# Patient Record
Sex: MALE | Race: BLACK OR AFRICAN AMERICAN | NOT HISPANIC OR LATINO | Employment: OTHER | URBAN - METROPOLITAN AREA
[De-identification: names, ages, dates, MRNs, and addresses within clinical notes are randomized per-mention and may not be internally consistent; named-entity substitution may affect disease eponyms.]

---

## 2017-01-26 DIAGNOSIS — E11.9 TYPE 2 DIABETES MELLITUS WITHOUT COMPLICATION, WITH LONG-TERM CURRENT USE OF INSULIN (H): Primary | ICD-10-CM

## 2017-01-26 DIAGNOSIS — Z79.4 TYPE 2 DIABETES MELLITUS WITHOUT COMPLICATION, WITH LONG-TERM CURRENT USE OF INSULIN (H): Primary | ICD-10-CM

## 2017-01-26 NOTE — TELEPHONE ENCOUNTER
patient needs new diabetic supplies (we are giving him a meter).  has appointment scheduled for 2/3/17.    If patient does not  the meter at our clinic he can call [facility] at [phone] and they will mail him a free meter    Nader Hernandez,   [facility]  [phone]

## 2017-01-27 RX ORDER — BLOOD-GLUCOSE METER
EACH MISCELLANEOUS
Qty: 1 KIT | Refills: 0 | Status: SHIPPED | OUTPATIENT
Start: 2017-01-27 | End: 2017-07-05

## 2017-02-03 ENCOUNTER — OFFICE VISIT (OUTPATIENT)
Dept: FAMILY MEDICINE | Facility: CLINIC | Age: 57
End: 2017-02-03

## 2017-02-03 VITALS
DIASTOLIC BLOOD PRESSURE: 88 MMHG | SYSTOLIC BLOOD PRESSURE: 120 MMHG | HEART RATE: 74 BPM | OXYGEN SATURATION: 98 % | BODY MASS INDEX: 30.79 KG/M2 | WEIGHT: 185 LBS

## 2017-02-03 DIAGNOSIS — K21.9 GASTROESOPHAGEAL REFLUX DISEASE, ESOPHAGITIS PRESENCE NOT SPECIFIED: ICD-10-CM

## 2017-02-03 DIAGNOSIS — E11.21 TYPE 2 DIABETES MELLITUS WITH DIABETIC NEPHROPATHY, WITHOUT LONG-TERM CURRENT USE OF INSULIN (H): ICD-10-CM

## 2017-02-03 DIAGNOSIS — R07.0 THROAT PAIN: Primary | ICD-10-CM

## 2017-02-03 PROCEDURE — 99214 OFFICE O/P EST MOD 30 MIN: CPT | Performed by: FAMILY MEDICINE

## 2017-02-03 PROCEDURE — 36415 COLL VENOUS BLD VENIPUNCTURE: CPT | Performed by: FAMILY MEDICINE

## 2017-02-03 RX ORDER — LANSOPRAZOLE 30 MG/1
30 CAPSULE, DELAYED RELEASE ORAL DAILY
Qty: 30 CAPSULE | Refills: 1 | Status: SHIPPED | OUTPATIENT
Start: 2017-02-03 | End: 2017-02-09

## 2017-02-03 NOTE — Clinical Note
[place] [address] [place]  [address]  Phone: [phone]    February 7, 2017      Emily Zhu  [address]  [facility] [address]              Dear Emily,    The A1c is better again, recheck in 3-4 months. The cholesterol and triglycerides are elevated, of course. Have you ever tried pravastatin(Pravachol) in the past. It causes fewer muscle aches then Crestor. Please let me know.          Sincerely,     Gianluca Apodaca M.D.    Results for orders placed or performed in visit on 02/03/17   Lipid Panel ([facility])   Result Value Ref Range    Cholesterol 255 (H) 100 - 199 mg/dL    Triglycerides 644 (HH) 0 - 149 mg/dL    HDL Cholesterol 38 (L) >39 mg/dL    VLDL Cholesterol Lester Comment 5 - 40 mg/dL    LDL Cholesterol Calculated Comment 0 - 99 mg/dL    LDL/HDL Ratio CANCELED ratio units    Narrative    Performed at:  01 - [facility] [address], [place], [place]  [id number]  : Rafat Chew MD, Phone:  [phone]   Hemoglobin A1C ([facility])   Result Value Ref Range    Hemoglobin A1C 8.1 (H) 4.8 - 5.6 %    Narrative    Performed at:  01 - [facility] [address], [place], [place]  [id number]  : Rafat Chew MD, Phone:  [phone]

## 2017-02-03 NOTE — MR AVS SNAPSHOT
"              After Visit Summary   2/3/2017    Emily Zhu    MRN: [id number]           Patient Information     Date Of Birth          1960        Visit Information        Provider Department      2/3/2017 2:30 PM Gianluca Apodaca MD [facility]        Today's Diagnoses     Throat pain    -  1     Type 2 diabetes mellitus with diabetic nephropathy, without long-term current use of insulin (H)         Gastroesophageal reflux disease, esophagitis presence not specified            Follow-ups after your visit        Additional Services     Referral to [facility]       Referral to [facility]  Phone:  [phone]  Reason for Referral:  consult    Please be aware that coverage of these services is subject to the terms and limitations of your health insurance plan.  Call member services at your health plan with any benefit or coverage questions.                  Who to contact     If you have questions or need follow up information about today's clinic visit or your schedule please contact [facility] directly at [phone].  Normal or non-critical lab and imaging results will be communicated to you by [employer], letter or phone within 4 business days after the clinic has received the results. If you do not hear from us within 7 days, please contact the clinic through [employer] or phone. If you have a critical or abnormal lab result, we will notify you by phone as soon as possible.  Submit refill requests through [employer] or call your pharmacy and they will forward the refill request to us. Please allow 3 business days for your refill to be completed.          Additional Information About Your Visit        [employer] Information     [employer] lets you send messages to your doctor, view your test results, renew your prescriptions, schedule appointments and more. To sign up, go to [email or web address] . Click on \"Log in\" on " "the left side of the screen, which will take you to the Welcome page. Then click on \"Sign up Now\" on the right side of the page.     You will be asked to enter the access code listed below, as well as some personal information. Please follow the directions to create your username and password.     Your access code is: [email or web address]  Expires: 2017  2:50 PM     Your access code will  in 90 days. If you need help or a new code, please call your [facility] or [phone].        Care EveryWhere ID     This is your Care EveryWhere ID. This could be used by other organizations to access your [facility] medical records  [id number]        Your Vitals Were     Pulse Pulse Oximetry                74 98%           Blood Pressure from Last 3 Encounters:   17 120/88   10/21/16 120/80   16 148/90    Weight from Last 3 Encounters:   17 83.915 kg (185 lb)   16 83.915 kg (185 lb)   16 84.1 kg (185 lb 6.5 oz)              We Performed the Following     Hemoglobin A1C ([facility])     Lipid Panel ([facility])     Referral to [facility]          Today's Medication Changes          These changes are accurate as of: 2/3/17  2:50 PM.  If you have any questions, ask your nurse or doctor.               Start taking these medicines.        Dose/Directions    LANsoprazole 30 MG CR capsule   Commonly known as:  PREVACID   Used for:  Gastroesophageal reflux disease, esophagitis presence not specified   Started by:  Gianluca Apodaca MD        Dose:  30 mg   Take 1 capsule (30 mg) by mouth daily Take 30-60 minutes before a meal.   Quantity:  30 capsule   Refills:  1            Where to get your medicines      These medications were sent to [facility] [address] [place] [id number]     Phone:  [phone]    - LANsoprazole 30 MG CR capsule             Primary Care Provider Office Phone # Fax #    Gianluca " RAMESH Apodaca -[phone]       [facility] [address]  [facility] [address]        Thank you!     Thank you for choosing [facility]  for your care. Our goal is always to provide you with excellent care. Hearing back from our patients is one way we can continue to improve our services. Please take a few minutes to complete the written survey that you may receive in the mail after your visit with us. Thank you!             Your Updated Medication List - Protect others around you: Learn how to safely use, store and throw away your medicines at www.disposemymeds.org.          This list is accurate as of: 2/3/17  2:50 PM.  Always use your most recent med list.                   Brand Name Dispense Instructions for use    blood glucose calibration solution     1 each    Use to calibrate blood glucose monitor as directed.       blood glucose monitoring lancets     1 Box    Use to test blood sugars 2 times daily or as directed.       blood glucose monitoring meter device kit     1 kit    Use to test blood sugars 2 times daily or as directed.       blood glucose monitoring test strip    ONE TOUCH VERIO IQ    100 strip    Use to test blood sugars 2 times daily or as directed.       clopidogrel 75 MG tablet    PLAVIX    90 tablet    TAKE ONE TABLET BY MOUTH ONCE DAILY       glipiZIDE 5 MG 24 hr tablet    GLUCOTROL XL    90 tablet    TAKE ONE TABLET BY MOUTH ONCE DAILY       isosorbide mononitrate 30 MG 24 hr tablet    IMDUR    90 tablet    TAKE ONE TABLET BY MOUTH ONCE DAILY       LANsoprazole 30 MG CR capsule    PREVACID    30 capsule    Take 1 capsule (30 mg) by mouth daily Take 30-60 minutes before a meal.       lisinopril 20 MG tablet    PRINIVIL/ZESTRIL    90 tablet    TAKE ONE TABLET BY MOUTH ONCE DAILY       metFORMIN 1000 MG tablet    GLUCOPHAGE    180 tablet    TAKE ONE TABLET BY MOUTH TWICE DAILY WITH MEALS       metoprolol 25 MG tablet    LOPRESSOR    180 tablet     Take 1 tablet (25 mg) by mouth 2 times daily

## 2017-02-03 NOTE — PROGRESS NOTES
SUBJECTIVE: Emily Zhu is a 57 year old male who is here for a DM 2 visit.   A1C      9.3   10/21/2016  A1C     11.4   6/15/2016  A1C      8.0   11/20/2015  A1C      9.2   7/24/2015  A1C      7.2   12/10/2014  Glucose monitoring is done daily, with am sugars averaging between 100-200. Compliance has been good with diet and medications. Has been feeling well, without polyuria, sensory or visual changes. No foot issues.    GENERAL: No change in weight, sleep or appetite.  Normal energy.  No fever or chills  ENT: throat discomfort  RESP: No coughing, wheezing or shortness of breath  CV: No chest pains or palpitations  GI: occasionally heartburn  : No urinary frequency  OBJECTIVE: /88 mmHg  Pulse 74  Wt 83.915 kg (185 lb)  SpO2 98%   Constitutional: healthy, alert and no distress  HEENT pharynx injected, no nodes. Smoker.  Cardiovascular: PMI normal. No lifts, heaves, or thrills. RRR. No murmurs, clicks gallops or rub, No edema or JVD.  Respiratory:  Good diaphragmatic excursion. Lungs clear  Psychiatric: affect normal/bright and mentation appears normal.  ASSESSMENT:   Pharyngitis, poss PND, or reflux  DM 2 ? Controlled, with microalbuminuria  BP at goal.    PLAN: Continue efforts to follow good diet and exercise regimen.  Orders Placed This Encounter   Procedures     VENOUS COLLECTION     Lipid Panel ([facility])     Hemoglobin A1C ([facility])     Referral to [facility]     Prevacid 30 mg daily for 4 weeks, refer to ENT if still uncomfortable throat area

## 2017-02-04 LAB
CHOLEST SERPL-MCNC: 255 MG/DL (ref 100–199)
HBA1C MFR BLD: 8.1 % (ref 4.8–5.6)
HDLC SERPL-MCNC: 38 MG/DL
LDL/HDL RATIO: ABNORMAL RATIO UNITS
LDLC SERPL CALC-MCNC: ABNORMAL MG/DL (ref 0–99)
TRIGL SERPL-MCNC: 644 MG/DL (ref 0–149)
VLDLC SERPL CALC-MCNC: ABNORMAL MG/DL (ref 5–40)

## 2017-02-07 NOTE — PROGRESS NOTES
Quick Note:    The A1c is better again, recheck in 3-4 months. The cholesterol and triglycerides are elevated, of course. Have you ever tried pravastatin(Pravachol) in the past. It causes fewer muscle aches then Crestor. Please let me know.  Dr. Apodaca    ______

## 2017-02-09 ENCOUNTER — TELEPHONE (OUTPATIENT)
Dept: FAMILY MEDICINE | Facility: CLINIC | Age: 57
End: 2017-02-09

## 2017-02-09 DIAGNOSIS — K21.9 ESOPHAGEAL REFLUX: Primary | ICD-10-CM

## 2017-02-09 RX ORDER — PANTOPRAZOLE SODIUM 20 MG/1
TABLET, DELAYED RELEASE ORAL
Qty: 90 TABLET | Refills: 1 | Status: SHIPPED | OUTPATIENT
Start: 2017-02-09 | End: 2018-12-12

## 2017-02-09 NOTE — TELEPHONE ENCOUNTER
Received fax from [facility] pharmacy that patients rx for Lansoprazole needs PA.  Submitted PA to [facility].  Received fax back that it was denied.  Suggestions on formulary are Nexium 24 hr OTC, generic Prilosec or generic Protonix.  Per Dr Apodaca patient can try generic Protonix.  Sent over rx to [facility] pharmacy and notified patient.

## 2017-02-14 ENCOUNTER — APPOINTMENT (OUTPATIENT)
Dept: MRI IMAGING | Facility: CLINIC | Age: 57
End: 2017-02-14
Attending: EMERGENCY MEDICINE
Payer: COMMERCIAL

## 2017-02-14 ENCOUNTER — HOSPITAL ENCOUNTER (EMERGENCY)
Facility: CLINIC | Age: 57
Discharge: HOME | End: 2017-02-14
Attending: EMERGENCY MEDICINE | Admitting: EMERGENCY MEDICINE
Payer: COMMERCIAL

## 2017-02-14 VITALS
TEMPERATURE: 97.8 F | HEART RATE: 83 BPM | WEIGHT: 180 LBS | SYSTOLIC BLOOD PRESSURE: 135 MMHG | HEIGHT: 64 IN | RESPIRATION RATE: 16 BRPM | DIASTOLIC BLOOD PRESSURE: 94 MMHG | OXYGEN SATURATION: 98 % | BODY MASS INDEX: 30.73 KG/M2

## 2017-02-14 DIAGNOSIS — R07.9 CHEST PAIN, UNSPECIFIED TYPE: ICD-10-CM

## 2017-02-14 DIAGNOSIS — Z86.73 HISTORY OF STROKE: ICD-10-CM

## 2017-02-14 DIAGNOSIS — Z86.79 HISTORY OF CORONARY ARTERY DISEASE: ICD-10-CM

## 2017-02-14 DIAGNOSIS — R20.2 PARESTHESIAS: ICD-10-CM

## 2017-02-14 LAB
ANION GAP SERPL CALCULATED.3IONS-SCNC: 8 MMOL/L (ref 3–14)
BASOPHILS # BLD AUTO: 0 10E9/L (ref 0–0.2)
BASOPHILS NFR BLD AUTO: 0.4 %
BUN SERPL-MCNC: 21 MG/DL (ref 7–30)
CALCIUM SERPL-MCNC: 8.5 MG/DL (ref 8.5–10.1)
CHLORIDE SERPL-SCNC: 101 MMOL/L (ref 94–109)
CO2 SERPL-SCNC: 26 MMOL/L (ref 20–32)
CREAT SERPL-MCNC: 1.29 MG/DL (ref 0.66–1.25)
DIFFERENTIAL METHOD BLD: ABNORMAL
EOSINOPHIL # BLD AUTO: 0.2 10E9/L (ref 0–0.7)
EOSINOPHIL NFR BLD AUTO: 1.8 %
ERYTHROCYTE [DISTWIDTH] IN BLOOD BY AUTOMATED COUNT: 12.6 % (ref 10–15)
GFR SERPL CREATININE-BSD FRML MDRD: 57 ML/MIN/1.7M2
GLUCOSE BLDC GLUCOMTR-MCNC: 333 MG/DL (ref 70–99)
GLUCOSE SERPL-MCNC: 288 MG/DL (ref 70–99)
HCT VFR BLD AUTO: 44.3 % (ref 40–53)
HGB BLD-MCNC: 16 G/DL (ref 13.3–17.7)
IMM GRANULOCYTES # BLD: 0 10E9/L (ref 0–0.4)
IMM GRANULOCYTES NFR BLD: 0.3 %
INTERPRETATION ECG - MUSE: NORMAL
LYMPHOCYTES # BLD AUTO: 2.9 10E9/L (ref 0.8–5.3)
LYMPHOCYTES NFR BLD AUTO: 32.1 %
MCH RBC QN AUTO: 33.2 PG (ref 26.5–33)
MCHC RBC AUTO-ENTMCNC: 36.1 G/DL (ref 31.5–36.5)
MCV RBC AUTO: 92 FL (ref 78–100)
MONOCYTES # BLD AUTO: 0.4 10E9/L (ref 0–1.3)
MONOCYTES NFR BLD AUTO: 4.3 %
NEUTROPHILS # BLD AUTO: 5.6 10E9/L (ref 1.6–8.3)
NEUTROPHILS NFR BLD AUTO: 61.1 %
NRBC # BLD AUTO: 0 10*3/UL
NRBC BLD AUTO-RTO: 0 /100
PLATELET # BLD AUTO: 246 10E9/L (ref 150–450)
POTASSIUM SERPL-SCNC: 4.2 MMOL/L (ref 3.4–5.3)
RBC # BLD AUTO: 4.82 10E12/L (ref 4.4–5.9)
SODIUM SERPL-SCNC: 135 MMOL/L (ref 133–144)
TROPONIN I SERPL-MCNC: NORMAL UG/L (ref 0–0.04)
WBC # BLD AUTO: 9.1 10E9/L (ref 4–11)

## 2017-02-14 PROCEDURE — A9585 GADOBUTROL INJECTION: HCPCS | Performed by: RADIOLOGY

## 2017-02-14 PROCEDURE — 93005 ELECTROCARDIOGRAM TRACING: CPT

## 2017-02-14 PROCEDURE — 85025 COMPLETE CBC W/AUTO DIFF WBC: CPT | Performed by: EMERGENCY MEDICINE

## 2017-02-14 PROCEDURE — 25500064 ZZH RX 255 OP 636: Performed by: RADIOLOGY

## 2017-02-14 PROCEDURE — 70553 MRI BRAIN STEM W/O & W/DYE: CPT

## 2017-02-14 PROCEDURE — 25000128 H RX IP 250 OP 636: Performed by: EMERGENCY MEDICINE

## 2017-02-14 PROCEDURE — 80048 BASIC METABOLIC PNL TOTAL CA: CPT | Performed by: EMERGENCY MEDICINE

## 2017-02-14 PROCEDURE — 99285 EMERGENCY DEPT VISIT HI MDM: CPT | Mod: 25

## 2017-02-14 PROCEDURE — 00000146 ZZHCL STATISTIC GLUCOSE BY METER IP

## 2017-02-14 PROCEDURE — 84484 ASSAY OF TROPONIN QUANT: CPT | Performed by: EMERGENCY MEDICINE

## 2017-02-14 PROCEDURE — 96374 THER/PROPH/DIAG INJ IV PUSH: CPT | Mod: 59

## 2017-02-14 RX ORDER — GADOBUTROL 604.72 MG/ML
8 INJECTION INTRAVENOUS ONCE
Status: COMPLETED | OUTPATIENT
Start: 2017-02-14 | End: 2017-02-14

## 2017-02-14 RX ADMIN — GADOBUTROL 8 ML: 604.72 INJECTION INTRAVENOUS at 10:37

## 2017-02-14 RX ADMIN — MIDAZOLAM HYDROCHLORIDE 1 MG: 1 INJECTION, SOLUTION INTRAMUSCULAR; INTRAVENOUS at 09:52

## 2017-02-14 NOTE — ED AVS SNAPSHOT
Emergency Department    [address] [facility] [address]    Phone:  [phone]    Fax:  [phone]                                       Emily Zhu   MRN: [id number]    Department:   Emergency Department   Date of Visit:  2/14/2017           Patient Information     Date Of Birth          1960        Your diagnoses for this visit were:     Paresthesias     Chest pain, unspecified type     History of stroke     History of coronary artery disease        You were seen by Harjeet Salinas MD.      Follow-up Information     Follow up with Gianluca Apodaca MD. Schedule an appointment as soon as possible for a visit in 2 days.    Specialty:  Family Practice    Why:  for recheck in clinic    Contact information:    [facility]  [address]  [facility] [address] [phone]          Follow up with  Emergency Department.    Specialty:  EMERGENCY MEDICINE    Why:  As needed, If symptoms worsen    Contact information:    [address] [facility] [address] [phone]        Follow up with [facility]. Call today.    Why:  to make appt for recheck within 1 week with Neurologist    Contact information:    [address] [facility] [phone]      Discharge References/Attachments     PARAESTHESIAS (ENGLISH)    CHEST PAIN, UNCERTAIN CAUSE (ENGLISH)      24 Hour Appointment Hotline       To make an appointment at any [facility], call [phone] ([phone]). If you don't have a family doctor or clinic, we will help you find one. [facility] are conveniently located to serve the needs of you and your family.             Review of your medicines      Our records show that you are taking the medicines listed below. If these are incorrect, please call your family doctor or clinic.        Dose / Directions Last dose taken    blood glucose calibration solution   Quantity:  1 each         Use to calibrate blood glucose monitor as directed.   Refills:  3        blood glucose monitoring lancets   Quantity:  1 Box        Use to test blood sugars 2 times daily or as directed.   Refills:  3        blood glucose monitoring meter device kit   Quantity:  1 kit        Use to test blood sugars 2 times daily or as directed.   Refills:  0        blood glucose monitoring test strip   Commonly known as:  ONE TOUCH VERIO IQ   Quantity:  100 strip        Use to test blood sugars 2 times daily or as directed.   Refills:  3        clopidogrel 75 MG tablet   Commonly known as:  PLAVIX   Quantity:  90 tablet        TAKE ONE TABLET BY MOUTH ONCE DAILY   Refills:  3        glipiZIDE 5 MG 24 hr tablet   Commonly known as:  GLUCOTROL XL   Quantity:  90 tablet        TAKE ONE TABLET BY MOUTH ONCE DAILY   Refills:  0        isosorbide mononitrate 30 MG 24 hr tablet   Commonly known as:  IMDUR   Quantity:  90 tablet        TAKE ONE TABLET BY MOUTH ONCE DAILY   Refills:  3        lisinopril 20 MG tablet   Commonly known as:  PRINIVIL/ZESTRIL   Quantity:  90 tablet        TAKE ONE TABLET BY MOUTH ONCE DAILY   Refills:  2        metFORMIN 1000 MG tablet   Commonly known as:  GLUCOPHAGE   Quantity:  180 tablet        TAKE ONE TABLET BY MOUTH TWICE DAILY WITH MEALS   Refills:  0        metoprolol 25 MG tablet   Commonly known as:  LOPRESSOR   Dose:  25 mg   Quantity:  180 tablet        Take 1 tablet (25 mg) by mouth 2 times daily   Refills:  3        pantoprazole 20 MG EC tablet   Commonly known as:  PROTONIX   Quantity:  90 tablet        Take by mouth 30-60 minutes before a meal.   Refills:  1                Procedures and tests performed during your visit     Basic metabolic panel    CBC with platelets differential    Cardiac Continuous Monitoring    EKG 12-lead, tracing only    Glucose by meter    Glucose monitor nursing POCT    MR Brain w/o & w Contrast    Peripheral IV catheter    Pulse oximetry nursing    Troponin I       Orders Needing Specimen Collection     None      Pending Results     Date and Time Order Name Status Description    2/14/2017 0924 MR Brain w/o & w Contrast Preliminary             Pending Culture Results     No orders found from 2/12/2017 to 2/15/2017.             Test Results from your hospital stay     2/14/2017  9:42 AM - Interface, Flexilab Results      Component Results     Component Value Ref Range & Units Status    WBC 9.1 4.0 - 11.0 10e9/L Final    RBC Count 4.82 4.4 - 5.9 10e12/L Final    Hemoglobin 16.0 13.3 - 17.7 g/dL Final    Hematocrit 44.3 40.0 - 53.0 % Final    MCV 92 78 - 100 fl Final    MCH 33.2 (H) 26.5 - 33.0 pg Final    MCHC 36.1 31.5 - 36.5 g/dL Final    RDW 12.6 10.0 - 15.0 % Final    Platelet Count 246 150 - 450 10e9/L Final    Diff Method Automated Method  Final    % Neutrophils 61.1 % Final    % Lymphocytes 32.1 % Final    % Monocytes 4.3 % Final    % Eosinophils 1.8 % Final    % Basophils 0.4 % Final    % Immature Granulocytes 0.3 % Final    Nucleated RBCs 0 0 /100 Final    Absolute Neutrophil 5.6 1.6 - 8.3 10e9/L Final    Absolute Lymphocytes 2.9 0.8 - 5.3 10e9/L Final    Absolute Monocytes 0.4 0.0 - 1.3 10e9/L Final    Absolute Eosinophils 0.2 0.0 - 0.7 10e9/L Final    Absolute Basophils 0.0 0.0 - 0.2 10e9/L Final    Abs Immature Granulocytes 0.0 0 - 0.4 10e9/L Final    Absolute Nucleated RBC 0.0  Final         2/14/2017 10:03 AM - Interface, Flexilab Results      Component Results     Component Value Ref Range & Units Status    Sodium 135 133 - 144 mmol/L Final    Potassium 4.2 3.4 - 5.3 mmol/L Final    Chloride 101 94 - 109 mmol/L Final    Carbon Dioxide 26 20 - 32 mmol/L Final    Anion Gap 8 3 - 14 mmol/L Final    Glucose 288 (H) 70 - 99 mg/dL Final    Urea Nitrogen 21 7 - 30 mg/dL Final    Creatinine 1.29 (H) 0.66 - 1.25 mg/dL Final    GFR Estimate 57 (L) >60 mL/min/1.7m2 Final    Non  GFR Calc    GFR Estimate If Black 69 >60 mL/min/1.7m2 Final    African American  GFR Calc    Calcium 8.5 8.5 - 10.1 mg/dL Final         2/14/2017 10:04 AM - Interface, Flexilab Results      Component Results     Component Value Ref Range & Units Status    Troponin I ES  0.000 - 0.045 ug/L Final    <0.015  The 99th percentile for upper reference range is 0.045 ug/L.  Troponin values in   the range of 0.045 - 0.120 ug/L may be associated with risks of adverse   clinical events.           2/14/2017 11:31 AM - Interface, Radiant Ib      Narrative     MRI BRAIN WITHOUT AND WITH CONTRAST  2/14/2017  10:46 AM    HISTORY: 3-7 days of heaviness in the left leg, arm and face. Weakness  and paresthesia.     TECHNIQUE: Multiplanar, multisequence MRI of the brain without and  with 8mL Gadavist    COMPARISON: 6/16/2016    FINDINGS: There is generalized atrophy of the brain. White matter  changes are present in the cerebral hemispheres that are consistent  with small vessel ischemic disease in this age patient. There is no  evidence of hemorrhage, mass, acute infarct, or anomaly. There are no  gadolinium enhancing lesions.    There is scattered mucosal thickening in the paranasal sinuses. There  are bilateral intraocular lens implants. The arteries at the base of  the brain and the dural venous sinuses appear patent.         Impression     IMPRESSION:  1. No evidence of acute infarct, hemorrhage or mass.  2. Brain atrophy and white matter changes consistent with sequelae of  small vessel ischemic disease, unchanged.         2/14/2017  9:50 AM - Interface, Flexilab Results      Component Results     Component Value Ref Range & Units Status    Glucose 333 (H) 70 - 99 mg/dL Final                Clinical Quality Measure: Blood Pressure Screening     Your blood pressure was checked while you were in the emergency department today. The last reading we obtained was  BP: (!) 135/94 . Please read the guidelines below about what these numbers mean and what you should do about them.  If your systolic blood pressure (the  "top number) is less than 120 and your diastolic blood pressure (the bottom number) is less than 80, then your blood pressure is normal. There is nothing more that you need to do about it.  If your systolic blood pressure (the top number) is 120-139 or your diastolic blood pressure (the bottom number) is 80-89, your blood pressure may be higher than it should be. You should have your blood pressure rechecked within a year by a primary care provider.  If your systolic blood pressure (the top number) is 140 or greater or your diastolic blood pressure (the bottom number) is 90 or greater, you may have high blood pressure. High blood pressure is treatable, but if left untreated over time it can put you at risk for heart attack, stroke, or kidney failure. You should have your blood pressure rechecked by a primary care provider within the next 4 weeks.  If your provider in the emergency department today gave you specific instructions to follow-up with your doctor or provider even sooner than that, you should follow that instruction and not wait for up to 4 weeks for your follow-up visit.        Thank you for choosing [facility]       Thank you for choosing [facility] for your care. Our goal is always to provide you with excellent care. Hearing back from our patients is one way we can continue to improve our services. Please take a few minutes to complete the written survey that you may receive in the mail after you visit with us. Thank you!        [employer] Information     [employer] lets you send messages to your doctor, view your test results, renew your prescriptions, schedule appointments and more. To sign up, go to [email or web address] . Click on \"Log in\" on the left side of the screen, which will take you to the Welcome page. Then click on \"Sign up Now\" on the right side of the page.     You will be asked to enter the access code listed below, as well as some personal information. Please follow the directions to create " your username and password.     Your access code is: [email or web address]  Expires: 2017  2:50 PM     Your access code will  in 90 days. If you need help or a new code, please call your [facility] or [phone].        Care EveryWhere ID     This is your Care EveryWhere ID. This could be used by other organizations to access your [facility] medical records  [id number]        After Visit Summary       This is your record. Keep this with you and show to your community pharmacist(s) and doctor(s) at your next visit.

## 2017-02-14 NOTE — ED PROVIDER NOTES
"  History     Chief Complaint:  L sided weakness/heaviness      The history is provided by the patient.      Emily Zhu is a 57 year old male with history of type 2 DM, HTN, hyperlipidemia, and remote stroke on Plavix and Aspirin 81 mg who presents with one-sided weakness.  The patient reports 3-7 days of heaviness in his left leg, arm, and face.   He went to work today but symptoms felt worse prompting visit to the emergency department.  He was evaluated in June 2016 for similar symptoms though he reports this is more severe, MRI results from June as below.  He also reports vague intermittent nonexertional chest pain for over a week, which she has had periodically in the past . No current pain. No dyspnea.  There is a \"pinching\" pain in his left lower extremity.  Patient also reports experiencing chills.  He denies fevers, hearing loss, or other complaint.        MRI Brain without and with contrast 6/15/16:  IMPRESSION:  1. Moderately extensive white matter changes bilaterally. These are nonspecific but could be due to gliosis, chronic ischemic change, or prior demyelination.  2. Old lacunar infarct in both basal ganglia regions.   3. No evidence for intracranial hemorrhage, acute infarct, or any focal mass lesions.     KG TEIXEIRA MD      Allergies:  Crestor [Rosuvastatin] - Myalgia     Medications:    Protonix  Glipizide  Lisinopril  Metformin  Imdur  Plavix  Lopressor  Aspirin 81 mg    Past Medical History:    Type 2 DM  GERD  Coronary atherosclerosis   HTN  Hyperlipidemia  Cataract  Stented coronary artery    Past Surgical History:    Angioplasty - RCA, OM  ORIF hip nailing  Coronary stenting  Colonoscopy  Phacoemulsification clear cornea IOL  Endoscopic release carpal tunnel  Decompression cubital tunnel    Family History:    Mother - CV disease, HTN  Father - CV disease, HTN    Social History:  Presents alone   Tobacco use: 0.70 PPD  Alcohol use: Occasional  PCP: Gianluca Apodaca    Marital Status:  " "Single        Review of Systems   All other systems reviewed and are negative.      Physical Exam     Patient Vitals for the past 24 hrs:   BP Temp Temp src Pulse Heart Rate Resp SpO2 Height Weight   02/14/17 1115 - - - - 78 17 98 % - -   02/14/17 1055 (!) 135/94 - - - 77 - 98 % - -   02/14/17 0923 - - - - - - - 1.626 m (5' 4\") 81.6 kg (180 lb)   02/14/17 0915 (!) 180/104 97.8  F (36.6  C) Oral 83 - 18 98 % - -   02/14/17 0914 (!) 180/104 - - - - - - - -      Physical Exam  General: nontoxic appearing male sitting upright  HENT: mucous membranes moist  CV: regular rate, regular rhythm, no LE edema, negative Yosi's bilaterally  Resp: clear throughout, normal effort  GI: abdomen soft and nontender, no guarding  MSK: no bony tenderness, no CVAT, FROM neck  Skin: appropriately warm and dry  Neuro: awake, alert, clear speech, fully oriented, face symmetric,  minimally weaker on L, finger-nose normal bilaterally, 4+/5 L leg strength, 5/5 on R, no meningismus, ambulatory, seems poorly motivated to participate in exam and question whether full effort made by patient  Psych: anxious, briefly tearful with nurse when discussing his family, cooperative      Emergency Department Course   ECG (09:39:18):  Rate 80 bpm. WV interval 162. QRS duration 70. QT/QTc 380/438. P-R-T axes 37 -62 -24.  Normal sinus rhythm.  Left axis deviation.  Inferior infarct, old.  Anterolateral infarct, old.  Abnormal ECG.  Interpreted at 0950 by Harjeet Salinas MD.     Imaging:  Radiographic findings were communicated with the patient who voiced understanding of the findings.    MR Brain without and with contrast:  IMPRESSION:  1. No evidence of acute infarct, hemorrhage or mass.  2. Brain atrophy and white matter changes consistent with sequelae of small vessel ischemic disease, unchanged.    Preliminary result per radiology.    Laboratory:  CBC: WNL (WBC 9.1, HGB 16.0, )   BMP: Creatinine 1.29 (H), Glucose 288 (H) ow WNL  "     Recent Labs  Lab 02/14/17  0947 02/14/17  0930   GLC  --  288*   *  --       0930: Troponin: <0.015     Interventions:  0952: Versed 1 mg IV    Emergency Department Course:  Past medical records, nursing notes, and vitals reviewed.    I performed electronic chart review in EPIC.    0914: I performed an exam of the patient and obtained history, as documented above.  IV inserted and blood drawn. The patient was placed on continuous cardiac monitoring and pulse oximetry.   The patient was sent for a MRI while in the emergency department, findings above.   I personally reviewed the laboratory results with the Patient and answered all related questions prior to discharge.   1136: I rechecked the patient.  Findings and plan explained to the Patient. Patient discharged home with instructions regarding supportive care, medications, and reasons to return. The importance of close follow-up was reviewed.      Impression & Plan    Medical Decision Making:  The cause of his subacute paresthesias is unclear.  He was at risk for stroke, intracranial hemorrhage, or tumor, among other possibilities, so MRI was ordered and fortunately has benign results.  He appears to have poor effort on the exam and it is not clear how much of his possible slight weakness is volitional.  He is quite anxious which has been noted in the past.  Presentation is not suspicious for infection.  His blood pressure improved to 130's/90's without treatment.  Versed given upon patient request for anxiolysis for MRI.  He has had vague, non-exertional chest pain for the past week and still has a negative troponin with benign EKG.  He is not in pain at this time and I do not think he requires workup for PE, dissection, or other more serious condition.  He agrees with the plan for discharge home.  He was exceptionally happy to have a fairly extended discussion about [place]'s with my physician assistant student Gagan today and agreed with the plan for  discharge home to close follow up as an outpatient through primary care later this week.  He also may ultimately benefit from neurology consultation.      Diagnosis:    ICD-10-CM    1. Paresthesias R20.2    2. Chest pain, unspecified type R07.9    3. History of stroke Z86.73    4. History of coronary artery disease Z86.79        Disposition:  Discharged to home with plan as outlined.      Shola Mak  2/14/2017    EMERGENCY DEPARTMENT    I, Shola Mak, am serving as a scribe at 9:14 AM on 2/14/2017 to document services personally performed by Harjeet Salinas MD based on my observations and the provider's statements to me.       Harjeet Salinas MD  02/14/17 2025

## 2017-02-14 NOTE — ED AVS SNAPSHOT
Emergency Department    [address] [facility] [address]    Phone:  [phone]    Fax:  [phone]                                       Emily Zhu   MRN: [id number]    Department:   Emergency Department   Date of Visit:  2/14/2017           After Visit Summary Signature Page     I have received my discharge instructions, and my questions have been answered. I have discussed any challenges I see with this plan with the nurse or doctor.    ..........................................................................................................................................  Patient/Patient Representative Signature      ..........................................................................................................................................  Patient Representative Print Name and Relationship to Patient    ..................................................               ................................................  Date                                            Time    ..........................................................................................................................................  Reviewed by Signature/Title    ...................................................              ..............................................  Date                                                            Time

## 2017-02-17 ENCOUNTER — OFFICE VISIT (OUTPATIENT)
Dept: FAMILY MEDICINE | Facility: CLINIC | Age: 57
End: 2017-02-17

## 2017-02-17 VITALS
HEART RATE: 84 BPM | SYSTOLIC BLOOD PRESSURE: 122 MMHG | BODY MASS INDEX: 31.76 KG/M2 | OXYGEN SATURATION: 98 % | DIASTOLIC BLOOD PRESSURE: 80 MMHG | WEIGHT: 185 LBS

## 2017-02-17 DIAGNOSIS — R20.2 PARESTHESIA AND PAIN OF LEFT EXTREMITY: Primary | ICD-10-CM

## 2017-02-17 DIAGNOSIS — M79.609 PARESTHESIA AND PAIN OF LEFT EXTREMITY: Primary | ICD-10-CM

## 2017-02-17 PROCEDURE — 99214 OFFICE O/P EST MOD 30 MIN: CPT | Performed by: FAMILY MEDICINE

## 2017-02-17 NOTE — MR AVS SNAPSHOT
"              After Visit Summary   2/17/2017    Emily Zhu    MRN: [id number]           Patient Information     Date Of Birth          1960        Visit Information        Provider Department      2/17/2017 2:15 PM Gianluca Apodaca MD [facility]        Today's Diagnoses     Paresthesia and pain of left extremity    -  1       Follow-ups after your visit        Additional Services     Referral to [facility]       Referral to [facility], [facility].  Phone:  [phone]  Reason for Referral:  Left sided numbness    Please be aware that coverage of these services is subject to the terms and limitations of your health insurance plan.  Call member services at your health plan with any benefit or coverage questions.                  Who to contact     If you have questions or need follow up information about today's clinic visit or your schedule please contact [facility] directly at [phone].  Normal or non-critical lab and imaging results will be communicated to you by MyChart, letter or phone within 4 business days after the clinic has received the results. If you do not hear from us within 7 days, please contact the clinic through MyChart or phone. If you have a critical or abnormal lab result, we will notify you by phone as soon as possible.  Submit refill requests through [employer] or call your pharmacy and they will forward the refill request to us. Please allow 3 business days for your refill to be completed.          Additional Information About Your Visit        MyChart Information     [employer] lets you send messages to your doctor, view your test results, renew your prescriptions, schedule appointments and more. To sign up, go to [email or web address] . Click on \"Log in\" on the left side of the screen, which will take you to the Welcome page. Then click on \"Sign up Now\" on the right side of the page.     You will be asked to " enter the access code listed below, as well as some personal information. Please follow the directions to create your username and password.     Your access code is: [email or web address]  Expires: 2017  2:50 PM     Your access code will  in 90 days. If you need help or a new code, please call your [facility] or [phone].        Care EveryWhere ID     This is your Care EveryWhere ID. This could be used by other organizations to access your [facility] medical records  [id number]        Your Vitals Were     Pulse Pulse Oximetry BMI (Body Mass Index)             84 98% 31.76 kg/m2          Blood Pressure from Last 3 Encounters:   17 122/80   17 (!) 135/94   17 120/88    Weight from Last 3 Encounters:   17 83.9 kg (185 lb)   17 81.6 kg (180 lb)   17 83.9 kg (185 lb)              We Performed the Following     Referral to [facility], [facility].        Primary Care Provider Office Phone # Fax #    Gianluca Apodaca [phone]       [facility] [address] [facility][note 2] [address]        Thank you!     Thank you for choosing [facility]  for your care. Our goal is always to provide you with excellent care. Hearing back from our patients is one way we can continue to improve our services. Please take a few minutes to complete the written survey that you may receive in the mail after your visit with us. Thank you!             Your Updated Medication List - Protect others around you: Learn how to safely use, store and throw away your medicines at www.disposemymeds.org.          This list is accurate as of: 17  2:30 PM.  Always use your most recent med list.                   Brand Name Dispense Instructions for use    blood glucose calibration solution     1 each    Use to calibrate blood glucose monitor as directed.       blood glucose monitoring lancets     1 Box    Use to test blood sugars 2 times daily or as  directed.       blood glucose monitoring meter device kit     1 kit    Use to test blood sugars 2 times daily or as directed.       blood glucose monitoring test strip    ONE TOUCH VERIO IQ    100 strip    Use to test blood sugars 2 times daily or as directed.       clopidogrel 75 MG tablet    PLAVIX    90 tablet    TAKE ONE TABLET BY MOUTH ONCE DAILY       glipiZIDE 5 MG 24 hr tablet    GLUCOTROL XL    90 tablet    TAKE ONE TABLET BY MOUTH ONCE DAILY       isosorbide mononitrate 30 MG 24 hr tablet    IMDUR    90 tablet    TAKE ONE TABLET BY MOUTH ONCE DAILY       lisinopril 20 MG tablet    PRINIVIL/ZESTRIL    90 tablet    TAKE ONE TABLET BY MOUTH ONCE DAILY       metFORMIN 1000 MG tablet    GLUCOPHAGE    180 tablet    TAKE ONE TABLET BY MOUTH TWICE DAILY WITH MEALS       metoprolol 25 MG tablet    LOPRESSOR    180 tablet    Take 1 tablet (25 mg) by mouth 2 times daily       pantoprazole 20 MG EC tablet    PROTONIX    90 tablet    Take by mouth 30-60 minutes before a meal.

## 2017-02-17 NOTE — PROGRESS NOTES
Here to recheck after sensation of left sided heaviness or numbness, involving the cheek, arm and lower extremity. This has been happening since last summer. He has had 2 ED visits, with MRI of brain done each time, with only white matter changes. At the last one, 3 days ago, felt so worried, he was afraid to fall asleep, as he might have a stroke, and be unable to get help. He is diabetic with last A1c of 8.1%, has CAD, and still smokes. He has had a CT release on the left side, and still has some numbness there. CT angio of head. TSH and B12 in the past have been normal.  OBJECTIVE: /80 (BP Location: Right arm)  Pulse 84  Wt 83.9 kg (185 lb)  SpO2 98%  BMI 31.76 kg/m2 he does seem anxious. Face symmetric, normal EOM, tongue and palate movement. Normal shrug. His  is weaker on the left, but hand is well developed. Gait is normal. DTR's are equal.   (M79.609,  R20.2) Paresthesia and pain of left extremity  (primary encounter diagnosis)  Comment: atypical, with negative w/u thus far.   Plan: Referral to [facility]        I emphasized that he needs to keep his DM2 controlled, stop smoking. He has not tolerated statins, but will try pravastatin. He will f/u with me in 3 months.   >25 min >50% counseling

## 2017-02-21 ENCOUNTER — CARE COORDINATION (OUTPATIENT)
Dept: CASE MANAGEMENT | Facility: CLINIC | Age: 57
End: 2017-02-21

## 2017-02-21 NOTE — PROGRESS NOTES
Clinic Care Coordination Contact  OUTREACH    Referral Information:  Referral Source: PCP  Reason for Contact: Glucometer teaching  Care Conference: No     Universal Utilization:   ED Visits in last year: 1  Hospital visits in last year: 1  Last PCP appointment: 02/17/17  Missed Appointments:  (N/A)  Concerns: Management of diabetes  Multiple Providers or Specialists: Cardiology, Neurology    Clinical Concerns:  Current Medical Concerns:   Patient Active Problem List   Diagnosis     Coronary atherosclerosis     Essential hypertension     Mixed hyperlipidemia     Cataract     GERD (gastroesophageal reflux disease)     Health Care Home     Type 2 diabetes mellitus with diabetic nephropathy (H)     Microalbuminuria due to type 2 diabetes mellitus (H)     Chest pain   Current Behavioral Concerns:  Reports no concerns  Education Provided to patient: Glucometer teaching, and review of medications.      Clinical Pathway: None    Medication Management:  Patient manages his own medications    Functional Status:  Mobility Status: Independent  Equipment Currently Used at Home: glucometer  Transportation: Friends           Psychosocial:  Current living arrangement:: I live alone  Financial/Insurance: [employer]       Resources and Interventions:  Current Resources: Other (Comments) (Emergency plan);  (N/A)    Advanced Care Plans/Directives on file:: No    Referrals Placed:  (N/A)     Patient/Caregiver understanding:   Type II Diabetes: Patient having difficulty using his new One Touch Verio IQ glucometer and lancet. Reviewed use of glucometer and lancets with patient.    Blood glucose today 172. Patient reports he is surprised his blood sugar was high because he hasn't had anything to eat. Reviewed his medications and instructions. States he didn't realize he was supposed to be taking Glipizide in addition to Glucophage. Patient was given RN CC contact information, and was informed he can contact the clinic doctor on call  after hours by calling the clinic number.     Numbness left side of face and arm: Referral to [facility]    Frequency of Care Coordination: 3-4 weeks        Plan:   Patient will keep Neurology appointment  RN CC will follow up with patient next week to assess needs.     Tiffanie Conrad RN  Clinic Care Coordination  [facility]  [phone]

## 2017-02-22 NOTE — PROGRESS NOTES
2/21/17 Katelyn faxed 2/17/17 office note with lab results from 6/28/16 to 2/14/17 to \[facility]\"" clinic of neurology, Atten: Dr. Crump @ [phone]    Nader Hernandez,   [facility]  [phone]

## 2017-03-06 NOTE — PROGRESS NOTES
3/2/17 Faxed this office note with 10/21/16 and 2/23/17 lab results to [facility] of neurology @ [phone]    Nader Hernandez,   [facility]  [phone]

## 2017-03-08 ENCOUNTER — CARE COORDINATION (OUTPATIENT)
Dept: CASE MANAGEMENT | Facility: CLINIC | Age: 57
End: 2017-03-08

## 2017-03-09 ENCOUNTER — CARE COORDINATION (OUTPATIENT)
Dept: CASE MANAGEMENT | Facility: CLINIC | Age: 57
End: 2017-03-09

## 2017-03-09 ENCOUNTER — TRANSFERRED RECORDS (OUTPATIENT)
Dept: FAMILY MEDICINE | Facility: CLINIC | Age: 57
End: 2017-03-09

## 2017-03-09 NOTE — PROGRESS NOTES
Clinic Care Coordination Contact  OUTREACH    Referral Information:  Referral Source: PCP  Reason for Contact: Insurance questions  Care Conference: No     Universal Utilization:   ED Visits in last year: 1  Hospital visits in last year: 1  Last PCP appointment: 02/17/17  Missed Appointments:  (N/A)  Concerns: Management of diabetes  Multiple Providers or Specialists: Cardiology, Neurology    Clinical Concerns:  Current Medical Concerns:   Patient Active Problem List   Diagnosis     Coronary atherosclerosis     Essential hypertension     Mixed hyperlipidemia     Cataract     GERD (gastroesophageal reflux disease)     Health Care Home     Type 2 diabetes mellitus with diabetic nephropathy (H)     Microalbuminuria due to type 2 diabetes mellitus (H)     Chest pain       Current Behavioral Concerns: NA    Education Provided to patient: Care Coordination Information, Insurance, BHP   Clinical Pathway: None    Medication Management:  Unclear if compliant     Functional Status:  Mobility Status: Independent  Equipment Currently Used at Home: glucometer  Transportation: Pt utilizes the bus     Psychosocial:  Current living arrangement:: I live alone  Financial/Insurance: [employer], Low income, Part time employee at [employer]     Resources and Interventions:  Current Resources: Other (Comments) (Emergency plan);  (N/A)  PAS Number:  (N/A)  Senior Linkage Line Referral Placed:  (N/A)  Advanced Care Plans/Directives on file:: No  Referrals Placed:  (N/A)     Patient/Caregiver understanding: Met with patient in clinic to review Special Notice regarding his [employer] Medical Assistance Plan. Called help line and disability linkage line and they directed pt to the web-site about providers. Pt rated his preferences and mailed document. Pt spent a great deal of time discussing his social history including his entry into the country and immigration status and past relationships. Pt is employed part-time at [employer] as a   Pt has two sons one in [place] and one in [place], is  (x 2?). Pt had a long time friend that he expressed grief about a deteriorationg relationship with, and stated that she wasn't really supporting him anymore like he wanted her too. Pt showed EDDIE LEIVA his phone which showed he had attempted to call her 35 times in one night and she didn't answer. Pt reports feeling lonely and isolated. Discussed hobbies, patient enjoys gardening, cooking, and playing music. Pt is Yarsanism and has a spiritual corner in his apartment dedicated to this. 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 Pt also talked about women and ideal relationships between men and women. Discussed counseling/therapy and medication for pt's reported depressive symptoms. Pt would like to think about this for a bit before he makes an appointment. Provided EDDIE CC contact information and encouraged patient to call with questions or concerns.     Frequency of Care Coordination: as needed  Upcoming appointment:  (NA)     Plan:  Pt will continue to work with PCP on diabetic management and will discuss depressive symptoms at next appointment. EDDIE CC will remain available to patient.    Marivel Urban [facility]  Clinic Care Coordinator  [facility]  [phone]

## 2017-03-09 NOTE — PROGRESS NOTES
"Clinic Care Coordination Contact  Face to face visit    Referral Information:  Referral Source: PCP  Reason for Contact: Follow up  Care Conference: No     Universal Utilization:   ED Visits in last year: 1  Hospital visits in last year: 1  Last PCP appointment: 02/17/17  Missed Appointments:  (N/A)  Concerns: Management of diabetes  Multiple Providers or Specialists: Cardiology, Neurology    Clinical Concerns:  Current Medical Concerns: Type II Diabetes, Left shoulder pain   Current Behavioral Concerns: None reported  Education Provided to patient: Reviewed use of One Touch Verio IQ meter  Clinical Pathway Name: None      Medication Management: Manages own medications    Functional Status:  Mobility Status: Independent  Equipment Currently Used at Home: glucometer  Transportation: Friends           Psychosocial:  Current living arrangement:: I live alone  Financial/Insurance: [facility]       Resources and Interventions:  Current Resources: Other (Comments) (Emergency plan);  (N/A)  PAS Number:  (N/A)  [facility] Linkage Line Referral Placed:  (N/A)  Advanced Care Plans/Directives on file:: No  Referrals Placed: EDDIE LEIVA     Patient/Caregiver understanding: Patient came to clinic today for assistance with glucometer and for assistance with his health care insurance coverage.     Glucometer: States he is having difficulty using his glucometer. Reviewed each step using lancet, new strips, and One Touch Verio IQ meter. Patient returned demonstration. Blood sugar today 252.  Patient stated \"Maybe the new strips will work better.\" No further questions in regards to blood glucose testing. Will follow up with Dr. Apodaca for patient update.     Insurance: Patient received letter in the mail informing him after 5/1/17 he will no longer be receiving health care coverage through [facility].  He has been instructed to choose  from [facility], [facility], or [facility]. RN CC referred patient to EDDIE LEIVA for further " assistance with health insurance coverage and housing resources.      Frequency of Care Coordination: 3-4 weeks        Plan: RN CC will follow up with patient in 3-4 weeks.     Tiffanie Conrad RN  Clinic Care Coordination  [facility]  [phone]

## 2017-03-16 ENCOUNTER — CARE COORDINATION (OUTPATIENT)
Dept: CASE MANAGEMENT | Facility: CLINIC | Age: 57
End: 2017-03-16

## 2017-03-16 NOTE — PROGRESS NOTES
Clinic Care Coordination Contact  Pt called the clinic and was in distress about his machine and wanted to discuss this with the RN CC. [facility] returned call to make sure that patient was okay. Pt is doing fine, just frustrated with the machine. RN CC had consulted with the MTM about possible options and will contact patient tomorrow with suggestions. Pt would like the return call around 2:15 pm as this is when he gets home from work. Will notify RN CC.     Marivel Urban, [facility]  Clinic Care Coordinator  [facility]  [phone]

## 2017-03-17 NOTE — PROGRESS NOTES
Clinic Care Coordination Contact  [facility]/Voicemail    Clinical Data: Care Coordinator Outreach  Outreach attempted x 1.  Left message on voicemail with call back information and requested return call.    Plan: Care Coordinator will try to reach patient again in 1-2 business days.    Tiffanie Conrad RN  Clinic Care Coordination  [facility]  [phone]

## 2017-03-17 NOTE — PROGRESS NOTES
Clinic Care Coordination Contact  Care Team Conversations    Patient called to report he continues to have difficulty using his One Touch Verio IQ glucometer. States he hasn't been able to get any readings for 5 days. Reviewed step by step instructions. Patient is able to come in on Monday afternoon to meet with care team to review glucometer instructions.     Spoke with Chelo Roberts Pharm D, MTM for recommendations. Per Chelo she will work patient into her schedule on Monday 3/20/17 at 2:30.     Spoke with patient and gave him this information. He was in agreement with this plan.     Tiffanie Conrad RN  Clinic Care Coordination  [facility]  [phone]

## 2017-03-20 ENCOUNTER — OFFICE VISIT (OUTPATIENT)
Dept: PHARMACY | Facility: PHYSICIAN GROUP | Age: 57
End: 2017-03-20
Payer: COMMERCIAL

## 2017-03-20 DIAGNOSIS — E11.21 TYPE 2 DIABETES MELLITUS WITH DIABETIC NEPHROPATHY, WITHOUT LONG-TERM CURRENT USE OF INSULIN (H): Primary | ICD-10-CM

## 2017-03-20 PROCEDURE — 99607 MTMS BY PHARM ADDL 15 MIN: CPT | Performed by: PHARMACIST

## 2017-03-20 PROCEDURE — 99605 MTMS BY PHARM NP 15 MIN: CPT | Performed by: PHARMACIST

## 2017-03-20 NOTE — PROGRESS NOTES
SUBJECTIVE/OBJECTIVE:                                                    Emily Zhu is a 57 year old male coming in for an initial visit for Medication Therapy Management.  He was referred to me from Tiff[facility]- [facility].     Chief Complaint: OneTouch VerioIQ meter not working.    Allergies/ADRs: Reviewed in Epic  Tobacco: 0-1 pack per day - is not interested in quitting Tobacco Cessation Action Plan: Information offered: Patient not interested at this time  Alcohol: Less than 1 beverages / week  Works PT at "[employer] Doesn't drive.   PMH: Reviewed in Epic    Medication Adherence: no issues reported, but doesn't have his meds with him today or a list of what he is taking.     Diabetes:  Pt currently taking metformin 1000mg BID and glipizide xl 5mg. Pt is not experiencing side effects.  SMBG: rarely. Ranges (patient reported): getting Error 4 message for several tests at a time. He is getting enough blood as he checked it twice in the office with me.   Called [employer] and they agreed the meter needs to be replaced, they will be shipping out a new one to him.   Symptoms of low blood sugar? none. Frequency of hypoglycemia? never.  Microalbumin is not < 30 mg/g. Pt is taking an ACEi/ARB.  Aspirin: Taking Plavix daily and denies side effects  Diet/Exercise: doesn't know what to eat    Current labs include:  BP Readings from Last 3 Encounters:   02/17/17 122/80   02/14/17 (!) 135/94   02/03/17 120/88     Today's Vitals: There were no vitals taken for this visit.  Lab Results   Component Value Date    A1C 8.1 02/03/2017   .  Lab Results   Component Value Date    CHOL 255 02/03/2017     Lab Results   Component Value Date    TRIG 644 02/03/2017     Lab Results   Component Value Date    HDL 38 02/03/2017     Lab Results   Component Value Date    LDL Comment 02/03/2017       Liver Function Studies -   Recent Labs   Lab Test  04/20/15   1540   PROTTOTAL  7.9   ALBUMIN  3.5   BILITOTAL  0.4   ALKPHOS  60   AST  24   ALT  71*        No results found for: UCRR, MICROL, UMALCR    Last Basic Metabolic Panel:  Lab Results   Component Value Date     02/14/2017      Lab Results   Component Value Date    POTASSIUM 4.2 02/14/2017     Lab Results   Component Value Date    CHLORIDE 101 02/14/2017     Lab Results   Component Value Date    BUN 21 02/14/2017    BUN 16 04/10/2013     Lab Results   Component Value Date    CR 1.29 02/14/2017     GFR Estimate   Date Value Ref Range Status   02/14/2017 57 (L) >60 mL/min/1.7m2 Final     Comment:     Non  GFR Calc   06/15/2016 67 >60 mL/min/1.7m2 Final     Comment:     Non  GFR Calc   04/20/2015 73 >60 mL/min/1.7m2 Final     Comment:     Non  GFR Calc     GFR Estimate If Black   Date Value Ref Range Status   02/14/2017 69 >60 mL/min/1.7m2 Final     Comment:      GFR Calc   06/15/2016 81 >60 mL/min/1.7m2 Final     Comment:      GFR Calc   04/20/2015 88 >60 mL/min/1.7m2 Final     Comment:      GFR Calc     No results found for: TSH]    Most Recent Immunizations   Administered Date(s) Administered     Pneumococcal 23 valent 07/24/2015     TDAP (ADACEL AGES 11-64) 05/13/2011     ASSESSMENT:                                                       Current medications were reviewed today.     Medication Adherence: no issues identified    Diabetes: Needs Improvement. Patient is not meeting A1c goal of < 7%. Self monitoring of blood glucose is not at goal of fasting  mg/dL and post prandial < 150 mg/dL. Pt would benefit from working meter- provided demo meter today and will be getting another in the mail from [employer]. Diet resources provided, but no time to go into more detail today during our visit.    PLAN:                                                      1. Cholesterol medication? He thinks he is taking something but nothing on list.     2. Diet information offered today- will need to reschedule for more  in depth conversation.     3. OneTouch VerioIQ meter given to him today, also getting a new one mailed to him in 3-4 business days.     4. Patient instructed to call and schedule follow up with me for 1 hour in the next 1-2 weeks to go through all medications and diet with him.       I spent 40 minutes with this patient today.  All changes were made via collaborative practice agreement with Gianluca Apodaca. A copy of the visit note was provided to the patient's primary care provider.    Will follow up in 2 weeks.    The patient was given a summary of these recommendations as an after visit summary.     Chelo Roberts, Pharm.D, [facility]  Medication Therapy Management Pharmacist  [phone]

## 2017-03-20 NOTE — MR AVS SNAPSHOT
"              After Visit Summary   3/20/2017    Emily Zhu    MRN: [id number]           Patient Information     Date Of Birth          1960        Visit Information        Provider Department      3/20/2017 2:30 PM Chelo Roberts RPH [facility]        Today's Diagnoses     Type 2 diabetes mellitus with diabetic nephropathy, without long-term current use of insulin (H)    -  1       Follow-ups after your visit        Who to contact     If you have questions or need follow up information about today's clinic visit or your schedule please contact [facility] directly at [phone].  Normal or non-critical lab and imaging results will be communicated to you by BeatTheBusheshart, letter or phone within 4 business days after the clinic has received the results. If you do not hear from us within 7 days, please contact the clinic through BeatTheBusheshart or phone. If you have a critical or abnormal lab result, we will notify you by phone as soon as possible.  Submit refill requests through Dekkun or call your pharmacy and they will forward the refill request to us. Please allow 3 business days for your refill to be completed.          Additional Information About Your Visit        MyChart Information     D[employer] lets you send messages to your doctor, view your test results, renew your prescriptions, schedule appointments and more. To sign up, go to [email or web address] . Click on \"Log in\" on the left side of the screen, which will take you to the Welcome page. Then click on \"Sign up Now\" on the right side of the page.     You will be asked to enter the access code listed below, as well as some personal information. Please follow the directions to create your username and password.     Your access code is: [email or web address]  Expires: 2017  3:50 PM     Your access code will  in 90 days. If you need help or a new code, please call your [place] clinic or [phone].        Care " EveryWhere ID     This is your Care EveryWhere ID. This could be used by other organizations to access your [facility] medical records  [id number]         Blood Pressure from Last 3 Encounters:   02/17/17 122/80   02/14/17 (!) 135/94   02/03/17 120/88    Weight from Last 3 Encounters:   02/17/17 185 lb (83.9 kg)   02/14/17 180 lb (81.6 kg)   02/03/17 185 lb (83.9 kg)              Today, you had the following     No orders found for display       Primary Care Provider Office Phone # Fax #    Gianluca Apodaca [phone]       [facility] [address]  [facility] [address]        Thank you!     Thank you for choosing [facility]  for your care. Our goal is always to provide you with excellent care. Hearing back from our patients is one way we can continue to improve our services. Please take a few minutes to complete the written survey that you may receive in the mail after your visit with us. Thank you!             Your Updated Medication List - Protect others around you: Learn how to safely use, store and throw away your medicines at www.disposemymeds.org.          This list is accurate as of: 3/20/17  4:03 PM.  Always use your most recent med list.                   Brand Name Dispense Instructions for use    blood glucose calibration solution     1 each    Use to calibrate blood glucose monitor as directed.       blood glucose monitoring lancets     1 Box    Use to test blood sugars 2 times daily or as directed.       blood glucose monitoring meter device kit     1 kit    Use to test blood sugars 2 times daily or as directed.       blood glucose monitoring test strip    ONE TOUCH VERIO IQ    100 strip    Use to test blood sugars 2 times daily or as directed.       clopidogrel 75 MG tablet    PLAVIX    90 tablet    TAKE ONE TABLET BY MOUTH ONCE DAILY       glipiZIDE 5 MG 24 hr tablet    GLUCOTROL XL    90 tablet    TAKE ONE TABLET BY MOUTH ONCE DAILY        isosorbide mononitrate 30 MG 24 hr tablet    IMDUR    90 tablet    TAKE ONE TABLET BY MOUTH ONCE DAILY       lisinopril 20 MG tablet    PRINIVIL/ZESTRIL    90 tablet    TAKE ONE TABLET BY MOUTH ONCE DAILY       metFORMIN 1000 MG tablet    GLUCOPHAGE    180 tablet    TAKE ONE TABLET BY MOUTH TWICE DAILY WITH MEALS       metoprolol 25 MG tablet    LOPRESSOR    180 tablet    Take 1 tablet (25 mg) by mouth 2 times daily       pantoprazole 20 MG EC tablet    PROTONIX    90 tablet    Take by mouth 30-60 minutes before a meal.

## 2017-03-20 NOTE — Clinical Note
Meter is broken- error code is why he can't get a reading most days. I gave him another demo meter and the company is mailing him a new one as well. He is suppose to call back tomorrow with his schedule to find a time to meet with me for more time to go through his medications and talk diet for his DM since he doesn't feel he knows what to do with that. Thank you for the referral!   Chelo Roberts, Pharm.D, [facility] Medication Therapy Management Pharmacist [phone]

## 2017-03-21 ENCOUNTER — TRANSFERRED RECORDS (OUTPATIENT)
Dept: FAMILY MEDICINE | Facility: CLINIC | Age: 57
End: 2017-03-21

## 2017-03-28 ENCOUNTER — TRANSFERRED RECORDS (OUTPATIENT)
Dept: FAMILY MEDICINE | Facility: CLINIC | Age: 57
End: 2017-03-28

## 2017-03-29 DIAGNOSIS — E11.9 TYPE 2 DIABETES MELLITUS WITHOUT COMPLICATION, WITHOUT LONG-TERM CURRENT USE OF INSULIN (H): ICD-10-CM

## 2017-03-30 NOTE — TELEPHONE ENCOUNTER
metformin last OV 2/17/17 last labs 2/14/17 @ [facility]  Mary Goldstein MA March 30, 2017 9:49 AM    Lab Results   Component Value Date    A1C 8.1 02/03/2017    A1C 9.3 10/21/2016    A1C 11.4 06/15/2016    A1C 8.0 11/20/2015    A1C 9.2 07/24/2015

## 2017-03-31 ENCOUNTER — OFFICE VISIT (OUTPATIENT)
Dept: FAMILY MEDICINE | Facility: CLINIC | Age: 57
End: 2017-03-31

## 2017-03-31 ENCOUNTER — CARE COORDINATION (OUTPATIENT)
Dept: CASE MANAGEMENT | Facility: CLINIC | Age: 57
End: 2017-03-31

## 2017-03-31 VITALS
OXYGEN SATURATION: 97 % | HEIGHT: 64 IN | RESPIRATION RATE: 16 BRPM | HEART RATE: 76 BPM | WEIGHT: 187 LBS | SYSTOLIC BLOOD PRESSURE: 116 MMHG | DIASTOLIC BLOOD PRESSURE: 80 MMHG | BODY MASS INDEX: 31.92 KG/M2 | TEMPERATURE: 98.1 F

## 2017-03-31 DIAGNOSIS — Z76.0 ENCOUNTER FOR MEDICATION REFILL: ICD-10-CM

## 2017-03-31 DIAGNOSIS — M75.122 COMPLETE TEAR OF LEFT ROTATOR CUFF: ICD-10-CM

## 2017-03-31 DIAGNOSIS — I10 ESSENTIAL HYPERTENSION: ICD-10-CM

## 2017-03-31 DIAGNOSIS — E11.21 TYPE 2 DIABETES MELLITUS WITH DIABETIC NEPHROPATHY, WITHOUT LONG-TERM CURRENT USE OF INSULIN (H): ICD-10-CM

## 2017-03-31 DIAGNOSIS — Z01.818 PREOP GENERAL PHYSICAL EXAM: Primary | ICD-10-CM

## 2017-03-31 DIAGNOSIS — I25.10 ATHEROSCLEROSIS OF CORONARY ARTERY OF NATIVE HEART WITHOUT ANGINA PECTORIS, UNSPECIFIED VESSEL OR LESION TYPE: ICD-10-CM

## 2017-03-31 LAB
% GRANULOCYTES: 55.4 % (ref 42.2–75.2)
HCT VFR BLD AUTO: 44.6 % (ref 39–51)
HEMOGLOBIN: 15.2 G/DL (ref 13.4–17.5)
LYMPHOCYTES NFR BLD AUTO: 36.7 % (ref 20.5–51.1)
MCH RBC QN AUTO: 31.7 PG (ref 27–31)
MCHC RBC AUTO-ENTMCNC: 34 G/DL (ref 33–37)
MCV RBC AUTO: 93.1 FL (ref 80–100)
MONOCYTES NFR BLD AUTO: 7.9 % (ref 1.7–9.3)
PLATELET # BLD AUTO: 260 K/UL (ref 140–450)
RBC # BLD AUTO: 4.79 X10/CMM (ref 4.2–5.9)
WBC # BLD AUTO: 8.6 X10/CMM (ref 3.8–11)

## 2017-03-31 PROCEDURE — 36415 COLL VENOUS BLD VENIPUNCTURE: CPT | Performed by: FAMILY MEDICINE

## 2017-03-31 PROCEDURE — 85025 COMPLETE CBC W/AUTO DIFF WBC: CPT | Performed by: FAMILY MEDICINE

## 2017-03-31 PROCEDURE — 99215 OFFICE O/P EST HI 40 MIN: CPT | Performed by: FAMILY MEDICINE

## 2017-03-31 RX ORDER — METOPROLOL TARTRATE 25 MG/1
25 TABLET, FILM COATED ORAL 2 TIMES DAILY
Qty: 180 TABLET | Refills: 3 | Status: SHIPPED | OUTPATIENT
Start: 2017-03-31 | End: 2018-04-02

## 2017-03-31 RX ORDER — CLOPIDOGREL BISULFATE 75 MG/1
75 TABLET ORAL DAILY
Qty: 90 TABLET | Refills: 3 | Status: SHIPPED | OUTPATIENT
Start: 2017-03-31 | End: 2018-04-30

## 2017-03-31 RX ORDER — GLIPIZIDE 5 MG/1
5 TABLET, FILM COATED, EXTENDED RELEASE ORAL DAILY
Qty: 90 TABLET | Refills: 0 | Status: SHIPPED | OUTPATIENT
Start: 2017-03-31 | End: 2018-04-18

## 2017-03-31 NOTE — PROGRESS NOTES
[facility]  [address] [place] [id number] [phone]  Dept: [phone]    PRE-OP EVALUATION:  Today's date: 3/31/2017    Emily Zhu (: 1960) presents for pre-operative evaluation assessment as requested by Dr. Deejay Conrad.  He requires evaluation and anesthesia risk assessment prior to undergoing surgery/procedure for treatment of left shoulder rotator cuff pain .  Proposed procedure: LEFT SHOULDER OPEN DECOMPRESSION; LEFT ROTATOR CUFF REPAIR    Date of Surgery/ Procedure: 17  Time of Surgery/ Procedure: 1045am  Hospital/Surgical [facility] same day surgery  Primary Physician: Gianluca Apodaca  Type of Anesthesia Anticipated: General    Patient has a Health Care Directive or Living Will:  YES in chart    1. YES - DO YOU HAVE A HISTORY OF HEART ATTACK, STROKE, STENT, BYPASS OR SURGERY ON AN ARTERY IN THE HEAD, NECK, HEART OR LEG? Artery stent, normal nuclear stress 2016. Able to climb stairs, lift and carry without SOB or CP  2. NO - Do you ever have any pain or discomfort in your chest?  3. YES - DO YOU HAVE A HISTORY OF HEART FAILURE 13 yrs ago  4. NO - Are you troubled by shortness of breath when: walking on the level, up a slight hill or at night?   5. NO - Do you currently have a cold, bronchitis or other respiratory infection?  6. NO - Do you have a cough, shortness of breath or wheezing?  7. NO- DO YOU SOMETIMES GET PAINS IN THE CALVES OF YOUR LEGS WHEN YOU WALK?   8. NO - Do you or anyone in your family have previous history of blood clots?  9. NO - Do you or does anyone in your family have a serious bleeding problem such as prolonged bleeding following surgeries or cuts?  10. NO - Have you ever had problems with anemia or been told to take iron pills?  11. NO - Have you had any abnormal blood loss such as black, tarry or bloody stools, or abnormal vaginal bleeding?  12. NO - Have you ever had a blood transfusion?  13. NO - Have you or  any of your relatives ever had problems with anesthesia?  14. NO - Do you have sleep apnea, excessive snoring or daytime drowsiness?  15. NO - Do you have any prosthetic heart valves?  16. NO - Do you have prosthetic joints?        HPI:                                                      Brief HPI related to upcoming procedure: left rotator cuff syndrome      56 yo male with history of DM2(A1c 8.1% Feb), CAD without symptoms, and HTN    MEDICAL HISTORY:                                                      Patient Active Problem List    Diagnosis Date Noted     Chest pain 06/16/2016     Priority: Medium     Type 2 diabetes mellitus with diabetic nephropathy (H) 11/23/2015     Priority: Medium     Microalbuminuria due to type 2 diabetes mellitus (H) 11/23/2015     Priority: Medium     Health Care Home 11/20/2013     Priority: Medium     State Tier Level:  Tier 3           GERD (gastroesophageal reflux disease) 05/03/2013     Priority: Medium     Cataract 04/10/2013     Priority: Medium     Utility update for deleted IMO code  [employer] Update utility       Coronary atherosclerosis      Priority: Medium     Coronary artery disease. 2 stents in [place]. Normal nuclear stress in 2011.  Problem list name updated by automated process. Provider to review       Essential hypertension      Priority: Medium     Essential hypertension  Problem list name updated by automated process. 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     Stented coronary artery      Type II or unspecified type diabetes mellitus without mention of  complication, not stated as uncontrolled     Diabetes mellitus     Unspecified essential hypertension     Essential hypertension     Past Surgical History:   Procedure Laterality Date     ANGIOPLASTY  2007    rca     ANGIOPLASTY  2010    om     COLONOSCOPY       DECOMPRESSION CUBITAL TUNNEL  11/29/2013    Procedure: DECOMPRESSION CUBITAL TUNNEL;;  Surgeon: Radha Cain MD;  Location: [facility]     ENDOSCOPIC RELEASE CARPAL TUNNEL  11/29/2013    Procedure: ENDOSCOPIC RELEASE CARPAL TUNNEL;  LEFT ENDOSCOPIC CARPAL TUNNEL RELEASE AND CUBITAL TUNNEL RELEASE ;  Surgeon: Radha Cain MD;  Location: [facility]     OPEN REDUCTION INTERNAL FIXATION HIP NAILING  3/30/2012    Procedure:OPEN REDUCTION INTERNAL FIXATION HIP NAILING; Biopsy, Curettage and Bone Grafting Right Hip Lesion, Placement of Hip Screw; Surgeon:MARILIN GAY; Location:[facility]     PHACOEMULSIFICATION CLEAR CORNEA WITH STANDARD INTRAOCULAR LENS IMPLANT  5/8/2013    Procedure: PHACOEMULSIFICATION CLEAR CORNEA WITH STANDARD INTRAOCULAR LENS IMPLANT;  RIGHT EYE PHACOEMULSIFICATION CLEAR CORNEA WITH STANDARD INTRAOCULAR LENS IMPLANT ;  Surgeon: Jovani Pretty MD;  Location: [facility]     PHACOEMULSIFICATION CLEAR CORNEA WITH STANDARD INTRAOCULAR LENS IMPLANT  5/20/2013    Procedure: PHACOEMULSIFICATION CLEAR CORNEA WITH STANDARD INTRAOCULAR LENS IMPLANT;  LEFT  EYE PHACOEMULSIFICATION CLEAR CORNEA WITH STANDARD INTRAOCULAR LENS IMPLANT ;  Surgeon: Jovani Pretty MD;  Location: [facility]     STENT       Current Outpatient Prescriptions   Medication Sig Dispense Refill     metFORMIN (GLUCOPHAGE) 1000 MG tablet TAKE ONE TABLET BY MOUTH TWICE DAILY WITH MEALS 180 tablet 0     metoprolol (LOPRESSOR) 25 MG tablet Take 1 tablet (25 mg) by mouth 2 times daily 180 tablet 3     clopidogrel (PLAVIX) 75 MG tablet Take 1 tablet (75 mg) by mouth daily 90 tablet 3     glipiZIDE (GLUCOTROL XL) 5 MG 24 hr tablet Take 1 tablet (5 mg) by mouth daily 90  tablet 0     pantoprazole (PROTONIX) 20 MG EC tablet Take by mouth 30-60 minutes before a meal. 90 tablet 1     lisinopril (PRINIVIL/ZESTRIL) 20 MG tablet TAKE ONE TABLET BY MOUTH ONCE DAILY 90 tablet 2     blood glucose monitoring (ONETOUCH VERIO) meter device kit Use to test blood sugars 2 times daily or as directed. 1 kit 0     blood glucose monitoring (ONE TOUCH VERIO IQ) test strip Use to test blood sugars 2 times daily or as directed. 100 strip 3     blood glucose monitoring (ONE TOUCH DELICA) lancets Use to test blood sugars 2 times daily or as directed. 1 Box 3     isosorbide mononitrate (IMDUR) 30 MG 24 hr tablet TAKE ONE TABLET BY MOUTH ONCE DAILY 90 tablet 3     blood glucose calibration (ONETOUCH VERIO) solution Use to calibrate blood glucose monitor as directed. 1 each 3     [DISCONTINUED] glipiZIDE (GLUCOTROL XL) 5 MG 24 hr tablet TAKE ONE TABLET BY MOUTH ONCE DAILY 90 tablet 0     [DISCONTINUED] clopidogrel (PLAVIX) 75 MG tablet TAKE ONE TABLET BY MOUTH ONCE DAILY 90 tablet 3     [DISCONTINUED] metoprolol (LOPRESSOR) 25 MG tablet Take 1 tablet (25 mg) by mouth 2 times daily 180 tablet 3     OTC products: None, except as noted above    Allergies   Allergen Reactions     Crestor [Rosuvastatin] Muscle Pain (Myalgia)      Latex Allergy: NO    Social History   Substance Use Topics     Smoking status: Current Every Day Smoker     Packs/day: 1.00     Types: Cigarettes     Smokeless tobacco: Never Used     Alcohol use Yes      Comment: occasion     History   Drug Use No       REVIEW OF SYSTEMS:                                                    C: NEGATIVE for fever, chills, change in weight  I: NEGATIVE for worrisome rashes, moles or lesions  E: NEGATIVE for vision changes or irritation  E/M: NEGATIVE for ear, mouth and throat problems  R: NEGATIVE for significant cough or SOB  CV: NEGATIVE for chest pain, palpitations or peripheral edema  GI: NEGATIVE for nausea, abdominal pain, heartburn, or change in  "bowel habits  : NEGATIVE for frequency, dysuria, or hematuria  M: NEGATIVE for significant arthralgias or myalgia  N: NEGATIVE for weakness, dizziness or paresthesias  E: NEGATIVE for temperature intolerance, skin/hair changes  H: NEGATIVE for bleeding problems  P: NEGATIVE for changes in mood or affect    EXAM:                                                    /80  Pulse 76  Temp 98.1  F (36.7  C) (Oral)  Resp 16  Ht 1.626 m (5' 4\")  Wt 84.8 kg (187 lb)  SpO2 97%  BMI 32.1 kg/m2    GENERAL APPEARANCE: healthy, alert and no distress     EYES: EOMI,  PERRL     HENT: ear canals and TM's normal and nose and mouth without ulcers or lesions     NECK: no adenopathy, no asymmetry, masses, or scars and thyroid normal to palpation     RESP: lungs clear to auscultation - no rales, rhonchi or wheezes     CV: regular rates and rhythm, normal S1 S2, no S3 or S4 and no murmur, click or rub     ABDOMEN:  soft, nontender, no HSM or masses and bowel sounds normal     MS: extremities normal- no gross deformities noted, no evidence of inflammation in joints, FROM in all extremities.     SKIN: no suspicious lesions or rashes     NEURO: Normal strength and tone, sensory exam grossly normal, mentation intact and speech normal     PSYCH: mentation appears normal. and affect normal/bright     LYMPHATICS: No axillary, cervical, or supraclavicular nodes    DIAGNOSTICS:                                                    EKG:  NSR, normal axis, normal intervals, tiny R waves inferior leads, and poor R wave progression anteriorly, no LVH by voltage criteria, unchanged from previous tracings    Recent Labs   Lab Test  02/14/17   0930  02/03/17   1503  10/21/16   1412  06/15/16   1150   04/20/15   1540   HGB  16.0   --    --   16.6   --   15.8   PLT  246   --    --   227   --   235   INR   --    --    --   0.90   --   0.93   NA  135   --    --   136   --   135   POTASSIUM  4.2   --    --   4.1   --   4.0   CR  1.29*   --    --   " 1.12   --   1.05   A1C   --   8.1*  9.3*  11.4*   < >   --     < > = values in this interval not displayed.      Lab Results   Component Value Date    WBC 8.6 03/31/2017    WBC 9.1 02/14/2017     Lab Results   Component Value Date    RBC 4.79 03/31/2017    RBC 4.82 02/14/2017     Lab Results   Component Value Date    HGB 15.2 03/31/2017     Lab Results   Component Value Date    HCT 44.6 03/31/2017     No components found for: MCT  Lab Results   Component Value Date    MCV 93.1 03/31/2017     Lab Results   Component Value Date    MCH 31.7 03/31/2017     Lab Results   Component Value Date    MCHC 34.0 03/31/2017     Lab Results   Component Value Date    RDW 12.6 02/14/2017     Lab Results   Component Value Date     03/31/2017         IMPRESSION:                                                    Reason for surgery/procedure: left rotator cuff tear  Diagnosis/reason for consult: 56 yo male with history of DM2(A1c 8.1% Feb), CAD without symptoms, and HTN. He has abnormal ECG, but can exercise to 4 MET's and had a nuclear stress with no ischemia in June. Discussed case with Dr. Jim of [facility].    The proposed surgical procedure is considered INTERMEDIATE risk.    REVISED CARDIAC RISK INDEX  The patient has the following serious cardiovascular risks for perioperative complications such as (MI, PE, VFib and 3  AV Block):  Coronary Artery Disease (MI, positive stress test, angina, Qs on EKG)  INTERPRETATION: 2 risks: Class III (moderate risk - 6.6% complication rate)    The patient has the following additional risks for perioperative complications:  No identified additional risks      ICD-10-CM    1. Left shoulder pain M25.512 CBC with Diff/Plt ([facility])     Potassium  Serum ([facility])     VENOUS COLLECTION   2. Preop general physical exam Z01.818 CBC with Diff/Plt ([facility])     Potassium  Serum ([facility])     VENOUS COLLECTION   3. Encounter for medication refill Z76.0 CBC with Diff/Plt ([facility])     Potassium  Serum  ([facility])     metoprolol (LOPRESSOR) 25 MG tablet     clopidogrel (PLAVIX) 75 MG tablet     glipiZIDE (GLUCOTROL XL) 5 MG 24 hr tablet     VENOUS COLLECTION   4. Atherosclerosis of coronary artery of native heart without angina pectoris, unspecified vessel or lesion type I25.10    5. Essential hypertension I10    6. Type 2 diabetes mellitus with diabetic nephropathy, without long-term current use of insulin (H) E11.21        RECOMMENDATIONS:                                                      Take only the lisinopril and the metoprolol  the morning of surgery. He may stop his antiplatelet medication whenever surgeons wish.    APPROVAL GIVEN to proceed with proposed procedure, without further diagnostic evaluation       Signed Electronically by: Gianluca Apodaca MD    Copy of this evaluation report is provided to requesting physician.

## 2017-03-31 NOTE — PROGRESS NOTES
Clinic Care Coordination Contact  OUTREACH    Referral Information:  Referral Source: PCP  Reason for Contact: Follow up  Care Conference: No     Universal Utilization:   ED Visits in last year: 1  Hospital visits in last year: 1  Last PCP appointment: 03/31/17  Missed Appointments:  (N/A)  Concerns: Management of diabetes  Multiple Providers or Specialists: Cardiology, Neurology    Clinical Concerns:  Current Medical Concerns: Left shoulder pain   Current Behavioral Concerns: None      Medication Management: Manages own medications      Functional Status:  Mobility Status: Independent  Equipment Currently Used at Home: glucometer  Transportation: Public transportation           Psychosocial:  Current living arrangement:: I live alone  Financial/Insurance: [employer]       Resources and Interventions:  Current Resources: Other (Comments) (Emergency plan);  (N/A)  PAS Number:  (N/A)  Senior Linkage Line Referral Placed:  (N/A)  Advanced Care Plans/Directives on file:: No  Referrals Placed:  (N/A)     Goals:   Goal 1 Statement: I want to have less pain and more strength in my left shoulder.    Goal 1 Progression Percent: 0%  Goal 1 Progression Date: 03/31/17  Barriers: Pain in left shoulder and numbness in left  Hand.  Strengths: Patient is scheduled for shoulder surgery 4/5/17.     Patient/Caregiver understanding: Met with patient today after his pre op visit.  Patient reports he recently spoke with his son to ask him if he can come and stay with him the first night after his surgery. Patient lives alone and does not have any other relatives that live nearby.     Frequency of Care Coordination: 3-4 weeks       Plan: RN CC will follow up with patient after his surgery.     Tiffanie Conrad RN  Clinic Care Coordination  [facility]  [phone]

## 2017-03-31 NOTE — MR AVS SNAPSHOT
After Visit Summary   3/31/2017    Emily Zhu    MRN: [id number]           Patient Information     Date Of Birth          1960        Visit Information        Provider Department      3/31/2017 1:00 PM Gianluca Apodaca MD [facility]        Today's Diagnoses     Preop general physical exam    -  1    Encounter for medication refill        Atherosclerosis of coronary artery of native heart without angina pectoris, unspecified vessel or lesion type        Essential hypertension        Type 2 diabetes mellitus with diabetic nephropathy, without long-term current use of insulin (H)        Complete tear of left rotator cuff          Care Instructions      Before Your Surgery      Call your surgeon if there is any change in your health. This includes signs of a cold or flu (such as a sore throat, runny nose, cough, rash or fever).    Do not smoke, drink alcohol or take over the counter medicine (unless your surgeon or primary care doctor tells you to) for the 24 hours before and after surgery.    If you take prescribed drugs: Follow your doctor s orders about which medicines to take and which to stop until after surgery.    Eating and drinking prior to surgery: follow the instructions from your surgeon    Take a shower or bath the night before surgery. Use the soap your surgeon gave you to gently clean your skin. If you do not have soap from your surgeon, use your regular soap. Do not shave or scrub the surgery site.  Wear clean pajamas and have clean sheets on your bed.         Follow-ups after your visit        Your next 10 appointments already scheduled     Apr 05, 2017   Procedure with Deejay Conrad MD   [facility] (--)    [address]  [facility] [id number] [phone]              Who to contact     If you have questions or need follow up information about today's clinic visit or your schedule please contact [facility]  "GROUP directly at [phone].  Normal or non-critical lab and imaging results will be communicated to you by [employer], letter or phone within 4 business days after the clinic has received the results. If you do not hear from us within 7 days, please contact the clinic through [employer] or phone. If you have a critical or abnormal lab result, we will notify you by phone as soon as possible.  Submit refill requests through [employer] or call your pharmacy and they will forward the refill request to us. Please allow 3 business days for your refill to be completed.          Additional Information About Your Visit        [email or web address] Information     [employer] lets you send messages to your doctor, view your test results, renew your prescriptions, schedule appointments and more. To sign up, go to [email or web address] . Click on \"Log in\" on the left side of the screen, which will take you to the Welcome page. Then click on \"Sign up Now\" on the right side of the page.     You will be asked to enter the access code listed below, as well as some personal information. Please follow the directions to create your username and password.     Your access code is: [email or web address]  Expires: 2017  3:50 PM     Your access code will  in 90 days. If you need help or a new code, please call your [facility] clinic or [phone].        Care EveryWhere ID     This is your Care EveryWhere ID. This could be used by other organizations to access your [facility] medical records  [id number]        Your Vitals Were     Pulse Temperature Respirations Height Pulse Oximetry BMI (Body Mass Index)    76 98.1  F (36.7  C) (Oral) 16 1.626 m (5' 4\") 97% 32.1 kg/m2       Blood Pressure from Last 3 Encounters:   17 116/80   17 122/80   17 (!) 135/94    Weight from Last 3 Encounters:   17 84.8 kg (187 lb)   17 83.9 kg (185 lb)   17 81.6 kg (180 lb)              We Performed the Following     CBC with Diff/Plt ([facility])     " Potassium  Serum ([facility])     VENOUS COLLECTION          Today's Medication Changes          These changes are accurate as of: 3/31/17  2:12 PM.  If you have any questions, ask your nurse or doctor.               These medicines have changed or have updated prescriptions.        Dose/Directions    clopidogrel 75 MG tablet   Commonly known as:  PLAVIX   This may have changed:  See the new instructions.   Used for:  Encounter for medication refill   Changed by:  Gianluca Apodaca MD        Dose:  75 mg   Take 1 tablet (75 mg) by mouth daily   Quantity:  90 tablet   Refills:  3       glipiZIDE 5 MG 24 hr tablet   Commonly known as:  GLUCOTROL XL   This may have changed:  See the new instructions.   Used for:  Encounter for medication refill   Changed by:  Gianluca Apodaca MD        Dose:  5 mg   Take 1 tablet (5 mg) by mouth daily   Quantity:  90 tablet   Refills:  0            Where to get your medicines      These medications were sent to [facility] [address] [place] [address]     Phone:  [phone]     clopidogrel 75 MG tablet    glipiZIDE 5 MG 24 hr tablet    metoprolol 25 MG tablet                Primary Care Provider Office Phone # Fax #    Gianluca Apodaca [phone]       [facility] [address] [facility] [place] [facility] [place] [address]        Thank you!     Thank you for choosing [facility]  for your care. Our goal is always to provide you with excellent care. Hearing back from our patients is one way we can continue to improve our services. Please take a few minutes to complete the written survey that you may receive in the mail after your visit with us. Thank you!             Your Updated Medication List - Protect others around you: Learn how to safely use, store and throw away your medicines at www.disposemymeds.org.          This list is accurate as of: 3/31/17  2:12 PM.  Always use your most  recent med list.                   Brand Name Dispense Instructions for use    blood glucose calibration solution     1 each    Use to calibrate blood glucose monitor as directed.       blood glucose monitoring lancets     1 Box    Use to test blood sugars 2 times daily or as directed.       blood glucose monitoring meter device kit     1 kit    Use to test blood sugars 2 times daily or as directed.       blood glucose monitoring test strip    ONE TOUCH VERIO IQ    100 strip    Use to test blood sugars 2 times daily or as directed.       clopidogrel 75 MG tablet    PLAVIX    90 tablet    Take 1 tablet (75 mg) by mouth daily       glipiZIDE 5 MG 24 hr tablet    GLUCOTROL XL    90 tablet    Take 1 tablet (5 mg) by mouth daily       isosorbide mononitrate 30 MG 24 hr tablet    IMDUR    90 tablet    TAKE ONE TABLET BY MOUTH ONCE DAILY       lisinopril 20 MG tablet    PRINIVIL/ZESTRIL    90 tablet    TAKE ONE TABLET BY MOUTH ONCE DAILY       metFORMIN 1000 MG tablet    GLUCOPHAGE    180 tablet    TAKE ONE TABLET BY MOUTH TWICE DAILY WITH MEALS       metoprolol 25 MG tablet    LOPRESSOR    180 tablet    Take 1 tablet (25 mg) by mouth 2 times daily       pantoprazole 20 MG EC tablet    PROTONIX    90 tablet    Take by mouth 30-60 minutes before a meal.

## 2017-04-01 LAB — POTASSIUM SERPL-SCNC: 4.9 MMOL/L (ref 3.5–5.2)

## 2017-04-03 NOTE — H&P (VIEW-ONLY)
[facility]  [address] [place] [address] [phone]  Dept: [phone]    PRE-OP EVALUATION:  Today's date: 3/31/2017    Emily Zhu (: 1960) presents for pre-operative evaluation assessment as requested by Dr. Deejay Conrad.  He requires evaluation and anesthesia risk assessment prior to undergoing surgery/procedure for treatment of left shoulder rotator cuff pain .  Proposed procedure: LEFT SHOULDER OPEN DECOMPRESSION; LEFT ROTATOR CUFF REPAIR    Date of Surgery/ Procedure: 17  Time of Surgery/ Procedure: 1045am  Hospital/Surgical Facility: [facility] same day surgery  Primary Physician: Gianluca Apodaca  Type of Anesthesia Anticipated: General    Patient has a Health Care Directive or Living Will:  YES in chart    1. YES - DO YOU HAVE A HISTORY OF HEART ATTACK, STROKE, STENT, BYPASS OR SURGERY ON AN ARTERY IN THE HEAD, NECK, HEART OR LEG? Artery stent, normal nuclear stress 2016.  2. NO - Do you ever have any pain or discomfort in your chest?  3. YES - DO YOU HAVE A HISTORY OF HEART FAILURE 13 yrs ago  4. NO - Are you troubled by shortness of breath when: walking on the level, up a slight hill or at night?  5. NO - Do you currently have a cold, bronchitis or other respiratory infection?  6. NO - Do you have a cough, shortness of breath or wheezing?  7. NO- DO YOU SOMETIMES GET PAINS IN THE CALVES OF YOUR LEGS WHEN YOU WALK?   8. NO - Do you or anyone in your family have previous history of blood clots?  9. NO - Do you or does anyone in your family have a serious bleeding problem such as prolonged bleeding following surgeries or cuts?  10. NO - Have you ever had problems with anemia or been told to take iron pills?  11. NO - Have you had any abnormal blood loss such as black, tarry or bloody stools, or abnormal vaginal bleeding?  12. NO - Have you ever had a blood transfusion?  13. NO - Have you or any of your relatives ever had problems with  anesthesia?  14. NO - Do you have sleep apnea, excessive snoring or daytime drowsiness?  15. NO - Do you have any prosthetic heart valves?  16. NO - Do you have prosthetic joints?        HPI:                                                      Brief HPI related to upcoming procedure: left rotator cuff      56 yo male with history of DM2(A1c 8.1% Feb), CAD without symptoms, and HTN    MEDICAL HISTORY:                                                      Patient Active Problem List    Diagnosis Date Noted     Chest pain 06/16/2016     Priority: Medium     Type 2 diabetes mellitus with diabetic nephropathy (H) 11/23/2015     Priority: Medium     Microalbuminuria due to type 2 diabetes mellitus (H) 11/23/2015     Priority: Medium     Health Care Home 11/20/2013     Priority: Medium     State Tier Level:  Tier 3           GERD (gastroesophageal reflux disease) 05/03/2013     Priority: Medium     Cataract 04/10/2013     Priority: Medium     Utility update for deleted IMO code  [employer] Update utility       Coronary atherosclerosis      Priority: Medium     Coronary artery disease. 2 stents in [place]. Normal nuclear stress in 2011.  Problem list name updated by automated process. Provider to review       Essential hypertension      Priority: Medium     Essential hypertension  Problem list name updated by automated process. 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     Stented coronary artery      Type II or unspecified type diabetes mellitus without mention of complication, not stated as uncontrolled     Diabetes mellitus      Unspecified essential hypertension     Essential hypertension     Past Surgical History:   Procedure Laterality Date     ANGIOPLASTY  2007    rca     ANGIOPLASTY  2010    om     COLONOSCOPY       DECOMPRESSION CUBITAL TUNNEL  11/29/2013    Procedure: DECOMPRESSION CUBITAL TUNNEL;;  Surgeon: Radha Cain MD;  Location: [facility]     ENDOSCOPIC RELEASE CARPAL TUNNEL  11/29/2013    Procedure: ENDOSCOPIC RELEASE CARPAL TUNNEL;  LEFT ENDOSCOPIC CARPAL TUNNEL RELEASE AND CUBITAL TUNNEL RELEASE ;  Surgeon: Radha Cain MD;  Location: [facility]     OPEN REDUCTION INTERNAL FIXATION HIP NAILING  3/30/2012    Procedure:OPEN REDUCTION INTERNAL FIXATION HIP NAILING; Biopsy, Curettage and Bone Grafting Right Hip Lesion, Placement of Hip Screw; Surgeon:MARILIN GAY; Location:[facility]     PHACOEMULSIFICATION CLEAR CORNEA WITH STANDARD INTRAOCULAR LENS IMPLANT  5/8/2013    Procedure: PHACOEMULSIFICATION CLEAR CORNEA WITH STANDARD INTRAOCULAR LENS IMPLANT;  RIGHT EYE PHACOEMULSIFICATION CLEAR CORNEA WITH STANDARD INTRAOCULAR LENS IMPLANT ;  Surgeon: Jovani Pretty MD;  [facility]     PHACOEMULSIFICATION CLEAR CORNEA WITH STANDARD INTRAOCULAR LENS IMPLANT  5/20/2013    Procedure: PHACOEMULSIFICATION CLEAR CORNEA WITH STANDARD INTRAOCULAR LENS IMPLANT;  LEFT  EYE PHACOEMULSIFICATION CLEAR CORNEA WITH STANDARD INTRAOCULAR LENS IMPLANT ;  Surgeon: Jovani Pretty MD;  Location:  [facility]     STENT       Current Outpatient Prescriptions   Medication Sig Dispense Refill     metFORMIN (GLUCOPHAGE) 1000 MG tablet TAKE ONE TABLET BY MOUTH TWICE DAILY WITH MEALS 180 tablet 0     metoprolol (LOPRESSOR) 25 MG tablet Take 1 tablet (25 mg) by mouth 2 times daily 180 tablet 3     clopidogrel (PLAVIX) 75 MG tablet Take 1 tablet (75 mg) by mouth daily 90 tablet 3     glipiZIDE (GLUCOTROL XL) 5 MG 24 hr tablet Take 1 tablet (5 mg) by mouth daily 90 tablet 0     pantoprazole (PROTONIX) 20 MG EC tablet Take by mouth  30-60 minutes before a meal. 90 tablet 1     lisinopril (PRINIVIL/ZESTRIL) 20 MG tablet TAKE ONE TABLET BY MOUTH ONCE DAILY 90 tablet 2     blood glucose monitoring (ONETOUCH VERIO) meter device kit Use to test blood sugars 2 times daily or as directed. 1 kit 0     blood glucose monitoring (ONE TOUCH VERIO IQ) test strip Use to test blood sugars 2 times daily or as directed. 100 strip 3     blood glucose monitoring (ONE TOUCH DELICA) lancets Use to test blood sugars 2 times daily or as directed. 1 Box 3     isosorbide mononitrate (IMDUR) 30 MG 24 hr tablet TAKE ONE TABLET BY MOUTH ONCE DAILY 90 tablet 3     blood glucose calibration (ONETOUCH VERIO) solution Use to calibrate blood glucose monitor as directed. 1 each 3     [DISCONTINUED] glipiZIDE (GLUCOTROL XL) 5 MG 24 hr tablet TAKE ONE TABLET BY MOUTH ONCE DAILY 90 tablet 0     [DISCONTINUED] clopidogrel (PLAVIX) 75 MG tablet TAKE ONE TABLET BY MOUTH ONCE DAILY 90 tablet 3     [DISCONTINUED] metoprolol (LOPRESSOR) 25 MG tablet Take 1 tablet (25 mg) by mouth 2 times daily 180 tablet 3     OTC products: None, except as noted above    Allergies   Allergen Reactions     Crestor [Rosuvastatin] Muscle Pain (Myalgia)      Latex Allergy: NO    Social History   Substance Use Topics     Smoking status: Current Every Day Smoker     Packs/day: 1.00     Types: Cigarettes     Smokeless tobacco: Never Used     Alcohol use Yes      Comment: occasion     History   Drug Use No       REVIEW OF SYSTEMS:                                                    C: NEGATIVE for fever, chills, change in weight  I: NEGATIVE for worrisome rashes, moles or lesions  E: NEGATIVE for vision changes or irritation  E/M: NEGATIVE for ear, mouth and throat problems  R: NEGATIVE for significant cough or SOB  CV: NEGATIVE for chest pain, palpitations or peripheral edema  GI: NEGATIVE for nausea, abdominal pain, heartburn, or change in bowel habits  : NEGATIVE for frequency, dysuria, or hematuria  M:  "NEGATIVE for significant arthralgias or myalgia  N: NEGATIVE for weakness, dizziness or paresthesias  E: NEGATIVE for temperature intolerance, skin/hair changes  H: NEGATIVE for bleeding problems  P: NEGATIVE for changes in mood or affect    EXAM:                                                    /80  Pulse 76  Temp 98.1  F (36.7  C) (Oral)  Resp 16  Ht 1.626 m (5' 4\")  Wt 84.8 kg (187 lb)  SpO2 97%  BMI 32.1 kg/m2    GENERAL APPEARANCE: healthy, alert and no distress     EYES: EOMI,  PERRL     HENT: ear canals and TM's normal and nose and mouth without ulcers or lesions     NECK: no adenopathy, no asymmetry, masses, or scars and thyroid normal to palpation     RESP: lungs clear to auscultation - no rales, rhonchi or wheezes     CV: regular rates and rhythm, normal S1 S2, no S3 or S4 and no murmur, click or rub     ABDOMEN:  soft, nontender, no HSM or masses and bowel sounds normal     MS: extremities normal- no gross deformities noted, no evidence of inflammation in joints, FROM in all extremities.     SKIN: no suspicious lesions or rashes     NEURO: Normal strength and tone, sensory exam grossly normal, mentation intact and speech normal     PSYCH: mentation appears normal. and affect normal/bright     LYMPHATICS: No axillary, cervical, or supraclavicular nodes    DIAGNOSTICS:                                                    EKG:  NSR, normal axis, normal intervals, tiny R waves inferior leads, and poor R wave progression anteriorly, no LVH by voltage criteria, unchanged from previous tracings    Recent Labs   Lab Test  02/14/17   0930  02/03/17   1503  10/21/16   1412  06/15/16   1150   04/20/15   1540   HGB  16.0   --    --   16.6   --   15.8   PLT  246   --    --   227   --   235   INR   --    --    --   0.90   --   0.93   NA  135   --    --   136   --   135   POTASSIUM  4.2   --    --   4.1   --   4.0   CR  1.29*   --    --   1.12   --   1.05   A1C   --   8.1*  9.3*  11.4*   < >   --     < > = " values in this interval not displayed.      Lab Results   Component Value Date    WBC 8.6 03/31/2017    WBC 9.1 02/14/2017     Lab Results   Component Value Date    RBC 4.79 03/31/2017    RBC 4.82 02/14/2017     Lab Results   Component Value Date    HGB 15.2 03/31/2017     Lab Results   Component Value Date    HCT 44.6 03/31/2017     No components found for: MCT  Lab Results   Component Value Date    MCV 93.1 03/31/2017     Lab Results   Component Value Date    MCH 31.7 03/31/2017     Lab Results   Component Value Date    MCHC 34.0 03/31/2017     Lab Results   Component Value Date    RDW 12.6 02/14/2017     Lab Results   Component Value Date     03/31/2017         IMPRESSION:                                                    Reason for surgery/procedure: left rotator cuff tear  Diagnosis/reason for consult: 56 yo male with history of DM2(A1c 8.1% Feb), CAD without symptoms, and HTN    The proposed surgical procedure is considered INTERMEDIATE risk.    REVISED CARDIAC RISK INDEX  The patient has the following serious cardiovascular risks for perioperative complications such as (MI, PE, VFib and 3  AV Block):  Coronary Artery Disease (MI, positive stress test, angina, Qs on EKG)  INTERPRETATION: 2 risks: Class III (moderate risk - 6.6% complication rate)    The patient has the following additional risks for perioperative complications:  No identified additional risks      ICD-10-CM    1. Left shoulder pain M25.512 CBC with Diff/Plt ([facility])     Potassium  Serum ([facility])     VENOUS COLLECTION   2. Preop general physical exam Z01.818 CBC with Diff/Plt ([facility])     Potassium  Serum ([facility])     VENOUS COLLECTION   3. Encounter for medication refill Z76.0 CBC with Diff/Plt ([facility])     Potassium  Serum ([facility])     metoprolol (LOPRESSOR) 25 MG tablet     clopidogrel (PLAVIX) 75 MG tablet     glipiZIDE (GLUCOTROL XL) 5 MG 24 hr tablet     VENOUS COLLECTION   4. Atherosclerosis of coronary artery of native heart without  angina pectoris, unspecified vessel or lesion type I25.10    5. Essential hypertension I10    6. Type 2 diabetes mellitus with diabetic nephropathy, without long-term current use of insulin (H) E11.21        RECOMMENDATIONS:                                                      Take only the lisinopril and the metoprolol  the morning of surgery    APPROVAL GIVEN to proceed with proposed procedure, without further diagnostic evaluation       Signed Electronically by: Gianluca Apodaca MD    Copy of this evaluation report is provided to requesting physician.

## 2017-04-04 ENCOUNTER — ANESTHESIA EVENT (OUTPATIENT)
Dept: SURGERY | Facility: CLINIC | Age: 57
End: 2017-04-04

## 2017-04-05 ENCOUNTER — HOSPITAL ENCOUNTER (OUTPATIENT)
Facility: CLINIC | Age: 57
Discharge: HOME | End: 2017-04-05
Attending: ORTHOPAEDIC SURGERY | Admitting: ORTHOPAEDIC SURGERY
Payer: COMMERCIAL

## 2017-04-05 ENCOUNTER — SURGERY (OUTPATIENT)
Age: 57
End: 2017-04-05

## 2017-04-05 ENCOUNTER — DOCUMENTATION ONLY (OUTPATIENT)
Dept: CARDIOLOGY | Facility: CLINIC | Age: 57
End: 2017-04-05

## 2017-04-05 ENCOUNTER — ANESTHESIA (OUTPATIENT)
Dept: SURGERY | Facility: CLINIC | Age: 57
End: 2017-04-05

## 2017-04-05 ENCOUNTER — TELEPHONE (OUTPATIENT)
Dept: FAMILY MEDICINE | Facility: CLINIC | Age: 57
End: 2017-04-05

## 2017-04-05 VITALS
HEIGHT: 64 IN | RESPIRATION RATE: 16 BRPM | TEMPERATURE: 95.7 F | BODY MASS INDEX: 31.16 KG/M2 | DIASTOLIC BLOOD PRESSURE: 88 MMHG | SYSTOLIC BLOOD PRESSURE: 138 MMHG | OXYGEN SATURATION: 98 % | WEIGHT: 182.5 LBS

## 2017-04-05 LAB
ANION GAP SERPL CALCULATED.3IONS-SCNC: 8 MMOL/L (ref 3–14)
BUN SERPL-MCNC: 25 MG/DL (ref 7–30)
CALCIUM SERPL-MCNC: 8.7 MG/DL (ref 8.5–10.1)
CHLORIDE SERPL-SCNC: 105 MMOL/L (ref 94–109)
CO2 SERPL-SCNC: 24 MMOL/L (ref 20–32)
CREAT SERPL-MCNC: 1.34 MG/DL (ref 0.66–1.25)
GFR SERPL CREATININE-BSD FRML MDRD: 55 ML/MIN/1.7M2
GLUCOSE BLDC GLUCOMTR-MCNC: 175 MG/DL (ref 70–99)
GLUCOSE SERPL-MCNC: 179 MG/DL (ref 70–99)
HGB BLD-MCNC: 15.9 G/DL (ref 13.3–17.7)
INR PPP: 0.95 (ref 0.86–1.14)
POTASSIUM SERPL-SCNC: 4.1 MMOL/L (ref 3.4–5.3)
SODIUM SERPL-SCNC: 137 MMOL/L (ref 133–144)

## 2017-04-05 PROCEDURE — 85610 PROTHROMBIN TIME: CPT | Performed by: ORTHOPAEDIC SURGERY

## 2017-04-05 PROCEDURE — 80048 BASIC METABOLIC PNL TOTAL CA: CPT | Performed by: ORTHOPAEDIC SURGERY

## 2017-04-05 PROCEDURE — 36415 COLL VENOUS BLD VENIPUNCTURE: CPT | Performed by: ORTHOPAEDIC SURGERY

## 2017-04-05 PROCEDURE — 85018 HEMOGLOBIN: CPT | Performed by: ORTHOPAEDIC SURGERY

## 2017-04-05 PROCEDURE — 82962 GLUCOSE BLOOD TEST: CPT

## 2017-04-05 RX ORDER — CEFAZOLIN SODIUM 2 G/100ML
2 INJECTION, SOLUTION INTRAVENOUS
Status: DISCONTINUED | OUTPATIENT
Start: 2017-04-05 | End: 2017-04-05 | Stop reason: HOSPADM

## 2017-04-05 RX ORDER — CEFAZOLIN SODIUM 1 G/3ML
1 INJECTION, POWDER, FOR SOLUTION INTRAMUSCULAR; INTRAVENOUS SEE ADMIN INSTRUCTIONS
Status: DISCONTINUED | OUTPATIENT
Start: 2017-04-05 | End: 2017-04-05 | Stop reason: HOSPADM

## 2017-04-05 NOTE — TELEPHONE ENCOUNTER
Rotator surgery canceled this morning due to patient hadn't been off his plavix for the 7 days prior. Had taken it up until 4/3/17.   Also some question regarding EKG changes on preop EKG.   Surgery will possibly rescheduled for next week.   Plan: Dr. Apodaca reviewed EKG with Dr. Jim at [facility] and ok to proceed with surgery.   Ok to continue holding Plavix and ASA until surgery next week. No bridging with lovenox necessary.   Patient and Idalmis at Dr. Houston desai's office, informed.   Yelena Montiel RN

## 2017-04-05 NOTE — ANESTHESIA PREPROCEDURE EVALUATION
Anesthesia Evaluation     .             ROS/MED HX    ENT/Pulmonary:       Neurologic:       Cardiovascular:     (+) ----. Taking blood thinners (stopped ASA>1 week ago but continued Plavix up to 48 hours ago) : . . . :. . Previous cardiac testing date:results:Stress Testdate:6/2016 results:nlECG reviewed date:2/14/2017 results:New inferior infarct date: results:          METS/Exercise Tolerance:     Hematologic:         Musculoskeletal:         GI/Hepatic:         Renal/Genitourinary:         Endo:         Psychiatric:         Infectious Disease:         Malignancy:         Other:                                    Anesthesia Plan          Plan for     Surgery cancelled until off Plavix X 1 week and recent EKG changes addressed      Postoperative Care      Consents                          .

## 2017-04-05 NOTE — PROGRESS NOTES
Returned a phone call from Dr. Apodaca regarding the patient's cardiac risk for undergoing rotator cuff surgery. He has a history of CAD and previous stenting. His preoperative ECG in 2/17 was abnormal, showing SR with inferior Q waves, poor R wave progression, and nonspecific T wave abnormalities in the inferolateral leads. Possible inferior and lateral infarctions. I am told that the patient is very active, carrying heavy loads and walking for prolonged distances without any symptoms. I reviewed his last stress test from 6/2016 (less than a year ago). The report indicates that it was normal, but I reviewed the images and see a small, basal lateral infarction without significant residual infarction. LV systolic function was well preserved with an EF 86%. Based on the low cardiac risk of the surgery, good exercise tolerance without symptoms, and recent stress test without ischemia or LV dysfunction, I don't think that additional cardiac work up is necessary, but would treat the patient with beta blockers throughout the perioperative period and proceed with surgery at a risk for cardiac events that is thought to be low but higher than the general population.  Antonio Jim MD

## 2017-04-05 NOTE — OR NURSING
"2 hours and 8 minutes spent in admission process.  Patient cancelled by Dr. Conrad. Patient took Plavix 2 days ago.  Patient upset and unaware of being off blood thinners.  Upset because \" I can't pay my bills\",  \" took time off from work without pay\" 'no one to help me\" , my brother in law and sister in law upset about helping me today\".   Dr. Conrad's , Idalmis, is to call patient today to reschedule patient, as soon as possible. Dr. Musa states patient should be off Plavix for 7 days.  Talked with Dr. Deejay Conrad regarding above and he asked patient to discuss with Dr. Apodaca , if he needs to be on Lovenox injections during this time.  I talked with Dr. Apodaca's nurse and left this information and that patient needs very clear instructions about when to take and be off medications.  Including being off Lovenox 24 hours before surgery.  Dr. Apodaca will send an addendum regarding the patient's last EKG via fax to [facility].  I talked with  EDWARD, patient's sister in law regarding all of the above and explained need to help patient get medications correct before surgery.  She seemed like she would help him with this.  Patient given number for [facility] for food and cash assistance.   "

## 2017-04-06 NOTE — H&P (VIEW-ONLY)
[facility]  [address] [place] [id number] [phone]  Dept: [phone]    PRE-OP EVALUATION:  Today's date: 3/31/2017    Emily Zhu (: 1960) presents for pre-operative evaluation assessment as requested by Dr. Deejay Conrad.  He requires evaluation and anesthesia risk assessment prior to undergoing surgery/procedure for treatment of left shoulder rotator cuff pain .  Proposed procedure: LEFT SHOULDER OPEN DECOMPRESSION; LEFT ROTATOR CUFF REPAIR    Date of Surgery/ Procedure: 17  Time of Surgery/ Procedure: 1045am  Hospital/Surgical Facility: [facility] same day surgery  Primary Physician: Gianluca Apodaca  Type of Anesthesia Anticipated: General    Patient has a Health Care Directive or Living Will:  YES in chart    1. YES - DO YOU HAVE A HISTORY OF HEART ATTACK, STROKE, STENT, BYPASS OR SURGERY ON AN ARTERY IN THE HEAD, NECK, HEART OR LEG? Artery stent, normal nuclear stress 2016. Able to climb stairs, lift and carry without SOB or CP  2. NO - Do you ever have any pain or discomfort in your chest?  3. YES - DO YOU HAVE A HISTORY OF HEART FAILURE 13 yrs ago  4. NO - Are you troubled by shortness of breath when: walking on the level, up a slight hill or at night?   5. NO - Do you currently have a cold, bronchitis or other respiratory infection?  6. NO - Do you have a cough, shortness of breath or wheezing?  7. NO- DO YOU SOMETIMES GET PAINS IN THE CALVES OF YOUR LEGS WHEN YOU WALK?   8. NO - Do you or anyone in your family have previous history of blood clots?  9. NO - Do you or does anyone in your family have a serious bleeding problem such as prolonged bleeding following surgeries or cuts?  10. NO - Have you ever had problems with anemia or been told to take iron pills?  11. NO - Have you had any abnormal blood loss such as black, tarry or bloody stools, or abnormal vaginal bleeding?  12. NO - Have you ever had a blood transfusion?  13. NO - Have you or  any of your relatives ever had problems with anesthesia?  14. NO - Do you have sleep apnea, excessive snoring or daytime drowsiness?  15. NO - Do you have any prosthetic heart valves?  16. NO - Do you have prosthetic joints?        HPI:                                                      Brief HPI related to upcoming procedure: left rotator cuff syndrome      58 yo male with history of DM2(A1c 8.1% Feb), CAD without symptoms, and HTN    MEDICAL HISTORY:                                                      Patient Active Problem List    Diagnosis Date Noted     Chest pain 06/16/2016     Priority: Medium     Type 2 diabetes mellitus with diabetic nephropathy (H) 11/23/2015     Priority: Medium     Microalbuminuria due to type 2 diabetes mellitus (H) 11/23/2015     Priority: Medium     Health Care Home 11/20/2013     Priority: Medium     State Tier Level:  Tier 3           GERD (gastroesophageal reflux disease) 05/03/2013     Priority: Medium     Cataract 04/10/2013     Priority: Medium     Utility update for deleted IMO code  [employer] Update utility       Coronary atherosclerosis      Priority: Medium     Coronary artery disease. 2 stents in [place]. Normal nuclear stress in 2011.  Problem list name updated by automated process. Provider to review       Essential hypertension      Priority: Medium     Essential hypertension  Problem list name updated by automated process. 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     Stented coronary artery      Type II or unspecified type diabetes mellitus without mention of  complication, not stated as uncontrolled     Diabetes mellitus     Unspecified essential hypertension     Essential hypertension     Past Surgical History:   Procedure Laterality Date     ANGIOPLASTY  2007    rca     ANGIOPLASTY  2010    om     COLONOSCOPY       DECOMPRESSION CUBITAL TUNNEL  11/29/2013    Procedure: DECOMPRESSION CUBITAL TUNNEL;;  Surgeon: Radha Cain MD;  Location: [facility]     ENDOSCOPIC RELEASE CARPAL TUNNEL  11/29/2013    Procedure: ENDOSCOPIC RELEASE CARPAL TUNNEL;  LEFT ENDOSCOPIC CARPAL TUNNEL RELEASE AND CUBITAL TUNNEL RELEASE ;  Surgeon: Radha Cain MD;  Location: [facility]     OPEN REDUCTION INTERNAL FIXATION HIP NAILING  3/30/2012    Procedure:OPEN REDUCTION INTERNAL FIXATION HIP NAILING; Biopsy, Curettage and Bone Grafting Right Hip Lesion, Placement of Hip Screw; Surgeon:MARILIN GAY; Location:[facility]     PHACOEMULSIFICATION CLEAR CORNEA WITH STANDARD INTRAOCULAR LENS IMPLANT  5/8/2013    Procedure: PHACOEMULSIFICATION CLEAR CORNEA WITH STANDARD INTRAOCULAR LENS IMPLANT;  RIGHT EYE PHACOEMULSIFICATION CLEAR CORNEA WITH STANDARD INTRAOCULAR LENS IMPLANT ;  Surgeon: Jovani Pretty MD;  Location: [facility]     PHACOEMULSIFICATION CLEAR CORNEA WITH STANDARD INTRAOCULAR LENS IMPLANT  5/20/2013    Procedure: PHACOEMULSIFICATION CLEAR CORNEA WITH STANDARD INTRAOCULAR LENS IMPLANT;  LEFT  EYE PHACOEMULSIFICATION CLEAR CORNEA WITH STANDARD INTRAOCULAR LENS IMPLANT ;  Surgeon: Jovani Pretty MD;  Location: [facility]     STENT       Current Outpatient Prescriptions   Medication Sig Dispense Refill     metFORMIN (GLUCOPHAGE) 1000 MG tablet TAKE ONE TABLET BY MOUTH TWICE DAILY WITH MEALS 180 tablet 0     metoprolol (LOPRESSOR) 25 MG tablet Take 1 tablet (25 mg) by mouth 2 times daily 180 tablet 3     clopidogrel (PLAVIX) 75 MG tablet Take 1 tablet (75 mg) by mouth daily 90 tablet 3     glipiZIDE (GLUCOTROL XL) 5 MG 24 hr tablet Take 1 tablet (5 mg) by mouth daily 90  tablet 0     pantoprazole (PROTONIX) 20 MG EC tablet Take by mouth 30-60 minutes before a meal. 90 tablet 1     lisinopril (PRINIVIL/ZESTRIL) 20 MG tablet TAKE ONE TABLET BY MOUTH ONCE DAILY 90 tablet 2     blood glucose monitoring (ONETOUCH VERIO) meter device kit Use to test blood sugars 2 times daily or as directed. 1 kit 0     blood glucose monitoring (ONE TOUCH VERIO IQ) test strip Use to test blood sugars 2 times daily or as directed. 100 strip 3     blood glucose monitoring (ONE TOUCH DELICA) lancets Use to test blood sugars 2 times daily or as directed. 1 Box 3     isosorbide mononitrate (IMDUR) 30 MG 24 hr tablet TAKE ONE TABLET BY MOUTH ONCE DAILY 90 tablet 3     blood glucose calibration (ONETOUCH VERIO) solution Use to calibrate blood glucose monitor as directed. 1 each 3     [DISCONTINUED] glipiZIDE (GLUCOTROL XL) 5 MG 24 hr tablet TAKE ONE TABLET BY MOUTH ONCE DAILY 90 tablet 0     [DISCONTINUED] clopidogrel (PLAVIX) 75 MG tablet TAKE ONE TABLET BY MOUTH ONCE DAILY 90 tablet 3     [DISCONTINUED] metoprolol (LOPRESSOR) 25 MG tablet Take 1 tablet (25 mg) by mouth 2 times daily 180 tablet 3     OTC products: None, except as noted above    Allergies   Allergen Reactions     Crestor [Rosuvastatin] Muscle Pain (Myalgia)      Latex Allergy: NO    Social History   Substance Use Topics     Smoking status: Current Every Day Smoker     Packs/day: 1.00     Types: Cigarettes     Smokeless tobacco: Never Used     Alcohol use Yes      Comment: occasion     History   Drug Use No       REVIEW OF SYSTEMS:                                                    C: NEGATIVE for fever, chills, change in weight  I: NEGATIVE for worrisome rashes, moles or lesions  E: NEGATIVE for vision changes or irritation  E/M: NEGATIVE for ear, mouth and throat problems  R: NEGATIVE for significant cough or SOB  CV: NEGATIVE for chest pain, palpitations or peripheral edema  GI: NEGATIVE for nausea, abdominal pain, heartburn, or change in  "bowel habits  : NEGATIVE for frequency, dysuria, or hematuria  M: NEGATIVE for significant arthralgias or myalgia  N: NEGATIVE for weakness, dizziness or paresthesias  E: NEGATIVE for temperature intolerance, skin/hair changes  H: NEGATIVE for bleeding problems  P: NEGATIVE for changes in mood or affect    EXAM:                                                    /80  Pulse 76  Temp 98.1  F (36.7  C) (Oral)  Resp 16  Ht 1.626 m (5' 4\")  Wt 84.8 kg (187 lb)  SpO2 97%  BMI 32.1 kg/m2    GENERAL APPEARANCE: healthy, alert and no distress     EYES: EOMI,  PERRL     HENT: ear canals and TM's normal and nose and mouth without ulcers or lesions     NECK: no adenopathy, no asymmetry, masses, or scars and thyroid normal to palpation     RESP: lungs clear to auscultation - no rales, rhonchi or wheezes     CV: regular rates and rhythm, normal S1 S2, no S3 or S4 and no murmur, click or rub     ABDOMEN:  soft, nontender, no HSM or masses and bowel sounds normal     MS: extremities normal- no gross deformities noted, no evidence of inflammation in joints, FROM in all extremities.     SKIN: no suspicious lesions or rashes     NEURO: Normal strength and tone, sensory exam grossly normal, mentation intact and speech normal     PSYCH: mentation appears normal. and affect normal/bright     LYMPHATICS: No axillary, cervical, or supraclavicular nodes    DIAGNOSTICS:                                                    EKG:  NSR, normal axis, normal intervals, tiny R waves inferior leads, and poor R wave progression anteriorly, no LVH by voltage criteria, unchanged from previous tracings    Recent Labs   Lab Test  02/14/17   0930  02/03/17   1503  10/21/16   1412  06/15/16   1150   04/20/15   1540   HGB  16.0   --    --   16.6   --   15.8   PLT  246   --    --   227   --   235   INR   --    --    --   0.90   --   0.93   NA  135   --    --   136   --   135   POTASSIUM  4.2   --    --   4.1   --   4.0   CR  1.29*   --    --   " 1.12   --   1.05   A1C   --   8.1*  9.3*  11.4*   < >   --     < > = values in this interval not displayed.      Lab Results   Component Value Date    WBC 8.6 03/31/2017    WBC 9.1 02/14/2017     Lab Results   Component Value Date    RBC 4.79 03/31/2017    RBC 4.82 02/14/2017     Lab Results   Component Value Date    HGB 15.2 03/31/2017     Lab Results   Component Value Date    HCT 44.6 03/31/2017     No components found for: MCT  Lab Results   Component Value Date    MCV 93.1 03/31/2017     Lab Results   Component Value Date    MCH 31.7 03/31/2017     Lab Results   Component Value Date    MCHC 34.0 03/31/2017     Lab Results   Component Value Date    RDW 12.6 02/14/2017     Lab Results   Component Value Date     03/31/2017         IMPRESSION:                                                    Reason for surgery/procedure: left rotator cuff tear  Diagnosis/reason for consult: 56 yo male with history of DM2(A1c 8.1% Feb), CAD without symptoms, and HTN. He has abnormal ECG, but can exercise to 4 MET's and had a nuclear stress with no ischemia in June. Discussed case with Dr. Jim of [facility].    The proposed surgical procedure is considered INTERMEDIATE risk.    REVISED CARDIAC RISK INDEX  The patient has the following serious cardiovascular risks for perioperative complications such as (MI, PE, VFib and 3  AV Block):  Coronary Artery Disease (MI, positive stress test, angina, Qs on EKG)  INTERPRETATION: 2 risks: Class III (moderate risk - 6.6% complication rate)    The patient has the following additional risks for perioperative complications:  No identified additional risks      ICD-10-CM    1. Left shoulder pain M25.512 CBC with Diff/Plt ([facility])     Potassium  Serum ([facility])     VENOUS COLLECTION   2. Preop general physical exam Z01.818 CBC with Diff/Plt ([facility])     Potassium  Serum ([facility])     VENOUS COLLECTION   3. Encounter for medication refill Z76.0 CBC with Diff/Plt ([facility])     Potassium  Serum  ([facility])     metoprolol (LOPRESSOR) 25 MG tablet     clopidogrel (PLAVIX) 75 MG tablet     glipiZIDE (GLUCOTROL XL) 5 MG 24 hr tablet     VENOUS COLLECTION   4. Atherosclerosis of coronary artery of native heart without angina pectoris, unspecified vessel or lesion type I25.10    5. Essential hypertension I10    6. Type 2 diabetes mellitus with diabetic nephropathy, without long-term current use of insulin (H) E11.21        RECOMMENDATIONS:                                                      Take only the lisinopril and the metoprolol  the morning of surgery. He may stop his antiplatelet medication whenever surgeons wish.    APPROVAL GIVEN to proceed with proposed procedure, without further diagnostic evaluation       Signed Electronically by: Gianluca Apodaca MD    Copy of this evaluation report is provided to requesting physician.

## 2017-04-12 ENCOUNTER — HOSPITAL ENCOUNTER (OUTPATIENT)
Facility: CLINIC | Age: 57
Discharge: HOME | End: 2017-04-12
Attending: ORTHOPAEDIC SURGERY | Admitting: ORTHOPAEDIC SURGERY
Payer: COMMERCIAL

## 2017-04-12 ENCOUNTER — ANESTHESIA (OUTPATIENT)
Dept: SURGERY | Facility: CLINIC | Age: 57
End: 2017-04-12
Payer: COMMERCIAL

## 2017-04-12 ENCOUNTER — ANESTHESIA EVENT (OUTPATIENT)
Dept: SURGERY | Facility: CLINIC | Age: 57
End: 2017-04-12
Payer: COMMERCIAL

## 2017-04-12 VITALS
WEIGHT: 184 LBS | BODY MASS INDEX: 31.58 KG/M2 | DIASTOLIC BLOOD PRESSURE: 92 MMHG | OXYGEN SATURATION: 97 % | SYSTOLIC BLOOD PRESSURE: 134 MMHG | RESPIRATION RATE: 16 BRPM | TEMPERATURE: 96.8 F

## 2017-04-12 DIAGNOSIS — Z98.890 S/P ROTATOR CUFF REPAIR: Primary | ICD-10-CM

## 2017-04-12 LAB
ANION GAP SERPL CALCULATED.3IONS-SCNC: 6 MMOL/L (ref 3–14)
BUN SERPL-MCNC: 19 MG/DL (ref 7–30)
C DIFF TOX B STL QL: ABNORMAL
CALCIUM SERPL-MCNC: 9.3 MG/DL (ref 8.5–10.1)
CHLORIDE SERPL-SCNC: 100 MMOL/L (ref 94–109)
CO2 SERPL-SCNC: 28 MMOL/L (ref 20–32)
CREAT SERPL-MCNC: 1.14 MG/DL (ref 0.66–1.25)
GFR SERPL CREATININE-BSD FRML MDRD: 66 ML/MIN/1.7M2
GLUCOSE BLDC GLUCOMTR-MCNC: 183 MG/DL (ref 70–99)
GLUCOSE BLDC GLUCOMTR-MCNC: 202 MG/DL (ref 70–99)
GLUCOSE SERPL-MCNC: 206 MG/DL (ref 70–99)
HGB BLD-MCNC: 16 G/DL (ref 13.3–17.7)
INR PPP: 0.88 (ref 0.86–1.14)
POTASSIUM SERPL-SCNC: 4.7 MMOL/L (ref 3.4–5.3)
SODIUM SERPL-SCNC: 134 MMOL/L (ref 133–144)
SPECIMEN SOURCE: ABNORMAL

## 2017-04-12 PROCEDURE — 25000132 ZZH RX MED GY IP 250 OP 250 PS 637: Performed by: ORTHOPAEDIC SURGERY

## 2017-04-12 PROCEDURE — 25000125 ZZHC RX 250: Performed by: NURSE ANESTHETIST, CERTIFIED REGISTERED

## 2017-04-12 PROCEDURE — 37000008 ZZH ANESTHESIA TECHNICAL FEE, 1ST 30 MIN: Performed by: ORTHOPAEDIC SURGERY

## 2017-04-12 PROCEDURE — 36415 COLL VENOUS BLD VENIPUNCTURE: CPT | Performed by: ORTHOPAEDIC SURGERY

## 2017-04-12 PROCEDURE — 82962 GLUCOSE BLOOD TEST: CPT

## 2017-04-12 PROCEDURE — 25800025 ZZH RX 258: Performed by: NURSE ANESTHETIST, CERTIFIED REGISTERED

## 2017-04-12 PROCEDURE — 40000170 ZZH STATISTIC PRE-PROCEDURE ASSESSMENT II: Performed by: ORTHOPAEDIC SURGERY

## 2017-04-12 PROCEDURE — 25000128 H RX IP 250 OP 636: Performed by: NURSE ANESTHETIST, CERTIFIED REGISTERED

## 2017-04-12 PROCEDURE — 25000128 H RX IP 250 OP 636: Performed by: ANESTHESIOLOGY

## 2017-04-12 PROCEDURE — 71000013 ZZH RECOVERY PHASE 1 LEVEL 1 EA ADDTL HR: Performed by: ORTHOPAEDIC SURGERY

## 2017-04-12 PROCEDURE — 27210794 ZZH OR GENERAL SUPPLY STERILE: Performed by: ORTHOPAEDIC SURGERY

## 2017-04-12 PROCEDURE — 71000012 ZZH RECOVERY PHASE 1 LEVEL 1 FIRST HR: Performed by: ORTHOPAEDIC SURGERY

## 2017-04-12 PROCEDURE — 36000063 ZZH SURGERY LEVEL 4 EA 15 ADDTL MIN: Performed by: ORTHOPAEDIC SURGERY

## 2017-04-12 PROCEDURE — 85018 HEMOGLOBIN: CPT | Performed by: ORTHOPAEDIC SURGERY

## 2017-04-12 PROCEDURE — 37000009 ZZH ANESTHESIA TECHNICAL FEE, EACH ADDTL 15 MIN: Performed by: ORTHOPAEDIC SURGERY

## 2017-04-12 PROCEDURE — 25000128 H RX IP 250 OP 636: Performed by: ORTHOPAEDIC SURGERY

## 2017-04-12 PROCEDURE — 80048 BASIC METABOLIC PNL TOTAL CA: CPT | Performed by: ORTHOPAEDIC SURGERY

## 2017-04-12 PROCEDURE — C1713 ANCHOR/SCREW BN/BN,TIS/BN: HCPCS | Performed by: ORTHOPAEDIC SURGERY

## 2017-04-12 PROCEDURE — 25000125 ZZHC RX 250: Performed by: ORTHOPAEDIC SURGERY

## 2017-04-12 PROCEDURE — 85610 PROTHROMBIN TIME: CPT | Performed by: ORTHOPAEDIC SURGERY

## 2017-04-12 PROCEDURE — 36000093 ZZH SURGERY LEVEL 4 1ST 30 MIN: Performed by: ORTHOPAEDIC SURGERY

## 2017-04-12 DEVICE — IMPLANTABLE DEVICE: Type: IMPLANTABLE DEVICE | Site: SHOULDER | Status: FUNCTIONAL

## 2017-04-12 RX ORDER — OXYCODONE AND ACETAMINOPHEN 5; 325 MG/1; MG/1
1-2 TABLET ORAL EVERY 4 HOURS PRN
Qty: 50 TABLET | Refills: 0 | Status: SHIPPED | OUTPATIENT
Start: 2017-04-12 | End: 2017-11-03

## 2017-04-12 RX ORDER — BUPIVACAINE HYDROCHLORIDE AND EPINEPHRINE 2.5; 5 MG/ML; UG/ML
INJECTION, SOLUTION INFILTRATION; PERINEURAL PRN
Status: DISCONTINUED | OUTPATIENT
Start: 2017-04-12 | End: 2017-04-12 | Stop reason: HOSPADM

## 2017-04-12 RX ORDER — OXYCODONE AND ACETAMINOPHEN 5; 325 MG/1; MG/1
1-2 TABLET ORAL EVERY 4 HOURS PRN
Status: DISCONTINUED | OUTPATIENT
Start: 2017-04-12 | End: 2017-04-12 | Stop reason: HOSPADM

## 2017-04-12 RX ORDER — ONDANSETRON 4 MG/1
4 TABLET, ORALLY DISINTEGRATING ORAL EVERY 30 MIN PRN
Status: DISCONTINUED | OUTPATIENT
Start: 2017-04-12 | End: 2017-04-12 | Stop reason: HOSPADM

## 2017-04-12 RX ORDER — FENTANYL CITRATE 50 UG/ML
INJECTION, SOLUTION INTRAMUSCULAR; INTRAVENOUS PRN
Status: DISCONTINUED | OUTPATIENT
Start: 2017-04-12 | End: 2017-04-12

## 2017-04-12 RX ORDER — CEFAZOLIN SODIUM 2 G/100ML
2 INJECTION, SOLUTION INTRAVENOUS
Status: COMPLETED | OUTPATIENT
Start: 2017-04-12 | End: 2017-04-12

## 2017-04-12 RX ORDER — ONDANSETRON 2 MG/ML
INJECTION INTRAMUSCULAR; INTRAVENOUS PRN
Status: DISCONTINUED | OUTPATIENT
Start: 2017-04-12 | End: 2017-04-12

## 2017-04-12 RX ORDER — PROPOFOL 10 MG/ML
INJECTION, EMULSION INTRAVENOUS CONTINUOUS PRN
Status: DISCONTINUED | OUTPATIENT
Start: 2017-04-12 | End: 2017-04-12

## 2017-04-12 RX ORDER — ONDANSETRON 2 MG/ML
4 INJECTION INTRAMUSCULAR; INTRAVENOUS EVERY 6 HOURS PRN
Status: DISCONTINUED | OUTPATIENT
Start: 2017-04-12 | End: 2017-04-12 | Stop reason: HOSPADM

## 2017-04-12 RX ORDER — ONDANSETRON 2 MG/ML
4 INJECTION INTRAMUSCULAR; INTRAVENOUS EVERY 30 MIN PRN
Status: DISCONTINUED | OUTPATIENT
Start: 2017-04-12 | End: 2017-04-12 | Stop reason: HOSPADM

## 2017-04-12 RX ORDER — EPHEDRINE SULFATE 50 MG/ML
INJECTION, SOLUTION INTRAVENOUS PRN
Status: DISCONTINUED | OUTPATIENT
Start: 2017-04-12 | End: 2017-04-12

## 2017-04-12 RX ORDER — SODIUM CHLORIDE, SODIUM LACTATE, POTASSIUM CHLORIDE, CALCIUM CHLORIDE 600; 310; 30; 20 MG/100ML; MG/100ML; MG/100ML; MG/100ML
INJECTION, SOLUTION INTRAVENOUS CONTINUOUS PRN
Status: DISCONTINUED | OUTPATIENT
Start: 2017-04-12 | End: 2017-04-12

## 2017-04-12 RX ORDER — NALOXONE HYDROCHLORIDE 0.4 MG/ML
.1-.4 INJECTION, SOLUTION INTRAMUSCULAR; INTRAVENOUS; SUBCUTANEOUS
Status: DISCONTINUED | OUTPATIENT
Start: 2017-04-12 | End: 2017-04-12 | Stop reason: HOSPADM

## 2017-04-12 RX ORDER — PROPOFOL 10 MG/ML
INJECTION, EMULSION INTRAVENOUS PRN
Status: DISCONTINUED | OUTPATIENT
Start: 2017-04-12 | End: 2017-04-12

## 2017-04-12 RX ORDER — ONDANSETRON 4 MG/1
4 TABLET, ORALLY DISINTEGRATING ORAL EVERY 6 HOURS PRN
Status: DISCONTINUED | OUTPATIENT
Start: 2017-04-12 | End: 2017-04-12 | Stop reason: HOSPADM

## 2017-04-12 RX ORDER — PHYSOSTIGMINE SALICYLATE 1 MG/ML
1.2 INJECTION INTRAVENOUS
Status: DISCONTINUED | OUTPATIENT
Start: 2017-04-12 | End: 2017-04-12 | Stop reason: HOSPADM

## 2017-04-12 RX ORDER — GINSENG 100 MG
CAPSULE ORAL
Status: DISCONTINUED
Start: 2017-04-12 | End: 2017-04-12 | Stop reason: HOSPADM

## 2017-04-12 RX ORDER — MEPERIDINE HYDROCHLORIDE 25 MG/ML
12.5 INJECTION INTRAMUSCULAR; INTRAVENOUS; SUBCUTANEOUS
Status: DISCONTINUED | OUTPATIENT
Start: 2017-04-12 | End: 2017-04-12 | Stop reason: HOSPADM

## 2017-04-12 RX ORDER — FENTANYL CITRATE 50 UG/ML
25-50 INJECTION, SOLUTION INTRAMUSCULAR; INTRAVENOUS
Status: DISCONTINUED | OUTPATIENT
Start: 2017-04-12 | End: 2017-04-12 | Stop reason: HOSPADM

## 2017-04-12 RX ORDER — FENTANYL CITRATE 50 UG/ML
25-50 INJECTION, SOLUTION INTRAMUSCULAR; INTRAVENOUS EVERY 5 MIN PRN
Status: DISCONTINUED | OUTPATIENT
Start: 2017-04-12 | End: 2017-04-12 | Stop reason: HOSPADM

## 2017-04-12 RX ORDER — CEFAZOLIN SODIUM 1 G/3ML
1 INJECTION, POWDER, FOR SOLUTION INTRAMUSCULAR; INTRAVENOUS SEE ADMIN INSTRUCTIONS
Status: DISCONTINUED | OUTPATIENT
Start: 2017-04-12 | End: 2017-04-12 | Stop reason: HOSPADM

## 2017-04-12 RX ORDER — HYDROMORPHONE HYDROCHLORIDE 1 MG/ML
.3-.5 INJECTION, SOLUTION INTRAMUSCULAR; INTRAVENOUS; SUBCUTANEOUS
Status: DISCONTINUED | OUTPATIENT
Start: 2017-04-12 | End: 2017-04-12 | Stop reason: HOSPADM

## 2017-04-12 RX ORDER — ACETAMINOPHEN 650 MG
TABLET, EXTENDED RELEASE ORAL PRN
Status: DISCONTINUED | OUTPATIENT
Start: 2017-04-12 | End: 2017-04-12 | Stop reason: HOSPADM

## 2017-04-12 RX ORDER — SODIUM CHLORIDE, SODIUM LACTATE, POTASSIUM CHLORIDE, CALCIUM CHLORIDE 600; 310; 30; 20 MG/100ML; MG/100ML; MG/100ML; MG/100ML
INJECTION, SOLUTION INTRAVENOUS CONTINUOUS
Status: DISCONTINUED | OUTPATIENT
Start: 2017-04-12 | End: 2017-04-12 | Stop reason: HOSPADM

## 2017-04-12 RX ADMIN — MIDAZOLAM HYDROCHLORIDE 2 MG: 1 INJECTION, SOLUTION INTRAMUSCULAR; INTRAVENOUS at 10:48

## 2017-04-12 RX ADMIN — PHENYLEPHRINE HYDROCHLORIDE 100 MCG: 10 INJECTION, SOLUTION INTRAMUSCULAR; INTRAVENOUS; SUBCUTANEOUS at 12:14

## 2017-04-12 RX ADMIN — MIDAZOLAM HYDROCHLORIDE 2 MG: 1 INJECTION, SOLUTION INTRAMUSCULAR; INTRAVENOUS at 11:15

## 2017-04-12 RX ADMIN — EPHEDRINE SULFATE 5 MG: 50 INJECTION, SOLUTION INTRAMUSCULAR; INTRAVENOUS; SUBCUTANEOUS at 11:24

## 2017-04-12 RX ADMIN — CEFAZOLIN SODIUM 2 G: 2 INJECTION, SOLUTION INTRAVENOUS at 11:22

## 2017-04-12 RX ADMIN — EPHEDRINE SULFATE 5 MG: 50 INJECTION, SOLUTION INTRAMUSCULAR; INTRAVENOUS; SUBCUTANEOUS at 11:45

## 2017-04-12 RX ADMIN — ROPIVACAINE HYDROCHLORIDE 30 ML: 5 INJECTION, SOLUTION EPIDURAL; INFILTRATION; PERINEURAL at 10:48

## 2017-04-12 RX ADMIN — PROPOFOL 175 MCG/KG/MIN: 10 INJECTION, EMULSION INTRAVENOUS at 11:19

## 2017-04-12 RX ADMIN — PROPOFOL 200 MG: 10 INJECTION, EMULSION INTRAVENOUS at 11:15

## 2017-04-12 RX ADMIN — SODIUM CHLORIDE, POTASSIUM CHLORIDE, SODIUM LACTATE AND CALCIUM CHLORIDE: 600; 310; 30; 20 INJECTION, SOLUTION INTRAVENOUS at 12:06

## 2017-04-12 RX ADMIN — SODIUM CHLORIDE, POTASSIUM CHLORIDE, SODIUM LACTATE AND CALCIUM CHLORIDE: 600; 310; 30; 20 INJECTION, SOLUTION INTRAVENOUS at 11:09

## 2017-04-12 RX ADMIN — ONDANSETRON 4 MG: 2 INJECTION INTRAMUSCULAR; INTRAVENOUS at 12:27

## 2017-04-12 RX ADMIN — FENTANYL CITRATE 50 MCG: 50 INJECTION, SOLUTION INTRAMUSCULAR; INTRAVENOUS at 12:00

## 2017-04-12 RX ADMIN — OXYCODONE HYDROCHLORIDE AND ACETAMINOPHEN 1 TABLET: 5; 325 TABLET ORAL at 16:03

## 2017-04-12 RX ADMIN — FENTANYL CITRATE 100 MCG: 50 INJECTION, SOLUTION INTRAMUSCULAR; INTRAVENOUS at 11:15

## 2017-04-12 RX ADMIN — ONDANSETRON 4 MG: 2 SOLUTION INTRAMUSCULAR; INTRAVENOUS at 13:59

## 2017-04-12 RX ADMIN — PHENYLEPHRINE HYDROCHLORIDE 50 MCG: 10 INJECTION, SOLUTION INTRAMUSCULAR; INTRAVENOUS; SUBCUTANEOUS at 11:57

## 2017-04-12 ASSESSMENT — LIFESTYLE VARIABLES: TOBACCO_USE: 1

## 2017-04-12 NOTE — DISCHARGE INSTRUCTIONS
Restart Plavix tomorrow.      [facility]  Discharge Instructions -- Ultra Sling Directions MD Víctor Sawyer MD           1.    [facility]  [facility] [address] [address]  [phone]        Discharge Instructions After Rotator Cuff Repair  Deejay Conrad MD    Post Anesthesia Instructions:    You have received sedation, rest and relax the day of surgery.  Please be aware of possible dizziness and exercise caution when you are up.  A responsible adult must be with you for 24 hours following surgery for safety and fall prevention.    Begin with liquids and advance diet as tolerated: avoid greasy and spicy foods.     No important decision or signing of legal documents for 24 hours. Do not operate poser machinery    Do not drive for 24 hours following surgery or while taking narcotics    If you are unable to urinate, feel uncomfortable and it has been 8-10 hours since you last urinated, go to an urgent care or the ER.    Medications:  Take the prescribed narcotic pain medication if your pain is significant You may also use Tylenol 650 mg every six hours, if you wish to supplement and increase the effectiveness on the narcotic pain medication.  If your narcotic pain medication has Tylenol in it be sure that you do not exceed 4000 mg of Tylenol in a 24 hour period. After surgery, pain typically will settle down after three to five days, at which point you should be able to diminish or discontinue the narcotic pain medication.  You may resume you pre-operative medications per your physician.        Shoulder Care:     Many patients have a Regional Nerve Block before their surgery to help with pain management afterwards.  Start taking your pain medication before the block wears off.  The block may wear off fairly quickly so have some pain medication ready to take as soon as you feel the pain coming on.  Do not be alarmed if the block is still working the following  morning.  The average duration of the block is anywhere from 8-24 hours.    Ice your shoulder on a regular basis over the first 72 hours. Use the cold pack or ice for 15-20 minutes at a time and do this 4 times per day. You can continue icing it thereafter, if you wish, but it is not necessary.    Remove your dressing the day after the surgery.  You may shower directly over the wound at that time, but do not submerge the incision underwater. After you towel dry following your shower, apply band aids over the small incisions. Occasionally you may need a gauze pad if the wound is still weeping, which is not unusual for 3-5 days. Please call if the weeping or drainage persists beyond 5 days or is increasing.    Do not be alarmed if you see bruising on your shoulder or chest wall in a few days.  This is very common.     You should use the Ultra-Sling arm support at all times day and night. You may remove the sling to bathe.  Straighten your elbow several times a day to prevent stiffening.  Also move your hand, wrist and fingers frequently to prevent swelling and stiffness in your hand.  You will be using this sling for approximately 6 weeks following this surgery.     Physical Therapy:     You will receive a referral for your physical therapy at the time of your postoperative appointment scheduled at our office.  You should then promptly call to arrange your physical therapy appointments according to that protocol. You will be undergoing supervised physical therapy for approximately 6 weeks.    Activities:     You can assume modified daily activities as soon as you are able while wearing the sling.  You should not operate a motor vehicle while on narcotic pain medication and wearing a sling.    Exercises:     Begin Codman's pendulum exercises as soon as you are comfortable, tomorrow if possible. You are to remove the sling to do these exercises.  While bending forward at the waist, you will let your arm hang straight  "down towards the floor and swing it gently as a pendulum on a clock would swing.  Do not actively elevate your arm away from your body and up overhead.      Doctor's Postoperative Appointment:     You should have clinic postoperative follow-up appointment already scheduled.  This appointment is typically setup for you when your surgery is scheduled. We will examine your shoulder,. Review your photos and discuss further recovery.  If you are not sure when to come in for that appointment, please call us at your convenience.       Be alert for signs of infection: redness, swelling, heat, red streaks and elevated temperature over 101  Contact Dr. Conrad's office if these occur.    Please do not hesitate to call if you have any   questions or concerns:  During clinic hours: [phone], after hours [phone].        [facility]      DISCHARGE INSTRUCTIONS FOLLOWING   REGIONAL BLOCK ANESTHESIA      Numbness or lack of feeling in the arm/leg that was operated may last up to 24 hours.  The average time is usually 10-15 hours.  You may not be able to lift or move the arm or leg where the operation was by itself during that time.  Long-acting local anesthetic medicines were used to give you long-lasting pain relief.    Wear a sling until your arm is completely \"awake\"    Avoid bumping your arm, leg or foot while it is numb    Avoid extremes of hot or cold while it is numb    Remain quiet and restful the day of surgery.  Resume normal activities gradually over the next day or so as advise by your surgeon.    Do not drive or operate  Any machinery until your extremity is full  \"awake\"        You will have a tingling and prickly sensation in your arm/leg as the feeling begins to return; you can also expect some discomfort. The amount of discomfort is unpredictable, but if you have more pain that can be controlled with the pain medication you received, you should contact your surgeon.  Start to take your pain " pills as soon as you start to feel any discomfort or pain.  We strongly recommend starting your pain medication at bedtime if you haven't already done so.  This is in the event that the block wears off while you are asleep.                      [facility] Day [facility] Discharge Instructions for  Sedation and General Anesthesia       It's not unusual to feel dizzy, light-headed or faint for up to 24 hours after surgery or while taking pain medication.  If you have these symptoms: sit for a few minutes before standing and have someone assist you when you get up to walk or use the bathroom.      You should rest and relax for the next 24 hours. We recommend you make arrangements to have an adult stay with you for at least 24 hours after your discharge.  Avoid hazardous and strenuous activity.      DO NOT DRIVE any vehicle or operate mechanical equipment for 24 hours following the end of your surgery.  Even though you may feel normal, your reactions may be affected by the medication you have received.      Do not drink alcoholic beverages for 24 hours following surgery.       Slowly progress to your regular diet as you feel able. It's not unusual to feel nauseated and/or vomit after receiving anesthesia.  If you develop these symptoms, drink clear liquids (apple juice, ginger ale, broth, 7-up, etc. ) until you feel better.  If your nausea and vomiting persists for 24 hours, please notify your surgeon.        All narcotic pain medications, along with inactivity and anesthesia, can cause constipation. Drinking plenty of liquids and increasing fiber intake will help.      For any questions of a medical nature, call your surgeon.      Do not make important decisions for 24 hours.      If you had general anesthesia, you may have a sore throat for a couple of days related to the breathing tube used during surgery.  You may use Cepacol lozenges to help with this discomfort.  If it worsens or if you develop a fever, contact your  surgeon.       If you feel your pain is not well managed with the pain medications prescribed by your surgeon, please contact your surgeon's office to let them know so they can address your concerns.       Please see your primary care provider if your diarrhea does not improve in a few days. You may take an over the counter medication such as imodium to help stop the diarrhea.

## 2017-04-12 NOTE — PROVIDER NOTIFICATION
Patient having frequent liquid stool. Notified lloyd Modi, who initially requested a c diff rule out. After discussing with PPM and the patient being otherwise fine to discharge as planned, this test was cancelled. Patient told to follow up with primary care if diarrhea continues, this was written in d/c instructions.

## 2017-04-12 NOTE — OP NOTE
PATIENT NAME:   Emily Zhu  MED RECORD #: [id number]  YOB: 1960  TODAY'S DATE: 4/12/2017    SURGEON:     JOON CARSON MD    1ST ASSISTANT:   EMELINA Yancey OPA-C      PREOPERATIVE DIAGNOSES:   1.  Impingement syndrome, left shoulder.   2.  Rotator cuff tear, left shoulder.      POSTOPERATIVE DIAGNOSES:   1.  Impingement syndrome, left shoulder.   2.  Rotator cuff tear, left shoulder  3. AC joint arthritis left shoulder      PROCEDURES:   1.  Open decompression, left shoulder.   2.  Open Fortino distal clavicle resection, left shoulder.   3.  Open rotator cuff repair involving the supraspinatus        DESCRIPTION OF PROCEDURE:  Emily Zhu was brought to the operating room on 4/12/2017 and following suitable general anesthesia, was placed in the semi-sitting position and the operative shoulder prepped and draped in the usual manner.  A full timeout was carried out and the patient, proper extremity, and operative procedure were identified and confirmed by all members of the operating team.        The shoulder was approached through a longitudinal incision made beginning at the AC joint and extending distally in line with the deltoid fibers for 6 cm.  The deltoid was split in line with its fibers for 4 cm and the AC joint opened.  The anterior acromion was downsloped in a type 2+ to type 3 fashion.  An acromioplasty was carried out along converting it back to a type 1 morphology with a medial wedge of the acromion also removed.  Bone wax was used to cover the resected bone surfaces once the decompression was completed.      The distal clavicle was arthritic, and distal clavicle resection was carried out removing 10 mm of distal clavicle according to Fortino.  Bone wax was used to coat this as well.      The underlying rotator cuff was next inspected.  There was a full thickness 2.0 cm tear involving the supraspinatus.  The torn edges were debrided in preparation for repair.  We then  prepared the footprint of the greater tuberosity.  We used a 5.5 twin fix  anchor in the footprint.   The proximal portion of the anchor fractured off the distal portion due to the firm bone structure.  The imbedded portion of the anchor was secure and held up to strong tensioning of the attached sutures so the anchor was not replaced.  This suture anchor was loaded with two #2 FiberWire sutures which were placed through the rotator cuff, 1 cm proximal to the torn edge.  These 2 sutures were tied down to complete the medial row repair.  We then used side to side ethibond sutures to reinforce the repair and a strong, secure repair was achieved.      The wound was irrigated with 25% Betadine solution.  It was closed with #1 Ethibond interrupted sutures in the dorsal capsule over the AC resection arthroplasty site.  #1 running was also then run the full length of the incision to close the deltoid split.  3-0 undyed Vicryl was used in the subcutaneous tissue, and skin staples in the skin.  A sterile soft dressing was applied and the patient awakened and taken to PAR in satisfactory condition.  The arm was placed in a sling.  There were no known intraoperative complications.  A skilled, certified surgical assistant was required for positioning and for assistance during the entire case.  He was present for the entirety of the procedure.      JOON CARSON MD    FAX:  [phone]

## 2017-04-12 NOTE — ANESTHESIA PREPROCEDURE EVALUATION
Anesthesia Evaluation     . Pt has had prior anesthetic. Type: General           ROS/MED HX    ENT/Pulmonary:     (+)tobacco use, Current use , . .    Neurologic:     (+)CVA     Cardiovascular:     (+) hypertension--CAD, --stent,2 . Taking blood thinners : . . . :. .       METS/Exercise Tolerance:     Hematologic:         Musculoskeletal:         GI/Hepatic:     (+) GERD       Renal/Genitourinary:     (+) chronic renal disease,       Endo:     (+) type II DM .      Psychiatric:         Infectious Disease:         Malignancy:         Other:                                    Anesthesia Plan      History & Physical Review  History and physical reviewed and following examination; no interval change.    ASA Status:  3 .        Plan for General and Periph. Nerve Block for postop pain with Intravenous induction. Maintenance will be TIVA.    PONV prophylaxis:  Ondansetron (or other 5HT-3) and Dexamethasone or Solumedrol  Propofol, Zofran, and decadron      Postoperative Care  Postoperative pain management:  IV analgesics and Oral pain medications.      Consents  Anesthetic plan, risks, benefits and alternatives discussed with:  Patient..                          .

## 2017-04-12 NOTE — ANESTHESIA CARE TRANSFER NOTE
Patient: Emily Permaul    Procedure(s):  LEFT SHOULDER OPEN DECOMPRESSION AND JEAN CARLOS; LEFT ROTATOR CUFF REPAIR - Wound Class: I-Clean   - Wound Class: I-Clean    Diagnosis: LEFT ROTATOR CUFF TEAR  Diagnosis Additional Information: No value filed.    Anesthesia Type:   General, Periph. Nerve Block for postop pain     Note:  Airway :LMA  Patient transferred to:PACU  Comments: Patient with good spontaneous respirations, tidal volumes 300-500cc. Patient transported to recovery with oxygen via LMA. On arrival to PACU, patient awake, eyes open, follows commands, LMA out. Dentiion unchanged. IV patent.. Report given to RN, care transferred, questions answered.  Vitals in PACU:  /76  HR 81  RR 16  Sats 99%  Temp 97.2      Vitals: (Last set prior to Anesthesia Care Transfer)    CRNA VITALS  4/12/2017 1223 - 4/12/2017 1253      4/12/2017             Resp Rate (set): 10                Electronically Signed By: CARROL Lorenz CRNA  April 12, 2017  12:53 PM

## 2017-04-12 NOTE — ANESTHESIA PROCEDURE NOTES
Peripheral nerve/Neuraxial procedure note : Interscalene    Procedure Documentation    .    Procedure:    Interscalene.     .  .       Assessment/Narrative  .  .  . Comments:  Interscalene Brachial Plexus Block for post- op analgesia:  Betadine prep: 25guage 5/8 inch needle:  30cc. 0.5%Ropivicaine with 1/200,000 epinephrine. paresthesia elicited.

## 2017-04-12 NOTE — ANESTHESIA POSTPROCEDURE EVALUATION
Patient: Emily Permaul    Procedure(s):  LEFT SHOULDER OPEN DECOMPRESSION AND JEAN CARLOS; LEFT ROTATOR CUFF REPAIR - Wound Class: I-Clean   - Wound Class: I-Clean    Diagnosis:LEFT ROTATOR CUFF TEAR  Diagnosis Additional Information: No value filed.    Anesthesia Type:  General, Periph. Nerve Block for postop pain    Note:  Anesthesia Post Evaluation    Patient location during evaluation: PACU  Patient participation: Able to fully participate in evaluation  Level of consciousness: awake  Pain management: adequate  Airway patency: patent  Cardiovascular status: acceptable  Respiratory status: acceptable  Hydration status: acceptable  PONV: controlled     Anesthetic complications: None          Last vitals:  Vitals:    04/12/17 1249 04/12/17 1251 04/12/17 1300   BP: (!) 89/57 103/76 128/86   Resp: 16 18 15   Temp: 36.2  C (97.2  F)     SpO2: 97% 99% 98%         Electronically Signed By: Vj Ramirez MD  April 12, 2017  1:06 PM

## 2017-04-12 NOTE — PROGRESS NOTES
Received report from PACU RN.  Patient to stay in observation until his sister can pick him up at @1730.  Will continue to monitor patient until he discharges.

## 2017-04-12 NOTE — IP AVS SNAPSHOT
[facility]    [address] [place] [id number]    Phone:  [phone]                                       After Visit Summary   4/12/2017    Emily Zhu    MRN: [id number]           After Visit Summary Signature Page     I have received my discharge instructions, and my questions have been answered. I have discussed any challenges I see with this plan with the nurse or doctor.    ..........................................................................................................................................  Patient/Patient Representative Signature      ..........................................................................................................................................  Patient Representative Print Name and Relationship to Patient    ..................................................               ................................................  Date                                            Time    ..........................................................................................................................................  Reviewed by Signature/Title    ...................................................              ..............................................  Date                                                            Time

## 2017-04-12 NOTE — IP AVS SNAPSHOT
MRN:[id number]                      After Visit Summary   4/12/2017    Emily Zhu    MRN: [id number]           Thank you!     Thank you for choosing [facility] for your care. Our goal is always to provide you with excellent care. Hearing back from our patients is one way we can continue to improve our services. Please take a few minutes to complete the written survey that you may receive in the mail after you visit with us. Thank you!        Patient Information     Date Of Birth          1960        About your hospital stay     You were admitted on:  April 12, 2017 [facility]    You were discharged on:  April 12, 2017       Who to Call     For medical emergencies, please call 911.  For non-urgent questions about your medical care, please call your primary care provider or clinic, [phone]  For questions related to your surgery, please call your surgery clinic        Attending Provider     Provider Deejay Taylor MD Orthopedics       Primary Care Provider Office Phone # Fax #    Gianluca Apodaca [phone]       [facility] [address] [place] [address]        Further instructions from your care team       Restart Plavix tomorrow.      [facility]  Discharge Instructions -- Ultra Sling Directions MD Víctor Sawyer MD           1.    [facility]  [address] [place] [id number] [phone]        Discharge Instructions After Rotator Cuff Repair  Deejay Conrad MD    Post Anesthesia Instructions:    You have received sedation, rest and relax the day of surgery.  Please be aware of possible dizziness and exercise caution when you are up.  A responsible adult must be with you for 24 hours following surgery for safety and fall prevention.    Begin with liquids and advance diet as tolerated: avoid greasy and spicy foods.     No  important decision or signing of legal documents for 24 hours. Do not operate poser machinery    Do not drive for 24 hours following surgery or while taking narcotics    If you are unable to urinate, feel uncomfortable and it has been 8-10 hours since you last urinated, go to an urgent care or the ER.    Medications:  Take the prescribed narcotic pain medication if your pain is significant You may also use Tylenol 650 mg every six hours, if you wish to supplement and increase the effectiveness on the narcotic pain medication.  If your narcotic pain medication has Tylenol in it be sure that you do not exceed 4000 mg of Tylenol in a 24 hour period. After surgery, pain typically will settle down after three to five days, at which point you should be able to diminish or discontinue the narcotic pain medication.  You may resume you pre-operative medications per your physician.        Shoulder Care:     Many patients have a Regional Nerve Block before their surgery to help with pain management afterwards.  Start taking your pain medication before the block wears off.  The block may wear off fairly quickly so have some pain medication ready to take as soon as you feel the pain coming on.  Do not be alarmed if the block is still working the following morning.  The average duration of the block is anywhere from 8-24 hours.    Ice your shoulder on a regular basis over the first 72 hours. Use the cold pack or ice for 15-20 minutes at a time and do this 4 times per day. You can continue icing it thereafter, if you wish, but it is not necessary.    Remove your dressing the day after the surgery.  You may shower directly over the wound at that time, but do not submerge the incision underwater. After you towel dry following your shower, apply band aids over the small incisions. Occasionally you may need a gauze pad if the wound is still weeping, which is not unusual for 3-5 days. Please call if the weeping or drainage persists  beyond 5 days or is increasing.    Do not be alarmed if you see bruising on your shoulder or chest wall in a few days.  This is very common.     You should use the Ultra-Sling arm support at all times day and night. You may remove the sling to bathe.  Straighten your elbow several times a day to prevent stiffening.  Also move your hand, wrist and fingers frequently to prevent swelling and stiffness in your hand.  You will be using this sling for approximately 6 weeks following this surgery.     Physical Therapy:     You will receive a referral for your physical therapy at the time of your postoperative appointment scheduled at our office.  You should then promptly call to arrange your physical therapy appointments according to that protocol. You will be undergoing supervised physical therapy for approximately 6 weeks.    Activities:     You can assume modified daily activities as soon as you are able while wearing the sling.  You should not operate a motor vehicle while on narcotic pain medication and wearing a sling.    Exercises:     Begin Codman's pendulum exercises as soon as you are comfortable, tomorrow if possible. You are to remove the sling to do these exercises.  While bending forward at the waist, you will let your arm hang straight down towards the floor and swing it gently as a pendulum on a clock would swing.  Do not actively elevate your arm away from your body and up overhead.      Doctor's Postoperative Appointment:     You should have clinic postoperative follow-up appointment already scheduled.  This appointment is typically setup for you when your surgery is scheduled. We will examine your shoulder,. Review your photos and discuss further recovery.  If you are not sure when to come in for that appointment, please call us at your convenience.       Be alert for signs of infection: redness, swelling, heat, red streaks and elevated temperature over 101  Contact Dr. Conrad's office if these  "occur.    Please do not hesitate to call if you have any   questions or concerns:  During clinic hours: [phone], after hours [phone].        [facility]      DISCHARGE INSTRUCTIONS FOLLOWING   REGIONAL BLOCK ANESTHESIA      Numbness or lack of feeling in the arm/leg that was operated may last up to 24 hours.  The average time is usually 10-15 hours.  You may not be able to lift or move the arm or leg where the operation was by itself during that time.  Long-acting local anesthetic medicines were used to give you long-lasting pain relief.    Wear a sling until your arm is completely \"awake\"    Avoid bumping your arm, leg or foot while it is numb    Avoid extremes of hot or cold while it is numb    Remain quiet and restful the day of surgery.  Resume normal activities gradually over the next day or so as advise by your surgeon.    Do not drive or operate  Any machinery until your extremity is full  \"awake\"        You will have a tingling and prickly sensation in your arm/leg as the feeling begins to return; you can also expect some discomfort. The amount of discomfort is unpredictable, but if you have more pain that can be controlled with the pain medication you received, you should contact your surgeon.  Start to take your pain pills as soon as you start to feel any discomfort or pain.  We strongly recommend starting your pain medication at bedtime if you haven't already done so.  This is in the event that the block wears off while you are asleep.                      Same Day Surgery Discharge Instructions for  Sedation and General Anesthesia       It's not unusual to feel dizzy, light-headed or faint for up to 24 hours after surgery or while taking pain medication.  If you have these symptoms: sit for a few minutes before standing and have someone assist you when you get up to walk or use the bathroom.      You should rest and relax for the next 24 hours. We recommend you make arrangements to " have an adult stay with you for at least 24 hours after your discharge.  Avoid hazardous and strenuous activity.      DO NOT DRIVE any vehicle or operate mechanical equipment for 24 hours following the end of your surgery.  Even though you may feel normal, your reactions may be affected by the medication you have received.      Do not drink alcoholic beverages for 24 hours following surgery.       Slowly progress to your regular diet as you feel able. It's not unusual to feel nauseated and/or vomit after receiving anesthesia.  If you develop these symptoms, drink clear liquids (apple juice, ginger ale, broth, 7-up, etc. ) until you feel better.  If your nausea and vomiting persists for 24 hours, please notify your surgeon.        All narcotic pain medications, along with inactivity and anesthesia, can cause constipation. Drinking plenty of liquids and increasing fiber intake will help.      For any questions of a medical nature, call your surgeon.      Do not make important decisions for 24 hours.      If you had general anesthesia, you may have a sore throat for a couple of days related to the breathing tube used during surgery.  You may use Cepacol lozenges to help with this discomfort.  If it worsens or if you develop a fever, contact your surgeon.       If you feel your pain is not well managed with the pain medications prescribed by your surgeon, please contact your surgeon's office to let them know so they can address your concerns.       Please see your primary care provider if your diarrhea does not improve in a few days. You may take an over the counter medication such as imodium to help stop the diarrhea.          Pending Results     Date and Time Order Name Status Description    4/12/2017 1559 Clostridium difficile toxin B PCR In process             Admission Information     Date & Time Provider Department Dept. Phone    4/12/2017 Deejay Conrad MD [facility] [phone]     "  Your Vitals Were     Blood Pressure Temperature Respirations Weight Pulse Oximetry BMI (Body Mass Index)    134/92 (BP Location: Right arm) 96.8  F (36  C) (Oral) 16 83.5 kg (184 lb) 97% 31.58 kg/m2      [employer] Information     [employer] lets you send messages to your doctor, view your test results, renew your prescriptions, schedule appointments and more. To sign up, go to [email or web address] . Click on \"Log in\" on the left side of the screen, which will take you to the Welcome page. Then click on \"Sign up Now\" on the right side of the page.     You will be asked to enter the access code listed below, as well as some personal information. Please follow the directions to create your username and password.     Your access code is: [email or web address]  Expires: 2017  3:50 PM     Your access code will  in 90 days. If you need help or a new code, please call your [facility] clinic or [phone].        Care EveryWhere ID     This is your Care EveryWhere ID. This could be used by other organizations to access your [facility] medical records  [id number]           Review of your medicines      START taking        Dose / Directions    oxyCODONE-acetaminophen 5-325 MG per tablet   Commonly known as:  PERCOCET   Used for:  S/P rotator cuff repair        Dose:  1-2 tablet   Take 1-2 tablets by mouth every 4 hours as needed for pain maximum 10 tablet(s) per day   Quantity:  50 tablet   Refills:  0         CONTINUE these medicines which have NOT CHANGED        Dose / Directions    ASPIRIN PO        Dose:  81 mg   Take 81 mg by mouth daily   Refills:  0       blood glucose calibration solution   Used for:  Type 2 diabetes mellitus without complication, with long-term current use of insulin (H)        Use to calibrate blood glucose monitor as directed.   Quantity:  1 each   Refills:  3       blood glucose monitoring lancets   Used for:  Type 2 diabetes mellitus without complication, with long-term current use of insulin (H) "        Use to test blood sugars 2 times daily or as directed.   Quantity:  1 Box   Refills:  3       blood glucose monitoring meter device kit   Used for:  Type 2 diabetes mellitus without complication, with long-term current use of insulin (H)        Use to test blood sugars 2 times daily or as directed.   Quantity:  1 kit   Refills:  0       blood glucose monitoring test strip   Commonly known as:  ONE TOUCH VERIO IQ   Used for:  Type 2 diabetes mellitus without complication, with long-term current use of insulin (H)        Use to test blood sugars 2 times daily or as directed.   Quantity:  100 strip   Refills:  3       clopidogrel 75 MG tablet   Commonly known as:  PLAVIX   Used for:  Encounter for medication refill        Dose:  75 mg   Take 1 tablet (75 mg) by mouth daily   Quantity:  90 tablet   Refills:  3       glipiZIDE 5 MG 24 hr tablet   Commonly known as:  GLUCOTROL XL   Used for:  Encounter for medication refill        Dose:  5 mg   Take 1 tablet (5 mg) by mouth daily   Quantity:  90 tablet   Refills:  0       isosorbide mononitrate 30 MG 24 hr tablet   Commonly known as:  IMDUR   Used for:  Encounter for medication refill        TAKE ONE TABLET BY MOUTH ONCE DAILY   Quantity:  90 tablet   Refills:  3       lisinopril 20 MG tablet   Commonly known as:  PRINIVIL/ZESTRIL   Used for:  Essential hypertension        TAKE ONE TABLET BY MOUTH ONCE DAILY   Quantity:  90 tablet   Refills:  2       metFORMIN 1000 MG tablet   Commonly known as:  GLUCOPHAGE   Used for:  Type 2 diabetes mellitus without complication, without long-term current use of insulin (H)        TAKE ONE TABLET BY MOUTH TWICE DAILY WITH MEALS   Quantity:  180 tablet   Refills:  0       metoprolol 25 MG tablet   Commonly known as:  LOPRESSOR   Used for:  Encounter for medication refill        Dose:  25 mg   Take 1 tablet (25 mg) by mouth 2 times daily   Quantity:  180 tablet   Refills:  3       pantoprazole 20 MG EC tablet   Commonly known as:   PROTONIX   Used for:  Esophageal reflux        Take by mouth 30-60 minutes before a meal.   Quantity:  90 tablet   Refills:  1            Where to get your medicines      Some of these will need a paper prescription and others can be bought over the counter. Ask your nurse if you have questions.     Bring a paper prescription for each of these medications     oxyCODONE-acetaminophen 5-325 MG per tablet                Protect others around you: Learn how to safely use, store and throw away your medicines at www.disposemymeds.org.             Medication List: This is a list of all your medications and when to take them. Check marks below indicate your daily home schedule. Keep this list as a reference.      Medications           Morning Afternoon Evening Bedtime As Needed    ASPIRIN PO   Take 81 mg by mouth daily   Next Dose Due:  4/13/17                                     blood glucose calibration solution   Use to calibrate blood glucose monitor as directed.                                blood glucose monitoring lancets   Use to test blood sugars 2 times daily or as directed.                                blood glucose monitoring meter device kit   Use to test blood sugars 2 times daily or as directed.                                blood glucose monitoring test strip   Commonly known as:  ONE TOUCH VERIO IQ   Use to test blood sugars 2 times daily or as directed.                                clopidogrel 75 MG tablet   Commonly known as:  PLAVIX   Take 1 tablet (75 mg) by mouth daily   Next Dose Due:  4/13/17                                     glipiZIDE 5 MG 24 hr tablet   Commonly known as:  GLUCOTROL XL   Take 1 tablet (5 mg) by mouth daily   Next Dose Due:  4/13/17                                     isosorbide mononitrate 30 MG 24 hr tablet   Commonly known as:  IMDUR   TAKE ONE TABLET BY MOUTH ONCE DAILY   Next Dose Due:  4/13/17                                     lisinopril 20 MG tablet   Commonly known  as:  PRINIVIL/ZESTRIL   TAKE ONE TABLET BY MOUTH ONCE DAILY   Next Dose Due:  4/13/17                                     metFORMIN 1000 MG tablet   Commonly known as:  GLUCOPHAGE   TAKE ONE TABLET BY MOUTH TWICE DAILY WITH MEALS   Next Dose Due:  4/12/2017                                        metoprolol 25 MG tablet   Commonly known as:  LOPRESSOR   Take 1 tablet (25 mg) by mouth 2 times daily   Next Dose Due:  4/12/2017                                        oxyCODONE-acetaminophen 5-325 MG per tablet   Commonly known as:  PERCOCET   Take 1-2 tablets by mouth every 4 hours as needed for pain maximum 10 tablet(s) per day   Last time this was given:  1 tablet on 4/12/2017  4:03 PM   Next Dose Due:  Available 4/12/2017 at 8pm                                   pantoprazole 20 MG EC tablet   Commonly known as:  PROTONIX   Take by mouth 30-60 minutes before a meal.   Next Dose Due:  4/13/17

## 2017-04-12 NOTE — OR NURSING
Patient s/p left rotator cuff repair. Transferred to Observation Unit until his sister can pick him up after work at 17:00.  Detailed report given to REGI MANCERA.  Transferred via cart, accompanied by DAVIDSON Garcia.  All belongings sent.  VSS.  Denies pain.

## 2017-04-13 NOTE — PLAN OF CARE
Problem: Goal Outcome Summary  Goal: Goal Outcome Summary  Outcome: Completed Date Met:  04/12/17  Patient stable, surgeon has declared ready for discharge. Discharge instructions, follow up recommendations, new medications and precautions reviewed with sister Lesia who will be staying the night with the patient. Sister verbalized understanding of instructions. Patient asked not to be included in discharge teaching but was provided with all instructions on paper. Left unit with all belongings, shoulder sling, new medications, discharge instructions. Home with sister for tonight, then self care.

## 2017-04-25 ENCOUNTER — TRANSFERRED RECORDS (OUTPATIENT)
Dept: FAMILY MEDICINE | Facility: CLINIC | Age: 57
End: 2017-04-25

## 2017-05-19 ENCOUNTER — TRANSFERRED RECORDS (OUTPATIENT)
Dept: FAMILY MEDICINE | Facility: CLINIC | Age: 57
End: 2017-05-19

## 2017-06-20 ENCOUNTER — TRANSFERRED RECORDS (OUTPATIENT)
Dept: FAMILY MEDICINE | Facility: CLINIC | Age: 57
End: 2017-06-20

## 2017-06-29 DIAGNOSIS — Z76.0 ENCOUNTER FOR MEDICATION REFILL: Primary | ICD-10-CM

## 2017-06-29 NOTE — TELEPHONE ENCOUNTER
Clementina monitor Countour kit last OV - 3/31/17 last labs 4/17/17 @ [facility]  Mary Goldstein MA June 29, 2017 2:50 PM

## 2017-07-05 DIAGNOSIS — Z76.0 ENCOUNTER FOR MEDICATION REFILL: Primary | ICD-10-CM

## 2017-07-07 RX ORDER — BLOOD-GLUCOSE METER
KIT MISCELLANEOUS
Qty: 1 KIT | Refills: 0 | Status: SHIPPED | OUTPATIENT
Start: 2017-07-07 | End: 2017-08-02

## 2017-07-07 RX ORDER — BLOOD-GLUCOSE CONTROL, NORMAL
EACH MISCELLANEOUS
Qty: 1 BOTTLE | Refills: 3 | Status: SHIPPED | OUTPATIENT
Start: 2017-07-07 | End: 2017-08-02

## 2017-07-07 RX ORDER — LANCETS 28 GAUGE
EACH MISCELLANEOUS
Qty: 100 EACH | Refills: 3 | Status: SHIPPED | OUTPATIENT
Start: 2017-07-07 | End: 2017-08-02

## 2017-07-31 DIAGNOSIS — Z76.0 ENCOUNTER FOR MEDICATION REFILL: ICD-10-CM

## 2017-07-31 RX ORDER — ISOSORBIDE MONONITRATE 30 MG/1
30 TABLET, EXTENDED RELEASE ORAL DAILY
Qty: 90 TABLET | Refills: 3 | Status: SHIPPED | OUTPATIENT
Start: 2017-07-31 | End: 2018-06-28

## 2017-08-02 DIAGNOSIS — Z76.0 ENCOUNTER FOR MEDICATION REFILL: Primary | ICD-10-CM

## 2017-08-04 RX ORDER — BLOOD-GLUCOSE METER
EACH MISCELLANEOUS
Qty: 1 KIT | Refills: 0 | Status: SHIPPED | OUTPATIENT
Start: 2017-08-04 | End: 2018-04-18

## 2017-11-03 ENCOUNTER — OFFICE VISIT (OUTPATIENT)
Dept: FAMILY MEDICINE | Facility: CLINIC | Age: 57
End: 2017-11-03

## 2017-11-03 VITALS
SYSTOLIC BLOOD PRESSURE: 104 MMHG | HEART RATE: 72 BPM | WEIGHT: 189 LBS | BODY MASS INDEX: 32.44 KG/M2 | OXYGEN SATURATION: 99 % | DIASTOLIC BLOOD PRESSURE: 80 MMHG

## 2017-11-03 DIAGNOSIS — E11.21 TYPE 2 DIABETES MELLITUS WITH DIABETIC NEPHROPATHY, WITHOUT LONG-TERM CURRENT USE OF INSULIN (H): Primary | ICD-10-CM

## 2017-11-03 DIAGNOSIS — R20.9 DISTURBANCE OF SKIN SENSATION: ICD-10-CM

## 2017-11-03 PROBLEM — Z98.890 POST-OPERATIVE STATE: Status: RESOLVED | Noted: 2017-04-12 | Resolved: 2017-11-03

## 2017-11-03 LAB
A/C RATIO MG/G: >300 MG/G
ALBUMIN (URINE) MG/L: 150 MG/L
INTERPRETATION: ABNORMAL
URINE CREATININE MG/DL - QUEST: 100 MG/DL

## 2017-11-03 PROCEDURE — 82044 UR ALBUMIN SEMIQUANTITATIVE: CPT | Performed by: FAMILY MEDICINE

## 2017-11-03 PROCEDURE — 99207 C FOOT EXAM  NO CHARGE: CPT | Performed by: FAMILY MEDICINE

## 2017-11-03 PROCEDURE — 82570 ASSAY OF URINE CREATININE: CPT | Performed by: FAMILY MEDICINE

## 2017-11-03 PROCEDURE — 99214 OFFICE O/P EST MOD 30 MIN: CPT | Performed by: FAMILY MEDICINE

## 2017-11-03 NOTE — PROGRESS NOTES
SUBJECTIVE: Emily Zhu is a 57 year old male who is here for a DM 2 visit.   Lab Results   Component Value Date    A1C 8.1 02/03/2017    A1C 9.3 10/21/2016    A1C 11.4 06/15/2016    A1C 8.0 11/20/2015    A1C 9.2 07/24/2015     Glucose monitoring is done bid, with am sugars averaging 100 and pm 150. Compliance has been good with diet and medications. Has been feeling tingling, burning in feet and left hand, for the past few months. Severe enough to be interfering with his sleep.  Current Outpatient Prescriptions   Medication     lisinopril (PRINIVIL/ZESTRIL) 20 MG tablet     blood glucose monitoring (ONE TOUCH ULTRA 2) meter device kit     blood glucose monitoring (ONE TOUCH DELICA) lancets     blood glucose monitoring (ONE TOUCH ULTRA) test strip     blood glucose calibration (ONETOUCH ULTRA CONTROL) solution     isosorbide mononitrate (IMDUR) 30 MG 24 hr tablet     metFORMIN (GLUCOPHAGE) 1000 MG tablet     ASPIRIN PO     metoprolol (LOPRESSOR) 25 MG tablet     clopidogrel (PLAVIX) 75 MG tablet     glipiZIDE (GLUCOTROL XL) 5 MG 24 hr tablet     pantoprazole (PROTONIX) 20 MG EC tablet     [DISCONTINUED] glipiZIDE (GLUCOTROL XL) 5 MG 24 hr tablet     No current facility-administered medications for this visit.        GENERAL: No change in weight, sleep or appetite.  Normal energy.  No fever or chills  RESP: No coughing, wheezing or shortness of breath  CV: No chest pains or palpitations  GI: No nausea, vomiting,  heartburn, abdominal pain, diarrhea, constipation or change in bowel habits  : No urinary frequency or dysuria, bladder or kidney problems  Musculoskeletal: left shoulder reconstruction.   OBJECTIVE: /80  Pulse 72  Wt 85.7 kg (189 lb)  SpO2 99%  BMI 32.44 kg/m2   Constitutional: healthy, alert and no distress  Cardiovascular: PMI normal. No lifts, heaves, or thrills. RRR. No murmurs, clicks gallops or rub, No edema or JVD. Excellent distal pulses.  Respiratory:  Good diaphragmatic  excursion. Lungs clear  Skin: no suspicious lesions or rashes  Neurologic: Mono normal, vibration normal  Psychiatric: affect normal/bright and mentation appears normal.    (E11.21) Type 2 diabetes mellitus with diabetic nephropathy, without long-term current use of insulin (H)  (primary encounter diagnosis)  Comment:   Plan: C FOOT EXAM  NO CHARGE, Microalbumin ([facility]),         Hemoglobin A1C ([facility])            (R20.9) Disturbance of skin sensation  Comment: no signs  Plan: Vitamin B12 ([facility])        Poss capsaicin, gabapentin, alpha lipoic acid.

## 2017-11-03 NOTE — MR AVS SNAPSHOT
"              After Visit Summary   11/3/2017    Emily Zhu    MRN: [id number]           Patient Information     Date Of Birth          1960        Visit Information        Provider Department      11/3/2017 9:30 AM Gianluca Apodaca MD [facility]        Today's Diagnoses     Type 2 diabetes mellitus with diabetic nephropathy, without long-term current use of insulin (H)    -  1    Disturbance of skin sensation           Follow-ups after your visit        Who to contact     If you have questions or need follow up information about today's clinic visit or your schedule please contact [facility] directly at [phone].  Normal or non-critical lab and imaging results will be communicated to you by Survatahart, letter or phone within 4 business days after the clinic has received the results. If you do not hear from us within 7 days, please contact the clinic through Survatahart or phone. If you have a critical or abnormal lab result, we will notify you by phone as soon as possible.  Submit refill requests through Mac[employer] or call your pharmacy and they will forward the refill request to us. Please allow 3 business days for your refill to be completed.          Additional Information About Your Visit        MyChart Information     Macoscope lets you send messages to your doctor, view your test results, renew your prescriptions, schedule appointments and more. To sign up, go to [email or web address] . Click on \"Log in\" on the left side of the screen, which will take you to the Welcome page. Then click on \"Sign up Now\" on the right side of the page.     You will be asked to enter the access code listed below, as well as some personal information. Please follow the directions to create your username and password.     Your access code is: [email or web address]  Expires: 2018  3:53 PM     Your access code will  in 90 days. If you need help or a new code, please call your [place] clinic or " [phone].        Care EveryWhere ID     This is your Care EveryWhere ID. This could be used by other organizations to access your [facility] medical records  [id number]        Your Vitals Were     Pulse Pulse Oximetry BMI (Body Mass Index)             72 99% 32.44 kg/m2          Blood Pressure from Last 3 Encounters:   11/03/17 104/80   04/12/17 (!) 134/92   04/05/17 138/88    Weight from Last 3 Encounters:   11/03/17 85.7 kg (189 lb)   04/12/17 83.5 kg (184 lb)   04/05/17 82.8 kg (182 lb 8 oz)              We Performed the Following     C FOOT EXAM  NO CHARGE     Hemoglobin A1C ([facility])     Microalbumin ([facility])     Vitamin B12 ([facility])        Primary Care Provider Office Phone # Fax #    Gianluca Apodaca [phone] [address] [facility] [address]        Equal Access to Services     [facility] : Hadii aad ku hadasho Soomaali, waaxda luqadaha, qaybta kaalmada adeegyada, waxay idiin hayaan emeritaeg shlomoaramadi la'gabe . So [facility] [phone].    ATENCIÓN: Si habla español, tiene a boggs disposición servicios gratuitos de asistencia lingüística. Llame al [phone].    We comply with applicable federal civil rights laws and [place] laws. We do not discriminate on the basis of race, color, national origin, age, disability, sex, sexual orientation, or gender identity.            Thank you!     Thank you for choosing [facility]  for your care. Our goal is always to provide you with excellent care. Hearing back from our patients is one way we can continue to improve our services. Please take a few minutes to complete the written survey that you may receive in the mail after your visit with us. Thank you!             Your Updated Medication List - Protect others around you: Learn how to safely use, store and throw away your medicines at www.disposemymeds.org.          This list is accurate as of: 11/3/17  3:53 PM.  Always use your most recent med list.                   Brand  Name Dispense Instructions for use Diagnosis    ASPIRIN PO      Take 81 mg by mouth daily        blood glucose calibration solution     1 Bottle    Use to calibrate blood glucose monitor as directed.    Encounter for medication refill       blood glucose monitoring lancets     100 each    Use to test blood sugars 1 times daily or as directed.    Encounter for medication refill       blood glucose monitoring meter device kit     1 kit    Use to test blood sugars 1 times daily or as directed.    Encounter for medication refill       blood glucose monitoring test strip    ONETOUCH ULTRA    100 strip    Use to test blood sugars 1 times daily or as directed.    Encounter for medication refill       clopidogrel 75 MG tablet    PLAVIX    90 tablet    Take 1 tablet (75 mg) by mouth daily    Encounter for medication refill       glipiZIDE 5 MG 24 hr tablet    GLUCOTROL XL    90 tablet    Take 1 tablet (5 mg) by mouth daily    Encounter for medication refill       isosorbide mononitrate 30 MG 24 hr tablet    IMDUR    90 tablet    Take 1 tablet (30 mg) by mouth daily    Encounter for medication refill       lisinopril 20 MG tablet    PRINIVIL/ZESTRIL    90 tablet    TAKE ONE TABLET BY MOUTH ONCE DAILY    Essential hypertension       metFORMIN 1000 MG tablet    GLUCOPHAGE    180 tablet    TAKE ONE TABLET BY MOUTH TWICE DAILY WITH MEALS    Type 2 diabetes mellitus without complication, without long-term current use of insulin (H)       metoprolol 25 MG tablet    LOPRESSOR    180 tablet    Take 1 tablet (25 mg) by mouth 2 times daily    Encounter for medication refill       pantoprazole 20 MG EC tablet    PROTONIX    90 tablet    Take by mouth 30-60 minutes before a meal.    Esophageal reflux

## 2017-11-03 NOTE — LETTER
[facility] [address] [place]  [address]  Phone: [phone]    November 8, 2017      Emily Zhu  [address]  [facility] [address]              Dear Emily,    The A1c results are fine, but low B12, so start taking sublingual(under the tongue) B12 1000 mcg daily indefinitely. It is over the counter. It helps the nerve function, and I think this is definitely affecting your nerves. It will take at least 3 months to see a difference. We should see you in 6 months, again.         Sincerely,     Gianluca Apodaca M.D.    Results for orders placed or performed in visit on 11/03/17   Hemoglobin A1C ([facility])   Result Value Ref Range    Hemoglobin A1C 7.4 (H) 4.8 - 5.6 %    Narrative    Performed at:  01 - [facility] [address], [place], [place]  [id number]  : Rafat Chew MD, Phone:  [phone]   Vitamin B12 ([facility])   Result Value Ref Range    Vitamin B12 207 (L) 211 - 946 pg/mL    Narrative    Performed at:  01 - [facility]  [employer] [place] [address]  [id number]  : Rafat Chew MD, Phone:  [phone]

## 2017-11-04 LAB
HBA1C MFR BLD: 7.4 % (ref 4.8–5.6)
VIT B12 SERPL-MCNC: 207 PG/ML (ref 211–946)

## 2017-11-07 NOTE — PROGRESS NOTES
The A1c results are fine, but low B12, so start taking sublingual(under the tongue) B12 1000 mcg daily indefinitely. It is over the counter. It helps the nerve function, and I think this is definitely affecting your nerves. It will take at least 3 months to see a difference. We should see you in 6 months, again.  Dr. Apodaca

## 2017-11-30 ENCOUNTER — TELEPHONE (OUTPATIENT)
Dept: FAMILY MEDICINE | Facility: CLINIC | Age: 57
End: 2017-11-30

## 2017-11-30 DIAGNOSIS — E11.9 DIABETES MELLITUS, TYPE 2 (H): Primary | ICD-10-CM

## 2017-11-30 NOTE — TELEPHONE ENCOUNTER
Pharmacy requesting new rx for OneTouch Ultra Blue test strips to be BID instead of once a day.  Sent over rx to [facility]'s pharmacy with change.

## 2017-12-27 ENCOUNTER — OFFICE VISIT (OUTPATIENT)
Dept: FAMILY MEDICINE | Facility: CLINIC | Age: 57
End: 2017-12-27

## 2017-12-27 VITALS
WEIGHT: 191 LBS | OXYGEN SATURATION: 95 % | DIASTOLIC BLOOD PRESSURE: 80 MMHG | BODY MASS INDEX: 32.79 KG/M2 | SYSTOLIC BLOOD PRESSURE: 112 MMHG | TEMPERATURE: 97.7 F | HEART RATE: 62 BPM

## 2017-12-27 DIAGNOSIS — R10.9 RIGHT FLANK PAIN: Primary | ICD-10-CM

## 2017-12-27 LAB
BACTERIA URINE: NORMAL
BILIRUB UR QL STRIP: 0
BLOOD URINE DIP: 0
CASTS/LPF: NORMAL
COLOR UR: YELLOW
CRYSTAL URINE: NORMAL
EPITHELIAL CELLS - QUEST: NORMAL
GLUCOSE UR STRIP-MCNC: 0 MG/DL
KETONES UR QL STRIP: 0
LEUKOCYTE ESTERASE URINE DIP: 0
MUCOUS URINE: NORMAL
NITRITE UR QL STRIP: NORMAL
PH UR STRIP: 5.5 PH (ref 5–9)
PROT UR QL: 0 MG/DL (ref ?–0.01)
RBC URINE: NORMAL (ref 0–3)
SP GR UR STRIP: 1.03 (ref 1–1.02)
UROBILINOGEN UR QL STRIP: 0.2 EU/DL (ref 0.2–1)
WBC URINE: NORMAL (ref 0–3)

## 2017-12-27 PROCEDURE — 81003 URINALYSIS AUTO W/O SCOPE: CPT | Performed by: FAMILY MEDICINE

## 2017-12-27 PROCEDURE — 99214 OFFICE O/P EST MOD 30 MIN: CPT | Performed by: FAMILY MEDICINE

## 2017-12-27 RX ORDER — PREGABALIN 50 MG/1
50 CAPSULE ORAL 3 TIMES DAILY
Qty: 90 CAPSULE | Refills: 0 | Status: SHIPPED | OUTPATIENT
Start: 2017-12-27 | End: 2018-02-28

## 2017-12-27 ASSESSMENT — PATIENT HEALTH QUESTIONNAIRE - PHQ9: SUM OF ALL RESPONSES TO PHQ QUESTIONS 1-9: 12

## 2017-12-27 NOTE — PROGRESS NOTES
10 days of burning right flank pain, with radiation to the right testicle area. No change in urination. No rash, no fever, no dysuria.  Patient Active Problem List   Diagnosis     Coronary atherosclerosis     Essential hypertension     Mixed hyperlipidemia     Cataract     GERD (gastroesophageal reflux disease)     Health Care Home     Microalbuminuria due to type 2 diabetes mellitus (H)     Type 2 diabetes mellitus with diabetic nephropathy, without long-term current use of insulin (H)      Current Outpatient Prescriptions   Medication     pregabalin (LYRICA) 50 MG capsule     blood glucose monitoring (NO BRAND SPECIFIED) test strip     glipiZIDE (GLUCOTROL XL) 5 MG 24 hr tablet     lisinopril (PRINIVIL/ZESTRIL) 20 MG tablet     blood glucose monitoring (ONE TOUCH ULTRA 2) meter device kit     blood glucose monitoring (ONE TOUCH DELICA) lancets     blood glucose monitoring (ONE TOUCH ULTRA) test strip     blood glucose calibration (ONETOUCH ULTRA CONTROL) solution     isosorbide mononitrate (IMDUR) 30 MG 24 hr tablet     metFORMIN (GLUCOPHAGE) 1000 MG tablet     ASPIRIN PO     metoprolol (LOPRESSOR) 25 MG tablet     clopidogrel (PLAVIX) 75 MG tablet     pantoprazole (PROTONIX) 20 MG EC tablet     glipiZIDE (GLUCOTROL XL) 5 MG 24 hr tablet     No current facility-administered medications for this visit.        ROS: depressed, lonely. Blood glucoses are normal.     /80  Pulse 62  Temp 97.7  F (36.5  C)  Wt 86.6 kg (191 lb)  SpO2 95%  BMI 32.79 kg/m2   NAD talkative, slightly depressed appearing. Lungs are clear, RRR. No tenderness with touch or percussion. No rash, but pain described follow a dermatomal pattern to the right groin. UA normal.    (R10.9) Right flank pain  (primary encounter diagnosis)  Comment: poss neuritis/shingles?  Plan: Urinalysis w/reflex protein, bili ([facility]),         pregabalin (LYRICA) 50 MG capsule        Tid prn.

## 2017-12-27 NOTE — MR AVS SNAPSHOT
"              After Visit Summary   2017    Emily Zhu    MRN: [id number]           Patient Information     Date Of Birth          1960        Visit Information        Provider Department      2017 3:30 PM Gianluca Apodaca MD [facility]        Today's Diagnoses     Right flank pain    -  1       Follow-ups after your visit        Who to contact     If you have questions or need follow up information about today's clinic visit or your schedule please contact [facility] directly at [phone].  Normal or non-critical lab and imaging results will be communicated to you by MyChart, letter or phone within 4 business days after the clinic has received the results. If you do not hear from us within 7 days, please contact the clinic through Deal In City[employer] or phone. If you have a critical or abnormal lab result, we will notify you by phone as soon as possible.  Submit refill requests through [employer] or call your pharmacy and they will forward the refill request to us. Please allow 3 business days for your refill to be completed.          Additional Information About Your Visit        MyChart Information     [employer] lets you send messages to your doctor, view your test results, renew your prescriptions, schedule appointments and more. To sign up, go to [email or web address] . Click on \"Log in\" on the left side of the screen, which will take you to the Welcome page. Then click on \"Sign up Now\" on the right side of the page.     You will be asked to enter the access code listed below, as well as some personal information. Please follow the directions to create your username and password.     Your access code is: [email or web address]  Expires: 2018  2:53 PM     Your access code will  in 90 days. If you need help or a new code, please call your [facility] or [phone].        Care EveryWhere ID     This is your Care EveryWhere ID. This could be used by other organizations " to access your [facility] medical records  [id number]        Your Vitals Were     Pulse Temperature Pulse Oximetry BMI (Body Mass Index)          62 97.7  F (36.5  C) 95% 32.79 kg/m2         Blood Pressure from Last 3 Encounters:   12/27/17 112/80   11/03/17 104/80   04/12/17 (!) 134/92    Weight from Last 3 Encounters:   12/27/17 86.6 kg (191 lb)   11/03/17 85.7 kg (189 lb)   04/12/17 83.5 kg (184 lb)              We Performed the Following     Urinalysis w/reflex protein, bili ([facility])          Today's Medication Changes          These changes are accurate as of: 12/27/17  5:24 PM.  If you have any questions, ask your nurse or doctor.               Start taking these medicines.        Dose/Directions    pregabalin 50 MG capsule   Commonly known as:  LYRICA   Used for:  Right flank pain   Started by:  Gianluca Apodaca MD        Dose:  50 mg   Take 1 capsule (50 mg) by mouth 3 times daily   Quantity:  90 capsule   Refills:  0            Where to get your medicines      Some of these will need a paper prescription and others can be bought over the counter.  Ask your nurse if you have questions.     Bring a paper prescription for each of these medications     pregabalin 50 MG capsule                Primary Care Provider Office Phone # Fax #    Gianluca Apodaca [phone] [address] [facility] [address]        Equal Access to Services     [facility]: Hadii aad ku hadasho Soomaali, waaxda luqadaha, qaybta kaalmada adeegyada, waxbryon dimitris jason ah. So [facility] [phone].    ATENCIÓN: Si habla español, tiene a boggs disposición servicios gratuitos de asistencia lingüística. Llame al [phone].    We comply with applicable federal civil rights laws and [place] laws. We do not discriminate on the basis of race, color, national origin, age, disability, sex, sexual orientation, or gender identity.            Thank you!     Thank you for choosing [facility]   for your care. Our goal is always to provide you with excellent care. Hearing back from our patients is one way we can continue to improve our services. Please take a few minutes to complete the written survey that you may receive in the mail after your visit with us. Thank you!             Your Updated Medication List - Protect others around you: Learn how to safely use, store and throw away your medicines at www.disposemymeds.org.          This list is accurate as of: 12/27/17  5:24 PM.  Always use your most recent med list.                   Brand Name Dispense Instructions for use Diagnosis    ASPIRIN PO      Take 81 mg by mouth daily        blood glucose calibration solution     1 Bottle    Use to calibrate blood glucose monitor as directed.    Encounter for medication refill       blood glucose monitoring lancets     100 each    Use to test blood sugars 1 times daily or as directed.    Encounter for medication refill       blood glucose monitoring meter device kit     1 kit    Use to test blood sugars 1 times daily or as directed.    Encounter for medication refill       * blood glucose monitoring test strip    ONETOUCH ULTRA    100 strip    Use to test blood sugars 1 times daily or as directed.    Encounter for medication refill       * blood glucose monitoring test strip    no brand specified    200 strip    Use to test blood sugars 2  times daily or as directed    Diabetes mellitus, type 2 (H)       clopidogrel 75 MG tablet    PLAVIX    90 tablet    Take 1 tablet (75 mg) by mouth daily    Encounter for medication refill       * glipiZIDE 5 MG 24 hr tablet    GLUCOTROL XL    90 tablet    Take 1 tablet (5 mg) by mouth daily    Encounter for medication refill       * glipiZIDE 5 MG 24 hr tablet    GLUCOTROL XL    90 tablet    TAKE ONE TABLET BY MOUTH ONCE DAILY    Encounter for medication refill       isosorbide mononitrate 30 MG 24 hr tablet    IMDUR    90 tablet    Take 1 tablet (30 mg) by mouth daily     Encounter for medication refill       lisinopril 20 MG tablet    PRINIVIL/ZESTRIL    90 tablet    TAKE ONE TABLET BY MOUTH ONCE DAILY    Essential hypertension       metFORMIN 1000 MG tablet    GLUCOPHAGE    180 tablet    TAKE ONE TABLET BY MOUTH TWICE DAILY WITH MEALS    Type 2 diabetes mellitus without complication, without long-term current use of insulin (H)       metoprolol 25 MG tablet    LOPRESSOR    180 tablet    Take 1 tablet (25 mg) by mouth 2 times daily    Encounter for medication refill       pantoprazole 20 MG EC tablet    PROTONIX    90 tablet    Take by mouth 30-60 minutes before a meal.    Esophageal reflux       pregabalin 50 MG capsule    LYRICA    90 capsule    Take 1 capsule (50 mg) by mouth 3 times daily    Right flank pain       * Notice:  This list has 4 medication(s) that are the same as other medications prescribed for you. Read the directions carefully, and ask your doctor or other care provider to review them with you.

## 2018-01-04 ENCOUNTER — TELEPHONE (OUTPATIENT)
Dept: FAMILY MEDICINE | Facility: CLINIC | Age: 58
End: 2018-01-04

## 2018-01-04 NOTE — TELEPHONE ENCOUNTER
Received fax from [facility] pharmacy that patients rx for Lyrica needs PA.  Submitted PA thru covermymeds.  Received back fax stating PA was approved.  Approval dates 01/01/2018-01/03/2019.  Called [facility] pharmacy to inform them of approval.

## 2018-01-12 ENCOUNTER — TELEPHONE (OUTPATIENT)
Dept: FAMILY MEDICINE | Facility: CLINIC | Age: 58
End: 2018-01-12

## 2018-01-12 NOTE — TELEPHONE ENCOUNTER
"Patient called with complaints of left sided numbness, weakness and heaviness.  Patient describes symptoms as \"stroke like\".  He states that he also has headache, as well as numbness and tingling in fingers and toes.  Advised patient to go call 911 to be evaluated in ER.  Breanne Dunlap   "

## 2018-01-13 ENCOUNTER — HOSPITAL ENCOUNTER (EMERGENCY)
Facility: CLINIC | Age: 58
Discharge: HOME | End: 2018-01-13
Attending: EMERGENCY MEDICINE | Admitting: EMERGENCY MEDICINE
Payer: COMMERCIAL

## 2018-01-13 ENCOUNTER — APPOINTMENT (OUTPATIENT)
Dept: CT IMAGING | Facility: CLINIC | Age: 58
End: 2018-01-13
Attending: EMERGENCY MEDICINE
Payer: COMMERCIAL

## 2018-01-13 VITALS
HEIGHT: 64 IN | OXYGEN SATURATION: 99 % | TEMPERATURE: 98.4 F | RESPIRATION RATE: 28 BRPM | HEART RATE: 72 BPM | SYSTOLIC BLOOD PRESSURE: 153 MMHG | DIASTOLIC BLOOD PRESSURE: 96 MMHG

## 2018-01-13 DIAGNOSIS — I10 HYPERTENSION, UNSPECIFIED TYPE: ICD-10-CM

## 2018-01-13 DIAGNOSIS — M62.81 GENERALIZED MUSCLE WEAKNESS: ICD-10-CM

## 2018-01-13 DIAGNOSIS — R20.2 PARESTHESIA: ICD-10-CM

## 2018-01-13 LAB
ALBUMIN SERPL-MCNC: 3.2 G/DL (ref 3.4–5)
ALP SERPL-CCNC: 58 U/L (ref 40–150)
ALT SERPL W P-5'-P-CCNC: 58 U/L (ref 0–70)
ANION GAP SERPL CALCULATED.3IONS-SCNC: 5 MMOL/L (ref 3–14)
APTT PPP: 28 SEC (ref 22–37)
AST SERPL W P-5'-P-CCNC: 27 U/L (ref 0–45)
BASOPHILS # BLD AUTO: 0.1 10E9/L (ref 0–0.2)
BASOPHILS NFR BLD AUTO: 0.7 %
BILIRUB SERPL-MCNC: 0.2 MG/DL (ref 0.2–1.3)
BUN SERPL-MCNC: 19 MG/DL (ref 7–30)
CALCIUM SERPL-MCNC: 8.6 MG/DL (ref 8.5–10.1)
CHLORIDE SERPL-SCNC: 103 MMOL/L (ref 94–109)
CO2 SERPL-SCNC: 28 MMOL/L (ref 20–32)
CREAT SERPL-MCNC: 1.13 MG/DL (ref 0.66–1.25)
DIFFERENTIAL METHOD BLD: NORMAL
EOSINOPHIL # BLD AUTO: 0.1 10E9/L (ref 0–0.7)
EOSINOPHIL NFR BLD AUTO: 1.6 %
ERYTHROCYTE [DISTWIDTH] IN BLOOD BY AUTOMATED COUNT: 12.8 % (ref 10–15)
GFR SERPL CREATININE-BSD FRML MDRD: 66 ML/MIN/1.7M2
GLUCOSE SERPL-MCNC: 187 MG/DL (ref 70–99)
HCT VFR BLD AUTO: 42.5 % (ref 40–53)
HGB BLD-MCNC: 15.1 G/DL (ref 13.3–17.7)
IMM GRANULOCYTES # BLD: 0 10E9/L (ref 0–0.4)
IMM GRANULOCYTES NFR BLD: 0.5 %
INR PPP: 0.87 (ref 0.86–1.14)
INTERPRETATION ECG - MUSE: NORMAL
LYMPHOCYTES # BLD AUTO: 2.4 10E9/L (ref 0.8–5.3)
LYMPHOCYTES NFR BLD AUTO: 31.8 %
MCH RBC QN AUTO: 32.6 PG (ref 26.5–33)
MCHC RBC AUTO-ENTMCNC: 35.5 G/DL (ref 31.5–36.5)
MCV RBC AUTO: 92 FL (ref 78–100)
MONOCYTES # BLD AUTO: 0.4 10E9/L (ref 0–1.3)
MONOCYTES NFR BLD AUTO: 5.1 %
NEUTROPHILS # BLD AUTO: 4.6 10E9/L (ref 1.6–8.3)
NEUTROPHILS NFR BLD AUTO: 60.3 %
NRBC # BLD AUTO: 0 10*3/UL
NRBC BLD AUTO-RTO: 0 /100
PLATELET # BLD AUTO: 257 10E9/L (ref 150–450)
POTASSIUM SERPL-SCNC: 4.4 MMOL/L (ref 3.4–5.3)
PROT SERPL-MCNC: 7.6 G/DL (ref 6.8–8.8)
RBC # BLD AUTO: 4.63 10E12/L (ref 4.4–5.9)
SODIUM SERPL-SCNC: 136 MMOL/L (ref 133–144)
TROPONIN I SERPL-MCNC: <0.015 UG/L (ref 0–0.04)
WBC # BLD AUTO: 7.6 10E9/L (ref 4–11)

## 2018-01-13 PROCEDURE — 85610 PROTHROMBIN TIME: CPT | Performed by: EMERGENCY MEDICINE

## 2018-01-13 PROCEDURE — 93005 ELECTROCARDIOGRAM TRACING: CPT

## 2018-01-13 PROCEDURE — 70498 CT ANGIOGRAPHY NECK: CPT

## 2018-01-13 PROCEDURE — 25000128 H RX IP 250 OP 636: Performed by: EMERGENCY MEDICINE

## 2018-01-13 PROCEDURE — 85025 COMPLETE CBC W/AUTO DIFF WBC: CPT | Performed by: EMERGENCY MEDICINE

## 2018-01-13 PROCEDURE — 70450 CT HEAD/BRAIN W/O DYE: CPT | Mod: XS

## 2018-01-13 PROCEDURE — 96374 THER/PROPH/DIAG INJ IV PUSH: CPT

## 2018-01-13 PROCEDURE — 25000132 ZZH RX MED GY IP 250 OP 250 PS 637: Performed by: EMERGENCY MEDICINE

## 2018-01-13 PROCEDURE — 99285 EMERGENCY DEPT VISIT HI MDM: CPT | Mod: 25

## 2018-01-13 PROCEDURE — 84484 ASSAY OF TROPONIN QUANT: CPT | Performed by: EMERGENCY MEDICINE

## 2018-01-13 PROCEDURE — 96376 TX/PRO/DX INJ SAME DRUG ADON: CPT

## 2018-01-13 PROCEDURE — 96361 HYDRATE IV INFUSION ADD-ON: CPT

## 2018-01-13 PROCEDURE — 85730 THROMBOPLASTIN TIME PARTIAL: CPT | Performed by: EMERGENCY MEDICINE

## 2018-01-13 PROCEDURE — 25000125 ZZHC RX 250: Performed by: EMERGENCY MEDICINE

## 2018-01-13 PROCEDURE — 80053 COMPREHEN METABOLIC PANEL: CPT | Performed by: EMERGENCY MEDICINE

## 2018-01-13 RX ORDER — ACETAMINOPHEN 500 MG
1000 TABLET ORAL ONCE
Status: COMPLETED | OUTPATIENT
Start: 2018-01-13 | End: 2018-01-13

## 2018-01-13 RX ORDER — IOPAMIDOL 755 MG/ML
70 INJECTION, SOLUTION INTRAVASCULAR ONCE
Status: COMPLETED | OUTPATIENT
Start: 2018-01-13 | End: 2018-01-13

## 2018-01-13 RX ORDER — HYDRALAZINE HYDROCHLORIDE 20 MG/ML
10 INJECTION INTRAMUSCULAR; INTRAVENOUS ONCE
Status: COMPLETED | OUTPATIENT
Start: 2018-01-13 | End: 2018-01-13

## 2018-01-13 RX ADMIN — SODIUM CHLORIDE 500 ML: 9 INJECTION, SOLUTION INTRAVENOUS at 09:35

## 2018-01-13 RX ADMIN — HYDRALAZINE HYDROCHLORIDE 10 MG: 20 INJECTION INTRAMUSCULAR; INTRAVENOUS at 11:02

## 2018-01-13 RX ADMIN — SODIUM CHLORIDE 100 ML: 9 INJECTION, SOLUTION INTRAVENOUS at 09:22

## 2018-01-13 RX ADMIN — IOPAMIDOL 70 ML: 755 INJECTION, SOLUTION INTRAVENOUS at 09:22

## 2018-01-13 RX ADMIN — ACETAMINOPHEN 1000 MG: 500 TABLET, FILM COATED ORAL at 11:01

## 2018-01-13 RX ADMIN — HYDRALAZINE HYDROCHLORIDE 10 MG: 20 INJECTION INTRAMUSCULAR; INTRAVENOUS at 09:51

## 2018-01-13 ASSESSMENT — ENCOUNTER SYMPTOMS
SPEECH DIFFICULTY: 1
HEADACHES: 1
NUMBNESS: 1
NAUSEA: 0
FACIAL ASYMMETRY: 0
WEAKNESS: 1
VOMITING: 0
SHORTNESS OF BREATH: 0

## 2018-01-13 NOTE — ED NOTES
Bed: ED29  Expected date:   Expected time:   Means of arrival:   Comments:  [facility] 511 Hypertension tingling on left side.  57 male ETA 0845

## 2018-01-13 NOTE — LETTER
January 13, 2018      To Whom It May Concern:      Emily Zhu was seen in our Emergency Department today, 01/13/18.  I expect his condition to improve over the next 2 days.  He may return to work/school when improved.    Sincerely,        Alyssia Harrison MD

## 2018-01-13 NOTE — ED AVS SNAPSHOT
Emergency Department    [address] [facility] [address]    Phone:  [phone]    Fax:  [phone]                                       Emily Zhu   MRN: [id number]    Department:   Emergency Department   Date of Visit:  1/13/2018           After Visit Summary Signature Page     I have received my discharge instructions, and my questions have been answered. I have discussed any challenges I see with this plan with the nurse or doctor.    ..........................................................................................................................................  Patient/Patient Representative Signature      ..........................................................................................................................................  Patient Representative Print Name and Relationship to Patient    ..................................................               ................................................  Date                                            Time    ..........................................................................................................................................  Reviewed by Signature/Title    ...................................................              ..............................................  Date                                                            Time

## 2018-01-13 NOTE — ED AVS SNAPSHOT
Emergency Department    [address] [facility] [address]    Phone:  [phone]    Fax:  [phone]                                       Emily Zhu   MRN: [id number]    Department:   Emergency Department   Date of Visit:  1/13/2018           Patient Information     Date Of Birth          1960        Your diagnoses for this visit were:     Hypertension, unspecified type     Paresthesia     Generalized muscle weakness        You were seen by Alyssia Harrison MD.      Follow-up Information     Follow up with Octavio Benz MD In 2 days.    Specialty:  Neurology    Contact information:    [facility]  [address]  [facility] [address]  [phone]        Discharge References/Attachments     HIGH BLOOD PRESSURE (HYPERTENSION), DISCHARGE INSTRUCTIONS (ENGLISH)    PARAESTHESIAS (ENGLISH)    WEAKNESS (UNCERTAIN CAUSE) (ENGLISH)      24 Hour Appointment Hotline       To make an appointment at any [facility], call [phone] ([phone]). If you don't have a family doctor or clinic, we will help you find one. [place] clinics are conveniently located to serve the needs of you and your family.             Review of your medicines      Our records show that you are taking the medicines listed below. If these are incorrect, please call your family doctor or clinic.        Dose / Directions Last dose taken    ASPIRIN PO   Dose:  81 mg        Take 81 mg by mouth daily   Refills:  0        blood glucose calibration solution   Quantity:  1 Bottle        Use to calibrate blood glucose monitor as directed.   Refills:  0        blood glucose monitoring lancets   Quantity:  100 each        Use to test blood sugars 1 times daily or as directed.   Refills:  11        blood glucose monitoring meter device kit   Quantity:  1 kit        Use to test blood sugars 1 times daily or as directed.   Refills:  0        * blood glucose monitoring test strip   Commonly  known as:  ONETOUCH ULTRA   Quantity:  100 strip        Use to test blood sugars 1 times daily or as directed.   Refills:  11        * blood glucose monitoring test strip   Commonly known as:  no brand specified   Quantity:  200 strip        Use to test blood sugars 2  times daily or as directed   Refills:  3        clopidogrel 75 MG tablet   Commonly known as:  PLAVIX   Dose:  75 mg   Quantity:  90 tablet        Take 1 tablet (75 mg) by mouth daily   Refills:  3        * glipiZIDE 5 MG 24 hr tablet   Commonly known as:  GLUCOTROL XL   Dose:  5 mg   Quantity:  90 tablet        Take 1 tablet (5 mg) by mouth daily   Refills:  0        * glipiZIDE 5 MG 24 hr tablet   Commonly known as:  GLUCOTROL XL   Quantity:  90 tablet        TAKE ONE TABLET BY MOUTH ONCE DAILY   Refills:  1        isosorbide mononitrate 30 MG 24 hr tablet   Commonly known as:  IMDUR   Dose:  30 mg   Quantity:  90 tablet        Take 1 tablet (30 mg) by mouth daily   Refills:  3        lisinopril 20 MG tablet   Commonly known as:  PRINIVIL/ZESTRIL   Quantity:  90 tablet        TAKE ONE TABLET BY MOUTH ONCE DAILY   Refills:  1        metFORMIN 1000 MG tablet   Commonly known as:  GLUCOPHAGE   Quantity:  180 tablet        TAKE ONE TABLET BY MOUTH TWICE DAILY WITH MEALS   Refills:  1        metoprolol tartrate 25 MG tablet   Commonly known as:  LOPRESSOR   Dose:  25 mg   Quantity:  180 tablet        Take 1 tablet (25 mg) by mouth 2 times daily   Refills:  3        pantoprazole 20 MG EC tablet   Commonly known as:  PROTONIX   Quantity:  90 tablet        Take by mouth 30-60 minutes before a meal.   Refills:  1        pregabalin 50 MG capsule   Commonly known as:  LYRICA   Dose:  50 mg   Quantity:  90 capsule        Take 1 capsule (50 mg) by mouth 3 times daily   Refills:  0        * Notice:  This list has 4 medication(s) that are the same as other medications prescribed for you. Read the directions carefully, and ask your doctor or other care provider to  review them with you.            Procedures and tests performed during your visit     CBC with platelets + differential    CT Head Neck Angio w/o & w Contrast    CT Head w/o Contrast    Comprehensive metabolic panel    EKG 12 lead    INR    Partial thromboplastin time    Troponin I (now)      Orders Needing Specimen Collection     None      Pending Results     No orders found from 1/11/2018 to 1/14/2018.            Pending Culture Results     No orders found from 1/11/2018 to 1/14/2018.            Pending Results Instructions     If you had any lab results that were not finalized at the time of your Discharge, you can call the ED Lab Result RN at [phone]. You will be contacted by this team for any positive Lab results or changes in treatment. The nurses are available 7 days a week from 10A to 6:30P.  You can leave a message 24 hours per day and they will return your call.        Test Results From Your Hospital Stay        1/13/2018  8:56 AM      Component Results     Component Value Ref Range & Units Status    WBC 7.6 4.0 - 11.0 10e9/L Final    RBC Count 4.63 4.4 - 5.9 10e12/L Final    Hemoglobin 15.1 13.3 - 17.7 g/dL Final    Hematocrit 42.5 40.0 - 53.0 % Final    MCV 92 78 - 100 fl Final    MCH 32.6 26.5 - 33.0 pg Final    MCHC 35.5 31.5 - 36.5 g/dL Final    RDW 12.8 10.0 - 15.0 % Final    Platelet Count 257 150 - 450 10e9/L Final    Diff Method Automated Method  Final    % Neutrophils 60.3 % Final    % Lymphocytes 31.8 % Final    % Monocytes 5.1 % Final    % Eosinophils 1.6 % Final    % Basophils 0.7 % Final    % Immature Granulocytes 0.5 % Final    Nucleated RBCs 0 0 /100 Final    Absolute Neutrophil 4.6 1.6 - 8.3 10e9/L Final    Absolute Lymphocytes 2.4 0.8 - 5.3 10e9/L Final    Absolute Monocytes 0.4 0.0 - 1.3 10e9/L Final    Absolute Eosinophils 0.1 0.0 - 0.7 10e9/L Final    Absolute Basophils 0.1 0.0 - 0.2 10e9/L Final    Abs Immature Granulocytes 0.0 0 - 0.4 10e9/L Final    Absolute Nucleated RBC 0.0   Final         1/13/2018  9:15 AM      Component Results     Component Value Ref Range & Units Status    Sodium 136 133 - 144 mmol/L Final    Potassium 4.4 3.4 - 5.3 mmol/L Final    Chloride 103 94 - 109 mmol/L Final    Carbon Dioxide 28 20 - 32 mmol/L Final    Anion Gap 5 3 - 14 mmol/L Final    Glucose 187 (H) 70 - 99 mg/dL Final    Urea Nitrogen 19 7 - 30 mg/dL Final    Creatinine 1.13 0.66 - 1.25 mg/dL Final    GFR Estimate 66 >60 mL/min/1.7m2 Final    Non  GFR Calc    GFR Estimate If Black 80 >60 mL/min/1.7m2 Final    African American GFR Calc    Calcium 8.6 8.5 - 10.1 mg/dL Final    Bilirubin Total 0.2 0.2 - 1.3 mg/dL Final    Albumin 3.2 (L) 3.4 - 5.0 g/dL Final    Protein Total 7.6 6.8 - 8.8 g/dL Final    Alkaline Phosphatase 58 40 - 150 U/L Final    ALT 58 0 - 70 U/L Final    AST 27 0 - 45 U/L Final         1/13/2018  9:15 AM      Component Results     Component Value Ref Range & Units Status    Troponin I ES <0.015 0.000 - 0.045 ug/L Final    The 99th percentile for upper reference range is 0.045 ug/L.  Troponin values   in the range of 0.045 - 0.120 ug/L may be associated with risks of adverse   clinical events.           1/13/2018 10:31 AM      Narrative     CT SCAN OF THE HEAD WITHOUT CONTRAST January 13, 2018 9:27 AM     HISTORY: Left-sided weakness and numbness for a week.     TECHNIQUE: Axial images of the head and coronal reformations without  IV contrast material. Radiation dose for this scan was reduced using  automated exposure control, adjustment of the mA and/or kV according  to patient size, or iterative reconstruction technique.    COMPARISON: Brain MR 2/14/2017.    FINDINGS: There is no evidence of intracranial hemorrhage, mass, or  anomaly. Moderate diffuse parenchymal volume loss. Moderate  periventricular white matter hypodensities which are nonspecific, but  likely related to chronic microvascular ischemic disease. Chronic  lacunar infarct within the right putamen.      Moderate mucosal thickening within the paranasal sinuses. The bony  calvarium and bones of the skull base appear intact. Bilateral  pseudophakia.        Impression     IMPRESSION:     1. No evidence of acute intracranial hemorrhage, mass, or herniation.  2. There is generalized atrophy of the brain. White matter changes are  present in the cerebral hemispheres that are consistent with small  vessel ischemic disease in this age patient.     BRAYDON SCHROEDER MD         1/13/2018 10:33 AM      Narrative     CT ANGIOGRAM OF THE HEAD AND NECK WITH CONTRAST January 13, 2018 9:34  AM     HISTORY: Evaluate for dissection/thromboembolism.     TECHNIQUE: CT angiography with an injection of 70 mL [id number]-370 IV  with scans through the head and neck. Images were transferred to a  separate 3-D workstation where multiplanar reformations and 3-D images  were created. Estimates of carotid stenoses are made relative to the  distal internal carotid artery diameters except as noted. Radiation  dose for this scan was reduced using automated exposure control,  adjustment of the mA and/or kV according to patient size, or iterative  reconstruction technique.     COMPARISON: CTA 6/15/2016.    CT HEAD FINDINGS: No contrast enhancing lesions. Cerebral blood flow  is grossly normal.    CT ANGIOGRAM HEAD FINDINGS: The major intracranial arteries including  the proximal branches of the anterior cerebral, middle cerebral, and  posterior cerebral arteries appear patent without vascular cutoff. No  aneurysm identified. No significant stenosis. Venous circulation is  unremarkable.     CT ANGIOGRAM NECK FINDINGS:   Common origin of the right brachiocephalic and left common carotid  arteries from the aortic arch, a normal variant. Mild atherosclerotic  disease at the origins of the great vessels without significant  stenosis.    Right carotid artery: The right common and internal carotid arteries  are patent.  Atherosclerotic disease at the right  carotid bifurcation  without stenosis.      Left carotid artery: The left common and internal carotid arteries are  patent.  Atherosclerotic disease at the left carotid bifurcation  without stenosis.      Vertebral arteries: Vertebral arteries are patent without evidence of  dissection.  No significant stenosis.      Other findings: There are hyperdense masses within the parotid glands  bilaterally including a 1.6 x 1.3 cm lesion within the left parotid  gland, a 1.2 cm lesion in the left parotid gland, and a 1.8 cm lesion  in the right parotid gland. There are also smaller subcentimeter  enhancing lesions in the periparotid regions which may represent  periparotid lymph nodes.        Impression     IMPRESSION:   1. Patent arteries in the neck without evidence of dissection. Mild  atherosclerotic calcifications at the carotid bifurcations bilaterally  without significant stenosis by NASCET criteria.  2. Patent proximal major intracranial arteries without vascular  cutoff. No significant stenosis. No aneurysm identified.  3. Bilateral hyperdense masses within the parotid glands. There are  also likely prominent periparotid lymph nodes particularly on the  left. Differential would include benign and malignant parotid lesions.  Consider further evaluation with parotid MR on a nonemergent basis.    BRAYDON SCHROEDER MD         1/13/2018  9:16 AM      Component Results     Component Value Ref Range & Units Status    INR 0.87 0.86 - 1.14 Final         1/13/2018  9:16 AM      Component Results     Component Value Ref Range & Units Status    PTT 28 22 - 37 sec Final                Clinical Quality Measure: Blood Pressure Screening     Your blood pressure was checked while you were in the emergency department today. The last reading we obtained was  BP: (!) 153/96 . Please read the guidelines below about what these numbers mean and what you should do about them.  If your systolic blood pressure (the top number) is less than 120  "and your diastolic blood pressure (the bottom number) is less than 80, then your blood pressure is normal. There is nothing more that you need to do about it.  If your systolic blood pressure (the top number) is 120-139 or your diastolic blood pressure (the bottom number) is 80-89, your blood pressure may be higher than it should be. You should have your blood pressure rechecked within a year by a primary care provider.  If your systolic blood pressure (the top number) is 140 or greater or your diastolic blood pressure (the bottom number) is 90 or greater, you may have high blood pressure. High blood pressure is treatable, but if left untreated over time it can put you at risk for heart attack, stroke, or kidney failure. You should have your blood pressure rechecked by a primary care provider within the next 4 weeks.  If your provider in the emergency department today gave you specific instructions to follow-up with your doctor or provider even sooner than that, you should follow that instruction and not wait for up to 4 weeks for your follow-up visit.        Thank you for choosing [facility]       Thank you for choosing [facility] for your care. Our goal is always to provide you with excellent care. Hearing back from our patients is one way we can continue to improve our services. Please take a few minutes to complete the written survey that you may receive in the mail after you visit with us. Thank you!        [employer] Information     [employer] lets you send messages to your doctor, view your test results, renew your prescriptions, schedule appointments and more. To sign up, go to [email or web address] . Click on \"Log in\" on the left side of the screen, which will take you to the Welcome page. Then click on \"Sign up Now\" on the right side of the page.     You will be asked to enter the access code listed below, as well as some personal information. Please follow the directions to create your username and password.   "   Your access code is: [email or web address]  Expires: 2018  2:53 PM     Your access code will  in 90 days. If you need help or a new code, please call your [facility] clinic or [phone].        Care EveryWhere ID     This is your Care EveryWhere ID. This could be used by other organizations to access your [facility] medical records  [id number]        Equal Access to Services     RILEY KUMARI : Hadii kingsley fontaine Somaya, waaxda lulesteradaha, qaybta kaalmada adeluma, liz jason . So [facility] [phone].    ATENCIÓN: Si habla español, tiene a boggs disposición servicios gratuitos de asistencia lingüística. Llame al [phone].    We comply with applicable federal civil rights laws and [place] laws. We do not discriminate on the basis of race, color, national origin, age, disability, sex, sexual orientation, or gender identity.            After Visit Summary       This is your record. Keep this with you and show to your community pharmacist(s) and doctor(s) at your next visit.

## 2018-01-13 NOTE — ED NOTES
Presents to ED from home by [facility].  Has numbness to entire left side of body that got worse yesterday around 1400.  Has been coming and going the last few days.  Was stressed about coming to ED yesterday for further eval.  Is diabetic.  EMS had BS of 240.

## 2018-04-02 ENCOUNTER — PATIENT OUTREACH (OUTPATIENT)
Dept: CARE COORDINATION | Facility: CLINIC | Age: 58
End: 2018-04-02

## 2018-04-02 ENCOUNTER — OFFICE VISIT (OUTPATIENT)
Dept: FAMILY MEDICINE | Facility: CLINIC | Age: 58
End: 2018-04-02

## 2018-04-02 VITALS
BODY MASS INDEX: 32.61 KG/M2 | HEART RATE: 72 BPM | HEIGHT: 64 IN | DIASTOLIC BLOOD PRESSURE: 88 MMHG | TEMPERATURE: 97.3 F | RESPIRATION RATE: 16 BRPM | SYSTOLIC BLOOD PRESSURE: 142 MMHG | WEIGHT: 191 LBS

## 2018-04-02 DIAGNOSIS — E78.2 MIXED HYPERLIPIDEMIA: ICD-10-CM

## 2018-04-02 DIAGNOSIS — J01.90 ACUTE SINUSITIS WITH SYMPTOMS > 10 DAYS: ICD-10-CM

## 2018-04-02 DIAGNOSIS — I10 ESSENTIAL HYPERTENSION: Primary | ICD-10-CM

## 2018-04-02 DIAGNOSIS — K11.8 PAROTID MASS: ICD-10-CM

## 2018-04-02 DIAGNOSIS — G44.52 NEW DAILY PERSISTENT HEADACHE: ICD-10-CM

## 2018-04-02 PROCEDURE — 99214 OFFICE O/P EST MOD 30 MIN: CPT | Performed by: FAMILY MEDICINE

## 2018-04-02 RX ORDER — METOPROLOL TARTRATE 25 MG/1
25 TABLET, FILM COATED ORAL 2 TIMES DAILY
Qty: 180 TABLET | Refills: 3 | Status: SHIPPED | OUTPATIENT
Start: 2018-04-02 | End: 2019-04-16

## 2018-04-02 RX ORDER — HYDROCHLOROTHIAZIDE 25 MG/1
25 TABLET ORAL DAILY
Qty: 90 TABLET | Refills: 1 | Status: SHIPPED | OUTPATIENT
Start: 2018-04-02 | End: 2018-09-28

## 2018-04-02 RX ORDER — ATORVASTATIN CALCIUM 10 MG/1
1 TABLET, FILM COATED ORAL AT BEDTIME
COMMUNITY
Start: 2018-03-28 | End: 2018-06-11

## 2018-04-02 RX ORDER — AZITHROMYCIN 250 MG/1
TABLET, FILM COATED ORAL
Qty: 6 TABLET | Refills: 0 | Status: SHIPPED | OUTPATIENT
Start: 2018-04-02 | End: 2018-06-11

## 2018-04-02 NOTE — MR AVS SNAPSHOT
"              After Visit Summary   4/2/2018    Emily Zhu    MRN: [id number]           Patient Information     Date Of Birth          1960        Visit Information        Provider Department      4/2/2018 12:45 PM Gianluca Apodaca MD [facility]        Today's Diagnoses     Essential hypertension    -  1    New daily persistent headache        Mixed hyperlipidemia        Parotid mass        Acute sinusitis with symptoms > 10 days           Follow-ups after your visit        Additional Services     Referral to [facility]       Referral to [facility]  Phone: [phone]  Fax: [phone]  Reason for referral: MR parotid glands                  Future tests that were ordered for you today     Open Future Orders        Priority Expected Expires Ordered    Lipid Panel ([facility]) Routine  4/2/2019 4/2/2018            Who to contact     If you have questions or need follow up information about today's clinic visit or your schedule please contact [facility] directly at [phone].  Normal or non-critical lab and imaging results will be communicated to you by MyChart, letter or phone within 4 business days after the clinic has received the results. If you do not hear from us within 7 days, please contact the clinic through [employer] or phone. If you have a critical or abnormal lab result, we will notify you by phone as soon as possible.  Submit refill requests through [employer] or call your pharmacy and they will forward the refill request to us. Please allow 3 business days for your refill to be completed.          Additional Information About Your Visit        [employer] Information     [employer] lets you send messages to your doctor, view your test results, renew your prescriptions, schedule appointments and more. To sign up, go to [email or web address] . Click on \"Log in\" on the left side of the screen, which will take you to the Welcome page. Then click on \"Sign up Now\" " "on the right side of the page.     You will be asked to enter the access code listed below, as well as some personal information. Please follow the directions to create your username and password.     Your access code is: [email or web address]  Expires: 2018  1:46 PM     Your access code will  in 90 days. If you need help or a new code, please call your [facility] or [phone].        Care EveryWhere ID     This is your Care EveryWhere ID. This could be used by other organizations to access your [facility] medical records  [id number]        Your Vitals Were     Pulse Temperature Respirations Height BMI (Body Mass Index)       72 97.3  F (36.3  C) (Oral) 16 1.626 m (5' 4\") 32.79 kg/m2        Blood Pressure from Last 3 Encounters:   18 142/88   18 (!) 153/96   17 112/80    Weight from Last 3 Encounters:   18 86.6 kg (191 lb)   17 86.6 kg (191 lb)   17 85.7 kg (189 lb)              We Performed the Following     Referral to [facility]          Today's Medication Changes          These changes are accurate as of 18  1:46 PM.  If you have any questions, ask your nurse or doctor.               Start taking these medicines.        Dose/Directions    azithromycin 250 MG tablet   Commonly known as:  ZITHROMAX   Used for:  Acute sinusitis with symptoms > 10 days   Started by:  Gianluca Apodaca MD        Two tablets first day, then one tablet daily for four days.   Quantity:  6 tablet   Refills:  0       hydrochlorothiazide 25 MG tablet   Commonly known as:  HYDRODIURIL   Used for:  Essential hypertension   Started by:  Gianluca Apodaca MD        Dose:  25 mg   Take 1 tablet (25 mg) by mouth daily   Quantity:  90 tablet   Refills:  1            Where to get your medicines      These medications were sent to [facility] [address] [place] [address]  [address] [facility] [address]     Phone:  [phone]     azithromycin 250 MG " tablet    hydrochlorothiazide 25 MG tablet    metoprolol tartrate 25 MG tablet                Primary Care Provider Office Phone # Fax #    Gianluca Apodaca [phone] [address]  [facility] [address]        Equal Access to Services     ROSEALEXIS QUOC : Hadii aad ku hadasho Soomaali, waaxda luqadaha, qaybta kaalmada adeegyada, waxay idiin hayaan adeeg khfarhad laministerion todd. So [facility] [phone].    ATENCIÓN: Si habla español, tiene a boggs disposición servicios gratuitos de asistencia lingüística. Llame al [phone].    We comply with applicable federal civil rights laws and [place] laws. We do not discriminate on the basis of race, color, national origin, age, disability, sex, sexual orientation, or gender identity.            Thank you!     Thank you for choosing [facility]  for your care. Our goal is always to provide you with excellent care. Hearing back from our patients is one way we can continue to improve our services. Please take a few minutes to complete the written survey that you may receive in the mail after your visit with us. Thank you!             Your Updated Medication List - Protect others around you: Learn how to safely use, store and throw away your medicines at www.disposemymeds.org.          This list is accurate as of 4/2/18  1:46 PM.  Always use your most recent med list.                   Brand Name Dispense Instructions for use Diagnosis    ASPIRIN PO      Take 81 mg by mouth daily        atorvastatin 10 MG tablet    LIPITOR     Take 1 tablet by mouth At Bedtime        azithromycin 250 MG tablet    ZITHROMAX    6 tablet    Two tablets first day, then one tablet daily for four days.    Acute sinusitis with symptoms > 10 days       blood glucose calibration solution     1 Bottle    Use to calibrate blood glucose monitor as directed.    Encounter for medication refill       blood glucose monitoring lancets     100 each    Use to test blood sugars 1  times daily or as directed.    Encounter for medication refill       blood glucose monitoring meter device kit     1 kit    Use to test blood sugars 1 times daily or as directed.    Encounter for medication refill       * blood glucose monitoring test strip    ONETOUCH ULTRA    100 strip    Use to test blood sugars 1 times daily or as directed.    Encounter for medication refill       * blood glucose monitoring test strip    no brand specified    200 strip    Use to test blood sugars 2  times daily or as directed    Diabetes mellitus, type 2 (H)       clopidogrel 75 MG tablet    PLAVIX    90 tablet    Take 1 tablet (75 mg) by mouth daily    Encounter for medication refill       * glipiZIDE 5 MG 24 hr tablet    GLUCOTROL XL    90 tablet    Take 1 tablet (5 mg) by mouth daily    Encounter for medication refill       * glipiZIDE 5 MG 24 hr tablet    GLUCOTROL XL    90 tablet    TAKE ONE TABLET BY MOUTH ONCE DAILY    Encounter for medication refill       hydrochlorothiazide 25 MG tablet    HYDRODIURIL    90 tablet    Take 1 tablet (25 mg) by mouth daily    Essential hypertension       isosorbide mononitrate 30 MG 24 hr tablet    IMDUR    90 tablet    Take 1 tablet (30 mg) by mouth daily    Encounter for medication refill       lisinopril 20 MG tablet    PRINIVIL/ZESTRIL    90 tablet    TAKE ONE TABLET BY MOUTH ONCE DAILY    Essential hypertension       LYRICA 50 MG capsule   Generic drug:  pregabalin     90 capsule    TAKE ONE CAPSULE BY MOUTH THREE TIMES DAILY    Right flank pain       metFORMIN 1000 MG tablet    GLUCOPHAGE    180 tablet    TAKE ONE TABLET BY MOUTH TWICE DAILY WITH MEALS    Type 2 diabetes mellitus without complication, without long-term current use of insulin (H)       metoprolol tartrate 25 MG tablet    LOPRESSOR    180 tablet    Take 1 tablet (25 mg) by mouth 2 times daily    Essential hypertension       pantoprazole 20 MG EC tablet    PROTONIX    90 tablet    Take by mouth 30-60 minutes before a  meal.    Esophageal reflux       * Notice:  This list has 4 medication(s) that are the same as other medications prescribed for you. Read the directions carefully, and ask your doctor or other care provider to review them with you.

## 2018-04-02 NOTE — PROGRESS NOTES
Problem(s) Oriented visit        SUBJECTIVE:                                                    Emily Zhu is a 58 year old male who presents to clinic today for the following health issues :      1. Essential hypertension  Wondering if he should be on a higher dose of medication. BP's averaging in the 140's. Hx of CAD    2. Coronary atherosclerosis  Needs refill of atorvastatin and metoprolol. No CP or SOB, unless strong exertion.    3. New daily persistent headache  3 weeks of right sided HA. He was in the ED 2 months ago for left sided numbness and weakness, and no problems noted, except BP to 170, and CT head/ carotid angio CT without evidence of stroke, but parotid masses bilaterally. He has had right sided nasal drainage also.         Problem list, Medication list, Allergies, and Medical/Social/Surgical histories reviewed in EPIC and updated as appropriate.   Additional history: as documented    ROS:  General:  NEGATIVE for fever, chills, change in weight    Histories:   Patient Active Problem List   Diagnosis     Coronary atherosclerosis     Essential hypertension     Mixed hyperlipidemia     Cataract     GERD (gastroesophageal reflux disease)     Health Care Home     Microalbuminuria due to type 2 diabetes mellitus (H)     Type 2 diabetes mellitus with diabetic nephropathy, without long-term current use of insulin (H)     Past Surgical History:   Procedure Laterality Date     ANGIOPLASTY  2007    rca     ANGIOPLASTY  2010    om     ARTHROTOMY SHOULDER, ROTATOR CUFF REPAIR, COMBINED Left 4/12/2017    Procedure: COMBINED ARTHROTOMY SHOULDER, ROTATOR CUFF REPAIR;  Surgeon: Deejay Conrad MD;  Location: [facility]     ARTHROTOMY SHOULDER, SUBACROMIAL DECOMPRESSION, COMBINED Left 4/12/2017    Procedure: COMBINED ARTHROTOMY SHOULDER, SUBACROMIAL DECOMPRESSION;  Surgeon: Deejay Conrad MD;  Location: [facility]     COLONOSCOPY       DECOMPRESSION CUBITAL TUNNEL  11/29/2013    Procedure:  "DECOMPRESSION CUBITAL TUNNEL;;  Surgeon: Radha Cain MD;  Location: [facility]     ENDOSCOPIC RELEASE CARPAL TUNNEL  11/29/2013    Procedure: ENDOSCOPIC RELEASE CARPAL TUNNEL;  LEFT ENDOSCOPIC CARPAL TUNNEL RELEASE AND CUBITAL TUNNEL RELEASE ;  Surgeon: Radha Cain MD;  Location: [facility]     OPEN REDUCTION INTERNAL FIXATION HIP NAILING  3/30/2012    Procedure:OPEN REDUCTION INTERNAL FIXATION HIP NAILING; Biopsy, Curettage and Bone Grafting Right Hip Lesion, Placement of Hip Screw; Surgeon:MARILIN GAY; Location:[facility]     PHACOEMULSIFICATION CLEAR CORNEA WITH STANDARD INTRAOCULAR LENS IMPLANT  5/8/2013    Procedure: PHACOEMULSIFICATION CLEAR CORNEA WITH STANDARD INTRAOCULAR LENS IMPLANT;  RIGHT EYE PHACOEMULSIFICATION CLEAR CORNEA WITH STANDARD INTRAOCULAR LENS IMPLANT ;  Surgeon: Jovani Pretty MD;  Location: [facility]     PHACOEMULSIFICATION CLEAR CORNEA WITH STANDARD INTRAOCULAR LENS IMPLANT  5/20/2013    Procedure: PHACOEMULSIFICATION CLEAR CORNEA WITH STANDARD INTRAOCULAR LENS IMPLANT;  LEFT  EYE PHACOEMULSIFICATION CLEAR CORNEA WITH STANDARD INTRAOCULAR LENS IMPLANT ;  Surgeon: Jovani Pretty MD;  Location: [facility]     STENT         Social History   Substance Use Topics     Smoking status: Current Every Day Smoker     Packs/day: 1.00     Years: 40.00     Types: Cigarettes     Smokeless tobacco: Never Used     Alcohol use 0.0 oz/week     0 Standard drinks or equivalent per week      Comment: none     Family History   Problem Relation Age of Onset     CEREBROVASCULAR DISEASE Mother 56     Hypertension Mother      CEREBROVASCULAR DISEASE Father      Hypertension Father            OBJECTIVE:                                                    /88  Pulse 72  Temp 97.3  F (36.3  C) (Oral)  Resp 16  Ht 1.626 m (5' 4\")  Wt 86.6 kg (191 lb)  BMI 32.79 kg/m2  Body mass index is 32.79 kg/(m^2).   Constitutional: healthy, alert and no distress   Cardiovascular: PMI normal. No " lifts, heaves, or thrills. RRR. No murmurs, clicks gallops or rub, No edema or JVD.  Respiratory: Percussion normal. Good diaphragmatic excursion. Lungs clear  Neck: his parotids seem normal, but a left sided nodule about 1 cm at angle of jaw, so may be node or parotid mass     ASSESSMENT/PLAN:                                                        Emily was seen today for headache, hypertension, eye problem and referral.    Diagnoses and all orders for this visit:    Essential hypertension  -     metoprolol tartrate (LOPRESSOR) 25 MG tablet; Take 1 tablet (25 mg) by mouth 2 times daily  -     hydrochlorothiazide (HYDRODIURIL) 25 MG tablet; Take 1 tablet (25 mg) by mouth daily    New daily persistent headache    Mixed hyperlipidemia  -     Lipid Panel ([facility]); Future    Parotid mass  -     Referral to [facility]    Acute sinusitis with symptoms > 10 days  -     azithromycin (ZITHROMAX) 250 MG tablet; Two tablets first day, then one tablet daily for four days.        MR of parotid. RTC 1 month.    The following health maintenance items are reviewed in Epic and correct as of today:  Health Maintenance   Topic Date Due     ADVANCE DIRECTIVE PLANNING Q5 YRS  01/22/2013     LIPID MONITORING Q1 YEAR  02/03/2018     A1C Q6 MO  05/03/2018     EYE EXAM Q1 YEAR  05/19/2018     INFLUENZA VACCINE (SYSTEM ASSIGNED)  09/01/2018     TSH W/ FREE T4 REFLEX Q2 YEAR  10/21/2018     FOOT EXAM Q1 YEAR  11/03/2018     MICROALBUMIN Q1 YEAR  11/03/2018     CREATININE Q1 YEAR  01/13/2019     TETANUS IMMUNIZATION (SYSTEM ASSIGNED)  05/13/2021     COLON CANCER SCREEN (SYSTEM ASSIGNED)  06/03/2021     PNEUMOVAX 1X HI RISK PATIENT < 65 (NO IB MSG)  Completed     HEPATITIS C SCREENING  Completed       Gianluca Apodaca MD  [facility]  Family Practice  [facility]  [phone]    For any issues my office # is [phone]

## 2018-04-03 NOTE — PROGRESS NOTES
Clinic Care Coordination Contact    OUTREACH    Referral Information:  Referral Source: Self-patient/Caregiver    Primary Diagnosis: Psychosocial    Chief Complaint   Patient presents with     Clinic Care Coordination - Initial     Psychosocial Support        Universal Utilization:   Utilization    Last refreshed: 4/2/2018  6:48 PM:  No Show Count (past year) 0       Last refreshed: 4/2/2018  6:48 PM:  ED visits 1       Last refreshed: 4/2/2018  6:48 PM:  Hospital admissions 0          Current as of: 4/2/2018  6:48 PM         Clinical Concerns:  Current Medical Concerns:  Pt stopped into Clinic Care Coordinators office to touch base. EDDIE LEIVA had previously worked with pt to support pt and concerns about relationship conflict. Pt still appears to be having similar concerns with the same individual.     Current Behavioral Concerns: Financial management is an issue at times. Pt is on Medical Assistance-concerns about over earning and income eligibility upon review.    Education Provided to patient:  Care Coordination, Health Care Home  Health Maintenance Reviewed: Due/Overdue     Medication Management:  Pt reports adherence, Pt was started on two new medications to help with hypertension during the appointment today, Pt denies any questions about the change    Functional Status:  Mobility Status: Independent  Equipment Currently Used at Home: glucometer  Transportation means:: Regular car     Psychosocial:  Current living arrangement:: I live alone  Type of residence:: Apartment  Financial/Insurance: Josh Rolon, Pt is employed part time at [employer], Denies any financial concerns, Pt recently helped his son plan, host, and pay for a wedding which he was very proud of, Pt showed pictures and discussed how he was able to cook and prep food for the occasion. Pt finds a lot of paulie in his hobbies including gardening and cooking     Resources and Interventions:  Current Resources:  No formal supports  Advanced Care  Plans/Directives on file:: No  Patient/Caregiver understanding: Pt reports understanding and denies any additional questions or concerns at this times. SW CC engaged in AIDET communication during encounter.  Outreach Frequency: 6 weeks     Plan: Pt will continue to use coping mechanisms and communication techniques during conflicts in his relationships. Pt will reach out to CC if he needs anything regarding health navigation, financial, or psychosocial concerns.    [facility]  Clinic Care Coordinator  [phone]  [email or web address]

## 2018-04-06 ENCOUNTER — TRANSFERRED RECORDS (OUTPATIENT)
Dept: FAMILY MEDICINE | Facility: CLINIC | Age: 58
End: 2018-04-06

## 2018-04-10 ENCOUNTER — TELEPHONE (OUTPATIENT)
Dept: FAMILY MEDICINE | Facility: CLINIC | Age: 58
End: 2018-04-10

## 2018-04-10 DIAGNOSIS — K11.8 PAROTID MASS: Primary | ICD-10-CM

## 2018-04-10 NOTE — TELEPHONE ENCOUNTER
Called patient with result of MRI of neck.  Informed him that it shows bilateral parotid lesions.  Recommended is for patient to have consult with ENT.  Referral made to [facility] Otolaryng[facility].  They will call patient to schedule consult.  Faxed over MRI report.

## 2018-04-16 ENCOUNTER — TRANSFERRED RECORDS (OUTPATIENT)
Dept: FAMILY MEDICINE | Facility: CLINIC | Age: 58
End: 2018-04-16

## 2018-04-18 ENCOUNTER — TELEPHONE (OUTPATIENT)
Dept: INTERVENTIONAL RADIOLOGY/VASCULAR | Facility: CLINIC | Age: 58
End: 2018-04-18

## 2018-04-18 NOTE — TELEPHONE ENCOUNTER
Interventional Radiology   Interventional Radiology at [facility] has been requested to perform a Bilateral parotid mass FNA from Dr Deejay Falcon.  Emily Zhu is a 58 year old male with a PM and surgical history of hypertension, current smoker, diabetes type 2, and a Right neck mass removal in [place] 15 years ago. He had an incidentally noted bilateral parotid neck mass noted on imaging, L > R and a bilateral parotid mass FNA is requested (For more details regarding patient evaluation please see attached ENT note).       Reviewed imaging and clinical information with Radiology staff Dr Donovan who approved the FNA with Ultrasound guidance.    The patient is taking Aspirin and Plavix which do not need to be held.   Central scheduling has contacted the patient and scheduled the FNA for Monday 4/23/18.     Thanks [facility] Interventional Radiology CNP ([phone])

## 2018-04-23 ENCOUNTER — HOSPITAL ENCOUNTER (OUTPATIENT)
Dept: ULTRASOUND IMAGING | Facility: CLINIC | Age: 58
End: 2018-04-23
Attending: OTOLARYNGOLOGY | Admitting: OTOLARYNGOLOGY
Payer: COMMERCIAL

## 2018-04-23 ENCOUNTER — HOSPITAL ENCOUNTER (OUTPATIENT)
Facility: CLINIC | Age: 58
Discharge: HOME | End: 2018-04-23
Attending: OTOLARYNGOLOGY | Admitting: OTOLARYNGOLOGY
Payer: COMMERCIAL

## 2018-04-23 VITALS
OXYGEN SATURATION: 97 % | DIASTOLIC BLOOD PRESSURE: 110 MMHG | RESPIRATION RATE: 18 BRPM | SYSTOLIC BLOOD PRESSURE: 181 MMHG

## 2018-04-23 DIAGNOSIS — K11.8 PAROTID MASS: ICD-10-CM

## 2018-04-23 PROCEDURE — 40000863 ZZH STATISTIC RADIOLOGY XRAY, US, CT, MAR, NM

## 2018-04-23 PROCEDURE — 88173 CYTOPATH EVAL FNA REPORT: CPT | Mod: 26 | Performed by: OTOLARYNGOLOGY

## 2018-04-23 PROCEDURE — 88173 CYTOPATH EVAL FNA REPORT: CPT | Performed by: OTOLARYNGOLOGY

## 2018-04-23 PROCEDURE — 10022 US BIOPSY SALIVARY GLAND: CPT

## 2018-04-23 NOTE — PROGRESS NOTES
1404 explained procedure to pt, reviewed his avs discharge instruction and copy given. Pt says he understands. Pain in neck a 4 on left side.   1450 bp high, other vitats stable. Pt instructed to follow up with primary md re his high bp. Then pt remembers he has new med at home, he thinks it is for his bp, he has not started it yet, will follow up on this. Pain unchanged at 4 more on left side of neck. 2 sites one on right and one on left side of neck, bandaids on both, both CDI. Has his copy of discharge instructions. Given juice. No dizziness. Walked out for discharge.

## 2018-04-23 NOTE — IP AVS SNAPSHOT
[place] [address]    [place] [address]    Phone:  [phone]                                       After Visit Summary   4/23/2018    Emily Zhu    MRN: [id number]           After Visit Summary Signature Page     I have received my discharge instructions, and my questions have been answered. I have discussed any challenges I see with this plan with the nurse or doctor.    ..........................................................................................................................................  Patient/Patient Representative Signature      ..........................................................................................................................................  Patient Representative Print Name and Relationship to Patient    ..................................................               ................................................  Date                                            Time    ..........................................................................................................................................  Reviewed by Signature/Title    ...................................................              ..............................................  Date                                                            Time

## 2018-04-23 NOTE — IP AVS SNAPSHOT
MRN:[id number]                      After Visit Summary   4/23/2018    Emily Zhu    MRN: [id number]           Visit Information        Department      4/23/2018  1:24 PM [facility]          Review of your medicines      UNREVIEWED medicines. Ask your doctor about these medicines        Dose / Directions    ASPIRIN PO        Dose:  81 mg   Take 81 mg by mouth daily   Refills:  0       atorvastatin 10 MG tablet   Commonly known as:  LIPITOR        Dose:  1 tablet   Take 1 tablet by mouth At Bedtime   Refills:  0       azithromycin 250 MG tablet   Commonly known as:  ZITHROMAX   Used for:  Acute sinusitis with symptoms > 10 days        Two tablets first day, then one tablet daily for four days.   Quantity:  6 tablet   Refills:  0       clopidogrel 75 MG tablet   Commonly known as:  PLAVIX   Used for:  Encounter for medication refill        Dose:  75 mg   Take 1 tablet (75 mg) by mouth daily   Quantity:  90 tablet   Refills:  3       glipiZIDE 5 MG 24 hr tablet   Commonly known as:  GLUCOTROL XL   Used for:  Encounter for medication refill        TAKE ONE TABLET BY MOUTH ONCE DAILY   Quantity:  90 tablet   Refills:  1       hydrochlorothiazide 25 MG tablet   Commonly known as:  HYDRODIURIL   Used for:  Essential hypertension        Dose:  25 mg   Take 1 tablet (25 mg) by mouth daily   Quantity:  90 tablet   Refills:  1       isosorbide mononitrate 30 MG 24 hr tablet   Commonly known as:  IMDUR   Used for:  Encounter for medication refill        Dose:  30 mg   Take 1 tablet (30 mg) by mouth daily   Quantity:  90 tablet   Refills:  3       lisinopril 20 MG tablet   Commonly known as:  PRINIVIL/ZESTRIL   Used for:  Essential hypertension        TAKE ONE TABLET BY MOUTH ONCE DAILY   Quantity:  90 tablet   Refills:  1       LYRICA 50 MG capsule   Used for:  Right flank pain   Generic drug:  pregabalin        TAKE ONE CAPSULE BY MOUTH THREE TIMES DAILY   Quantity:  90 capsule    Refills:  3       metFORMIN 1000 MG tablet   Commonly known as:  GLUCOPHAGE   Used for:  Type 2 diabetes mellitus without complication, without long-term current use of insulin (H)        TAKE ONE TABLET BY MOUTH TWICE DAILY WITH MEALS   Quantity:  180 tablet   Refills:  1       metoprolol tartrate 25 MG tablet   Commonly known as:  LOPRESSOR   Used for:  Essential hypertension        Dose:  25 mg   Take 1 tablet (25 mg) by mouth 2 times daily   Quantity:  180 tablet   Refills:  3       pantoprazole 20 MG EC tablet   Commonly known as:  PROTONIX   Used for:  Esophageal reflux        Take by mouth 30-60 minutes before a meal.   Quantity:  90 tablet   Refills:  1                Protect others around you: Learn how to safely use, store and throw away your medicines at www.disposemymeds.org.         Follow-ups after your visit        Your next 10 appointments already scheduled     Apr 23, 2018  2:00 PM CDT   (Arrive by 1:45 PM)   US BIOPSY THYROID FINE NEEDLE ASPIRATION with SHUS4, SH BODY RAD   [facility] Ultrasound ([facility])    [address] [place] [id number] [phone]           Bring a list of your medicines to the exam. Include vitamins, minerals and over-the-counter drugs.  Tell your doctor in advance:   If you are or may be pregnant.   If you are taking Coumadin (or any other blood thinners) 5 days prior to the exam for any special instructions.  IF YOUR DOCTOR HAS TOLD YOU THAT YOU WILL BE RECEIVING SEDATION (medicine to help you relax): (Typically sedation is only for liver exams at [facility] and Renal Biopsy exams in Pediatrics)   See your family doctor for an exam within 30 days of treatment.   Plan for an adult to drive you home and stay with you for at least 24 hours.   No eating or drinking for 4 hours before your test. You may take medicine with small sips of water.   If you have diabetes:If you take insulin, call your diabetes care team for any  "special instructions for this exam.  Please call the Imaging Department at your exam site with any questions.                Care Instructions        Further instructions from your care team       Thyroid/Lymph Node Biopsy Discharge Instructions     After you go home:      You may resume your normal diet    Care of Puncture Site:      You may have mild bruising, soreness & swelling at the puncture site. This will go away in a few days    For swelling & bruising, you may use an ice pack on the site.     Activity:      You may go back to your normal activity    Avoid strenuous activity for 24 hours    Medicines:      You may resume all medications    For minor pain, you may take Acetaminophen (Tylenol) or Ibuprofen (Advil)            Call the provider who ordered this test if:      Increased redness or swelling at the site    Fluid or blood oozing from the site    Severe pain at the site    Chills or a fever greater than 101 F (38 C).    Any questions or concerns    Call  911 or go to the Emergency Room if:      Trouble breathing    Your neck swells    Bleeding that you cannot control    Severe difficulty swallowing    If you have questions call:      [facility] Radiology Dept @ [phone]    The provider who performed your procedure was Dr Donovan     Additional Information About Your Visit        [employer] Information     [email or web address] lets you send messages to your doctor, view your test results, renew your prescriptions, schedule appointments and more. To sign up, go to [email or web address] . Click on \"Log in\" on the left side of the screen, which will take you to the Welcome page. Then click on \"Sign up Now\" on the right side of the page.     You will be asked to enter the access code listed below, as well as some personal information. Please follow the directions to create your username and password.     Your access code is: [email or web address]  Expires: 2018  1:46 PM     Your access code will  in 90 " days. If you need help or a new code, please call your [facility] clinic or [phone].        Care EveryWhere ID     This is your Care EveryWhere ID. This could be used by other organizations to access your [facility] medical records  [id number]         Primary Care Provider Office Phone # Fax #    Gianluca Apodaca [phone]      Equal Access to Services     [facility] : Hadii aad ku hadasho Soomaali, waaxda luqadaha, qaybta kaalmada adeegyada, waxay dimitris josephn sang delisamadi marroquinRaegangabe . So [place] [phone].    ATENCIÓN: Si tobin mendenhall, tiene a boggs disposición servicios gratuitos de asistencia lingüística. Llame al [phone].    We comply with applicable federal civil rights laws and [place] laws. We do not discriminate on the basis of race, color, national origin, age, disability, sex, sexual orientation, or gender identity.            Thank you!     Thank you for choosing [facility] for your care. Our goal is always to provide you with excellent care. Hearing back from our patients is one way we can continue to improve our services. Please take a few minutes to complete the written survey that you may receive in the mail after you visit with us. Thank you!             Medication List: This is a list of all your medications and when to take them. Check marks below indicate your daily home schedule. Keep this list as a reference.      Medications           Morning Afternoon Evening Bedtime As Needed    ASPIRIN PO   Take 81 mg by mouth daily                                atorvastatin 10 MG tablet   Commonly known as:  LIPITOR   Take 1 tablet by mouth At Bedtime                                azithromycin 250 MG tablet   Commonly known as:  ZITHROMAX   Two tablets first day, then one tablet daily for four days.                                clopidogrel 75 MG tablet   Commonly known as:  PLAVIX   Take 1 tablet (75 mg) by mouth daily                                glipiZIDE 5 MG 24 hr tablet    Commonly known as:  GLUCOTROL XL   TAKE ONE TABLET BY MOUTH ONCE DAILY                                hydrochlorothiazide 25 MG tablet   Commonly known as:  HYDRODIURIL   Take 1 tablet (25 mg) by mouth daily                                isosorbide mononitrate 30 MG 24 hr tablet   Commonly known as:  IMDUR   Take 1 tablet (30 mg) by mouth daily                                lisinopril 20 MG tablet   Commonly known as:  PRINIVIL/ZESTRIL   TAKE ONE TABLET BY MOUTH ONCE DAILY                                LYRICA 50 MG capsule   TAKE ONE CAPSULE BY MOUTH THREE TIMES DAILY   Generic drug:  pregabalin                                metFORMIN 1000 MG tablet   Commonly known as:  GLUCOPHAGE   TAKE ONE TABLET BY MOUTH TWICE DAILY WITH MEALS                                metoprolol tartrate 25 MG tablet   Commonly known as:  LOPRESSOR   Take 1 tablet (25 mg) by mouth 2 times daily                                pantoprazole 20 MG EC tablet   Commonly known as:  PROTONIX   Take by mouth 30-60 minutes before a meal.

## 2018-04-23 NOTE — DISCHARGE INSTRUCTIONS
Thyroid/Lymph Node Biopsy Discharge Instructions     After you go home:      You may resume your normal diet    Care of Puncture Site:      You may have mild bruising, soreness & swelling at the puncture site. This will go away in a few days    For swelling & bruising, you may use an ice pack on the site.     Activity:      You may go back to your normal activity    Avoid strenuous activity for 24 hours    Medicines:      You may resume all medications    For minor pain, you may take Acetaminophen (Tylenol) or Ibuprofen (Advil)            Call the provider who ordered this test if:      Increased redness or swelling at the site    Fluid or blood oozing from the site    Severe pain at the site    Chills or a fever greater than 101 F (38 C).    Any questions or concerns    Call  911 or go to the Emergency Room if:      Trouble breathing    Your neck swells    Bleeding that you cannot control    Severe difficulty swallowing    If you have questions call:      [facility] Radiology Dept @ [phone]    The provider who performed your procedure was Dr Donovan

## 2018-04-24 LAB — COPATH REPORT: NORMAL

## 2018-04-30 DIAGNOSIS — E78.2 MIXED HYPERLIPIDEMIA: ICD-10-CM

## 2018-04-30 DIAGNOSIS — I10 ESSENTIAL HYPERTENSION: ICD-10-CM

## 2018-04-30 DIAGNOSIS — Z76.0 ENCOUNTER FOR MEDICATION REFILL: ICD-10-CM

## 2018-04-30 NOTE — TELEPHONE ENCOUNTER
atorvastatin (LIPITOR) 10 MG  lisinopril (PRINIVIL/ZESTRIL) 20 MG  clopidogrel (PLAVIX) 75 MG   LAST  Med check 4/2/18   last labs(for RX) 4/2/18  Next  appt scheduled =  none  Mary Goldstein MA    Recent Labs   Lab Test  02/03/17   1503  11/20/15   0758   CHOL  255*  277*   HDL  38*  43   LDL  Comment  Comment   TRIG  644*  597*     BP Readings from Last 3 Encounters:   04/23/18 (!) 181/110   04/02/18 142/88   01/13/18 (!) 153/96     Last Basic Metabolic Panel:  Lab Results   Component Value Date     01/13/2018      Lab Results   Component Value Date    POTASSIUM 4.4 01/13/2018     Lab Results   Component Value Date    CHLORIDE 103 01/13/2018     Lab Results   Component Value Date    ONEL 8.6 01/13/2018     Lab Results   Component Value Date    CO2 28 01/13/2018     Lab Results   Component Value Date    BUN 19 01/13/2018     Lab Results   Component Value Date    CR 1.13 01/13/2018     Lab Results   Component Value Date     01/13/2018

## 2018-05-01 RX ORDER — CLOPIDOGREL BISULFATE 75 MG/1
TABLET ORAL
Qty: 30 TABLET | Refills: 11 | Status: SHIPPED | OUTPATIENT
Start: 2018-05-01 | End: 2019-05-04

## 2018-05-01 RX ORDER — ATORVASTATIN CALCIUM 10 MG/1
TABLET, FILM COATED ORAL
Qty: 90 TABLET | Refills: 3 | Status: SHIPPED | OUTPATIENT
Start: 2018-05-01 | End: 2019-05-04

## 2018-05-01 RX ORDER — LISINOPRIL 20 MG/1
TABLET ORAL
Qty: 90 TABLET | Refills: 3 | Status: SHIPPED | OUTPATIENT
Start: 2018-05-01 | End: 2019-05-04

## 2018-05-08 NOTE — PROGRESS NOTES
SUBJECTIVE:   Emily Zhu is a 58 year old male who presents to clinic today for the following health issues:     No cold  No flying or diving  No drainage  Smoking 15 cig per day. Not interested right now 35 years. Offered low dose CT for lung cancer screen    Concern - Ear pain ( right)  Onset: 2 weeks    Description:   Aching pain,swelling    Intensity: severe    Progression of Symptoms:  worsening    Accompanying Signs & Symptoms:  none    Previous history of similar problem:   none    Precipitating factors:   Worsened by: none    Alleviating factors:  Improved by: none    Therapies Tried and outcome: none    Ibuprofen 1-2 tabs twice daily  Working at [employer]     ROS: 10 point ROS neg other than the symptoms noted above in the HPI.  /88  Pulse 67  Resp 16  Wt 82.6 kg (182 lb)  SpO2 97%  BMI 31.24 kg/m2    Right anterior/posterior ear tender. Matroid not hot or tender.Hurt a little to tug on pinna.  TMJ ok  Node or mass just below the right ear. SMOKER  Mobile, no drainage no red or hot  TM ok  HEENT otherwise ok  Lungs CTA  CV RRR S2s2 without MGR    IMPRESSION:   1. Patent arteries in the neck without evidence of dissection. Mild  atherosclerotic calcifications at the carotid bifurcations bilaterally  without significant stenosis by NASCET criteria.  2. Patent proximal major intracranial arteries without vascular  cutoff. No significant stenosis. No aneurysm identified.  3. Bilateral hyperdense masses within the parotid glands. There are  also likely prominent periparotid lymph nodes particularly on the  left. Differential would include benign and malignant parotid lesions.  Consider further evaluation with parotid MR on a nonemergent basis.     MD Dr Ezekiel SINCLAIR ENT and did biopsy 4/23/2018 Salivary gland IMPRESSION: Technically successful bilateral parotid nodule  fine-needle aspiration.     JOON MURRY MD    Also had MRI 4/6/18 and CT 1/13/18 CT        Problem list and  histories reviewed & adjusted, as indicated.  Additional history: as documented    Patient Active Problem List   Diagnosis     Coronary atherosclerosis     Essential hypertension     Mixed hyperlipidemia     Cataract     GERD (gastroesophageal reflux disease)     Health Care Home     Microalbuminuria due to type 2 diabetes mellitus (H)     Type 2 diabetes mellitus with diabetic nephropathy, without long-term current use of insulin (H)     Past Surgical History:   Procedure Laterality Date     ANGIOPLASTY  2007    rca     ANGIOPLASTY  2010    om     ARTHROTOMY SHOULDER, ROTATOR CUFF REPAIR, COMBINED Left 4/12/2017    Procedure: COMBINED ARTHROTOMY SHOULDER, ROTATOR CUFF REPAIR;  Surgeon: Deejay Conrad MD;  Location: [facility]     ARTHROTOMY SHOULDER, SUBACROMIAL DECOMPRESSION, COMBINED Left 4/12/2017    Procedure: COMBINED ARTHROTOMY SHOULDER, SUBACROMIAL DECOMPRESSION;  Surgeon: Deejay Conrad MD;  Location: [facility]     COLONOSCOPY       DECOMPRESSION CUBITAL TUNNEL  11/29/2013    Procedure: DECOMPRESSION CUBITAL TUNNEL;;  Surgeon: Radha Cain MD;  Location: [facility]     ENDOSCOPIC RELEASE CARPAL TUNNEL  11/29/2013    Procedure: ENDOSCOPIC RELEASE CARPAL TUNNEL;  LEFT ENDOSCOPIC CARPAL TUNNEL RELEASE AND CUBITAL TUNNEL RELEASE ;  Surgeon: Radha Cain MD;  Location: [facility]     OPEN REDUCTION INTERNAL FIXATION HIP NAILING  3/30/2012    Procedure:OPEN REDUCTION INTERNAL FIXATION HIP NAILING; Biopsy, Curettage and Bone Grafting Right Hip Lesion, Placement of Hip Screw; Surgeon:MARILIN GAY; Location: OR     PHACOEMULSIFICATION CLEAR CORNEA WITH STANDARD INTRAOCULAR LENS IMPLANT  5/8/2013    Procedure: PHACOEMULSIFICATION CLEAR CORNEA WITH STANDARD INTRAOCULAR LENS IMPLANT;  RIGHT EYE PHACOEMULSIFICATION CLEAR CORNEA WITH STANDARD INTRAOCULAR LENS IMPLANT ;  Surgeon: Jovani Pretty MD;  Location: [facility]     PHACOEMULSIFICATION CLEAR CORNEA WITH STANDARD INTRAOCULAR  LENS IMPLANT  5/20/2013    Procedure: PHACOEMULSIFICATION CLEAR CORNEA WITH STANDARD INTRAOCULAR LENS IMPLANT;  LEFT  EYE PHACOEMULSIFICATION CLEAR CORNEA WITH STANDARD INTRAOCULAR LENS IMPLANT ;  Surgeon: Jovani Pretty MD;  Location:  [facility]     STENT         Social History   Substance Use Topics     Smoking status: Current Every Day Smoker     Packs/day: 1.00     Years: 40.00     Types: Cigarettes     Smokeless tobacco: Never Used     Alcohol use 0.0 oz/week     0 Standard drinks or equivalent per week      Comment: none     Family History   Problem Relation Age of Onset     CEREBROVASCULAR DISEASE Mother 56     Hypertension Mother      CEREBROVASCULAR DISEASE Father      Hypertension Father          Current Outpatient Prescriptions   Medication Sig Dispense Refill     amoxicillin-clavulanate (AUGMENTIN) 875-125 MG per tablet Take 1 tablet by mouth 2 times daily 28 tablet 0     ASPIRIN PO Take 81 mg by mouth daily       atorvastatin (LIPITOR) 10 MG tablet Take 1 tablet by mouth At Bedtime       atorvastatin (LIPITOR) 10 MG tablet TAKE ONE TABLET BY MOUTH ONCE DAILY 90 tablet 3     azithromycin (ZITHROMAX) 250 MG tablet Two tablets first day, then one tablet daily for four days. 6 tablet 0     clopidogrel (PLAVIX) 75 MG tablet TAKE ONE TABLET BY MOUTH ONCE DAILY 30 tablet 11     glipiZIDE (GLUCOTROL XL) 5 MG 24 hr tablet TAKE ONE TABLET BY MOUTH ONCE DAILY 90 tablet 1     hydrochlorothiazide (HYDRODIURIL) 25 MG tablet Take 1 tablet (25 mg) by mouth daily 90 tablet 1     isosorbide mononitrate (IMDUR) 30 MG 24 hr tablet Take 1 tablet (30 mg) by mouth daily 90 tablet 3     lisinopril (PRINIVIL/ZESTRIL) 20 MG tablet TAKE ONE TABLET BY MOUTH ONCE DAILY 90 tablet 3     metFORMIN (GLUCOPHAGE) 1000 MG tablet TAKE ONE TABLET BY MOUTH TWICE DAILY WITH MEALS 180 tablet 1     metoprolol tartrate (LOPRESSOR) 25 MG tablet Take 1 tablet (25 mg) by mouth 2 times daily 180 tablet 3     pantoprazole (PROTONIX) 20 MG EC tablet  Take by mouth 30-60 minutes before a meal. 90 tablet 1     LYRICA 50 MG capsule TAKE ONE CAPSULE BY MOUTH THREE TIMES DAILY (Patient not taking: Reported on 4/2/2018) 90 capsule 3     Allergies   Allergen Reactions     Crestor [Rosuvastatin] Muscle Pain (Myalgia)     Recent Labs   Lab Test  01/13/18   0840  11/03/17   1046  04/12/17   0943   02/03/17   1503  10/21/16   1412   11/20/15   0758   04/20/15   1540   11/20/13   1221   04/10/13   1559   A1C   --   7.4*   --    --   8.1*  9.3*   < >  8.0*   < >   --    < >  7.4*   < >   --    LDL   --    --    --    --   Comment   --    --   Comment   --    --    --   38   < >   --    HDL   --    --    --    --   38*   --    --   43   --    --    --   49   < >   --    TRIG   --    --    --    --   644*   --    --   597*   --    --    --   276*   < >   --    ALT  58   --    --    --    --    --    --    --    --   71*   --    --    --   34   CR  1.13   --   1.14   < >   --    --    < >   --    --   1.05   < >   --    --   1.17   GFRESTIMATED  66   --   66   < >   --    --    < >   --    --   73   < >   --    --    --    GFRESTBLACK  80   --   80   < >   --    --    < >   --    --   88   < >   --    --    --    POTASSIUM  4.4   --   4.7   < >   --    --    < >   --    --   4.0   < >  4.4   < >  4.7    < > = values in this interval not displayed.      BP Readings from Last 3 Encounters:   05/09/18 128/88   04/23/18 (!) 181/110   04/02/18 142/88    Wt Readings from Last 3 Encounters:   05/09/18 82.6 kg (182 lb)   04/02/18 86.6 kg (191 lb)   12/27/17 86.6 kg (191 lb)                  Labs reviewed in EPIC    Reviewed and updated as needed this visit by clinical staff       Reviewed and updated as needed this visit by Provider         Plan:    Augmentin for possible infection- this mass seems to be in the lymph node chain. His previous lesion was salivary.    CBC today    If not better in 2-4 weeks then see ENT (Jeff thinking that this will need a biopsy.     Smoking cessation  recommended. He is not interested today.    Consider low dose CT scan for lung cancer screen- he deferred    Vanessa Mejia MD, MEd  G

## 2018-05-09 ENCOUNTER — OFFICE VISIT (OUTPATIENT)
Dept: FAMILY MEDICINE | Facility: CLINIC | Age: 58
End: 2018-05-09

## 2018-05-09 VITALS
DIASTOLIC BLOOD PRESSURE: 88 MMHG | WEIGHT: 182 LBS | BODY MASS INDEX: 31.24 KG/M2 | SYSTOLIC BLOOD PRESSURE: 128 MMHG | OXYGEN SATURATION: 97 % | RESPIRATION RATE: 16 BRPM | HEART RATE: 67 BPM

## 2018-05-09 DIAGNOSIS — R59.9 LYMPH NODE ENLARGEMENT: Primary | ICD-10-CM

## 2018-05-09 DIAGNOSIS — F17.200 TOBACCO USE DISORDER: ICD-10-CM

## 2018-05-09 LAB
% GRANULOCYTES: 53.3 % (ref 42.2–75.2)
HCT VFR BLD AUTO: 46.7 % (ref 39–51)
HEMOGLOBIN: 15.9 G/DL (ref 13.4–17.5)
LYMPHOCYTES NFR BLD AUTO: 37.5 % (ref 20.5–51.1)
MCH RBC QN AUTO: 31.7 PG (ref 27–31)
MCHC RBC AUTO-ENTMCNC: 34.1 G/DL (ref 33–37)
MCV RBC AUTO: 92.8 FL (ref 80–100)
MONOCYTES NFR BLD AUTO: 9.2 % (ref 1.7–9.3)
PLATELET # BLD AUTO: 268 K/UL (ref 140–450)
RBC # BLD AUTO: 5.03 X10/CMM (ref 4.2–5.9)
WBC # BLD AUTO: 7.7 X10/CMM (ref 3.8–11)

## 2018-05-09 PROCEDURE — 36415 COLL VENOUS BLD VENIPUNCTURE: CPT | Performed by: FAMILY MEDICINE

## 2018-05-09 PROCEDURE — 85025 COMPLETE CBC W/AUTO DIFF WBC: CPT | Performed by: FAMILY MEDICINE

## 2018-05-09 PROCEDURE — 99213 OFFICE O/P EST LOW 20 MIN: CPT | Performed by: FAMILY MEDICINE

## 2018-05-09 NOTE — MR AVS SNAPSHOT
"              After Visit Summary   2018    Emily Zhu    MRN: [id number]           Patient Information     Date Of Birth          1960        Visit Information        Provider Department      2018 3:00 PM Vanessa Mejia MD [facility]        Today's Diagnoses     Lymph node enlargement    -  1    Tobacco use disorder          Care Instructions    Augmentin for infection    CBC today    If not better in 2-4 weeks then see ENT           Follow-ups after your visit        Who to contact     If you have questions or need follow up information about today's clinic visit or your schedule please contact [facility] directly at [phone].  Normal or non-critical lab and imaging results will be communicated to you by [employer], letter or phone within 4 business days after the clinic has received the results. If you do not hear from us within 7 days, please contact the clinic through [employer] or phone. If you have a critical or abnormal lab result, we will notify you by phone as soon as possible.  Submit refill requests through [employer] or call your pharmacy and they will forward the refill request to us. Please allow 3 business days for your refill to be completed.          Additional Information About Your Visit        MyChart Information     [employer] lets you send messages to your doctor, view your test results, renew your prescriptions, schedule appointments and more. To sign up, go to [email or web address] . Click on \"Log in\" on the left side of the screen, which will take you to the Welcome page. Then click on \"Sign up Now\" on the right side of the page.     You will be asked to enter the access code listed below, as well as some personal information. Please follow the directions to create your username and password.     Your access code is: [email or web address]  Expires: 2018  1:46 PM     Your access code will  in 90 days. If you need help or a new code, please call " your [place] clinic or [phone].        Care EveryWhere ID     This is your Care EveryWhere ID. This could be used by other organizations to access your [place] medical records  [id number]        Your Vitals Were     Pulse Respirations Pulse Oximetry BMI (Body Mass Index)          67 16 97% 31.24 kg/m2         Blood Pressure from Last 3 Encounters:   05/09/18 128/88   04/23/18 (!) 181/110   04/02/18 142/88    Weight from Last 3 Encounters:   05/09/18 82.6 kg (182 lb)   04/02/18 86.6 kg (191 lb)   12/27/17 86.6 kg (191 lb)              We Performed the Following     CBC with Diff/Plt ([facility])          Today's Medication Changes          These changes are accurate as of 5/9/18  4:00 PM.  If you have any questions, ask your nurse or doctor.               Start taking these medicines.        Dose/Directions    amoxicillin-clavulanate 875-125 MG per tablet   Commonly known as:  AUGMENTIN   Used for:  Lymph node enlargement, Tobacco use disorder   Started by:  Vanessa Mejia MD        Dose:  1 tablet   Take 1 tablet by mouth 2 times daily   Quantity:  28 tablet   Refills:  0            Where to get your medicines      These medications were sent to [facility] [address] [place] [address]     Phone:  [phone]     amoxicillin-clavulanate 875-125 MG per tablet                Primary Care Provider Office Phone # Fax #    Gianluca Apodaca [phone] [address] [facility] [address]        Equal Access to Services     [facility]: Hadii aad ku hadasho Soomaali, waaxda luqadaha, qaybta kaalmada angelica, liz brooks. So [facility] [phone].    ATENCIÓN: Si habla español, tiene a boggs disposición servicios gratuitos de asistencia lingüística. Llame al [phone].    We comply with applicable federal civil rights laws and [place] laws. We do not discriminate on the basis of race,  color, national origin, age, disability, sex, sexual orientation, or gender identity.            Thank you!     Thank you for choosing [facility]  for your care. Our goal is always to provide you with excellent care. Hearing back from our patients is one way we can continue to improve our services. Please take a few minutes to complete the written survey that you may receive in the mail after your visit with us. Thank you!             Your Updated Medication List - Protect others around you: Learn how to safely use, store and throw away your medicines at www.disposemymeds.org.          This list is accurate as of 5/9/18  4:00 PM.  Always use your most recent med list.                   Brand Name Dispense Instructions for use Diagnosis    amoxicillin-clavulanate 875-125 MG per tablet    AUGMENTIN    28 tablet    Take 1 tablet by mouth 2 times daily    Lymph node enlargement, Tobacco use disorder       ASPIRIN PO      Take 81 mg by mouth daily        * atorvastatin 10 MG tablet    LIPITOR     Take 1 tablet by mouth At Bedtime        * atorvastatin 10 MG tablet    LIPITOR    90 tablet    TAKE ONE TABLET BY MOUTH ONCE DAILY    Mixed hyperlipidemia       azithromycin 250 MG tablet    ZITHROMAX    6 tablet    Two tablets first day, then one tablet daily for four days.    Acute sinusitis with symptoms > 10 days       clopidogrel 75 MG tablet    PLAVIX    30 tablet    TAKE ONE TABLET BY MOUTH ONCE DAILY    Encounter for medication refill       glipiZIDE 5 MG 24 hr tablet    GLUCOTROL XL    90 tablet    TAKE ONE TABLET BY MOUTH ONCE DAILY    Encounter for medication refill       hydrochlorothiazide 25 MG tablet    HYDRODIURIL    90 tablet    Take 1 tablet (25 mg) by mouth daily    Essential hypertension       isosorbide mononitrate 30 MG 24 hr tablet    IMDUR    90 tablet    Take 1 tablet (30 mg) by mouth daily    Encounter for medication refill       lisinopril 20 MG tablet    PRINIVIL/ZESTRIL    90 tablet     TAKE ONE TABLET BY MOUTH ONCE DAILY    Essential hypertension       LYRICA 50 MG capsule   Generic drug:  pregabalin     90 capsule    TAKE ONE CAPSULE BY MOUTH THREE TIMES DAILY    Right flank pain       metFORMIN 1000 MG tablet    GLUCOPHAGE    180 tablet    TAKE ONE TABLET BY MOUTH TWICE DAILY WITH MEALS    Type 2 diabetes mellitus without complication, without long-term current use of insulin (H)       metoprolol tartrate 25 MG tablet    LOPRESSOR    180 tablet    Take 1 tablet (25 mg) by mouth 2 times daily    Essential hypertension       pantoprazole 20 MG EC tablet    PROTONIX    90 tablet    Take by mouth 30-60 minutes before a meal.    Esophageal reflux       * Notice:  This list has 2 medication(s) that are the same as other medications prescribed for you. Read the directions carefully, and ask your doctor or other care provider to review them with you.

## 2018-06-11 ENCOUNTER — OFFICE VISIT (OUTPATIENT)
Dept: FAMILY MEDICINE | Facility: CLINIC | Age: 58
End: 2018-06-11

## 2018-06-11 VITALS
RESPIRATION RATE: 12 BRPM | WEIGHT: 187.2 LBS | SYSTOLIC BLOOD PRESSURE: 124 MMHG | DIASTOLIC BLOOD PRESSURE: 88 MMHG | TEMPERATURE: 97.7 F | HEART RATE: 60 BPM | BODY MASS INDEX: 32.13 KG/M2

## 2018-06-11 DIAGNOSIS — M62.838 SPASM OF MUSCLE: Primary | ICD-10-CM

## 2018-06-11 DIAGNOSIS — F17.200 TOBACCO USE DISORDER: ICD-10-CM

## 2018-06-11 DIAGNOSIS — E11.21 TYPE 2 DIABETES MELLITUS WITH DIABETIC NEPHROPATHY, WITHOUT LONG-TERM CURRENT USE OF INSULIN (H): ICD-10-CM

## 2018-06-11 PROCEDURE — 99214 OFFICE O/P EST MOD 30 MIN: CPT | Performed by: FAMILY MEDICINE

## 2018-06-11 ASSESSMENT — PAIN SCALES - GENERAL: PAINLEVEL: WORST PAIN (10)

## 2018-06-11 NOTE — MR AVS SNAPSHOT
"              After Visit Summary   6/11/2018    Emily Zhu    MRN: [id number]           Patient Information     Date Of Birth          1960        Visit Information        Provider Department      6/11/2018 2:45 PM Vanessa Mejia MD [facility]        Today's Diagnoses     Spasm of muscle    -  1    Type 2 diabetes mellitus with diabetic nephropathy, without long-term current use of insulin (H)          Care Instructions    Lab today A1C    Zanaflex for muscle tension HA.     Keep Headache log and call into Dr Mejia in 2-4 weeks    Smoking Cessation Ideas    1. Consider hiding your cigarettes in an inconvenient place like the freezer, the trunk of your car etc.    2. Consider using gum, candy, toothpicks instead. Many smokers are oral people who like things in their mouth and hands.    3. Consider using an elastic/rubber band and snapping it when you want a cigarette to remind you to pause and consider if you want to make the choice to smoke. This is also a negative feedback tool.    4. Consider cutting the cigarette in half.    5. Consider portioning out your allotment for the day in a ziplock bag so that you can see what your limit is for the day. Then put the pack somewhere inconvenient.    6. Set a quit date.    7. Consider when and where and who you smoke with. Consider changing those behaviors. For example \"everytime I have coffee, I smoke\" try and separate these events from each other.    8. Consider setting longer time limits before your first or next cigarette. You could use a timer if needed.                    Follow-ups after your visit        Who to contact     If you have questions or need follow up information about today's clinic visit or your schedule please contact [facility] directly at [phone].  Normal or non-critical lab and imaging results will be communicated to you by MyChart, letter or phone within 4 business days after the clinic has " received the results. If you do not hear from us within 7 days, please contact the clinic through [employer] or phone. If you have a critical or abnormal lab result, we will notify you by phone as soon as possible.  Submit refill requests through [employer] or call your pharmacy and they will forward the refill request to us. Please allow 3 business days for your refill to be completed.          Additional Information About Your Visit        Care EveryWhere ID     This is your Care EveryWhere ID. This could be used by other organizations to access your [place] medical records  [id number]        Your Vitals Were     Pulse Temperature Respirations BMI (Body Mass Index)          60 97.7  F (36.5  C) (Oral) 12 32.13 kg/m2         Blood Pressure from Last 3 Encounters:   06/11/18 124/88   05/09/18 128/88   04/23/18 (!) 181/110    Weight from Last 3 Encounters:   06/11/18 84.9 kg (187 lb 3.2 oz)   05/09/18 82.6 kg (182 lb)   04/02/18 86.6 kg (191 lb)              We Performed the Following     Hemoglobin A1C ([facility])          Today's Medication Changes          These changes are accurate as of 6/11/18  3:18 PM.  If you have any questions, ask your nurse or doctor.               Start taking these medicines.        Dose/Directions    tiZANidine 4 MG tablet   Commonly known as:  ZANAFLEX   Used for:  Spasm of muscle   Started by:  Vanessa Mejia MD        Dose:  4 mg   Take 1 tablet (4 mg) by mouth 3 times daily as needed for muscle spasms   Quantity:  30 tablet   Refills:  1            Where to get your medicines      These medications were sent to [facility] [address] [place] [address] [address] [facility]  [address] [facility] [address]     Phone:  [phone]     tiZANidine 4 MG tablet                Primary Care Provider Office Phone # Fax #    Vanessa Mejia [phone] [address]  [facility] [address]        Equal Access to Services     RILEY KUMARI : Lili  kingsley Pereira, waaxda luqadaha, qaybta kaalmada emeritaluma, waxbryon dimitris keenanalinemadi jason todd. So [facility] [phone].    ATENCIÓN: Si habla español, tiene a boggs disposición servicios gratuitos de asistencia lingüística. Gregoryame al [phone].    We comply with applicable federal civil rights laws and [place] laws. We do not discriminate on the basis of race, color, national origin, age, disability, sex, sexual orientation, or gender identity.            Thank you!     Thank you for choosing [facility]  for your care. Our goal is always to provide you with excellent care. Hearing back from our patients is one way we can continue to improve our services. Please take a few minutes to complete the written survey that you may receive in the mail after your visit with us. Thank you!             Your Updated Medication List - Protect others around you: Learn how to safely use, store and throw away your medicines at www.disposemymeds.org.          This list is accurate as of 6/11/18  3:18 PM.  Always use your most recent med list.                   Brand Name Dispense Instructions for use Diagnosis    ASPIRIN PO      Take 81 mg by mouth daily        atorvastatin 10 MG tablet    LIPITOR    90 tablet    TAKE ONE TABLET BY MOUTH ONCE DAILY    Mixed hyperlipidemia       blood glucose monitoring lancets           clopidogrel 75 MG tablet    PLAVIX    30 tablet    TAKE ONE TABLET BY MOUTH ONCE DAILY    Encounter for medication refill       glipiZIDE 5 MG 24 hr tablet    GLUCOTROL XL    90 tablet    TAKE ONE TABLET BY MOUTH ONCE DAILY    Encounter for medication refill       hydrochlorothiazide 25 MG tablet    HYDRODIURIL    90 tablet    Take 1 tablet (25 mg) by mouth daily    Essential hypertension       IBUPROFEN PO      Take 200 mg by mouth daily        isosorbide mononitrate 30 MG 24 hr tablet    IMDUR    90 tablet    Take 1 tablet (30 mg) by mouth daily    Encounter for medication refill        lisinopril 20 MG tablet    PRINIVIL/ZESTRIL    90 tablet    TAKE ONE TABLET BY MOUTH ONCE DAILY    Essential hypertension       LYRICA 50 MG capsule   Generic drug:  pregabalin     90 capsule    TAKE ONE CAPSULE BY MOUTH THREE TIMES DAILY    Right flank pain       metFORMIN 1000 MG tablet    GLUCOPHAGE    180 tablet    TAKE ONE TABLET BY MOUTH TWICE DAILY WITH MEALS    Type 2 diabetes mellitus without complication, without long-term current use of insulin (H)       metoprolol tartrate 25 MG tablet    LOPRESSOR    180 tablet    Take 1 tablet (25 mg) by mouth 2 times daily    Essential hypertension       ONETOUCH ULTRA test strip   Generic drug:  blood glucose monitoring           pantoprazole 20 MG EC tablet    PROTONIX    90 tablet    Take by mouth 30-60 minutes before a meal.    Esophageal reflux       tiZANidine 4 MG tablet    ZANAFLEX    30 tablet    Take 1 tablet (4 mg) by mouth 3 times daily as needed for muscle spasms    Spasm of muscle

## 2018-06-11 NOTE — PROGRESS NOTES
"  SUBJECTIVE:   Emily Zhu is a 58 year old male who presents to clinic today for the following health issues:    Walk in     Headache at top of head  Linear per his description. No rash  Dizzy some  Coming and going months  Hit on head several times years ago. No LOC  No neurology consult  No grinding teeth \"I don't have teeth\"  Lyrica for back pain does not helped his head pain  No Aura  No sensitive the sound  No nausea with HA  4/7 days in the last week  Eye exam 1.5 year ago  Smoker- advised to quit  Upper trapezius muscle tension  Lab Results   Component Value Date    A1C 7.4 11/03/2017    A1C 8.1 02/03/2017    A1C 9.3 10/21/2016    A1C 11.4 06/15/2016    A1C 8.0 11/20/2015   Rechecked a1c today  Foot exam not done today  Advised update of his eye exam    Previous studies reviewed in his chart  IMPRESSION:   1. Patent arteries in the neck without evidence of dissection. Mild  atherosclerotic calcifications at the carotid bifurcations bilaterally  without significant stenosis by NASCET criteria.  2. Patent proximal major intracranial arteries without vascular  cutoff. No significant stenosis. No aneurysm identified.  3. Bilateral hyperdense masses within the parotid glands. There are  also likely prominent periparotid lymph nodes particularly on the  left. Differential would include benign and malignant parotid lesions.  Consider further evaluation with parotid MR on a nonemergent basis.     BRAYDON SCHROEDER MD  IMPRESSION:     1. No evidence of acute intracranial hemorrhage, mass, or herniation.  2. There is generalized atrophy of the brain. White matter changes are  present in the cerebral hemispheres that are consistent with small  vessel ischemic disease in this age patient.      BRAYDON SCHROEDER MD    New grandchild a girl. He is planning on going to see her.     He reports taking Ibuprofen and lays down. 15-20 min then better. Goes to sleep.  Pain  5/10 now, max 10/10  Burning sensation      Onset: " 2014    Description:   Location:top  of head pointing to scalp  Character: burning  Frequency:  Coming and going for a long time  Duration:  2014    Intensity: severe, 10/10 max, today 5/10    Progression of Symptoms:  intermittent    Accompanying Signs & Symptoms:  Stiff neck: no   Neck or upper back pain: no  Fever: no  Sinus pressure: no  Nausea or vomiting: no  Dizziness: not right now  Numbness: no  Weakness: no  Visual changes: no    History:   Head trauma: YES- remote history  Family history of migraines: unknown  Previous tests for headaches: YES  Neurologist evaluations: no  Able to do daily activities: most of the time, see above  Wake with a headaches: no  Do headaches wake you up: no  Daily pain medication use: no  Work/school stressors/changes: unknown    Precipitating factors:   Does light make it worse: no  Does sound make it worse: no    Alleviating factors:  Does sleep help: YES    Therapies Tried and outcome: Ibuprofen (Advil, Motrin)        Problem list and histories reviewed & adjusted, as indicated.  Additional history: as documented    Patient Active Problem List   Diagnosis     Coronary atherosclerosis     Essential hypertension     Mixed hyperlipidemia     Cataract     GERD (gastroesophageal reflux disease)     Health Care Home     Microalbuminuria due to type 2 diabetes mellitus (H)     Type 2 diabetes mellitus with diabetic nephropathy, without long-term current use of insulin (H)     Past Surgical History:   Procedure Laterality Date     ANGIOPLASTY  2007    rca     ANGIOPLASTY  2010    om     ARTHROTOMY SHOULDER, ROTATOR CUFF REPAIR, COMBINED Left 4/12/2017    Procedure: COMBINED ARTHROTOMY SHOULDER, ROTATOR CUFF REPAIR;  Surgeon: Deejay Conrad MD;  Location: [facility]     ARTHROTOMY SHOULDER, SUBACROMIAL DECOMPRESSION, COMBINED Left 4/12/2017    Procedure: COMBINED ARTHROTOMY SHOULDER, SUBACROMIAL DECOMPRESSION;  Surgeon: Deejay Conrad MD;  Location: [facility]      COLONOSCOPY       DECOMPRESSION CUBITAL TUNNEL  11/29/2013    Procedure: DECOMPRESSION CUBITAL TUNNEL;;  Surgeon: Radha Cain MD;  Location: [facility]     ENDOSCOPIC RELEASE CARPAL TUNNEL  11/29/2013    Procedure: ENDOSCOPIC RELEASE CARPAL TUNNEL;  LEFT ENDOSCOPIC CARPAL TUNNEL RELEASE AND CUBITAL TUNNEL RELEASE ;  Surgeon: Radha Cain MD;  Location: [facility]     OPEN REDUCTION INTERNAL FIXATION HIP NAILING  3/30/2012    Procedure:OPEN REDUCTION INTERNAL FIXATION HIP NAILING; Biopsy, Curettage and Bone Grafting Right Hip Lesion, Placement of Hip Screw; Surgeon:MARILIN GAY; Location:[facility]     PHACOEMULSIFICATION CLEAR CORNEA WITH STANDARD INTRAOCULAR LENS IMPLANT  5/8/2013    Procedure: PHACOEMULSIFICATION CLEAR CORNEA WITH STANDARD INTRAOCULAR LENS IMPLANT;  RIGHT EYE PHACOEMULSIFICATION CLEAR CORNEA WITH STANDARD INTRAOCULAR LENS IMPLANT ;  Surgeon: Jovani Pretty MD;  Location:  [facility]     PHACOEMULSIFICATION CLEAR CORNEA WITH STANDARD INTRAOCULAR LENS IMPLANT  5/20/2013    Procedure: PHACOEMULSIFICATION CLEAR CORNEA WITH STANDARD INTRAOCULAR LENS IMPLANT;  LEFT  EYE PHACOEMULSIFICATION CLEAR CORNEA WITH STANDARD INTRAOCULAR LENS IMPLANT ;  Surgeon: Jovani Pretty MD;  Location:  [facility]     STENT         Social History   Substance Use Topics     Smoking status: Current Every Day Smoker     Packs/day: 1.00     Years: 40.00     Types: Cigarettes     Smokeless tobacco: Never Used     Alcohol use 0.0 oz/week     0 Standard drinks or equivalent per week      Comment: none     Family History   Problem Relation Age of Onset     CEREBROVASCULAR DISEASE Mother 56     Hypertension Mother      CEREBROVASCULAR DISEASE Father      Hypertension Father          Current Outpatient Prescriptions   Medication Sig Dispense Refill     ASPIRIN PO Take 81 mg by mouth daily       atorvastatin (LIPITOR) 10 MG tablet TAKE ONE TABLET BY MOUTH ONCE DAILY 90 tablet 3     blood glucose monitoring (ONE TOUCH  DELICA) lancets        clopidogrel (PLAVIX) 75 MG tablet TAKE ONE TABLET BY MOUTH ONCE DAILY 30 tablet 11     glipiZIDE (GLUCOTROL XL) 5 MG 24 hr tablet TAKE ONE TABLET BY MOUTH ONCE DAILY 90 tablet 1     hydrochlorothiazide (HYDRODIURIL) 25 MG tablet Take 1 tablet (25 mg) by mouth daily 90 tablet 1     IBUPROFEN PO Take 200 mg by mouth daily       isosorbide mononitrate (IMDUR) 30 MG 24 hr tablet Take 1 tablet (30 mg) by mouth daily 90 tablet 3     lisinopril (PRINIVIL/ZESTRIL) 20 MG tablet TAKE ONE TABLET BY MOUTH ONCE DAILY 90 tablet 3     LYRICA 50 MG capsule TAKE ONE CAPSULE BY MOUTH THREE TIMES DAILY 90 capsule 3     metFORMIN (GLUCOPHAGE) 1000 MG tablet TAKE ONE TABLET BY MOUTH TWICE DAILY WITH MEALS 180 tablet 1     metoprolol tartrate (LOPRESSOR) 25 MG tablet Take 1 tablet (25 mg) by mouth 2 times daily 180 tablet 3     pantoprazole (PROTONIX) 20 MG [facility] tablet Take by mouth 30-60 minutes before a meal. 90 tablet 1     tiZANidine (ZANAFLEX) 4 MG tablet Take 1 tablet (4 mg) by mouth 3 times daily as needed for muscle spasms 30 tablet 1     ONETOUCH ULTRA test strip        [DISCONTINUED] atorvastatin (LIPITOR) 10 MG tablet Take 1 tablet by mouth At Bedtime       Allergies   Allergen Reactions     Crestor [Rosuvastatin] Muscle Pain (Myalgia)     Recent Labs   Lab Test  01/13/18   0840  11/03/17   1046  04/12/17   0943   02/03/17   1503  10/21/16   1412   11/20/15   0758   04/20/15   1540   11/20/13   1221   04/10/13   1559   A1C   --   7.4*   --    --   8.1*  9.3*   < >  8.0*   < >   --    < >  7.4*   < >   --    LDL   --    --    --    --   Comment   --    --   Comment   --    --    --   38   < >   --    HDL   --    --    --    --   38*   --    --   43   --    --    --   49   < >   --    TRIG   --    --    --    --   644*   --    --   597*   --    --    --   276*   < >   --    ALT  58   --    --    --    --    --    --    --    --   71*   --    --    --   34   CR  1.13   --   1.14   < >   --    --    < >   --     --   1.05   < >   --    --   1.17   GFRESTIMATED  66   --   66   < >   --    --    < >   --    --   73   < >   --    --    --    GFRESTBLACK  80   --   80   < >   --    --    < >   --    --   88   < >   --    --    --    POTASSIUM  4.4   --   4.7   < >   --    --    < >   --    --   4.0   < >  4.4   < >  4.7    < > = values in this interval not displayed.      BP Readings from Last 3 Encounters:   06/11/18 124/88   05/09/18 128/88   04/23/18 (!) 181/110    Wt Readings from Last 3 Encounters:   06/11/18 84.9 kg (187 lb 3.2 oz)   05/09/18 82.6 kg (182 lb)   04/02/18 86.6 kg (191 lb)                  Labs reviewed in EPIC    Reviewed and updated as needed this visit by clinical staff  Tobacco  Allergies  Meds       Reviewed and updated as needed this visit by Provider         ROS:  Constitutional, HEENT, cardiovascular, pulmonary, GI, , musculoskeletal, neuro, skin, endocrine and psych systems are negative, except as otherwise noted.    OBJECTIVE:     /88  Pulse 60  Temp 97.7  F (36.5  C) (Oral)  Resp 12  Wt 84.9 kg (187 lb 3.2 oz)  BMI 32.13 kg/m2  Body mass index is 32.13 kg/(m^2).  GENERAL: healthy, alert and no distress  EYES: Eyes grossly normal to inspection, PERRL and conjunctivae and sclerae normal  HENT: ear canals and TM's normal, nose and mouth without ulcers or lesions  NECK: no adenopathy, no asymmetry, masses, or scars and thyroid normal to palpation  RESP: lungs clear to auscultation - no rales, rhonchi or wheezes  CV: regular rate and rhythm, normal S1 S2, no S3 or S4, no murmur, click or rub, no peripheral edema and peripheral pulses strong  ABDOMEN: soft, nontender, no hepatosplenomegaly, no masses and bowel sounds normal  MS: no gross musculoskeletal defects noted, no edema  SKIN: no suspicious lesions or rashes  NEURO: Normal strength and tone, mentation intact and speech normal no scalp lesions or sensation change to light touch. Full ROM of neck. Tight upper trapezius. No facial  asymmetry. Nondenominational artery, sinus, TMJ all normal. Mastoid negative. No adenopathy.  Finger to nose, rapid alternating, finger thumb opposition, heel knee shin normal. DTRS at knees normal.   PSYCH: mentation appears normal, affect normal/bright  LYMPH: no cervical, supraclavicular, axillary, or inguinal adenopathy  DM foot exam deferred today    Diagnostic Test Results:  Results for orders placed or performed in visit on 05/09/18   CBC with Diff/Plt ([facility])   Result Value Ref Range    WBC x10/cmm 7.7 3.8 - 11.0 x10/cmm    % Lymphocytes 37.5 20.5 - 51.1 %    % Monocytes 9.2 1.7 - 9.3 %    % Granulocytes 53.3 42.2 - 75.2 %    RBC x10/cmm 5.03 4.2 - 5.9 x10/cmm    Hemoglobin 15.9 13.4 - 17.5 g/dl    Hematocrit 46.7 39 - 51 %    MCV 92.8 80 - 100 fL    MCH 31.7 (A) 27.0 - 31.0 pg    MCHC 34.1 33.0 - 37.0 g/dL    Platelet Count 268 140 - 450 K/uL     Lab Results   Component Value Date    A1C 7.4 11/03/2017    A1C 8.1 02/03/2017    A1C 9.3 10/21/2016    A1C 11.4 06/15/2016    A1C 8.0 11/20/2015         ASSESSMENT/PLAN:     ASSESSMENT / PLAN:  (M62.838) Spasm of muscle  (primary encounter diagnosis)  Comment:   Plan: tiZANidine (ZANAFLEX) 4 MG tablet            (E11.21) Type 2 diabetes mellitus with diabetic nephropathy, without long-term current use of insulin (H)  Comment:   Plan: Hemoglobin A1C ([facility])          Tobacco use disorder- encouraged smoking cessation. Quit plan card given.Reviewed behavioral change ideas.      Patient Instructions   Lab today A1C    Zanaflex for muscle tension HA.     Keep Headache log and call into Dr Mejia in 2-4 weeks    Smoking Cessation Ideas    1. Consider hiding your cigarettes in an inconvenient place like the freezer, the trunk of your car etc.    2. Consider using gum, candy, toothpicks instead. Many smokers are oral people who like things in their mouth and hands.    3. Consider using an elastic/rubber band and snapping it when you want a cigarette to remind you to pause and  "consider if you want to make the choice to smoke. This is also a negative feedback tool.    4. Consider cutting the cigarette in half.    5. Consider portioning out your allotment for the day in a ziplock bag so that you can see what your limit is for the day. Then put the pack somewhere inconvenient.    6. Set a quit date.    7. Consider when and where and who you smoke with. Consider changing those behaviors. For example \"everytime I have coffee, I smoke\" try and separate these events from each other.    8. Consider setting longer time limits before your first or next cigarette. You could use a timer if needed.                Vanessa Mejia MD  [facility]    "

## 2018-06-11 NOTE — PATIENT INSTRUCTIONS
"Lab today A1C    Zanaflex for muscle tension HA.     Keep Headache log and call into Dr Mejia in 2-4 weeks    Smoking Cessation Ideas    1. Consider hiding your cigarettes in an inconvenient place like the freezer, the trunk of your car etc.    2. Consider using gum, candy, toothpicks instead. Many smokers are oral people who like things in their mouth and hands.    3. Consider using an elastic/rubber band and snapping it when you want a cigarette to remind you to pause and consider if you want to make the choice to smoke. This is also a negative feedback tool.    4. Consider cutting the cigarette in half.    5. Consider portioning out your allotment for the day in a ziplock bag so that you can see what your limit is for the day. Then put the pack somewhere inconvenient.    6. Set a quit date.    7. Consider when and where and who you smoke with. Consider changing those behaviors. For example \"everytime I have coffee, I smoke\" try and separate these events from each other.    8. Consider setting longer time limits before your first or next cigarette. You could use a timer if needed.            "

## 2018-06-11 NOTE — LETTER
[facility] [id number] [place]  [address]  Phone: [phone]    June 13, 2018      Emily Zhu  [address] [facility] [address]  [place] [address]              Dear Emily,    The results from your recent visit showed 3 month A1C is HIGH and diabetes needs work. Please see endocrinology and MTM Chelo Roberts.   Check blood sugars before meals and at bedtime. Call the sugars in weekly with the diabetes medications and doses taken so that we can advise you better.      Sincerely,     Vanessa Mejia M.D.    Results for orders placed or performed in visit on 06/11/18   Hemoglobin A1C ([facility])   Result Value Ref Range    Hemoglobin A1C 8.4 (H) 4.8 - 5.6 %    Narrative    Performed at:  01 - [facility] [address], [place], [place]  [id number]  : Rafat Chew MD, Phone:  [phone]

## 2018-06-12 LAB — HBA1C MFR BLD: 8.4 % (ref 4.8–5.6)

## 2018-06-28 DIAGNOSIS — R10.9 RIGHT FLANK PAIN: ICD-10-CM

## 2018-06-28 DIAGNOSIS — Z76.0 ENCOUNTER FOR MEDICATION REFILL: ICD-10-CM

## 2018-06-28 RX ORDER — PREGABALIN 50 MG
CAPSULE ORAL
Qty: 90 CAPSULE | Refills: 3 | Status: SHIPPED | OUTPATIENT
Start: 2018-06-28 | End: 2018-11-30

## 2018-06-28 RX ORDER — BLOOD-GLUCOSE METER
EACH MISCELLANEOUS
Qty: 1 KIT | Refills: 0 | Status: SHIPPED | OUTPATIENT
Start: 2018-06-28 | End: 2020-03-31

## 2018-06-28 RX ORDER — ISOSORBIDE MONONITRATE 30 MG/1
TABLET, EXTENDED RELEASE ORAL
Qty: 90 TABLET | Refills: 3 | Status: SHIPPED | OUTPATIENT
Start: 2018-06-28 | End: 2019-07-15

## 2018-07-02 ENCOUNTER — TELEPHONE (OUTPATIENT)
Dept: FAMILY MEDICINE | Facility: CLINIC | Age: 58
End: 2018-07-02

## 2018-07-02 NOTE — TELEPHONE ENCOUNTER
Received fax from [employer] that patients rx for Lyrica is approved.  ( Not sure who submitted)  There was already a PA approved for Lyrica that  in 2019.  This new PA  expires in 2019.

## 2018-07-13 ENCOUNTER — TRANSFERRED RECORDS (OUTPATIENT)
Dept: FAMILY MEDICINE | Facility: CLINIC | Age: 58
End: 2018-07-13

## 2018-07-24 DIAGNOSIS — E11.9 TYPE 2 DIABETES MELLITUS WITHOUT COMPLICATION, WITHOUT LONG-TERM CURRENT USE OF INSULIN (H): ICD-10-CM

## 2018-08-28 DIAGNOSIS — Z76.0 ENCOUNTER FOR MEDICATION REFILL: ICD-10-CM

## 2018-09-28 DIAGNOSIS — I10 ESSENTIAL HYPERTENSION: ICD-10-CM

## 2018-09-28 NOTE — TELEPHONE ENCOUNTER
hydrochlorothiazide (HYDRODIURIL) 25 MG tablet   LAST  Med check 6/11/18   last labs(for RX) 6/11/18  Next  appt scheduled =  none  Mary Goldstein MA    BP Readings from Last 3 Encounters:   06/11/18 124/88   05/09/18 128/88   04/23/18 (!) 181/110     Lab Results   Component Value Date    A1C 8.4 06/11/2018    A1C 7.4 11/03/2017    A1C 8.1 02/03/2017    A1C 9.3 10/21/2016    A1C 11.4 06/15/2016

## 2018-10-01 RX ORDER — HYDROCHLOROTHIAZIDE 25 MG/1
TABLET ORAL
Qty: 90 TABLET | Refills: 0 | Status: SHIPPED | OUTPATIENT
Start: 2018-10-01 | End: 2019-03-08

## 2018-12-12 ENCOUNTER — PATIENT OUTREACH (OUTPATIENT)
Dept: CARE COORDINATION | Facility: CLINIC | Age: 58
End: 2018-12-12

## 2018-12-12 ENCOUNTER — OFFICE VISIT (OUTPATIENT)
Dept: FAMILY MEDICINE | Facility: CLINIC | Age: 58
End: 2018-12-12

## 2018-12-12 VITALS
SYSTOLIC BLOOD PRESSURE: 130 MMHG | OXYGEN SATURATION: 97 % | DIASTOLIC BLOOD PRESSURE: 86 MMHG | RESPIRATION RATE: 16 BRPM | BODY MASS INDEX: 30.73 KG/M2 | WEIGHT: 179 LBS | HEART RATE: 64 BPM

## 2018-12-12 DIAGNOSIS — K21.9 GASTROESOPHAGEAL REFLUX DISEASE WITHOUT ESOPHAGITIS: ICD-10-CM

## 2018-12-12 DIAGNOSIS — E78.5 HYPERLIPIDEMIA LDL GOAL <100: ICD-10-CM

## 2018-12-12 DIAGNOSIS — E53.8 VITAMIN B12 DEFICIENCY DISEASE: ICD-10-CM

## 2018-12-12 DIAGNOSIS — D50.9 IRON DEFICIENCY ANEMIA, UNSPECIFIED IRON DEFICIENCY ANEMIA TYPE: ICD-10-CM

## 2018-12-12 DIAGNOSIS — E11.9 TYPE 2 DIABETES MELLITUS TREATED WITHOUT INSULIN (H): ICD-10-CM

## 2018-12-12 DIAGNOSIS — Z51.81 ENCOUNTER FOR THERAPEUTIC DRUG MONITORING: ICD-10-CM

## 2018-12-12 DIAGNOSIS — E55.9 VITAMIN D DEFICIENCY: ICD-10-CM

## 2018-12-12 DIAGNOSIS — Z12.5 SCREENING FOR PROSTATE CANCER: Primary | ICD-10-CM

## 2018-12-12 LAB
% GRANULOCYTES: 64 % (ref 42.2–75.2)
A/C RATIO MG/G: ABNORMAL MG/G
ALBUMIN (URINE) MG/L: 150 MG/L
HCT VFR BLD AUTO: 48.3 % (ref 39–51)
HEMOGLOBIN: 16.2 G/DL (ref 13.4–17.5)
INTERPRETATION: ABNORMAL
LYMPHOCYTES NFR BLD AUTO: 28.5 % (ref 20.5–51.1)
MCH RBC QN AUTO: 31.5 PG (ref 27–31)
MCHC RBC AUTO-ENTMCNC: 33.6 G/DL (ref 33–37)
MCV RBC AUTO: 93.7 FL (ref 80–100)
MONOCYTES NFR BLD AUTO: 7.5 % (ref 1.7–9.3)
PLATELET # BLD AUTO: 278 K/UL (ref 140–450)
RBC # BLD AUTO: 5.15 X10/CMM (ref 4.2–5.9)
URINE CREATININE MG/DL - QUEST: 200 MG/DL
WBC # BLD AUTO: 9.9 X10/CMM (ref 3.8–11)

## 2018-12-12 PROCEDURE — 99214 OFFICE O/P EST MOD 30 MIN: CPT | Performed by: FAMILY MEDICINE

## 2018-12-12 PROCEDURE — 85025 COMPLETE CBC W/AUTO DIFF WBC: CPT | Performed by: FAMILY MEDICINE

## 2018-12-12 PROCEDURE — 82044 UR ALBUMIN SEMIQUANTITATIVE: CPT | Performed by: FAMILY MEDICINE

## 2018-12-12 PROCEDURE — 36415 COLL VENOUS BLD VENIPUNCTURE: CPT | Performed by: FAMILY MEDICINE

## 2018-12-12 PROCEDURE — 82570 ASSAY OF URINE CREATININE: CPT | Mod: 90 | Performed by: FAMILY MEDICINE

## 2018-12-12 RX ORDER — PANTOPRAZOLE SODIUM 20 MG/1
20 TABLET, DELAYED RELEASE ORAL DAILY
Qty: 90 TABLET | Refills: 1 | Status: SHIPPED | OUTPATIENT
Start: 2018-12-12 | End: 2020-03-31

## 2018-12-12 RX ORDER — PANTOPRAZOLE SODIUM 20 MG/1
TABLET, DELAYED RELEASE ORAL
Qty: 90 TABLET | Refills: 1 | Status: SHIPPED | OUTPATIENT
Start: 2018-12-12 | End: 2018-12-12

## 2018-12-12 ASSESSMENT — ACTIVITIES OF DAILY LIVING (ADL): DEPENDENT_IADLS:: TRANSPORTATION

## 2018-12-12 NOTE — LETTER
[facility]    December 12, 2018    Emily Zhu  [address]  [place] [address]      Dear Clint,     I am a clinic care coordinator who works with Vanessa Mejia MD at [facility]. I wanted to introduce myself and provide you with my contact information so that you can call me with questions or concerns about your health care. Below is a description of clinic care coordination and how I can further assist you.     The clinic care coordinator is a registered nurse and/or  who understand the health care system. The goal of clinic care coordination is to help you manage your health and improve access to the [facility] system in the most efficient manner. The registered nurse can assist you in meeting your health care goals by providing education, coordinating services, and strengthening the communication among your providers. The  can assist you with financial, behavioral, psychosocial, chemical dependency, counseling, and/or psychiatric resources.    Please feel free to contact me at [phone], with any questions or concerns. We at [facility] are focused on providing you with the highest-quality healthcare experience possible and that all starts with you.     Sincerely,     Christine Tao

## 2018-12-12 NOTE — PROGRESS NOTES
Clinic Care Coordination Contact  OUTREACH    Referral Information: PCP referred for  Care Coordination as patient was fired from [employer] and his unemployment is ending at the end of this month. Patient is depressed and needs guidance on resources/support. Limited informal supports as his sons live out of state.    Referral Source: PCP    Primary Diagnosis: Psychosocial    Chief Complaint   Patient presents with     Clinic Care Coordination - Initial     psychosocial concerns        Universal Utilization: Patient had one ED visit this past year. No compliance concerns  Utilization    Last refreshed: 12/12/2018  8:48 AM:  Hospital Admissions 0           Last refreshed: 12/12/2018  8:48 AM:  ED Visits 1           Last refreshed: 12/12/2018  8:48 AM:  No Show Count (past year) 0              Current as of: 12/12/2018  8:48 AM            Clinical Concerns:  Current Medical Concerns: Patient has Type 2 Diabetes, GERD, Mixed Hyperlipidemia, Coronary Artery Disease, and Cataracts.  Care Coordinator spoke with patient today and he said he is depressed as he is out of a job and not sure how he is going to financially pay to continue to live in his apartment. Assessed with patient if he is having suicidal ideation, thoughts to harm himself, or thoughts to harm others. Patient said he does not have a plan but sometimes feels that he wants to die as he is overwhelmed. Discussed and gave contact information for [facility] crisis response team and told patient to call if he is suicidal or depressed and doesn't know what to do. Patient indicated understanding. Patient was emotional/crying during the phone call but was able to calm down towards the end of the conversation.    Patient takes public transportation to/from places in the community. Discussed with patient that he could apply for [employer] for door to door service, but there is an application process as you have to be certified to have a disability or have a  "reason that it is no longer safe or a good plan for you to continue to take public transportation. Gave patient the phone # and address for [employer] in [place] as they have social workers that can help fill out paperwork, get his access to transportation if needed (through their agency), and have a food pantry.     Patient stated he does get some food support through [facility]- $15. He fills out his renewals for medical assistance through the [facility] and currently has [facility] for insurance.    Patient has gone to the [employer] to try and network to get a new job, he is also getting unemployment through the end of this month. Patient said the [employer] [facility] hasn't \"done much\". He has tried applying to a variety of places, no success yet. Gave patient info for the nonprofit [employer] in [place] as they can provide 1:1 job counseling, skills training, and job search support. Gave contact info and address- patient said he would check into it.     Current Behavioral Concerns: Patient stated he does not use drugs or alcohol. No other behavioral concerns noted.    Medication Management:  Patient sets up and administers medications.    Functional Status:  Patient stated he lives in an apartment and has a cane and walker for ambulation. Patient is able to independently complete ADL's or SW Care Coordinator would have made a referral for homecare.    Living Situation:  Lives alone in an apartment    Diet/Exercise/Sleep:  Patient has info for [employer] food pantry, and gets $15 in food support through [facility]  Patient is walking for exercise daily  Patient said he has not been getting good sleep with being depressed and worrying about his financial situation. He said he is Buddhism (Anglican) and does pray.    Transportation:  Public transportation, gave resources for metro mobility or [employer]      Psychosocial:  Informal Support system:: Family(son in [place], and a son in [place])     Financial/Insurance: " Eliane FLOWER     Resources and Interventions:  [employer] [facility] (food support or general assistance), [employer] and the [employer] [facility]    Advance Care Plan/Directive: None on file, considering options    Referrals Placed: Mental Health, Community Resources     Goals:   Goals        General    Healthy Eating (pt-stated)     Notes - Note created  12/12/2018 11:06 AM by Christine Tao LGSW    I will call [employer] in [place] [phone] for food support, and help filling out paperwork, and see about access to transportation if needed by January 2019      Other (pt-stated)     Notes - Note created  12/12/2018 11:05 AM by Christine Tao LGSW    I will utilize [employer] or the [employer] to find and secure a new job by the end of January 2019              Patient/Caregiver understanding: Patient reports understanding and denies any other concerns. Utilized [employer] communication during encounter    Outreach Frequency: weekly      Plan: Patient will call RO if suicidal/depressed to assess if he should go in for a psych assessment such as at [facility]. Patient will utilize [employer] and/or the [employer] to try and get a new job. Patient will utilize the food [employer] if needs further food support.  Care Coordinator will outreach again this Friday to check on patient and provide further support/resources as needed.     Christine Rutledge, MSW, LGSW  Clinic Care Coordinator  [email or web address]  [phone]

## 2018-12-12 NOTE — PROGRESS NOTES
SUBJECTIVE:   Emily Zhu is a 58 year old male who presents to clinic today for the following health issues:    Fasting    Since he was here last lost his job    July month   [facility]. Wanted to go 2 weeks to   Fired and not working  Financial issues  Depression  Unemployment runs out at the end of this month- SW to contact him  Has some food  Living by self  Having memory issues  Someone brought him here    Sleep poor 3 nights   Appetite fasting  Supper chicken noodle soup  Exercise walking inside    Smoking yes 1 ppd.   ETOH no  Street drugs/MJ no  Caffeine tea    Fall tripped no injury  Seizure no  LOC no    Depression yes  Anxiety yes  Panic yes  isolation  SI/SP no  Hallucinations no  Paranoid no  Manic no  OCD no  PTSD no  Gambling no    Left stove on yesterday- noodles nothing burned  Drinking a lot of water, tea  Eating oatmeal      Lab Results   Component Value Date    A1C 8.4 06/11/2018    A1C 7.4 11/03/2017    A1C 8.1 02/03/2017    A1C 9.3 10/21/2016    A1C 11.4 06/15/2016     LDL Cholesterol Calculated   Date Value Ref Range Status   02/03/2017 Comment 0 - 99 mg/dL Final     Comment:     Triglyceride result indicated is too high for an accurate LDL  cholesterol estimation.     11/20/2015 Comment 0 - 99 mg/dL Final     Comment:     Triglyceride result indicated is too high for an accurate LDL  cholesterol estimation.       Creatinine   Date Value Ref Range Status   01/13/2018 1.13 0.66 - 1.25 mg/dL Final       Caodaism   Was asleep near alter  Recliner sleep  Friend helped him up,her  has cancer      Medication Followup of multiple     Taking Medication as prescribed: yes    Side Effects:  None    Medication Helping Symptoms:  yes           Problem list and histories reviewed & adjusted, as indicated.  Additional history: as documented    Patient Active Problem List   Diagnosis     Coronary atherosclerosis     Essential hypertension     Mixed hyperlipidemia     Cataract     GERD  (gastroesophageal reflux disease)     Health Care Home     Microalbuminuria due to type 2 diabetes mellitus (H)     Type 2 diabetes mellitus with diabetic nephropathy, without long-term current use of insulin (H)     Past Surgical History:   Procedure Laterality Date     ANGIOPLASTY  2007    rca     ANGIOPLASTY  2010    om     ARTHROTOMY SHOULDER, ROTATOR CUFF REPAIR, COMBINED Left 4/12/2017    Procedure: COMBINED ARTHROTOMY SHOULDER, ROTATOR CUFF REPAIR;  Surgeon: Deejay Conrad MD;  Location: [facility]     ARTHROTOMY SHOULDER, SUBACROMIAL DECOMPRESSION, COMBINED Left 4/12/2017    Procedure: COMBINED ARTHROTOMY SHOULDER, SUBACROMIAL DECOMPRESSION;  Surgeon: Deejay Cornad MD;  Location: [facility]     COLONOSCOPY       DECOMPRESSION CUBITAL TUNNEL  11/29/2013    Procedure: DECOMPRESSION CUBITAL TUNNEL;;  Surgeon: Radha Cain MD;  Location: [facility]     ENDOSCOPIC RELEASE CARPAL TUNNEL  11/29/2013    Procedure: ENDOSCOPIC RELEASE CARPAL TUNNEL;  LEFT ENDOSCOPIC CARPAL TUNNEL RELEASE AND CUBITAL TUNNEL RELEASE ;  Surgeon: Radha Cain MD;  Location: [facility]     OPEN REDUCTION INTERNAL FIXATION HIP NAILING  3/30/2012    Procedure:OPEN REDUCTION INTERNAL FIXATION HIP NAILING; Biopsy, Curettage and Bone Grafting Right Hip Lesion, Placement of Hip Screw; Surgeon:MARILIN GAY; Location:[facility]     PHACOEMULSIFICATION CLEAR CORNEA WITH STANDARD INTRAOCULAR LENS IMPLANT  5/8/2013    Procedure: PHACOEMULSIFICATION CLEAR CORNEA WITH STANDARD INTRAOCULAR LENS IMPLANT;  RIGHT EYE PHACOEMULSIFICATION CLEAR CORNEA WITH STANDARD INTRAOCULAR LENS IMPLANT ;  Surgeon: Jovani Pretty MD;  Location: [facility]     PHACOEMULSIFICATION CLEAR CORNEA WITH STANDARD INTRAOCULAR LENS IMPLANT  5/20/2013    Procedure: PHACOEMULSIFICATION CLEAR CORNEA WITH STANDARD INTRAOCULAR LENS IMPLANT;  LEFT  EYE PHACOEMULSIFICATION CLEAR CORNEA WITH STANDARD INTRAOCULAR LENS IMPLANT ;  Surgeon: Jovani Pretty MD;   Location:  [facility]     STENT         Social History     Tobacco Use     Smoking status: Current Every Day Smoker     Packs/day: 1.00     Years: 40.00     Pack years: 40.00     Types: Cigarettes     Smokeless tobacco: Never Used   Substance Use Topics     Alcohol use: Yes     Alcohol/week: 0.0 oz     Comment: none     Family History   Problem Relation Age of Onset     Cerebrovascular Disease Mother 56     Hypertension Mother      Cerebrovascular Disease Father      Hypertension Father          Current Outpatient Medications   Medication Sig Dispense Refill     ASPIRIN PO Take 81 mg by mouth daily       atorvastatin (LIPITOR) 10 MG tablet TAKE ONE TABLET BY MOUTH ONCE DAILY 90 tablet 3     blood glucose monitoring (ONE TOUCH DELICA) lancets USE TO TEST BLOOD SUGARS ONE TIME DAILY OR AS DIRECTED. 100 each 11     blood glucose monitoring (ONE TOUCH DELICA) lancets        blood glucose monitoring (ONE TOUCH ULTRA 2) meter device kit USE TO TEST BLOOD SUGARS ONCE DAILY OR AS DIRECTED 1 kit 0     clopidogrel (PLAVIX) 75 MG tablet TAKE ONE TABLET BY MOUTH ONCE DAILY 30 tablet 11     glipiZIDE (GLUCOTROL XL) 5 MG 24 hr tablet TAKE 1 TABLET BY MOUTH ONCE DAILY 30 tablet 0     hydrochlorothiazide (HYDRODIURIL) 25 MG tablet TAKE 1 TABLET BY MOUTH ONCE DAILY 90 tablet 0     IBUPROFEN PO Take 200 mg by mouth daily       isosorbide mononitrate (IMDUR) 30 MG 24 hr tablet TAKE ONE TABLET BY MOUTH ONCE DAILY 90 tablet 3     lisinopril (PRINIVIL/ZESTRIL) 20 MG tablet TAKE ONE TABLET BY MOUTH ONCE DAILY 90 tablet 3     LYRICA 50 MG capsule TAKE 1 CAPSULE BY MOUTH THREE TIMES DAILY 21 capsule 0     metFORMIN (GLUCOPHAGE) 1000 MG tablet TAKE ONE TABLET BY MOUTH TWICE DAILY WITH MEALS 180 tablet 1     metoprolol tartrate (LOPRESSOR) 25 MG tablet Take 1 tablet (25 mg) by mouth 2 times daily 180 tablet 3     ONETOUCH ULTRA test strip        pantoprazole (PROTONIX) 20 MG [facility] tablet Take by mouth 30-60 minutes before a meal. 90 tablet 1      tiZANidine (ZANAFLEX) 4 MG tablet Take 1 tablet (4 mg) by mouth 3 times daily as needed for muscle spasms 30 tablet 1     Allergies   Allergen Reactions     Crestor [Rosuvastatin] Muscle Pain (Myalgia)     Recent Labs   Lab Test 06/11/18  1532 01/13/18  0840 11/03/17  1046 04/12/17  0943  02/03/17  1503  11/20/15  0758  04/20/15  1540  11/20/13  1221  04/10/13  1559   A1C 8.4*  --  7.4*  --   --  8.1*   < > 8.0*   < >  --    < > 7.4*   < >  --    LDL  --   --   --   --   --  Comment  --  Comment  --   --   --  38   < >  --    HDL  --   --   --   --   --  38*  --  43  --   --   --  49   < >  --    TRIG  --   --   --   --   --  644*  --  597*  --   --   --  276*   < >  --    ALT  --  58  --   --   --   --   --   --   --  71*  --   --   --  34   CR  --  1.13  --  1.14   < >  --    < >  --   --  1.05   < >  --   --  1.17   GFRESTIMATED  --  66  --  66   < >  --    < >  --   --  73   < >  --   --   --    GFRESTBLACK  --  80  --  80   < >  --    < >  --   --  88   < >  --   --   --    POTASSIUM  --  4.4  --  4.7   < >  --    < >  --   --  4.0   < > 4.4   < > 4.7    < > = values in this interval not displayed.      BP Readings from Last 3 Encounters:   12/12/18 130/86   06/11/18 124/88   05/09/18 128/88    Wt Readings from Last 3 Encounters:   12/12/18 81.2 kg (179 lb)   06/11/18 84.9 kg (187 lb 3.2 oz)   05/09/18 82.6 kg (182 lb)                  Labs reviewed in EPIC    Reviewed and updated as needed this visit by clinical staff       Reviewed and updated as needed this visit by Provider         ROS:  Constitutional, HEENT, cardiovascular, pulmonary, GI, , musculoskeletal, neuro, skin, endocrine and psych systems are negative, except as otherwise noted.    OBJECTIVE:     /86   Pulse 64   Resp 16   Wt 81.2 kg (179 lb)   SpO2 97%   BMI 30.73 kg/m    Body mass index is 30.73 kg/m .  GENERAL: healthy, alert and no distress  EYES: Eyes grossly normal to inspection, PERRL and conjunctivae and sclerae normal  HENT:  ear canals and TM's normal, nose and mouth without ulcers or lesions  NECK: no adenopathy, no asymmetry, masses, or scars and thyroid normal to palpation  RESP: lungs clear to auscultation - no rales, rhonchi or wheezes  CV: regular rate and rhythm, normal S1 S2, no S3 or S4, no murmur, click or rub, no peripheral edema and peripheral pulses strong  ABDOMEN: soft, nontender, no hepatosplenomegaly, no masses and bowel sounds normal  MS: no gross musculoskeletal defects noted, no edema  SKIN: no suspicious lesions or rashes  NEURO: Normal strength and tone, mentation intact and speech normal  PSYCH: mentation appears normal, affect normal/anxious pacing at times  LYMPH: no cervical, supraclavicular, axillary, or inguinal adenopathy  Foot exam: normal pulses, no open sores, normal monofilament testing    Diagnostic Test Results:  Results for orders placed or performed in visit on 06/11/18   Hemoglobin A1C ([facility])   Result Value Ref Range    Hemoglobin A1C 8.4 (H) 4.8 - 5.6 %    Narrative    Performed at:  01 - [facility] [address], [place], [place]  [id number]  : Rafat Chew MD, Phone:  [phone]       ASSESSMENT/PLAN:     ASSESSMENT / PLAN:  (Z12.5) Screening for prostate cancer  (primary encounter diagnosis)  Comment:   Plan: PSA Serum ([facility])            (E55.9) Vitamin D deficiency  Comment:   Plan: Vitamin D  25-Hydroxy ([facility])            (E78.5) Hyperlipidemia LDL goal <100  Comment:   Plan: Lipid Panel ([facility])            (E11.9) Type 2 diabetes mellitus treated without insulin (H)  Comment:   Plan: Basic Metabolic Panel (8) ([facility]), Hemoglobin        A1C ([facility]), Microalbumin ([facility]), TSH         ([facility]), Thyroxine (T4) Free  Direct  S         ([facility])            (D50.9) Iron deficiency anemia, unspecified iron deficiency anemia type  Comment:   Plan: CBC with Diff/Plt ([facility])            (Z51.81) Encounter for therapeutic drug monitoring  Comment:   Plan: Hepatic Function Panel  (7) ([facility])            (E53.8) Vitamin B12 deficiency disease  Comment:   Plan: Vitamin B12 and Folate ([facility])            (K21.9) Esophageal reflux  Comment:   Plan: refill    (K21.9) Gastroesophageal reflux disease without esophagitis  Comment:   Plan: pantoprazole (PROTONIX) 20 MG EC tablet,                    Patient Instructions   Melatonin over the counter for sleep    Labs today    Flu shot you declined    Refill done     to call you to help with support options        Vanessa Mejia MD  [facility]

## 2018-12-12 NOTE — PATIENT INSTRUCTIONS
Melatonin over the counter for sleep    Labs today    Flu shot you declined    Refill done     to call you to help with support options

## 2018-12-13 LAB
ALBUMIN SERPL-MCNC: 4.3 G/DL (ref 3.6–4.8)
ALP SERPL-CCNC: 57 IU/L (ref 39–117)
ALT SERPL-CCNC: 32 IU/L (ref 0–44)
AST SERPL-CCNC: 18 IU/L (ref 0–40)
BILIRUB SERPL-MCNC: 0.4 MG/DL (ref 0–1.2)
BILIRUBIN, DIRECT: 0.11 MG/DL (ref 0–0.4)
BUN SERPL-MCNC: 18 MG/DL (ref 8–27)
BUN/CREATININE RATIO: 13 (ref 10–24)
CALCIUM SERPL-MCNC: 9.4 MG/DL (ref 8.6–10.2)
CHLORIDE SERPLBLD-SCNC: 98 MMOL/L (ref 96–106)
CHOLEST SERPL-MCNC: 226 MG/DL (ref 100–199)
CREAT SERPL-MCNC: 1.43 MG/DL (ref 0.76–1.27)
EGFR IF AFRICN AM: 61 ML/MIN/1.73
EGFR IF NONAFRICN AM: 53 ML/MIN/1.73
FOLATE: 8.8 NG/ML
GLUCOSE SERPL-MCNC: 112 MG/DL (ref 65–99)
HBA1C MFR BLD: 7.2 % (ref 4.8–5.6)
HDLC SERPL-MCNC: 45 MG/DL
LDL/HDL RATIO: ABNORMAL RATIO
LDLC SERPL CALC-MCNC: ABNORMAL MG/DL (ref 0–99)
POTASSIUM SERPL-SCNC: 4.6 MMOL/L (ref 3.5–5.2)
PROT SERPL-MCNC: 7.4 G/DL (ref 6–8.5)
PSA NG/ML: 13.6 NG/ML (ref 0–4)
SODIUM SERPL-SCNC: 137 MMOL/L (ref 134–144)
T4 FREE SERPL-MCNC: 1.27 NG/DL (ref 0.82–1.77)
TOTAL CO2: 22 MMOL/L (ref 20–29)
TRIGL SERPL-MCNC: 453 MG/DL (ref 0–149)
TSH BLD-ACNC: 4.02 UIU/ML (ref 0.45–4.5)
VIT B12 SERPL-MCNC: 1035 PG/ML (ref 232–1245)
VITAMIN D, 25-HYDROXY: 16.7 NG/ML (ref 30–100)
VLDLC SERPL CALC-MCNC: ABNORMAL MG/DL (ref 5–40)

## 2018-12-14 ENCOUNTER — PATIENT OUTREACH (OUTPATIENT)
Dept: CARE COORDINATION | Facility: CLINIC | Age: 58
End: 2018-12-14

## 2018-12-14 NOTE — PROGRESS NOTES
Clinic Care Coordination Contact  [facility]/Voicemail       Clinical Data: Care Coordinator Outreach  Outreach attempted x 1.  Left message on voicemail with call back information and requested return call.    Plan:  Care Coordinator plans to talk further with patient to assess why he was fired from [employer]. If he was fired for a medical reason, or he will have trouble working again due a medical reason,  Care Coordinator will suggest trying to apply for social security benefits ([address] [facility] [address], [phone])     Care Coordinator also plans to talk with patient about his informal supports. (emergency contact-sister listed in file) and sons, to see if would be a potential plan for him to move into their home until he is able to establish a financial plan. Either that, or have family loan him some money.     Care Coordinator will try and outreach again in one week.     Christine Rutledge, MSW, [facility]  Clinic Care Coordinator  [email or web address]  [phone]

## 2018-12-17 ENCOUNTER — TELEPHONE (OUTPATIENT)
Dept: FAMILY MEDICINE | Facility: CLINIC | Age: 58
End: 2018-12-17

## 2018-12-17 DIAGNOSIS — R73.09 ELEVATED HEMOGLOBIN A1C: ICD-10-CM

## 2018-12-17 DIAGNOSIS — R79.89 ELEVATED SERUM CREATININE: ICD-10-CM

## 2018-12-17 DIAGNOSIS — R97.20 ELEVATED PROSTATE SPECIFIC ANTIGEN (PSA): Primary | ICD-10-CM

## 2018-12-17 DIAGNOSIS — E78.2 ELEVATED TRIGLYCERIDES WITH HIGH CHOLESTEROL: ICD-10-CM

## 2018-12-17 DIAGNOSIS — R79.89 LOW VITAMIN D LEVEL: ICD-10-CM

## 2018-12-17 NOTE — TELEPHONE ENCOUNTER
Called patient with lab results. Also mailed patient out copy of note from Dr Mejia with phone number to call for [facility].  Informed patient that most of his labs have improved-A1C and triglycerides.  Liver and thyroid labs good.  Vit D low and recommended patient take Vit D3 5000 units daily.  PSA elevated and recommended is referral to Urologist.  Patient will call [facility] to schedule consult.  Referral made. Patient will angie labs-A1C, BMP, Lipids,  Vit D in 3 months.

## 2018-12-21 ENCOUNTER — PATIENT OUTREACH (OUTPATIENT)
Dept: CARE COORDINATION | Facility: CLINIC | Age: 58
End: 2018-12-21

## 2018-12-21 NOTE — LETTER
[facility]    December 21, 2018    Emily Zhu (Clint)  [address]  [place] [address]      Dear Clint,    I am a clinic care coordinator who works with Vanessa Mejia MD at [facility].We spoke recently over the phone, and I wanted to send you a letter to introduce myself and provide you with my contact information so that you can call me with questions or concerns about your health care or concerns.    Please feel free to contact me at [phone]. I have tried to call you twice, and have not heard back yet. I am happy to help and be a resource.     Sincerely,     Christine Tao

## 2018-12-21 NOTE — PROGRESS NOTES
Clinic Care Coordination Contact  [facility]/Voicemail       Clinical Data: Care Coordinator Outreach  Outreach attempted x 2.  Left message on voicemail with call back information and requested return call.  Plan:  Care Coordinator will attempt to outreach again in two weeks. Will send patient a care coordinator intro letter today with  Care Coordinator's contact info.     Christine Rutledge, MSW, [facility]  Clinic Care Coordinator  [email or web address]  [phone]

## 2019-01-03 DIAGNOSIS — E11.21 CONTROLLED TYPE 2 DIABETES MELLITUS WITH DIABETIC NEPHROPATHY, WITHOUT LONG-TERM CURRENT USE OF INSULIN (H): Primary | ICD-10-CM

## 2019-01-04 ENCOUNTER — PATIENT OUTREACH (OUTPATIENT)
Dept: CARE COORDINATION | Facility: CLINIC | Age: 59
End: 2019-01-04

## 2019-01-04 NOTE — PROGRESS NOTES
Clinic Care Coordination Contact  [facility]/Voicemail       Clinical Data: Patient is on unemployment that ended beginning of this month. Patient concerned financially and needing support/resources/guidance.  Outreach attempted x 2.  Left message on voicemail with call back information and requested return call.  Plan:  EDDIE LEIVA did mail out CC letter last outreach. Will send access plan today via mail. EDDIE LEIVA has tried to outreach twice with no success or calls back. Will try to follow up one last time in a week before closing care coordination.    Christine Rutledge, MSW, [facility]  Clinic Care Coordinator  [email or web address]  [phone]

## 2019-01-04 NOTE — LETTER
[facility]  Complex Care Plan  About Me  Patient Name:  Emily Zhu    YOB: 1960  Age:     58 year old   Alfonso MRN:   [id number] Telephone Information:  Home Phone [phone]   Mobile None       Address:    [address]  [place] [address] Email address:  No e-mail address on record      Emergency Contact(s)  Name Relationship Lgl Grd Work Phone Home Phone Mobile Phone   EDWARD CABRERA Sister  [phone] none           Primary language:  English     needed? No   [place] [facility]:  [phone] op. 1  Other communication barriers:    Preferred Method of Communication:     Current living arrangement:    Mobility Status/ Medical Equipment:      Health Maintenance  Health Maintenance Reviewed:      My Access Plan  Medical Emergency 911   Primary Clinic Line   - [phone]   24 Hour Appointment Line [phone] or  [phone]) (toll-free)   24 Hour Nurse Line [phone] (toll-free)   Preferred Urgent Care     Preferred Hospital     Preferred Pharmacy [facility] - [place], [place] - [address] [facility]     Behavioral Health Crisis Line The National Suicide Prevention Lifeline at 1-780.980.7663 or 421     My Care Team Members    Patient Care Team       Relationship Specialty Notifications Start End    Vanessa Mejia MD PCP - General Family Practice  6/11/18     Phone: [phone] Fax: [phone] [address] [facility] [address]    Chrisitne Tao LGSW Lead Care Coordinator   12/12/18             My Care Plans  Self Management and Treatment Plan  Goals and (Comments)  Goals        General    Healthy Eating (pt-stated)     Notes - Note created  12/12/2018 11:06 AM by Christine Tao LGSW    I will call [employer] in [place] [phone] for food support, and help filling out paperwork, and see about access to transportation if needed by January 2019      Other (pt-stated)     Notes  - Note created  12/12/2018 11:05 AM by Christine Tao LGSW    I will utilize [employer] or the [employer] Center to find and secure a new job by the end of January 2019      Other (pt-stated)     Notes - Note created  12/21/2018  9:38 AM by Christine Tao LGSW    I will consult with the social security office to see if I am eligible to receive social security benefits by end of January 2019 (if no longer able to work due to medical reason, or was fired related to a medical condition).                   My Medical and Care Information  Problem List   Patient Active Problem List   Diagnosis     Coronary atherosclerosis     Essential hypertension     Mixed hyperlipidemia     Cataract     GERD (gastroesophageal reflux disease)     Health Care Home     Microalbuminuria due to type 2 diabetes mellitus (H)     Type 2 diabetes mellitus with diabetic nephropathy, without long-term current use of insulin (H)      Current Medications and Allergies:  See printed Medication Report.    Care Coordination Start Date: 12/12/2018   Frequency of Care Coordination: weekly   Form Last Updated: 01/04/2019

## 2019-01-04 NOTE — LETTER
Health Care Home - Access Care Plan    About Me  Patient Name:  Emily Zhu (Clint)    YOB: 1960  Age:                             58 year old   Alfonso MRN:            [id number] Telephone Information:   Home Phone [phone]   Mobile None       Address:    [address]  [place] [address] Email address:  No e-mail address on record      Emergency Contact(s)  Name Relationship Lganjum Grd Work Phone Home Phone Mobile Phone   EDWARD CABRERA Sister  [phone] none             Health Maintenance:      My Access Plan  Medical Emergency 911   Questions or concerns during clinic hours Primary Clinic Line, I will call the clinic directly:  [phone]   24 Hour Appointment Line [phone] or  [phone]) (toll free)   24 Hour Nurse Line [phone] (toll free)         Preferred Urgent Care     Preferred Hospital     Preferred Pharmacy [facility] [address] [address]     Behavioral Health Crisis Line The National Suicide Prevention Lifeline at 1-512.677.7781, or call 911.   Also you can call [facility] [phone] at [facility]     My Care Team Members  Patient Care Team       Relationship Specialty Notifications Start End    Vanessa Mejia MD PCP - General Family Practice  6/11/18     Phone: [phone] Fax: [phone] [address] [facility] [address]    Christine Tao LGSW Lead Care Coordinator   12/12/18            My Medical and Care Information  Problem List   Patient Active Problem List   Diagnosis     Coronary atherosclerosis     Essential hypertension     Mixed hyperlipidemia     Cataract     GERD (gastroesophageal reflux disease)     Health Care Home     Microalbuminuria due to type 2 diabetes mellitus (H)     Type 2 diabetes mellitus with diabetic nephropathy, without long-term current use of insulin (H)      Current Medications and Allergies:  See printed Medication Report

## 2019-01-11 ENCOUNTER — PATIENT OUTREACH (OUTPATIENT)
Dept: CARE COORDINATION | Facility: CLINIC | Age: 59
End: 2019-01-11

## 2019-01-11 NOTE — PROGRESS NOTES
Clinic Care Coordination Contact  [facility]/Voicemail       Clinical Data: Patient indicated that he would stop getting unemployment at the beginning of this month and was concerned financially.  Care Coordinator to offer guidance, support, and resources.      Outreach attempted x 3.  Left message on voicemail with call back information and requested return call.          Plan: EDDIE  has already sent out access plan and introduction letter to patient. Has now tried to outreach several times with no success or calls back. Encouraged patient to call if concerned or has any questions. Scheduled no further outreach at this time.     Christine Rutledge, MSW, [facility]  Clinic Care Coordinator  [email or web address]  [phone]

## 2019-01-28 ENCOUNTER — TRANSFERRED RECORDS (OUTPATIENT)
Dept: FAMILY MEDICINE | Facility: CLINIC | Age: 59
End: 2019-01-28

## 2019-02-27 ENCOUNTER — TRANSFERRED RECORDS (OUTPATIENT)
Dept: FAMILY MEDICINE | Facility: CLINIC | Age: 59
End: 2019-02-27

## 2019-03-08 DIAGNOSIS — I10 ESSENTIAL HYPERTENSION: ICD-10-CM

## 2019-03-08 RX ORDER — HYDROCHLOROTHIAZIDE 25 MG/1
TABLET ORAL
Qty: 90 TABLET | Refills: 0 | Status: SHIPPED | OUTPATIENT
Start: 2019-03-08 | End: 2019-08-15

## 2019-03-08 NOTE — TELEPHONE ENCOUNTER
Last Comprehensive Metabolic Panel:  Sodium   Date Value Ref Range Status   12/12/2018 137 134 - 144 mmol/L Final     Potassium   Date Value Ref Range Status   12/12/2018 4.6 3.5 - 5.2 mmol/L Final     Chloride   Date Value Ref Range Status   12/12/2018 98 96 - 106 mmol/L Final     Carbon Dioxide   Date Value Ref Range Status   01/13/2018 28 20 - 32 mmol/L Final     Anion Gap   Date Value Ref Range Status   01/13/2018 5 3 - 14 mmol/L Final     Glucose   Date Value Ref Range Status   12/12/2018 112 (H) 65 - 99 mg/dL Final     Urea Nitrogen   Date Value Ref Range Status   12/12/2018 18 8 - 27 mg/dL Final     BUN/Creatinine Ratio   Date Value Ref Range Status   12/12/2018 13 10 - 24 Final     Creatinine   Date Value Ref Range Status   12/12/2018 1.43 (H) 0.76 - 1.27 mg/dL Final     GFR Estimate   Date Value Ref Range Status   01/13/2018 66 >60 mL/min/1.7m2 Final     Comment:     Non  GFR Calc     Calcium   Date Value Ref Range Status   12/12/2018 9.4 8.6 - 10.2 mg/dL Final

## 2019-04-02 ENCOUNTER — TELEPHONE (OUTPATIENT)
Dept: FAMILY MEDICINE | Facility: CLINIC | Age: 59
End: 2019-04-02

## 2019-04-02 NOTE — TELEPHONE ENCOUNTER
April 2, 2019 patient stopped in clinic yesterday at 445pm and presented letter from urology that he is scheduled for prostate US and biopsy. This letter instructs regarding necessity of holding warfarin and avoiding nsaids and ASA  4/3/19 and to check with PCP regarding holding plavix. It is not clear in letter how long they would like patient off plavix.   Called urology office to clarify and spoke with Coordinator Viji who states patients do need to hold plavix x 7 days prior to procedure and since he has not been off this med, they will call him to reschedule the procedure.   Per EPIC hx, patient has been on plavix for several years due to CAD and stents.   Okay per Dr. Mejia to hold plavix for 7 days.   Left message on patient's sisters voicemail (per patient's request) for patient with this info and to call MATTHIEU Montiel RN

## 2019-04-16 DIAGNOSIS — I10 ESSENTIAL HYPERTENSION: ICD-10-CM

## 2019-04-16 RX ORDER — METOPROLOL TARTRATE 25 MG/1
25 TABLET, FILM COATED ORAL 2 TIMES DAILY
Qty: 180 TABLET | Refills: 3 | Status: SHIPPED | OUTPATIENT
Start: 2019-04-16 | End: 2020-04-09

## 2019-05-04 DIAGNOSIS — Z76.0 ENCOUNTER FOR MEDICATION REFILL: ICD-10-CM

## 2019-05-04 DIAGNOSIS — I10 ESSENTIAL HYPERTENSION: ICD-10-CM

## 2019-05-04 DIAGNOSIS — E78.2 MIXED HYPERLIPIDEMIA: ICD-10-CM

## 2019-05-06 DIAGNOSIS — E11.9 TYPE 2 DIABETES MELLITUS WITHOUT COMPLICATION, WITHOUT LONG-TERM CURRENT USE OF INSULIN (H): ICD-10-CM

## 2019-05-06 RX ORDER — CLOPIDOGREL BISULFATE 75 MG/1
TABLET ORAL
Qty: 30 TABLET | Refills: 11 | Status: SHIPPED | OUTPATIENT
Start: 2019-05-06 | End: 2020-04-09

## 2019-05-06 RX ORDER — ATORVASTATIN CALCIUM 10 MG/1
TABLET, FILM COATED ORAL
Qty: 30 TABLET | Refills: 11 | Status: SHIPPED | OUTPATIENT
Start: 2019-05-06 | End: 2020-01-13

## 2019-05-06 RX ORDER — LISINOPRIL 20 MG/1
TABLET ORAL
Qty: 90 TABLET | Refills: 1 | Status: SHIPPED | OUTPATIENT
Start: 2019-05-06 | End: 2020-01-02

## 2019-05-06 NOTE — TELEPHONE ENCOUNTER
Creatinine   Date Value Ref Range Status   12/12/2018 1.43 (H) 0.76 - 1.27 mg/dL Final     GFR Estimate   Date Value Ref Range Status   01/13/2018 66 >60 mL/min/1.7m2 Final     Comment:     Non  GFR Calc   04/12/2017 66 >60 mL/min/1.7m2 Final     Comment:     Non  GFR Calc   04/05/2017 55 (L) >60 mL/min/1.7m2 Final     Comment:     Non  GFR Calc     GFR Estimate If Black   Date Value Ref Range Status   01/13/2018 80 >60 mL/min/1.7m2 Final     Comment:      GFR Calc   04/12/2017 80 >60 mL/min/1.7m2 Final     Comment:      GFR Calc   04/05/2017 66 >60 mL/min/1.7m2 Final     Comment:      GFR Calc     BP Readings from Last 3 Encounters:   12/12/18 130/86   06/11/18 124/88   05/09/18 128/88     Recheck labs in [place]issa

## 2019-05-06 NOTE — TELEPHONE ENCOUNTER
Metformin. LOV with labs 12/12/18.     Creatinine   Date Value Ref Range Status   12/12/2018 1.43 (H) 0.76 - 1.27 mg/dL Final     GFR Estimate   Date Value Ref Range Status   01/13/2018 66 >60 mL/min/1.7m2 Final     Comment:     Non  GFR Calc   04/12/2017 66 >60 mL/min/1.7m2 Final     Comment:     Non  GFR Calc   04/05/2017 55 (L) >60 mL/min/1.7m2 Final     Comment:     Non  GFR Calc     GFR Estimate If Black   Date Value Ref Range Status   01/13/2018 80 >60 mL/min/1.7m2 Final     Comment:      GFR Calc   04/12/2017 80 >60 mL/min/1.7m2 Final     Comment:      GFR Calc   04/05/2017 66 >60 mL/min/1.7m2 Final     Comment:      GFR Calc

## 2019-07-08 ENCOUNTER — OFFICE VISIT (OUTPATIENT)
Dept: FAMILY MEDICINE | Facility: CLINIC | Age: 59
End: 2019-07-08

## 2019-07-08 VITALS
BODY MASS INDEX: 30.21 KG/M2 | WEIGHT: 176 LBS | DIASTOLIC BLOOD PRESSURE: 92 MMHG | SYSTOLIC BLOOD PRESSURE: 144 MMHG | OXYGEN SATURATION: 97 % | HEART RATE: 75 BPM

## 2019-07-08 DIAGNOSIS — M62.838 SPASM OF MUSCLE: ICD-10-CM

## 2019-07-08 DIAGNOSIS — R10.9 RIGHT FLANK PAIN: ICD-10-CM

## 2019-07-08 DIAGNOSIS — R79.89 LOW VITAMIN D LEVEL: Primary | ICD-10-CM

## 2019-07-08 DIAGNOSIS — R07.9 CHEST PAIN WITH MODERATE RISK FOR CARDIAC ETIOLOGY: ICD-10-CM

## 2019-07-08 DIAGNOSIS — K21.9 GASTROESOPHAGEAL REFLUX DISEASE WITHOUT ESOPHAGITIS: ICD-10-CM

## 2019-07-08 DIAGNOSIS — N13.9 LOWER URINARY OBSTRUCTIVE SYMPTOM: ICD-10-CM

## 2019-07-08 DIAGNOSIS — E11.21 TYPE 2 DIABETES MELLITUS WITH DIABETIC NEPHROPATHY, WITHOUT LONG-TERM CURRENT USE OF INSULIN (H): ICD-10-CM

## 2019-07-08 LAB
% GRANULOCYTES: 62.3 % (ref 42.2–75.2)
HCT VFR BLD AUTO: 46.2 % (ref 39–51)
HEMOGLOBIN: 15.5 G/DL (ref 13.4–17.5)
LYMPHOCYTES NFR BLD AUTO: 29.9 % (ref 20.5–51.1)
MCH RBC QN AUTO: 31.6 PG (ref 27–31)
MCHC RBC AUTO-ENTMCNC: 33.5 G/DL (ref 33–37)
MCV RBC AUTO: 94.3 FL (ref 80–100)
MONOCYTES NFR BLD AUTO: 7.8 % (ref 1.7–9.3)
PLATELET # BLD AUTO: 275 K/UL (ref 140–450)
RBC # BLD AUTO: 4.9 X10/CMM (ref 4.2–5.9)
WBC # BLD AUTO: 8.5 X10/CMM (ref 3.8–11)

## 2019-07-08 PROCEDURE — 85025 COMPLETE CBC W/AUTO DIFF WBC: CPT | Performed by: NURSE PRACTITIONER

## 2019-07-08 PROCEDURE — 93000 ELECTROCARDIOGRAM COMPLETE: CPT | Performed by: NURSE PRACTITIONER

## 2019-07-08 PROCEDURE — 99205 OFFICE O/P NEW HI 60 MIN: CPT | Performed by: NURSE PRACTITIONER

## 2019-07-08 PROCEDURE — 36415 COLL VENOUS BLD VENIPUNCTURE: CPT | Performed by: NURSE PRACTITIONER

## 2019-07-08 RX ORDER — PREGABALIN 50 MG/1
50 CAPSULE ORAL 3 TIMES DAILY
Qty: 90 CAPSULE | Refills: 0 | Status: SHIPPED | OUTPATIENT
Start: 2019-07-08 | End: 2019-10-21

## 2019-07-08 NOTE — PROGRESS NOTES
Subjective     Emily Zhu is a 59 year old male who presents to clinic today for the following health issues: A feeling of tingling pain down the left side of his body concentrated in his hand and foot.     HPI   Diabetes Follow-up      How often are you checking your blood sugar? One time daily    What time of day are you checking your blood sugars (select all that apply)?  Before and after meals    Have you had any blood sugars above 200?  Yes unknown amount    Have you had any blood sugars below 70?  No    What symptoms do you notice when your blood sugar is low?  Shaky, Dizzy and Weak    What concerns do you have today about your diabetes? None and Blood sugar is often over 200     Do you have any of these symptoms? (Select all that apply)  Numbness in feet, Burning in feet and No numbness or tingling in feet.  No redness, sores or blisters on feet.  No complaints of excessive thirst.  No reports of blurry vision.  No significant changes to weight.     Have you had a diabetic eye exam in the last 12 months? No    Diabetes Management Resources    Hyperlipidemia Follow-Up      Are you having any of the following symptoms? (Select all that apply)  Left-sided neck or arm pain    Are you regularly taking any medication or supplement to lower your cholesterol?   Yes- see medication list    Are you having muscle aches or other side effects that you think could be caused by your cholesterol lowering medication?  No    Hypertension Follow-up      Do you check your blood pressure regularly outside of the clinic? No     Are you following a low salt diet? No    Are your blood pressures ever more than 140 on the top number (systolic) OR more   than 90 on the bottom number (diastolic), for example 140/90? Yes    BP Readings from Last 2 Encounters:   07/08/19 (!) 144/92   12/12/18 130/86     Hemoglobin A1C (%)   Date Value   12/12/2018 7.2 (H)   06/11/2018 8.4 (H)     LDL Cholesterol Calculated (mg/dL)   Date Value    12/12/2018 Comment   02/03/2017 Comment     Depression and Anxiety Follow-Up    How are you doing with your depression since your last visit? No change    How are you doing with your anxiety since your last visit?  No change    Are you having other symptoms that might be associated with depression or anxiety? Yes:  excessive worry    Have you had a significant life event? Job Concerns, Financial Concerns and Health Concerns     Do you have any concerns with your use of alcohol or other drugs? No    Social History     Tobacco Use     Smoking status: Current Every Day Smoker     Packs/day: 1.00     Years: 40.00     Pack years: 40.00     Types: Cigarettes     Smokeless tobacco: Never Used   Substance Use Topics     Alcohol use: Yes     Alcohol/week: 0.0 oz     Comment: none     Drug use: No     PHQ 12/27/2017   PHQ-9 Total Score 12   Q9: Thoughts of better off dead/self-harm past 2 weeks Nearly every day     No flowsheet data found.  No flowsheet data found.  No flowsheet data found.  In the past two weeks have you had thoughts of suicide or self-harm?  No.    Do you have concerns about your personal safety or the safety of others?   No    Suicide Assessment Five-step Evaluation and Treatment (SAFE-T)    Amount of exercise or physical activity: None    Problems taking medications regularly: No    Medication side effects: none    Diet: regular (no restrictions)    Recent Labs   Lab Test 12/12/18  1255 12/12/18  1254 06/11/18  1532 01/13/18  0840 11/03/17  1046 04/12/17  0943  02/03/17  1503  11/20/15  0758  04/20/15  1540   A1C 7.2*  --  8.4*  --  7.4*  --   --  8.1*   < > 8.0*   < >  --    LDL  --  Comment  --   --   --   --   --  Comment  --  Comment  --   --    HDL  --  45  --   --   --   --   --  38*  --  43  --   --    TRIG  --  453*  --   --   --   --   --  644*  --  597*  --   --    ALT  --  32  --  58  --   --   --   --   --   --   --  71*   CR  --  1.43*  --  1.13  --  1.14   < >  --    < >  --   --  1.05    GFRESTIMATED  --   --   --  66  --  66   < >  --    < >  --   --  73   GFRESTBLACK  --   --   --  80  --  80   < >  --    < >  --   --  88   POTASSIUM  --  4.6  --  4.4  --  4.7   < >  --    < >  --   --  4.0    < > = values in this interval not displayed.      BP Readings from Last 3 Encounters:   07/08/19 (!) 144/92   12/12/18 130/86   06/11/18 124/88    Wt Readings from Last 3 Encounters:   07/08/19 79.8 kg (176 lb)   12/12/18 81.2 kg (179 lb)   06/11/18 84.9 kg (187 lb 3.2 oz)           Review of Systems   ROS COMP: CONSTITUTIONAL:NEGATIVE for fever, chills, change in weight  RESP:NEGATIVE for significant cough or SOB  CV: NEGATIVE for chest pain, palpitations or peripheral edema. Positive for feeling of numbness and paresthesia in left arm  MUSCULOSKELETAL: NEGATIVE for edema in LE, muscle cramps-nocturnal, muscle spasm or muscle weakness   NEURO: NEGATIVE for weakness, dizziness or paresthesias  ENDOCRINE: NEGATIVE for temperature intolerance, skin/hair changes  HEME/ALLERGY/IMMUNE: NEGATIVE for bleeding problems  PSYCHIATRIC: POSITIVE forfatigue, anxiety, depressed mood and hopelessness      Objective    BP (!) 144/92 (BP Location: Left arm, Patient Position: Sitting, Cuff Size: Adult Regular)   Pulse 75   Wt 79.8 kg (176 lb)   SpO2 97%   BMI 30.21 kg/m     Body mass index is 30.21 kg/m .  Physical Exam   GENERAL: healthy, alert and no distress  RESP: lungs clear to auscultation - no rales, rhonchi or wheezes  CV: regular rate and rhythm, normal S1 S2, no S3 or S4, no murmur, click or rub, no peripheral edema and peripheral pulses strong  ABDOMEN: soft, nontender, no hepatosplenomegaly, no masses and bowel sounds normal  MS: no gross musculoskeletal defects noted, no edema  SKIN: no suspicious lesions or rashes  NEURO: Normal strength and tone, mentation intact and speech normal  PSYCH: mentation appears normal, affect normal/bright  Diabetic foot exam: normal DP and PT pulses, no trophic changes or  ulcerative lesions and normal sensory exam    EKG - negative      Assessment  1. Type 2 diabetes mellitus with diabetic nephropathy, without long-term current use of insulin (H)    2. Lower urinary obstructive symptom    3. Chest pain with moderate risk for cardiac etiology    4. Spasm of muscle    5. Gastroesophageal reflux disease without esophagitis    6. Right flank pain        Plan  Orders Placed This Encounter     Lipid Panel ([facility])     Hemoglobin A1C ([facility])     Basic Metabolic Panel (8) ([facility])     PSA Serum ([facility])     CBC with Diff/Plt ([facility])     Referral to [facility]     pregabalin (LYRICA) 50 MG capsule     The ECG and neuro exam today are normal. I do not think the paresthesia you are experiencing is due to CVD or stroke. I believe the paresthesia in your limbs may be caused by the abrupt discontinuation of your Lyrica medication. I have reordered this medication. Please begin taking Lyrica as soon as you are able. Please follow up with me in two weeks to see if your condition has improved.

## 2019-07-08 NOTE — PROGRESS NOTES
"Patient with \"numbness and burning\" on right side of body for 2 weeks from scalp to lower leg.    metformin making patient nauseated?    Patient has not been working for 3 months and is currently taking heart medications (metaprpolo),plavix, and metformin. Patient has discontinued all other medications but would like to reinitiate.    Georgette Simmons MA on 7/8/2019 at 3:38 PM    "

## 2019-07-08 NOTE — PATIENT INSTRUCTIONS
The ECG and neuro exam today are normal. I do not think the paresthesia you are experiencing is due to CVD or stroke. I believe the paresthesia in your limbs may be caused by the abrupt discontinuation of your Lyrica medication. I have reordered this medication. Please begin taking Lyrica as soon as you are able. Please follow up with me in two weeks to see if your condition has improved.

## 2019-07-10 LAB
BUN SERPL-MCNC: 15 MG/DL (ref 8–27)
BUN/CREATININE RATIO: 13 (ref 10–24)
CALCIUM SERPL-MCNC: 9.6 MG/DL (ref 8.6–10.2)
CHLORIDE SERPLBLD-SCNC: 102 MMOL/L (ref 96–106)
CHOLEST SERPL-MCNC: 233 MG/DL (ref 100–199)
CREAT SERPL-MCNC: 1.18 MG/DL (ref 0.76–1.27)
EGFR IF AFRICN AM: 77 ML/MIN/1.73
EGFR IF NONAFRICN AM: 66 ML/MIN/1.73
GLUCOSE SERPL-MCNC: 86 MG/DL (ref 65–99)
HBA1C MFR BLD: 7.5 % (ref 4.8–5.6)
HDLC SERPL-MCNC: 41 MG/DL
LDL/HDL RATIO: ABNORMAL RATIO
LDLC SERPL CALC-MCNC: ABNORMAL MG/DL (ref 0–99)
POTASSIUM SERPL-SCNC: 5.3 MMOL/L (ref 3.5–5.2)
PSA NG/ML: 11.6 NG/ML (ref 0–4)
SODIUM SERPL-SCNC: 140 MMOL/L (ref 134–144)
TOTAL CO2: 26 MMOL/L (ref 20–29)
TRIGL SERPL-MCNC: 408 MG/DL (ref 0–149)
VLDLC SERPL CALC-MCNC: ABNORMAL MG/DL (ref 5–40)

## 2019-07-11 LAB — VITAMIN D, 25-HYDROXY: 12.7 NG/ML (ref 30–100)

## 2019-07-12 ENCOUNTER — TRANSFERRED RECORDS (OUTPATIENT)
Dept: FAMILY MEDICINE | Facility: CLINIC | Age: 59
End: 2019-07-12

## 2019-07-12 LAB — RETINOPATHY: NORMAL

## 2019-07-15 DIAGNOSIS — E11.21 TYPE 2 DIABETES MELLITUS WITH DIABETIC NEPHROPATHY, WITHOUT LONG-TERM CURRENT USE OF INSULIN (H): Primary | ICD-10-CM

## 2019-07-15 DIAGNOSIS — Z76.0 ENCOUNTER FOR MEDICATION REFILL: ICD-10-CM

## 2019-07-15 RX ORDER — ISOSORBIDE MONONITRATE 30 MG/1
TABLET, EXTENDED RELEASE ORAL
Qty: 30 TABLET | Refills: 11 | Status: SHIPPED | OUTPATIENT
Start: 2019-07-15 | End: 2020-04-09

## 2019-07-15 NOTE — TELEPHONE ENCOUNTER
per pharmacy needs prescription for all new diabetic supplies.  Insurance does not cover current brand.  last seen 7/8/19

## 2019-07-15 NOTE — TELEPHONE ENCOUNTER
Refill medication Imdur    LOV 07/08/2019  Labs 07/08/2019    Georgette Simmons MA on 7/15/2019 at 3:20 PM

## 2019-07-16 ENCOUNTER — TELEPHONE (OUTPATIENT)
Dept: FAMILY MEDICINE | Facility: CLINIC | Age: 59
End: 2019-07-16

## 2019-07-16 NOTE — TELEPHONE ENCOUNTER
Received fax from [facility] pharmacy requesting PA for Lyrica.  Submitted through [employer].  Will wait for response.

## 2019-07-22 ENCOUNTER — OFFICE VISIT (OUTPATIENT)
Dept: FAMILY MEDICINE | Facility: CLINIC | Age: 59
End: 2019-07-22

## 2019-07-22 VITALS
OXYGEN SATURATION: 98 % | WEIGHT: 174 LBS | RESPIRATION RATE: 16 BRPM | BODY MASS INDEX: 29.87 KG/M2 | SYSTOLIC BLOOD PRESSURE: 148 MMHG | DIASTOLIC BLOOD PRESSURE: 90 MMHG | HEART RATE: 76 BPM

## 2019-07-22 DIAGNOSIS — F32.1 MODERATE MAJOR DEPRESSION (H): ICD-10-CM

## 2019-07-22 DIAGNOSIS — E11.8 TYPE 2 DIABETES MELLITUS WITH COMPLICATION, WITHOUT LONG-TERM CURRENT USE OF INSULIN (H): Primary | ICD-10-CM

## 2019-07-22 PROBLEM — L60.0 INGROWN TOENAIL: Status: ACTIVE | Noted: 2018-07-17

## 2019-07-22 PROCEDURE — 82044 UR ALBUMIN SEMIQUANTITATIVE: CPT | Performed by: NURSE PRACTITIONER

## 2019-07-22 PROCEDURE — 99214 OFFICE O/P EST MOD 30 MIN: CPT | Performed by: NURSE PRACTITIONER

## 2019-07-22 PROCEDURE — 82570 ASSAY OF URINE CREATININE: CPT | Performed by: NURSE PRACTITIONER

## 2019-07-22 RX ORDER — SERTRALINE HYDROCHLORIDE 25 MG/1
25 TABLET, FILM COATED ORAL DAILY
Qty: 30 TABLET | Refills: 1 | Status: SHIPPED | OUTPATIENT
Start: 2019-07-22 | End: 2019-09-09 | Stop reason: ALTCHOICE

## 2019-07-22 ASSESSMENT — ANXIETY QUESTIONNAIRES
2. NOT BEING ABLE TO STOP OR CONTROL WORRYING: SEVERAL DAYS
7. FEELING AFRAID AS IF SOMETHING AWFUL MIGHT HAPPEN: NEARLY EVERY DAY
5. BEING SO RESTLESS THAT IT IS HARD TO SIT STILL: NOT AT ALL
1. FEELING NERVOUS, ANXIOUS, OR ON EDGE: SEVERAL DAYS
3. WORRYING TOO MUCH ABOUT DIFFERENT THINGS: SEVERAL DAYS
6. BECOMING EASILY ANNOYED OR IRRITABLE: SEVERAL DAYS
IF YOU CHECKED OFF ANY PROBLEMS ON THIS QUESTIONNAIRE, HOW DIFFICULT HAVE THESE PROBLEMS MADE IT FOR YOU TO DO YOUR WORK, TAKE CARE OF THINGS AT HOME, OR GET ALONG WITH OTHER PEOPLE: SOMEWHAT DIFFICULT
GAD7 TOTAL SCORE: 8

## 2019-07-22 ASSESSMENT — PATIENT HEALTH QUESTIONNAIRE - PHQ9
SUM OF ALL RESPONSES TO PHQ QUESTIONS 1-9: 11
5. POOR APPETITE OR OVEREATING: SEVERAL DAYS

## 2019-07-22 NOTE — PROGRESS NOTES
SUBJECTIVE:   Emily Zhu is a 59 year old male who presents to clinic today for the following   health issues: Follow up after restarting medications. Discussed depression and ways to treat depression.    Diabetes Follow-up      How often are you checking your blood sugar? One time daily    What time of day are you checking your blood sugars (select all that apply)?  Before meals    Have you had any blood sugars above 200?  Yes weekly    Have you had any blood sugars below 70?  No    What symptoms do you notice when your blood sugar is low?  None    What concerns do you have today about your diabetes? None     Do you have any of these symptoms? (Select all that apply)  Numbness in feet and Burning in feet     Have you had a diabetic eye exam in the last 12 months? Yes- Date of last eye exam: 07/2019    Diabetes Management Resources    Hyperlipidemia Follow-Up      Are you having any of the following symptoms? (Select all that apply)  No complaints of shortness of breath, chest pain or pressure.  No increased sweating or nausea with activity.  No left-sided neck or arm pain.  No complaints of pain in calves when walking 1-2 blocks.    Are you regularly taking any medication or supplement to lower your cholesterol?   No    Are you having muscle aches or other side effects that you think could be caused by your cholesterol lowering medication?  No    Hypertension Follow-up      Do you check your blood pressure regularly outside of the clinic? No     Are you following a low salt diet? No    Are your blood pressures ever more than 140 on the top number (systolic) OR more   than 90 on the bottom number (diastolic), for example 140/90? No    BP Readings from Last 2 Encounters:   07/22/19 148/90   07/08/19 (!) 144/92     Hemoglobin A1C (%)   Date Value   07/08/2019 7.5 (H)   12/12/2018 7.2 (H)     LDL Cholesterol Calculated (mg/dL)   Date Value   07/08/2019 Comment   12/12/2018 Comment       Amount of exercise or  physical activity: None    Problems taking medications regularly: Yes,  cost of medication and problems remembering to take    Medication side effects: none    Diet: regular (no restrictions)         Depression or Anxiety - New Diagnosis   Duration of complaint: 1 year ago after losing his job  Abnormal Mood Symptoms      Duration: one year    Description:  Depression: YES  Anxiety: YES  Panic attacks: no      Accompanying signs and symptoms: see PHQ-9 (11)  and KELLIE (8) scores    History (similar episodes/previous evaluation): None    Precipitating or alleviating factors: Loss of job    Therapies tried and outcome: none      Additional history: as documented      Reviewed and updated as needed this visit by Provider         Patient Active Problem List   Diagnosis     Coronary atherosclerosis     Essential hypertension     Mixed hyperlipidemia     Cataract     GERD (gastroesophageal reflux disease)     Health Care Home     Microalbuminuria due to type 2 diabetes mellitus (H)     Type 2 diabetes mellitus with diabetic nephropathy, without long-term current use of insulin (H)     Ingrown toenail     Plantar fasciitis     Past Surgical History:   Procedure Laterality Date     ANGIOPLASTY  2007    rca     ANGIOPLASTY  2010    om     ARTHROTOMY SHOULDER, ROTATOR CUFF REPAIR, COMBINED Left 4/12/2017    Procedure: COMBINED ARTHROTOMY SHOULDER, ROTATOR CUFF REPAIR;  Surgeon: Deejay Conrad MD;  Location: [facility]     ARTHROTOMY SHOULDER, SUBACROMIAL DECOMPRESSION, COMBINED Left 4/12/2017    Procedure: COMBINED ARTHROTOMY SHOULDER, SUBACROMIAL DECOMPRESSION;  Surgeon: Deejay Conrad MD;  Location: [facility]     COLONOSCOPY       DECOMPRESSION CUBITAL TUNNEL  11/29/2013    Procedure: DECOMPRESSION CUBITAL TUNNEL;;  Surgeon: Radha Cain MD;  Location: [facility]     ENDOSCOPIC RELEASE CARPAL TUNNEL  11/29/2013    Procedure: ENDOSCOPIC RELEASE CARPAL TUNNEL;  LEFT ENDOSCOPIC CARPAL TUNNEL RELEASE AND  CUBITAL TUNNEL RELEASE ;  Surgeon: Radha Cain MD;  Location: [facility]     OPEN REDUCTION INTERNAL FIXATION HIP NAILING  3/30/2012    Procedure:OPEN REDUCTION INTERNAL FIXATION HIP NAILING; Biopsy, Curettage and Bone Grafting Right Hip Lesion, Placement of Hip Screw; Surgeon:MARILIN GAY; Location:[facility]     PHACOEMULSIFICATION CLEAR CORNEA WITH STANDARD INTRAOCULAR LENS IMPLANT  5/8/2013    Procedure: PHACOEMULSIFICATION CLEAR CORNEA WITH STANDARD INTRAOCULAR LENS IMPLANT;  RIGHT EYE PHACOEMULSIFICATION CLEAR CORNEA WITH STANDARD INTRAOCULAR LENS IMPLANT ;  Surgeon: Jovani Pretty MD;  Location:  [facility]     PHACOEMULSIFICATION CLEAR CORNEA WITH STANDARD INTRAOCULAR LENS IMPLANT  5/20/2013    Procedure: PHACOEMULSIFICATION CLEAR CORNEA WITH STANDARD INTRAOCULAR LENS IMPLANT;  LEFT  EYE PHACOEMULSIFICATION CLEAR CORNEA WITH STANDARD INTRAOCULAR LENS IMPLANT ;  Surgeon: Jovani Pretty MD;  Location:  [facility]     STENT         Social History     Tobacco Use     Smoking status: Current Every Day Smoker     Packs/day: 1.00     Years: 40.00     Pack years: 40.00     Types: Cigarettes     Smokeless tobacco: Never Used   Substance Use Topics     Alcohol use: Yes     Alcohol/week: 0.0 oz     Comment: none     Family History   Problem Relation Age of Onset     Cerebrovascular Disease Mother 56     Hypertension Mother      Cerebrovascular Disease Father      Hypertension Father          Current Outpatient Medications   Medication Sig Dispense Refill     ASPIRIN PO Take 81 mg by mouth daily       atorvastatin (LIPITOR) 10 MG tablet TAKE 1 TABLET BY MOUTH ONCE DAILY 30 tablet 11     blood glucose (NO BRAND SPECIFIED) lancets standard Use to test blood sugar 2 times daily or as directed. 100 each 3     blood glucose (NO BRAND SPECIFIED) test strip Use to test blood sugar 2 times daily or as directed. 100 strip 3     blood glucose monitoring (NO BRAND SPECIFIED) meter device kit Use to test blood sugar 2 times  daily or as directed. 1 kit 0     blood glucose monitoring (ONE TOUCH DELICA) lancets USE TO TEST BLOOD SUGARS ONE TIME DAILY OR AS DIRECTED. 100 each 11     blood glucose monitoring (ONE TOUCH DELICA) lancets        blood glucose monitoring (ONE TOUCH ULTRA 2) meter device kit USE TO TEST BLOOD SUGARS ONCE DAILY OR AS DIRECTED 1 kit 0     clopidogrel (PLAVIX) 75 MG tablet TAKE 1 TABLET BY MOUTH ONCE DAILY 30 tablet 11     glipiZIDE (GLUCOTROL XL) 5 MG 24 hr tablet TAKE 1 TABLET BY MOUTH ONCE DAILY 30 tablet 0     hydrochlorothiazide (HYDRODIURIL) 25 MG tablet TAKE 1 TABLET BY MOUTH ONCE DAILY 90 tablet 0     IBUPROFEN PO Take 200 mg by mouth daily       isosorbide mononitrate (IMDUR) 30 MG 24 hr tablet TAKE 1 TABLET BY MOUTH ONCE DAILY 30 tablet 11     lisinopril (PRINIVIL/ZESTRIL) 20 MG tablet TAKE 1 TABLET BY MOUTH ONCE DAILY 90 tablet 1     metFORMIN (GLUCOPHAGE) 1000 MG tablet TAKE ONE TABLET BY MOUTH TWICE DAILY WITH MEALS 180 tablet 1     metoprolol tartrate (LOPRESSOR) 25 MG tablet Take 1 tablet (25 mg) by mouth 2 times daily 180 tablet 3     ONETOUCH ULTRA test strip Test twice daily 100 strip 3     pantoprazole (PROTONIX) 20 MG [facility] tablet Take 1 tablet (20 mg) by mouth daily Take by mouth 30-60 minutes before a meal. 90 tablet 1     tiZANidine (ZANAFLEX) 4 MG tablet Take 1 tablet (4 mg) by mouth 3 times daily as needed for muscle spasms 30 tablet 1     Cholecalciferol (VITAMIN D-3) 5000 units TABS Take 1 tablet by mouth daily 100 tablet 3     pregabalin (LYRICA) 50 MG capsule Take 1 capsule (50 mg) by mouth 3 times daily (Patient not taking: Reported on 7/22/2019) 90 capsule 0     Allergies   Allergen Reactions     Crestor [Rosuvastatin] Muscle Pain (Myalgia)     Recent Labs   Lab Test 07/08/19  1630 12/12/18  1255 12/12/18  1254 06/11/18  1532 01/13/18  0840  04/12/17  0943  02/03/17  1503  04/20/15  1540   A1C 7.5* 7.2*  --  8.4*  --    < >  --   --  8.1*   < >  --    LDL Comment  --  Comment  --   --    --   --   --  Comment   < >  --    HDL 41  --  45  --   --   --   --   --  38*   < >  --    TRIG 408*  --  453*  --   --   --   --   --  644*   < >  --    ALT  --   --  32  --  58  --   --   --   --   --  71*   CR 1.18  --  1.43*  --  1.13  --  1.14   < >  --    < > 1.05   GFRESTIMATED  --   --   --   --  66  --  66   < >  --    < > 73   GFRESTBLACK  --   --   --   --  80  --  80   < >  --    < > 88   POTASSIUM 5.3*  --  4.6  --  4.4  --  4.7   < >  --    < > 4.0    < > = values in this interval not displayed.      BP Readings from Last 3 Encounters:   07/22/19 148/90   07/08/19 (!) 144/92   12/12/18 130/86    Wt Readings from Last 3 Encounters:   07/22/19 78.9 kg (174 lb)   07/08/19 79.8 kg (176 lb)   12/12/18 81.2 kg (179 lb)                    Assessment  1. Type 2 diabetes mellitus with complication, without long-term current use of insulin (H)    2. Moderate major depression (H)        Plan  Orders Placed This Encounter     C FOOT EXAM  NO CHARGE     Microalbumin ([facility])     sertraline (ZOLOFT) 25 MG tablet     Please take sertraline 25 mg everyday and follow up with me in six weeks as we discussed. It may take 6-8 weeks to improve your mood. Call with any questions or concerns.     June Pacheco NP.  [facility] [place]    Physician Attestation   I, Gonzalez Jensen, oversaw care given to Emily Zhu as part of a shared visit.  I have reviewed and discussed with the advanced practice provider their history, physical and plan.    I personally reviewed the vital signs and medications.    My key history or physical exam findings are incorporated into the documentation.    I agree with the management decisions as listed in documentation above.    Gonzalez Jensen

## 2019-07-22 NOTE — PATIENT INSTRUCTIONS
Patient Education    This medication with take six to eight weeks to begin working. I would like you come back in six weeks so we can assess how the medication is effecting you.   Patient Education    Sertraline Hydrochloride Oral solution    Sertraline Hydrochloride Oral tablet  Sertraline Hydrochloride Oral solution  What is this medicine?  SERTRALINE (SER tra la) is used to treat depression. It may also be used to treat obsessive compulsive disorder, panic disorder, post-trauma stress, premenstrual dysphoric disorder (PMDD) or social anxiety.  This medicine may be used for other purposes; ask your health care provider or pharmacist if you have questions.  What should I tell my health care provider before I take this medicine?  They need to know if you have any of these conditions:    bipolar disorder or a family history of bipolar disorder    diabetes    glaucoma    heart disease    high blood pressure    history of irregular heartbeat    history of low levels of calcium, magnesium, or potassium in the blood    if you often drink alcohol    liver disease    receiving electroconvulsive therapy    seizures    suicidal thoughts, plans, or attempt; a previous suicide attempt by you or a family member    thyroid disease    an unusual or allergic reaction to sertraline, other medicines, foods, dyes, or preservatives    pregnant or trying to get pregnant    breast-feeding  How should I use this medicine?  Take this medicine by mouth. Follow the directions on the prescription label.  Before taking your dose, you need to dilute the solution in a beverage. Measure your medicine dose using the dropper in the bottle. Next, place the measured dose in at least 4 ounces (one-half cup) of water, ginger-johanne, lemon-lime soda, lemonade or orange juice and mix. Do not mix in grapefruit juice or in any other liquids other than those listed.  Drink all of mixed liquid immediately. Do not mix the dose and store it for later. It must  be taken right away. You may take this medicine with or without food. Take your medicine at regular intervals. Do not take your medicine more often than directed. Do not stop taking this medicine suddenly except upon the advice of your doctor. Stopping this medicine too quickly may cause serious side effects or your condition may worsen.  A special MedGuide will be given to you by the pharmacist with each prescription and refill. Be sure to read this information carefully each time.  Talk to your pediatrician regarding the use of this medicine in children. While this drug may be prescribed for children as young as 7 years for selected conditions, precautions do apply.  Overdosage: If you think you have taken too much of this medicine contact a poison control center or emergency room at once.  NOTE: This medicine is only for you. Do not share this medicine with others.  What if I miss a dose?  If you miss a dose, take it as soon as you can. If it is almost time for your next dose, take only that dose. Do not take double or extra doses.  What may interact with this medicine?  Do not take this medicine with any of the following medications:    certain medicines for fungal infections like fluconazole, itraconazole, ketoconazole, posaconazole, voriconazole    cisapride    disulfiram    dofetilide    linezolid    MAOIs like Carbex, Eldepryl, Marplan, Nardil, and Parnate    metronidazole    methylene blue (injected into a vein)    pimozide    thioridazine    ziprasidone  This medicine may also interact with the following medications:    alcohol    aspirin and aspirin-like medicines    certain medicines for depression, anxiety, or psychotic disturbances    certain medicines for irregular heart beat like flecainide, propafenone    certain medicines for migraine headaches like almotriptan, eletriptan, frovatriptan, naratriptan, rizatriptan, sumatriptan, zolmitriptan    certain medicines for sleep    certain medicines for  seizures like carbamazepine, valproic acid, phenytoin    certain medicines that treat or prevent blood clots like warfarin, enoxaparin, dalteparin    cimetidine    digoxin    diuretics    fentanyl    furazolidone    isoniazid    lithium    NSAIDs, medicines for pain and inflammation, like ibuprofen or naproxen    other medicines that prolong the QT interval (cause an abnormal heart rhythm)    procarbazine    rasagiline    supplements like Finderne's wort, kava kava, valerian    tolbutamide    tramadol    tryptophan  This list may not describe all possible interactions. Give your health care provider a list of all the medicines, herbs, non-prescription drugs, or dietary supplements you use. Also tell them if you smoke, drink alcohol, or use illegal drugs. Some items may interact with your medicine.  What should I watch for while using this medicine?  Tell your doctor if your symptoms do not get better or if they get worse. Visit your doctor or health care professional for regular checks on your progress. Because it may take several weeks to see the full effects of this medicine, it is important to continue your treatment as prescribed by your doctor.  Patients and their families should watch out for new or worsening thoughts of suicide or depression. Also watch out for sudden changes in feelings such as feeling anxious, agitated, panicky, irritable, hostile, aggressive, impulsive, severely restless, overly excited and hyperactive, or not being able to sleep. If this happens, especially at the beginning of treatment or after a change in dose, call your health care professional.  You may get drowsy or dizzy. Do not drive, use machinery, or do anything that needs mental alertness until you know how this medicine affects you. Do not stand or sit up quickly, especially if you are an older patient. This reduces the risk of dizzy or fainting spells. Alcohol may interfere with the effect of this medicine. Avoid alcoholic  drinks.  This medicine contains a high percentage of alcohol that may interact with medicines used to treat alcohol abuse, like Antabuse (disulfiram). You should not take these medicines together.  Your mouth may get dry. Chewing sugarless gum or sucking hard candy, and drinking plenty of water may help. Contact your doctor if the problem does not go away or is severe.  What side effects may I notice from receiving this medicine?  Side effects that you should report to your doctor or health care professional as soon as possible:    allergic reactions like skin rash, itching or hives, swelling of the face, lips, or tongue    black or bloody stools, blood in the urine or vomit    fast, irregular heartbeat    feeling faint or lightheaded, falls    hallucination, loss of contact with reality    seizures    suicidal thoughts or other mood changes    unusual bleeding or bruising    unusually weak or tired    vomiting  Side effects that usually do not require medical attention (report to your doctor or health care professional if they continue or are bothersome):    change in appetite    change in sex drive or performance    diarrhea    indigestion, nausea    increased sweating    tremors  This list may not describe all possible side effects. Call your doctor for medical advice about side effects. You may report side effects to FDA at 9-420-FDA-4337.  Where should I keep my medicine?  Keep out of the reach of children.  Store at room temperature between 15 and 30 degrees C (59 and 86 degrees F). Throw away any unused medicine after the expiration date.  NOTE:This sheet is a summary. It may not cover all possible information. If you have questions about this medicine, talk to your doctor, pharmacist, or health care provider. Copyright  2016 Gold Standard

## 2019-07-23 DIAGNOSIS — E11.21 TYPE 2 DIABETES MELLITUS WITH DIABETIC NEPHROPATHY, WITHOUT LONG-TERM CURRENT USE OF INSULIN (H): ICD-10-CM

## 2019-07-23 ASSESSMENT — ANXIETY QUESTIONNAIRES: GAD7 TOTAL SCORE: 8

## 2019-07-24 ENCOUNTER — TRANSFERRED RECORDS (OUTPATIENT)
Dept: FAMILY MEDICINE | Facility: CLINIC | Age: 59
End: 2019-07-24

## 2019-07-24 LAB
A/C RATIO MG/G: >300 MG/G
ALBUMIN (URINE) MG/L: 150 MG/L
INTERPRETATION: ABNORMAL
URINE CREATININE MG/DL - QUEST: 200 MG/DL

## 2019-07-25 RX ORDER — PREGABALIN 50 MG/1
50 CAPSULE ORAL 3 TIMES DAILY
Qty: 90 CAPSULE | Refills: 0 | OUTPATIENT
Start: 2019-07-25

## 2019-08-05 DIAGNOSIS — E11.21 TYPE 2 DIABETES MELLITUS WITH DIABETIC NEPHROPATHY, WITHOUT LONG-TERM CURRENT USE OF INSULIN (H): Primary | ICD-10-CM

## 2019-08-13 ENCOUNTER — OFFICE VISIT (OUTPATIENT)
Dept: PHARMACY | Facility: PHYSICIAN GROUP | Age: 59
End: 2019-08-13
Payer: COMMERCIAL

## 2019-08-13 VITALS — SYSTOLIC BLOOD PRESSURE: 138 MMHG | DIASTOLIC BLOOD PRESSURE: 82 MMHG | BODY MASS INDEX: 29.87 KG/M2 | WEIGHT: 174 LBS

## 2019-08-13 DIAGNOSIS — F32.A DEPRESSION, UNSPECIFIED DEPRESSION TYPE: ICD-10-CM

## 2019-08-13 DIAGNOSIS — E78.2 MIXED HYPERLIPIDEMIA: ICD-10-CM

## 2019-08-13 DIAGNOSIS — K21.9 GASTROESOPHAGEAL REFLUX DISEASE, ESOPHAGITIS PRESENCE NOT SPECIFIED: Primary | ICD-10-CM

## 2019-08-13 DIAGNOSIS — I25.10 ATHEROSCLEROSIS OF CORONARY ARTERY OF NATIVE HEART WITHOUT ANGINA PECTORIS, UNSPECIFIED VESSEL OR LESION TYPE: ICD-10-CM

## 2019-08-13 DIAGNOSIS — E11.21 TYPE 2 DIABETES MELLITUS WITH DIABETIC NEPHROPATHY, WITHOUT LONG-TERM CURRENT USE OF INSULIN (H): ICD-10-CM

## 2019-08-13 DIAGNOSIS — I10 ESSENTIAL HYPERTENSION: ICD-10-CM

## 2019-08-13 DIAGNOSIS — R52 PAIN: ICD-10-CM

## 2019-08-13 PROCEDURE — 99605 MTMS BY PHARM NP 15 MIN: CPT | Performed by: PHARMACIST

## 2019-08-13 PROCEDURE — 99607 MTMS BY PHARM ADDL 15 MIN: CPT | Performed by: PHARMACIST

## 2019-08-13 RX ORDER — METFORMIN HCL 500 MG
1000 TABLET, EXTENDED RELEASE 24 HR ORAL 2 TIMES DAILY WITH MEALS
Qty: 360 TABLET | Refills: 0 | Status: SHIPPED | OUTPATIENT
Start: 2019-08-13 | End: 2019-11-25

## 2019-08-13 NOTE — PROGRESS NOTES
SUBJECTIVE/OBJECTIVE:                           Emily Zhu is a 59 year old male coming in for an initial visit for Medication Therapy Management.  He was referred to me from June Pacheco NP.    Chief Complaint: Diabetes. He did not bring meter to the visit and does not really know his medications that he is taking. Did NOT bring bottles or list from home either.     Allergies/ADRs: Reviewed in Epic  Tobacco: 0-1 pack per day - is not interested in quittingTobacco Cessation Action Plan: Information offered: Patient not interested at this time  Alcohol: not currently using  Caffeine: 1 cups/day of coffee, 1 cups/day of tea  PMH: Reviewed in Epic    Medication Adherence/Access:  Unclear if he has been taking all of his prescriptions.     Diabetes:  Pt currently taking glipizide XL 5mg daily and metformin 1000mg BID. Some bloating and gas. He is unsure about taking the glipizide.   SMBG: two times daily.   Ranges (patient reported):   Fasting-   5pm- 150-220s  Patient is not experiencing hypoglycemia  Recent symptoms of high blood sugar? none  ACEi/ARB: Yes: lisinopril.   Urine Albumin: No results found for: UMALCR   Aspirin: Taking aspirin 81mg and Plavix- had been off for 3 weeks from his prostate biopsy.   Given time constraints of the visit and medication reconciliation, unable to get into diet information today.     Lab Results   Component Value Date    A1C 7.5 07/08/2019    A1C 7.2 12/12/2018    A1C 8.4 06/11/2018    A1C 7.4 11/03/2017    A1C 8.1 02/03/2017     Hypertension/CAD: Current medications prescribed include lisinopril 20mg daily, hydrochlorothiazide 25mg daily, metoprolol 25mg BID, isosorbide 30mg daily, plavix 75mg daily and aspirin 81mg daily.  Patient does not self-monitor BP.  Patient reports no current medication side effects. Stent placed in 2007- has been on dual therapy since.   BP Readings from Last 3 Encounters:   07/22/19 148/90   07/08/19 (!) 144/92   12/12/18 130/86        Hyperlipidemia: Current therapy includes atorvastatin 10mg once daily.  Pt reports no significant myalgias or other side effects.. Had issues on Crestor 20mg in the past.    labs on treatment-  Recent Labs   Lab Test 07/08/19  1630 12/12/18  1254   CHOL 233* 226*   HDL 41 45   LDL Comment Comment   TRIG 408* 453*       GERD: Current medications include: Protonix (pantoprazole) 20mg once daily, but when verifying medications, he was not sure if he was really taking this or not. Pt c/o no current symptoms. Need to verify if he has this at home and has been taking it.     Depression:  Current medications include: Sertraline 25mg once daily, just started this. Good friend is battling cancer which is difficult. Family is not in [place].  Denies issues with side effects at this time.   PHQ-9 SCORE 12/27/2017 7/22/2019   PHQ-9 Total Score 12 11       Pain: Has both Lyrica 50mg TID and tizanidine 4mg TID PRN on his medication list- fill history does not show these being filled recently.     Today's Vitals: /82   Wt 174 lb (78.9 kg)   BMI 29.87 kg/m      ASSESSMENT:                             Current medications were reviewed today as discussed above.     Medication Adherence: unclear, need to have bottles from home brought in.     Diabetes: Needs Improvement. Patient is not meeting A1c goal of < 7%. Pt would benefit from switching to ER metformin and verfiying if taking glipizide- need monitor next visit to review actual home monitoring numbers.     Hypertension/CAD: Needs improvement, dual therapy no longer indicated, patient not willing to stop plavix, so will stop aspirin to reduce risk of bleeding.     Hyperlipidemia: Needs improvement, better sugar control to help the trigs and then ensure compliance with statin. Would like to increase statin intensity if alena to tolerate- will discuss at follow up.     GERD: Need to verify if taking.     Depression:  Unimproved, just started on medication. Continue to  monitor.     Pain: Need to verify home medication.     PLAN:                            *majority of the visit was spent trying to figure out what he is actually taking- unable to accurately determine what he is truly taking at home given fill history information, med list and patient information.     1. Switch metformin to ER formulation- 500mg tablets (2 tablets morning and 2 tablets night)    2. Stop aspirin - continue to take clopidogrel (Plavix)    3. Need to verify home medications further- bring in bottles to office.       I spent 60 minutes with this patient today. All changes were made via collaborative practice agreement with June Pacheco NP. A copy of the visit note was provided to the patient's primary care provider.    Will follow up in 1 month with all bottles from home.     The patient was given a summary of these recommendations as an after visit summary.     Chelo Roberts, Pharm.D, [facility]  Medication Therapy Management Pharmacist  [phone]

## 2019-08-13 NOTE — PATIENT INSTRUCTIONS
Recommendations from today's MTM visit:                                                      1. Switch metformin to ER formulation- 500mg tablets (2 tablets morning and 2 tablets night)    2. Stop aspirin - continue to take clopidogrel (Plavix)    3. Check at home for the pantoprazole- let me know if you are taking this.     4. Let me know if you are going to take melatonin (or another over the counter sleep medication)      It was great to speak with you today.  I value your experience and would be very thankful for your time with providing feedback on our clinic survey. You may receive a survey via email or text message in the next few days.     Next MTM visit: 1 month- Sept with Denise- bring meter    To schedule another MTM appointment, please call the clinic directly or you may call the MTM scheduling line at [phone] or toll-free at [phone].     My Clinical Pharmacist's contact information:                                                      It was a pleasure talking with you today!  Please feel free to contact me with any questions or concerns you have.      Chelo Roberts, Pharm.D, [facility]  Medication Therapy Management Pharmacist  [phone]           Medication List           Accurate as of 8/13/19  3:05 PM. If you have any questions, ask your nurse or doctor.               START taking these medications        Morning Afternoon Evening Bedtime    metFORMIN 500 MG 24 hr tablet  Also known as:  GLUCOPHAGE-XR  Take 2 tablets (1,000 mg) by mouth 2 times daily (with meals)  Doctor's comments:  Replace 1000mg  Replaces:  metFORMIN 1000 MG tablet  Notes to patient:  For Blood sugar                   CONTINUE taking these medications        Morning Afternoon Evening Bedtime    atorvastatin 10 MG tablet  Also known as:  LIPITOR  TAKE 1 TABLET BY MOUTH ONCE DAILY  Doctor's comments:  Please consider 90 day supplies to promote better adherence  Notes to patient:  For cholesterol                blood glucose monitoring lancets  USE TO TEST BLOOD SUGARS ONE TIME DAILY OR AS DIRECTED.  Doctor's comments:  Please consider 90 day supplies to promote better adherence             blood glucose monitoring meter device kit  USE TO TEST BLOOD SUGARS ONCE DAILY OR AS DIRECTED  Doctor's comments:  Please consider 90 day supplies to promote better adherence             clopidogrel 75 MG tablet  Also known as:  PLAVIX  TAKE 1 TABLET BY MOUTH ONCE DAILY  Doctor's comments:  Please consider 90 day supplies to promote better adherence  Notes to patient:  For blood thinner               glipiZIDE 5 MG 24 hr tablet  Also known as:  GLUCOTROL XL  TAKE 1 TABLET BY MOUTH ONCE DAILY  Notes to patient:  For blood sugar               hydrochlorothiazide 25 MG tablet  Also known as:  HYDRODIURIL  TAKE 1 TABLET BY MOUTH ONCE DAILY  Notes to patient:  For Blood Pressure               IBUPROFEN PO  Take 200 mg by mouth daily             isosorbide mononitrate 30 MG 24 hr tablet  Also known as:  IMDUR  TAKE 1 TABLET BY MOUTH ONCE DAILY  Doctor's comments:  Please consider 90 day supplies to promote better adherence  Notes to patient:  For Blood Pressure               lisinopril 20 MG tablet  Also known as:  PRINIVIL/ZESTRIL  TAKE 1 TABLET BY MOUTH ONCE DAILY  Doctor's comments:  Please consider 90 day supplies to promote better adherence  Notes to patient:  For Blood Pressure               metoprolol tartrate 25 MG tablet  Also known as:  LOPRESSOR  Take 1 tablet (25 mg) by mouth 2 times daily  Notes to patient:  For Blood Pressure                 * ONETOUCH ULTRA test strip  Test twice daily  Generic drug:  blood glucose             * blood glucose test strip  Also known as:  NO BRAND SPECIFIED  Use to test blood sugar 2 times daily or as directed.             pantoprazole 20 MG EC tablet  Also known as:  PROTONIX  Take 1 tablet (20 mg) by mouth daily Take by mouth 30-60 minutes before a meal.  Notes to patient:  For  Heartburn               pregabalin 50 MG capsule  Also known as:  LYRICA  Take 1 capsule (50 mg) by mouth 3 times daily  Notes to patient:  For Nerve Pain                   sertraline 25 MG tablet  Also known as:  ZOLOFT  Take 1 tablet (25 mg) by mouth daily  Notes to patient:  For Mood               tiZANidine 4 MG tablet  Also known as:  ZANAFLEX  Take 1 tablet (4 mg) by mouth 3 times daily as needed for muscle spasms  Notes to patient:  As needed for muscle pains                * This list has 2 medication(s) that are the same as other medications prescribed for you. Read the directions carefully, and ask your doctor or other care provider to review them with you.            STOP taking these medications     metFORMIN 1000 MG tablet  Also known as:  GLUCOPHAGE  Replaced by:  metFORMIN 500 MG 24 hr tablet           Where to Get Your Medications      These medications were sent to [facility] [address] [place] [address] - [address] [facility]  [address] [facility] [address]    Phone:  [phone]     metFORMIN 500 MG 24 hr tablet

## 2019-08-15 DIAGNOSIS — I10 ESSENTIAL HYPERTENSION: ICD-10-CM

## 2019-08-15 DIAGNOSIS — M62.838 SPASM OF MUSCLE: ICD-10-CM

## 2019-08-15 RX ORDER — HYDROCHLOROTHIAZIDE 25 MG/1
TABLET ORAL
Qty: 90 TABLET | Refills: 0 | Status: ON HOLD | OUTPATIENT
Start: 2019-08-15 | End: 2019-11-25

## 2019-09-09 ENCOUNTER — OFFICE VISIT (OUTPATIENT)
Dept: FAMILY MEDICINE | Facility: CLINIC | Age: 59
End: 2019-09-09

## 2019-09-09 VITALS
RESPIRATION RATE: 16 BRPM | SYSTOLIC BLOOD PRESSURE: 136 MMHG | BODY MASS INDEX: 29.61 KG/M2 | OXYGEN SATURATION: 99 % | WEIGHT: 172.5 LBS | DIASTOLIC BLOOD PRESSURE: 86 MMHG | HEART RATE: 64 BPM

## 2019-09-09 DIAGNOSIS — E11.40 PAINFUL DIABETIC NEUROPATHY (H): Primary | ICD-10-CM

## 2019-09-09 DIAGNOSIS — F32.0 MILD MAJOR DEPRESSION (H): ICD-10-CM

## 2019-09-09 DIAGNOSIS — E11.21 TYPE 2 DIABETES MELLITUS WITH DIABETIC NEPHROPATHY, WITHOUT LONG-TERM CURRENT USE OF INSULIN (H): ICD-10-CM

## 2019-09-09 PROCEDURE — 99213 OFFICE O/P EST LOW 20 MIN: CPT | Performed by: FAMILY MEDICINE

## 2019-09-09 RX ORDER — AMITRIPTYLINE HYDROCHLORIDE 10 MG/1
10 TABLET ORAL AT BEDTIME
Qty: 90 TABLET | Refills: 3 | Status: SHIPPED | OUTPATIENT
Start: 2019-09-09 | End: 2020-04-09

## 2019-09-09 NOTE — PATIENT INSTRUCTIONS
II would like you to supplement vitamin D3 2000 units daily.   Patient Education     Amitriptyline Hydrochloride Oral tablet  What is this medicine?  AMITRIPTYLINE (a lyly TRIP ti la) is used to treat depression.  This medicine may be used for other purposes; ask your health care provider or pharmacist if you have questions.  What should I tell my health care provider before I take this medicine?  They need to know if you have any of these conditions:    an alcohol problem    asthma, difficulty breathing    bipolar disorder or schizophrenia    difficulty passing urine, prostate trouble    glaucoma    heart disease or previous heart attack    liver disease    over active thyroid    seizures    thoughts or plans of suicide, a previous suicide attempt, or family history of suicide attempt    an unusual or allergic reaction to amitriptyline, other medicines, foods, dyes, or preservatives    pregnant or trying to get pregnant    breast-feeding  How should I use this medicine?  Take this medicine by mouth with a drink of water. Follow the directions on the prescription label. You can take the tablets with or without food. Take your medicine at regular intervals. Do not take it more often than directed. Do not stop taking this medicine suddenly except upon the advice of your doctor. Stopping this medicine too quickly may cause serious side effects or your condition may worsen.  A special MedGuide will be given to you by the pharmacist with each prescription and refill. Be sure to read this information carefully each time.  Talk to your pediatrician regarding the use of this medicine in children. Special care may be needed.  Overdosage: If you think you have taken too much of this medicine contact a poison control center or emergency room at once.  NOTE: This medicine is only for you. Do not share this medicine with others.  What if I miss a dose?  If you miss a dose, take it as soon as you can. If it is almost time for  your next dose, take only that dose. Do not take double or extra doses.  What may interact with this medicine?  Do not take this medicine with any of the following medications:    arsenic trioxide    certain medicines used to regulate abnormal heartbeat or to treat other heart conditions    cisapride    droperidol    halofantrine    linezolid    MAOIs like Carbex, Eldepryl, Marplan, Nardil, and Parnate    methylene blue    other medicines for mental depression    phenothiazines like perphenazine, thioridazine and chlorpromazine    pimozide    probucol    procarbazine    sparfloxacin    Sacaton's Wort    ziprasidone  This medicine may also interact with the following medications:    atropine and related drugs like hyoscyamine, scopolamine, tolterodine and others    barbiturate medicines for inducing sleep or treating seizures, like phenobarbital    cimetidine    disulfiram    ethchlorvynol    thyroid hormones such as levothyroxine  This list may not describe all possible interactions. Give your health care provider a list of all the medicines, herbs, non-prescription drugs, or dietary supplements you use. Also tell them if you smoke, drink alcohol, or use illegal drugs. Some items may interact with your medicine.  What should I watch for while using this medicine?  Tell your doctor if your symptoms do not get better or if they get worse. Visit your doctor or health care professional for regular checks on your progress. Because it may take several weeks to see the full effects of this medicine, it is important to continue your treatment as prescribed by your doctor.  Patients and their families should watch out for new or worsening thoughts of suicide or depression. Also watch out for sudden changes in feelings such as feeling anxious, agitated, panicky, irritable, hostile, aggressive, impulsive, severely restless, overly excited and hyperactive, or not being able to sleep. If this happens, especially at the  beginning of treatment or after a change in dose, call your health care professional.  You may get drowsy or dizzy. Do not drive, use machinery, or do anything that needs mental alertness until you know how this medicine affects you. Do not stand or sit up quickly, especially if you are an older patient. This reduces the risk of dizzy or fainting spells. Alcohol may interfere with the effect of this medicine. Avoid alcoholic drinks.  Do not treat yourself for coughs, colds, or allergies without asking your doctor or health care professional for advice. Some ingredients can increase possible side effects.  Your mouth may get dry. Chewing sugarless gum or sucking hard candy, and drinking plenty of water will help. Contact your doctor if the problem does not go away or is severe.  This medicine may cause dry eyes and blurred vision. If you wear contact lenses you may feel some discomfort. Lubricating drops may help. See your eye doctor if the problem does not go away or is severe.  This medicine can cause constipation. Try to have a bowel movement at least every 2 to 3 days. If you do not have a bowel movement for 3 days, call your doctor or health care professional.  This medicine can make you more sensitive to the sun. Keep out of the sun. If you cannot avoid being in the sun, wear protective clothing and use sunscreen. Do not use sun lamps or tanning beds/booths.  What side effects may I notice from receiving this medicine?  Side effects that you should report to your doctor or health care professional as soon as possible:    allergic reactions like skin rash, itching or hives, swelling of the face, lips, or tongue    abnormal production of milk in females    breast enlargement in both males and females    breathing problems    confusion, hallucinations    fast, irregular heartbeat    fever with increased sweating    muscle stiffness, or spasms    pain or difficulty passing urine, loss of bladder  control    seizures    suicidal thoughts or other mood changes    swelling of the testicles    tingling, pain, or numbness in the feet or hands    yellowing of the eyes or skin  Side effects that usually do not require medical attention (report to your doctor or health care professional if they continue or are bothersome):    change in sex drive or performance    constipation or diarrhea    nausea, vomiting    weight gain or loss  This list may not describe all possible side effects. Call your doctor for medical advice about side effects. You may report side effects to FDA at 3-792-KCJ-7154.  Where should I keep my medicine?  Keep out of the reach of children.  Store at room temperature between 20 and 25 degrees C (68 and 77 degrees F). Throw away any unused medicine after the expiration date.  NOTE:This sheet is a summary. It may not cover all possible information. If you have questions about this medicine, talk to your doctor, pharmacist, or health care provider. Copyright  2016 Gold Standard

## 2019-09-09 NOTE — PROGRESS NOTES
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 Has been taking his blood sugars which have been 160-90, feeling better. Symptoms of depression have completely resolved. Has stopped taking the Zoloft. Discussed labs including vitamin D, suggested supplementation. Started amitriptyline for painful neuropathy.     HPI   Diabetes Follow-up      How often are you checking your blood sugar? Two times daily    What time of day are you checking your blood sugars (select all that apply)?  Before meals    Have you had any blood sugars above 200?  No    Have you had any blood sugars below 70?  No    What symptoms do you notice when your blood sugar is low?  Shaky and Weak    What concerns do you have today about your diabetes? None     Do you have any of these symptoms? (Select all that apply)  Numbness in feet and Burning in feet Painful neuropathy to mid-calf     Have you had a diabetic eye exam in the last 12 months? Yes    BP Readings from Last 2 Encounters:   09/09/19 136/86   08/13/19 138/82     Hemoglobin A1C (%)   Date Value   07/08/2019 7.5 (H)   12/12/2018 7.2 (H)     LDL Cholesterol Calculated (mg/dL)   Date Value   07/08/2019 Comment   12/12/2018 Comment       Diabetes Management Resources  Depression Followup    How are you doing with your depression since your last visit? Improved and stopped taking antidepressant medication due to improvement    Are you having other symptoms that might be associated with depression? No    Have you had a significant life event?  OTHER: got a new job, which is very positive     Are you feeling anxious or having panic attacks?   No    Do you have any concerns with your use of alcohol or other drugs? No    Social History     Tobacco Use     Smoking status: Current Every Day Smoker      Packs/day: 1.00     Years: 40.00     Pack years: 40.00     Types: Cigarettes     Smokeless tobacco: Never Used   Substance Use Topics     Alcohol use: Yes     Alcohol/week: 0.0 standard drinks     Comment: none     Drug use: No     PHQ 12/27/2017 7/22/2019   PHQ-9 Total Score 12 11   Q9: Thoughts of better off dead/self-harm past 2 weeks Nearly every day Several days     KELLIE-7 SCORE 7/22/2019   Total Score 8     In the past two weeks have you had thoughts of suicide or self-harm?  No.    Do you have concerns about your personal safety or the safety of others?   No    Suicide Assessment Five-step Evaluation and Treatment (SAFE-T)      How many servings of fruits and vegetables do you eat daily?  2-3    On average, how many sweetened beverages do you drink each day (soda, juice, sweet tea, etc)?   1    How many days per week do you miss taking your medication? 0      Problem list, Medication list, Allergies, and Medical/Social/Surgical histories reviewed in EPIC and updated as appropriate.   Additional history: as documented    ROS:  General:  NEGATIVE for fever, chills, change in weight CV:  NEGATIVE for chest pain, palpitations or peripheral edema Resp:  NEGATIVE for significant cough or SOB Musculoskeletal:  No significant muscle or joint pains  Neurologic: POSITIVE for:, paresthesias in feet and lower legs Psychiatric: No problems with anxiety, depression or mental health    Histories:   Patient Active Problem List   Diagnosis     Coronary atherosclerosis     Essential hypertension     Mixed hyperlipidemia     Cataract     GERD (gastroesophageal reflux disease)     Health Care Home     Microalbuminuria due to type 2 diabetes mellitus (H)     Type 2 diabetes mellitus with diabetic nephropathy, without long-term current use of insulin (H)     Ingrown toenail     Plantar fasciitis     Mild major depression (H)     Past Surgical History:   Procedure Laterality Date     ANGIOPLASTY  2007    rca     ANGIOPLASTY  2010     om     ARTHROTOMY SHOULDER, ROTATOR CUFF REPAIR, COMBINED Left 4/12/2017    Procedure: COMBINED ARTHROTOMY SHOULDER, ROTATOR CUFF REPAIR;  Surgeon: Deejay Conrad MD;  Location: [facility]     ARTHROTOMY SHOULDER, SUBACROMIAL DECOMPRESSION, COMBINED Left 4/12/2017    Procedure: COMBINED ARTHROTOMY SHOULDER, SUBACROMIAL DECOMPRESSION;  Surgeon: Deejay Conrad MD;  Location: [facility]     COLONOSCOPY       DECOMPRESSION CUBITAL TUNNEL  11/29/2013    Procedure: DECOMPRESSION CUBITAL TUNNEL;;  Surgeon: Radha Cain MD;  Location: [facility]     ENDOSCOPIC RELEASE CARPAL TUNNEL  11/29/2013    Procedure: ENDOSCOPIC RELEASE CARPAL TUNNEL;  LEFT ENDOSCOPIC CARPAL TUNNEL RELEASE AND CUBITAL TUNNEL RELEASE ;  Surgeon: Radha Cain MD;  Location: [facility]     OPEN REDUCTION INTERNAL FIXATION HIP NAILING  3/30/2012    Procedure:OPEN REDUCTION INTERNAL FIXATION HIP NAILING; Biopsy, Curettage and Bone Grafting Right Hip Lesion, Placement of Hip Screw; Surgeon:MARILIN GAY; [facility]     PHACOEMULSIFICATION CLEAR CORNEA WITH STANDARD INTRAOCULAR LENS IMPLANT  5/8/2013    Procedure: PHACOEMULSIFICATION CLEAR CORNEA WITH STANDARD INTRAOCULAR LENS IMPLANT;  RIGHT EYE PHACOEMULSIFICATION CLEAR CORNEA WITH STANDARD INTRAOCULAR LENS IMPLANT ;  Surgeon: Jovani Pretty MD;  Location: [facility]     PHACOEMULSIFICATION CLEAR CORNEA WITH STANDARD INTRAOCULAR LENS IMPLANT  5/20/2013    Procedure: PHACOEMULSIFICATION CLEAR CORNEA WITH STANDARD INTRAOCULAR LENS IMPLANT;  LEFT  EYE PHACOEMULSIFICATION CLEAR CORNEA WITH STANDARD INTRAOCULAR LENS IMPLANT ;  Surgeon: Jovani Pretty MD;  Location: [facility]     STENT         Social History     Tobacco Use     Smoking status: Current Every Day Smoker     Packs/day: 1.00     Years: 40.00     Pack years: 40.00     Types: Cigarettes     Smokeless tobacco: Never Used   Substance Use Topics     Alcohol use: Yes     Alcohol/week: 0.0 oz     Comment: none      Family History   Problem Relation Age of Onset     Cerebrovascular Disease Mother 56     Hypertension Mother      Cerebrovascular Disease Father      Hypertension Father            OBJECTIVE:                                                    /86   Pulse 64   Resp 16   Wt 78.2 kg (172 lb 8 oz)   SpO2 99%   BMI 29.61 kg/m    Body mass index is 29.61 kg/m .   GENERAL: healthy, alert and no distress  EYES: Eyes grossly normal to inspection, PERRL and conjunctivae and sclerae normal  HENT: ear canals and TM's normal, nose and mouth without ulcers or lesions  NECK: no adenopathy, no asymmetry, masses, or scars and thyroid normal to palpation  RESP: lungs clear to auscultation - no rales, rhonchi or wheezes  CV: regular rate and rhythm, normal S1 S2, no S3 or S4, no murmur, click or rub, no peripheral edema and peripheral pulses strong  ABDOMEN: soft, nontender, no hepatosplenomegaly, no masses and bowel sounds normal  MS: no gross musculoskeletal defects noted, no edema  SKIN: no suspicious lesions or rashes  NEURO: Normal strength and tone, mentation intact and speech normal  PSYCH: mentation appears normal, affect normal/bright     ASSESSMENT/PLAN:                                                    (E11.40) Painful diabetic neuropathy (H)  (primary encounter diagnosis)  Comment: Discussed the risks and benefits of this medication. Patient would like to try this medication for neuropathy.   Plan: amitriptyline (ELAVIL) 10 MG tablet        Follow up in 4-6 weeks.     (F32.0) Mild major depression (H)  Comment: Stopped taking zoloft, due to feeling better. Discussed importance of continue use of antidepressant medications to keep depression in remission.  Plan:     amitriptyline (ELAVIL) 10 MG tablet; Take 1 tablet (10 mg) by mouth At Bedtime    (E11.21) Type 2 diabetes mellitus with diabetic nephropathy, without long-term current use of insulin (H)  Comment: Follow up with B.S. for medication  management.  Plan: C FOOT EXAM  NO CHARGE         June Pacheco NP  [facility]  [facility]  [phone]    Physician Attestation   I, Gonzalez Jensen, oversaw care given to Emily Zhu as part of a shared visit.  I have reviewed and discussed with the advanced practice provider their history, physical and plan.    I personally reviewed the vital signs and medications.    My key history or physical exam findings are incorporated into the documentation.    I agree with the management decisions as listed in documentation above.    Gonzalez Jensen    For any issues my office # is [phone]

## 2019-09-27 DIAGNOSIS — Z76.0 ENCOUNTER FOR MEDICATION REFILL: ICD-10-CM

## 2019-09-27 NOTE — TELEPHONE ENCOUNTER
Refill medication onetouch delica lancets  LOV 09/09/2019  Labs  07/08/2019  Georgette Simmons MA on 9/27/2019 at 4:35 PM

## 2019-09-29 PROBLEM — E11.40 PAINFUL DIABETIC NEUROPATHY (H): Status: ACTIVE | Noted: 2019-09-29

## 2019-10-07 ENCOUNTER — HOSPITAL ENCOUNTER (OUTPATIENT)
Dept: MRI IMAGING | Facility: CLINIC | Age: 59
Discharge: HOME | End: 2019-10-07
Attending: NURSE PRACTITIONER | Admitting: NURSE PRACTITIONER
Payer: COMMERCIAL

## 2019-10-07 ENCOUNTER — OFFICE VISIT (OUTPATIENT)
Dept: FAMILY MEDICINE | Facility: CLINIC | Age: 59
End: 2019-10-07

## 2019-10-07 VITALS
OXYGEN SATURATION: 98 % | HEART RATE: 76 BPM | SYSTOLIC BLOOD PRESSURE: 136 MMHG | WEIGHT: 177.2 LBS | BODY MASS INDEX: 30.42 KG/M2 | RESPIRATION RATE: 16 BRPM | DIASTOLIC BLOOD PRESSURE: 82 MMHG

## 2019-10-07 DIAGNOSIS — K11.8 PAROTID MASS: ICD-10-CM

## 2019-10-07 DIAGNOSIS — F41.9 ACUTE ANXIETY: ICD-10-CM

## 2019-10-07 DIAGNOSIS — K11.8 PAROTID MASS: Primary | ICD-10-CM

## 2019-10-07 PROCEDURE — 25500064 ZZH RX 255 OP 636: Performed by: NURSE PRACTITIONER

## 2019-10-07 PROCEDURE — 70543 MRI ORBT/FAC/NCK W/O &W/DYE: CPT

## 2019-10-07 PROCEDURE — 99213 OFFICE O/P EST LOW 20 MIN: CPT | Mod: 25 | Performed by: FAMILY MEDICINE

## 2019-10-07 PROCEDURE — A9585 GADOBUTROL INJECTION: HCPCS | Performed by: NURSE PRACTITIONER

## 2019-10-07 RX ORDER — DIAZEPAM 5 MG
5 TABLET ORAL PRN
Qty: 2 TABLET | Refills: 0 | Status: SHIPPED | OUTPATIENT
Start: 2019-10-07 | End: 2020-03-31

## 2019-10-07 RX ORDER — GADOBUTROL 604.72 MG/ML
8 INJECTION INTRAVENOUS ONCE
Status: COMPLETED | OUTPATIENT
Start: 2019-10-07 | End: 2019-10-07

## 2019-10-07 RX ADMIN — GADOBUTROL 8 ML: 604.72 INJECTION INTRAVENOUS at 13:56

## 2019-10-07 NOTE — PROGRESS NOTES
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      Problem list, Medication list, Allergies, and Medical/Social/Surgical histories reviewed in [facility] and updated as appropriate.   Additional history: as documented    ROS:  General:  NEGATIVE for fever, chills, change in weight HEENT:  Head Positive for SEE HPI CV:  NEGATIVE for chest pain, palpitations or peripheral edema Resp:  NEGATIVE for significant cough or SOB Musculoskeletal:  No significant muscle or joint pains  Neurologic: No headaches, numbness, tingling, or weakness    Histories:   Patient Active Problem List   Diagnosis     Coronary atherosclerosis     Essential hypertension     Mixed hyperlipidemia     Cataract     GERD (gastroesophageal reflux disease)     Health Care Home     Microalbuminuria due to type 2 diabetes mellitus (H)     Type 2 diabetes mellitus with diabetic nephropathy, without long-term current use of insulin (H)     Ingrown toenail     Plantar fasciitis     Mild major depression (H)     Painful diabetic neuropathy (H)     Past Surgical History:   Procedure Laterality Date     ANGIOPLASTY  2007    rca     ANGIOPLASTY  2010    om     ARTHROTOMY SHOULDER, ROTATOR CUFF REPAIR, COMBINED Left 4/12/2017    Procedure: COMBINED ARTHROTOMY SHOULDER, ROTATOR CUFF REPAIR;  Surgeon: Deejay Conrad MD;  Location: [facility]     ARTHROTOMY SHOULDER, SUBACROMIAL DECOMPRESSION, COMBINED Left 4/12/2017    Procedure: COMBINED ARTHROTOMY SHOULDER, SUBACROMIAL DECOMPRESSION;  Surgeon: Deejay Conrad MD;  Location: [facility]     COLONOSCOPY       " DECOMPRESSION CUBITAL TUNNEL  11/29/2013    Procedure: DECOMPRESSION CUBITAL TUNNEL;;  Surgeon: Radha Cain MD;  Location: [facility]     ENDOSCOPIC RELEASE CARPAL TUNNEL  11/29/2013    Procedure: ENDOSCOPIC RELEASE CARPAL TUNNEL;  LEFT ENDOSCOPIC CARPAL TUNNEL RELEASE AND CUBITAL TUNNEL RELEASE ;  Surgeon: Radha Cain MD;  Location: [facility]     OPEN REDUCTION INTERNAL FIXATION HIP NAILING  3/30/2012    Procedure:OPEN REDUCTION INTERNAL FIXATION HIP NAILING; Biopsy, Curettage and Bone Grafting Right Hip Lesion, Placement of Hip Screw; Surgeon:MARILIN GAY; [facility]     PHACOEMULSIFICATION CLEAR CORNEA WITH STANDARD INTRAOCULAR LENS IMPLANT  5/8/2013    Procedure: PHACOEMULSIFICATION CLEAR CORNEA WITH STANDARD INTRAOCULAR LENS IMPLANT;  RIGHT EYE PHACOEMULSIFICATION CLEAR CORNEA WITH STANDARD INTRAOCULAR LENS IMPLANT ;  Surgeon: Jovani Pretty MD;  Location: [facility]     PHACOEMULSIFICATION CLEAR CORNEA WITH STANDARD INTRAOCULAR LENS IMPLANT  5/20/2013    Procedure: PHACOEMULSIFICATION CLEAR CORNEA WITH STANDARD INTRAOCULAR LENS IMPLANT;  LEFT  EYE PHACOEMULSIFICATION CLEAR CORNEA WITH STANDARD INTRAOCULAR LENS IMPLANT ;  Surgeon: Jovani Pretty MD;  Location:  [facility]     STENT         Social History     Tobacco Use     Smoking status: Current Every Day Smoker     Packs/day: 1.00     Years: 40.00     Pack years: 40.00     Types: Cigarettes     Smokeless tobacco: Never Used   Substance Use Topics     Alcohol use: Yes     Alcohol/week: 0.0 standard drinks     Comment: none     Family History   Problem Relation Age of Onset     Cerebrovascular Disease Mother 56     Hypertension Mother      Cerebrovascular Disease Father      Hypertension Father            OBJECTIVE:                                                    /82   Pulse 76   Resp 16   Wt 80.4 kg (177 lb 3.2 oz)   SpO2 98%   BMI 30.42 kg/m    Body mass index is 30.42 kg/m .   GENERAL: healthy, alert and no  distress  EYES: Eyes grossly normal to inspection, PERRL and conjunctivae and sclerae normal  HENT: ear canals and TM's normal, nose and mouth without ulcers or lesions  NECK: cervical adenopathy on left side anterior chain, mass under jaw on left side parotid area and thyroid normal to palpation  RESP: lungs clear to auscultation - no rales, rhonchi or wheezes  CV: regular rate and rhythm, normal S1 S2, no S3 or S4, no murmur, click or rub, no peripheral edema and peripheral pulses strong  MS: no gross musculoskeletal defects noted, no edema  SKIN: no suspicious lesions or rashes  NEURO: Normal strength and tone, mentation intact and speech normal  PSYCH: mentation appears normal, affect normal/bright     ASSESSMENT/PLAN:                                                      Emily was seen today for mass.    Diagnoses and all orders for this visit:    Parotid mass  -     Cancel: XR Neck Soft Tissue; Future  -     CBC with Diff/Plt ([facility])  -     Cancel: XR Neck Soft Tissue  -     X-ray Cervical spine 2-3 vws  -     Referral to [facility]      I will call to follow up with you after you MRI, we will make a plan based on the results of the MRI.        ASSESSMENT/PLAN:       The following health maintenance items are reviewed in Epic and correct as of today:  Health Maintenance   Topic Date Due     ADVANCE CARE PLANNING  1960     DEPRESSION ACTION PLAN  1960     HIV SCREENING  01/22/1973     ZOSTER IMMUNIZATION (1 of 2) 01/22/2008     PREVENTIVE CARE VISIT  04/10/2014     EYE EXAM  07/13/2019     INFLUENZA VACCINE (1) 09/01/2019     A1C  01/08/2020     PHQ-9  01/22/2020     BMP  07/08/2020     LIPID  07/08/2020     MICROALBUMIN  07/22/2020     DIABETIC FOOT EXAM  09/09/2020     TSH W/FREE T4 REFLEX  12/12/2020     DTAP/TDAP/TD IMMUNIZATION (2 - Td) 05/13/2021     COLONOSCOPY  06/03/2021     HEPATITIS C SCREENING  Completed     PNEUMOCOCCAL IMMUNIZATION 19-64 MEDIUM RISK  Completed     IPV  IMMUNIZATION  Aged Out     MENINGITIS IMMUNIZATION  Aged Out       Gonzalez Jensen MD  [facility]  [phone]    For any issues my office # is [phone]

## 2019-10-09 DIAGNOSIS — K11.8 PAROTID MASS: Primary | ICD-10-CM

## 2019-10-15 NOTE — TELEPHONE ENCOUNTER
Received paper work  07/19 that  needed to be filled out as part of the PA request. Filled out and faxed back to [facility]. Received denial for Lyrica.  Appeal was then done  with a letter faxed  to [facility] on 08/22.  Did not hear any answer back so  on 10/01 called [facility] pharmacy to see if patient picked up the prescription and they say he was able to get it  for $3.00.

## 2019-10-16 ENCOUNTER — TELEPHONE (OUTPATIENT)
Dept: FAMILY MEDICINE | Facility: CLINIC | Age: 59
End: 2019-10-16

## 2019-10-16 ENCOUNTER — OFFICE VISIT (OUTPATIENT)
Dept: FAMILY MEDICINE | Facility: CLINIC | Age: 59
End: 2019-10-16

## 2019-10-16 VITALS — BODY MASS INDEX: 30.04 KG/M2 | WEIGHT: 175 LBS

## 2019-10-16 DIAGNOSIS — R22.1 MASS OF NECK: Primary | ICD-10-CM

## 2019-10-16 PROCEDURE — 99207 ZZC NO CHARGE LOS: CPT | Performed by: NURSE PRACTITIONER

## 2019-10-16 NOTE — TELEPHONE ENCOUNTER
----- Message from June Pacheco NP sent at 10/16/2019 10:06 AM CDT -----  Clint needs to be seen at [facility]. Please help him to set up an appointment. Thank you!    June Pacheco CNP

## 2019-10-16 NOTE — TELEPHONE ENCOUNTER
Called and spoke with patient regarding MRI results per June Pacheco CNP. Patient advised to follow up with ENT-Dr Falcon who he has seen previously for the parotid masses. Patient verbalized that Dr Falcon did not do anything last time and wonders why he needs to see him again. Advised that masses have grown and need re-evaluation. Breanne Dunlap

## 2019-10-16 NOTE — PROGRESS NOTES
Problem(s) Oriented visit        SUBJECTIVE:                                                    Emily Zhu is a 59 year old male who presents to clinic today for the following health issues :   Clint arrived in the clinic after being contacted by our triage nurse with the results of his MRI and follow-up instructions including referral to ENT. Clint had hung up on our triage nurse after yelling at her. He arrived in the clinic several hours later demanding to be seen. When he was roomed he began sobbing and was either unable or unwilling to answer the questions posed by the MA. I met with him and explained the limitations of our ability to treat the mass in his parotid gland. I referred him to ENT. I walked him to the front of the clinic where they were able to find him a next-day appointment. He refused this appointment and insisted on being seen on Monday, our  told him I wrote the order for him to be seen as soon as possible but he again declined the visit for 10/16/2019.  After the appointment was set, Clint sat in the chair refusing to leave the clinic. I came to the front of the building and again explained I could not help with his condition in this clinic and asked him to follow up with ENT. He agreed to leave the clinic and I walked him out.       Problem list, Medication list, Allergies, and Medical/Social/Surgical histories reviewed in EPIC and updated as appropriate.   Additional history: as documented  Histories:   Patient Active Problem List   Diagnosis     Coronary atherosclerosis     Essential hypertension     Mixed hyperlipidemia     Cataract     GERD (gastroesophageal reflux disease)     Health Care Home     Microalbuminuria due to type 2 diabetes mellitus (H)     Type 2 diabetes mellitus with diabetic nephropathy, without long-term current use of insulin (H)     Ingrown toenail     Plantar fasciitis     Mild major depression (H)     Painful diabetic neuropathy (H)     Past Surgical  History:   Procedure Laterality Date     ANGIOPLASTY  2007    rca     ANGIOPLASTY  2010    om     ARTHROTOMY SHOULDER, ROTATOR CUFF REPAIR, COMBINED Left 4/12/2017    Procedure: COMBINED ARTHROTOMY SHOULDER, ROTATOR CUFF REPAIR;  Surgeon: Deejay Conrad MD;  Location: [facility]     ARTHROTOMY SHOULDER, SUBACROMIAL DECOMPRESSION, COMBINED Left 4/12/2017    Procedure: COMBINED ARTHROTOMY SHOULDER, SUBACROMIAL DECOMPRESSION;  Surgeon: Deejay Conrad MD;  Location: [facility]     COLONOSCOPY       DECOMPRESSION CUBITAL TUNNEL  11/29/2013    Procedure: DECOMPRESSION CUBITAL TUNNEL;;  Surgeon: Radha Cain MD;  Location: [facility]     ENDOSCOPIC RELEASE CARPAL TUNNEL  11/29/2013    Procedure: ENDOSCOPIC RELEASE CARPAL TUNNEL;  LEFT ENDOSCOPIC CARPAL TUNNEL RELEASE AND CUBITAL TUNNEL RELEASE ;  Surgeon: Radha Cain MD;  Location: [facility]     OPEN REDUCTION INTERNAL FIXATION HIP NAILING  3/30/2012    Procedure:OPEN REDUCTION INTERNAL FIXATION HIP NAILING; Biopsy, Curettage and Bone Grafting Right Hip Lesion, Placement of Hip Screw; Surgeon:MARILIN GAY; [facility]     PHACOEMULSIFICATION CLEAR CORNEA WITH STANDARD INTRAOCULAR LENS IMPLANT  5/8/2013    Procedure: PHACOEMULSIFICATION CLEAR CORNEA WITH STANDARD INTRAOCULAR LENS IMPLANT;  RIGHT EYE PHACOEMULSIFICATION CLEAR CORNEA WITH STANDARD INTRAOCULAR LENS IMPLANT ;  Surgeon: Jovani Pretty MD;  Location: [facility]     PHACOEMULSIFICATION CLEAR CORNEA WITH STANDARD INTRAOCULAR LENS IMPLANT  5/20/2013    Procedure: PHACOEMULSIFICATION CLEAR CORNEA WITH STANDARD INTRAOCULAR LENS IMPLANT;  LEFT  EYE PHACOEMULSIFICATION CLEAR CORNEA WITH STANDARD INTRAOCULAR LENS IMPLANT ;  Surgeon: Jovani Pretty MD;  Location: [facility]     STENT         Social History     Tobacco Use     Smoking status: Current Every Day Smoker     Packs/day: 1.00     Years: 40.00     Pack years: 40.00     Types: Cigarettes     Smokeless tobacco: Never Used    Substance Use Topics     Alcohol use: Yes     Alcohol/week: 0.0 standard drinks     Comment: none     Family History   Problem Relation Age of Onset     Cerebrovascular Disease Mother 56     Hypertension Mother      Cerebrovascular Disease Father      Hypertension Father            OBJECTIVE:                                                      ASSESSMENT/PLAN:                                                        Referral to ENT for evaluation and treatment.       June Pacheco NP  [facility]  Family Practice  [facility]  [phone]    For any issues my office # is [phone]

## 2019-10-18 ENCOUNTER — TRANSFERRED RECORDS (OUTPATIENT)
Dept: FAMILY MEDICINE | Facility: CLINIC | Age: 59
End: 2019-10-18

## 2019-10-19 ENCOUNTER — TELEPHONE (OUTPATIENT)
Dept: OTOLARYNGOLOGY | Facility: CLINIC | Age: 59
End: 2019-10-19

## 2019-10-19 NOTE — TELEPHONE ENCOUNTER
Health Call Center    Phone Message    May a detailed message be left on voicemail: yes    Reason for Call:  STAT REFERRAL RECEIVED.   Dx = neck mass //  referred from  Dr. Pacheco  10/16   Also see chart notes from office visit on 10-16-19.      Action Taken: Message routed to:  [facility] ([facility] P ENT [facility] and new onc.

## 2019-10-21 ENCOUNTER — OFFICE VISIT (OUTPATIENT)
Dept: FAMILY MEDICINE | Facility: CLINIC | Age: 59
End: 2019-10-21

## 2019-10-21 VITALS
DIASTOLIC BLOOD PRESSURE: 82 MMHG | OXYGEN SATURATION: 98 % | SYSTOLIC BLOOD PRESSURE: 128 MMHG | HEIGHT: 65 IN | HEART RATE: 70 BPM | RESPIRATION RATE: 16 BRPM | BODY MASS INDEX: 29.56 KG/M2 | WEIGHT: 177.4 LBS

## 2019-10-21 DIAGNOSIS — Z01.818 PREOPERATIVE EXAMINATION: ICD-10-CM

## 2019-10-21 DIAGNOSIS — Z01.818 PREOP GENERAL PHYSICAL EXAM: ICD-10-CM

## 2019-10-21 DIAGNOSIS — Z01.810 PREOPERATIVE CARDIOVASCULAR EXAMINATION: Primary | ICD-10-CM

## 2019-10-21 LAB
% GRANULOCYTES: 66.1 % (ref 42.2–75.2)
HCT VFR BLD AUTO: 44.5 % (ref 39–51)
HEMOGLOBIN: 15 G/DL (ref 13.4–17.5)
LYMPHOCYTES NFR BLD AUTO: 27.1 % (ref 20.5–51.1)
MCH RBC QN AUTO: 31.7 PG (ref 27–31)
MCHC RBC AUTO-ENTMCNC: 33.7 G/DL (ref 33–37)
MCV RBC AUTO: 94.1 FL (ref 80–100)
MONOCYTES NFR BLD AUTO: 6.8 % (ref 1.7–9.3)
PLATELET # BLD AUTO: 288 K/UL (ref 140–450)
RBC # BLD AUTO: 4.73 X10/CMM (ref 4.2–5.9)
WBC # BLD AUTO: 7 X10/CMM (ref 3.8–11)

## 2019-10-21 PROCEDURE — 99213 OFFICE O/P EST LOW 20 MIN: CPT | Mod: 25 | Performed by: FAMILY MEDICINE

## 2019-10-21 PROCEDURE — 93000 ELECTROCARDIOGRAM COMPLETE: CPT | Mod: 59 | Performed by: FAMILY MEDICINE

## 2019-10-21 PROCEDURE — 85025 COMPLETE CBC W/AUTO DIFF WBC: CPT | Performed by: FAMILY MEDICINE

## 2019-10-21 PROCEDURE — 36415 COLL VENOUS BLD VENIPUNCTURE: CPT | Performed by: FAMILY MEDICINE

## 2019-10-21 ASSESSMENT — MIFFLIN-ST. JEOR: SCORE: 1532.59

## 2019-10-21 NOTE — PROGRESS NOTES
"  [facility]  [address] [place] [address] [phone]  Dept: [phone]    PRE-OP EVALUATION:  Today's date: 10/21/2019    Emily Zhu (: 1960) presents for pre-operative evaluation assessment as requested by Dr. Perez.  He requires evaluation and anesthesia risk assessment prior to undergoing surgery/procedure for treatment of Mass on Thyroid .    Proposed Surgery/ Procedure: Parotidectomy  Date of Surgery/ Procedure: TBD  Time of Surgery/ Procedure: TBD  Hospital/Surgical Facility: [facility]  Fax number for surgical facility:   Primary Physician: Vanessa Mejia  Type of Anesthesia Anticipated: to be determined    Patient has a Health Care Directive or Living Will:  NO    1. YES - DO YOU HAVE A HISTORY OF HEART ATTACK, STROKE, STENT, BYPASS OR SURGERY ON AN ARTERY IN THE HEAD, NECK, HEART OR LEG? Patient reports a stent placed \"maybe 15 years ago\" in [place], he is unable to remember the name of the clinic or doctor. Clint has also been treated at a cardiac care clinic in the [facility] but reports he has not followed up with them in many years.  Clint has some difficulty organizing his health history in a linier manner.   2. NO - Do you ever have any pain or discomfort in your chest?  3. NO - Do you have a history of  Heart Failure?  4. NO - Are you troubled by shortness of breath when: walking on the level, up a slight hill or at night?  5. NO - Do you currently have a cold, bronchitis or other respiratory infection?  6. NO - Do you have a cough, shortness of breath or wheezing?  7. YES - DO YOU SOMETIMES GET PAINS IN THE CALVES OF YOUR LEGS WHEN YOU WALK? Patient is unable to articulate what he experiences when walking, but reports pain in his legs due to neuropathy.   8. NO - Do you or anyone in your family have previous history of blood clots?  9. NO - Do you or does anyone in your family have a serious bleeding problem such as prolonged bleeding following surgeries " or cuts?  10. NO - Have you ever had problems with anemia or been told to take iron pills?  11. NO - Have you had any abnormal blood loss such as black, tarry or bloody stools, or abnormal vaginal bleeding?  12. NO - Have you ever had a blood transfusion?  13. NO - Have you or any of your relatives ever had problems with anesthesia?  14. NO - Do you have sleep apnea, excessive snoring or daytime drowsiness?  15. NO - Do you have any prosthetic heart valves?  16. NO - Do you have prosthetic joints?  17. NO - Is there any chance that you may be pregnant?      HPI:     HPI related to upcoming procedure:   After careful review of his past medical history and current medical problems I have referred Clint to cardiology to preform the pre-op evaluation for his surgery.  Clint agrees to follow up with [facility] heart clinic tomorrow for pre-op visit.      MEDICAL HISTORY:     Patient Active Problem List    Diagnosis Date Noted     Painful diabetic neuropathy (H) 09/29/2019     Priority: Medium     Mild major depression (H) 09/09/2019     Priority: Medium     Ingrown toenail 07/17/2018     Priority: Medium     Type 2 diabetes mellitus with diabetic nephropathy, without long-term current use of insulin (H) 11/03/2017     Priority: Medium     Microalbuminuria due to type 2 diabetes mellitus (H) 11/23/2015     Priority: Medium     Health Care Home 11/20/2013     Priority: Medium     State Tier Level:  Tier 3           GERD (gastroesophageal reflux disease) 05/03/2013     Priority: Medium     Cataract 04/10/2013     Priority: Medium     Utility update for deleted IMO code  [employer] Update utility       Plantar fasciitis 06/21/2011     Priority: Medium     Coronary atherosclerosis      Priority: Medium     Coronary artery disease. 2 stents in [place]. Normal nuclear stress in 2011.  Problem list name updated by automated process. Provider to review       Essential hypertension      Priority: Medium     Essential  hypertension  Problem list name updated by automated process. 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     Stented coronary artery      Type II or unspecified type diabetes mellitus without mention of complication, not stated as uncontrolled     Diabetes mellitus     Unspecified essential hypertension     Essential hypertension     Past Surgical History:   Procedure Laterality Date     ANGIOPLASTY  2007    rca     ANGIOPLASTY  2010    om     ARTHROTOMY SHOULDER, ROTATOR CUFF REPAIR, COMBINED Left 4/12/2017    Procedure: COMBINED ARTHROTOMY SHOULDER, ROTATOR CUFF REPAIR;  Surgeon: Deejay Conrad MD;  Location: [facility]     ARTHROTOMY SHOULDER, SUBACROMIAL DECOMPRESSION, COMBINED Left 4/12/2017    Procedure: COMBINED ARTHROTOMY SHOULDER, SUBACROMIAL DECOMPRESSION;  Surgeon: Deejay Conrad MD;  Location: [facility]     COLONOSCOPY       DECOMPRESSION CUBITAL TUNNEL  11/29/2013    Procedure: DECOMPRESSION CUBITAL TUNNEL;;  Surgeon: Radha Cain MD;  Location: [facility]     ENDOSCOPIC RELEASE CARPAL TUNNEL  11/29/2013    Procedure: ENDOSCOPIC RELEASE CARPAL TUNNEL;  LEFT ENDOSCOPIC CARPAL TUNNEL RELEASE AND CUBITAL TUNNEL RELEASE ;  Surgeon: Radha Cain MD;  Location: [facility]     OPEN REDUCTION INTERNAL FIXATION HIP NAILING  3/30/2012    Procedure:OPEN REDUCTION INTERNAL FIXATION HIP NAILING; Biopsy, Curettage and Bone Grafting Right Hip Lesion, Placement of Hip Screw; Surgeon:MARILIN GAY; [facility]     PHACOEMULSIFICATION  CLEAR CORNEA WITH STANDARD INTRAOCULAR LENS IMPLANT  5/8/2013    Procedure: PHACOEMULSIFICATION CLEAR CORNEA WITH STANDARD INTRAOCULAR LENS IMPLANT;  RIGHT EYE PHACOEMULSIFICATION CLEAR CORNEA WITH STANDARD INTRAOCULAR LENS IMPLANT ;  Surgeon: Jovani Pretty MD;  Location: [facility]     PHACOEMULSIFICATION CLEAR CORNEA WITH STANDARD INTRAOCULAR LENS IMPLANT  5/20/2013    Procedure: PHACOEMULSIFICATION CLEAR CORNEA WITH STANDARD INTRAOCULAR LENS IMPLANT;  LEFT  EYE PHACOEMULSIFICATION CLEAR CORNEA WITH STANDARD INTRAOCULAR LENS IMPLANT ;  Surgeon: Jovani Pretty MD;  Location: [facility]     STENT       Current Outpatient Medications   Medication Sig Dispense Refill     amitriptyline (ELAVIL) 10 MG tablet Take 1 tablet (10 mg) by mouth At Bedtime 90 tablet 3     atorvastatin (LIPITOR) 10 MG tablet TAKE 1 TABLET BY MOUTH ONCE DAILY 30 tablet 11     blood glucose (NO BRAND SPECIFIED) test strip Use to test blood sugar 2 times daily or as directed. 100 strip 3     blood glucose monitoring (ONE TOUCH DELICA) lancets USE 1  TO CHECK GLUCOSE ONCE DAILY OR  AS  DIRECTED 100 each 11     blood glucose monitoring (ONE TOUCH ULTRA 2) meter device kit USE TO TEST BLOOD SUGARS ONCE DAILY OR AS DIRECTED 1 kit 0     clopidogrel (PLAVIX) 75 MG tablet TAKE 1 TABLET BY MOUTH ONCE DAILY 30 tablet 11     diazepam (VALIUM) 5 MG tablet Take 1 tablet (5 mg) by mouth as needed for anxiety (take one pill 30 min before and another directly before as needed) 2 tablet 0     glipiZIDE (GLUCOTROL XL) 5 MG 24 hr tablet TAKE 1 TABLET BY MOUTH ONCE DAILY 30 tablet 0     hydrochlorothiazide (HYDRODIURIL) 25 MG tablet TAKE 1 TABLET BY MOUTH ONCE DAILY 90 tablet 0     IBUPROFEN PO Take 200 mg by mouth daily       isosorbide mononitrate (IMDUR) 30 MG 24 hr tablet TAKE 1 TABLET BY MOUTH ONCE DAILY 30 tablet 11     lisinopril (PRINIVIL/ZESTRIL) 20 MG tablet TAKE 1 TABLET BY MOUTH ONCE DAILY 90 tablet 1     metFORMIN (GLUCOPHAGE) 1000 MG tablet TAKE 1  "TABLET BY MOUTH TWICE DAILY WITH MEALS  1     metFORMIN (GLUCOPHAGE-XR) 500 MG 24 hr tablet Take 2 tablets (1,000 mg) by mouth 2 times daily (with meals) 360 tablet 0     metoprolol tartrate (LOPRESSOR) 25 MG tablet Take 1 tablet (25 mg) by mouth 2 times daily 180 tablet 3     ONETOUCH ULTRA test strip Test twice daily 100 strip 3     pantoprazole (PROTONIX) 20 MG EC tablet Take 1 tablet (20 mg) by mouth daily Take by mouth 30-60 minutes before a meal. 90 tablet 1     tiZANidine (ZANAFLEX) 4 MG tablet TAKE 1 TABLET BY MOUTH THREE TIMES DAILY AS NEEDED FOR MUSCLE SPASM 30 tablet 1     OTC products: None, except as noted above    Allergies   Allergen Reactions     Crestor [Rosuvastatin] Muscle Pain (Myalgia)      Latex Allergy: NO    Social History     Tobacco Use     Smoking status: Current Every Day Smoker     Packs/day: 1.00     Years: 40.00     Pack years: 40.00     Types: Cigarettes     Smokeless tobacco: Never Used   Substance Use Topics     Alcohol use: Yes     Alcohol/week: 0.0 standard drinks     Comment: none     History   Drug Use No       REVIEW OF SYSTEMS:   CONSTITUTIONAL: NEGATIVE for fever, chills, change in weight  INTEGUMENTARY/SKIN: NEGATIVE for worrisome rashes, moles or lesions  EYES: NEGATIVE for vision changes or irritation  ENT/MOUTH: POSITIVE for left parotid tumor.  RESP: NEGATIVE for significant cough or SOB  CV: NEGATIVE for chest pain, palpitations or peripheral edema  GI: NEGATIVE for nausea, abdominal pain, heartburn, or change in bowel habits  : NEGATIVE for frequency, dysuria, or hematuria  MUSCULOSKELETAL: NEGATIVE for significant arthralgias or myalgia  NEURO: NEGATIVE for weakness, dizziness or paresthesias  ENDOCRINE: NEGATIVE for temperature intolerance, skin/hair changes  HEME: NEGATIVE for bleeding problems  PSYCHIATRIC: NEGATIVE for changes in mood or affect    EXAM:   /82   Pulse 70   Resp 16   Ht 1.645 m (5' 4.75\")   Wt 80.5 kg (177 lb 6.4 oz)   SpO2 98%   BMI " 29.75 kg/m    Exam deferred as I have referred you to cardiology for pre-op clearance.     DIAGNOSTICS:   EKG: Normal Sinus Rhythm, Pulmonary disease pattern, Left anterior fascicular block, Abnormal ECG    Recent Labs   Lab Test 07/08/19  1638 07/08/19  1630 12/12/18  1432 12/12/18  1255 12/12/18  1254  01/13/18  0840  04/12/17  0943   HGB 15.5  --  16.2  --   --    < > 15.1  --  16.0     --  278  --   --    < > 257  --   --    INR  --   --   --   --   --   --  0.87  --  0.88   NA  --  140  --   --  137  --  136  --  134   POTASSIUM  --  5.3*  --   --  4.6  --  4.4  --  4.7   CR  --  1.18  --   --  1.43*  --  1.13  --  1.14   A1C  --  7.5*  --  7.2*  --    < >  --    < >  --     < > = values in this interval not displayed.        IMPRESSION:   Reason for surgery/procedure: Parotid tumor  Diagnosis/reason for consult: Pre-op examination    The proposed surgical procedure is considered LOW risk.    REVISED CARDIAC RISK INDEX  The patient has the following serious cardiovascular risks for perioperative complications such as (MI, PE, VFib and 3  AV Block):  Coronary Artery Disease (MI, positive stress test, angina, Qs on EKG)  Cerebrovascular Disease (TIA or CVA)  Diabetes Mellitus (on Insulin)  INTERPRETATION: 2 risks: Class III (moderate risk - 6.6% complication rate)    The patient has the following additional risks for perioperative complications:  No identified additional risks      ICD-10-CM    1. Preoperative cardiovascular examination Z01.810 CARDIOLOGY PROCEDURE ADULT REFERRAL   2. Preoperative examination Z01.818 EKG 12-lead complete w/read - Clinics     CANCELED: CBC with Diff/Plt ([facility])     CANCELED: Basic Metabolic Panel (8) ([facility])   3. Preop general physical exam Z01.818 CBC with Diff/Plt ([facility])     Basic Metabolic Panel (8) ([facility])       RECOMMENDATIONS:     I have not cleared Clint for surgery, I have referred him to cardiology for his pre-operative assessment. I made the appointment for him  while he was in clinic and he agreed to attened the appointment. We called the surgery center, who told us they have not yet scheduled the surgery and will schedule when he has completed a pre-op assessment. Clint will call the scheduling center when he completes his pre-operative visit tomorrow. The visit is set for 1:15 PM with [facility] in [place].      Signed Electronically by: June Pacheco NP    Copy of this evaluation report is provided to requesting physician.    [facility] Preop Guidelines    Revised Cardiac Risk Index

## 2019-10-21 NOTE — TELEPHONE ENCOUNTER
Spoke to patient to schedule appointment with one of your providers and patient stated he was already seen by (?) on Friday and was determined he would be having surgery and was actually on his way to have his Pre-op physical at this time. Pt stated he spoke to a Tona [place], who is the surgery coordinator, in regards to the surgery.pt state a biopsy was done last year and was concluded the mass was benign. In looking in the appts, this writer could not find any of these providers or appts, which leads this writer to assume he was seen else where. Advised pt I would document the information and disregard need for appt for this referral. Pt verbalized understanding.

## 2019-10-21 NOTE — LETTER
[facility] [address] [place]  [address]  Phone: [phone]    October 25, 2019      Loretaelsy Dylantonya  [address] [place] [id number]              Dear Emily,    The results from your recent visit showed Your glucose is elevated, this is likely due to poor diabetic control. It is important to monitor your blood sugar, take your diabetes medications as directed and eat a diet high in fiber, protein and vegetables and low in sugar, carbohydrates and saturated fat. Your creatinine is elevated and your GFR is low indicating your kidneys are not functioning as well as they could be. This is also linked to your diabetes, the better your control of your blood sugar, the better your kidneys are able to function. Please call or return to clinic if you have any questions.         Sincerely,     Abel Pacheco CNP    Results for orders placed or performed in visit on 10/21/19   CBC with Diff/Plt ([facility])   Result Value Ref Range    WBC x10/cmm 7.0 3.8 - 11.0 x10/cmm    % Lymphocytes 27.1 20.5 - 51.1 %    % Monocytes 6.8 1.7 - 9.3 %    % Granulocytes 66.1 42.2 - 75.2 %    RBC x10/cmm 4.73 4.2 - 5.9 x10/cmm    Hemoglobin 15.0 13.4 - 17.5 g/dl    Hematocrit 44.5 39 - 51 %    MCV 94.1 80 - 100 fL    MCH 31.7 (A) 27.0 - 31.0 pg    MCHC 33.7 33.0 - 37.0 g/dL    Platelet Count 288 140 - 450 K/uL   Basic Metabolic Panel (8) ([facility])   Result Value Ref Range    Glucose 276 (H) 65 - 99 mg/dL    Urea Nitrogen 21 8 - 27 mg/dL    Creatinine 1.53 (H) 0.76 - 1.27 mg/dL    eGFR If NonAfricn Am 48 (L) >59 mL/min/1.73    eGFR If Africn Am 56 (L) >59 mL/min/1.73    BUN/Creatinine Ratio 14 10 - 24    Sodium 137 134 - 144 mmol/L    Potassium 4.7 3.5 - 5.2 mmol/L    Chloride 101 96 - 106 mmol/L    Total CO2 22 20 - 29 mmol/L    Calcium 9.7 8.6 - 10.2 mg/dL    Narrative    Performed at:  01 - [facility] [address], [place], [place]  [id number]  : Arsh Caceres MD, Phone:   [phone]

## 2019-10-21 NOTE — LETTER
My Depression Action Plan  Name: Emily Zhu   Date of Birth 1960  Date: 10/21/2019    My doctor: Vanessa Mejia   My clinic: [facility] [address] [place] [address] [phone]          GREEN    ZONE   Good Control    What it looks like:     Things are going generally well. You have normal up s and down s. You may even feel depressed from time to time, but bad moods usually last less than a day.   What you need to do:  1. Continue to care for yourself (see self care plan)  2. Check your depression survival kit and update it as needed  3. Follow your physician s recommendations including any medication.  4. Do not stop taking medication unless you consult with your physician first.           YELLOW         ZONE Getting Worse    What it looks like:     Depression is starting to interfere with your life.     It may be hard to get out of bed; you may be starting to isolate yourself from others.    Symptoms of depression are starting to last most all day and this has happened for several days.     You may have suicidal thoughts but they are not constant.   What you need to do:     1. Call your care team, your response to treatment will improve if you keep your care team informed of your progress. Yellow periods are signs an adjustment may need to be made.     2. Continue your self-care, even if you have to fake it!    3. Talk to someone in your support network    4. Open up your depression survival kit           RED    ZONE Medical Alert - Get Help    What it looks like:     Depression is seriously interfering with your life.     You may experience these or other symptoms: You can t get out of bed most days, can t work or engage in other necessary activities, you have trouble taking care of basic hygiene, or basic responsibilities, thoughts of suicide or death that will not go away, self-injurious behavior.     What you need to do:  1. Call your care team and  request a same-day appointment. If they are not available (weekends or after hours) call your local crisis line, emergency room or 911.            Depression Self Care Plan / Survival Kit    Self-Care for Depression  Here s the deal. Your body and mind are really not as separate as most people think.  What you do and think affects how you feel and how you feel influences what you do and think. This means if you do things that people who feel good do, it will help you feel better.  Sometimes this is all it takes.  There is also a place for medication and therapy depending on how severe your depression is, so be sure to consult with your medical provider and/ or Behavioral Health Consultant if your symptoms are worsening or not improving.     In order to better manage my stress, I will:    Exercise  Get some form of exercise, every day. This will help reduce pain and release endorphins, the  feel good  chemicals in your brain. This is almost as good as taking antidepressants!  This is not the same as joining a gym and then never going! (they count on that by the way ) It can be as simple as just going for a walk or doing some gardening, anything that will get you moving.      Hygiene   Maintain good hygiene (Get out of bed in the morning, Make your bed, Brush your teeth, Take a shower, and Get dressed like you were going to work, even if you are unemployed).  If your clothes don't fit try to get ones that do.    Diet  I will strive to eat foods that are good for me, drink plenty of water, and avoid excessive sugar, caffeine, alcohol, and other mood-altering substances.  Some foods that are helpful in depression are: complex carbohydrates, B vitamins, flaxseed, fish or fish oil, fresh fruits and vegetables.    Psychotherapy  I agree to participate in Individual Therapy (if recommended).    Medication  If prescribed medications, I agree to take them.  Missing doses can result in serious side effects.  I understand that  drinking alcohol, or other illicit drug use, may cause potential side effects.  I will not stop my medication abruptly without first discussing it with my provider.    Staying Connected With Others  I will stay in touch with my friends, family members, and my primary care provider/team.    Use your imagination  Be creative.  We all have a creative side; it doesn t matter if it s oil painting, sand castles, or mud pies! This will also kick up the endorphins.    Witness Beauty  (AKA stop and smell the roses) Take a look outside, even in mid-winter. Notice colors, textures. Watch the squirrels and birds.     Service to others  Be of service to others.  There is always someone else in need.  By helping others we can  get out of ourselves  and remember the really important things.  This also provides opportunities for practicing all the other parts of the program.    Humor  Laugh and be silly!  Adjust your TV habits for less news and crime-drama and more comedy.    Control your stress  Try breathing deep, massage therapy, biofeedback, and meditation. Find time to relax each day.     My support system    Clinic Contact:  Phone number:    Contact 1:  Phone number:    Contact 2:  Phone number:    Mu-ism/:  Phone number:    Therapist:  Phone number:    Local crisis center:    Phone number:    Other community support:  Phone number:

## 2019-10-22 ENCOUNTER — OFFICE VISIT (OUTPATIENT)
Dept: CARDIOLOGY | Facility: CLINIC | Age: 59
End: 2019-10-22
Payer: COMMERCIAL

## 2019-10-22 VITALS
DIASTOLIC BLOOD PRESSURE: 80 MMHG | HEART RATE: 68 BPM | BODY MASS INDEX: 29.41 KG/M2 | WEIGHT: 176.5 LBS | SYSTOLIC BLOOD PRESSURE: 148 MMHG | HEIGHT: 65 IN

## 2019-10-22 DIAGNOSIS — Z13.6 SCREENING FOR CARDIOVASCULAR CONDITION: Primary | ICD-10-CM

## 2019-10-22 LAB
BUN SERPL-MCNC: 21 MG/DL (ref 8–27)
BUN/CREATININE RATIO: 14 (ref 10–24)
CALCIUM SERPL-MCNC: 9.7 MG/DL (ref 8.6–10.2)
CHLORIDE SERPLBLD-SCNC: 101 MMOL/L (ref 96–106)
CREAT SERPL-MCNC: 1.53 MG/DL (ref 0.76–1.27)
EGFR IF AFRICN AM: 56 ML/MIN/1.73
EGFR IF NONAFRICN AM: 48 ML/MIN/1.73
GLUCOSE SERPL-MCNC: 276 MG/DL (ref 65–99)
POTASSIUM SERPL-SCNC: 4.7 MMOL/L (ref 3.5–5.2)
SODIUM SERPL-SCNC: 137 MMOL/L (ref 134–144)
TOTAL CO2: 22 MMOL/L (ref 20–29)

## 2019-10-22 PROCEDURE — 99204 OFFICE O/P NEW MOD 45 MIN: CPT | Mod: 25 | Performed by: INTERNAL MEDICINE

## 2019-10-22 PROCEDURE — 93000 ELECTROCARDIOGRAM COMPLETE: CPT | Performed by: INTERNAL MEDICINE

## 2019-10-22 ASSESSMENT — MIFFLIN-ST. JEOR: SCORE: 1532.48

## 2019-10-22 NOTE — LETTER
10/22/2019    June Pacheco NP  [address]  [facility] [address]    RE: Emily Zhu       Dear Colleague,    I had the pleasure of seeing Emily Zhu in the [facility].        Cardiology Clinic Consultation:    October 22, 2019   Patient Name: Emily Zhu  Patient MRN: [id number]    Consult reason: pre-op cardiac risk assessment    HPI and Assessment and Plan/Recommendations:    Please see dictation. #[id number]      Thank you for allowing our team to participate in the care of Emily Zhu.  Please do not hesitate to call or page me with any questions or concerns.    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     ARTHROTOMY SHOULDER, SUBACROMIAL DECOMPRESSION, COMBINED Left 4/12/2017    Procedure: COMBINED ARTHROTOMY SHOULDER,  SUBACROMIAL DECOMPRESSION;  Surgeon: Deejay Conrad MD;  Location: [facility]     COLONOSCOPY       DECOMPRESSION CUBITAL TUNNEL  11/29/2013    Procedure: DECOMPRESSION CUBITAL TUNNEL;;  Surgeon: Radha Cain MD;  Location: [facility]     ENDOSCOPIC RELEASE CARPAL TUNNEL  11/29/2013    Procedure: ENDOSCOPIC RELEASE CARPAL TUNNEL;  LEFT ENDOSCOPIC CARPAL TUNNEL RELEASE AND CUBITAL TUNNEL RELEASE ;  Surgeon: Radha Cain MD;  Location: [facility]     OPEN REDUCTION INTERNAL FIXATION HIP NAILING  3/30/2012    Procedure:OPEN REDUCTION INTERNAL FIXATION HIP NAILING; Biopsy, Curettage and Bone Grafting Right Hip Lesion, Placement of Hip Screw; Surgeon:MARILIN GAY; Location:[facility]     PHACOEMULSIFICATION CLEAR CORNEA WITH STANDARD INTRAOCULAR LENS IMPLANT  5/8/2013    Procedure: PHACOEMULSIFICATION CLEAR CORNEA WITH STANDARD INTRAOCULAR LENS IMPLANT;  RIGHT EYE PHACOEMULSIFICATION CLEAR CORNEA WITH STANDARD INTRAOCULAR LENS IMPLANT ;  Surgeon: Jovani Pretty MD;  Location: [facility]     PHACOEMULSIFICATION CLEAR CORNEA WITH STANDARD INTRAOCULAR LENS IMPLANT  5/20/2013    Procedure: PHACOEMULSIFICATION CLEAR CORNEA WITH STANDARD INTRAOCULAR LENS IMPLANT;  LEFT  EYE PHACOEMULSIFICATION CLEAR CORNEA WITH STANDARD INTRAOCULAR LENS IMPLANT ;  Surgeon: Jovani Pretty MD;  Location: [facility]     STENT         Medications (outpatient):  Current Outpatient Medications   Medication Sig Dispense Refill     amitriptyline (ELAVIL) 10 MG tablet Take 1 tablet (10 mg) by mouth At Bedtime 90 tablet 3     atorvastatin (LIPITOR) 10 MG tablet TAKE 1 TABLET BY MOUTH ONCE DAILY 30 tablet 11     blood glucose (NO BRAND SPECIFIED) test strip Use to test blood sugar 2 times daily or as directed. 100 strip 3     blood glucose monitoring (ONE TOUCH DELICA) lancets USE 1  TO CHECK GLUCOSE ONCE DAILY OR  AS  DIRECTED 100 each 11     blood glucose monitoring (ONE TOUCH ULTRA 2) meter device kit USE TO TEST BLOOD SUGARS  ONCE DAILY OR AS DIRECTED 1 kit 0     clopidogrel (PLAVIX) 75 MG tablet TAKE 1 TABLET BY MOUTH ONCE DAILY 30 tablet 11     diazepam (VALIUM) 5 MG tablet Take 1 tablet (5 mg) by mouth as needed for anxiety (take one pill 30 min before and another directly before as needed) 2 tablet 0     glipiZIDE (GLUCOTROL XL) 5 MG 24 hr tablet TAKE 1 TABLET BY MOUTH ONCE DAILY 30 tablet 0     hydrochlorothiazide (HYDRODIURIL) 25 MG tablet TAKE 1 TABLET BY MOUTH ONCE DAILY 90 tablet 0     IBUPROFEN PO Take 200 mg by mouth daily       isosorbide mononitrate (IMDUR) 30 MG 24 hr tablet TAKE 1 TABLET BY MOUTH ONCE DAILY 30 tablet 11     lisinopril (PRINIVIL/ZESTRIL) 20 MG tablet TAKE 1 TABLET BY MOUTH ONCE DAILY 90 tablet 1     metFORMIN (GLUCOPHAGE) 1000 MG tablet TAKE 1 TABLET BY MOUTH TWICE DAILY WITH MEALS  1     metFORMIN (GLUCOPHAGE-XR) 500 MG 24 hr tablet Take 2 tablets (1,000 mg) by mouth 2 times daily (with meals) 360 tablet 0     metoprolol tartrate (LOPRESSOR) 25 MG tablet Take 1 tablet (25 mg) by mouth 2 times daily 180 tablet 3     ONETOUCH ULTRA test strip Test twice daily 100 strip 3     pantoprazole (PROTONIX) 20 MG EC tablet Take 1 tablet (20 mg) by mouth daily Take by mouth 30-60 minutes before a meal. 90 tablet 1     tiZANidine (ZANAFLEX) 4 MG tablet TAKE 1 TABLET BY MOUTH THREE TIMES DAILY AS NEEDED FOR MUSCLE SPASM 30 tablet 1       Allergies:  Allergies   Allergen Reactions     Crestor [Rosuvastatin] Muscle Pain (Myalgia)       Social History:   History   Drug Use No      History   Smoking Status     Current Every Day Smoker     Packs/day: 1.00     Years: 40.00     Types: Cigarettes   Smokeless Tobacco     Never Used     Social History    Substance and Sexual Activity      Alcohol use: Yes        Alcohol/week: 0.0 standard drinks        Comment: none       Family History:  Family History   Problem Relation Age of Onset     Cerebrovascular Disease Mother 56     Hypertension Mother      Cerebrovascular Disease  Father      Hypertension Father        Review of Systems:   A complete review of systems was negative except as mentioned in the History of Present Illness.     Objective & Physical Exam:  There were no vitals taken for this visit.  Wt Readings from Last 2 Encounters:   10/21/19 80.5 kg (177 lb 6.4 oz)   10/16/19 79.4 kg (175 lb)     There is no height or weight on file to calculate BMI.   There is no height or weight on file to calculate BSA.    Constitutional: appears stated age, in no apparent distress, appears to be well nourished  Eyes: sclera anicteric, conjunctiva normal, no lesions on eyelids or lashes  ENT: normocephalic, without obvious abnormality, atraumatic, external ears without lesions   Pulmonary: clear to auscultation bilaterally, no wheezes, no rales, no increased work of breathing  Cardiovascular: JVP normal, regular rate, regular rhythm, normal S1 and S2, no S3, S4, no murmur appreciated, no lower extremity edema  Gastrointestinal: abdominal exam benign, non-tender, no rigidity, no guarding  Neurologic: awake, alert, face symmetrical, moves all extremities  Skin: no abnormal rashes or lesions on limited exam, nails normal without discoloration or clubbing, no jaundice  Psychiatric: affect is normal, answers questions appropriately, oriented to self and place    Labs reviewed:  Lab Results   Component Value Date    WBC 7.0 10/21/2019    WBC 7.6 01/13/2018    RBC 4.73 10/21/2019    RBC 4.63 01/13/2018    HGB 15.0 10/21/2019    HCT 44.5 10/21/2019    MCV 94.1 10/21/2019    MCH 31.7 (A) 10/21/2019    MCHC 33.7 10/21/2019    RDW 12.8 01/13/2018     10/21/2019     Sodium   Date Value Ref Range Status   10/21/2019 137 134 - 144 mmol/L Final     Potassium   Date Value Ref Range Status   10/21/2019 4.7 3.5 - 5.2 mmol/L Final     Chloride   Date Value Ref Range Status   10/21/2019 101 96 - 106 mmol/L Final     Carbon Dioxide   Date Value Ref Range Status   01/13/2018 28 20 - 32 mmol/L Final      Anion Gap   Date Value Ref Range Status   01/13/2018 5 3 - 14 mmol/L Final     Glucose   Date Value Ref Range Status   10/21/2019 276 (H) 65 - 99 mg/dL Final     Urea Nitrogen   Date Value Ref Range Status   10/21/2019 21 8 - 27 mg/dL Final     BUN/Creatinine Ratio   Date Value Ref Range Status   10/21/2019 14 10 - 24 Final     Creatinine   Date Value Ref Range Status   10/21/2019 1.53 (H) 0.76 - 1.27 mg/dL Final     GFR Estimate   Date Value Ref Range Status   01/13/2018 66 >60 mL/min/1.7m2 Final     Comment:     Non  GFR Calc     Calcium   Date Value Ref Range Status   10/21/2019 9.7 8.6 - 10.2 mg/dL Final     Bilirubin Total   Date Value Ref Range Status   12/12/2018 0.4 0.0 - 1.2 mg/dL Final     Alkaline Phosphatase   Date Value Ref Range Status   12/12/2018 57 39 - 117 IU/L Final     ALT   Date Value Ref Range Status   12/12/2018 32 0 - 44 IU/L Final     AST   Date Value Ref Range Status   12/12/2018 18 0 - 40 IU/L Final     Recent Labs   Lab Test 07/08/19  1630 12/12/18  1254   CHOL 233* 226*   HDL 41 45   LDL Comment Comment   TRIG 408* 453*      Lab Results   Component Value Date    A1C 7.5 07/08/2019    A1C 7.2 12/12/2018    A1C 8.4 06/11/2018    A1C 7.4 11/03/2017    A1C 8.1 02/03/2017          Service Date: 10/22/2019      CARDIAC CLINIC CONSULT NOTE      HISTORY OF PRESENT ILLNESS:      I had the opportunity to see the patient, Emily Zhu, who goes by Clint today in Cardiology Clinic for consultation.  As you know, he is a 59-year-old male with a history of parotid mass for which he is scheduled for a parotidectomy, current smoking, as well as coronary artery disease, who presents for a preoperative cardiac risk assessment.  Regarding his history of coronary artery disease, in 2007 while in [facility], he describes having overall weakness and chest [place].  This ultimately led to a coronary angiogram and stenting to his RCA.  Subsequently, he  moved to [place] for employment reasons and per review of records he had recurrent chest pain in 04/2014 for which she underwent a repeat coronary angiogram which showed patent stent and no hemodynamically significant obstructive coronary artery disease.  He then had recurrent episodes of chest pain in 06/2016 for which he was seen by Dr. Starkey in consultation and he underwent a nuclear stress test that was negative for evidence of myocardial ischemia.      Since then, Mr. Zhu has been doing reasonably well.  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  He denies any episodes of chest discomfort, chest pain symptoms similar to what led to his stenting in 2007.  He denies palpitations, dizziness, lightheadedness, any recent change in exertional capacity.  He lives alone and is able to do light housework, vacuuming, washing the dishes without appreciable discomfort.  He is scheduled for a parotidectomy in the next few weeks for a parotid mass.  ECG today shows normal sinus rhythm, left axis deviation, left atrial enlargement, normal intervals.  No acute ischemic changes.  This is  similar to an ECG done on 10/21/2019.      ASSESSMENT AND PLAN:      In summary, the patient Emily Zhu is a pleasant 59-year-old male with past medical history significant for a parotid mass for which he is planned to have a parotidectomy, current smoking, as well as coronary artery disease status post PCI to his RCA in 2007 in [facility], who presents for preoperative cardiac risk assessment.      Overall, he is in stable cardiovascular health without symptoms concerning for angina or heart failure.  He is taking his medications as directed without adverse side effects.  Per the 2014 ACC/AHA preop guidelines for perioperative cardiovascular evaluation in the management of patients undergoing noncardiac surgery, no further cardiac testing is required prior to  "proceeding with the planned surgery.  He is able to engage in activities equivalent to at least 4 METS without significant discomfort.  We recommended that he stop smoking and he is eager to make another attempt. We offered cessation aids but these were declined at this time.     Regarding perioperative medication management, it will be reasonable to stop his clopidogrel 5 days prior to the operation.  It would also be reasonable to hold his lisinopril the morning of the operation.  These recommendations were typed and given to the patient.  We will also send this letter to his outpatient surgery center.  Please do not hesitate to call or page me with any questions.      Thank you for allowing us to participate in the care of Emily \"Clint\" Audra.         Kelechi Zhu MD  [facility]  Cardiology  Pager:  [phone]  Text Page   2019           D: 10/22/2019   T: 10/22/2019   MT: JEAN      Name:     EMILY VALENZUELA   MRN:      [id number]        Account:      [id number]   :      1960           Service Date: 10/22/2019      Document: [id number]        Thank you for allowing me to participate in the care of your patient.      Sincerely,     Kelechi Zhu MD     [facility]    cc:   June Pacheco NP  [address] [place] [address]        "

## 2019-10-22 NOTE — PATIENT INSTRUCTIONS
October 22, 2019    Thank you for allowing our Cardiology team to participate in your care.     Please note the following changes to your heart treatment plan:     Medication changes:   - hold clopidogrel for 5 days prior to the surgery  - hold lisinopril the morning of the surgery    Tests to be done:  - none    Follow up:    - At this time scheduled follow up in our clinic is not required, and we remain available as needed in the future.    We will send these recommendations to a[facility]ry [place] Surgery Coordinator  [email or web address]  [phone]     Please contact our team at [phone] for any questions or concerns.   If you are having a medical emergency, please call 911.       Sincerely,    Kelechi Zhu MD  Cardiology - [facility]    [facility] - [facility] and [facility] - [facility] - [place]

## 2019-10-22 NOTE — PROGRESS NOTES
Cardiology Clinic Consultation:    October 22, 2019   Patient Name: Emily Zhu  Patient MRN: [id number]    Consult reason: pre-op cardiac risk assessment    HPI and Assessment and Plan/Recommendations:    Please see dictation. #[id number]      Thank you for allowing our team to participate in the care of Emily Zhu.  Please do not hesitate to call or page me with any questions or concerns.    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     Stented coronary artery      Type II or unspecified type diabetes mellitus without mention of complication, not stated as uncontrolled     Diabetes mellitus     Unspecified essential hypertension     Essential hypertension       Past Surgical History:   Past Surgical History:   Procedure Laterality Date     ANGIOPLASTY  2007    rca     ANGIOPLASTY  2010    om     ARTHROTOMY SHOULDER, ROTATOR CUFF REPAIR, COMBINED Left 4/12/2017    Procedure: COMBINED ARTHROTOMY SHOULDER, ROTATOR CUFF REPAIR;  Surgeon: Deejay Conrad MD;  Location: [facility]     ARTHROTOMY SHOULDER, SUBACROMIAL DECOMPRESSION, COMBINED Left 4/12/2017    Procedure: COMBINED ARTHROTOMY SHOULDER, SUBACROMIAL DECOMPRESSION;  Surgeon: Deejay Conrad MD;  Location: [facility]     COLONOSCOPY       DECOMPRESSION CUBITAL TUNNEL  11/29/2013    Procedure: DECOMPRESSION CUBITAL TUNNEL;;  Surgeon: Radha Cain,  MD;  Location: [facility]     ENDOSCOPIC RELEASE CARPAL TUNNEL  11/29/2013    Procedure: ENDOSCOPIC RELEASE CARPAL TUNNEL;  LEFT ENDOSCOPIC CARPAL TUNNEL RELEASE AND CUBITAL TUNNEL RELEASE ;  Surgeon: Radha Cain MD;  Location: [facility]     OPEN REDUCTION INTERNAL FIXATION HIP NAILING  3/30/2012    Procedure:OPEN REDUCTION INTERNAL FIXATION HIP NAILING; Biopsy, Curettage and Bone Grafting Right Hip Lesion, Placement of Hip Screw; Surgeon:MARILIN GAY; Location[facility]     PHACOEMULSIFICATION CLEAR CORNEA WITH STANDARD INTRAOCULAR LENS IMPLANT  5/8/2013    Procedure: PHACOEMULSIFICATION CLEAR CORNEA WITH STANDARD INTRAOCULAR LENS IMPLANT;  RIGHT EYE PHACOEMULSIFICATION CLEAR CORNEA WITH STANDARD INTRAOCULAR LENS IMPLANT ;  Surgeon: Jovani Pretty MD;  Location: [facility]     PHACOEMULSIFICATION CLEAR CORNEA WITH STANDARD INTRAOCULAR LENS IMPLANT  5/20/2013    Procedure: PHACOEMULSIFICATION CLEAR CORNEA WITH STANDARD INTRAOCULAR LENS IMPLANT;  LEFT  EYE PHACOEMULSIFICATION CLEAR CORNEA WITH STANDARD INTRAOCULAR LENS IMPLANT ;  Surgeon: Jovani Pretty MD;  Location: [facility]     STENT         Medications (outpatient):  Current Outpatient Medications   Medication Sig Dispense Refill     amitriptyline (ELAVIL) 10 MG tablet Take 1 tablet (10 mg) by mouth At Bedtime 90 tablet 3     atorvastatin (LIPITOR) 10 MG tablet TAKE 1 TABLET BY MOUTH ONCE DAILY 30 tablet 11     blood glucose (NO BRAND SPECIFIED) test strip Use to test blood sugar 2 times daily or as directed. 100 strip 3     blood glucose monitoring (ONE TOUCH DELICA) lancets USE 1  TO CHECK GLUCOSE ONCE DAILY OR  AS  DIRECTED 100 each 11     blood glucose monitoring (ONE TOUCH ULTRA 2) meter device kit USE TO TEST BLOOD SUGARS ONCE DAILY OR AS DIRECTED 1 kit 0     clopidogrel (PLAVIX) 75 MG tablet TAKE 1 TABLET BY MOUTH ONCE DAILY 30 tablet 11     diazepam (VALIUM) 5 MG tablet Take 1 tablet (5 mg) by mouth as needed for anxiety (take one pill 30 min  before and another directly before as needed) 2 tablet 0     glipiZIDE (GLUCOTROL XL) 5 MG 24 hr tablet TAKE 1 TABLET BY MOUTH ONCE DAILY 30 tablet 0     hydrochlorothiazide (HYDRODIURIL) 25 MG tablet TAKE 1 TABLET BY MOUTH ONCE DAILY 90 tablet 0     IBUPROFEN PO Take 200 mg by mouth daily       isosorbide mononitrate (IMDUR) 30 MG 24 hr tablet TAKE 1 TABLET BY MOUTH ONCE DAILY 30 tablet 11     lisinopril (PRINIVIL/ZESTRIL) 20 MG tablet TAKE 1 TABLET BY MOUTH ONCE DAILY 90 tablet 1     metFORMIN (GLUCOPHAGE) 1000 MG tablet TAKE 1 TABLET BY MOUTH TWICE DAILY WITH MEALS  1     metFORMIN (GLUCOPHAGE-XR) 500 MG 24 hr tablet Take 2 tablets (1,000 mg) by mouth 2 times daily (with meals) 360 tablet 0     metoprolol tartrate (LOPRESSOR) 25 MG tablet Take 1 tablet (25 mg) by mouth 2 times daily 180 tablet 3     ONETOUCH ULTRA test strip Test twice daily 100 strip 3     pantoprazole (PROTONIX) 20 MG EC tablet Take 1 tablet (20 mg) by mouth daily Take by mouth 30-60 minutes before a meal. 90 tablet 1     tiZANidine (ZANAFLEX) 4 MG tablet TAKE 1 TABLET BY MOUTH THREE TIMES DAILY AS NEEDED FOR MUSCLE SPASM 30 tablet 1       Allergies:  Allergies   Allergen Reactions     Crestor [Rosuvastatin] Muscle Pain (Myalgia)       Social History:   History   Drug Use No      History   Smoking Status     Current Every Day Smoker     Packs/day: 1.00     Years: 40.00     Types: Cigarettes   Smokeless Tobacco     Never Used     Social History    Substance and Sexual Activity      Alcohol use: Yes        Alcohol/week: 0.0 standard drinks        Comment: none       Family History:  Family History   Problem Relation Age of Onset     Cerebrovascular Disease Mother 56     Hypertension Mother      Cerebrovascular Disease Father      Hypertension Father        Review of Systems:   A complete review of systems was negative except as mentioned in the History of Present Illness.     Objective & Physical Exam:  There were no vitals taken for this  visit.  Wt Readings from Last 2 Encounters:   10/21/19 80.5 kg (177 lb 6.4 oz)   10/16/19 79.4 kg (175 lb)     There is no height or weight on file to calculate BMI.   There is no height or weight on file to calculate BSA.    Constitutional: appears stated age, in no apparent distress, appears to be well nourished  Eyes: sclera anicteric, conjunctiva normal, no lesions on eyelids or lashes  ENT: normocephalic, without obvious abnormality, atraumatic, external ears without lesions   Pulmonary: clear to auscultation bilaterally, no wheezes, no rales, no increased work of breathing  Cardiovascular: JVP normal, regular rate, regular rhythm, normal S1 and S2, no S3, S4, no murmur appreciated, no lower extremity edema  Gastrointestinal: abdominal exam benign, non-tender, no rigidity, no guarding  Neurologic: awake, alert, face symmetrical, moves all extremities  Skin: no abnormal rashes or lesions on limited exam, nails normal without discoloration or clubbing, no jaundice  Psychiatric: affect is normal, answers questions appropriately, oriented to self and place    Labs reviewed:  Lab Results   Component Value Date    WBC 7.0 10/21/2019    WBC 7.6 01/13/2018    RBC 4.73 10/21/2019    RBC 4.63 01/13/2018    HGB 15.0 10/21/2019    HCT 44.5 10/21/2019    MCV 94.1 10/21/2019    MCH 31.7 (A) 10/21/2019    MCHC 33.7 10/21/2019    RDW 12.8 01/13/2018     10/21/2019     Sodium   Date Value Ref Range Status   10/21/2019 137 134 - 144 mmol/L Final     Potassium   Date Value Ref Range Status   10/21/2019 4.7 3.5 - 5.2 mmol/L Final     Chloride   Date Value Ref Range Status   10/21/2019 101 96 - 106 mmol/L Final     Carbon Dioxide   Date Value Ref Range Status   01/13/2018 28 20 - 32 mmol/L Final     Anion Gap   Date Value Ref Range Status   01/13/2018 5 3 - 14 mmol/L Final     Glucose   Date Value Ref Range Status   10/21/2019 276 (H) 65 - 99 mg/dL Final     Urea Nitrogen   Date Value Ref Range Status   10/21/2019 21 8 - 27  mg/dL Final     BUN/Creatinine Ratio   Date Value Ref Range Status   10/21/2019 14 10 - 24 Final     Creatinine   Date Value Ref Range Status   10/21/2019 1.53 (H) 0.76 - 1.27 mg/dL Final     GFR Estimate   Date Value Ref Range Status   01/13/2018 66 >60 mL/min/1.7m2 Final     Comment:     Non  GFR Calc     Calcium   Date Value Ref Range Status   10/21/2019 9.7 8.6 - 10.2 mg/dL Final     Bilirubin Total   Date Value Ref Range Status   12/12/2018 0.4 0.0 - 1.2 mg/dL Final     Alkaline Phosphatase   Date Value Ref Range Status   12/12/2018 57 39 - 117 IU/L Final     ALT   Date Value Ref Range Status   12/12/2018 32 0 - 44 IU/L Final     AST   Date Value Ref Range Status   12/12/2018 18 0 - 40 IU/L Final     Recent Labs   Lab Test 07/08/19  1630 12/12/18  1254   CHOL 233* 226*   HDL 41 45   LDL Comment Comment   TRIG 408* 453*      Lab Results   Component Value Date    A1C 7.5 07/08/2019    A1C 7.2 12/12/2018    A1C 8.4 06/11/2018    A1C 7.4 11/03/2017    A1C 8.1 02/03/2017

## 2019-10-22 NOTE — LETTER
10/22/2019      June Pacheco, LISA  [address] [facility] [address]      RE: Emily Zhu       Dear Colleague,    I had the pleasure of seeing Emily Zhu in the [facility].    Service Date: 10/22/2019      CARDIAC CLINIC CONSULT NOTE      HISTORY OF PRESENT ILLNESS:      I had the opportunity to see the patient, Emily Zhu, who goes by Clnit today in Cardiology Clinic for consultation.  As you know, he is a 59-year-old male with a history of parotid mass for which he is scheduled for a parotidectomy, current smoking, as well as coronary artery disease, who presents for a preoperative cardiac risk assessment.  Regarding his history of coronary artery disease, in 2007 while in [facility], he describes having overall weakness and chest [place].  This ultimately led to a coronary angiogram and stenting to his RCA.  Subsequently, he moved to [place] for employment reasons and per review of records he had recurrent chest pain in 04/2014 for which she underwent a repeat coronary angiogram which showed patent stent and no hemodynamically significant obstructive coronary artery disease.  He then had recurrent episodes of chest pain in 06/2016 for which he was seen by Dr. Starkey in consultation and he underwent a nuclear stress test that was negative for evidence of myocardial ischemia.      Since then, Mr. Zhu has been doing reasonably well.  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  He denies any episodes of chest discomfort, chest pain symptoms similar to what led to his stenting in 2007.  He denies palpitations, dizziness, lightheadedness, any recent change in exertional capacity.  He lives alone and is able to do light housework, vacuuming, washing the dishes without appreciable discomfort.  He is scheduled for a parotidectomy in the next few weeks for a parotid mass.  " ECG today shows normal sinus rhythm, left axis deviation, left atrial enlargement, normal intervals.  No acute ischemic changes.  This is  similar to an ECG done on 10/21/2019.      ASSESSMENT AND PLAN:      In summary, the patient Emily Valenzuela is a pleasant 59-year-old male with past medical history significant for a parotid mass for which he is planned to have a parotidectomy, current smoking, as well as coronary artery disease status post PCI to his RCA in 2007 in [facility], who presents for preoperative cardiac risk assessment.      Overall, he is in stable cardiovascular health without symptoms concerning for angina or heart failure.  He is taking his medications as directed without adverse side effects.  Per the 2014 ACC/AHA preop guidelines for perioperative cardiovascular evaluation in the management of patients undergoing noncardiac surgery, no further cardiac testing is required prior to proceeding with the planned surgery.  He is able to engage in activities equivalent to at least 4 METS without significant discomfort.  We recommended that he stop smoking and he is eager to make another attempt. We offered cessation aids but these were declined at this time.     Regarding perioperative medication management, it will be reasonable to stop his clopidogrel 5 days prior to the operation.  It would also be reasonable to hold his lisinopril the morning of the operation.  These recommendations were typed and given to the patient.  We will also send this letter to his outpatient surgery center.  Please do not hesitate to call or page me with any questions.      Thank you for allowing us to participate in the care of Emily \"Clint\" Audra.         Kelechi Zhu MD  [facility]  Cardiology  Pager:  [phone]  Text Page   October 23, 2019           D: 10/22/2019   T: 10/22/2019   MT: JEAN      Name:     EMILY VALENZUELA   MRN:      [id number]        Account:      [id number] "   :      1960           Service Date: 10/22/2019      Document: [id number]           Outpatient Encounter Medications as of 10/22/2019   Medication Sig Dispense Refill     amitriptyline (ELAVIL) 10 MG tablet Take 1 tablet (10 mg) by mouth At Bedtime 90 tablet 3     atorvastatin (LIPITOR) 10 MG tablet TAKE 1 TABLET BY MOUTH ONCE DAILY 30 tablet 11     blood glucose (NO BRAND SPECIFIED) test strip Use to test blood sugar 2 times daily or as directed. 100 strip 3     blood glucose monitoring (ONE TOUCH DELICA) lancets USE 1  TO CHECK GLUCOSE ONCE DAILY OR  AS  DIRECTED 100 each 11     blood glucose monitoring (ONE TOUCH ULTRA 2) meter device kit USE TO TEST BLOOD SUGARS ONCE DAILY OR AS DIRECTED 1 kit 0     clopidogrel (PLAVIX) 75 MG tablet TAKE 1 TABLET BY MOUTH ONCE DAILY 30 tablet 11     glipiZIDE (GLUCOTROL XL) 5 MG 24 hr tablet TAKE 1 TABLET BY MOUTH ONCE DAILY 30 tablet 0     hydrochlorothiazide (HYDRODIURIL) 25 MG tablet TAKE 1 TABLET BY MOUTH ONCE DAILY 90 tablet 0     IBUPROFEN PO Take 200 mg by mouth daily       isosorbide mononitrate (IMDUR) 30 MG 24 hr tablet TAKE 1 TABLET BY MOUTH ONCE DAILY 30 tablet 11     lisinopril (PRINIVIL/ZESTRIL) 20 MG tablet TAKE 1 TABLET BY MOUTH ONCE DAILY 90 tablet 1     metFORMIN (GLUCOPHAGE-XR) 500 MG 24 hr tablet Take 2 tablets (1,000 mg) by mouth 2 times daily (with meals) 360 tablet 0     metoprolol tartrate (LOPRESSOR) 25 MG tablet Take 1 tablet (25 mg) by mouth 2 times daily 180 tablet 3     ONETOUCH ULTRA test strip Test twice daily 100 strip 3     pantoprazole (PROTONIX) 20 MG EC tablet Take 1 tablet (20 mg) by mouth daily Take by mouth 30-60 minutes before a meal. 90 tablet 1     tiZANidine (ZANAFLEX) 4 MG tablet TAKE 1 TABLET BY MOUTH THREE TIMES DAILY AS NEEDED FOR MUSCLE SPASM 30 tablet 1     diazepam (VALIUM) 5 MG tablet Take 1 tablet (5 mg) by mouth as needed for anxiety (take one pill 30 min before and another directly before as needed) (Patient not  taking: Reported on 10/22/2019) 2 tablet 0     metFORMIN (GLUCOPHAGE) 1000 MG tablet TAKE 1 TABLET BY MOUTH TWICE DAILY WITH MEALS  1     No facility-administered encounter medications on file as of 10/22/2019.      Again, thank you for allowing me to participate in the care of your patient.      Sincerely,    Kelechi Zhu MD     [facility]

## 2019-10-22 NOTE — PROGRESS NOTES
Service Date: 10/22/2019      CARDIAC CLINIC CONSULT NOTE      HISTORY OF PRESENT ILLNESS:      I had the opportunity to see the patient, Emily Zhu, who goes by Clint today in Cardiology Clinic for consultation.  As you know, he is a 59-year-old male with a history of parotid mass for which he is scheduled for a parotidectomy, current smoking, as well as coronary artery disease, who presents for a preoperative cardiac risk assessment.  Regarding his history of coronary artery disease, in 2007 while in [facility], he describes having overall weakness and chest [place].  This ultimately led to a coronary angiogram and stenting to his RCA.  Subsequently, he moved to [place] for employment reasons and per review of records he had recurrent chest pain in 04/2014 for which she underwent a repeat coronary angiogram which showed patent stent and no hemodynamically significant obstructive coronary artery disease.  He then had recurrent episodes of chest pain in 06/2016 for which he was seen by Dr. Starkey in consultation and he underwent a nuclear stress test that was negative for evidence of myocardial ischemia.      Since then, Mr. Zhu has been doing reasonably well.  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  He denies any episodes of chest discomfort, chest pain symptoms similar to what led to his stenting in 2007.  He denies palpitations, dizziness, lightheadedness, any recent change in exertional capacity.  He lives alone and is able to do light housework, vacuuming, washing the dishes without appreciable discomfort.  He is scheduled for a parotidectomy in the next few weeks for a parotid mass.  ECG today shows normal sinus rhythm, left axis deviation, left atrial enlargement, normal intervals.  No acute ischemic changes.  This is  similar to an ECG done on 10/21/2019.      ASSESSMENT AND PLAN:      In summary, the  "patient Emily Valenzuela is a pleasant 59-year-old male with past medical history significant for a parotid mass for which he is planned to have a parotidectomy, current smoking, as well as coronary artery disease status post PCI to his RCA in  in [facility], who presents for preoperative cardiac risk assessment.      Overall, he is in stable cardiovascular health without symptoms concerning for angina or heart failure.  He is taking his medications as directed without adverse side effects.  Per the 2014 ACC/AHA preop guidelines for perioperative cardiovascular evaluation in the management of patients undergoing noncardiac surgery, no further cardiac testing is required prior to proceeding with the planned surgery.  He is able to engage in activities equivalent to at least 4 METS without significant discomfort.  We recommended that he stop smoking and he is eager to make another attempt. We offered cessation aids but these were declined at this time.     Regarding perioperative medication management, it will be reasonable to stop his clopidogrel 5 days prior to the operation.  It would also be reasonable to hold his lisinopril the morning of the operation.  These recommendations were typed and given to the patient.  We will also send this letter to his outpatient surgery center.  Please do not hesitate to call or page me with any questions.      Thank you for allowing us to participate in the care of Emily \"Clint\" Audra.         Kelechi Zhu MD  [facility]  Cardiology  Pager:  [phone]  Text Page   2019           D: 10/22/2019   T: 10/22/2019   MT: JEAN      Name:     EMILY VALENZUELA   MRN:      [id number]        Account:      [id number]   :      1960           Service Date: 10/22/2019      Document: [id number]      "

## 2019-10-24 NOTE — PROGRESS NOTES
10/16/19 faxed 10/7/19 and 9/9/19 office note with 10/7/19 mri neck results to [facility] @ [phone]    Nader Hernandez,   [facility]  [phone]

## 2019-10-24 NOTE — PROGRESS NOTES
10/16/19 faxed 10/7/19 and 9/9/19 office note with 10/7/19 mri neck results to [facility] @ [phone]    Nader Hernandez,   [facility]  [phone]

## 2019-10-28 DIAGNOSIS — M62.838 SPASM OF MUSCLE: ICD-10-CM

## 2019-10-28 RX ORDER — PREGABALIN 50 MG
1 CAPSULE ORAL 3 TIMES DAILY
Refills: 0 | COMMUNITY
Start: 2019-09-30 | End: 2019-11-06 | Stop reason: ALTCHOICE

## 2019-10-30 RX ORDER — PREGABALIN 50 MG
50 CAPSULE ORAL 3 TIMES DAILY
Qty: 90 CAPSULE | Refills: 0 | OUTPATIENT
Start: 2019-10-30

## 2019-10-30 NOTE — TELEPHONE ENCOUNTER
When your insurance did not fill this medication I prescribed you alternative therapy. If you would like to have a trial of lyrica we can adjust your medications. We should not adjust any medications until after your surgery, please make an appointment for after your surgery date. Thank you

## 2019-11-06 RX ORDER — PREGABALIN 50 MG
50 CAPSULE ORAL 3 TIMES DAILY
Qty: 90 CAPSULE | Refills: 0 | OUTPATIENT
Start: 2019-11-06 | End: 2019-12-06

## 2019-11-06 NOTE — TELEPHONE ENCOUNTER
Patient on alternative medication due to insurance coverage. June Pacheco CNP willing to reconsider if insurance coverage has changed or patient wishes to discuss further after upcoming surgery. Breanne Dunlap

## 2019-11-11 ENCOUNTER — OFFICE VISIT (OUTPATIENT)
Dept: FAMILY MEDICINE | Facility: CLINIC | Age: 59
End: 2019-11-11

## 2019-11-11 VITALS
BODY MASS INDEX: 29.12 KG/M2 | SYSTOLIC BLOOD PRESSURE: 144 MMHG | HEART RATE: 63 BPM | OXYGEN SATURATION: 98 % | DIASTOLIC BLOOD PRESSURE: 86 MMHG | WEIGHT: 175 LBS | RESPIRATION RATE: 16 BRPM

## 2019-11-11 DIAGNOSIS — M85.40 SOLITARY BONE CYST: ICD-10-CM

## 2019-11-11 DIAGNOSIS — E11.40 PAINFUL DIABETIC NEUROPATHY (H): Primary | ICD-10-CM

## 2019-11-11 PROCEDURE — 99212 OFFICE O/P EST SF 10 MIN: CPT | Performed by: NURSE PRACTITIONER

## 2019-11-11 NOTE — PROGRESS NOTES
Problem(s) Oriented visit        SUBJECTIVE:                                                    Emily Zhu is a 59 year old male who presents to clinic today for the following health issues :  Clint is seen today for medication refills, Clint reports he has stopped taking all of his medication in preparation for his up-coming surgery. He reports he was told to discontinue only two medications but decided to stop all of them. Clint requires medication refills for all of his current medications. We discussed using amitriptyline rather than lyrica, as his insurance would not pay for the lyrica. Clint had forgotten this, and is satisfied with this plan.       Problem list, Medication list, Allergies, and Medical/Social/Surgical histories reviewed in [facility] and updated as appropriate.   Additional history: as documented    ROS:  General:  NEGATIVE for fever, chills, change in weight HEENT:  No changes in hearing or vision, no nose bleeds or other nasal problems and Negative for frequent or significant headaches CV:  NEGATIVE for chest pain, palpitations or peripheral edema Resp:  NEGATIVE for significant cough or SOB Heme/immune/allergy: No history of bleeding or clotting problems or anemia. Endocrine: POSITIVE for:, diabetes mellitus, anxiety    Histories:   Patient Active Problem List   Diagnosis     Coronary atherosclerosis     Essential hypertension     Mixed hyperlipidemia     Cataract     GERD (gastroesophageal reflux disease)     Health Care Home     Microalbuminuria due to type 2 diabetes mellitus (H)     Type 2 diabetes mellitus with diabetic nephropathy, without long-term current use of insulin (H)     Ingrown toenail     Plantar fasciitis     Mild major depression (H)     Painful diabetic neuropathy (H)     Past Surgical History:   Procedure Laterality Date     ANGIOPLASTY  2007    rca     ANGIOPLASTY  2010    om     ARTHROTOMY SHOULDER, ROTATOR CUFF REPAIR, COMBINED Left 4/12/2017    Procedure: COMBINED  ARTHROTOMY SHOULDER, ROTATOR CUFF REPAIR;  Surgeon: Deejay Conrad MD;  Location: [facility]     ARTHROTOMY SHOULDER, SUBACROMIAL DECOMPRESSION, COMBINED Left 4/12/2017    Procedure: COMBINED ARTHROTOMY SHOULDER, SUBACROMIAL DECOMPRESSION;  Surgeon: Deejay Conrad MD;  Location: [facility]     COLONOSCOPY       DECOMPRESSION CUBITAL TUNNEL  11/29/2013    Procedure: DECOMPRESSION CUBITAL TUNNEL;;  Surgeon: Radha Cain MD;  Location: [facility]     ENDOSCOPIC RELEASE CARPAL TUNNEL  11/29/2013    Procedure: ENDOSCOPIC RELEASE CARPAL TUNNEL;  LEFT ENDOSCOPIC CARPAL TUNNEL RELEASE AND CUBITAL TUNNEL RELEASE ;  Surgeon: Radha Cain MD;  Location: [facility]     OPEN REDUCTION INTERNAL FIXATION HIP NAILING  3/30/2012    Procedure:OPEN REDUCTION INTERNAL FIXATION HIP NAILING; Biopsy, Curettage and Bone Grafting Right Hip Lesion, Placement of Hip Screw; Surgeon:MARILNI GAY; [facility]     PHACOEMULSIFICATION CLEAR CORNEA WITH STANDARD INTRAOCULAR LENS IMPLANT  5/8/2013    Procedure: PHACOEMULSIFICATION CLEAR CORNEA WITH STANDARD INTRAOCULAR LENS IMPLANT;  RIGHT EYE PHACOEMULSIFICATION CLEAR CORNEA WITH STANDARD INTRAOCULAR LENS IMPLANT ;  Surgeon: Jovani Pretty MD;  Location: [facility]     PHACOEMULSIFICATION CLEAR CORNEA WITH STANDARD INTRAOCULAR LENS IMPLANT  5/20/2013    Procedure: PHACOEMULSIFICATION CLEAR CORNEA WITH STANDARD INTRAOCULAR LENS IMPLANT;  LEFT  EYE PHACOEMULSIFICATION CLEAR CORNEA WITH STANDARD INTRAOCULAR LENS IMPLANT ;  Surgeon: Jovani Pretty MD;  Location: [facility]     STENT         Social History     Tobacco Use     Smoking status: Current Every Day Smoker     Packs/day: 1.00     Years: 40.00     Pack years: 40.00     Types: Cigarettes     Smokeless tobacco: Never Used   Substance Use Topics     Alcohol use: Not Currently     Alcohol/week: 0.0 standard drinks     Comment: none     Family History   Problem Relation Age of Onset     Cerebrovascular Disease  Mother 56     Hypertension Mother      Cerebrovascular Disease Father      Hypertension Father            OBJECTIVE:                                                    BP (!) 144/86   Pulse 63   Resp 16   Wt 79.4 kg (175 lb)   SpO2 98%   BMI 29.12 kg/m    Body mass index is 29.12 kg/m .   GENERAL: healthy, alert and no distress  EYES: Eyes grossly normal to inspection, PERRL and conjunctivae and sclerae normal  RESP: lungs clear to auscultation - no rales, rhonchi or wheezes  CV: regular rate and rhythm, normal S1 S2, no S3 or S4, no murmur, click or rub, no peripheral edema and peripheral pulses strong  NEURO: Normal strength and tone, mentation intact and speech normal  PSYCH: concentration poor, inattentive, affect flat, judgement and insight intact and appearance well groomed     ASSESSMENT/PLAN:                                                      , Emily was seen today for recheck.    Diagnoses and all orders for this visit:    Painful diabetic neuropathy (H)      Continue to use the amitriptyline to manage the neuropathy, please reduce the about of sugar and carbohydrate you consume and  Increase your exercise.       ASSESSMENT/PLAN:       The following health maintenance items are reviewed in Epic and correct as of today:  Health Maintenance   Topic Date Due     ADVANCE CARE PLANNING  1960     HIV SCREENING  01/22/1973     ZOSTER IMMUNIZATION (1 of 2) 01/22/2008     PREVENTIVE CARE VISIT  04/10/2014     EYE EXAM  07/13/2019     INFLUENZA VACCINE (1) 09/01/2019     A1C  01/08/2020     PHQ-9  01/22/2020     LIPID  07/08/2020     MICROALBUMIN  07/22/2020     DIABETIC FOOT EXAM  09/09/2020     BMP  10/21/2020     TSH W/FREE T4 REFLEX  12/12/2020     DTAP/TDAP/TD IMMUNIZATION (2 - Td) 05/13/2021     COLONOSCOPY  06/03/2021     HEPATITIS C SCREENING  Completed     DEPRESSION ACTION PLAN  Completed     PNEUMOCOCCAL IMMUNIZATION 19-64 MEDIUM RISK  Completed     IPV IMMUNIZATION  Aged Out      MENINGITIS IMMUNIZATION  Aged Out       June Pacheco NP  [facility]  Family Practice  [facility]  [phone]    For any issues my office # is [phone]

## 2019-11-12 ENCOUNTER — HOSPITAL ENCOUNTER (OUTPATIENT)
Facility: CLINIC | Age: 59
Discharge: HOME | End: 2019-11-12
Attending: OTOLARYNGOLOGY | Admitting: OTOLARYNGOLOGY
Payer: COMMERCIAL

## 2019-11-12 VITALS
RESPIRATION RATE: 16 BRPM | BODY MASS INDEX: 29.16 KG/M2 | TEMPERATURE: 98.7 F | WEIGHT: 175 LBS | DIASTOLIC BLOOD PRESSURE: 95 MMHG | OXYGEN SATURATION: 100 % | HEIGHT: 65 IN | SYSTOLIC BLOOD PRESSURE: 140 MMHG

## 2019-11-12 LAB — GLUCOSE BLDC GLUCOMTR-MCNC: 154 MG/DL (ref 70–99)

## 2019-11-12 PROCEDURE — 40000882 ZZH CANCELLED SURGERY UP TO 46-60 MINS: Performed by: OTOLARYNGOLOGY

## 2019-11-12 PROCEDURE — 25000132 ZZH RX MED GY IP 250 OP 250 PS 637: Performed by: OTOLARYNGOLOGY

## 2019-11-12 PROCEDURE — 82962 GLUCOSE BLOOD TEST: CPT

## 2019-11-12 RX ORDER — SODIUM CHLORIDE, SODIUM LACTATE, POTASSIUM CHLORIDE, CALCIUM CHLORIDE 600; 310; 30; 20 MG/100ML; MG/100ML; MG/100ML; MG/100ML
INJECTION, SOLUTION INTRAVENOUS CONTINUOUS
Status: DISCONTINUED | OUTPATIENT
Start: 2019-11-12 | End: 2019-11-12 | Stop reason: HOSPADM

## 2019-11-12 RX ORDER — ACETAMINOPHEN 325 MG/1
975 TABLET ORAL ONCE
Status: COMPLETED | OUTPATIENT
Start: 2019-11-12 | End: 2019-11-12

## 2019-11-12 RX ORDER — CEFAZOLIN SODIUM 1 G/3ML
1 INJECTION, POWDER, FOR SOLUTION INTRAMUSCULAR; INTRAVENOUS SEE ADMIN INSTRUCTIONS
Status: DISCONTINUED | OUTPATIENT
Start: 2019-11-12 | End: 2019-11-12 | Stop reason: HOSPADM

## 2019-11-12 RX ORDER — CEFAZOLIN SODIUM 2 G/100ML
2 INJECTION, SOLUTION INTRAVENOUS
Status: DISCONTINUED | OUTPATIENT
Start: 2019-11-12 | End: 2019-11-12 | Stop reason: HOSPADM

## 2019-11-12 RX ORDER — LIDOCAINE 40 MG/G
CREAM TOPICAL
Status: DISCONTINUED | OUTPATIENT
Start: 2019-11-12 | End: 2019-11-12 | Stop reason: HOSPADM

## 2019-11-12 RX ORDER — HYDROMORPHONE HYDROCHLORIDE 1 MG/ML
.3-.5 INJECTION, SOLUTION INTRAMUSCULAR; INTRAVENOUS; SUBCUTANEOUS EVERY 5 MIN PRN
Status: CANCELLED | OUTPATIENT
Start: 2019-11-12

## 2019-11-12 RX ORDER — LABETALOL HYDROCHLORIDE 5 MG/ML
10 INJECTION, SOLUTION INTRAVENOUS
Status: CANCELLED | OUTPATIENT
Start: 2019-11-12

## 2019-11-12 RX ORDER — DEXAMETHASONE SODIUM PHOSPHATE 10 MG/ML
10 INJECTION, SOLUTION INTRAMUSCULAR; INTRAVENOUS ONCE
Status: DISCONTINUED | OUTPATIENT
Start: 2019-11-12 | End: 2019-11-12 | Stop reason: HOSPADM

## 2019-11-12 RX ORDER — NALOXONE HYDROCHLORIDE 0.4 MG/ML
.1-.4 INJECTION, SOLUTION INTRAMUSCULAR; INTRAVENOUS; SUBCUTANEOUS
Status: CANCELLED | OUTPATIENT
Start: 2019-11-12 | End: 2019-11-13

## 2019-11-12 RX ORDER — HYDRALAZINE HYDROCHLORIDE 20 MG/ML
2.5-5 INJECTION INTRAMUSCULAR; INTRAVENOUS EVERY 10 MIN PRN
Status: CANCELLED | OUTPATIENT
Start: 2019-11-12

## 2019-11-12 RX ORDER — ONDANSETRON 2 MG/ML
4 INJECTION INTRAMUSCULAR; INTRAVENOUS EVERY 30 MIN PRN
Status: CANCELLED | OUTPATIENT
Start: 2019-11-12

## 2019-11-12 RX ORDER — SODIUM CHLORIDE, SODIUM LACTATE, POTASSIUM CHLORIDE, CALCIUM CHLORIDE 600; 310; 30; 20 MG/100ML; MG/100ML; MG/100ML; MG/100ML
INJECTION, SOLUTION INTRAVENOUS CONTINUOUS
Status: CANCELLED | OUTPATIENT
Start: 2019-11-12

## 2019-11-12 RX ORDER — ONDANSETRON 4 MG/1
4 TABLET, ORALLY DISINTEGRATING ORAL EVERY 30 MIN PRN
Status: CANCELLED | OUTPATIENT
Start: 2019-11-12

## 2019-11-12 RX ORDER — FENTANYL CITRATE 50 UG/ML
25-50 INJECTION, SOLUTION INTRAMUSCULAR; INTRAVENOUS
Status: CANCELLED | OUTPATIENT
Start: 2019-11-12

## 2019-11-12 RX ADMIN — ACETAMINOPHEN 975 MG: 325 TABLET, FILM COATED ORAL at 12:22

## 2019-11-12 ASSESSMENT — MIFFLIN-ST. JEOR: SCORE: 1525.67

## 2019-11-21 ENCOUNTER — OFFICE VISIT (OUTPATIENT)
Dept: FAMILY MEDICINE | Facility: CLINIC | Age: 59
End: 2019-11-21

## 2019-11-21 VITALS
SYSTOLIC BLOOD PRESSURE: 130 MMHG | BODY MASS INDEX: 29.2 KG/M2 | DIASTOLIC BLOOD PRESSURE: 80 MMHG | HEART RATE: 70 BPM | WEIGHT: 175.5 LBS | RESPIRATION RATE: 16 BRPM | OXYGEN SATURATION: 98 %

## 2019-11-21 DIAGNOSIS — E78.2 MIXED HYPERLIPIDEMIA: ICD-10-CM

## 2019-11-21 DIAGNOSIS — Z01.818 PREOP GENERAL PHYSICAL EXAM: Primary | ICD-10-CM

## 2019-11-21 DIAGNOSIS — I10 ESSENTIAL HYPERTENSION: ICD-10-CM

## 2019-11-21 DIAGNOSIS — E11.21 TYPE 2 DIABETES MELLITUS WITH DIABETIC NEPHROPATHY, WITHOUT LONG-TERM CURRENT USE OF INSULIN (H): ICD-10-CM

## 2019-11-21 DIAGNOSIS — I25.10 ATHEROSCLEROSIS OF CORONARY ARTERY OF NATIVE HEART WITHOUT ANGINA PECTORIS, UNSPECIFIED VESSEL OR LESION TYPE: ICD-10-CM

## 2019-11-21 PROCEDURE — 99214 OFFICE O/P EST MOD 30 MIN: CPT | Performed by: FAMILY MEDICINE

## 2019-11-21 NOTE — PROGRESS NOTES
[facility]  [address] [place] [address] [phone]  Dept: [phone]    PRE-OP EVALUATION:  Today's date: 2019    Emily Zhu (: 1960) presents for pre-operative evaluation assessment as requested by Dr. Carlin.  He requires evaluation and anesthesia risk assessment prior to undergoing surgery/procedure for treatment of left parotidectomy.    Proposed Surgery/ Procedure: parotidectomy  Date of Surgery/ Procedure: 19  Time of Surgery/ Procedure: 1pm  Hospital/Surgical Facility: [facility]  Fax number for surgical facility: on file  Primary Physician: June Pacheco  Type of Anesthesia Anticipated: to be determined    Patient has a Health Care Directive or Living Will:  YES     1. YES - Do you have a history of heart attack, stroke, stent, bypass or surgery on an artery in the head, neck, heart or legs? Stent  2. NO - Do you ever have any pain or discomfort in your chest?  3. NO - Do you have a history of  Heart Failure?  4. NO - Are you troubled by shortness of breath when: walking on the level, up a slight hill or at night?  5. NO - Do you currently have a cold, bronchitis or other respiratory infection?  6. NO - Do you have a cough, shortness of breath or wheezing?  7. NO - Do you sometimes get pains in the calves of your legs when you walk?  8. NO - Do you or anyone in your family have previous history of blood clots?  9. NO - Do you or does anyone in your family have a serious bleeding problem such as prolonged bleeding following surgeries or cuts?  10. NO - Have you ever had problems with anemia or been told to take iron pills?  11. NO - Have you had any abnormal blood loss such as black, tarry or bloody stools, or abnormal vaginal bleeding?  12. NO - Have you ever had a blood transfusion?  13. NO - Have you or any of your relatives ever had problems with anesthesia?  14. NO - Do you have sleep apnea, excessive snoring or daytime drowsiness?  15. NO  - Do you have any prosthetic heart valves?  16. NO - Do you have prosthetic joints?  17. NO - Is there any chance that you may be pregnant?      HPI:     HPI related to upcoming procedure: non cancerous parotid gland growth and is growing.   Scheduled for removal.      See problem list for active medical problems.  Problems all longstanding and stable, except as noted/documented.  See ROS for pertinent symptoms related to these conditions.      MEDICAL HISTORY:     Patient Active Problem List    Diagnosis Date Noted     Painful diabetic neuropathy (H) 09/29/2019     Priority: Medium     Mild major depression (H) 09/09/2019     Priority: Medium     Ingrown toenail 07/17/2018     Priority: Medium     Type 2 diabetes mellitus with diabetic nephropathy, without long-term current use of insulin (H) 11/03/2017     Priority: Medium     Microalbuminuria due to type 2 diabetes mellitus (H) 11/23/2015     Priority: Medium     Health Care Home 11/20/2013     Priority: Medium     State Tier Level:  Tier 3           GERD (gastroesophageal reflux disease) 05/03/2013     Priority: Medium     Cataract 04/10/2013     Priority: Medium     Utility update for deleted IMO code  [employer] Update utility       Plantar fasciitis 06/21/2011     Priority: Medium     Coronary atherosclerosis      Priority: Medium     Coronary artery disease. 2 stents in [place]. Normal nuclear stress in 2011.  Problem list name updated by automated process. Provider to review       Essential hypertension      Priority: Medium     Essential hypertension  Problem list name updated by automated process. 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     Stented coronary artery      Type II or unspecified type diabetes mellitus without mention of complication, not stated as uncontrolled     Diabetes mellitus     Unspecified essential hypertension     Essential hypertension     Past Surgical History:   Procedure Laterality Date     ANGIOPLASTY  2007    rca     ANGIOPLASTY  2010    om     ARTHROTOMY SHOULDER, ROTATOR CUFF REPAIR, COMBINED Left 4/12/2017    Procedure: COMBINED ARTHROTOMY SHOULDER, ROTATOR CUFF REPAIR;  Surgeon: Deeajy Conrad MD;  Location: [facility]     ARTHROTOMY SHOULDER, SUBACROMIAL DECOMPRESSION, COMBINED Left 4/12/2017    Procedure: COMBINED ARTHROTOMY SHOULDER, SUBACROMIAL DECOMPRESSION;  Surgeon: Deejay Conrad MD;  Location: [facility]     COLONOSCOPY       DECOMPRESSION CUBITAL TUNNEL  11/29/2013    Procedure: DECOMPRESSION CUBITAL TUNNEL;;  Surgeon: Radha Cain MD;  Location: [facility]     ENDOSCOPIC RELEASE CARPAL TUNNEL  11/29/2013    Procedure: ENDOSCOPIC RELEASE CARPAL TUNNEL;  LEFT ENDOSCOPIC CARPAL TUNNEL RELEASE AND CUBITAL TUNNEL RELEASE ;  Surgeon: Radha Cain MD;  Location: [facility]     OPEN REDUCTION INTERNAL FIXATION HIP NAILING  3/30/2012    Procedure:OPEN REDUCTION INTERNAL FIXATION HIP NAILING; Biopsy, Curettage and Bone Grafting Right Hip Lesion, Placement of Hip Screw; Surgeon:MARILIN GAY; [facility]     PHACOEMULSIFICATION CLEAR CORNEA WITH STANDARD INTRAOCULAR LENS IMPLANT  5/8/2013    Procedure: PHACOEMULSIFICATION CLEAR CORNEA WITH STANDARD INTRAOCULAR LENS IMPLANT;  RIGHT EYE PHACOEMULSIFICATION CLEAR CORNEA WITH STANDARD INTRAOCULAR LENS IMPLANT ;  Surgeon: Jovani Pretty MD;  Location: [facility]     PHACOEMULSIFICATION CLEAR CORNEA WITH STANDARD INTRAOCULAR LENS IMPLANT  5/20/2013    Procedure:  PHACOEMULSIFICATION CLEAR CORNEA WITH STANDARD INTRAOCULAR LENS IMPLANT;  LEFT  EYE PHACOEMULSIFICATION CLEAR CORNEA WITH STANDARD INTRAOCULAR LENS IMPLANT ;  Surgeon: Jovani Pretty MD;  Location: [facility]       Current Outpatient Medications   Medication Sig Dispense Refill     amitriptyline (ELAVIL) 10 MG tablet Take 1 tablet (10 mg) by mouth At Bedtime 90 tablet 3     atorvastatin (LIPITOR) 10 MG tablet TAKE 1 TABLET BY MOUTH ONCE DAILY 30 tablet 11     blood glucose (NO BRAND SPECIFIED) test strip Use to test blood sugar 2 times daily or as directed. (Patient not taking: Reported on 11/11/2019) 100 strip 3     blood glucose monitoring (ONE TOUCH DELICA) lancets USE 1  TO CHECK GLUCOSE ONCE DAILY OR  AS  DIRECTED (Patient not taking: Reported on 11/11/2019) 100 each 11     blood glucose monitoring (ONE TOUCH ULTRA 2) meter device kit USE TO TEST BLOOD SUGARS ONCE DAILY OR AS DIRECTED (Patient not taking: Reported on 11/11/2019) 1 kit 0     clopidogrel (PLAVIX) 75 MG tablet TAKE 1 TABLET BY MOUTH ONCE DAILY (Patient not taking: Reported on 11/11/2019) 30 tablet 11     diazepam (VALIUM) 5 MG tablet Take 1 tablet (5 mg) by mouth as needed for anxiety (take one pill 30 min before and another directly before as needed) (Patient not taking: Reported on 11/11/2019) 2 tablet 0     glipiZIDE (GLUCOTROL XL) 5 MG 24 hr tablet TAKE 1 TABLET BY MOUTH ONCE DAILY 30 tablet 0     hydrochlorothiazide (HYDRODIURIL) 25 MG tablet TAKE 1 TABLET BY MOUTH ONCE DAILY 90 tablet 0     IBUPROFEN PO Take 200 mg by mouth daily       isosorbide mononitrate (IMDUR) 30 MG 24 hr tablet TAKE 1 TABLET BY MOUTH ONCE DAILY 30 tablet 11     lisinopril (PRINIVIL/ZESTRIL) 20 MG tablet TAKE 1 TABLET BY MOUTH ONCE DAILY 90 tablet 1     metFORMIN (GLUCOPHAGE-XR) 500 MG 24 hr tablet Take 2 tablets (1,000 mg) by mouth 2 times daily (with meals) 360 tablet 0     metoprolol tartrate (LOPRESSOR) 25 MG tablet Take 1 tablet (25 mg) by mouth 2 times  daily 180 tablet 3     ONETOUCH ULTRA test strip Test twice daily (Patient not taking: Reported on 11/11/2019) 100 strip 3     pantoprazole (PROTONIX) 20 MG EC tablet Take 1 tablet (20 mg) by mouth daily Take by mouth 30-60 minutes before a meal. 90 tablet 1     tiZANidine (ZANAFLEX) 4 MG tablet TAKE 1 TABLET BY MOUTH THREE TIMES DAILY AS NEEDED FOR MUSCLE SPASM (Patient not taking: Reported on 11/11/2019) 30 tablet 1     OTC products: None, except as noted above    Allergies   Allergen Reactions     Crestor [Rosuvastatin] Muscle Pain (Myalgia)      Latex Allergy: NO    Social History     Tobacco Use     Smoking status: Current Every Day Smoker     Packs/day: 1.00     Years: 40.00     Pack years: 40.00     Types: Cigarettes     Smokeless tobacco: Never Used   Substance Use Topics     Alcohol use: Not Currently     Alcohol/week: 0.0 standard drinks     Comment: none     History   Drug Use No       REVIEW OF SYSTEMS:   Constitutional, HEENT, cardiovascular, pulmonary, gi and gu systems are negative, except as otherwise noted.    EXAM:   BP (!) 142/98   Pulse 70   Resp 16   Wt 79.6 kg (175 lb 8 oz)   SpO2 98%   BMI 29.20 kg/m      GENERAL APPEARANCE: healthy, alert and no distress     EYES: EOMI,  PERRL     HENT: ear canals and TM's normal, nose and mouth without ulcers or lesions and growth L face     NECK: no adenopathy, no asymmetry, masses, or scars and thyroid normal to palpation     RESP: lungs clear to auscultation - no rales, rhonchi or wheezes     CV: regular rates and rhythm, normal S1 S2, no S3 or S4 and no murmur, click or rub     ABDOMEN:  soft, nontender, no HSM or masses and bowel sounds normal     MS: extremities normal- no gross deformities noted, no evidence of inflammation in joints, FROM in all extremities.     SKIN: no suspicious lesions or rashes     NEURO: Normal strength and tone, sensory exam grossly normal, mentation intact and speech normal     PSYCH: mentation appears normal. and affect  normal/bright     LYMPHATICS: No cervical adenopathy    DIAGNOSTICS:   No new testing required    Recent Labs   Lab Test 10/21/19  1415 10/21/19  1410 07/08/19  1638 07/08/19  1630  12/12/18  1255  01/13/18  0840  04/12/17  0943   HGB  --  15.0 15.5  --    < >  --    < > 15.1  --  16.0   PLT  --  288 275  --    < >  --    < > 257  --   --    INR  --   --   --   --   --   --   --  0.87  --  0.88     --   --  140  --   --    < > 136  --  134   POTASSIUM 4.7  --   --  5.3*  --   --    < > 4.4  --  4.7   CR 1.53*  --   --  1.18  --   --    < > 1.13  --  1.14   A1C  --   --   --  7.5*  --  7.2*   < >  --    < >  --     < > = values in this interval not displayed.        IMPRESSION:   Reason for surgery/procedure: L parotid mass  Diagnosis/reason for consult: preoperative clearance    The proposed surgical procedure is considered LOW risk.    REVISED CARDIAC RISK INDEX  The patient has the following serious cardiovascular risks for perioperative complications such as (MI, PE, VFib and 3  AV Block):  No serious cardiac risks  INTERPRETATION: 0 risks: Class I (very low risk - 0.4% complication rate)    The patient has the following additional risks for perioperative complications:  No identified additional risks      ICD-10-CM    1. Preop general physical exam Z01.818    2. Type 2 diabetes mellitus with diabetic nephropathy, without long-term current use of insulin (H) E11.21    3. Mixed hyperlipidemia E78.2    4. Atherosclerosis of coronary artery of native heart without angina pectoris, unspecified vessel or lesion type I25.10    5. Essential hypertension I10        RECOMMENDATIONS:     --Patient is to take all scheduled medications on the day of surgery EXCEPT for modifications listed below.    APPROVAL GIVEN to proceed with proposed procedure, without further diagnostic evaluation     Off plavix and asa for surgery  Off glipizide day of surgery    Signed Electronically by: Kelechi Tim MD    Copy of this  evaluation report is provided to requesting physician.    [facility] Preop Guidelines    Revised Cardiac Risk Index

## 2019-11-25 DIAGNOSIS — E11.21 TYPE 2 DIABETES MELLITUS WITH DIABETIC NEPHROPATHY, WITHOUT LONG-TERM CURRENT USE OF INSULIN (H): Primary | ICD-10-CM

## 2019-11-25 DIAGNOSIS — E11.21 TYPE 2 DIABETES MELLITUS WITH DIABETIC NEPHROPATHY, WITHOUT LONG-TERM CURRENT USE OF INSULIN (H): ICD-10-CM

## 2019-11-25 DIAGNOSIS — I10 ESSENTIAL HYPERTENSION: ICD-10-CM

## 2019-11-25 PROBLEM — M85.40 SOLITARY BONE CYST: Status: RESOLVED | Noted: 2019-11-25 | Resolved: 2019-11-25

## 2019-11-25 PROBLEM — L60.0 INGROWN TOENAIL: Status: RESOLVED | Noted: 2018-07-17 | Resolved: 2019-11-25

## 2019-11-25 RX ORDER — METFORMIN HCL 500 MG
1000 TABLET, EXTENDED RELEASE 24 HR ORAL 2 TIMES DAILY WITH MEALS
Qty: 360 TABLET | Refills: 0 | Status: SHIPPED | OUTPATIENT
Start: 2019-11-25 | End: 2020-03-19

## 2019-11-25 NOTE — TELEPHONE ENCOUNTER
HYDROCHLOROT   LOV (pre op) 11/21/19  Last Labs 10/21/19    BP Readings from Last 3 Encounters:   11/21/19 130/80   11/12/19 (!) 140/95   11/11/19 (!) 144/86     Last Comprehensive Metabolic Panel:  Sodium   Date Value Ref Range Status   10/21/2019 137 134 - 144 mmol/L Final     Potassium   Date Value Ref Range Status   10/21/2019 4.7 3.5 - 5.2 mmol/L Final     Chloride   Date Value Ref Range Status   10/21/2019 101 96 - 106 mmol/L Final     Carbon Dioxide   Date Value Ref Range Status   01/13/2018 28 20 - 32 mmol/L Final     Anion Gap   Date Value Ref Range Status   01/13/2018 5 3 - 14 mmol/L Final     Glucose   Date Value Ref Range Status   10/21/2019 276 (H) 65 - 99 mg/dL Final     Urea Nitrogen   Date Value Ref Range Status   10/21/2019 21 8 - 27 mg/dL Final     BUN/Creatinine Ratio   Date Value Ref Range Status   10/21/2019 14 10 - 24 Final     Creatinine   Date Value Ref Range Status   10/21/2019 1.53 (H) 0.76 - 1.27 mg/dL Final     GFR Estimate   Date Value Ref Range Status   01/13/2018 66 >60 mL/min/1.7m2 Final     Comment:     Non  GFR Calc     Calcium   Date Value Ref Range Status   10/21/2019 9.7 8.6 - 10.2 mg/dL Final

## 2019-11-26 ENCOUNTER — ANESTHESIA EVENT (OUTPATIENT)
Dept: SURGERY | Facility: CLINIC | Age: 59
End: 2019-11-26
Payer: COMMERCIAL

## 2019-11-26 ENCOUNTER — ANESTHESIA (OUTPATIENT)
Dept: SURGERY | Facility: CLINIC | Age: 59
End: 2019-11-26
Payer: COMMERCIAL

## 2019-11-26 ENCOUNTER — HOSPITAL ENCOUNTER (OUTPATIENT)
Facility: CLINIC | Age: 59
Discharge: HOME | End: 2019-11-27
Attending: OTOLARYNGOLOGY | Admitting: OTOLARYNGOLOGY
Payer: COMMERCIAL

## 2019-11-26 DIAGNOSIS — D11.9 WARTHIN TUMOR: Primary | ICD-10-CM

## 2019-11-26 LAB
GLUCOSE BLDC GLUCOMTR-MCNC: 142 MG/DL (ref 70–99)
GLUCOSE BLDC GLUCOMTR-MCNC: 175 MG/DL (ref 70–99)
GLUCOSE BLDC GLUCOMTR-MCNC: 193 MG/DL (ref 70–99)

## 2019-11-26 PROCEDURE — 40000306 ZZH STATISTIC PRE PROC ASSESS II: Performed by: OTOLARYNGOLOGY

## 2019-11-26 PROCEDURE — 88307 TISSUE EXAM BY PATHOLOGIST: CPT | Mod: 26 | Performed by: OTOLARYNGOLOGY

## 2019-11-26 PROCEDURE — 25000128 H RX IP 250 OP 636: Performed by: NURSE ANESTHETIST, CERTIFIED REGISTERED

## 2019-11-26 PROCEDURE — 25800030 ZZH RX IP 258 OP 636: Performed by: OTOLARYNGOLOGY

## 2019-11-26 PROCEDURE — 25000125 ZZHC RX 250: Performed by: NURSE ANESTHETIST, CERTIFIED REGISTERED

## 2019-11-26 PROCEDURE — 25000128 H RX IP 250 OP 636: Performed by: OTOLARYNGOLOGY

## 2019-11-26 PROCEDURE — 99202 OFFICE O/P NEW SF 15 MIN: CPT | Performed by: PHYSICIAN ASSISTANT

## 2019-11-26 PROCEDURE — 88307 TISSUE EXAM BY PATHOLOGIST: CPT | Performed by: OTOLARYNGOLOGY

## 2019-11-26 PROCEDURE — 37000008 ZZH ANESTHESIA TECHNICAL FEE, 1ST 30 MIN: Performed by: OTOLARYNGOLOGY

## 2019-11-26 PROCEDURE — 36000063 ZZH SURGERY LEVEL 4 EA 15 ADDTL MIN: Performed by: OTOLARYNGOLOGY

## 2019-11-26 PROCEDURE — 25000128 H RX IP 250 OP 636: Performed by: ANESTHESIOLOGY

## 2019-11-26 PROCEDURE — 36000093 ZZH SURGERY LEVEL 4 1ST 30 MIN: Performed by: OTOLARYNGOLOGY

## 2019-11-26 PROCEDURE — 25000125 ZZHC RX 250: Performed by: OTOLARYNGOLOGY

## 2019-11-26 PROCEDURE — 25000132 ZZH RX MED GY IP 250 OP 250 PS 637: Performed by: OTOLARYNGOLOGY

## 2019-11-26 PROCEDURE — 99207 ZZC CDG-CODE CATEGORY CHANGED: CPT | Performed by: PHYSICIAN ASSISTANT

## 2019-11-26 PROCEDURE — 25000132 ZZH RX MED GY IP 250 OP 250 PS 637: Performed by: PHYSICIAN ASSISTANT

## 2019-11-26 PROCEDURE — 25800030 ZZH RX IP 258 OP 636: Performed by: NURSE ANESTHETIST, CERTIFIED REGISTERED

## 2019-11-26 PROCEDURE — 27210794 ZZH OR GENERAL SUPPLY STERILE: Performed by: OTOLARYNGOLOGY

## 2019-11-26 PROCEDURE — 71000013 ZZH RECOVERY PHASE 1 LEVEL 1 EA ADDTL HR: Performed by: OTOLARYNGOLOGY

## 2019-11-26 PROCEDURE — 37000009 ZZH ANESTHESIA TECHNICAL FEE, EACH ADDTL 15 MIN: Performed by: OTOLARYNGOLOGY

## 2019-11-26 PROCEDURE — 71000012 ZZH RECOVERY PHASE 1 LEVEL 1 FIRST HR: Performed by: OTOLARYNGOLOGY

## 2019-11-26 PROCEDURE — 82962 GLUCOSE BLOOD TEST: CPT

## 2019-11-26 RX ORDER — SODIUM CHLORIDE, SODIUM LACTATE, POTASSIUM CHLORIDE, CALCIUM CHLORIDE 600; 310; 30; 20 MG/100ML; MG/100ML; MG/100ML; MG/100ML
INJECTION, SOLUTION INTRAVENOUS CONTINUOUS
Status: DISCONTINUED | OUTPATIENT
Start: 2019-11-26 | End: 2019-11-26 | Stop reason: HOSPADM

## 2019-11-26 RX ORDER — DEXAMETHASONE SODIUM PHOSPHATE 10 MG/ML
10 INJECTION, SOLUTION INTRAMUSCULAR; INTRAVENOUS ONCE
Status: DISCONTINUED | OUTPATIENT
Start: 2019-11-26 | End: 2019-11-26 | Stop reason: HOSPADM

## 2019-11-26 RX ORDER — OXYCODONE HYDROCHLORIDE 5 MG/1
5-10 TABLET ORAL
Status: DISCONTINUED | OUTPATIENT
Start: 2019-11-26 | End: 2019-11-27 | Stop reason: HOSPADM

## 2019-11-26 RX ORDER — ACETAMINOPHEN 325 MG/1
650 TABLET ORAL ONCE
Status: COMPLETED | OUTPATIENT
Start: 2019-11-26 | End: 2019-11-26

## 2019-11-26 RX ORDER — METOPROLOL TARTRATE 25 MG/1
25 TABLET, FILM COATED ORAL 2 TIMES DAILY
Status: DISCONTINUED | OUTPATIENT
Start: 2019-11-26 | End: 2019-11-27 | Stop reason: HOSPADM

## 2019-11-26 RX ORDER — NICOTINE POLACRILEX 4 MG
15-30 LOZENGE BUCCAL
Status: DISCONTINUED | OUTPATIENT
Start: 2019-11-26 | End: 2019-11-27 | Stop reason: HOSPADM

## 2019-11-26 RX ORDER — CALCIUM CARBONATE 500 MG/1
1000 TABLET, CHEWABLE ORAL 4 TIMES DAILY PRN
Status: DISCONTINUED | OUTPATIENT
Start: 2019-11-26 | End: 2019-11-27 | Stop reason: HOSPADM

## 2019-11-26 RX ORDER — DEXTROSE MONOHYDRATE 25 G/50ML
25-50 INJECTION, SOLUTION INTRAVENOUS
Status: DISCONTINUED | OUTPATIENT
Start: 2019-11-26 | End: 2019-11-27 | Stop reason: HOSPADM

## 2019-11-26 RX ORDER — FENTANYL CITRATE 50 UG/ML
INJECTION, SOLUTION INTRAMUSCULAR; INTRAVENOUS PRN
Status: DISCONTINUED | OUTPATIENT
Start: 2019-11-26 | End: 2019-11-26

## 2019-11-26 RX ORDER — LIDOCAINE 40 MG/G
CREAM TOPICAL
Status: DISCONTINUED | OUTPATIENT
Start: 2019-11-26 | End: 2019-11-27 | Stop reason: HOSPADM

## 2019-11-26 RX ORDER — NALOXONE HYDROCHLORIDE 0.4 MG/ML
.1-.4 INJECTION, SOLUTION INTRAMUSCULAR; INTRAVENOUS; SUBCUTANEOUS
Status: DISCONTINUED | OUTPATIENT
Start: 2019-11-26 | End: 2019-11-27 | Stop reason: HOSPADM

## 2019-11-26 RX ORDER — SODIUM CHLORIDE, SODIUM LACTATE, POTASSIUM CHLORIDE, CALCIUM CHLORIDE 600; 310; 30; 20 MG/100ML; MG/100ML; MG/100ML; MG/100ML
INJECTION, SOLUTION INTRAVENOUS CONTINUOUS
Status: DISCONTINUED | OUTPATIENT
Start: 2019-11-26 | End: 2019-11-27 | Stop reason: HOSPADM

## 2019-11-26 RX ORDER — GLYCOPYRROLATE 0.2 MG/ML
INJECTION, SOLUTION INTRAMUSCULAR; INTRAVENOUS PRN
Status: DISCONTINUED | OUTPATIENT
Start: 2019-11-26 | End: 2019-11-26

## 2019-11-26 RX ORDER — ISOSORBIDE MONONITRATE 30 MG/1
30 TABLET, EXTENDED RELEASE ORAL DAILY
Status: DISCONTINUED | OUTPATIENT
Start: 2019-11-26 | End: 2019-11-27 | Stop reason: HOSPADM

## 2019-11-26 RX ORDER — BACITRACIN ZINC 500 [USP'U]/G
OINTMENT TOPICAL PRN
Status: DISCONTINUED | OUTPATIENT
Start: 2019-11-26 | End: 2019-11-26 | Stop reason: HOSPADM

## 2019-11-26 RX ORDER — LABETALOL 20 MG/4 ML (5 MG/ML) INTRAVENOUS SYRINGE
10
Status: DISCONTINUED | OUTPATIENT
Start: 2019-11-26 | End: 2019-11-26 | Stop reason: HOSPADM

## 2019-11-26 RX ORDER — FENTANYL CITRATE 50 UG/ML
25-50 INJECTION, SOLUTION INTRAMUSCULAR; INTRAVENOUS
Status: DISCONTINUED | OUTPATIENT
Start: 2019-11-26 | End: 2019-11-26 | Stop reason: HOSPADM

## 2019-11-26 RX ORDER — ACETAMINOPHEN 325 MG/1
975 TABLET ORAL ONCE
Status: COMPLETED | OUTPATIENT
Start: 2019-11-26 | End: 2019-11-26

## 2019-11-26 RX ORDER — ONDANSETRON 2 MG/ML
4 INJECTION INTRAMUSCULAR; INTRAVENOUS EVERY 30 MIN PRN
Status: DISCONTINUED | OUTPATIENT
Start: 2019-11-26 | End: 2019-11-26 | Stop reason: HOSPADM

## 2019-11-26 RX ORDER — CEFAZOLIN SODIUM 2 G/100ML
2 INJECTION, SOLUTION INTRAVENOUS
Status: COMPLETED | OUTPATIENT
Start: 2019-11-26 | End: 2019-11-26

## 2019-11-26 RX ORDER — PROPOFOL 10 MG/ML
INJECTION, EMULSION INTRAVENOUS PRN
Status: DISCONTINUED | OUTPATIENT
Start: 2019-11-26 | End: 2019-11-26

## 2019-11-26 RX ORDER — PANTOPRAZOLE SODIUM 20 MG/1
20 TABLET, DELAYED RELEASE ORAL DAILY
Status: DISCONTINUED | OUTPATIENT
Start: 2019-11-26 | End: 2019-11-27 | Stop reason: HOSPADM

## 2019-11-26 RX ORDER — OXYCODONE HYDROCHLORIDE 5 MG/1
5 TABLET ORAL
Qty: 20 TABLET | Refills: 0 | Status: SHIPPED | OUTPATIENT
Start: 2019-11-26 | End: 2020-03-31

## 2019-11-26 RX ORDER — DIAZEPAM 5 MG
5 TABLET ORAL EVERY 6 HOURS PRN
Status: DISCONTINUED | OUTPATIENT
Start: 2019-11-26 | End: 2019-11-26

## 2019-11-26 RX ORDER — HYDROMORPHONE HYDROCHLORIDE 1 MG/ML
.3-.5 INJECTION, SOLUTION INTRAMUSCULAR; INTRAVENOUS; SUBCUTANEOUS EVERY 5 MIN PRN
Status: DISCONTINUED | OUTPATIENT
Start: 2019-11-26 | End: 2019-11-26 | Stop reason: HOSPADM

## 2019-11-26 RX ORDER — NEOSTIGMINE METHYLSULFATE 1 MG/ML
VIAL (ML) INJECTION PRN
Status: DISCONTINUED | OUTPATIENT
Start: 2019-11-26 | End: 2019-11-26

## 2019-11-26 RX ORDER — DEXAMETHASONE SODIUM PHOSPHATE 4 MG/ML
INJECTION, SOLUTION INTRA-ARTICULAR; INTRALESIONAL; INTRAMUSCULAR; INTRAVENOUS; SOFT TISSUE PRN
Status: DISCONTINUED | OUTPATIENT
Start: 2019-11-26 | End: 2019-11-26

## 2019-11-26 RX ORDER — PROPOFOL 10 MG/ML
INJECTION, EMULSION INTRAVENOUS CONTINUOUS PRN
Status: DISCONTINUED | OUTPATIENT
Start: 2019-11-26 | End: 2019-11-26

## 2019-11-26 RX ORDER — LIDOCAINE HYDROCHLORIDE 10 MG/ML
INJECTION, SOLUTION INFILTRATION; PERINEURAL PRN
Status: DISCONTINUED | OUTPATIENT
Start: 2019-11-26 | End: 2019-11-26

## 2019-11-26 RX ORDER — SODIUM CHLORIDE, SODIUM LACTATE, POTASSIUM CHLORIDE, CALCIUM CHLORIDE 600; 310; 30; 20 MG/100ML; MG/100ML; MG/100ML; MG/100ML
INJECTION, SOLUTION INTRAVENOUS CONTINUOUS PRN
Status: DISCONTINUED | OUTPATIENT
Start: 2019-11-26 | End: 2019-11-26

## 2019-11-26 RX ORDER — METFORMIN HCL 500 MG
1000 TABLET, EXTENDED RELEASE 24 HR ORAL 2 TIMES DAILY WITH MEALS
Status: DISCONTINUED | OUTPATIENT
Start: 2019-11-26 | End: 2019-11-26

## 2019-11-26 RX ORDER — HYDRALAZINE HYDROCHLORIDE 20 MG/ML
2.5-5 INJECTION INTRAMUSCULAR; INTRAVENOUS EVERY 10 MIN PRN
Status: DISCONTINUED | OUTPATIENT
Start: 2019-11-26 | End: 2019-11-26 | Stop reason: HOSPADM

## 2019-11-26 RX ORDER — ONDANSETRON 2 MG/ML
INJECTION INTRAMUSCULAR; INTRAVENOUS PRN
Status: DISCONTINUED | OUTPATIENT
Start: 2019-11-26 | End: 2019-11-26

## 2019-11-26 RX ORDER — GLIPIZIDE 5 MG/1
5 TABLET, FILM COATED, EXTENDED RELEASE ORAL DAILY
Status: DISCONTINUED | OUTPATIENT
Start: 2019-11-26 | End: 2019-11-26

## 2019-11-26 RX ORDER — ONDANSETRON 4 MG/1
4 TABLET, ORALLY DISINTEGRATING ORAL EVERY 6 HOURS PRN
Status: DISCONTINUED | OUTPATIENT
Start: 2019-11-26 | End: 2019-11-27 | Stop reason: HOSPADM

## 2019-11-26 RX ORDER — CEFAZOLIN SODIUM 1 G/3ML
1 INJECTION, POWDER, FOR SOLUTION INTRAMUSCULAR; INTRAVENOUS SEE ADMIN INSTRUCTIONS
Status: DISCONTINUED | OUTPATIENT
Start: 2019-11-26 | End: 2019-11-26 | Stop reason: HOSPADM

## 2019-11-26 RX ORDER — EPHEDRINE SULFATE 50 MG/ML
INJECTION, SOLUTION INTRAMUSCULAR; INTRAVENOUS; SUBCUTANEOUS PRN
Status: DISCONTINUED | OUTPATIENT
Start: 2019-11-26 | End: 2019-11-26

## 2019-11-26 RX ORDER — PROCHLORPERAZINE MALEATE 10 MG
10 TABLET ORAL EVERY 6 HOURS PRN
Status: DISCONTINUED | OUTPATIENT
Start: 2019-11-26 | End: 2019-11-27 | Stop reason: HOSPADM

## 2019-11-26 RX ORDER — LISINOPRIL 20 MG/1
20 TABLET ORAL DAILY
Status: DISCONTINUED | OUTPATIENT
Start: 2019-11-26 | End: 2019-11-27 | Stop reason: HOSPADM

## 2019-11-26 RX ORDER — ONDANSETRON 2 MG/ML
4 INJECTION INTRAMUSCULAR; INTRAVENOUS EVERY 6 HOURS PRN
Status: DISCONTINUED | OUTPATIENT
Start: 2019-11-26 | End: 2019-11-27 | Stop reason: HOSPADM

## 2019-11-26 RX ORDER — ATORVASTATIN CALCIUM 10 MG/1
10 TABLET, FILM COATED ORAL DAILY
Status: DISCONTINUED | OUTPATIENT
Start: 2019-11-26 | End: 2019-11-27 | Stop reason: HOSPADM

## 2019-11-26 RX ORDER — AMITRIPTYLINE HYDROCHLORIDE 10 MG/1
10 TABLET ORAL AT BEDTIME
Status: DISCONTINUED | OUTPATIENT
Start: 2019-11-26 | End: 2019-11-27 | Stop reason: HOSPADM

## 2019-11-26 RX ORDER — HYDROCHLOROTHIAZIDE 25 MG/1
25 TABLET ORAL DAILY
Status: DISCONTINUED | OUTPATIENT
Start: 2019-11-26 | End: 2019-11-26

## 2019-11-26 RX ORDER — HYDROMORPHONE HYDROCHLORIDE 1 MG/ML
0.2 INJECTION, SOLUTION INTRAMUSCULAR; INTRAVENOUS; SUBCUTANEOUS
Status: DISCONTINUED | OUTPATIENT
Start: 2019-11-26 | End: 2019-11-27 | Stop reason: HOSPADM

## 2019-11-26 RX ORDER — ONDANSETRON 4 MG/1
4 TABLET, ORALLY DISINTEGRATING ORAL EVERY 30 MIN PRN
Status: DISCONTINUED | OUTPATIENT
Start: 2019-11-26 | End: 2019-11-26 | Stop reason: HOSPADM

## 2019-11-26 RX ORDER — ACETAMINOPHEN 325 MG/1
650 TABLET ORAL EVERY 6 HOURS PRN
Status: DISCONTINUED | OUTPATIENT
Start: 2019-11-26 | End: 2019-11-27 | Stop reason: HOSPADM

## 2019-11-26 RX ADMIN — PHENYLEPHRINE HYDROCHLORIDE 150 MCG: 10 INJECTION INTRAVENOUS at 15:01

## 2019-11-26 RX ADMIN — SODIUM CHLORIDE, POTASSIUM CHLORIDE, SODIUM LACTATE AND CALCIUM CHLORIDE: 600; 310; 30; 20 INJECTION, SOLUTION INTRAVENOUS at 20:37

## 2019-11-26 RX ADMIN — ONDANSETRON HYDROCHLORIDE 4 MG: 2 INJECTION, SOLUTION INTRAVENOUS at 15:37

## 2019-11-26 RX ADMIN — HYDROMORPHONE HYDROCHLORIDE 0.5 MG: 1 INJECTION, SOLUTION INTRAMUSCULAR; INTRAVENOUS; SUBCUTANEOUS at 17:44

## 2019-11-26 RX ADMIN — PROPOFOL 150 MG: 10 INJECTION, EMULSION INTRAVENOUS at 13:26

## 2019-11-26 RX ADMIN — PHENYLEPHRINE HYDROCHLORIDE 100 MCG: 10 INJECTION INTRAVENOUS at 14:16

## 2019-11-26 RX ADMIN — DEXAMETHASONE SODIUM PHOSPHATE 8 MG: 4 INJECTION, SOLUTION INTRA-ARTICULAR; INTRALESIONAL; INTRAMUSCULAR; INTRAVENOUS; SOFT TISSUE at 13:26

## 2019-11-26 RX ADMIN — PHENYLEPHRINE HYDROCHLORIDE 100 MCG: 10 INJECTION INTRAVENOUS at 13:46

## 2019-11-26 RX ADMIN — PHENYLEPHRINE HYDROCHLORIDE 100 MCG: 10 INJECTION INTRAVENOUS at 15:25

## 2019-11-26 RX ADMIN — GLYCOPYRROLATE 0.2 MG: 0.2 INJECTION, SOLUTION INTRAMUSCULAR; INTRAVENOUS at 13:26

## 2019-11-26 RX ADMIN — PANTOPRAZOLE SODIUM 20 MG: 20 TABLET, DELAYED RELEASE ORAL at 20:37

## 2019-11-26 RX ADMIN — ACETAMINOPHEN 975 MG: 325 TABLET, FILM COATED ORAL at 12:09

## 2019-11-26 RX ADMIN — Medication 2 MG: at 15:47

## 2019-11-26 RX ADMIN — SODIUM CHLORIDE, POTASSIUM CHLORIDE, SODIUM LACTATE AND CALCIUM CHLORIDE: 600; 310; 30; 20 INJECTION, SOLUTION INTRAVENOUS at 15:30

## 2019-11-26 RX ADMIN — PHENYLEPHRINE HYDROCHLORIDE 150 MCG: 10 INJECTION INTRAVENOUS at 15:09

## 2019-11-26 RX ADMIN — GLYCOPYRROLATE 0.2 MG: 0.2 INJECTION, SOLUTION INTRAMUSCULAR; INTRAVENOUS at 15:47

## 2019-11-26 RX ADMIN — FENTANYL CITRATE 50 MCG: 50 INJECTION, SOLUTION INTRAMUSCULAR; INTRAVENOUS at 14:49

## 2019-11-26 RX ADMIN — CEFAZOLIN 1 G: 1 INJECTION, POWDER, FOR SOLUTION INTRAMUSCULAR; INTRAVENOUS at 15:22

## 2019-11-26 RX ADMIN — FENTANYL CITRATE 50 MCG: 50 INJECTION, SOLUTION INTRAMUSCULAR; INTRAVENOUS at 16:23

## 2019-11-26 RX ADMIN — AMITRIPTYLINE HYDROCHLORIDE 10 MG: 10 TABLET, FILM COATED ORAL at 22:11

## 2019-11-26 RX ADMIN — PROPOFOL 50 MCG/KG/MIN: 10 INJECTION, EMULSION INTRAVENOUS at 13:42

## 2019-11-26 RX ADMIN — FENTANYL CITRATE 50 MCG: 50 INJECTION, SOLUTION INTRAMUSCULAR; INTRAVENOUS at 14:37

## 2019-11-26 RX ADMIN — SODIUM CHLORIDE, POTASSIUM CHLORIDE, SODIUM LACTATE AND CALCIUM CHLORIDE: 600; 310; 30; 20 INJECTION, SOLUTION INTRAVENOUS at 13:23

## 2019-11-26 RX ADMIN — LIDOCAINE HYDROCHLORIDE 30 MG: 10 INJECTION, SOLUTION INFILTRATION; PERINEURAL at 13:26

## 2019-11-26 RX ADMIN — METOPROLOL TARTRATE 25 MG: 25 TABLET ORAL at 20:37

## 2019-11-26 RX ADMIN — FENTANYL CITRATE 50 MCG: 50 INJECTION, SOLUTION INTRAMUSCULAR; INTRAVENOUS at 16:47

## 2019-11-26 RX ADMIN — CEFAZOLIN SODIUM 2 G: 2 INJECTION, SOLUTION INTRAVENOUS at 13:23

## 2019-11-26 RX ADMIN — PHENYLEPHRINE HYDROCHLORIDE 100 MCG: 10 INJECTION INTRAVENOUS at 13:58

## 2019-11-26 RX ADMIN — Medication 10 MG: at 15:07

## 2019-11-26 RX ADMIN — ROCURONIUM BROMIDE 40 MG: 10 INJECTION INTRAVENOUS at 13:26

## 2019-11-26 RX ADMIN — PHENYLEPHRINE HYDROCHLORIDE 100 MCG: 10 INJECTION INTRAVENOUS at 13:50

## 2019-11-26 RX ADMIN — MIDAZOLAM 2 MG: 1 INJECTION INTRAMUSCULAR; INTRAVENOUS at 13:23

## 2019-11-26 RX ADMIN — PHENYLEPHRINE HYDROCHLORIDE 100 MCG: 10 INJECTION INTRAVENOUS at 14:26

## 2019-11-26 RX ADMIN — FENTANYL CITRATE 150 MCG: 50 INJECTION, SOLUTION INTRAMUSCULAR; INTRAVENOUS at 13:26

## 2019-11-26 RX ADMIN — ACETAMINOPHEN 650 MG: 325 TABLET, FILM COATED ORAL at 18:24

## 2019-11-26 RX ADMIN — HYDROMORPHONE HYDROCHLORIDE 0.5 MG: 1 INJECTION, SOLUTION INTRAMUSCULAR; INTRAVENOUS; SUBCUTANEOUS at 18:07

## 2019-11-26 ASSESSMENT — LIFESTYLE VARIABLES: TOBACCO_USE: 1

## 2019-11-26 ASSESSMENT — MIFFLIN-ST. JEOR: SCORE: 1522.05

## 2019-11-26 NOTE — BRIEF OP NOTE
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  Complications: None.  Implants: * No implants in log *     Rafat Carlin M.D.

## 2019-11-26 NOTE — ANESTHESIA POSTPROCEDURE EVALUATION
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    Anesthesia Type:  General, ETT    Note:  Anesthesia Post Evaluation    Patient location during evaluation: PACU  Patient participation: Able to fully participate in evaluation  Level of consciousness: awake  Pain management: adequate  Airway patency: patent  Cardiovascular status: acceptable  Respiratory status: acceptable  Hydration status: acceptable  PONV: controlled     Anesthetic complications: None          Last vitals:  Vitals:    11/26/19 1715 11/26/19 1730 11/26/19 1745   BP: 132/77 133/77    Pulse:      Resp: 29 24 16   Temp:      SpO2: 100% 100% 100%         Electronically Signed By: Nik Monroe MD  November 26, 2019  5:51 PM

## 2019-11-26 NOTE — ANESTHESIA PREPROCEDURE EVALUATION
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     Stented coronary artery      Type II or unspecified type diabetes mellitus without mention of complication, not stated as uncontrolled     Diabetes mellitus     Unspecified essential hypertension     Essential hypertension     Past Surgical History:   Procedure Laterality Date     ANGIOPLASTY      rca     ANGIOPLASTY      om     ARTHROTOMY SHOULDER, ROTATOR CUFF REPAIR, COMBINED Left 2017    Procedure: COMBINED ARTHROTOMY SHOULDER, ROTATOR CUFF REPAIR;  Surgeon: Deejay Conrad MD;  Location: [facility]     ARTHROTOMY SHOULDER, SUBACROMIAL DECOMPRESSION, COMBINED Left 2017    Procedure: COMBINED ARTHROTOMY SHOULDER, SUBACROMIAL DECOMPRESSION;  Surgeon: Deejay Conrad MD;  Location: [facility]     COLONOSCOPY       DECOMPRESSION CUBITAL TUNNEL  2013    Procedure: DECOMPRESSION CUBITAL TUNNEL;;  Surgeon: Radha Cain MD;  Location: [facility]     ENDOSCOPIC RELEASE CARPAL TUNNEL  2013    Procedure: ENDOSCOPIC RELEASE CARPAL TUNNEL;  LEFT ENDOSCOPIC CARPAL TUNNEL RELEASE AND CUBITAL TUNNEL RELEASE ;  Surgeon: Radha Cain MD;  Location: [facility]     OPEN REDUCTION INTERNAL FIXATION HIP NAILING  3/30/2012    Procedure:OPEN REDUCTION INTERNAL FIXATION HIP NAILING; Biopsy, Curettage and Bone Grafting Right  Hip Lesion, Placement of Hip Screw; Surgeon:MARILIN GAY; Location:[facility]     PAROTIDECTOMY Right ~1990    [place]     PHACOEMULSIFICATION CLEAR CORNEA WITH STANDARD INTRAOCULAR LENS IMPLANT  5/8/2013    Procedure: PHACOEMULSIFICATION CLEAR CORNEA WITH STANDARD INTRAOCULAR LENS IMPLANT;  RIGHT EYE PHACOEMULSIFICATION CLEAR CORNEA WITH STANDARD INTRAOCULAR LENS IMPLANT ;  Surgeon: Jovani Pretty MD;  Location: [facility]     PHACOEMULSIFICATION CLEAR CORNEA WITH STANDARD INTRAOCULAR LENS IMPLANT  5/20/2013    Procedure: PHACOEMULSIFICATION CLEAR CORNEA WITH STANDARD INTRAOCULAR LENS IMPLANT;  LEFT  EYE PHACOEMULSIFICATION CLEAR CORNEA WITH STANDARD INTRAOCULAR LENS IMPLANT ;  Surgeon: Jovani Pretty MD;  Location: [facility]     STENT       Anesthesia Evaluation     .             ROS/MED HX    ENT/Pulmonary:     (+)tobacco use, Current use , . .    Neurologic:  - neg neurologic ROS     Cardiovascular:     (+) hypertension--CAD, --stent,. Taking blood thinners : . . . :. .       METS/Exercise Tolerance:     Hematologic:  - neg hematologic  ROS       Musculoskeletal:  - neg musculoskeletal ROS       GI/Hepatic:     (+) GERD       Renal/Genitourinary:  - ROS Renal section negative       Endo:     (+) type II DM .      Psychiatric:  - neg psychiatric ROS       Infectious Disease:  - neg infectious disease ROS       Malignancy:         Other: Comment: .Lab Test        10/21/19     07/08/19     12/12/18      --          01/13/18 04/12/17 04/05/17                       1410          1638          1432           --           0840          0943          0857          WBC          7.0          8.5          9.9            < >        7.6           --           --           HGB          15.0         15.5         16.2           < >        15.1         16.0         15.9          MCV          94.1         94.3         93.7           < >        92            --           --           PLT          288          275          278             < >        257           --           --           INR           --           --           --           --          0.87         0.88         0.95           < > = values in this interval not displayed.                  Lab Test        10/21/19     07/08/19     12/12/18     01/13/18     04/12/17     04/05/17                       1415          1630          1254          0840          0943          0857          NA           137          140          137          136          134          137           POTASSIUM    4.7          5.3*         4.6          4.4          4.7          4.1           CHLORIDE     101          102          98           103          100          105           CO2           --           --           --          28           28           24            BUN          21  14      15  13      18  13 19 19           25            CR           1.53*        1.18         1.43*        1.13         1.14         1.34*         ANIONGAP      --           --           --          5            6            8             ONEL          9.7          9.6          9.4          8.6          9.3          8.7           GLC          276*         86           112*         187*         206*         179*                                   Physical Exam  Normal systems: cardiovascular and pulmonary    Airway   Mallampati: II    Dental     Cardiovascular   Rhythm and rate: regular and normal      Pulmonary    breath sounds clear to auscultation            Lab Results   Component Value Date    WBC 7.0 10/21/2019    HGB 15.0 10/21/2019    HCT 44.5 10/21/2019     10/21/2019    SED 12 06/28/2016     10/21/2019    POTASSIUM 4.7 10/21/2019    CHLORIDE 101 10/21/2019    CO2 28 01/13/2018    BUN 21 10/21/2019    BUN 14 10/21/2019    CR 1.53 (H) 10/21/2019     (H) 10/21/2019    ONEL 9.7 10/21/2019    MAG 2.0 04/01/2012    ALBUMIN 4.3 12/12/2018    PROTTOTAL 7.4 12/12/2018    ALT 32 12/12/2018  "   AST 18 12/12/2018    ALKPHOS 57 12/12/2018    BILITOTAL 0.4 12/12/2018    BILIDIRECT 0.11 12/12/2018    LIPASE 75 (H) 06/28/2016    PTT 28 01/13/2018    INR 0.87 01/13/2018    T4 1.27 12/12/2018       Preop Vitals  BP Readings from Last 3 Encounters:   11/26/19 (!) 130/96   11/21/19 130/80   11/12/19 (!) 140/95    Pulse Readings from Last 3 Encounters:   11/26/19 84   11/21/19 70   11/11/19 63      Resp Readings from Last 3 Encounters:   11/26/19 16   11/21/19 16   11/12/19 16    SpO2 Readings from Last 3 Encounters:   11/26/19 98%   11/21/19 98%   11/12/19 100%      Temp Readings from Last 1 Encounters:   11/26/19 97.1  F (36.2  C) (Temporal)    Ht Readings from Last 1 Encounters:   11/26/19 1.651 m (5' 5\")      Wt Readings from Last 1 Encounters:   11/26/19 79 kg (174 lb 3.2 oz)    Estimated body mass index is 28.99 kg/m  as calculated from the following:    Height as of this encounter: 1.651 m (5' 5\").    Weight as of this encounter: 79 kg (174 lb 3.2 oz).       Anesthesia Plan      History & Physical Review  History and physical reviewed and following examination; no interval change.    ASA Status:  3 .        Plan for General and ETT with Intravenous induction. Maintenance will be Inhalation and Balanced.    PONV prophylaxis:  Ondansetron (or other 5HT-3) and Dexamethasone or Solumedrol       Postoperative Care      Consents  Anesthetic plan, risks, benefits and alternatives discussed with:  Patient or representative..                 Dennis Byrne DO                    .  "

## 2019-11-26 NOTE — ANESTHESIA CARE TRANSFER NOTE
Patient: Emily Permaul    Procedure(s):  Left Parotidectomy with Facial Nerve Monitoring    Diagnosis: Left parotid adenoma [D11.0]  Diagnosis Additional Information: No value filed.    Anesthesia Type:   General, ETT     Note:    Patient transferred to:PACU  Comments: Pt spont resps to PACU VSS report to RNHandoff Report: Identifed the Patient, Identified the Reponsible Provider, Reviewed the pertinent medical history, Discussed the surgical course, Reviewed Intra-OP anesthesia mangement and issues during anesthesia, Set expectations for post-procedure period and Allowed opportunity for questions and acknowledgement of understanding      Vitals: (Last set prior to Anesthesia Care Transfer)    CRNA VITALS  11/26/2019 1533 - 11/26/2019 1608      11/26/2019             Pulse:  68    SpO2:  100 %                Electronically Signed By: CARROL Simmons CRNA  November 26, 2019  4:08 PM

## 2019-11-27 VITALS
TEMPERATURE: 96.7 F | SYSTOLIC BLOOD PRESSURE: 131 MMHG | HEART RATE: 78 BPM | RESPIRATION RATE: 16 BRPM | HEIGHT: 65 IN | BODY MASS INDEX: 29.02 KG/M2 | WEIGHT: 174.2 LBS | DIASTOLIC BLOOD PRESSURE: 74 MMHG | OXYGEN SATURATION: 98 %

## 2019-11-27 LAB
COPATH REPORT: NORMAL
GLUCOSE BLDC GLUCOMTR-MCNC: 162 MG/DL (ref 70–99)
GLUCOSE SERPL-MCNC: 183 MG/DL (ref 70–99)
HBA1C MFR BLD: 7.2 % (ref 0–5.6)

## 2019-11-27 PROCEDURE — 83036 HEMOGLOBIN GLYCOSYLATED A1C: CPT | Performed by: PHYSICIAN ASSISTANT

## 2019-11-27 PROCEDURE — 25800030 ZZH RX IP 258 OP 636: Performed by: OTOLARYNGOLOGY

## 2019-11-27 PROCEDURE — 82947 ASSAY GLUCOSE BLOOD QUANT: CPT | Performed by: PHYSICIAN ASSISTANT

## 2019-11-27 PROCEDURE — 25000132 ZZH RX MED GY IP 250 OP 250 PS 637: Performed by: OTOLARYNGOLOGY

## 2019-11-27 PROCEDURE — 82962 GLUCOSE BLOOD TEST: CPT

## 2019-11-27 PROCEDURE — 82947 ASSAY GLUCOSE BLOOD QUANT: CPT | Performed by: OTOLARYNGOLOGY

## 2019-11-27 PROCEDURE — 36415 COLL VENOUS BLD VENIPUNCTURE: CPT | Performed by: PHYSICIAN ASSISTANT

## 2019-11-27 RX ORDER — HYDROCHLOROTHIAZIDE 25 MG/1
TABLET ORAL
Qty: 90 TABLET | Refills: 0 | Status: SHIPPED | OUTPATIENT
Start: 2019-11-27 | End: 2020-03-16

## 2019-11-27 RX ADMIN — SODIUM CHLORIDE, POTASSIUM CHLORIDE, SODIUM LACTATE AND CALCIUM CHLORIDE: 600; 310; 30; 20 INJECTION, SOLUTION INTRAVENOUS at 05:28

## 2019-11-27 RX ADMIN — TIZANIDINE 4 MG: 4 TABLET ORAL at 05:47

## 2019-11-27 RX ADMIN — CALCIUM CARBONATE (ANTACID) CHEW TAB 500 MG 1000 MG: 500 CHEW TAB at 00:25

## 2019-11-27 RX ADMIN — ACETAMINOPHEN 650 MG: 325 TABLET, FILM COATED ORAL at 02:15

## 2019-11-27 NOTE — PLAN OF CARE
PRIMARY DIAGNOSIS: L Parotidectomy with nerve monitoring and excision of lobe   OUTPATIENT/OBSERVATION GOALS TO BE MET BEFORE DISCHARGE:  1. Stable vital signs Yes  2. Tolerating diet:Yes  3. Pain controlled with oral pain medications:  Yes  4. Positive bowel sounds:  Yes  5. Voiding without difficulty:  Yes  6. Able to ambulate:  Yes  7. Provider specific discharge goals met:  Yes    Patient alert and oriented x4. Vitals are Temp: 96.9  F (36.1  C) Temp src: Oral BP: (!) 156/87 Pulse: 78 Heart Rate: 86 Resp: 16 SpO2: 98 % RA. Reports incisional pain- declines interventions. Continuing with capno. PRN TUMS give for heartburn. Incision is open to air, no drainage noted. Plan to assist with supportive cares.     Discharge Planner Nurse   Safe discharge environment identified: Yes  Barriers to discharge: No       Entered by: Amy Archuleta 11/27/2019     Please review provider order for any additional goals.   Nurse to notify provider when observation goals have been met and patient is ready for discharge.

## 2019-11-27 NOTE — PHARMACY-ADMISSION MEDICATION HISTORY
Admission medication history interview status for this patient is complete. See [facility] admission navigator for allergy information, prior to admission medications and immunization status.     Medication history completed by pre-admitting nurse, reviewed by Pharmacist.    Prior to Admission medications    Medication Sig Last Dose Taking? Auth Provider   amitriptyline (ELAVIL) 10 MG tablet Take 1 tablet (10 mg) by mouth At Bedtime 11/25/2019 at Unknown time Yes June Pacheco NP   atorvastatin (LIPITOR) 10 MG tablet TAKE 1 TABLET BY MOUTH ONCE DAILY 11/25/2019 at Unknown time Yes Vanessa Mejia MD   clopidogrel (PLAVIX) 75 MG tablet TAKE 1 TABLET BY MOUTH ONCE DAILY Past Month at Unknown time Yes Vanessa Mjeia MD   diazepam (VALIUM) 5 MG tablet Take 1 tablet (5 mg) by mouth as needed for anxiety (take one pill 30 min before and another directly before as needed) Past Week at Unknown time Yes June Pacheco NP   glipiZIDE (GLUCOTROL XL) 5 MG 24 hr tablet TAKE 1 TABLET BY MOUTH ONCE DAILY 11/25/2019 at Unknown time Yes Vanessa Mejia MD   hydrochlorothiazide (HYDRODIURIL) 25 MG tablet TAKE 1 TABLET BY MOUTH ONCE DAILY 11/25/2019 at Unknown time Yes Gonzalez Jensen MD   IBUPROFEN PO Take 200 mg by mouth daily Past Week at Unknown time Yes Reported, Patient   isosorbide mononitrate (IMDUR) 30 MG 24 hr tablet TAKE 1 TABLET BY MOUTH ONCE DAILY 11/25/2019 at Unknown time Yes Vanessa Mejia MD   lisinopril (PRINIVIL/ZESTRIL) 20 MG tablet TAKE 1 TABLET BY MOUTH ONCE DAILY 11/25/2019 at Unknown time Yes Vanessa Mejia MD   metFORMIN (GLUCOPHAGE-XR) 500 MG 24 hr tablet Take 2 tablets (1,000 mg) by mouth 2 times daily (with meals) 11/26/2019 at Unknown time Yes June Pacheco NP   metoprolol tartrate (LOPRESSOR) 25 MG tablet Take 1 tablet (25 mg) by mouth 2 times daily 11/26/2019 at Unknown time Yes Vanessa Mejia MD   pantoprazole (PROTONIX) 20 MG EC tablet Take 1 tablet (20 mg) by  mouth daily Take by mouth 30-60 minutes before a meal. 11/25/2019 at Unknown time Yes Vanessa Mejia MD   tiZANidine (ZANAFLEX) 4 MG tablet TAKE 1 TABLET BY MOUTH THREE TIMES DAILY AS NEEDED FOR MUSCLE SPASM 11/25/2019 at Unknown time Yes June Pacheco NP   blood glucose (NO BRAND SPECIFIED) lancets standard Use to test blood sugar 2 times daily or as directed.   June Pacheco NP   blood glucose (NO BRAND SPECIFIED) test strip Use to test blood sugar 2 times daily or as directed.   Vanessa Mejia MD   blood glucose monitoring (ONE TOUCH DELICA) lancets USE 1  TO CHECK GLUCOSE ONCE DAILY OR  AS  DIRECTED   Milena Vinson MD   blood glucose monitoring (ONE TOUCH ULTRA 2) meter device kit USE TO TEST BLOOD SUGARS ONCE DAILY OR AS DIRECTED   Milena Vinson MD   ONETOUCH ULTRA test strip Test twice daily   Vanessa Mejia MD

## 2019-11-27 NOTE — PROGRESS NOTES
ROOM # 227    Living Situation (if not independent, order SW consult): Home alone   Facility name:  : [facility] [phone]    Activity level at baseline: Independent   Activity level on admit: Standby       Patient registered to observation; given Patient Bill of Rights; given the opportunity to ask questions about observation status and their plan of care.  Patient has been oriented to the observation room, bathroom and call light is in place.    Discussed discharge goals and expectations with patient/family.

## 2019-11-27 NOTE — PROGRESS NOTES
OBSERVATION patient END time: 0830    Patient's After Visit Summary was reviewed with patient.   Patient verbalized understanding of After Visit Summary, recommended follow up and was given an opportunity to ask questions.   Discharge medications sent home with patient/family: YES   Discharged with other:family friend

## 2019-11-27 NOTE — PLAN OF CARE
PRIMARY DIAGNOSIS: L Parotidectomy with nerve monitoring and excision of lobe   OUTPATIENT/OBSERVATION GOALS TO BE MET BEFORE DISCHARGE:  1. Stable vital signs Yes  2. Tolerating diet:Yes  3. Pain controlled with oral pain medications:  Yes  4. Positive bowel sounds:  Yes  5. Voiding without difficulty:  Yes  6. Able to ambulate:  Yes  7. Provider specific discharge goals met:  Yes    Patient alert and oriented x4. Vitals are Temp: 96.6  F (35.9  C) Temp src: Oral BP: (!) 155/85 Pulse: 78 Heart Rate: 72 Resp: 18 SpO2: 97 % RA. Reports incisional pain- PRN Tylenol given. Patient complains of gas pain that makes it hard to move. Continuing with capno. Incision is open to air, no drainage noted. Plan to assist with supportive cares.     Discharge Planner Nurse   Safe discharge environment identified: Yes  Barriers to discharge: No       Entered by: Amy Archuleta 11/27/2019     Please review provider order for any additional goals.   Nurse to notify provider when observation goals have been met and patient is ready for discharge.

## 2019-11-27 NOTE — OP NOTE
Procedure Date: 11/26/2019      SURGEON:  Rafat Carlin MD.        ASSISTANT:  Alpesh Salazar MD.      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      POSTOPERATIVE DIAGNOSIS:  Left parotid suspected Warthin tumor.      PROCEDURE:  Left parotidectomy with facial nerve monitoring and 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.      ANESTHESIA:  General endotracheal.      ESTIMATED BLOOD LOSS:  25 mL.      SPECIMENS:  Left deep lobe inferior parotid tumor.      INDICATIONS FOR PROCEDURE:  This is a 59-year-old male with a previous history of a Warthin's tumor and he had previous parotidectomy several years ago.  He has a gradually enlarging mass of his left parotid gland, which was biopsied by [facility] and consistent with Warthin's tumor.  He had a preoperative MRI which shows bilateral parotid tumors.  It is suspected as Warthin tumors on both sides of his neck.  The left parotid tumor is more bothersome to him.  It seems to be growing more rapidly and he wishes to have the left parotid tumor removed.  The risks and benefits were described, including possible infection, bleeding, pain, possible temporary or permanent facial weakness, possible postoperative gustatory sweating, possible hematoma, seroma, sialocele, and expected numbness of the earlobe.      DESCRIPTION OF PROCEDURE:  The patient was taken to the operating room, placed on the table in the supine position and general endotracheal anesthesia was induced and the table was turned 90 degrees.  A timeout was called.  I applied the nerve monitor to the left oral commissure and tested the device was functioning properly.  I marked out the planned incision on the left neck, a modified Parrish, and then injected the skin with a solution of epinephrine 1:100,000.  The left neck was sterilely prepped and draped in the usual fashion.        The skin incision was made through the skin with a 15 blade and then an anterior flap was developed,  dissecting along the parotideomasseteric fascia and then inferiorly dissecting deep to the platysma muscle.  The skin was elevated past the anterior aspect of the tumor which is palpable at the inferior aspect of the parotid, but more in the anterior aspect of the inferior parotid.  The stays were applied to retract the skin.  The tragus was retracted.  Then, the parotid was  from the sternocleidomastoid muscle and also from the tragal cartilage.  The parotid gland was retracted anteriorly.  I continued dissecting the parotid tissue off of the tip of the mastoid and then carefully dissected between the tragal pointer and the posterior belly of the digastric muscle until with blunt dissection the facial nerve was identified.  The nerve stimulator was used to stimulate the nerve, confirming its identity.  There was a very prominent large blood vessel which was running parallel with the facial nerve.  This vessel was ligated and divided using the Harmonic scalpel.  Much of the dissection was performed with the Harmonic scalpel for hemostasis.  The facial nerve was then dissected anteriorly until it was bifurcating into a superior and inferior branch and then there was no need to follow the superior branch as the tumor was more inferior.  The inferior branch was followed, taking care to divide the parotid tissue superficial and avoiding injury to the nerve.  The nerve was followed all the way through the entire parotid and then with retraction of the gland it was evident that the tumor was actually deep to the inferior branch of the facial nerve and this was a deep lobe parotid tumor.  The inferior branch of the facial nerve was then carefully elevated and using a small scissor the nerve was released from the tumor deep to it.  There was need to dissect out the more superior branch to be certain no more superior branches were injured and the most inferior branch of the superior branch of the facial nerve was  dissected anteriorly.  This nerve was a few centimeters away from the most superior aspect of the tumor.  Then, the tissue between the more superior branch and inferior branch was divided, taking care to avoid entering the tumor.  The small amount of the normal parotid tissue was kept around the parotid tumor, and the parotid tumor was freed from the surrounding tissues.  The retromandibular vein was encountered and this was left intact and  from the tumor.  A few small branches of this vein were divided with the Harmonic scalpel.  After releasing all attachments of the tumor it was removed from the wound and sent to pathology for permanent.        The wound was copiously irrigated with saline and then the parotid gland was reapproximated where it had been opened while following the inferior branch, and there was very little defect because of the minimal amount of parotid tissue which required removal.  A running 3-0 Vicryl suture was used to close the parotid gland.  Then, a few small vessels which were oozing were controlled with bipolar cautery.  A 10-Lao drain was brought into the wound through a separate stab incision, secured to the skin with a 3-0 nylon suture.  The incision was closed in 2 layers, first interrupted 3-0 Vicryl sutures for the deep layer, followed by a running 5-0 fast gut suture for the skin.  The drain was holding suction and attached to bulb.  The procedure was complete.  The patient was awakened and taken to recovery in stable condition.  No immediate complications.         SANGITA PRADHAN MD             D: 2019   T: 2019   MT: WT      Name:     HANNAH VALENZUELA   MRN:      [id number]        Account:        [id number]   :      1960           Procedure Date: 2019      Document: [id number]       cc: Sangita POLLOCK CNP

## 2019-11-27 NOTE — PLAN OF CARE
PRIMARY DIAGNOSIS: L Parotidectomy with nerve monitoring and excision of lobe   OUTPATIENT/OBSERVATION GOALS TO BE MET BEFORE DISCHARGE:  1. Stable vital signs Yes  2. Tolerating diet:Yes  3. Pain controlled with oral pain medications:  Yes  4. Positive bowel sounds:  Yes  5. Voiding without difficulty:  Yes  6. Able to ambulate:  Yes  7. Provider specific discharge goals met:  Yes    Patient alert and oriented x4. Vitals are Temp: 96.3  F (35.7  C) Temp src: Oral BP: (!) 149/85 Pulse: 86 Heart Rate: 86 Resp: 16 SpO2: 99 % RA. Reports incisional pain- declines interventions. Continuing with capno. Incision is open to air, no drainage noted. Plan to assist with supportive cares.     Discharge Planner Nurse   Safe discharge environment identified: Yes  Barriers to discharge: No       Entered by: Amy Archuleta 11/26/2019     Please review provider order for any additional goals.   Nurse to notify provider when observation goals have been met and patient is ready for discharge.

## 2019-11-27 NOTE — CONSULTS
[facility] Hospitalist Consult     Emily Zhu MRN# [id number]   YOB: 1960 Age: 59 year old   Date of Admission: 11/26/2019  PCP is June Pacheco  Date of Service: 11/26/2019    Referring MD & Reason for Visit: I was asked by Rafat Carlin MD, to manage chronic medical problems.  Internal Medicine Physician Assistant: Gillian Ricci PA-C         Assessment and Plan:   Emily Zhu is a 59 year old male  with a history of HTN, HLD, GERD, Depression, Anxiety, Cataracts, DM Type 2, Peripheral Neuropathy, Tobacco Dependence, CAD s/p RCA stent and Parotid Mass who is admitted s/p Left Parotidectomy.  Internal Medicine service was asked to see for management of chronic medical problems.     Left Parotid Mass s/p Left Parotidectomy with Excision of Parotid Tumor - POD #0. Doing well.     - will defer diet, activity, DVT ppx, and pain control to primary team.     CAD, HTN, HLD - s/p PCI to RCA 2007 in [place].  Currently denies chest pain, shortness of breath.    - continue pta Atorvastatin, Lopressor, Imdur, Lisinopril with hold parameters  - resume pta hydrochlorothiazide in am if taking po well  - resume pta Plavix when ok per surgery      DM Type 2 with Peripheral Neuropathy - hgb A1C 7/2019 was 7.5.  BS on admission 142-193.  - clear liquid diet at this time.  Hold pta Metformin and Glipizide for now; reassess resuming in am if tolerating po well.  - start ISS protocol  - continue pta Amitriptyline    GERD - continue pta Protonix    Tobacco Dependence - smokes 10 cig/per day.  He declined a nicotine patch.     Anxiety - pt reports he is no longer using Valium.  Will discontinue.       CODE: Full  Diet/IVF: clear liquid and ADAT, LR  GI ppx:  Protonix  DVT ppx: ambulation      Gillian Ricci MS, PA-C  Internal Medicine Physician Assistant  [facility]  Pager: [phone]           Chief Complaint:   Left facial pain         HPI:   History is obtained from the patient  and medical record. This patient is a 59 year old male with a history of Right Parotidectomy, HTN, HLD, GERD, Depression, Cataracts, DM Type 2, Peripheral Neuropathy, Tobacco Dependence, CAD s/p RCA stent and Parotid Mass who is admitted s/p Left Parotidectomy.  EBL 25ml.  Internal Medicine service was asked to see for management of chronic medical problems.     Patient denies chest pain, shortness of breath, abdominal pain, nausea, emesis.  His throat feels scratchy and he reports his pain is controlled.  He stopped almost all of his medications on his own 10 days prior to surgery.           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     Stented coronary artery      Type II or unspecified type diabetes mellitus without mention of complication, not stated as uncontrolled     Diabetes mellitus     Unspecified essential hypertension     Essential hypertension          Past Surgical History:     Past Surgical History:   Procedure Laterality Date     ANGIOPLASTY  2007    rca     ANGIOPLASTY  2010    om     ARTHROTOMY SHOULDER, ROTATOR CUFF REPAIR, COMBINED Left 4/12/2017    Procedure: COMBINED ARTHROTOMY SHOULDER, ROTATOR CUFF REPAIR;  Surgeon: Deejay Conrad MD;  Location: [facility]     ARTHROTOMY SHOULDER, SUBACROMIAL DECOMPRESSION, COMBINED Left 4/12/2017    Procedure: COMBINED ARTHROTOMY SHOULDER, SUBACROMIAL DECOMPRESSION;  Surgeon: Deejay Conrad MD;  Location: [facility]     COLONOSCOPY       DECOMPRESSION CUBITAL TUNNEL  11/29/2013    Procedure: DECOMPRESSION CUBITAL TUNNEL;;  Surgeon: Ant  Radha Kyle MD;  Location: [facility]     ENDOSCOPIC RELEASE CARPAL TUNNEL  11/29/2013    Procedure: ENDOSCOPIC RELEASE CARPAL TUNNEL;  LEFT ENDOSCOPIC CARPAL TUNNEL RELEASE AND CUBITAL TUNNEL RELEASE ;  Surgeon: Radha Cain MD;  Location: [facility]     OPEN REDUCTION INTERNAL FIXATION HIP NAILING  3/30/2012    Procedure:OPEN REDUCTION INTERNAL FIXATION HIP NAILING; Biopsy, Curettage and Bone Grafting Right Hip Lesion, Placement of Hip Screw; Surgeon:MARILIN GAY; Location:[facility]     PAROTIDECTOMY Right ~1990 [place]     PHACOEMULSIFICATION CLEAR CORNEA WITH STANDARD INTRAOCULAR LENS IMPLANT  5/8/2013    Procedure: PHACOEMULSIFICATION CLEAR CORNEA WITH STANDARD INTRAOCULAR LENS IMPLANT;  RIGHT EYE PHACOEMULSIFICATION CLEAR CORNEA WITH STANDARD INTRAOCULAR LENS IMPLANT ;  Surgeon: Jovani Pretty MD;  Location: [facility]     PHACOEMULSIFICATION CLEAR CORNEA WITH STANDARD INTRAOCULAR LENS IMPLANT  5/20/2013    Procedure: PHACOEMULSIFICATION CLEAR CORNEA WITH STANDARD INTRAOCULAR LENS IMPLANT;  LEFT  EYE PHACOEMULSIFICATION CLEAR CORNEA WITH STANDARD INTRAOCULAR LENS IMPLANT ;  Surgeon: Jovani Pretty MD;  Location: [facility]     STENT            Social History:     Social History     Socioeconomic History     Marital status: Single     Spouse name: Not on file     Number of children: Not on file     Years of education: Not on file     Highest education level: Not on file   Occupational History     Not on file   Social Needs     Financial resource strain: Not on file     Food insecurity:     Worry: Not on file     Inability: Not on file     Transportation needs:     Medical: Not on file     Non-medical: Not on file   Tobacco Use     Smoking status: Current Every Day Smoker     Packs/day: 1.00     Years: 40.00     Pack years: 40.00     Types: Cigarettes     Smokeless tobacco: Never Used   Substance and Sexual Activity     Alcohol use: Not Currently     Alcohol/week: 0.0 standard drinks     Comment: none      Drug use: No     Sexual activity: Yes     Partners: Female   Lifestyle     Physical activity:     Days per week: Not on file     Minutes per session: Not on file     Stress: Not on file   Relationships     Social connections:     Talks on phone: Not on file     Gets together: Not on file     Attends Restorationism service: Not on file     Active member of club or organization: Not on file     Attends meetings of clubs or organizations: Not on file     Relationship status: Not on file     Intimate partner violence:     Fear of current or ex partner: Not on file     Emotionally abused: Not on file     Physically abused: Not on file     Forced sexual activity: Not on file   Other Topics Concern     Parent/sibling w/ CABG, MI or angioplasty before 65F 55M? Not Asked   Social History Narrative     Not on file          Family History:     Family History   Problem Relation Age of Onset     Cerebrovascular Disease Mother 56     Hypertension Mother      Cerebrovascular Disease Father      Hypertension Father           Allergies:      Allergies   Allergen Reactions     Crestor [Rosuvastatin] Muscle Pain (Myalgia)          Medications:     Prior to Admission medications    Medication Sig Last Dose Taking? Auth Provider   amitriptyline (ELAVIL) 10 MG tablet Take 1 tablet (10 mg) by mouth At Bedtime 11/25/2019 at Unknown time Yes June Pacheco NP   atorvastatin (LIPITOR) 10 MG tablet TAKE 1 TABLET BY MOUTH ONCE DAILY 11/25/2019 at Unknown time Yes Vanessa Mejia MD   clopidogrel (PLAVIX) 75 MG tablet TAKE 1 TABLET BY MOUTH ONCE DAILY Past Month at Unknown time Yes Vanessa Mejia MD   diazepam (VALIUM) 5 MG tablet Take 1 tablet (5 mg) by mouth as needed for anxiety (take one pill 30 min before and another directly before as needed) Past Week at Unknown time Yes June Pacheco NP   glipiZIDE (GLUCOTROL XL) 5 MG 24 hr tablet TAKE 1 TABLET BY MOUTH ONCE DAILY 11/25/2019 at Unknown time Yes Vanessa Mejia MD    hydrochlorothiazide (HYDRODIURIL) 25 MG tablet TAKE 1 TABLET BY MOUTH ONCE DAILY 11/25/2019 at Unknown time Yes Gonzalez Jensen MD   IBUPROFEN PO Take 200 mg by mouth daily Past Week at Unknown time Yes Reported, Patient   isosorbide mononitrate (IMDUR) 30 MG 24 hr tablet TAKE 1 TABLET BY MOUTH ONCE DAILY 11/25/2019 at Unknown time Yes Vanessa Mejia MD   lisinopril (PRINIVIL/ZESTRIL) 20 MG tablet TAKE 1 TABLET BY MOUTH ONCE DAILY 11/25/2019 at Unknown time Yes Vanessa Mejia MD   metFORMIN (GLUCOPHAGE-XR) 500 MG 24 hr tablet Take 2 tablets (1,000 mg) by mouth 2 times daily (with meals) 11/26/2019 at Unknown time Yes June Pacheco NP   metoprolol tartrate (LOPRESSOR) 25 MG tablet Take 1 tablet (25 mg) by mouth 2 times daily 11/26/2019 at Unknown time Yes Vanessa Mejia MD   oxyCODONE (ROXICODONE) 5 MG tablet Take 1 tablet (5 mg) by mouth every 3 hours as needed for pain (Moderate to Severe)  Yes Rafat Carlin MD   pantoprazole (PROTONIX) 20 MG EC tablet Take 1 tablet (20 mg) by mouth daily Take by mouth 30-60 minutes before a meal. 11/25/2019 at Unknown time Yes Vanessa Mejia MD   tiZANidine (ZANAFLEX) 4 MG tablet TAKE 1 TABLET BY MOUTH THREE TIMES DAILY AS NEEDED FOR MUSCLE SPASM 11/25/2019 at Unknown time Yes June Pacheco NP   blood glucose (NO BRAND SPECIFIED) lancets standard Use to test blood sugar 2 times daily or as directed.   June Pacheco NP   blood glucose (NO BRAND SPECIFIED) test strip Use to test blood sugar 2 times daily or as directed.   Vanessa Mejia MD   blood glucose monitoring (ONE TOUCH DELICA) lancets USE 1  TO CHECK GLUCOSE ONCE DAILY OR  AS  DIRECTED   Milena Vinson MD   blood glucose monitoring (ONE TOUCH ULTRA 2) meter device kit USE TO TEST BLOOD SUGARS ONCE DAILY OR AS DIRECTED   Milena Vinson MD   ONETOUCH ULTRA test strip Test twice daily   Vanessa Mejia MD          Review of Systems:   A complete ROS was  "performed and is negative other than what is stated in the HPI.       Physical Exam:   Blood pressure (!) 149/85, pulse 86, temperature 96.3  F (35.7  C), temperature source Oral, resp. rate 16, height 1.651 m (5' 5\"), weight 79 kg (174 lb 3.2 oz), SpO2 96 %.  General: Alert, interactive, NAD, lying in bed  HEENT: AT/NC, sclera anicteric, PERRL, EOMI  Neck: left facial incision c/d/i, yasmeen in the base of the left neck.  Chest/Resp: clear to auscultation bilaterally, no crackles or wheezes  Heart/CV: regular rate and rhythm, no murmur  Abdomen/GI: Soft, nontender, nondistended. +BS.  No HSM or masses, no rebound or guarding.  Extremities/MSK: No LE edema  Skin: Warm and dry, no jaundice or rash  Neuro: Alert & oriented x 3, Cns 2-12 intact, moves all extremities equally           Labs:     CMPNo lab results found in last 7 days.  CBCNo lab results found in last 7 days.  INRNo lab results found in last 7 days.  Arterial Blood GasNo lab results found in last 7 days.      "

## 2019-11-27 NOTE — DISCHARGE SUMMARY
"POD #1- uneventful overnigh, stable    EXAM  BP (!) 155/85 (BP Location: Right arm)   Pulse 78   Temp 96.6  F (35.9  C) (Oral)   Resp 18   Ht 1.651 m (5' 5\")   Wt 79 kg (174 lb 3.2 oz)   SpO2 97%   BMI 28.99 kg/m    Wound is CDI  Drain out serosang - removed drain  Face full movement symmetric    IMP: stable after parotectomy    RECC: discharge to home today, followup next week for wound check    Rafat Carlin M.D.    "

## 2019-12-03 ENCOUNTER — TRANSFERRED RECORDS (OUTPATIENT)
Dept: FAMILY MEDICINE | Facility: CLINIC | Age: 59
End: 2019-12-03

## 2020-01-02 DIAGNOSIS — I10 ESSENTIAL HYPERTENSION: ICD-10-CM

## 2020-01-02 RX ORDER — LISINOPRIL 20 MG/1
20 TABLET ORAL DAILY
Qty: 90 TABLET | Refills: 1 | Status: SHIPPED | OUTPATIENT
Start: 2020-01-02 | End: 2020-04-09

## 2020-01-02 NOTE — TELEPHONE ENCOUNTER
Lisinopril  LOV 11/21/19  Last Labs 10/21/19    BP Readings from Last 3 Encounters:   11/27/19 131/74   11/21/19 130/80   11/12/19 (!) 140/95     Last Comprehensive Metabolic Panel:  Sodium   Date Value Ref Range Status   10/21/2019 137 134 - 144 mmol/L Final     Potassium   Date Value Ref Range Status   10/21/2019 4.7 3.5 - 5.2 mmol/L Final     Chloride   Date Value Ref Range Status   10/21/2019 101 96 - 106 mmol/L Final     Carbon Dioxide   Date Value Ref Range Status   01/13/2018 28 20 - 32 mmol/L Final     Anion Gap   Date Value Ref Range Status   01/13/2018 5 3 - 14 mmol/L Final     Glucose   Date Value Ref Range Status   11/27/2019 183 (H) 70 - 99 mg/dL Final     Urea Nitrogen   Date Value Ref Range Status   10/21/2019 21 8 - 27 mg/dL Final     BUN/Creatinine Ratio   Date Value Ref Range Status   10/21/2019 14 10 - 24 Final     Creatinine   Date Value Ref Range Status   10/21/2019 1.53 (H) 0.76 - 1.27 mg/dL Final     GFR Estimate   Date Value Ref Range Status   01/13/2018 66 >60 mL/min/1.7m2 Final     Comment:     Non  GFR Calc     Calcium   Date Value Ref Range Status   10/21/2019 9.7 8.6 - 10.2 mg/dL Final

## 2020-01-09 ENCOUNTER — HOSPITAL ENCOUNTER (EMERGENCY)
Facility: CLINIC | Age: 60
Discharge: HOME | End: 2020-01-09
Attending: PHYSICIAN ASSISTANT | Admitting: PHYSICIAN ASSISTANT
Payer: COMMERCIAL

## 2020-01-09 ENCOUNTER — APPOINTMENT (OUTPATIENT)
Dept: MRI IMAGING | Facility: CLINIC | Age: 60
End: 2020-01-09
Attending: PHYSICIAN ASSISTANT
Payer: COMMERCIAL

## 2020-01-09 ENCOUNTER — APPOINTMENT (OUTPATIENT)
Dept: ULTRASOUND IMAGING | Facility: CLINIC | Age: 60
End: 2020-01-09
Attending: PHYSICIAN ASSISTANT
Payer: COMMERCIAL

## 2020-01-09 VITALS
BODY MASS INDEX: 30.73 KG/M2 | WEIGHT: 180 LBS | OXYGEN SATURATION: 98 % | RESPIRATION RATE: 20 BRPM | DIASTOLIC BLOOD PRESSURE: 94 MMHG | TEMPERATURE: 98 F | HEART RATE: 95 BPM | HEIGHT: 64 IN | SYSTOLIC BLOOD PRESSURE: 144 MMHG

## 2020-01-09 DIAGNOSIS — R53.1 ACUTE LEFT-SIDED WEAKNESS: ICD-10-CM

## 2020-01-09 DIAGNOSIS — R20.2 PARESTHESIAS: ICD-10-CM

## 2020-01-09 DIAGNOSIS — R74.8 ELEVATED LIPASE: ICD-10-CM

## 2020-01-09 DIAGNOSIS — R10.9 ABDOMINAL CRAMPING: ICD-10-CM

## 2020-01-09 LAB
ALBUMIN SERPL-MCNC: 3.4 G/DL (ref 3.4–5)
ALP SERPL-CCNC: 46 U/L (ref 40–150)
ALT SERPL W P-5'-P-CCNC: 20 U/L (ref 0–70)
ANION GAP SERPL CALCULATED.3IONS-SCNC: 6 MMOL/L (ref 3–14)
AST SERPL W P-5'-P-CCNC: 13 U/L (ref 0–45)
BASOPHILS # BLD AUTO: 0.1 10E9/L (ref 0–0.2)
BASOPHILS NFR BLD AUTO: 0.6 %
BILIRUB SERPL-MCNC: 0.3 MG/DL (ref 0.2–1.3)
BUN SERPL-MCNC: 31 MG/DL (ref 7–30)
CALCIUM SERPL-MCNC: 9.3 MG/DL (ref 8.5–10.1)
CHLORIDE SERPL-SCNC: 106 MMOL/L (ref 94–109)
CO2 SERPL-SCNC: 23 MMOL/L (ref 20–32)
CREAT SERPL-MCNC: 1.59 MG/DL (ref 0.66–1.25)
DIFFERENTIAL METHOD BLD: NORMAL
EOSINOPHIL # BLD AUTO: 0.2 10E9/L (ref 0–0.7)
EOSINOPHIL NFR BLD AUTO: 1.7 %
ERYTHROCYTE [DISTWIDTH] IN BLOOD BY AUTOMATED COUNT: 12.6 % (ref 10–15)
GFR SERPL CREATININE-BSD FRML MDRD: 46 ML/MIN/{1.73_M2}
GLUCOSE SERPL-MCNC: 144 MG/DL (ref 70–99)
HCT VFR BLD AUTO: 41.4 % (ref 40–53)
HGB BLD-MCNC: 14.5 G/DL (ref 13.3–17.7)
IMM GRANULOCYTES # BLD: 0 10E9/L (ref 0–0.4)
IMM GRANULOCYTES NFR BLD: 0.1 %
INR PPP: 0.99 (ref 0.86–1.14)
INTERPRETATION ECG - MUSE: NORMAL
LIPASE SERPL-CCNC: 1017 U/L (ref 73–393)
LYMPHOCYTES # BLD AUTO: 2.5 10E9/L (ref 0.8–5.3)
LYMPHOCYTES NFR BLD AUTO: 29.3 %
MCH RBC QN AUTO: 32.2 PG (ref 26.5–33)
MCHC RBC AUTO-ENTMCNC: 35 G/DL (ref 31.5–36.5)
MCV RBC AUTO: 92 FL (ref 78–100)
MONOCYTES # BLD AUTO: 0.3 10E9/L (ref 0–1.3)
MONOCYTES NFR BLD AUTO: 3.8 %
NEUTROPHILS # BLD AUTO: 5.5 10E9/L (ref 1.6–8.3)
NEUTROPHILS NFR BLD AUTO: 64.5 %
NRBC # BLD AUTO: 0 10*3/UL
NRBC BLD AUTO-RTO: 0 /100
PLATELET # BLD AUTO: 253 10E9/L (ref 150–450)
POTASSIUM SERPL-SCNC: 4.2 MMOL/L (ref 3.4–5.3)
PROT SERPL-MCNC: 7.5 G/DL (ref 6.8–8.8)
RBC # BLD AUTO: 4.51 10E12/L (ref 4.4–5.9)
SODIUM SERPL-SCNC: 135 MMOL/L (ref 133–144)
TROPONIN I SERPL-MCNC: <0.015 UG/L (ref 0–0.04)
WBC # BLD AUTO: 8.6 10E9/L (ref 4–11)

## 2020-01-09 PROCEDURE — 99285 EMERGENCY DEPT VISIT HI MDM: CPT | Mod: 25

## 2020-01-09 PROCEDURE — A9585 GADOBUTROL INJECTION: HCPCS | Performed by: PHYSICIAN ASSISTANT

## 2020-01-09 PROCEDURE — 85025 COMPLETE CBC W/AUTO DIFF WBC: CPT | Performed by: PHYSICIAN ASSISTANT

## 2020-01-09 PROCEDURE — 83690 ASSAY OF LIPASE: CPT | Performed by: PHYSICIAN ASSISTANT

## 2020-01-09 PROCEDURE — 84484 ASSAY OF TROPONIN QUANT: CPT | Performed by: PHYSICIAN ASSISTANT

## 2020-01-09 PROCEDURE — 76705 ECHO EXAM OF ABDOMEN: CPT

## 2020-01-09 PROCEDURE — 25500064 ZZH RX 255 OP 636: Performed by: PHYSICIAN ASSISTANT

## 2020-01-09 PROCEDURE — 25000132 ZZH RX MED GY IP 250 OP 250 PS 637: Performed by: PHYSICIAN ASSISTANT

## 2020-01-09 PROCEDURE — 70553 MRI BRAIN STEM W/O & W/DYE: CPT

## 2020-01-09 PROCEDURE — 85610 PROTHROMBIN TIME: CPT | Performed by: PHYSICIAN ASSISTANT

## 2020-01-09 PROCEDURE — 80053 COMPREHEN METABOLIC PANEL: CPT | Performed by: PHYSICIAN ASSISTANT

## 2020-01-09 PROCEDURE — 93005 ELECTROCARDIOGRAM TRACING: CPT

## 2020-01-09 RX ORDER — LORAZEPAM 1 MG/1
1 TABLET ORAL ONCE
Status: COMPLETED | OUTPATIENT
Start: 2020-01-09 | End: 2020-01-09

## 2020-01-09 RX ORDER — DICYCLOMINE HCL 20 MG
20 TABLET ORAL 2 TIMES DAILY
Qty: 20 TABLET | Refills: 0 | Status: SHIPPED | OUTPATIENT
Start: 2020-01-09 | End: 2020-01-13

## 2020-01-09 RX ORDER — GADOBUTROL 604.72 MG/ML
8 INJECTION INTRAVENOUS ONCE
Status: COMPLETED | OUTPATIENT
Start: 2020-01-09 | End: 2020-01-09

## 2020-01-09 RX ORDER — LORAZEPAM 0.5 MG/1
0.5 TABLET ORAL ONCE
Status: COMPLETED | OUTPATIENT
Start: 2020-01-09 | End: 2020-01-09

## 2020-01-09 RX ADMIN — GADOBUTROL 8 ML: 604.72 INJECTION INTRAVENOUS at 15:16

## 2020-01-09 RX ADMIN — LORAZEPAM 1 MG: 1 TABLET ORAL at 12:06

## 2020-01-09 RX ADMIN — LORAZEPAM 0.5 MG: 0.5 TABLET ORAL at 15:03

## 2020-01-09 ASSESSMENT — ENCOUNTER SYMPTOMS
LIGHT-HEADEDNESS: 0
SHORTNESS OF BREATH: 1
WEAKNESS: 1
CHILLS: 0
ABDOMINAL PAIN: 0
FEVER: 0
DIZZINESS: 1
NUMBNESS: 1

## 2020-01-09 ASSESSMENT — MIFFLIN-ST. JEOR: SCORE: 1532.47

## 2020-01-09 NOTE — DISCHARGE INSTRUCTIONS
Follow up with GI and neurology for your symptoms.   Take bentyl for gas pains. EAT boring and bland foods.   Discharge Instructions  Abdominal Pain    Abdominal pain (belly pain) can be caused by many things. Your evaluation today does not show the exact cause for your pain. Your provider today has decided that it is unlikely your pain is due to a life threatening problem, or a problem requiring surgery or hospital admission. Sometimes those problems cannot be found right away, so it is very important that you follow up as directed.  Sometimes only the changes which occur over time allow the cause of your pain to be found.    Generally, every Emergency Department visit should have a follow-up clinic visit with either a primary or a specialty clinic/provider. Please follow-up as instructed by your emergency provider today. With abdominal pain, we often recommend very close follow-up, such as the following day.    ADULTS:  Return to the Emergency Department right away if:    You get an oral temperature above 102oF or as directed by your provider.  You have blood in your stools. This may be bright red or appear as black, tarry stools.    You keep vomiting (throwing up) or cannot drink liquids.  You see blood when you vomit.   You cannot have a bowel movement or you cannot pass gas.  Your stomach gets bloated or bigger.  Your skin or the whites of your eyes look yellow.  You faint.  You have bloody, frequent or painful urination (peeing).  You have new symptoms or anything that worries you.    CHILDREN:  Return to the Emergency Department right away if your child has any of the above-listed symptoms or the following:    Pushes your hand away or screams/cries when his/her belly is touched.  You notice your child is very fussy or weak.  Your child is very tired and is too tired to eat or drink.  Your child is dehydrated.  Signs of dehydration can be:  Significant change in the amount of wet diapers/urine.  Your infant or  child starts to have dry mouth and lips, or no saliva (spit) or tears.    PREGNANT WOMEN:  Return to the Emergency Department right away if you have any of the above-listed symptoms or the following:    You have bleeding, leaking fluid or passing tissue from the vagina.  You have worse pain or cramping, or pain in your shoulder or back.  You have vomiting that will not stop.  You have a temperature of 100oF or more.  Your baby is not moving as much as usual.  You faint.  You get a bad headache with or without eye problems and abdominal pain.  You have a seizure.  You have unusual discharge from your vagina and abdominal pain.    Abdominal pain is pretty common during pregnancy.  Your pain may or may not be related to your pregnancy. You should follow-up closely with your OB provider so they can evaluate you and your baby.  Until you follow-up with your regular provider, do the following:     Avoid sex and do not put anything in your vagina.  Drink clear fluids.  Only take medications approved by your provider.    MORE INFORMATION:    Appendicitis:  A possible cause of abdominal pain in any person who still has their appendix is acute appendicitis. Appendicitis is often hard to diagnose.  Testing does not always rule out early appendicitis or other causes of abdominal pain. Close follow-up with your provider and re-evaluations may be needed to figure out the reason for your abdominal pain.    Follow-up:  It is very important that you make an appointment with your clinic and go to the appointment.  If you do not follow-up with your primary provider, it may result in missing an important development which could result in permanent injury or disability and/or lasting pain.  If there is any problem keeping your appointment, call your provider or return to the Emergency Department.    Medications:  Take your medications as directed by your provider today.  Before using over-the-counter medications, ask your provider and  "make sure to take the medications as directed.  If you have any questions about medications, ask your provider.    Diet:  Resume your normal diet as much as possible, but do not eat fried, fatty or spicy foods while you have pain.  Do not drink alcohol or have caffeine.  Do not smoke tobacco.    Probiotics: If you have been given an antibiotic, you may want to also take a probiotic pill or eat yogurt with live cultures. Probiotics have \"good bacteria\" to help your intestines stay healthy. Studies have shown that probiotics help prevent diarrhea (loose stools) and other intestine problems (including C. diff infection) when you take antibiotics. You can buy these without a prescription in the pharmacy section of the store.     If you were given a prescription for medicine here today, be sure to read all of the information (including the package insert) that comes with your prescription.  This will include important information about the medicine, its side effects, and any warnings that you need to know about.  The pharmacist who fills the prescription can provide more information and answer questions you may have about the medicine.  If you have questions or concerns that the pharmacist cannot address, please call or return to the Emergency Department.       Remember that you can always come back to the Emergency Department if you are not able to see your regular provider in the amount of time listed above, if you get any new symptoms, or if there is anything that worries you.        "

## 2020-01-09 NOTE — ED AVS SNAPSHOT
Emergency Department  [address] [facility] [place] [address]  Phone:  [phone]  Fax:  [phone]                                    Emily Zhu   MRN: [id number]    Department:   Emergency Department   Date of Visit:  1/9/2020           After Visit Summary Signature Page    I have received my discharge instructions, and my questions have been answered. I have discussed any challenges I see with this plan with the nurse or doctor.    ..........................................................................................................................................  Patient/Patient Representative Signature      ..........................................................................................................................................  Patient Representative Print Name and Relationship to Patient    ..................................................               ................................................  Date                                   Time    ..........................................................................................................................................  Reviewed by Signature/Title    ...................................................              ..............................................  Date                                               Time          22EPIC Rev 08/18

## 2020-01-09 NOTE — LETTER
January 9, 2020      To Whom It May Concern:      Emily Zhu was seen in our Emergency Department today, 01/09/20.        Sincerely,        Jessica DONALD RN

## 2020-01-09 NOTE — ED PROVIDER NOTES
History     Chief Complaint:  Numbness    HPI   Emily Zhu is a 59 year old male with a history of Warthin Tumor, diabetes, pancreatic disease, Coronary Artery Disease, hypertension, hyperlipidemia, among others, s/p left parotidectomy who presents with numbness. The patient reports that intermittently for the past few months he has been experiencing left sided numbness that has worsened over the past several days. He notes that it feels like tingling, numbness, and a burning sensation. He has felt that he has had difficulty keeping his balance with weakness since the symptoms worsened and he is wobbly upon ambulating. He endorses shortness of breath and dizziness. He continues to state that he has left sided upper abdominal pain that he describes as gas. The patient denies headache, fever, chills, chest pain, vision changes, or lightheadedness. The patient is unsure if he has had speech difficulty due to not being around people to talk to and he does not have any family or friends in the area. The patient denies history of stroke.    Allergies:  Crestor      Medications:    Elavil   Lipitor  One Touch Delica   One Touch Ultra  Plavix   Valium  Glucotrol XL   Hydrodiuril   Imdur   Lisinopril   Glucophage XR  Lopressor   Oxycodone   Protonix   Zanaflex    Past Medical History:    Antiplatelet or antithrombotic long-term use   Cataract   Coronary atherosclerosis  GERD  Microalbuminuria   Depression   Diabetic neuropathy   Warthin Tumor   Mixed hyperlipidemia   Pancreatic disease   Solitary bone cyst   Plantar fascitis   Stented coronary artery   Type II diabetes mellitus   hypertension     Past Surgical History:    Angioplasty x2  Arthrotomy shoulder, rotator cuff repair   Arthrotomy shoulder, subacromial decompression  Colonoscopy  Decompression cubital tunnel   Endoscopic release carpal tunnel  Open reduction internal fixation hip nailing   Parotidectomy x2  Phacoemulsification clear cornea with standard  "intraocular lens implant x2  Stent     Family History:    Mother: Cerebrovascular Disease, hypertension   Father: Cerebrovascular Disease, hypertension     Social History:  The patient was unaccompanied to the ED.  Smoking Status: Current Every Day Smoker  Smokeless Tobacco: Never Used  Alcohol Use: Negative   Drug Use: Negative  PCP: June Pacheco   Marital Status:  Single      Review of Systems   Constitutional: Negative for chills and fever.        Difficulty keeping balance - \"wobbly\"   Eyes: Negative for visual disturbance.   Respiratory: Positive for shortness of breath.    Cardiovascular: Negative for chest pain.   Gastrointestinal: Negative for abdominal pain.   Neurological: Positive for dizziness, weakness and numbness. Negative for light-headedness.   All other systems reviewed and are negative.    Physical Exam     Patient Vitals for the past 24 hrs:   BP Temp Temp src Pulse Resp SpO2 Height Weight   01/09/20 1615 -- -- -- -- -- 98 % -- --   01/09/20 1610 -- -- -- -- -- 96 % -- --   01/09/20 1605 (!) 144/94 -- -- 95 -- -- -- --   01/09/20 1330 (!) 144/86 -- -- -- -- -- -- --   01/09/20 1245 (!) 151/100 -- -- 84 -- -- -- --   01/09/20 1200 (!) 155/100 -- -- 84 -- -- -- --   01/09/20 1115 (!) 151/93 98  F (36.7  C) Oral 94 20 98 % 1.626 m (5' 4\") 81.6 kg (180 lb)      Physical Exam  General: Alert and interactive. Anxious appearing.   Eyes: The pupils are equal and round. EOMs intact. No scleral icterus.  ENT: No abnormalities to the external nose or ears. Mucous membranes moist. Posterior oropharynx is non-erythematous.  Neck: Trachea is in the midline. No nuchal rigidity.     CV: Regular rate and rhythm. S1 and S2 normal without murmur, click, gallop or rub.   Resp: Breath sounds are clear bilaterally, without rhonchi, wheezes, rales. Non-labored, no retractions or accessory muscle use.     GI: Abdomen is soft without distension. Mild diffuse TTP without guarding.   MS: Moving all extremities well. " Good muscle tone.   Skin: Warm and dry. No rash or lesions noted.  Neuro: Alert and oriented x 3. Normal speech. No pronator drift. 4/5 strength in left hand grasp, biceps, triceps and with SLR in lower extremities in comparison to 5/5 strength in right extremities. Slightly decreased sensation in left upper extremity. Normal heel-shin rub and finger-nose-finger. Normal ambulation with steady gait.   Psych: Awake. Alert.  Normal affect. Appropriate interactions.  Lymph: No anterior or posterior cervical lymphadenopathy noted.    Emergency Department Course     ECG:  ECG taken at 1147, ECG read at 1150  Normal sinus rhythm  Left anterior fascicular block  Abnormal ECG  Rate 82 bpm. ID interval 160 ms. QRS duration 80 ms. QT/QTc 378/441 ms. P-R-T axes 39 -54 40.    Imaging:  Radiology findings were communicated with the patient who voiced understanding of the findings.    MR Brain w/o & w Contrast  1. Stable mixed signal intensity nodule in the superficial parotid  space on the right.  2. Diffuse cerebral volume loss and cerebral white matter changes  consistent with chronic small vessel ischemic disease. No evidence for  acute intracranial pathology.  EMORY MYERS MD  Reading per radiology    Abdomen US, limited (RUQ only)  1. No acute abnormality.  2. Fatty liver. Enlarged liver. A focal area of decreased echogenicity  is suspected to represent fatty sparing at the right liver near the  gallbladder fossa region. There was a much larger similar-appearing  region within the right liver on the prior study from 2015 as well.  RAFA DUNCAN MD  Reading per radiology      Laboratory:  Laboratory findings were communicated with the patient who voiced understanding of the findings.    CBC: WBC 8.6, HGB 14.5,   CMP: Glucose 144 (H), BUN 31 (H), GFR 46 (L), o/w WNL (Creatinine 1.59 (H))    Troponin (Collected 1117): <0.015   Lipase: 1,017 (H)  INR: 0.99    Interventions:  1206 Ativan 1 mg PO  1503 Ativan 0.5 mg  PO  1516 Gadavist 8 mL IV     Emergency Department Course:    1117 IV was inserted and blood was drawn for laboratory testing, results above.     1123 Nursing notes and vitals reviewed. I performed an exam of the patient as documented above.     1147 EKG obtained as noted above.     1311 The patient was sent for a US while in the emergency department, results above.      1444 Patient rechecked and updated.      1516 The patient was sent for a MRI while in the emergency department, results above.      1635 Prior to discharge, I personally reviewed the results with the patient and all related questions were answered. The patient verbalized understanding and is amenable to plan.     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 The patient presents with 3 month history of left sided weakness and paresthesias. He has had this presentation a few different times in the past and has had multiple negative MRI's. He is able to ambulate here and actually has pretty strength. However, given his paresthesias and muscular weakness, I felt that a MRI was necessarily to rule out acute CVA. Fortunately this was negative. There is diffuse cerebral volume loss as superficial parotid nodule but no other abnormalities. His lab work here is negative and reassuring without any signs of leukocytosis or anemia, significant renal dysfunction from baseline, or electrolyte abnormalities. His troponin is negative and is EKG is reassuring. His INR is normal. His lipase is slightly elevated at 1,000, and I have also noted this in the past. He does not have any nausea or vomiting and is not currently in pain, and therefore I did not feel that this was acute pancreatitis needing admission to the hospital. I did  obtain a right upper quadrant ultrasound but there are no signs at this point of pancreatitic abscess, cholelithiasis, choledocholithiasis, or cholangitis. His vitals are stable here without any fever or chills. I requested that he follow very closely with neurology and GI for follow up of his paresthesia and abdominal cramping, respectively. I gave him Bentyl to go home with for his abdominal cramping, and I feel that he is stable for discharge at this time. He will follow up with primary care for further evaluation and treatment. He will be given a cab ride home. Will return here for persistent abdominal pain, vomiting, fever, chills, or any other worrisome symptoms.      Diagnosis:    ICD-10-CM    1. Acute left-sided weakness R53.1    2. Paresthesias R20.2    3. Abdominal cramping R10.9    4. Elevated lipase R74.8      Disposition:   The patient is discharged to home.     Discharge Medications:  New Prescriptions    DICYCLOMINE (BENTYL) 20 MG TABLET    Take 1 tablet (20 mg) by mouth 2 times daily for 10 days     Scribe Disclosure:  I, Orla Severson, am serving as a scribe at 11:24 AM on 1/9/2020 to document services personally performed by Tania Arshad PA-C based on my observations and the provider's statements to me.     EMERGENCY DEPARTMENT       Tania Arshad PA-C  01/09/20 8395

## 2020-01-09 NOTE — ED NOTES
Bed: ED12  Expected date:   Expected time:   Means of arrival:   Comments:  Eligio 516 - 59M numb & weak 3 days - ETA 9min

## 2020-01-09 NOTE — ED NOTES
"Patient able to ambulate around the room. Patient demonstrated standing on one foot and then the other. Patient complained of it being \"worse\" than normal. When asked what is worse, the Patient states the \"tingling in his hands and feet.\" RN and MD notified.   "

## 2020-01-10 ENCOUNTER — PATIENT OUTREACH (OUTPATIENT)
Dept: CARE COORDINATION | Facility: CLINIC | Age: 60
End: 2020-01-10

## 2020-01-10 DIAGNOSIS — R20.2 PARESTHESIA: ICD-10-CM

## 2020-01-10 DIAGNOSIS — R74.8 ELEVATED LIPASE: ICD-10-CM

## 2020-01-10 DIAGNOSIS — R10.9 ABDOMINAL CRAMPS: Primary | ICD-10-CM

## 2020-01-10 ASSESSMENT — ACTIVITIES OF DAILY LIVING (ADL): DEPENDENT_IADLS:: MEDICATION MANAGEMENT

## 2020-01-10 NOTE — TELEPHONE ENCOUNTER
1/10/20 Letter printed and mailed to the patient.    Nader Hernandez,   [facility]  [phone]

## 2020-01-10 NOTE — TELEPHONE ENCOUNTER
1/10/20 printed letter and mailed to patient.    Nader Hernandez,   [facility]  [phone]

## 2020-01-10 NOTE — PROGRESS NOTES
"Clinic Care Coordination Contact    Referral Information:  Referral Source: ED Follow-Up    Patient is a 59 year old that went to [facility] 1/9/20 for numbness. He reported he was experiencing left side numbness that has worsened. He described tingling, burning, and weakness. He had been having difficulty with his balance. He has no history of stroke. Additionally he was having abdominal cramping.     His ambulating was ok in the ED. He did have an MRI which was negative. His lab work was negative. His EKG was reassuring and INR was normal. His lipase was slightly elevated. He had no nausea or pain. No signs of pancreatic issues. He is to follow up with PCP, and schedule an appointment with neurology and GI. He was prescribed Bentyl for abdominal cramping and discharged.    Patient met criteria for care coordination outreach, [facility] outreaching to assess psychosocial status and go over hospital discharge plan of care.     Chief Complaint   Patient presents with     Clinic Care Coordination - Post Hospital     Needs to follow up with neurology, GI, and PCP        Universal Utilization:   Utilization    Last refreshed: 1/10/2020  9:23 AM:  Hospital Admissions 0           Last refreshed: 1/10/2020  9:23 AM:  ED Visits 1           Last refreshed: 1/10/2020  9:23 AM:  No Show Count (past year) 1              Current as of: 1/10/2020  9:23 AM            Clinical Concerns:  Current Medical Concerns:   Concern of ongoing left sides weakness, burning, and numbness. Directed to follow up with neurology.  Abdominal cramping- needs to follow up with GI.    Patient states some days he feels he could use some extra help with ADL's/IADL's but is still able to somewhat manage.    Current Behavioral Concerns: Patient reports being overwhelmed with his medical care saying \"I don't know what to do\". He has somewhat of a history of compliance issues with medication and not knowing exactly what he takes and has met with MTM " "before. Patient does smoke tobacco. He does not own any weapons.    Education Provided to patient:   Discussed getting assessed for PCA services and/or waiver services through the [facility].   Called [facility] front door with patient- did a referral for PCA assessment. Patient could move forward with trying to be assessed for a waiver for more supports than just PCA, but he needs to apply for social security and get \"certified disabled\". He was given resource for [employer] which could help him with that process if needed. At this time, patient is satisfied with at least just moving forward with PCA assessment.   Spoke with \"Lisbet\" at [facility] front door- [phone])    Medication Management:  Patient sets up and administers-    Indicated he was prescribed a medication in the ED but is unsure about it being sent to the pharmacy- Bentyl. EDDIE LEIVA requested RN at [facility] to follow up on this.    Functional Status:  Has no assistive device for ambulating at this time. Indicates he does manage ADL's but if he is having an \"off\" day with the paralysis its difficult to dress/shower independently. Somewhat a risk for falls due to intermittent issues with balance.    Living Situation:  Lives alone in an apartment. Somewhat socially isolated. Most family lives out of state and sister is main informal support    Diet/Exercise/Sleep:  Works at a hotel handling the laundry- keeps him somewhat active  No issues with diet or sleep reported    Transportation:  Public transportation  Sometimes medical transport for appt's     Psychosocial/notes:   Patient lives alone in an apartment in [place]. 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 He is somewhat socially isolated. He does smoke. He has no pets. He gets around $1,000 a month in income from working. He does not get social security currently. He does get food support through the [facility] and goes to the food shelf if needed. He uses public transportation.    " "  Financial/Insurance: Josh MA     Resources and Interventions:  Helped patient call to request a PCA assessment and is considering getting SMRT'd to see if eligible for a waiver for more in home support options    Advance Care Plan/Directive: on file listed as full code    Referrals Placed: [facility] Resources, Transportation     Goals:   Goals        General    Psychosocial (pt-stated)     Notes - Note edited  1/10/2020 10:03 AM by Christine Tao LGSW    Goal Statement: I will have a PCA assessment through the [facility] within three months. I understand that a PCA worker would be able to help me with bathing, dressing, grooming, eating and preparing meals, shopping, getting to medical appointments, and cleaning if eligible.  Date Goal set: 1/10/20  Date to Achieve By: 3/10/20  Patient expressed understanding of goal: yes  Action steps to achieve this goal:  1. I will schedule an assessment with a public health nurse (the [facility] will call you to schedule- [phone])  2. I will engage and participate in the assessment with any questions that need to be went over.  3. If I want to have a waiver instead of just PCA services, I will move forward with getting \"certified disabled\" by applying for social security and meeting with the state medical review team to get the certified disabled certification. [employer] in [place] can help me through the entire process- I can make an appointment with them by calling [phone]              Barriers: understanding and navigating  resources     Strengths: values his independence and maintaining his home     Patient/Caregiver understanding: yes    Outreach Frequency: monthly      Plan: Helped patient to make a referral to get a PCA assessment through the [facility]/insurance. He should get a call within a few weeks to schedule an assessment. Patient was given information about waiver services- if he wants to pursue that needs to get \"certified disabled\" and " apply for social security.     Patient plans to schedule an appointment for hospital follow up at [facility]. He needs to  his Bentyl prescription from the pharmacy.     Patient needs to schedule an appointment at [facility]. Can assist with this if needed. Patient had to end the call today due to having to go to work.     Care Coordinator will continue to follow. Sent intro letter and complex care plan via mail.     Christine Rutledge, MSW, [facility]  Clinic Care Coordinator  [email or web address]  [phone]

## 2020-01-10 NOTE — LETTER
[facility]  Health Care Home - Complex Care Plan    About Me   Patient Name:  Emily Zhu    YOB: 1960  Age:         59 year old   Alfonso MRN:    [id number] Telephone Information:   Home Phone [phone]   Mobile None       Address:  [address]  [place] [address] Email address:  No e-mail address on record      Emergency Contact(s)  Name Relationship Lgl Grd Work Phone Home Phone Mobile Phone   EDWARD CABRERA Sister  [phone] none   2. DECLINE Declined               Primary language:  English     needed? No    Language Services - Kathy [phone]  Other communication barriers: English as a second language, Lack of coping  Preferred Method of Communication:     Current living arrangement: I live alone  Mobility Status/ Medical Equipment: Independent    Health Maintenance  Health Maintenance Reviewed: due for hospital follow up- call clinic to schedule. You are also recommended to schedule an appointment with [facility] by calling [phone]. The address is [address] [place] [address]. Also, with GI.     My Access Plan  Medical Emergency 911   Primary Clinic Line   [phone]   24 Hour Appointment Line [phone]   24 Hour Nurse Line [phone]   Preferred Urgent Care [facility], [phone]   Preferred Hospital [facility]  [phone]   Preferred Pharmacy [id number] - [place], [place] - [address]     Behavioral Health Crisis Line [facility] Connection, [phone] or 911               My Care Team Members  Patient Care Team       Relationship Specialty Notifications Start End    June Pacheco NP PCP - General Nurse Practitioner  10/21/19     Phone: [phone] [address] [facility]note 3[facility] [id number]    Chelo Roberts [facility] Pharmacist Pharmacist  19     Phone: [phone] [address]  "[facility] [place] [address]    Kelechi Tim MD Assigned PCP   11/24/19     Phone: [phone] [address] [facility] [place] [address]    Christine Tao LGSW Lead Care Coordinator   1/10/20                   My Care Plans  Self Management and Treatment Plan  Goals (Comments)  Goals        General    Psychosocial (pt-stated)     Notes - Note edited  1/10/2020 10:03 AM by Christine Tao LGSW    Goal Statement: I will have a PCA assessment through the [facility] within three months. I understand that a PCA worker would be able to help me with bathing, dressing, grooming, eating and preparing meals, shopping, getting to medical appointments, and cleaning if eligible.  Date Goal set: 1/10/20  Date to Achieve By: 3/10/20  Patient expressed understanding of goal: yes  Action steps to achieve this goal:  1. I will schedule an assessment with a public health nurse (the [facility] will call you to schedule- [phone])  2. I will engage and participate in the assessment with any questions that need to be went over.  3. If I want to have a waiver instead of just PCA services, I will move forward with getting \"certified disabled\" by applying for social security and meeting with the state medical review team to get the certified disabled certification. [employer] in [place] can help me through the entire process- I can make an appointment with them by calling [phone]               Action Plans on File:            Depression          Advance Care Plans/Directives Type:   Type Advanced Care Plans/Directives: Advanced Directive - On File    My Medical and Care Information  Problem List   Patient Active Problem List   Diagnosis     Coronary atherosclerosis     Essential hypertension     Mixed hyperlipidemia     Cataract     GERD (gastroesophageal reflux disease)     Health Care Home     Microalbuminuria due to type 2 diabetes mellitus (H)     Type 2 diabetes mellitus with diabetic nephropathy, without long-term current " use of insulin (H)     Plantar fasciitis     Mild major depression (H)     Painful diabetic neuropathy (H)     Warthin tumor      Current Medications and Allergies:  See printed Medication Report    Care Coordination Start Date: 1/10/2020   Frequency of Care Coordination: monthly   Form Last Updated: 01/10/2020

## 2020-01-10 NOTE — LETTER
[facility]    January 10, 2020    Emily Zhu  [address]  [place] [address]      Dear Clint,    I am a clinic care coordinator who works with June Pacheco NP at [facility]. I wanted to introduce myself and provide you with my contact information for you to be able to call me with any questions or concerns. I wanted to introduce myself and provide you with my contact information so that you can call me with questions or concerns about your health care. Below is a description of clinic care coordination and how I can further assist you.     The clinic care coordinator is a registered nurse and/or  who understand the health care system. The goal of clinic care coordination is to help you manage your health and improve access to the [facility] system in the most efficient manner. The registered nurse can assist you in meeting your health care goals by providing education, coordinating services, and strengthening the communication among your providers. The  can assist you with financial, behavioral, psychosocial, chemical dependency, counseling, and/or psychiatric resources.    Please feel free to contact me at [phone], with any questions or concerns. We at [facility] are focused on providing you with the highest-quality healthcare experience possible and that all starts with you.     Sincerely,     Christine Tao, [facility]    [phone]

## 2020-01-13 ENCOUNTER — OFFICE VISIT (OUTPATIENT)
Dept: FAMILY MEDICINE | Facility: CLINIC | Age: 60
End: 2020-01-13

## 2020-01-13 VITALS
HEIGHT: 65 IN | WEIGHT: 172.8 LBS | TEMPERATURE: 97.5 F | DIASTOLIC BLOOD PRESSURE: 74 MMHG | HEART RATE: 72 BPM | RESPIRATION RATE: 16 BRPM | SYSTOLIC BLOOD PRESSURE: 138 MMHG | BODY MASS INDEX: 28.79 KG/M2

## 2020-01-13 DIAGNOSIS — E78.2 MIXED HYPERLIPIDEMIA: ICD-10-CM

## 2020-01-13 DIAGNOSIS — N18.30 CKD (CHRONIC KIDNEY DISEASE) STAGE 3, GFR 30-59 ML/MIN (H): ICD-10-CM

## 2020-01-13 DIAGNOSIS — E78.1 HYPERTRIGLYCERIDEMIA: Primary | ICD-10-CM

## 2020-01-13 DIAGNOSIS — R10.84 ABDOMINAL PAIN, GENERALIZED: ICD-10-CM

## 2020-01-13 PROCEDURE — 99214 OFFICE O/P EST MOD 30 MIN: CPT | Performed by: FAMILY MEDICINE

## 2020-01-13 RX ORDER — ATORVASTATIN CALCIUM 40 MG/1
40 TABLET, FILM COATED ORAL DAILY
Qty: 90 TABLET | Refills: 3 | Status: SHIPPED | OUTPATIENT
Start: 2020-01-13 | End: 2020-04-09

## 2020-01-13 RX ORDER — DICYCLOMINE HCL 20 MG
20 TABLET ORAL 2 TIMES DAILY
Qty: 20 TABLET | Refills: 0 | Status: SHIPPED | OUTPATIENT
Start: 2020-01-13 | End: 2020-03-31

## 2020-01-13 ASSESSMENT — MIFFLIN-ST. JEOR: SCORE: 1519.66

## 2020-01-13 NOTE — PROGRESS NOTES
Problem(s) Oriented visit        SUBJECTIVE:                                                    Emily Zhu is a 59 year old male who presents to clinic today for the following health issues :      PROBLEMS TO ADD ON...  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 The patient presents with 3 month history of left sided weakness and paresthesias. He has had this presentation a few different times in the past and has had multiple negative MRI's. He is able to ambulate here and actually has pretty strength. However, given his paresthesias and muscular weakness, I felt that a MRI was necessarily to rule out acute CVA. Fortunately this was negative. There is diffuse cerebral volume loss as superficial parotid nodule but no other abnormalities. His lab work here is negative and reassuring without any signs of leukocytosis or anemia, significant renal dysfunction from baseline, or electrolyte abnormalities. His troponin is negative and is EKG is reassuring. His INR is normal. His lipase is slightly elevated at 1,000, and I have also noted this in the past. He does not have any nausea or vomiting and is not currently in pain, and therefore I did not feel that this was acute pancreatitis needing admission to the hospital. I did obtain a right upper quadrant ultrasound but there are no signs at this point of pancreatitic abscess, cholelithiasis, choledocholithiasis, or cholangitis. His vitals are stable here without any fever or chills. I requested that he follow very closely with neurology and GI for follow up of his paresthesia and abdominal cramping, respectively. I gave him Bentyl to go home with for his abdominal cramping,  and I feel that he is stable for discharge at this time. He will follow up with primary care for further evaluation and treatment. He will be given a cab ride home. Will return here for persistent abdominal pain, vomiting, fever, chills, or any other worrisome symptoms. M  Current Status: still having left sided tingling      Problem list, Medication list, Allergies, and Medical/Social/Surgical histories reviewed in [facility] and updated as appropriate.   Additional history: as documented    ROS:  5 point ROS completed and negative except noted above, including Gen, CV, Resp, GI, MS    Histories:   Patient Active Problem List   Diagnosis     Coronary atherosclerosis     Essential hypertension     Mixed hyperlipidemia     Cataract     GERD (gastroesophageal reflux disease)     Health Care Home     Microalbuminuria due to type 2 diabetes mellitus (H)     Type 2 diabetes mellitus with diabetic nephropathy, without long-term current use of insulin (H)     Plantar fasciitis     Mild major depression (H)     Painful diabetic neuropathy (H)     Warthin tumor     CKD (chronic kidney disease) stage 3, GFR 30-59 ml/min (H)     Past Surgical History:   Procedure Laterality Date     ANGIOPLASTY  2007    rca     ANGIOPLASTY  2010    om     ARTHROTOMY SHOULDER, ROTATOR CUFF REPAIR, COMBINED Left 4/12/2017    Procedure: COMBINED ARTHROTOMY SHOULDER, ROTATOR CUFF REPAIR;  Surgeon: Deejay Conrad MD;  Location: [facility]     ARTHROTOMY SHOULDER, SUBACROMIAL DECOMPRESSION, COMBINED Left 4/12/2017    Procedure: COMBINED ARTHROTOMY SHOULDER, SUBACROMIAL DECOMPRESSION;  Surgeon: Deejay Conrad MD;  Location: [facility]     COLONOSCOPY       DECOMPRESSION CUBITAL TUNNEL  11/29/2013    Procedure: DECOMPRESSION CUBITAL TUNNEL;;  Surgeon: Radha Cain MD;  Location: [facility]     ENDOSCOPIC RELEASE CARPAL TUNNEL  11/29/2013    Procedure: ENDOSCOPIC RELEASE CARPAL TUNNEL;  LEFT ENDOSCOPIC CARPAL TUNNEL RELEASE AND CUBITAL  "TUNNEL RELEASE ;  Surgeon: Radha Cain MD;  Location: [facility]     OPEN REDUCTION INTERNAL FIXATION HIP NAILING  3/30/2012    Procedure:OPEN REDUCTION INTERNAL FIXATION HIP NAILING; Biopsy, Curettage and Bone Grafting Right Hip Lesion, Placement of Hip Screw; Surgeon:MARILIN GAY; Location:[facility]     PAR[place]OMY Right ~1990    [place]     PAROTIDECTOMY Left 11/26/2019    Procedure: Left parotidectomy with facial nerve monitoring and 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.;  Surgeon: Rafat Carlin MD;  Location: [facility]     PHACOEMULSIFICATION CLEAR CORNEA WITH STANDARD INTRAOCULAR LENS IMPLANT  5/8/2013    Procedure: PHACOEMULSIFICATION CLEAR CORNEA WITH STANDARD INTRAOCULAR LENS IMPLANT;  RIGHT EYE PHACOEMULSIFICATION CLEAR CORNEA WITH STANDARD INTRAOCULAR LENS IMPLANT ;  Surgeon: Jovani Pretty MD;  Location: [facility]     PHACOEMULSIFICATION CLEAR CORNEA WITH STANDARD INTRAOCULAR LENS IMPLANT  5/20/2013    Procedure: PHACOEMULSIFICATION CLEAR CORNEA WITH STANDARD INTRAOCULAR LENS IMPLANT;  LEFT  EYE PHACOEMULSIFICATION CLEAR CORNEA WITH STANDARD INTRAOCULAR LENS IMPLANT ;  Surgeon: Jovani Pretty MD;  Location:  [facility]     STENT         Social History     Tobacco Use     Smoking status: Current Every Day Smoker     Packs/day: 1.00     Years: 40.00     Pack years: 40.00     Types: Cigarettes     Smokeless tobacco: Never Used   Substance Use Topics     Alcohol use: Not Currently     Alcohol/week: 0.0 standard drinks     Comment: none     Family History   Problem Relation Age of Onset     Cerebrovascular Disease Mother 56     Hypertension Mother      Cerebrovascular Disease Father      Hypertension Father            OBJECTIVE:                                                    /74   Pulse 72   Temp 97.5  F (36.4  C) (Oral)   Resp 16   Ht 1.657 m (5' 5.25\")   Wt 78.4 kg (172 lb 12.8 oz)   BMI 28.54 kg/m    Body mass index is 28.54 kg/m .   APPEARANCE: = Relaxed and in no " distress  Conj/Eyelids = noninjected and lids and lashes are without inflammation  PERRLA/Irises = Pupils Round Reactive to Light and Irisis without inflammation  Neck = No anterior or posterior adenopathy appreciated.  Thyroid = Not enlarged and no masses felt  Resp effort = Calm regular breathing  Breath Sounds = Good air movement with no rales or rhonchi in any lung fields  Heart Rate, Rhythm, & sounds (no Murm)  = Regular rate and rhythm with no S3, S4, or murmur appreciated.  Carotid Art's = Pulses full and equal and no bruits appreciated  Abdomen = Soft, nontender, no masses, & bowel sounds in all quadrants  Liver/Spleen = Normal span and no splenomegaly noted  Digits and Nails = FROM in all finger joints, no nail dystrophy  Ext (edema) = No pretibial edema noted or elsewhere  Musculsktl =  Strength and ROM of major joints are within normal limits  SKIN = absent significant rashes or lesions   Recent/Remote Memory = Alert and Oriented x 3  Mood/Affect = Cooperative and interested     ASSESSMENT/PLAN:                                                        Emily was seen today for er f/u, abdominal pain and medication reconciliation.    Diagnoses and all orders for this visit:    Hypertriglyceridemia    CKD (chronic kidney disease) stage 3, GFR 30-59 ml/min (H)  Chronic kidney disease due to HTN and DM.  Check urine for protein.   Told the patient to avoid NSAIDs if at all possible.   Will monitor closely and send to Nephrologist if renal function continues to decline.      Mixed hyperlipidemia  -     atorvastatin (LIPITOR) 40 MG tablet; Take 1 tablet (40 mg) by mouth daily    Pancreatitis,  I wonder if this is from the Hypertriglycerides.  On atorvastatin only 10, could increase to 40 or add Lopid.      Regular exercise    The following health maintenance items are reviewed in Epic and correct as of today:  Health Maintenance   Topic Date Due     ADVANCE CARE PLANNING  1960     HIV SCREENING   01/22/1973     ZOSTER IMMUNIZATION (1 of 2) 01/22/2008     PREVENTIVE CARE VISIT  04/10/2014     EYE EXAM  07/13/2019     PHQ-9  01/22/2020     A1C  05/27/2020     LIPID  07/08/2020     MICROALBUMIN  07/22/2020     DIABETIC FOOT EXAM  09/09/2020     TSH W/FREE T4 REFLEX  12/12/2020     BMP  01/09/2021     DTAP/TDAP/TD IMMUNIZATION (2 - Td) 05/13/2021     COLONOSCOPY  06/03/2021     HEPATITIS C SCREENING  Completed     DEPRESSION ACTION PLAN  Completed     PNEUMOCOCCAL IMMUNIZATION 19-64 MEDIUM RISK  Completed     INFLUENZA VACCINE  Addressed     IPV IMMUNIZATION  Aged Out     MENINGITIS IMMUNIZATION  Aged Out       Gonzalez Jensen MD  [facility]  Family Practice  [facility]  [phone]    For any issues my office # is [phone]

## 2020-01-30 ENCOUNTER — PATIENT OUTREACH (OUTPATIENT)
Dept: CARE COORDINATION | Facility: CLINIC | Age: 60
End: 2020-01-30

## 2020-01-30 NOTE — PROGRESS NOTES
"Clinic Care Coordination Contact    Follow Up Progress Note      Assessment:  Care Coordinator called [facility] with patient on 1/10/20 to request a PCA assessment. Called patient today to confirm if he had the assessment done and received any information on status of being eligible. Patient stated that a nurse came out on Monday to do the assessment and he should be getting paperwork on reponse soon.  CC called [facility] and was told that they contract with [facility] to do the assessment. Called [facility] and they confirmed an assessment was done and patient will be getting paperwork in the mail within a few days on eligibility and results of the assessment. They could not share much info without a CASSIE. Will reconnect with patient within a few weeks on progress.    Per chart review, patient had a visit with Dr. Jensen on 1/13/20. Patient indicated he followed up with neurology.     Goals addressed this encounter:   Goals Addressed                 This Visit's Progress       Patient Stated      Psychosocial (pt-stated)   On track     Goal Statement: I will have a PCA assessment through the [facility] within three months. I understand that a PCA worker would be able to help me with bathing, dressing, grooming, eating and preparing meals, shopping, getting to medical appointments, and cleaning if eligible.  Date Goal set: 1/10/20  Date to Achieve By: 3/10/20  Patient expressed understanding of goal: yes  Action steps to achieve this goal:  1. I will schedule an assessment with a public health nurse (the [facility] will call you to schedule- [phone])  2. I will engage and participate in the assessment with any questions that need to be went over.  3. If I want to have a waiver instead of just PCA services, I will move forward with getting \"certified disabled\" by applying for social security and meeting with the state medical review team to get the certified disabled certification. [employer] in [place] can " help me through the entire process- I can make an appointment with them by calling [phone]    1/30/20- Spoke with patient via phone. He indicated a nurse came out to do the PCA assessment and he is awaiting to hear if he qualifies and for how many hours per day/week. EDDIE LEIVA called [facility] and was told that they contract with [place] [facility] [facility], and they were the agency that completed the assessment. Called [facility] and it was confirmed that patient did have an assessment but could not share information on eligibility/qualification without a signed release. Will plan to check in with patient in 2-3 weeks on status and if there are further needs at that time.                   Outreach Frequency: monthly    Plan: Will plan to touch base with patient in one month to go over progress and see if there are further needs/questions/concerns to address at that time.    Christine Rutledge MSW, [facility]  Clinic Care Coordinator  [email or web address]  [phone]

## 2020-02-03 ENCOUNTER — TRANSFERRED RECORDS (OUTPATIENT)
Dept: FAMILY MEDICINE | Facility: CLINIC | Age: 60
End: 2020-02-03

## 2020-03-06 ENCOUNTER — PATIENT OUTREACH (OUTPATIENT)
Dept: CARE COORDINATION | Facility: CLINIC | Age: 60
End: 2020-03-06

## 2020-03-06 ASSESSMENT — ACTIVITIES OF DAILY LIVING (ADL): DEPENDENT_IADLS:: MEDICATION MANAGEMENT

## 2020-03-06 NOTE — PROGRESS NOTES
Clinic Care Coordination Contact  [facility]/Voicemail    Referral Source: continued ED discharge follow up    Clinical Data: Care Coordinator Outreaching to go over progress with getting in home services such as through PCA- patient had an assessment recently. Also, to see if there are further needs/questions/concerns etc.       Outreach attempted x 1.  Left message on patient's voicemail with call back information and requested return call.      Plan: Care Coordinator will try to outreach again within one week. Encouraged patient to call back to go over progress.     Christine Rutledge, MSW, [facility]  Clinic Care Coordinator  [email or web address]  [phone]

## 2020-03-06 NOTE — LETTER
Complex Care Plan  About Me:    Patient Name:  Emily Zhu    YOB: 1960  Age:         60 year old   Alfonso MRN:    [id number] Telephone Information:  Home Phone [phone]   Mobile None       Address:  [address]  [place] [address] Email address:  No e-mail address on record      Emergency Contact(s)    Name Relationship Lgl Grd Work Phone Home Phone Mobile Phone   EDWARD CABRERA Sister  [phone] none   2. DECLINE Declined               Primary language:  English     needed? No   [place] [employer] [facility]:  [phone] op. 1  Other communication barriers: English as a second language, Lack of coping  Preferred Method of Communication:     Current living arrangement: I live alone  Mobility Status/ Medical Equipment: Independent    Health Maintenance  Health Maintenance Reviewed:      My Access Plan  Medical Emergency 911   Primary Clinic Line   [phone]   24 Hour Appointment Line [phone] or  [phone]) (toll-free)   24 Hour Nurse Line [phone] (toll-free)   Preferred Urgent Care [facility] [phone]   Preferred Hospital [facility]  [phone]   Preferred Pharmacy [facility] [address]     Behavioral Health Crisis Line The National Suicide Prevention Lifeline at 1-811.853.3923 or 911             My Care Team Members  Patient Care Team       Relationship Specialty Notifications Start End    Gonzalez Jensen MD PCP - General Family Practice  1/10/20     Phone: [phone] Fax: [phone] [address] [facility] [address]    Chelo Roberts RPH Pharmacist Pharmacist  8/13/19     Phone: [phone] [address] [facility] [address]    Christine Tao LGSW Lead Care Coordinator   1/10/20     Gonzalez Jensen MD Assigned PCP   1/12/20     Phone: [phone] Fax: [phone]          "[address] [facility] [address]            My Care Plans  Self Management and Treatment Plan  Goals and (Comments)  Goals        General    Psychosocial (pt-stated)     Notes - Note edited  3/6/2020  1:32 PM by Christine Tao LGSW    Goal Statement: I will have a PCA assessment through the [facility] within three months. I understand that a PCA worker would be able to help me with bathing, dressing, grooming, eating and preparing meals, shopping, getting to medical appointments, and cleaning if eligible.  Date Goal set: 1/10/20  Date to Achieve By: 3/10/20  Patient expressed understanding of goal: yes  Action steps to achieve this goal:  1. I will schedule an assessment with a public health nurse (the [facility] will call you to schedule- [phone])  2. I will engage and participate in the assessment with any questions that need to be went over.  3. If I want to have a waiver instead of just PCA services, I will move forward with getting \"certified disabled\" by applying for social security and meeting with the [facility] medical review team to get the certified disabled certification. [employer] in [place] can help me through the entire process- I can make an appointment with them by calling [phone]    1/30/20- Spoke with patient via phone. He indicated a nurse came out to do the PCA assessment and he is awaiting to hear if he qualifies and for how many hours per day/week. EDDIE LEIVA called [facility] and was told that they contract with [place] [facility] [facility] [employer], and they were the agency that completed the assessment. Called [facility] and it was confirmed that patient did have an assessment but could not share information on eligibility/qualification without a signed release. Will plan to check in with patient in 2-3 weeks on status and if there are further needs at that time.    As of today's date 3/6/2020 goal is met at 26 - 50%.   Goal Status:  Showing progress. Left voicemail to go over progress.  "                Action Plans on File:            Depression          Advance Care Plans/Directives Type:   Type Advanced Care Plans/Directives: Advanced Directive - On File    My Medical and Care Information  Problem List   Patient Active Problem List   Diagnosis     Coronary atherosclerosis     Essential hypertension     Mixed hyperlipidemia     Cataract     GERD (gastroesophageal reflux disease)     Health Care Home     Microalbuminuria due to type 2 diabetes mellitus (H)     Type 2 diabetes mellitus with diabetic nephropathy, without long-term current use of insulin (H)     Plantar fasciitis     Mild major depression (H)     Painful diabetic neuropathy (H)     Warthin tumor     CKD (chronic kidney disease) stage 3, GFR 30-59 ml/min (H)      Current Medications and Allergies:  See printed Medication Report.    Care Coordination Start Date: 1/10/2020   Frequency of Care Coordination: monthly   Form Last Updated: 03/06/2020

## 2020-03-16 DIAGNOSIS — I10 ESSENTIAL HYPERTENSION: ICD-10-CM

## 2020-03-16 RX ORDER — HYDROCHLOROTHIAZIDE 25 MG/1
25 TABLET ORAL DAILY
Qty: 90 TABLET | Refills: 0 | Status: SHIPPED | OUTPATIENT
Start: 2020-03-16 | End: 2020-04-09

## 2020-03-19 DIAGNOSIS — E11.21 TYPE 2 DIABETES MELLITUS WITH DIABETIC NEPHROPATHY, WITHOUT LONG-TERM CURRENT USE OF INSULIN (H): ICD-10-CM

## 2020-03-19 RX ORDER — METFORMIN HCL 500 MG
1000 TABLET, EXTENDED RELEASE 24 HR ORAL 2 TIMES DAILY WITH MEALS
Qty: 360 TABLET | Refills: 0 | Status: SHIPPED | OUTPATIENT
Start: 2020-03-19 | End: 2020-04-09

## 2020-03-25 ENCOUNTER — PATIENT OUTREACH (OUTPATIENT)
Dept: CARE COORDINATION | Facility: CLINIC | Age: 60
End: 2020-03-25

## 2020-03-25 NOTE — PROGRESS NOTES
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 He has had a reduction in his hours and may need to file for unemployment. He also noted that he  needs help filling out some forms and gathering necessary documentation. He doesn't have access to a computer or printer.    Patient stated that he plans to go to [employer] tomorrow which is the usual food shelf he goes to, to see if there is a  staff available to help him with forms/mail. He is not sure if they will be open or not. EDDIE LEIVA left a voicemail at [facility] to check and see if someone can help patient there.    Patient stated that he feels down as he has had to isolate more in his apartment lately. He has had some passive thoughts of \"not wanting to live anymore\" but mostly just feels its because he is getting older and is needing more help with things he cannot necessarily do by himself anymore. He is hoping to have PCA services. Went over crisis resources with patient and if/when to go to ER for mental health. Pt was able confirm safety today he just feels that \"things are hard right now\".    He isn't sure what happened after the PCA assessment- he was told he would get some information in the mail about his eligibility but he isn't sure if he received it. EDDIE LEIVA offered to call and check on the status- left a voicemail for intake at [facility].     Intake at [facility] called back- pt's MA is active and his PMI # is [id number]. He was approved for PCA services on January 29th for 1 hour and 45 minutes per day. However, in the system his status is currently \"not activee/denied\". EDDIE LEIVA was referred to leave a voicemail for the PCA line at [facility] which CC did to check into this- [phone] option 4 then 2.     Patient was very appreciative of the follow up.     Outreach Frequency: " monthly    Plan: - check on status of PCA eligbility after assessment was completed. Left a VM for PCA line at [facility].    Pt going to [facility] to fill out some paperwork he needs help with tomorrow.     Care Coordinator will follow up as needed.    Christine Rutledge, MSW, [facility]  Clinic Care Coordinator  [email or web address]  [phone]

## 2020-03-27 ENCOUNTER — PATIENT OUTREACH (OUTPATIENT)
Dept: CARE COORDINATION | Facility: CLINIC | Age: 60
End: 2020-03-27

## 2020-03-27 NOTE — TELEPHONE ENCOUNTER
3/27/20 LETTER PRINTED AND MAILED TO PATIENT    Nader Hernandez,   [facility]  [phone]

## 2020-03-27 NOTE — PROGRESS NOTES
"Clinic Care Coordination Contact  Care Team Conversations    EDDIE Care Coordinator received a voicemail from \"Alyssia\" a public health nurse at [facility]. She stated that patient was approved for 1 hour and 45 minutes of PCA per day but services were not authorized yet because patient needs to find a PCA agency and PCA to work with and has not done this yet. Patient feels like he needs help to figure it out and can't do it on his own. She directed patient to call the [employer] to get a list of PCA agencies and receive some assistance with this.    EDDIE LEIVA called [employer] this morning- and spoke with \"Idalmis\" at extension [phone]. She and CC called patient together and left a voicemail. For now, she will mail patient a list of PCA agencies.     EDDIE LEIVA will try to connect with patient again next week.    Christine Rutledge, MSW, [facility]  Clinic Care Coordinator  [email or web address]  [phone]    "

## 2020-03-27 NOTE — LETTER
[facility]  Complex Care Plan  About Me:    Patient Name:  Emily Zhu    YOB: 1960  Age:         60 year old   Alfonso MRN:    [id number] Telephone Information:  Home Phone [phone]   Mobile None       Address:  [address]  [facility] [address] Email address:  No e-mail address on record      Emergency Contact(s)    Name Relationship Lgl Grd Work Phone Home Phone Mobile Phone   EDWARD CABRERA Sister  [phone] none   2. DECLINE Declined               Primary language:       needed? No   [place] [facility]:  [phone] op. 1  Other communication barriers: none  Preferred Method of Communication:     Current living arrangement:    Mobility Status/ Medical Equipment:      Health Maintenance  Health Maintenance Reviewed:      My Access Plan  Medical Emergency 911   Primary Clinic Line  [phone]   24 Hour Appointment Line [phone] or  [phone] ([phone]) (toll-free)   24 Hour Nurse Line [phone] (toll-free)   Preferred Urgent Care     Preferred Hospital     Preferred Pharmacy [facility] [address] [facility][1] [address] [facility]     Behavioral Health Crisis Line The National Suicide Prevention Lifeline at 1-250.372.1706 or 918             My Care Team Members  Patient Care Team       Relationship Specialty Notifications Start End    Gonzalez Jensen MD PCP - General Family Practice  1/10/20     Phone: [phone] Fax: [phone] [address] [facility] [address]    Chelo Roberts JUANITA Pharmacist Pharmacist  8/13/19     Phone: [phone] [address] [facility] [address]    Christine Tao LGSW Lead Care Coordinator   1/10/20     Gonzalez Jensen MD Assigned PCP   1/12/20     Phone: [phone] Fax: [phone] [address] [facility] [address]            My Care Plans  Self Management and Treatment Plan  Goals and  "(Comments)  Goals        General    Psychosocial (pt-stated)     Notes - Note edited  3/27/2020  9:08 AM by Christine Tao LGSW    Goal Statement: I will have a PCA assessment through the [facility] within three months. I understand that a PCA worker would be able to help me with bathing, dressing, grooming, eating and preparing meals, shopping, getting to medical appointments, and cleaning if eligible.  Date Goal set: 1/10/20  Date to Achieve By: 5/1/20  Patient expressed understanding of goal: yes  Action steps to achieve this goal:  1. I will schedule an assessment with a public health nurse (the [facility] will call you to schedule- [phone])  2. I will engage and participate in the assessment with any questions that need to be went over.  3. If I want to have a waiver instead of just PCA services, I will move forward with getting \"certified disabled\" by applying for social security and meeting with the [facility] medical review team to get the certified disabled certification. [employer] in [place] can help me through the entire process- I can make an appointment with them by calling [phone]    1/30/20- Spoke with patient via phone. He indicated a nurse came out to do the PCA assessment and he is awaiting to hear if he qualifies and for how many hours per day/week. EDDIE LEIVA called [facility] and was told that they contract with [place] [employer], and they were the agency that completed the assessment. Called [facility] and it was confirmed that patient did have an assessment but could not share information on eligibility/qualification without a signed release. Will plan to check in with patient in 2-3 weeks on status and if there are further needs at that time.    As of today's date 3/6/2020 goal is met at 26 - 50%.   Goal Status:  Showing progress. Left voicemail to go over progress.      As of today's date 3/25/2020 goal is met at 51 - 75%.   Goal Status:  Ongoing. Pt had the assessment, but " "isn't sure what happened with eligibility. He has a hard time reading forms and handling paperwork. EDDIE CC left a VM for the [facility] to check on the status.      3/27/20- Confirmed with the county patient is eligible for 1 hour and 45 minutes per day of PCA services. He needs to find a PCA agency and PCA to work with him before the [facility] authorizes services. Called [employer] and they will mail him a list of traditional PCA agencies to call. Extended goal timeline.  [employer]: 2-966-397-3048 \"[employer]\" extension 02475                    My Medical and Care Information  Problem List   Patient Active Problem List   Diagnosis     Coronary atherosclerosis     Essential hypertension     Mixed hyperlipidemia     Cataract     GERD (gastroesophageal reflux disease)     Health Care Home     Microalbuminuria due to type 2 diabetes mellitus (H)     Type 2 diabetes mellitus with diabetic nephropathy, without long-term current use of insulin (H)     Plantar fasciitis     Mild major depression (H)     Painful diabetic neuropathy (H)     [place] tumor     CKD (chronic kidney disease) stage 3, GFR 30-59 ml/min (H)      Current Medications and Allergies:  See printed Medication Report.    Care Coordination Start Date: No linked episodes   Frequency of Care Coordination: monthly   Form Last Updated: 03/27/2020       "

## 2020-03-31 ENCOUNTER — ALLIED HEALTH/NURSE VISIT (OUTPATIENT)
Dept: PHARMACY | Facility: PHYSICIAN GROUP | Age: 60
End: 2020-03-31
Payer: COMMERCIAL

## 2020-03-31 DIAGNOSIS — I10 ESSENTIAL HYPERTENSION: ICD-10-CM

## 2020-03-31 DIAGNOSIS — E11.21 TYPE 2 DIABETES MELLITUS WITH DIABETIC NEPHROPATHY, WITHOUT LONG-TERM CURRENT USE OF INSULIN (H): Primary | ICD-10-CM

## 2020-03-31 DIAGNOSIS — R10.84 GENERALIZED ABDOMINAL PAIN: ICD-10-CM

## 2020-03-31 DIAGNOSIS — R52 PAIN: ICD-10-CM

## 2020-03-31 DIAGNOSIS — I25.10 ATHEROSCLEROSIS OF CORONARY ARTERY OF NATIVE HEART WITHOUT ANGINA PECTORIS, UNSPECIFIED VESSEL OR LESION TYPE: ICD-10-CM

## 2020-03-31 DIAGNOSIS — E78.2 MIXED HYPERLIPIDEMIA: ICD-10-CM

## 2020-03-31 PROCEDURE — 99605 MTMS BY PHARM NP 15 MIN: CPT | Performed by: PHARMACIST

## 2020-03-31 PROCEDURE — 99607 MTMS BY PHARM ADDL 15 MIN: CPT | Performed by: PHARMACIST

## 2020-03-31 RX ORDER — ATORVASTATIN CALCIUM 10 MG/1
10 TABLET, FILM COATED ORAL DAILY
COMMUNITY
End: 2020-04-09

## 2020-03-31 NOTE — PROGRESS NOTES
MTM ENCOUNTER  SUBJECTIVE/OBJECTIVE:                           Emily Zhu is a 60 year old male called for a follow-up visit. He was referred to me from Dr. Jensen.  Today's visit is a follow-up MTM visit from 2019.      Chief Complaint: First visit of the year.    Allergies/ADRs: Reviewed in Epic  Tobacco:  reports that he has been smoking cigarettes. He has a 40.00 pack-year smoking history. He has never used smokeless tobacco.Tobacco Cessation Action Plan: Information offered: Patient not interested at this time  Alcohol: not currently using  Caffeine: no caffeine  Activity: limited- not leaving the house due to COVID  PMH: Reviewed in Epic      Medication Adherence/Access:   Called [facility] to check on fill history as he has not had consistent use of his medications in the past.   It appears his medications were filled in January for 30 days, but then went without for awhile due to insurance issues then had his medications refilled this month (March).   Metformin - 4 daily, Filled 3/24 for 30 day  Metoprolol tart 25mg BID filled on 3/17 for 30 day  Clopidogrel 75mg filled on 3/26 for 30 day  Hydrochlorothiazide 25mg 3/16 for 30 day  Lisinopril 20mg 3/16 for 30 day  Amitriptyline 10mg on 3/16 for 30 day  Isosorbide ER 30mg on 3/16 for 30 day  Atorvastatin 10mg on 3/29 for 30 day- NOT the 40mg dose (filled this 1x in 2020 for 30 days).    Diabetes:  Pt currently taking metformin 1000mg BID.   SMBG: two times daily. Ranges (patient reported):   Fastin-160  States being home he is eating more and not exercising. He is not currently working either.   Patient is not experiencing hypoglycemia  Recent symptoms of high blood sugar? none  ACEi/ARB: Yes: lisinopril.   Urine Albumin: No results found for: UMALCR   Aspirin: Taking Plavix 75mg daily- denies issues.   Given time constraints of the visit and medication reconciliation, unable to get into diet information today.   Lab Results    Component Value Date    A1C 7.2 11/27/2019    A1C 7.5 07/08/2019    A1C 7.2 12/12/2018    A1C 8.4 06/11/2018    A1C 7.4 11/03/2017       Hypertension/CAD: Current medications  lisinopril 20mg daily, hydrochlorothiazide 25mg daily, metoprolol 25mg BID, isosorbide 30mg daily, plavix 75mg daily.  Patient does not self-monitor BP.  Patient reports no current medication side effects. Stent placed in 2007. No reported BP values to assess control on these doses.   BP Readings from Last 3 Encounters:   01/13/20 138/74   01/09/20 (!) 144/94   11/27/19 131/74       Hyperlipidemia: Current therapy includes atorvastatin 40mg once daily increased from 10mg on Jan13th, but he only took the higher dose only 1 month then back to the lower dose as of 3/29/2020.  Pt reports no significant myalgias or other side effects.. Had issues on Crestor 20mg in the past.    labs on 10mg of atorvastatin-  Recent Labs   Lab Test 07/08/19  1630 12/12/18  1254   CHOL 233* 226*   HDL 41 45   LDL Comment Comment   TRIG 408* 453*       Pain: Currently taking Amitriptyline 10mg daily at bedtime, using ibuprofen as needed, but not regularly using.     Abdominal Pain: hospitalized in January, was to follow up with GI, colonoscopy postponed due to COVID, states pain has resolved at this time. Was given rx for dicyclomine in the ED in Jan, but not using this. Med list has rx for pantoprazole but this was last ordered in 2018 and has not been taking this either.       Today's Vitals: There were no vitals taken for this visit.- phone     ASSESSMENT:                            Medication Adherence: good, needs help with keeping medication refills up to date to avoid lapse in adherence. Only able to get 30 day supply based on insurance. He will be due for a majority of his prescriptions before 4/16 and will need the atorvastatin switched to 40mg again.     Diabetes: Needs Improvement. Patient is not meeting A1c goal of < 7%. Self monitoring of blood glucose  is not at goal of fasting  mg/dL. Pt would benefit from adherence to his regimen and increased exercise with modifications in his diet.     Hypertension/CAD: Stable for now, efforts to continue adherence focus for him.    Hyperlipidemia: Stable, will finish using 10mg dose for now, but then recommend switching to the 40mg strength for next refill.     Pain: Stable.     Abdominal Pain: Resolved.     PLAN:                            1. Atorvastatin 40mg next month after out of 10mg     2. Patient encouraged to make additional dietary and exercise changes to aid in lower blood sugars without adding more medication.     I spent 30 minutes with this patient today. All changes were made via collaborative practice agreement with Dr. Christopher. A copy of the visit note was provided to the patient's primary care provider.    Will follow up in 1 month.    The patient was given a summary of these recommendations.     Chelo Roberts, Pharm.D, [facility]  Medication Therapy Management Pharmacist  [phone]

## 2020-04-09 DIAGNOSIS — E78.2 MIXED HYPERLIPIDEMIA: ICD-10-CM

## 2020-04-09 DIAGNOSIS — Z76.0 ENCOUNTER FOR MEDICATION REFILL: ICD-10-CM

## 2020-04-09 DIAGNOSIS — I10 ESSENTIAL HYPERTENSION: ICD-10-CM

## 2020-04-09 DIAGNOSIS — E11.40 PAINFUL DIABETIC NEUROPATHY (H): ICD-10-CM

## 2020-04-09 DIAGNOSIS — E11.21 TYPE 2 DIABETES MELLITUS WITH DIABETIC NEPHROPATHY, WITHOUT LONG-TERM CURRENT USE OF INSULIN (H): ICD-10-CM

## 2020-04-09 DIAGNOSIS — I25.10 ATHEROSCLEROSIS OF CORONARY ARTERY OF NATIVE HEART WITHOUT ANGINA PECTORIS, UNSPECIFIED VESSEL OR LESION TYPE: Primary | ICD-10-CM

## 2020-04-09 RX ORDER — METOPROLOL TARTRATE 25 MG/1
25 TABLET, FILM COATED ORAL 2 TIMES DAILY
Qty: 60 TABLET | Refills: 11 | Status: SHIPPED | OUTPATIENT
Start: 2020-04-09 | End: 2021-04-22

## 2020-04-09 RX ORDER — LISINOPRIL 20 MG/1
20 TABLET ORAL DAILY
Qty: 30 TABLET | Refills: 11 | Status: SHIPPED | OUTPATIENT
Start: 2020-04-09 | End: 2021-04-22

## 2020-04-09 RX ORDER — METFORMIN HCL 500 MG
1000 TABLET, EXTENDED RELEASE 24 HR ORAL 2 TIMES DAILY WITH MEALS
Qty: 120 TABLET | Refills: 11 | Status: SHIPPED | OUTPATIENT
Start: 2020-04-09 | End: 2021-04-22

## 2020-04-09 RX ORDER — AMITRIPTYLINE HYDROCHLORIDE 10 MG/1
10 TABLET ORAL AT BEDTIME
Qty: 30 TABLET | Refills: 11 | Status: SHIPPED | OUTPATIENT
Start: 2020-04-09 | End: 2021-04-22

## 2020-04-09 RX ORDER — ISOSORBIDE MONONITRATE 30 MG/1
30 TABLET, EXTENDED RELEASE ORAL DAILY
Qty: 30 TABLET | Refills: 11 | Status: SHIPPED | OUTPATIENT
Start: 2020-04-09 | End: 2021-04-22

## 2020-04-09 RX ORDER — CLOPIDOGREL BISULFATE 75 MG/1
75 TABLET ORAL DAILY
Qty: 30 TABLET | Refills: 11 | Status: SHIPPED | OUTPATIENT
Start: 2020-04-09 | End: 2021-04-22

## 2020-04-09 RX ORDER — HYDROCHLOROTHIAZIDE 25 MG/1
25 TABLET ORAL DAILY
Qty: 30 TABLET | Refills: 11 | Status: SHIPPED | OUTPATIENT
Start: 2020-04-09 | End: 2021-04-22

## 2020-04-09 RX ORDER — LANCETS
EACH MISCELLANEOUS
Qty: 100 EACH | Refills: 11 | Status: SHIPPED | OUTPATIENT
Start: 2020-04-09 | End: 2021-04-22

## 2020-04-09 RX ORDER — ATORVASTATIN CALCIUM 40 MG/1
40 TABLET, FILM COATED ORAL DAILY
Qty: 30 TABLET | Refills: 11 | Status: SHIPPED | OUTPATIENT
Start: 2020-04-09 | End: 2021-04-22

## 2020-04-09 RX ORDER — BLOOD SUGAR DIAGNOSTIC
STRIP MISCELLANEOUS
Qty: 100 STRIP | Refills: 11 | Status: SHIPPED | OUTPATIENT
Start: 2020-04-09 | End: 2020-10-30

## 2020-04-09 NOTE — TELEPHONE ENCOUNTER
Syncing up rx orders for him to aid in better compliance and refills for him.     Clopidogrel/metformin were most recently filled on 3/24 and 3/26 for 30 days, so these won't be able to be refilled until April 17th.     The other prescriptions were filled on 3/16, 3/17 so could be filled sooner. Pharmacist at [facility] states they have a way to help with a refill too soon override due to COVID that she can use to help get these on the same refill date.     Updated all rx for 30 day supplies on same date to allow for more synced filling. Pharmacy will deactivate old prescriptions.     Changes made per CPA with Dr. Jensen.    Chelo Roberts, Pharm.D, BCACP  Medication Therapy Management Pharmacist  [phone]

## 2020-04-13 ENCOUNTER — PATIENT OUTREACH (OUTPATIENT)
Dept: CARE COORDINATION | Facility: CLINIC | Age: 60
End: 2020-04-13

## 2020-04-13 NOTE — PROGRESS NOTES
"Clinic Care Coordination Contact  Care Team Conversations    EDDIE Care Coordinator called a PCA agency to gather general info on availability and ability to take on a new referral. CC has been trying to help patient line up PCA services since hs is eligible for 1 hour and 45 minutes per day but needs to find a PCA/agency in order to start.    EDDIE LEIVA called [facility] since they are in [place] and close to patient. Spoke with \"Mila\" in intake. She noted pt could be put on a list and they can try to find him a PCA but there is a staffing crisis right now so it may take some time. She encouraged to keep looking into other agencies as well. They will call patient to go over PCA choice option if he knows anyone that he would want to hire as his PCA as well.    EDDIE LEIVA also tried calling [facility] in [place]. The VM box was full- unable to leave a message.    4:15pm- later today EDDIE LEIVA received a call from Mila at [facility]. She stated patient wants to wait until after the coronavirus pandemic/social distancing is lifted to look into having a PCA come into this home. He noted that Lesia has her \"own stuff going on\" so he wouldn't ask her to be his PCA. EDDIE LEIVA will put a hold on looking further into agencies at this time and address it on a monthly basis. Mila will keep pt on a wait list.    PCA assessments are good for one year.    EDDIE LEIVA will continue to follow and outreach to patient again within a few weeks.    Christine Rutledge, MSW, [facility]  Clinic Care Coordinator  [email or web address]  [phone]    "

## 2020-05-21 ENCOUNTER — PATIENT OUTREACH (OUTPATIENT)
Dept: CARE COORDINATION | Facility: CLINIC | Age: 60
End: 2020-05-21

## 2020-05-21 NOTE — PROGRESS NOTES
"Clinic Care Coordination Contact    Follow Up Progress Note      Assessment: Shana from [facility] reached out to care coordinator to check in on patient to see if he wants to start PCA services or not yet. Called with her to patient and patient stated he wants to wait until COVID pandemic is over or things are better. He is able to handle most all ADL's and feels he can wait. Shana will continue to keep in touch and have patient on a wait list. Patient very appreciative of continued contact and follow up.    Patient noted that he has been isolating at home and his sister \"Lesia\" has been getting his groceries for him and other things he might need and dropping it off. Patient notes things that help him stay positive are prayer, exercising in his apartment, and playing his instruments. He has moments of feeling depressed but does not want to talk to a therapist at this time.     Goals addressed this encounter:   Goals Addressed                 This Visit's Progress       Patient Stated      Psychosocial (pt-stated)        1- Goal Statement: I will have a PCA assessment through the [facility] within three months. I understand that a PCA worker would be able to help me with bathing, dressing, grooming, eating and preparing meals, shopping, getting to medical appointments, and cleaning if eligible.  Date Goal set: 1/10/20  Date to Achieve By: 9/1/20  Patient expressed understanding of goal: yes  Action steps to achieve this goal:  1. I will schedule an assessment with a public health nurse (the [facility] will call you to schedule- [phone])  2. I will engage and participate in the assessment with any questions that need to be went over.  3. If I want to have a waiver instead of just PCA services, I will move forward with getting \"certified disabled\" by applying for social security and meeting with the state medical review team to get the certified disabled certification. [employer] in [place] can help me " "through the entire process- I can make an appointment with them by calling [phone]    1/30/20- Spoke with patient via phone. He indicated a nurse came out to do the PCA assessment and he is awaiting to hear if he qualifies and for how many hours per day/week. EDDIE LEIVA called [facility] and was told that they contract with [place] [employer], and they were the agency that completed the assessment. Called [facility] and it was confirmed that patient did have an assessment but could not share information on eligibility/qualification without a signed release. Will plan to check in with patient in 2-3 weeks on status and if there are further needs at that time.    As of today's date 3/6/2020 goal is met at 26 - 50%.   Goal Status:  Showing progress. Left voicemail to go over progress.      As of today's date 3/25/2020 goal is met at 51 - 75%.   Goal Status:  Ongoing. Pt had the assessment, but isn't sure what happened with eligibility. He has a hard time reading forms and handling paperwork. EDDIE LEIVA left a VM for the [facility] to check on the status.      3/27/20- Confirmed with the [facility] patient is eligible for 1 hour and 45 minutes per day of PCA services. He needs to find a PCA agency and PCA to work with him before the [facility] authorizes services. Called [employer] and they will mail him a list of traditional PCA agencies to call. Extended goal timeline.  [employer]: 1-997-692-5532 \"[employer]\" extension 26555    As of today's date 4/13/2020 goal is met at 26 - 50%.   Goal Status:  Ongoing. EDDIE LEIVA called a PCA agency today to help patient line up PCA services- [facility]. Was told pt would be put on a wait list and  will try to find a candidate to work with pt. They will call pt to go over options with him.     As of today's date 5/21/2020 goal is met at 26 - 50%.   Goal Status:  Showing progress. Pt wants to wait until COVID pandemic is over to start PCA services. He understands his " assessment is good for a year. Shana at [facility] will reach out every few months to check in with patient.                Plan:   -Patient is on a wait list at [facility] for PCA services (contact is Mila).   -Patient wants to wait to initiate PCA services until COVID pandemic ends or things get better.   -No other concerns noted today.  - Care Coordinator will plan to outreach every few months to check in on progress    Christine Rutledge, MS, [facility]  Clinic Care Coordinator  [email or web address]  [phone]

## 2020-05-27 ENCOUNTER — PATIENT OUTREACH (OUTPATIENT)
Dept: CARE COORDINATION | Facility: CLINIC | Age: 60
End: 2020-05-27

## 2020-05-27 NOTE — PROGRESS NOTES
"Clinic Care Coordination Contact  Care Team Conversations    SW Care Coordinator received a voicemail from \"Diana\" at the [facility] Medical Review Team noting that she had a few questions regarding pt's case and requested a call back.    Diana: [phone]    EDDIE CC does not notice a CASSIE in pt's chart for [facility] Medical Review Team as of yet.    Care Coordinator left a follow up VM to send a CASSIE to clinic via fax and request medical records if needed. Also, directed her to call back with any specific other questions.    Will continue to follow as patient has a long term goal to get PCA services in place once social distancing/stay at home order lifts.    Christine Rutledge, MSW, [facility]  Clinic Care Coordinator  [email or web address]  [phone]            "

## 2020-05-27 NOTE — PROGRESS NOTES
Clinic Care Coordination Contact  Care Team Conversations    Diana from [facility] medical review team called care coordinator back. She noted that she has been trying to reach patient to get him to fill out a CASSIE for the clinic but patient has not filled it out or mailed it back yet. She also sent an application to patient which he has not done. Explained patient has trouble and needs support to fill out forms. The places he typically goes for help are closed for their  department.    Diana noted she will try to do the application with patient over the phone to make things easier if he is open to it. Otherwise, if patient does not do this she plans to deny disability certification meaning he will not be abl to move forward to get a CADI waiver assessment.    Patient will get a denial letter in the mail if he gets denied.    Christine Rutledge, MSW, [facility]  Clinic Care Coordinator  [email or web address]  [phone]

## 2020-07-08 ENCOUNTER — PATIENT OUTREACH (OUTPATIENT)
Dept: CARE COORDINATION | Facility: CLINIC | Age: 60
End: 2020-07-08

## 2020-07-09 NOTE — PROGRESS NOTES
Clinic Care Coordination Contact    Follow Up Progress Note      Assessment: Patient is stable. 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 He is staying home for the most part and has informal support of Lesia to get groceries for him. Patient expresses concern today that he is considering moving as he feels his rent is too expensive $825. Patient had questions about long-term, how much assisted living and independent senior living costs etc. Patient does not have a section 8 voucher.     Discussed that a waiver can help pay for the cost of assisted living but he needs to go through the process and reconnect with the [facility] medical review team and request a mnchoices assessment. Patient is approved for PCA services through insurance but declined to start since he is hesitant to allow staff into his home due to covid pandemic. He noted his lease ends in October so he wants to figure out cheaper living before then. He wants to talk with Lesia and get back to care coordinator on this.      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   - Care Coordinator gave some tips and insight on housing options.  Care Coordinator will reconnect with patient in 2-3 weeks. Encouraged to call as needed    AMANDA Fuentes, [facility]  Clinic Care Coordinator  [email or web address]  [phone]

## 2020-08-04 ENCOUNTER — PATIENT OUTREACH (OUTPATIENT)
Dept: CARE COORDINATION | Facility: CLINIC | Age: 60
End: 2020-08-04

## 2020-08-04 NOTE — PROGRESS NOTES
Clinic Care Coordination Contact  [facility]/Voicemail       Clinical Data: Care Coordinator Outreaching to go over goal progression- patient had inquiry about cheaper housing options. Discussed in detail at last outreach.     Outreach attempted x 1.  Left message on patient's voicemail with call back information and requested return call.    Plan: Care Coordinator will try to outreach again within 1-2 weeks. Encouraged to call back.     Christine Rutledge, MSW, [facility]  Clinic Care Coordinator  [email or web address]  [phone]

## 2020-08-13 ENCOUNTER — PATIENT OUTREACH (OUTPATIENT)
Dept: CARE COORDINATION | Facility: CLINIC | Age: 60
End: 2020-08-13

## 2020-08-13 NOTE — PROGRESS NOTES
Clinic Care Coordination Contact    Follow Up Progress Note      Assessment:  Care Coordinator called patient and reminded him he should schedule a follow up in the clinic to go over his diabetic management and do labs. Patient said he would call today to set up an appointment    Patient is more open now to starting PCA services. He recognizes that the covid pandemic is going to go on longer and so he may as well start services. EDDIE LEIVA called Mila at [facility] to let her know. She will call the [facility] to request his PCA assessment. It may be difficult to find staff. He is eligible for 1 hr and 45 minutes per day. She will work on this and call patient to go over his plan of care. She will begin to try and find staffing.    Patient is still interested in moving to senior independent housing complex that is subsidized. EDDIE LEIVA called a few places in the area to check on availaility/pricing. Pt's lease otherwise is renewal for October. He wants to figure out a plan before then if possible.    Goals addressed this encounter:   Goals Addressed                 This Visit's Progress       Patient Stated      Psychosocial (pt-stated)   50%     2- Goal Statement: I will explore and navigate a cheaper housing option/senior independent housing option by October when my lease ends  Date Goal set: 7/9/20  Barriers: does not have a section 8 voucher, has not completely gone through process yet to screen onto a [facility] waiver   Strengths: motivated   Date to Achieve By: October 1st, 2020  Patient expressed understanding of goal: yes  Action steps to achieve this goal:  1. I will pursue state medical review team screen and m[place]choices screening   2. I will call senior linkage line to go over some options   3. I will consider assisted living and senior independent housing complexes     As of today's date 8/4/2020 goal is met at 0 - 25%.   Goal Status:  Ongoing. Unable to track progress today, left a  requesting a call back.       As of today's date 8/13/2020 goal is met at 26 - 50%.   Goal Status:  Showing progress. Pt wants to start PCA services now. Left a VM for [facility] to let them know. Also, EDDIE LEIVA left voicemails for senior independent housing complexes to check on availability/cost to try and help figure out where pt can move to. Pt also wants a list via mail of place to consider and will send.                Plan:   -Pt will set up a clinic appointment for follow up  -Patient wants to start PCA services: called [facility] which is the agency he chose to let them know. They will work on staffing.  -Patient wants to move before October to senior independent housing that is affordable. EDDIE LEIVA started calling around to places in the area to figure out some options.    -Patient wants to connect every month to figure out a plan.    Christine Rutledge, MSW, [facility]  Clinic Care Coordinator  [email or web address]  [phone]

## 2020-08-14 ENCOUNTER — OFFICE VISIT (OUTPATIENT)
Dept: FAMILY MEDICINE | Facility: CLINIC | Age: 60
End: 2020-08-14

## 2020-08-14 VITALS
DIASTOLIC BLOOD PRESSURE: 84 MMHG | BODY MASS INDEX: 29.39 KG/M2 | SYSTOLIC BLOOD PRESSURE: 134 MMHG | RESPIRATION RATE: 16 BRPM | WEIGHT: 178 LBS | HEART RATE: 67 BPM | OXYGEN SATURATION: 97 % | TEMPERATURE: 98.3 F

## 2020-08-14 DIAGNOSIS — N18.30 CKD (CHRONIC KIDNEY DISEASE) STAGE 3, GFR 30-59 ML/MIN (H): ICD-10-CM

## 2020-08-14 DIAGNOSIS — E78.2 MIXED HYPERLIPIDEMIA: ICD-10-CM

## 2020-08-14 DIAGNOSIS — Z72.0 TOBACCO USE: ICD-10-CM

## 2020-08-14 DIAGNOSIS — E11.29 MICROALBUMINURIA DUE TO TYPE 2 DIABETES MELLITUS (H): ICD-10-CM

## 2020-08-14 DIAGNOSIS — E11.21 TYPE 2 DIABETES MELLITUS WITH DIABETIC NEPHROPATHY, WITHOUT LONG-TERM CURRENT USE OF INSULIN (H): Primary | ICD-10-CM

## 2020-08-14 DIAGNOSIS — F32.0 MILD MAJOR DEPRESSION (H): ICD-10-CM

## 2020-08-14 DIAGNOSIS — I10 ESSENTIAL HYPERTENSION: ICD-10-CM

## 2020-08-14 DIAGNOSIS — R80.9 MICROALBUMINURIA DUE TO TYPE 2 DIABETES MELLITUS (H): ICD-10-CM

## 2020-08-14 DIAGNOSIS — I25.10 ATHEROSCLEROSIS OF CORONARY ARTERY OF NATIVE HEART WITHOUT ANGINA PECTORIS, UNSPECIFIED VESSEL OR LESION TYPE: ICD-10-CM

## 2020-08-14 PROCEDURE — 82570 ASSAY OF URINE CREATININE: CPT | Performed by: NURSE PRACTITIONER

## 2020-08-14 PROCEDURE — 99207 C FOOT EXAM  NO CHARGE: CPT | Performed by: NURSE PRACTITIONER

## 2020-08-14 PROCEDURE — 82044 UR ALBUMIN SEMIQUANTITATIVE: CPT | Performed by: NURSE PRACTITIONER

## 2020-08-14 PROCEDURE — 99214 OFFICE O/P EST MOD 30 MIN: CPT | Performed by: NURSE PRACTITIONER

## 2020-08-14 ASSESSMENT — ANXIETY QUESTIONNAIRES
GAD7 TOTAL SCORE: 7
3. WORRYING TOO MUCH ABOUT DIFFERENT THINGS: SEVERAL DAYS
IF YOU CHECKED OFF ANY PROBLEMS ON THIS QUESTIONNAIRE, HOW DIFFICULT HAVE THESE PROBLEMS MADE IT FOR YOU TO DO YOUR WORK, TAKE CARE OF THINGS AT HOME, OR GET ALONG WITH OTHER PEOPLE: SOMEWHAT DIFFICULT
4. TROUBLE RELAXING: SEVERAL DAYS
7. FEELING AFRAID AS IF SOMETHING AWFUL MIGHT HAPPEN: NEARLY EVERY DAY
6. BECOMING EASILY ANNOYED OR IRRITABLE: NOT AT ALL
1. FEELING NERVOUS, ANXIOUS, OR ON EDGE: SEVERAL DAYS
2. NOT BEING ABLE TO STOP OR CONTROL WORRYING: NOT AT ALL
5. BEING SO RESTLESS THAT IT IS HARD TO SIT STILL: SEVERAL DAYS

## 2020-08-14 ASSESSMENT — PATIENT HEALTH QUESTIONNAIRE - PHQ9: SUM OF ALL RESPONSES TO PHQ QUESTIONS 1-9: 8

## 2020-08-14 NOTE — PROGRESS NOTES
"Problem(s) Oriented visit        SUBJECTIVE:                                                    Emily Zhu is a 60 year old male who presents to clinic today for the following health issues :        1. Type 2 diabetes mellitus with diabetic nephropathy, without long-term current use of insulin (H) - fairly well controlled. States he checks sugars at home which are \"fine\" but unable to give numbers. Feet having tingling sensation when sugars are high. No hypoglycemic episodes. Taking Metformin 1000 mg BID. Minimal exercise. Tries to eat mostly diabetic diet. Due for eye exam and making appointment. Declines baby aspirin. Taking statin, ACEI. Continues to smoke and is not interested in quitting.  Lab Results   Component Value Date    A1C 7.2 11/27/2019    A1C 7.5 07/08/2019    A1C 7.2 12/12/2018    A1C 8.4 06/11/2018    A1C 7.4 11/03/2017     2. Mild major depression (H)  Continues to live on his own and states that he \"doesn't have anyone\". Unsure what he is going to do without a job. Does not want to go to therapy or take medication. Denies thoughts of harming himself or others.  PHQ 12/27/2017 7/22/2019 8/14/2020   PHQ-9 Total Score 12 11 8   Q9: Thoughts of better off dead/self-harm past 2 weeks Nearly every day Several days Not at all     KELLIE-7 SCORE 7/22/2019 8/14/2020   Total Score 8 7     3. Mixed hyperlipidemia - Taking Atorvastatin 40 mg daily without side effects  The 10-year ASCVD risk score ([place] HYLTON Jr., et al., 2013) is: 46.5%    Values used to calculate the score:      Age: 60 years      Sex: Male      Is Non- : Yes      Diabetic: Yes      Tobacco smoker: Yes      Systolic Blood Pressure: 134 mmHg      Is BP treated: Yes      HDL Cholesterol: 41 mg/dL      Total Cholesterol: 233 mg/dL    4. Microalbuminuria due to type 2 diabetes mellitus (H)  Due to hypertension & diabetes most likely    5. Essential hypertension - controlled with hydrochlorothiazide 25 mg daily, Imdur 30 " mg daily, Lisinopril 20 mg daily & Metoprolol 25 mg daily. Denies chest pain, pressure, palpitations, shortness of breath, ankle swelling  BP Readings from Last 3 Encounters:   08/14/20 134/84   01/13/20 138/74   01/09/20 (!) 144/94     6. Atherosclerosis of coronary artery of native heart without angina pectoris, unspecified vessel or lesion type  Controlling cholesterol & blood pressure    7. CKD (chronic kidney disease) stage 3, GFR 30-59 ml/min (H) - checking today  GFR Estimate   Date Value Ref Range Status   01/09/2020 46 (L) >60 mL/min/[1.73_m2] Final     Comment:     Non  GFR Calc  Starting 12/18/2018, serum creatinine based estimated GFR (eGFR) will be   calculated using the Chronic Kidney Disease Epidemiology Collaboration   (CKD-EPI) equation.     01/13/2018 66 >60 mL/min/1.7m2 Final     Comment:     Non  GFR Calc   04/12/2017 66 >60 mL/min/1.7m2 Final     Comment:     Non  GFR Calc         Problem list, Medication list, Allergies, and Medical/Social/Surgical histories reviewed in [facility] and updated as appropriate.   Additional history: as documented    ROS:  10 point ROS completed and negative except noted above, including Gen, HEENT, CV, Resp, GI, , MS, Neurologic, Psych    Histories:   Patient Active Problem List   Diagnosis     Coronary atherosclerosis     Essential hypertension     Mixed hyperlipidemia     Cataract     GERD (gastroesophageal reflux disease)     Health Care Home     Microalbuminuria due to type 2 diabetes mellitus (H)     Type 2 diabetes mellitus with diabetic nephropathy, without long-term current use of insulin (H)     Plantar fasciitis     Mild major depression (H)     Painful diabetic neuropathy (H)     Warthin tumor     CKD (chronic kidney disease) stage 3, GFR 30-59 ml/min (H)     Past Surgical History:   Procedure Laterality Date     ANGIOPLASTY  2007    rca     ANGIOPLASTY  2010    om     ARTHROTOMY SHOULDER, ROTATOR CUFF REPAIR,  COMBINED Left 4/12/2017    Procedure: COMBINED ARTHROTOMY SHOULDER, ROTATOR CUFF REPAIR;  Surgeon: Deejay Conrad MD;  Location: [facility]     ARTHROTOMY SHOULDER, SUBACROMIAL DECOMPRESSION, COMBINED Left 4/12/2017    Procedure: COMBINED ARTHROTOMY SHOULDER, SUBACROMIAL DECOMPRESSION;  Surgeon: Deejay Conrad MD;  Location: [facility]     COLONOSCOPY       DECOMPRESSION CUBITAL TUNNEL  11/29/2013    Procedure: DECOMPRESSION CUBITAL TUNNEL;;  Surgeon: Radha Cain MD;  Location: [facility]     ENDOSCOPIC RELEASE CARPAL TUNNEL  11/29/2013    Procedure: ENDOSCOPIC RELEASE CARPAL TUNNEL;  LEFT ENDOSCOPIC CARPAL TUNNEL RELEASE AND CUBITAL TUNNEL RELEASE ;  Surgeon: Radha Cain MD;  Location: [facility]     OPEN REDUCTION INTERNAL FIXATION HIP NAILING  3/30/2012    Procedure:OPEN REDUCTION INTERNAL FIXATION HIP NAILING; Biopsy, Curettage and Bone Grafting Right Hip Lesion, Placement of Hip Screw; Surgeon:MARILIN GAY; Location:[facility]     PAR[place]IDECTOMY Right ~1990    [place]     PAROTIDECTOMY Left 11/26/2019    Procedure: Left parotidectomy with facial nerve monitoring and 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.;  Surgeon: Rafat Carlin MD;  Location: [facility]     PHACOEMULSIFICATION CLEAR CORNEA WITH STANDARD INTRAOCULAR LENS IMPLANT  5/8/2013    Procedure: PHACOEMULSIFICATION CLEAR CORNEA WITH STANDARD INTRAOCULAR LENS IMPLANT;  RIGHT EYE PHACOEMULSIFICATION CLEAR CORNEA WITH STANDARD INTRAOCULAR LENS IMPLANT ;  Surgeon: Jovani Pretty MD;  Location: [facility]     PHACOEMULSIFICATION CLEAR CORNEA WITH STANDARD INTRAOCULAR LENS IMPLANT  5/20/2013    Procedure: PHACOEMULSIFICATION CLEAR CORNEA WITH STANDARD INTRAOCULAR LENS IMPLANT;  LEFT  EYE PHACOEMULSIFICATION CLEAR CORNEA WITH STANDARD INTRAOCULAR LENS IMPLANT ;  Surgeon: Jovani Pretty MD;  Location: [facility]     STENT         Social History     Tobacco Use     Smoking status: Current Every Day Smoker     Packs/day: 1.00      "Years: 40.00     Pack years: 40.00     Types: Cigarettes     Smokeless tobacco: Never Used   Substance Use Topics     Alcohol use: Not Currently     Alcohol/week: 0.0 standard drinks     Comment: none     Family History   Problem Relation Age of Onset     Cerebrovascular Disease Mother 56     Hypertension Mother      Cerebrovascular Disease Father      Hypertension Father            OBJECTIVE:                                                    /84   Pulse 67   Temp 98.3  F (36.8  C) (Skin)   Resp 16   Wt 80.7 kg (178 lb)   SpO2 97%   BMI 29.39 kg/m    Body mass index is 29.39 kg/m .   GENERAL: healthy, alert and no distress  RESP: lungs clear to auscultation - no rales, rhonchi or wheezes  CV: regular rate and rhythm, normal S1 S2, no S3 or S4, no murmur, click or rub, no peripheral edema and peripheral pulses strong  MS: no gross musculoskeletal defects noted, no edema  SKIN: no suspicious lesions or rashes  NEURO: Mentation intact and speech normal  PSYCH: Flat affect, depressed mood  Diabetic foot exam: normal DP and PT pulses, no trophic changes or ulcerative lesions and normal sensory exam     ASSESSMENT/PLAN:                                                        Tobacco Cessation:   reports that he has been smoking cigarettes. He has a 40.00 pack-year smoking history. He has never used smokeless tobacco.  Tobacco Cessation Action Plan: Information offered: Patient not interested at this time      BMI:   Estimated body mass index is 29.39 kg/m  as calculated from the following:    Height as of 1/13/20: 1.657 m (5' 5.25\").    Weight as of this encounter: 80.7 kg (178 lb).   Weight management plan: Discussed healthy diet and exercise guidelines    Declined immunizations: Zoster due to Cost    Emily was seen today for recheck medication.    Diagnoses and all orders for this visit:    Type 2 diabetes mellitus with diabetic nephropathy, without long-term current use of insulin (H)  -     " Microalbumin ([facility])  -     C FOOT EXAM  NO CHARGE  -     Lipid Panel ([facility])  -     Comp. Metabolic Panel (14) ([facility])  -     Hemoglobin A1C ([facility])  -     OPHTHALMOLOGY ADULT REFERRAL    Atherosclerosis of coronary artery of native heart without angina pectoris, unspecified vessel or lesion type  -     Lipid Panel ([facility])    CKD (chronic kidney disease) stage 3, GFR 30-59 ml/min (H)  -     Comp. Metabolic Panel (14) ([facility])    Essential hypertension  -     Microalbumin ([facility])  -     Comp. Metabolic Panel (14) ([facility])    Mixed hyperlipidemia  -     Lipid Panel ([facility])    Microalbuminuria due to type 2 diabetes mellitus (H)  -     Microalbumin ([facility])    Mild major depression (H)        FUTURE APPOINTMENTS: 3 months  Patient Instructions     Thank you for coming in today!     We are working to improve our digital reputation.  Would you please help us by reviewing our clinic on [employer] and/or [employer]?  These are links for filling out a review for the clinic:    [email or web address]                 [email or web address]    We truly appreciate you taking the time to do this.     General Information:    Today you had your appointment with Tamica Tang CNP  My hours are:    Monday 8 AM - 5 PM  Tuesday: 8 AM - 5 PM  Thursday: 8 AM - 5 PM  Fridays: 8 AM - 5 PM    I am not in the office Wednesdays. Therefore non urgent calls received on Wednesday will be addressed when I am back in the office on Thursday.     If lab work was done today as part of your evaluation you will generally be contacted via My Chart, mail, or phone with the results within 1-5 days. If there is an alarming result we will contact you by phone. Lab results come back at varying times, I generally wait until all labs are resulted before making comments on results. Please note labs are automatically released to My Chart once available.     If you need refills please contact your pharmacist.  They will send a refill request to me to review. Please allow 3 business days for us to process all refill requests.     Please call or send a medical message with any questions or concerns        The following health maintenance items are reviewed in Epic and correct as of today:  Health Maintenance   Topic Date Due     ADVANCE CARE PLANNING  1960     HEPATITIS B IMMUNIZATION (1 of 3 - Risk 3-dose series) 01/22/1977     ZOSTER IMMUNIZATION (1 of 2) 01/22/2008     PREVENTIVE CARE VISIT  04/10/2014     EYE EXAM  07/13/2019     A1C  05/27/2020     LIPID  07/08/2020     MICROALBUMIN  07/22/2020     INFLUENZA VACCINE (1) 09/01/2020     BMP  01/09/2021     PHQ-9  02/14/2021     DTAP/TDAP/TD IMMUNIZATION (2 - Td) 05/13/2021     COLORECTAL CANCER SCREENING  06/03/2021     DIABETIC FOOT EXAM  08/14/2021     HEPATITIS C SCREENING  Completed     DEPRESSION ACTION PLAN  Completed     PNEUMOCOCCAL IMMUNIZATION 19-64 MEDIUM RISK  Completed     IPV IMMUNIZATION  Aged Out     MENINGITIS IMMUNIZATION  Aged Out     HIV SCREENING  Discontinued       CARROL Pretty CNP  [facility]  Family Practice  [facility]  [phone]    For any issues my office # is [phone]

## 2020-08-14 NOTE — PATIENT INSTRUCTIONS
Thank you for coming in today!     We are working to improve our digital reputation.  Would you please help us by reviewing our clinic on Google and/or Facebook?  These are links for filling out a review for the clinic:    [email or web address]                 [email or web address]    We truly appreciate you taking the time to do this.     General Information:    Today you had your appointment with Tamica Tang CNP  My hours are:    Monday 8 AM - 5 PM  Tuesday: 8 AM - 5 PM  Thursday: 8 AM - 5 PM  Fridays: 8 AM - 5 PM    I am not in the office Wednesdays. Therefore non urgent calls received on Wednesday will be addressed when I am back in the office on Thursday.     If lab work was done today as part of your evaluation you will generally be contacted via My Chart, mail, or phone with the results within 1-5 days. If there is an alarming result we will contact you by phone. Lab results come back at varying times, I generally wait until all labs are resulted before making comments on results. Please note labs are automatically released to My Chart once available.     If you need refills please contact your pharmacist. They will send a refill request to me to review. Please allow 3 business days for us to process all refill requests.     Please call or send a medical message with any questions or concerns

## 2020-08-15 LAB
ALBUMIN SERPL-MCNC: 4.3 G/DL (ref 3.8–4.8)
ALBUMIN/GLOB SERPL: 1.5 {RATIO} (ref 1.2–2.2)
ALP SERPL-CCNC: 49 IU/L (ref 39–117)
ALT SERPL-CCNC: 24 IU/L (ref 0–44)
AST SERPL-CCNC: 19 IU/L (ref 0–40)
BILIRUB SERPL-MCNC: 0.4 MG/DL (ref 0–1.2)
BUN SERPL-MCNC: 31 MG/DL (ref 8–27)
BUN/CREATININE RATIO: 18 (ref 10–24)
CALCIUM SERPL-MCNC: 9.9 MG/DL (ref 8.6–10.2)
CHLORIDE SERPLBLD-SCNC: 101 MMOL/L (ref 96–106)
CHOLEST SERPL-MCNC: 165 MG/DL (ref 100–199)
CREAT SERPL-MCNC: 1.68 MG/DL (ref 0.76–1.27)
EGFR IF AFRICN AM: 50 ML/MIN/1.73
EGFR IF NONAFRICN AM: 43 ML/MIN/1.73
GLOBULIN, TOTAL: 2.8 G/DL (ref 1.5–4.5)
GLUCOSE SERPL-MCNC: 138 MG/DL (ref 65–99)
HBA1C MFR BLD: 8.3 % (ref 4.8–5.6)
HDLC SERPL-MCNC: 38 MG/DL
LDL/HDL RATIO: 2.2 RATIO (ref 0–3.6)
LDLC SERPL CALC-MCNC: 82 MG/DL (ref 0–99)
POTASSIUM SERPL-SCNC: 4.5 MMOL/L (ref 3.5–5.2)
PROT SERPL-MCNC: 7.1 G/DL (ref 6–8.5)
SODIUM SERPL-SCNC: 137 MMOL/L (ref 134–144)
TOTAL CO2: 26 MMOL/L (ref 20–29)
TRIGL SERPL-MCNC: 224 MG/DL (ref 0–149)
VLDLC SERPL CALC-MCNC: 45 MG/DL (ref 5–40)

## 2020-08-15 ASSESSMENT — ANXIETY QUESTIONNAIRES: GAD7 TOTAL SCORE: 7

## 2020-08-17 ENCOUNTER — TELEPHONE (OUTPATIENT)
Dept: FAMILY MEDICINE | Facility: CLINIC | Age: 60
End: 2020-08-17

## 2020-08-17 DIAGNOSIS — E11.9 TYPE 2 DIABETES MELLITUS (H): Primary | ICD-10-CM

## 2020-08-17 NOTE — TELEPHONE ENCOUNTER
Called patient with lab results. Informed him of normal labs.-electrolytes, liver function and stable kidney function.  A1C elevated.and recommended is for patient to see Chelo to discuss diabetes.  Appointment  made for Aug. 24th.

## 2020-08-17 NOTE — TELEPHONE ENCOUNTER
"Also discussed lipids.  HDL \"good\" cholesterol low and triglycerides elevated but improved. Patient will increase exercise and cut down on processed foods, sugars  "

## 2020-08-24 NOTE — PROGRESS NOTES
MTM ENCOUNTER  SUBJECTIVE/OBJECTIVE:                           Emily Zhu is a 60 year old male Seen in clinic for a follow-up visit. He was referred to me from Dr. Jensen.  Today's visit is a follow-up MTM visit from 3/31/2020       Chief Complaint: Needs help with lancing device- [employer]      Allergies/ADRs: Reviewed in EHR  Tobacco: He reports that he has been smoking cigarettes. He has a 40.00 pack-year smoking history. He has never used smokeless tobacco.Tobacco Cessation Action Plan:   Pharmacotherapies : Nicotine patch    Medication Adherence/Access: history of variable compliance, did NOT bring bottles today. Only had meter with him.     Diabetes:  Pt currently taking metformin ER 1000mg BID.   Has NOT been checking sugar as he couldn't figure out the lancet device.   Checked in office 2.5 hours after lunch = 445, recheck was 423mg/dl, he was bothered that the numbers were so high as they have never been this high before.     States being home he is eating more and not exercising. He is not currently working either. He is wanting to get back on track and take better care of himself.   Patient is not experiencing hypoglycemia  Recent symptoms of high blood sugar? none  ACEi/ARB: Yes: lisinopril.   Urine Albumin: No results found for: UMALCR   Aspirin: Taking Plavix 75mg daily- denies issues.    Lab Results   Component Value Date    A1C 8.3 08/14/2020    A1C 7.2 11/27/2019    A1C 7.5 07/08/2019    A1C 7.2 12/12/2018    A1C 8.4 06/11/2018     Wt Readings from Last 4 Encounters:   08/14/20 178 lb (80.7 kg)   01/13/20 172 lb 12.8 oz (78.4 kg)   01/09/20 180 lb (81.6 kg)   11/26/19 174 lb 3.2 oz (79 kg)       Hypertension/CAD: Current medications  lisinopril 20mg daily, hydrochlorothiazide 25mg daily, metoprolol 25mg BID, isosorbide 30mg daily, plavix 75mg daily.  Patient does not self-monitor BP.  Patient reports no current medication side effects. Stent placed in 2007.   BP Readings from Last 3  Encounters:   08/14/20 134/84   01/13/20 138/74   01/09/20 (!) 144/94       Hyperlipidemia: Current therapy includes atorvastatin 40mg once daily Pt reports no significant myalgias or other side effects. Had issues on Crestor 20mg in the past.    July 2019 on 10mg atorvastatin.  Recent Labs   Lab Test 08/14/20  1105 07/08/19  1630   CHOL 165 233*   HDL 38* 41   LDL 82 Comment   TRIG 224* 408*       Tobacco Use: Ready to quit smoking- he is open to using pharmacotherapy, but didn't really think much about it. He cleaned his whole house and washed all his walls. He is paying carpet  to come and wants to quit. He only bought 2 packs instead of a carton this last time at the store, so is ready to stop this week. Has been smoking >1 ppd lately due to boredom - he wants to quit for his daughter so he can be healthier.      Today's Vitals: There were no vitals taken for this visit.- phone     ASSESSMENT:                              Medication Adherence:  no issues identified    Type 2 Diabetes: Needs Improvement. Patient is not meeting A1c goal of < 7%. Self monitoring of blood glucose is not at goal of post prandial < 150 mg/dL. Pt would benefit from starting glipizide XL 5mg once daily with breakfast and staring to check sugars on a regular basis to know where things are at. Reviewed use of his device, patient demonstrated use 2x without issue.     Hypertension/CAD: Stable. Patient is meeting BP goal of < 140/90mmHg.     Hyperlipidemia: Stable.     Tobacco Use: Strategies discussed, recommend starting patches to help with quitting.      PLAN:                            1.  Start glipizide XL 5mg daily with breakfast    2. Start checking blood sugars daily fasting or if feeling low.     3.  Nicotine patches recommended=21mg daily, Reviewed appropriate use, benefits, risks and monitoring at length.      HM: Shingles shot due- recommend getting at pharmacy      I spent 60 minutes with this patient today. All  changes were made via collaborative practice agreement with Dr. Jensen. A copy of the visit note was provided to the patient's primary care provider.    Will follow up in 2 weeks.    The patient declined a summary of these recommendations.     Chelo Roberts, Pharm.D, [facility]  Medication Therapy Management Pharmacist  [phone]

## 2020-09-08 ENCOUNTER — ALLIED HEALTH/NURSE VISIT (OUTPATIENT)
Dept: PHARMACY | Facility: PHYSICIAN GROUP | Age: 60
End: 2020-09-08
Attending: NURSE PRACTITIONER
Payer: COMMERCIAL

## 2020-09-08 DIAGNOSIS — E11.21 TYPE 2 DIABETES MELLITUS WITH DIABETIC NEPHROPATHY, WITHOUT LONG-TERM CURRENT USE OF INSULIN (H): Primary | ICD-10-CM

## 2020-09-08 DIAGNOSIS — I10 ESSENTIAL HYPERTENSION: ICD-10-CM

## 2020-09-08 DIAGNOSIS — I25.10 ATHEROSCLEROSIS OF CORONARY ARTERY OF NATIVE HEART WITHOUT ANGINA PECTORIS, UNSPECIFIED VESSEL OR LESION TYPE: ICD-10-CM

## 2020-09-08 DIAGNOSIS — E78.2 MIXED HYPERLIPIDEMIA: ICD-10-CM

## 2020-09-08 DIAGNOSIS — Z72.0 TOBACCO USE: ICD-10-CM

## 2020-09-08 PROCEDURE — 99607 MTMS BY PHARM ADDL 15 MIN: CPT | Performed by: PHARMACIST

## 2020-09-08 PROCEDURE — 99606 MTMS BY PHARM EST 15 MIN: CPT | Performed by: PHARMACIST

## 2020-09-08 RX ORDER — GLIPIZIDE 5 MG/1
5 TABLET, FILM COATED, EXTENDED RELEASE ORAL DAILY
Qty: 30 TABLET | Refills: 0 | Status: SHIPPED | OUTPATIENT
Start: 2020-09-08 | End: 2020-09-30

## 2020-09-08 RX ORDER — NICOTINE 21 MG/24HR
1 PATCH, TRANSDERMAL 24 HOURS TRANSDERMAL EVERY 24 HOURS
Qty: 30 PATCH | Refills: 1 | Status: SHIPPED | OUTPATIENT
Start: 2020-09-08 | End: 2020-12-18

## 2020-09-11 DIAGNOSIS — E11.21 TYPE 2 DIABETES MELLITUS WITH DIABETIC NEPHROPATHY, WITHOUT LONG-TERM CURRENT USE OF INSULIN (H): Primary | ICD-10-CM

## 2020-09-11 RX ORDER — BLOOD-GLUCOSE METER
EACH MISCELLANEOUS
COMMUNITY
End: 2020-09-11

## 2020-09-11 RX ORDER — BLOOD-GLUCOSE METER
EACH MISCELLANEOUS 2 TIMES DAILY
Qty: 1 KIT | Refills: 0 | Status: SHIPPED | OUTPATIENT
Start: 2020-09-11 | End: 2021-08-09

## 2020-09-22 ENCOUNTER — ALLIED HEALTH/NURSE VISIT (OUTPATIENT)
Dept: PHARMACY | Facility: PHYSICIAN GROUP | Age: 60
End: 2020-09-22
Payer: COMMERCIAL

## 2020-09-22 DIAGNOSIS — Z72.0 TOBACCO USE: ICD-10-CM

## 2020-09-22 DIAGNOSIS — E11.21 TYPE 2 DIABETES MELLITUS WITH DIABETIC NEPHROPATHY, WITHOUT LONG-TERM CURRENT USE OF INSULIN (H): Primary | ICD-10-CM

## 2020-09-22 DIAGNOSIS — E11.40 PAINFUL DIABETIC NEUROPATHY (H): ICD-10-CM

## 2020-09-22 PROCEDURE — 99606 MTMS BY PHARM EST 15 MIN: CPT | Performed by: PHARMACIST

## 2020-09-22 NOTE — PROGRESS NOTES
MTM ENCOUNTER  SUBJECTIVE/OBJECTIVE:                           Emily Zhu is a 60 year old male called for a follow-up visit. He was referred to me from Dr. Jensen.  Today's visit is a follow-up MT visit from 9/8/     Patient consented to a telehealth visit: yes  Telemedicine Visit Details  Type of service:  Telephone visit  Start Time: 1:41 PM  End Time: 2:00 PM  Originating Location (pt. Location): Home  Distant Location (provider location):  [facility]  Mode of Communication:  Telephone    Chief Complaint: DM follow up    Allergies/ADRs: Reviewed in chart  Tobacco: He reports that he has been smoking cigarettes. He has a 40.00 pack-year smoking history. He has never used smokeless tobacco.Tobacco Cessation Action Plan:   Pharmacotherapies : Nicotine patch    Alcohol: not currently using  PMH: Reviewed in chart    Medication Adherence/Access: history of variable compliance, having to switch all his prescriptions next month to [facility] due to insurance.      Diabetes:  Pt currently taking metformin ER 1000mg BID and glipizide XL 5mg daily. No issues with starting this medication.   Got a new meter after we met because was getting very high (400s) that didn't seem to be 'normal' for him. Got the new meter and is now seeing , during call today was at 145mg/dl.   States being home he is eating more and not exercising. He is not currently working either. He is wanting to get back on track and take better care of himself.   Patient is not experiencing hypoglycemia  Recent symptoms of high blood sugar? none  ACEi/ARB: Yes: lisinopril.   Urine Albumin: No results found for: UMALCR   Aspirin: Taking Plavix 75mg daily- denies issues.      Neuropathy: Currently taking amitriptyline 10mg at bedtime started several years ago for his pains. He states he has burning pains in his leg and arms. Hx of surgeries, long standing diabetes and tobacco abuse contributing. Wondering if the metformin or  atorvastatin is causing his pain.     Tobacco Use: Ready to quit smoking- he is open to using pharmacotherapy, but didn't really think much about it. He cleaned his whole house and washed all his walls. He is paying carpet  to come and wants to quit. He only bought 2 packs instead of a carton this last time at the store, so is ready to stop this week. Has been smoking >1 ppd lately due to boredom - he wants to quit for his daughter so he can be healthier. Thinking will quit this weekend, and will start the patch this weekend.     ASSESSMENT:                            Medication Adherence: will need [facility] for him as I worry that leaving them to transfer may result in missing/lapses in his regimen. Easier to issue new scripts so all in once place. Will wait until 3 weeks so closer to when he will need refills.     Type 2 Diabetes: Pt would benefit from regular monitoring and continuing to take medications consistently.     Neuropathy: Ongoing blood sugar control recommended, do not feel it is related to his metformin or statin, but will continue to monitor and discussed that may be reasonable to switch amitriptyline to gabapentin in the future if pain persists. Could also consider checking a b12 level given issues and longstanding metformin use.     Tobacco Abuse: Cessation strategies reviewed.     PLAN:                            1. Next month will send new rx to [facility] instead of [facility].     2. Smoking cessation encouraged, starting patches.     3. Consider b12 level with next blood work.      I spent 19 minutes with this patient today. All changes were made via collaborative practice agreement with Dr. Jensen. A copy of the visit note was provided to the patient's primary care provider.    Will follow up in 3 weeks.    The patient declined a summary of these recommendations.     Chelo Roberts, Pharm.D, BCACP  Medication Therapy Management  Pharmacist  [phone]

## 2020-09-23 DIAGNOSIS — E11.21 TYPE 2 DIABETES MELLITUS WITH DIABETIC NEPHROPATHY, WITHOUT LONG-TERM CURRENT USE OF INSULIN (H): ICD-10-CM

## 2020-09-23 RX ORDER — GLIPIZIDE 5 MG/1
5 TABLET, FILM COATED, EXTENDED RELEASE ORAL DAILY
Qty: 30 TABLET | Refills: 0 | OUTPATIENT
Start: 2020-09-23 | End: 2020-10-23

## 2020-09-23 NOTE — TELEPHONE ENCOUNTER
Glipizide. Addressed yesterday at appt with Chelo.  She stated pt would be changing pharmacies(listed below) next month and would request refills at that time.     Hemoglobin A1C   Date Value Ref Range Status   08/14/2020 8.3 (H) 4.8 - 5.6 % Final     Comment:              Prediabetes: 5.7 - 6.4           Diabetes: >6.4           Glycemic control for adults with diabetes: <7.0     11/27/2019 7.2 (H) 0 - 5.6 % Final     Comment:     Normal <5.7% Prediabetes 5.7-6.4%  Diabetes 6.5% or higher - adopted from ADA   consensus guidelines.     07/08/2019 7.5 (H) 4.8 - 5.6 % Final     Comment:              Prediabetes: 5.7 - 6.4           Diabetes: >6.4           Glycemic control for adults with diabetes: <7.0

## 2020-09-30 DIAGNOSIS — E11.21 TYPE 2 DIABETES MELLITUS WITH DIABETIC NEPHROPATHY, WITHOUT LONG-TERM CURRENT USE OF INSULIN (H): ICD-10-CM

## 2020-10-01 RX ORDER — GLIPIZIDE 5 MG/1
5 TABLET, FILM COATED, EXTENDED RELEASE ORAL DAILY
Qty: 90 TABLET | Refills: 1 | Status: SHIPPED | OUTPATIENT
Start: 2020-10-01 | End: 2021-01-20

## 2020-10-06 ENCOUNTER — PATIENT OUTREACH (OUTPATIENT)
Dept: CARE COORDINATION | Facility: CLINIC | Age: 60
End: 2020-10-06
Payer: COMMERCIAL

## 2020-10-30 DIAGNOSIS — E11.21 TYPE 2 DIABETES MELLITUS WITH DIABETIC NEPHROPATHY, WITHOUT LONG-TERM CURRENT USE OF INSULIN (H): ICD-10-CM

## 2020-10-30 RX ORDER — BLOOD SUGAR DIAGNOSTIC
STRIP MISCELLANEOUS
Qty: 100 STRIP | Refills: 11 | Status: SHIPPED | OUTPATIENT
Start: 2020-10-30 | End: 2022-06-28

## 2020-10-30 NOTE — TELEPHONE ENCOUNTER
Accu chek strips  LOV 8/14/20    Hemoglobin A1C   Date Value Ref Range Status   08/14/2020 8.3 (H) 4.8 - 5.6 % Final     Comment:              Prediabetes: 5.7 - 6.4           Diabetes: >6.4           Glycemic control for adults with diabetes: <7.0     11/27/2019 7.2 (H) 0 - 5.6 % Final     Comment:     Normal <5.7% Prediabetes 5.7-6.4%  Diabetes 6.5% or higher - adopted from ADA   consensus guidelines.     07/08/2019 7.5 (H) 4.8 - 5.6 % Final     Comment:              Prediabetes: 5.7 - 6.4           Diabetes: >6.4           Glycemic control for adults with diabetes: <7.0

## 2020-12-02 ENCOUNTER — PATIENT OUTREACH (OUTPATIENT)
Dept: CARE COORDINATION | Facility: CLINIC | Age: 60
End: 2020-12-02

## 2020-12-02 NOTE — PROGRESS NOTES
Clinic Care Coordination Contact    EDDIE Care Coordinator called [facility] and spoke with Mila to get an update on process to start PCA services. They are still awaiting a copy of the assessment from [facility] before they can start. Patient qualifies for 1 hour and 45 minutes a day. Patient requested [facility] mail him a copy of the assessment, and then patient will mail a copy to [facility]. Patient is waiting for it in the mail, he requested a copy on the 23rd.   Patient is open to looking into ARHMS services- EDDIE Care Coordinator made a referral to [facility] today by filling out the online referral form with patient permission. Requested an update on their availability and starting with patient.    Patient continues to receive unemployment. He does not think he will be taken back at his job at the [employer]. He said he received his 401K money in the mail via check. He does not want to look for a different job during the pandemic as he is anxious about being hire risk for covid. He is able to pay his rent this month but next month he doesn't think he will have the money and may need to apply for emergency assistance.     [facility] is going to call Lesia (sister) to see if she would be willing to be hired to be patient's PCA when they can start. They want to rule out this option.     EDDIE LEIVA received an email from Anabella Saldana at [facility] stating they are taking referrals and there is a 3-6 week wait to start. They would most likely start off virtual services but then move to in person. EDDIE LEIVA asked that she call patient and let him know this      Goals addressed this encounter:   Goals Addressed                 This Visit's Progress       Patient Stated      Psychosocial (pt-stated)   20%     2-Goal Statement: I will start ARHMS services within 3 months   Date Goal set: 12/2/20  Barriers: wait lists   Strengths: motivated   Date to Achieve By: 3/2/21  Patient expressed understanding of goal:  yes  Action steps to achieve this goal:  1. I will wait to hear from [facility], where CC made the referral to see if they have availability   2. I will keep care coordinator updated on progress   3. I will engage in intake process              Plan:   Referral made for AHRMS services today via [facility] online referral form  In process to get PCA services, still through [facility]    SW CC will go over progress in 2-3 weeks    Christine Rutledge, MSW, [facility]  Clinic Care Coordinator  [email or web address]  [phone]

## 2020-12-11 ENCOUNTER — TELEPHONE (OUTPATIENT)
Dept: FAMILY MEDICINE | Facility: CLINIC | Age: 60
End: 2020-12-11

## 2020-12-16 ENCOUNTER — PATIENT OUTREACH (OUTPATIENT)
Dept: CARE COORDINATION | Facility: CLINIC | Age: 60
End: 2020-12-16

## 2020-12-16 NOTE — PROGRESS NOTES
Clinic Care Coordination Contact  Care Team Conversations    EDDIE LEIVA received a voicemail from Deb at [facility] ([phone]). She updated that patient has a diagnostic assessment on Friday set up with her to follow up on the referral for ARHMS services. He needs to get that done as part of the intake process.    Per chart review, patient also has an appointment with Dr. Tang tomorrow for diabetes recheck.     EDDIE LEIVA will go over progress next week with patient and check in    Christine Rutledge, MSW, [facility]  Clinic Care Coordinator  [email or web address]  [phone]

## 2020-12-18 ENCOUNTER — OFFICE VISIT (OUTPATIENT)
Dept: FAMILY MEDICINE | Facility: CLINIC | Age: 60
End: 2020-12-18

## 2020-12-18 VITALS
WEIGHT: 187 LBS | OXYGEN SATURATION: 95 % | HEIGHT: 65 IN | TEMPERATURE: 98.1 F | RESPIRATION RATE: 16 BRPM | HEART RATE: 72 BPM | BODY MASS INDEX: 31.16 KG/M2 | DIASTOLIC BLOOD PRESSURE: 82 MMHG | SYSTOLIC BLOOD PRESSURE: 130 MMHG

## 2020-12-18 DIAGNOSIS — I10 ESSENTIAL HYPERTENSION: Primary | ICD-10-CM

## 2020-12-18 DIAGNOSIS — Z72.0 TOBACCO USE: ICD-10-CM

## 2020-12-18 DIAGNOSIS — Z28.21 INFLUENZA VACCINATION DECLINED: ICD-10-CM

## 2020-12-18 DIAGNOSIS — N18.30 STAGE 3 CHRONIC KIDNEY DISEASE, UNSPECIFIED WHETHER STAGE 3A OR 3B CKD (H): ICD-10-CM

## 2020-12-18 DIAGNOSIS — E11.21 TYPE 2 DIABETES MELLITUS WITH DIABETIC NEPHROPATHY, WITHOUT LONG-TERM CURRENT USE OF INSULIN (H): ICD-10-CM

## 2020-12-18 PROCEDURE — 36415 COLL VENOUS BLD VENIPUNCTURE: CPT | Performed by: NURSE PRACTITIONER

## 2020-12-18 PROCEDURE — 99214 OFFICE O/P EST MOD 30 MIN: CPT | Performed by: NURSE PRACTITIONER

## 2020-12-18 PROCEDURE — 93050 ART PRESSURE WAVEFORM ANALYS: CPT | Performed by: NURSE PRACTITIONER

## 2020-12-18 ASSESSMENT — MIFFLIN-ST. JEOR: SCORE: 1575.11

## 2020-12-18 NOTE — PROGRESS NOTES
Problem(s) Oriented visit        SUBJECTIVE:                                                    Emily Zhu is a 60 year old male who presents to clinic today for the following health issues :    Type 2 diabetes - uncontrolled. Patient occasionally checks blood sugars. Thinks it was in the 120's fasting a couple days ago. Continues to smoke about 1 pack hand-rolled cigarettes daily. High stress level.   Lab Results   Component Value Date    A1C 8.3 08/14/2020    A1C 7.2 11/27/2019    A1C 7.5 07/08/2019    A1C 7.2 12/12/2018    A1C 8.4 06/11/2018     Hypertension - controlled with current mediations.  BP Readings from Last 3 Encounters:   12/18/20 130/82   08/14/20 134/84   01/13/20 138/74     Does have stage 3 kidney disease likely due to hypertension and poorly controlled diabetes.    Problem list, Medication list, Allergies, and Medical/Social/Surgical histories reviewed in EPIC and updated as appropriate.   Additional history: as documented    ROS:  10 point ROS completed and negative except noted above, including Gen, HEENT, CV, Resp, GI, , MS, Neurologic, Psych    Histories:   Patient Active Problem List   Diagnosis     Coronary atherosclerosis     Essential hypertension     Mixed hyperlipidemia     Cataract     GERD (gastroesophageal reflux disease)     Microalbuminuria due to type 2 diabetes mellitus (H)     Type 2 diabetes mellitus with diabetic nephropathy, without long-term current use of insulin (H)     Plantar fasciitis     Mild major depression (H)     Painful diabetic neuropathy (H)     Warthin tumor     CKD (chronic kidney disease) stage 3, GFR 30-59 ml/min     Tobacco use     Past Surgical History:   Procedure Laterality Date     ANGIOPLASTY  2007    rca     ANGIOPLASTY  2010    om     ARTHROTOMY SHOULDER, ROTATOR CUFF REPAIR, COMBINED Left 4/12/2017    Procedure: COMBINED ARTHROTOMY SHOULDER, ROTATOR CUFF REPAIR;  Surgeon: Deejay Conrad MD;  Location: [facility]     ARTHROTOMY  SHOULDER, SUBACROMIAL DECOMPRESSION, COMBINED Left 4/12/2017    Procedure: COMBINED ARTHROTOMY SHOULDER, SUBACROMIAL DECOMPRESSION;  Surgeon: Deejay Conrad MD;  Location: [facility]     COLONOSCOPY       DECOMPRESSION CUBITAL TUNNEL  11/29/2013    Procedure: DECOMPRESSION CUBITAL TUNNEL;;  Surgeon: Radha Cain MD;  Location: [facility]     ENDOSCOPIC RELEASE CARPAL TUNNEL  11/29/2013    Procedure: ENDOSCOPIC RELEASE CARPAL TUNNEL;  LEFT ENDOSCOPIC CARPAL TUNNEL RELEASE AND CUBITAL TUNNEL RELEASE ;  Surgeon: Radha Cain MD;  Location: [facility]     OPEN REDUCTION INTERNAL FIXATION HIP NAILING  3/30/2012    Procedure:OPEN REDUCTION INTERNAL FIXATION HIP NAILING; Biopsy, Curettage and Bone Grafting Right Hip Lesion, Placement of Hip Screw; Surgeon:MARILIN GAY; Location:[facility]     PAR[place]ECTOMY Right ~1990    [place]     PAROTIDECTOMY Left 11/26/2019    Procedure: Left parotidectomy with facial nerve monitoring and 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.;  Surgeon: Rafat Carlin MD;  Location: [facility]     PHACOEMULSIFICATION CLEAR CORNEA WITH STANDARD INTRAOCULAR LENS IMPLANT  5/8/2013    Procedure: PHACOEMULSIFICATION CLEAR CORNEA WITH STANDARD INTRAOCULAR LENS IMPLANT;  RIGHT EYE PHACOEMULSIFICATION CLEAR CORNEA WITH STANDARD INTRAOCULAR LENS IMPLANT ;  Surgeon: Jovani Pretty MD;  Location: [facility]     PHACOEMULSIFICATION CLEAR CORNEA WITH STANDARD INTRAOCULAR LENS IMPLANT  5/20/2013    Procedure: PHACOEMULSIFICATION CLEAR CORNEA WITH STANDARD INTRAOCULAR LENS IMPLANT;  LEFT  EYE PHACOEMULSIFICATION CLEAR CORNEA WITH STANDARD INTRAOCULAR LENS IMPLANT ;  Surgeon: Jovani Pretty MD;  Location: [facility]     STENT         Social History     Tobacco Use     Smoking status: Current Every Day Smoker     Packs/day: 1.00     Years: 40.00     Pack years: 40.00     Types: Cigarettes     Smokeless tobacco: Never Used   Substance Use Topics     Alcohol use: Not Currently     Alcohol/week:  "0.0 standard drinks     Comment: none     Family History   Problem Relation Age of Onset     Cerebrovascular Disease Mother 56     Hypertension Mother      Cerebrovascular Disease Father      Hypertension Father            OBJECTIVE:                                                    /82   Pulse 72   Temp 98.1  F (36.7  C)   Resp 16   Ht 1.651 m (5' 5\")   Wt 84.8 kg (187 lb)   SpO2 95%   BMI 31.12 kg/m    Body mass index is 31.12 kg/m .   GENERAL: Adult male in no acute distress  EYES: Eyes grossly normal to inspection, conjunctivae and sclerae normal  RESP: normal work of breathing, no cough  CV: normal rate  MS: no gross musculoskeletal defects noted, no edema  PSYCH: affect flat and mood depressed     ASSESSMENT/PLAN:                                                      Emily was seen today for diabetes.    Diagnoses and all orders for this visit:    Essential hypertension  -     NH ART PRESS WAVEFORM ANALYS CENTRAL ART PRESSURE  -     Basic Metabolic Panel (8) ([facility])    Type 2 diabetes mellitus with diabetic nephropathy, without long-term current use of insulin (H)  -     Hemoglobin A1C ([facility])  -     Basic Metabolic Panel (8) ([facility])  -     VENOUS COLLECTION    Stage 3 chronic kidney disease, unspecified whether stage 3a or 3b CKD    Tobacco use    Influenza vaccination declined        See Patient Instructions  Patient Instructions   Schedule eye exam and have them send us records      The following health maintenance items are reviewed in Epic and correct as of today:  Health Maintenance   Topic Date Due     ADVANCE CARE PLANNING  1960     ZOSTER IMMUNIZATION (1 of 2) 01/22/2008     PREVENTIVE CARE VISIT  04/10/2014     EYE EXAM  07/13/2019     INFLUENZA VACCINE (1) 09/01/2020     A1C  02/14/2021     PHQ-9  02/14/2021     DTAP/TDAP/TD IMMUNIZATION (2 - Td) 05/13/2021     COLORECTAL CANCER SCREENING  06/03/2021     BMP  08/14/2021     LIPID  08/14/2021     MICROALBUMIN  08/14/2021 "     DIABETIC FOOT EXAM  08/14/2021     HEPATITIS C SCREENING  Completed     DEPRESSION ACTION PLAN  Completed     Pneumococcal Vaccine: Pediatrics (0 to 5 Years) and At-Risk Patients (6 to 64 Years)  Completed     IPV IMMUNIZATION  Aged Out     MENINGITIS IMMUNIZATION  Aged Out     HIV SCREENING  Discontinued       CARROL Pretty CNP  [facility]  Family Practice  [facility]  [phone]    For any issues my office # is [phone]

## 2020-12-19 LAB
BUN SERPL-MCNC: 22 MG/DL (ref 8–27)
BUN/CREATININE RATIO: 12 (ref 10–24)
CALCIUM SERPL-MCNC: 9.7 MG/DL (ref 8.6–10.2)
CHLORIDE SERPLBLD-SCNC: 96 MMOL/L (ref 96–106)
CREAT SERPL-MCNC: 1.77 MG/DL (ref 0.76–1.27)
EGFR IF AFRICN AM: 47 ML/MIN/1.73
EGFR IF NONAFRICN AM: 40 ML/MIN/1.73
GLUCOSE SERPL-MCNC: 192 MG/DL (ref 65–99)
HBA1C MFR BLD: 7.6 % (ref 4.8–5.6)
POTASSIUM SERPL-SCNC: 5.3 MMOL/L (ref 3.5–5.2)
SODIUM SERPL-SCNC: 134 MMOL/L (ref 134–144)
TOTAL CO2: 24 MMOL/L (ref 20–29)

## 2020-12-22 ENCOUNTER — PATIENT OUTREACH (OUTPATIENT)
Dept: CARE COORDINATION | Facility: CLINIC | Age: 60
End: 2020-12-22

## 2020-12-22 NOTE — PROGRESS NOTES
Clinic Care Coordination Contact  Care Team Conversations    EDDIE LEIVA received a call from Mila at [facility]. They are going to connect with patient to see if he has the PCA assessment from [facility] yet so they can start looking for PCA for him. They have 7 caregivers that work in the area, and would need to see if any of them could be possible staff.     She will call patient on Monday and provide an update.     EDDIE LEIVA also left a VM for [facility] to check on the status of [facility] services    Christine Rutledge, AMANDA, [facility]  Clinic Care Coordinator  [email or web address]  [phone]

## 2021-01-11 ENCOUNTER — PATIENT OUTREACH (OUTPATIENT)
Dept: CARE COORDINATION | Facility: CLINIC | Age: 61
End: 2021-01-11

## 2021-01-11 NOTE — PROGRESS NOTES
Clinic Care Coordination Contact  Care Team Conversations    Gillian ERAZO from [facility] called CC and said that patient started therapy with her and today will be the second session. She feels patient needs more support and [facility] to see if patient wants to pursue this. The diagnostic said patient wanted to start with therapy. Updated Gillian about pt's status with services/needs. She will call CC with progress after their session.    EDDIE LEIVA called Mila from [facility] and she said that Mac/her supervisor went to  the assessment from patient so they have it. Unfortunately, since it's been a year now he is due for a reassessment. They requested a public health nurse reassessment and patient has it tomorrow. They should be a copy of the assessment since they requested the reassessment. As soon as they get it, they will start looking for staffing for patient. They will keep CC updated.    EDDIE LEIVA will plan to reach out to patient within the next few weeks, will await for an update from Gillian as well as PCA agency.    Christine Rutledge, MSW, [facility]  Clinic Care Coordinator  [email or web address]  [phone]

## 2021-01-20 DIAGNOSIS — E11.21 TYPE 2 DIABETES MELLITUS WITH DIABETIC NEPHROPATHY, WITHOUT LONG-TERM CURRENT USE OF INSULIN (H): ICD-10-CM

## 2021-01-20 RX ORDER — GLIPIZIDE 5 MG/1
TABLET, FILM COATED, EXTENDED RELEASE ORAL
Qty: 90 TABLET | Refills: 0 | Status: SHIPPED | OUTPATIENT
Start: 2021-01-20 | End: 2021-03-22

## 2021-01-20 NOTE — TELEPHONE ENCOUNTER
glipiZIDE (GLUCOTROL XL) 5 MG 24 hr tablet    Last OV and labs 12/18/20    Lab Results   Component Value Date    A1C 7.6 12/18/2020    A1C 8.3 08/14/2020    A1C 7.2 11/27/2019    A1C 7.5 07/08/2019    A1C 7.2 12/12/2018

## 2021-02-16 ENCOUNTER — PATIENT OUTREACH (OUTPATIENT)
Dept: CARE COORDINATION | Facility: CLINIC | Age: 61
End: 2021-02-16

## 2021-02-16 NOTE — PROGRESS NOTES
"Clinic Care Coordination Contact    Follow Up Progress Note      Assessment: SW called patient and he said he's \"ok\". He is awaiting for a staff to be assigned through [facility] for PCA services.     Patient has been doing therapy with Gillian at [facility]. Although he's not sure about this -- \"so many people have been calling me\".    Patient has been staying home.     He would like some help to schedule his vision appointment- he typically goes to [facility] for his glasses and has been to [facility].     He Is getting $125 per week in unemployment still as his main source of income. He wants to look into cheaper place of living.       Plan:   -Wants to look into cheaper place of living  Such as independent senior living or subsidized housing. Will look into options  -Pt waiting to get assigned a PCA, through [facility]  -Pt continues to get unemployment as main source of income - around $500 a month  -SW requested clarification from [facility] about what services pt is receiving  -Pt does want to get the covid vaccine-  -SW will continue to follow and reach out on 1-2 week basis.    Christine Rutledge, MSW, [facility]  Clinic Care Coordinator  [email or web address]  [phone]  "

## 2021-03-16 ENCOUNTER — PATIENT OUTREACH (OUTPATIENT)
Dept: CARE COORDINATION | Facility: CLINIC | Age: 61
End: 2021-03-16

## 2021-03-16 NOTE — PROGRESS NOTES
Clinic Care Coordination Contact    Follow Up Progress Note      Assessment: Patient called care coordinator. He said he fell yesterday and broke his glasses. He doesn't think he injured himself but is worried about getting new glasses. Helped patient to schedule an appointment at [facility] tomorrow at 10:10am. Also, called [facility] with patient and scheduled a medical ride to/from. Name of the company he will ride with is [employer]. ([phone]).   [facility] [employer] rides [phone].     Patient then stated he is in dental pain. He noticed a bump on the gum of his mouth- he has had half of teeth extracted from his mouth per patient. He is having trouble finding a place that takes his insurance. He went to [facility].  They did free x-rays. Then they gave him an estimated cost of $10,000 as they didn't accept the insurance.   Called [facility] with patient- left a VM for them asking that they call patient to schedule    Patient is experiencing foot pain. He has had shoe inserts made for him before. Helped patient get set up next week 2:45pm Monday 22nd with Dr. Keven Quan. He has ingrown toenails and is in pain from it. [address] [facility].    called [facility] and helped patient get scheduled for a medical ride to/from.    time 2:15pm and return ride back to home. [employer] will be the company.     Patient plans to call [facility] to schedule a medical appointment. He has a rash on his bottom that he wants to get looked at that is bothersome to him    No other needs/questions at this time    Christine Rutledge, MSW, [facility]  Clinic Care Coordinator  [email or web address]  [phone]

## 2021-03-17 ENCOUNTER — TRANSFERRED RECORDS (OUTPATIENT)
Dept: FAMILY MEDICINE | Facility: CLINIC | Age: 61
End: 2021-03-17

## 2021-03-17 LAB — RETINOPATHY: POSITIVE

## 2021-03-22 DIAGNOSIS — E11.21 TYPE 2 DIABETES MELLITUS WITH DIABETIC NEPHROPATHY, WITHOUT LONG-TERM CURRENT USE OF INSULIN (H): ICD-10-CM

## 2021-03-22 RX ORDER — GLIPIZIDE 5 MG/1
5 TABLET, FILM COATED, EXTENDED RELEASE ORAL DAILY
Qty: 90 TABLET | Refills: 0 | Status: SHIPPED | OUTPATIENT
Start: 2021-03-22 | End: 2021-05-28

## 2021-03-22 NOTE — TELEPHONE ENCOUNTER
Glipizide refilled. Due for diabetes visit. Recommend scheduling with Chelo and myself jointly.    CARROL Pretty CNP on 3/22/2021 at 12:54 PM

## 2021-03-31 ENCOUNTER — PATIENT OUTREACH (OUTPATIENT)
Dept: CARE COORDINATION | Facility: CLINIC | Age: 61
End: 2021-03-31

## 2021-03-31 NOTE — PROGRESS NOTES
"Clinic Care Coordination Contact  Care Team Conversations    SW Care Coordinator received a VM from \"Taylor\" at [facility] [phone]. She updated that she spoke with patient after pt's therapist left the practice. Pt is interested in setting up a new therapist so she will assist with this.  SW left a follow up VM advocating for [facility] services as well.    SW then left a VM for patient to check in. SW plans to talk with patient too about [facility] advocate services to see if pt is interested.    SW also got a VM from [facility] stating that they are still looking for PCA for patient and that there's a staffing shortage.    SW will try to reach again within 2-3 weeks.    Christine Rutledge, MSW, [facility]  Clinic Care Coordinator  [email or web address]  [phone]    "

## 2021-04-05 ENCOUNTER — OFFICE VISIT (OUTPATIENT)
Dept: PHARMACY | Facility: PHYSICIAN GROUP | Age: 61
End: 2021-04-05
Payer: COMMERCIAL

## 2021-04-05 ENCOUNTER — OFFICE VISIT (OUTPATIENT)
Dept: FAMILY MEDICINE | Facility: CLINIC | Age: 61
End: 2021-04-05

## 2021-04-05 ENCOUNTER — TELEPHONE (OUTPATIENT)
Dept: FAMILY MEDICINE | Facility: CLINIC | Age: 61
End: 2021-04-05

## 2021-04-05 VITALS
OXYGEN SATURATION: 96 % | HEART RATE: 73 BPM | RESPIRATION RATE: 16 BRPM | WEIGHT: 188.5 LBS | DIASTOLIC BLOOD PRESSURE: 100 MMHG | SYSTOLIC BLOOD PRESSURE: 158 MMHG | BODY MASS INDEX: 31.4 KG/M2 | TEMPERATURE: 97.8 F | HEIGHT: 65 IN

## 2021-04-05 DIAGNOSIS — F32.A DEPRESSION, UNSPECIFIED DEPRESSION TYPE: ICD-10-CM

## 2021-04-05 DIAGNOSIS — R39.9 GENITOURINARY SYMPTOMS: ICD-10-CM

## 2021-04-05 DIAGNOSIS — N18.30 STAGE 3 CHRONIC KIDNEY DISEASE, UNSPECIFIED WHETHER STAGE 3A OR 3B CKD (H): ICD-10-CM

## 2021-04-05 DIAGNOSIS — E11.21 TYPE 2 DIABETES MELLITUS WITH DIABETIC NEPHROPATHY, WITHOUT LONG-TERM CURRENT USE OF INSULIN (H): Primary | ICD-10-CM

## 2021-04-05 DIAGNOSIS — F32.0 MILD MAJOR DEPRESSION (H): ICD-10-CM

## 2021-04-05 DIAGNOSIS — R30.0 DYSURIA: ICD-10-CM

## 2021-04-05 DIAGNOSIS — L29.0 RECTAL ITCHING: ICD-10-CM

## 2021-04-05 DIAGNOSIS — I10 ESSENTIAL HYPERTENSION: ICD-10-CM

## 2021-04-05 DIAGNOSIS — G47.00 INSOMNIA, UNSPECIFIED TYPE: ICD-10-CM

## 2021-04-05 LAB
BACTERIA URINE: ABNORMAL
BILIRUB UR QL STRIP: 0
BLOOD URINE DIP: 0
CASTS/LPF: ABNORMAL
COLOR UR: YELLOW
CRYSTAL URINE: ABNORMAL
EPITHELIAL CELLS - QUEST: ABNORMAL
GLUCOSE UR STRIP-MCNC: ABNORMAL MG/DL
HBA1C MFR BLD: 8.3 % (ref 4–6)
KETONES UR QL STRIP: ABNORMAL
LEUKOCYTE ESTERASE URINE DIP: 0
MUCOUS URINE: ABNORMAL
NITRITE UR QL STRIP: ABNORMAL
PH UR STRIP: 6 PH (ref 5–9)
PROT UR QL: ABNORMAL MG/DL (ref ?–0.01)
RBC URINE: 0 (ref 0–3)
SP GR UR STRIP: 1.02 (ref 1–1.02)
UROBILINOGEN UR QL STRIP: 0.2 EU/DL (ref 0.2–1)
WBC URINE: ABNORMAL (ref 0–3)

## 2021-04-05 PROCEDURE — 99605 MTMS BY PHARM NP 15 MIN: CPT | Performed by: PHARMACIST

## 2021-04-05 PROCEDURE — 36415 COLL VENOUS BLD VENIPUNCTURE: CPT | Performed by: NURSE PRACTITIONER

## 2021-04-05 PROCEDURE — 83036 HEMOGLOBIN GLYCOSYLATED A1C: CPT | Performed by: NURSE PRACTITIONER

## 2021-04-05 PROCEDURE — 93050 ART PRESSURE WAVEFORM ANALYS: CPT | Performed by: NURSE PRACTITIONER

## 2021-04-05 PROCEDURE — 99607 MTMS BY PHARM ADDL 15 MIN: CPT | Performed by: PHARMACIST

## 2021-04-05 PROCEDURE — 99214 OFFICE O/P EST MOD 30 MIN: CPT | Performed by: NURSE PRACTITIONER

## 2021-04-05 PROCEDURE — 81003 URINALYSIS AUTO W/O SCOPE: CPT | Performed by: NURSE PRACTITIONER

## 2021-04-05 RX ORDER — AMLODIPINE BESYLATE 5 MG/1
5 TABLET ORAL DAILY
Qty: 90 TABLET | Refills: 0 | Status: SHIPPED | OUTPATIENT
Start: 2021-04-05 | End: 2021-05-28

## 2021-04-05 RX ORDER — LIRAGLUTIDE 6 MG/ML
1.2 INJECTION SUBCUTANEOUS DAILY
Qty: 6 ML | Refills: 0 | Status: SHIPPED | OUTPATIENT
Start: 2021-04-05 | End: 2021-04-05

## 2021-04-05 RX ORDER — SERTRALINE HYDROCHLORIDE 25 MG/1
25 TABLET, FILM COATED ORAL DAILY
Qty: 30 TABLET | Refills: 1 | Status: SHIPPED | OUTPATIENT
Start: 2021-04-05 | End: 2021-05-03

## 2021-04-05 ASSESSMENT — PATIENT HEALTH QUESTIONNAIRE - PHQ9: SUM OF ALL RESPONSES TO PHQ QUESTIONS 1-9: 8

## 2021-04-05 ASSESSMENT — MIFFLIN-ST. JEOR: SCORE: 1576.91

## 2021-04-05 NOTE — TELEPHONE ENCOUNTER
----- Message from CARROL Lopez CNP sent at 4/5/2021  4:53 PM CDT -----  Please call patient with the following results/message:    Urine test did not show any infection. Recommend drinking more water and following up with Urology if symptoms continue.    CARROL Pretty CNP on 4/5/2021 at 4:53 PM

## 2021-04-05 NOTE — PATIENT INSTRUCTIONS
Recommendations from today's MTM visit:                                                       1. Could try preparation H over the counter cream to see if this helps the pain.     2. Start Victoza 0.6mg daily for one week, then increase to 1.2mg daily.     3. Start taking amlodipine 5mg daily     4. Urine sample today.     5. Drink more water!     6. Start sertraline 25mg daily once daily.       Next MTM visit: See CARROL Pretty CNP and Chelo in 4 weeks.      It was great to speak with you today.  I value your experience and would be very thankful for your time with providing feedback on our clinic survey. You may receive a survey via email or text message in the next few days.     To schedule another MTM appointment, please call the clinic directly or you may call the MTM scheduling line at [phone] or toll-free at [phone].     My Clinical Pharmacist's contact information:                                                      Please feel free to contact me with any questions or concerns you have.      Chelo Roberts, Pharm.D, [facility]  Medication Therapy Management Pharmacist  [phone]

## 2021-04-05 NOTE — PROGRESS NOTES
Problem(s) Oriented visit        SUBJECTIVE:                                                    Emily Zhu is a 61 year old male who presents to clinic today for the following health issues :    Having occasional rectal itching. Also having dysuria for past 3 days. Not sexually active, no penile discharge.     1. Type 2 diabetes mellitus with diabetic nephropathy, without long-term current use of insulin (H)  Uncontrolled. Checks fasting blood sugar daily which are 120-130 usually. Taking Metformin and Glipizide as prescribed. Denies symptoms of hyper- or hypoglycemia. Does not follow diabetic diet. No regular exercise.   Lab Results   Component Value Date    A1C 8.3 04/05/2021    A1C 7.6 12/18/2020    A1C 8.3 08/14/2020    A1C 7.2 11/27/2019    A1C 7.5 07/08/2019     2. Essential hypertension  Uncontrolled. Current medications include Lisinopril 20 mg daily, Isosorbide 30 mg daily & Metoprolol 25 mg BID. Does not check home bp.  BP Readings from Last 3 Encounters:   04/05/21 (!) 158/100   12/18/20 130/82   08/14/20 134/84     3. Mild major depression (H)  Worsening. Not currently on any antidepressant medication    4. Stage 3 chronic kidney disease, unspecified whether stage 3a or 3b CKD  Stable  GFR Estimate   Date Value Ref Range Status   01/09/2020 46 (L) >60 mL/min/[1.73_m2] Final     Comment:     Non  GFR Calc  Starting 12/18/2018, serum creatinine based estimated GFR (eGFR) will be   calculated using the Chronic Kidney Disease Epidemiology Collaboration   (CKD-EPI) equation.     01/13/2018 66 >60 mL/min/1.7m2 Final     Comment:     Non  GFR Calc   04/12/2017 66 >60 mL/min/1.7m2 Final     Comment:     Non  GFR Calc     GFR Estimate If Black   Date Value Ref Range Status   01/09/2020 53 (L) >60 mL/min/[1.73_m2] Final     Comment:      GFR Calc  Starting 12/18/2018, serum creatinine based estimated GFR (eGFR) will be   calculated using the  "Chronic Kidney Disease Epidemiology Collaboration   (CKD-EPI) equation.     01/13/2018 80 >60 mL/min/1.7m2 Final     Comment:      GFR Calc   04/12/2017 80 >60 mL/min/1.7m2 Final     Comment:      GFR Calc            Problem list, Medication list, Allergies, and Medical/Social/Surgical histories reviewed in EPIC and updated as appropriate.   Additional history: as documented    ROS:  10 point ROS completed and negative except noted above, including Gen, HEENT, CV, Resp, GI, , MS, Neurologic, Psych    OBJECTIVE:                                                    BP (!) 158/100   Pulse 73   Temp 97.8  F (36.6  C)   Resp 16   Ht 1.651 m (5' 5\")   Wt 85.5 kg (188 lb 8 oz)   SpO2 96%   BMI 31.37 kg/m    Body mass index is 31.37 kg/m .   GENERAL: Adult male in no acute distress  RESP: lungs clear to auscultation - no rales, rhonchi or wheezes  CV: regular rates and rhythm, normal S1 S2, no S3 or S4, no murmur, click or rub and no peripheral edema  MS: extremities normal- no gross deformities noted  SKIN: no suspicious lesions or rashes  PSYCH: tearful, depressed     ASSESSMENT/PLAN:                                                        BMI:   Estimated body mass index is 31.37 kg/m  as calculated from the following:    Height as of this encounter: 1.651 m (5' 5\").    Weight as of this encounter: 85.5 kg (188 lb 8 oz).   Weight management plan: Discussed healthy diet and exercise guidelines      Emily was seen today for diabetes.    Diagnoses and all orders for this visit:    Type 2 diabetes mellitus with diabetic nephropathy, without long-term current use of insulin (H)  -     Hemoglobin A1C ([facility])  Uncontrolled - start Victoza injections. Sample given. F/U 2 weeks    Essential hypertension  -     FL ART PRESS WAVEFORM ANALYS CENTRAL ART PRESSURE  Uncontrolled - add amlodipine 5 mg daily. Lifestyle modifications discussed. Follow-up 4 weeks    Mild major depression (H)  New " prescription - sertraline 25 mg daily    Stage 3 chronic kidney disease, unspecified whether stage 3a or 3b CKD  Stable - poorly controlled diabetes & hypertension    Dysuria  -     Urinalysis w/reflex protein, bili (R[facility])  UA normal. Follow-up with Urology    Rectal itching  Preparation H prn      See Patient Instructions  Patient Instructions   Try preparation H as needed for rectal itching.    Urine sample does not show infection. Recommend making appointment with Urology.    New diabetes medication per Chelo        Follow-up 4 weeks with CARROL Pretty CNP and Chelo Roberts [facility]      CARROL Pretty CNP  [facility]  [facility]  [phone]    For any issues my office # is [phone]

## 2021-04-05 NOTE — TELEPHONE ENCOUNTER
Called patient with normal urine result. If patient does not improve he will call for urology referral.

## 2021-04-05 NOTE — PROGRESS NOTES
Medication Therapy Management (MTM) Encounter    ASSESSMENT:                            Medication Adherence/Access: See below for considerations    Type 2 Diabetes: Patient is not meeting A1c goal of < 7%. Self monitoring of blood glucose is not at goal of fasting  mg/dL. Patient would benefit from Starting GLP1 agonist - based on insurance coverage suggest using Victoza daily. Reviewed appropriate use, benefits, risks and monitoring at length.    Hypertension: Not meeting goal of <140/90mmHg and would benefit from additional therapy, suggest starting amlodipine 5mg once daily.     Genitourinary Symptoms: UA today, patient will monitor symptoms, discussed need for urology referral if symptoms persist or worsen. For anus symptoms, he was encouraged to trial OTC preparation H and if no relief to follow up.    Insomnia: amitriptyline may contribute to worsening memory from the anticholinergic properties, however the under-treated depression may also be a factor for his symptoms. Will consider stopping or changing this medication at follow up.     Depression: Discussion on pros/cons of treatment, starting sertraline 25mg daily. Would eventually like to get off amitriptyline and maximize selective serotonin reuptake inhibitor therapy.    PLAN:                            1. Start Amlodipine 5mg daily for blood pressure    2. Start sertraline 25mg once daily for mood    3. Start Victoza 0.6mg daily for 7 days then increase to 1.2mg daily for blood sugars    4. Urine analysis today    5. OTC Preparation H recommended     Follow-up: 1 week MTM will call, 4 weeks in person for co-visit with Deb    SUBJECTIVE/OBJECTIVE:                          Emily Zhu is a 61 year old male coming in for a co-visit with Tamica BRANHAM CNP.     Reason for visit: Diabetes recheck. First visit of the year.  Also questions about possible infection.    Allergies/ADRs: Reviewed in chart  Tobacco: He reports that  he has been smoking cigarettes. He has a 40.00 pack-year smoking history. He has never used smokeless tobacco.Tobacco Cessation Action Plan:   Information offered: Patient not interested at this time    Alcohol: not currently using  Past Medical History: Reviewed in chart      Medication Adherence/Access: easily confused on his medications at times, has help from family friend Aye.     Type 2 Diabetes:  Currently taking glipizide XL 5mg daily and metformin 1000mg twice daily. Patient is not experiencing side effects.  Blood sugar monitorin time(s) daily. Ranges (patient reported): 110-150s fasting, feels his sugars are doing well, however today's A1c was elevated at 8.3%  Symptoms of low blood sugar? none  Symptoms of high blood sugar? Urinary issues - see below  Diet/Exercise: gave up O[place]i foods, eating more cereals, breads. Doesn't eat meat. Is having a hard time with COVID being stuck at home.   Aspirin: no, taking plavix once daily.   Statin: Yes: atorvastatin   ACEi/ARB: Yes: lisinopril.     Lab Results   Component Value Date    A1C 8.3 2021    A1C 7.6 2020    A1C 8.3 2020    A1C 7.2 2019    A1C 7.5 2019       Hypertension: Current medications include hydrochlorothiazide 25mg daily, lisinopril 20mg daily, isosorbide 30mg daily, metoprolol tartrate 25mg twice daily.  Patient does not self-monitor blood pressure.  Patient reports no current medication side effects.  BP Readings from Last 3 Encounters:   21 (!) 158/100   20 130/82   20 134/84       Genitourinary Symptoms: having new onset of penial pain and burning in the last 3 days. UA ordered by PCP today. Also reports itching/discomfort around anus, not using anything at this time.     Insomnia: Current medications include: amitriptyline. Has been on this dose for some time. Depressed mood- see below. Is having ongoing concern with memory.      Depression:  Current medications include: no current  "medication. Patient became tearful during our visit today. Feeling isolated. Has struggled with depressed mood for a long time but has not been willing to take medication for this previously. On low dose amitriptyline for sleep noted above. Family friend was phoned by patient during visit and she reports patient's ongoing depressed mood. Today he is ready to consider starting a medication.   PHQ-9 SCORE 7/22/2019 8/14/2020 4/5/2021   PHQ-9 Total Score 11 8 8           BP Readings from Last 1 Encounters:   04/05/21 (!) 158/100     Pulse Readings from Last 1 Encounters:   04/05/21 73     Wt Readings from Last 1 Encounters:   04/05/21 188 lb 8 oz (85.5 kg)     Ht Readings from Last 1 Encounters:   04/05/21 5' 5\" (1.651 m)   ----------------    I spent 60 minutes with this patient today. All changes were made via collaborative practice agreement with CARROL Pretty CNP. A copy of the visit note was provided to the patient's primary care provider.    The patient was given a summary of these recommendations.     Chelo Roberts, Pharm.D, [facility]  Medication Therapy Management Pharmacist  [phone]      Medication Therapy Recommendations  Depression, unspecified depression type    Current Medication: sertraline (ZOLOFT) 25 MG tablet   Rationale: Untreated condition - Needs additional medication therapy - Indication   Recommendation: Start Medication - sertraline 25 MG tablet - start 25mg daily   Status: Accepted per CPA         Essential hypertension    Current Medication: amLODIPine (NORVASC) 5 MG tablet   Rationale: Synergistic therapy - Needs additional medication therapy - Indication   Recommendation: Start Medication - amLODIPine 5 MG tablet - start 5mg daily   Status: Accepted per CPA         Type 2 diabetes mellitus with diabetic nephropathy, without long-term current use of insulin (H)    Current Medication: liraglutide (VICTOZA PEN) 18 MG/3ML solution   Rationale: Synergistic therapy - Needs additional " medication therapy - Indication   Recommendation: Start Medication - VICTOZA PEN 18 MG/3ML soln - 0.6mg daily x 7 days then 1.2mg daily   Status: Accepted per CPA

## 2021-04-07 RX ORDER — LIRAGLUTIDE 6 MG/ML
INJECTION SUBCUTANEOUS
Qty: 6 ML | COMMUNITY
Start: 2021-04-07 | End: 2021-05-03

## 2021-04-07 RX ORDER — BENZOCAINE/MENTHOL 6 MG-10 MG
LOZENGE MUCOUS MEMBRANE 2 TIMES DAILY
Qty: 30 G | Refills: 0 | COMMUNITY
Start: 2021-04-07 | End: 2022-11-28

## 2021-04-07 NOTE — PATIENT INSTRUCTIONS
Try preparation H as needed for rectal itching.    Urine sample does not show infection. Recommend making appointment with Urology.    New diabetes medication per Chelo        Follow-up 4 weeks with CARROL Pretty CNP and Chelo Roberts [facility]

## 2021-04-22 DIAGNOSIS — I10 ESSENTIAL HYPERTENSION: ICD-10-CM

## 2021-04-22 DIAGNOSIS — E78.2 MIXED HYPERLIPIDEMIA: ICD-10-CM

## 2021-04-22 DIAGNOSIS — I25.10 ATHEROSCLEROSIS OF CORONARY ARTERY OF NATIVE HEART WITHOUT ANGINA PECTORIS, UNSPECIFIED VESSEL OR LESION TYPE: ICD-10-CM

## 2021-04-22 DIAGNOSIS — E11.40 PAINFUL DIABETIC NEUROPATHY (H): ICD-10-CM

## 2021-04-22 DIAGNOSIS — E11.21 TYPE 2 DIABETES MELLITUS WITH DIABETIC NEPHROPATHY, WITHOUT LONG-TERM CURRENT USE OF INSULIN (H): ICD-10-CM

## 2021-04-22 RX ORDER — AMITRIPTYLINE HYDROCHLORIDE 10 MG/1
10 TABLET ORAL AT BEDTIME
Qty: 30 TABLET | Refills: 0 | Status: SHIPPED | OUTPATIENT
Start: 2021-04-22 | End: 2021-05-28

## 2021-04-22 RX ORDER — ISOSORBIDE MONONITRATE 30 MG/1
TABLET, EXTENDED RELEASE ORAL
Qty: 30 TABLET | Refills: 0 | Status: SHIPPED | OUTPATIENT
Start: 2021-04-22 | End: 2021-05-28

## 2021-04-22 RX ORDER — HYDROCHLOROTHIAZIDE 25 MG/1
TABLET ORAL
Qty: 30 TABLET | Refills: 0 | Status: SHIPPED | OUTPATIENT
Start: 2021-04-22 | End: 2021-05-28

## 2021-04-22 RX ORDER — LANCETS
EACH MISCELLANEOUS
Qty: 100 EACH | Refills: 11 | Status: SHIPPED | OUTPATIENT
Start: 2021-04-22 | End: 2021-08-09

## 2021-04-22 RX ORDER — CLOPIDOGREL BISULFATE 75 MG/1
TABLET ORAL
Qty: 30 TABLET | Refills: 0 | Status: SHIPPED | OUTPATIENT
Start: 2021-04-22 | End: 2021-05-28

## 2021-04-22 RX ORDER — METOPROLOL TARTRATE 25 MG/1
TABLET, FILM COATED ORAL
Qty: 60 TABLET | Refills: 0 | Status: SHIPPED | OUTPATIENT
Start: 2021-04-22 | End: 2021-05-28

## 2021-04-22 RX ORDER — METFORMIN HCL 500 MG
TABLET, EXTENDED RELEASE 24 HR ORAL
Qty: 120 TABLET | Refills: 0 | Status: SHIPPED | OUTPATIENT
Start: 2021-04-22 | End: 2021-05-28

## 2021-04-22 RX ORDER — LISINOPRIL 20 MG/1
TABLET ORAL
Qty: 30 TABLET | Refills: 0 | Status: SHIPPED | OUTPATIENT
Start: 2021-04-22 | End: 2021-05-28

## 2021-04-22 RX ORDER — ATORVASTATIN CALCIUM 40 MG/1
TABLET, FILM COATED ORAL
Qty: 30 TABLET | Refills: 0 | Status: SHIPPED | OUTPATIENT
Start: 2021-04-22 | End: 2021-05-28

## 2021-04-22 NOTE — TELEPHONE ENCOUNTER
Multi Rxs.  Last addressed 4/5/21.     Hemoglobin A1C   Date Value Ref Range Status   04/05/2021 8.3 (A) 4.0 - 6.0 % Final   12/18/2020 7.6 (H) 4.8 - 5.6 % Final     Comment:              Prediabetes: 5.7 - 6.4           Diabetes: >6.4           Glycemic control for adults with diabetes: <7.0     08/14/2020 8.3 (H) 4.8 - 5.6 % Final     Comment:              Prediabetes: 5.7 - 6.4           Diabetes: >6.4           Glycemic control for adults with diabetes: <7.0       BP Readings from Last 3 Encounters:   04/05/21 (!) 158/100   12/18/20 130/82   08/14/20 134/84         Type 2 diabetes mellitus with diabetic nephropathy, without long-term current use of insulin (H)  -     Hemoglobin A1C ([facility])  Uncontrolled - start Victoza injections. Sample given. F/U 2 weeks     Essential hypertension  -     [place] ART PRESS WAVEFORM ANALYS CENTRAL ART PRESSURE  Uncontrolled - add amlodipine 5 mg daily. Lifestyle modifications discussed. Follow-up 4 weeks

## 2021-04-27 ENCOUNTER — PATIENT OUTREACH (OUTPATIENT)
Dept: CARE COORDINATION | Facility: CLINIC | Age: 61
End: 2021-04-27

## 2021-04-27 NOTE — PROGRESS NOTES
"Clinic Care Coordination Contact    Follow Up Progress Note      Assessment: EDDIE Care Coordinator received an update from \"Eboni\" at [facility] where pt is on the waiting list for PCA services, they are trying to find a PCA for patient. Eboni noted they have been unable to secure a staff for patient as of yet.  EDDIE also received a VM from pt's therapist at [facility] wanting to check in and go over progress. SW left a follow up VM.     SW then called patient- he said he got new glasses. He went to the podiatrist and they did surgery on his ingrown toenails. He said it was painful recovery but he is better now. He has follow up with PCP and MTM next week. He started Sertraline for Depression. He has no concern of side effects thus far.   Patient noted he has been having dental pain and hasn't been able to secure an appointment at [facility]. SW called [facility] and there soonest appointment for exam and x-rays is May 25th. Pt thinks he needs extractions and dentures made based on when he went to an out of network dentist ([facility]). It was recommended to call --     [facility] [phone]  [facility] [address] [address]  Walk in appts- Monday-Friday 8:30-3pm  They can prescribe antibiotic if needed during walk in prior to full exam/treatment plan   They are booking out till end of May for a full comprehensive exam otherwise and won't be able to open the schedule till next Tuesday  Pt wants to call again next Tuesday to set up an appointment. He may do a walk in in the meantime    EDDIE discussed referring patient to [facility] to help him figure out a senior living option. Can go over this more next week but gave pt the contact info.    SW will reach out again next Tuesday.   Pt says he continues to work with his therapist    Christine Rutledge, MSW, [facility]  Clinic Care Coordinator  [email or web address]  [phone]              "

## 2021-04-28 ASSESSMENT — ACTIVITIES OF DAILY LIVING (ADL): DEPENDENT_IADLS:: TRANSPORTATION

## 2021-05-03 ENCOUNTER — OFFICE VISIT (OUTPATIENT)
Dept: FAMILY MEDICINE | Facility: CLINIC | Age: 61
End: 2021-05-03

## 2021-05-03 ENCOUNTER — OFFICE VISIT (OUTPATIENT)
Dept: PHARMACY | Facility: PHYSICIAN GROUP | Age: 61
End: 2021-05-03
Payer: COMMERCIAL

## 2021-05-03 VITALS
HEIGHT: 65 IN | TEMPERATURE: 98.3 F | WEIGHT: 186.19 LBS | DIASTOLIC BLOOD PRESSURE: 96 MMHG | BODY MASS INDEX: 31.02 KG/M2 | SYSTOLIC BLOOD PRESSURE: 146 MMHG | OXYGEN SATURATION: 97 % | HEART RATE: 75 BPM

## 2021-05-03 VITALS — SYSTOLIC BLOOD PRESSURE: 134 MMHG | DIASTOLIC BLOOD PRESSURE: 86 MMHG

## 2021-05-03 DIAGNOSIS — E11.21 TYPE 2 DIABETES MELLITUS WITH DIABETIC NEPHROPATHY, WITHOUT LONG-TERM CURRENT USE OF INSULIN (H): Primary | ICD-10-CM

## 2021-05-03 DIAGNOSIS — G47.00 INSOMNIA, UNSPECIFIED TYPE: ICD-10-CM

## 2021-05-03 DIAGNOSIS — I10 ESSENTIAL HYPERTENSION: ICD-10-CM

## 2021-05-03 DIAGNOSIS — R39.9 GENITOURINARY SYMPTOMS: ICD-10-CM

## 2021-05-03 DIAGNOSIS — F32.0 MILD MAJOR DEPRESSION (H): ICD-10-CM

## 2021-05-03 DIAGNOSIS — F32.A DEPRESSION, UNSPECIFIED DEPRESSION TYPE: Primary | ICD-10-CM

## 2021-05-03 DIAGNOSIS — E11.21 TYPE 2 DIABETES MELLITUS WITH DIABETIC NEPHROPATHY, WITHOUT LONG-TERM CURRENT USE OF INSULIN (H): ICD-10-CM

## 2021-05-03 PROCEDURE — 99214 OFFICE O/P EST MOD 30 MIN: CPT | Performed by: NURSE PRACTITIONER

## 2021-05-03 PROCEDURE — 99607 MTMS BY PHARM ADDL 15 MIN: CPT | Performed by: PHARMACIST

## 2021-05-03 PROCEDURE — 99606 MTMS BY PHARM EST 15 MIN: CPT | Performed by: PHARMACIST

## 2021-05-03 RX ORDER — LIRAGLUTIDE 6 MG/ML
1.2 INJECTION SUBCUTANEOUS DAILY
Qty: 6 ML | Refills: 0 | Status: SHIPPED | OUTPATIENT
Start: 2021-05-03 | End: 2021-05-28

## 2021-05-03 ASSESSMENT — MIFFLIN-ST. JEOR: SCORE: 1566.42

## 2021-05-03 NOTE — PROGRESS NOTES
Medication Therapy Management (MTM) Encounter    ASSESSMENT:                            Medication Adherence/Access: No issues identified    Type 2 Diabetes:  Recommend increase in Victoza to 1.2mg daily.     Hypertension: Recommend increase to 5mg daily    Genitourinary Symptoms: Improved.    Insomnia: likely related to uncontrolled depression and recommend dose adjustment of sertraline before adding or changing sleep medication.     Depression: Recommend increase in sertraline and ongoing work with Care Coordination.     PLAN:                            1. Increase Victoza to 1.2mg daily.     2. Increase sertraline to 50mg daily - take 2 tablets of the 25mg for 50mg daily.     3. Continue amlodipine 5mg daily     Follow-up: Return in about 2 months (around 7/3/2021) for MTM visit in clinic, PCP.    SUBJECTIVE/OBJECTIVE:                          Emily Zhu is a 61 year old male coming in for a follow-up visit. He was referred to me from CARROL Pretty CNP.  Today's visit is a follow-up MTM visit from      Reason for visit: medication follow up. Seen today as co-visit with CARROL Pretty CNP    Allergies/ADRs: Reviewed in chart  Tobacco: He reports that he has been smoking cigarettes. He has a 40.00 pack-year smoking history. He has never used smokeless tobacco.Tobacco Cessation Action Plan:   Information offered: Patient not interested at this time  Alcohol: not currently using  Past Medical History: Reviewed in chart    Covid 2nd shot on - week of not feeling well.     Medication Adherence/Access: easily confused on his medications at times, has help from family friend Aye.     Type 2 Diabetes:  Currently taking glipizide XL 5mg daily and metformin 1000mg twice daily, Victoza 0.6mg daily (messed up dosing so did get sick 1x), but now has been doing just the 0.6mg daily without issues. . Patient is not experiencing side effects.  Blood sugar monitorin time(s) daily. Ranges (patient  reported): 189 this morning, frusted by higher numbers.   A1c was elevated at 8.3%  Symptoms of low blood sugar? none  Symptoms of high blood sugar? none  Diet/Exercise: gave up  foods, eating more cereals, breads. Doesn't eat meat. Is having a hard time with COVID being stuck at home.   Aspirin: no, taking plavix once daily.   Statin: Yes: atorvastatin   ACEi/ARB: Yes: lisinopril.     Lab Results   Component Value Date    A1C 8.3 04/05/2021    A1C 7.6 12/18/2020    A1C 8.3 08/14/2020    A1C 7.2 11/27/2019    A1C 7.5 07/08/2019       Hypertension: Current medications include amlodipine 5mg daily (started last visit), hydrochlorothiazide 25mg daily, lisinopril 20mg daily, isosorbide 30mg daily, metoprolol tartrate 25mg twice daily.  Patient does not self-monitor blood pressure.  Patient reports no current medication side effects.  BP Readings from Last 3 Encounters:   05/03/21 134/86   05/03/21 (!) 146/96   04/05/21 (!) 158/100       Genitourinary Symptoms: had new onset of penial pain and burning last visit, UA unremarkable and symptoms self resolved. No issues at this time.     Insomnia: Current medications include: amitriptyline 10mg daily. Has been on this dose for some time. Depressed mood- see below. Is having ongoing concern with memory.  Having trouble falling asleep and only sleeping 3 hours. Feels like his mind is still racing.     Depression:  Current medications include: sertraline 25mg daily (started 4 weeks ago- not sure if it is helping or not). Working with care coordination and working to get PCA. Feeling isolated and lonely. Is hoping to find new housing to increase socialization as well. On low dose amitriptyline for sleep noted above.   PHQ-9 SCORE 7/22/2019 8/14/2020 4/5/2021   PHQ-9 Total Score 11 8 8       Today's Vitals: /86   ----------------    I spent 45 minutes with this patient today. All changes were made via collaborative practice agreement with CARROL Pretty CNP. A  copy of the visit note was provided to the patient's primary care provider.    The patient was given a summary of these recommendations. See Provider note/AVS from today.     Chelo Roberts, Pharm.D, [facility]  Medication Therapy Management Pharmacist  [phone]        Medication Therapy Recommendations  Depression, unspecified depression type    Current Medication: sertraline (ZOLOFT) 50 MG tablet   Rationale: Dose too low - Dosage too low - Effectiveness   Recommendation: Increase Dose - sertraline 50 MG tablet - increase to 50mg daily   Status: Accepted per CPA         Type 2 diabetes mellitus with diabetic nephropathy, without long-term current use of insulin (H)    Current Medication: liraglutide (VICTOZA PEN) 18 MG/3ML solution   Rationale: Dose too low - Dosage too low - Effectiveness   Recommendation: Increase Dose - VICTOZA PEN 18 MG/3ML soln - increase to 1.2mg daily   Status: Accepted per CPA

## 2021-05-03 NOTE — PATIENT INSTRUCTIONS
Increase Victoza to 1.2 mg daily    Continue Amlodipine 5 mg daily in addition to other blood pressure medications    Increase Sertraline to 50 mg daily    Follow-up in 2 months.

## 2021-05-03 NOTE — PROGRESS NOTES
"Problem(s) Oriented visit        SUBJECTIVE:                                                    Emily Zhu is a 61 year old male who presents to clinic today for the following health issues :        Problem list, Medication list, Allergies, and Medical/Social/Surgical histories reviewed in EPIC and updated as appropriate.   Additional history: as documented    ROS:  7point ROS completed and negative except noted above, including Gen, CV, Resp, GI, , Neuro, Psych    OBJECTIVE:                                                    BP (!) 146/96   Pulse 75   Temp 98.3  F (36.8  C)   Ht 1.651 m (5' 5\")   Wt 84.5 kg (186 lb 3 oz)   SpO2 97%   BMI 30.98 kg/m    Body mass index is 30.98 kg/m .   GENERAL: Adult male in no distress  RESP: lungs clear to auscultation - no rales, rhonchi or wheezes  CV: regular rate and rhythm, normal S1 S2, no S3 or S4, no murmur, click or rub, no peripheral edema and peripheral pulses strong  SKIN: no suspicious lesions or rashes  PSYCH: flat affect, depressed mood     ASSESSMENT/PLAN:                                                      Emily was seen today for diabetes.    Diagnoses and all orders for this visit:    Type 2 diabetes mellitus with diabetic nephropathy, without long-term current use of insulin (H)  Increase Victoza to 1.2 mg daily. Follow-up in 2 months for A1C (not done today    Essential hypertension  Controlled with current medications. No changes at this time    Mild major depression (H)  Increase Sertraline to 50 mg daily      See Patient Instructions  Patient Instructions   Increase Victoza to 1.2 mg daily    Continue Amlodipine 5 mg daily in addition to other blood pressure medications    Increase Sertraline to 50 mg daily    Follow-up in 2 months.      CARROL Pretty [facility]  Family Practice  [facility]  [phone]    For any issues my office # is [phone]      "

## 2021-05-04 ENCOUNTER — PATIENT OUTREACH (OUTPATIENT)
Dept: CARE COORDINATION | Facility: CLINIC | Age: 61
End: 2021-05-04

## 2021-05-05 NOTE — PROGRESS NOTES
Clinic Care Coordination Contact    Follow Up Progress Note      Assessment: EDDIE called patient. He indicated he went to the [facility] and did a walk in appointment. He has another appointment on Monday to extract one of his teeth. He was prescribed an antibiotic. He is setting up a full exam to consult about dentures as well.    Patient will get unemployment till August. He gets $300 a week on top of $125 in benefits. He is able to make his rent. He does want to eventually move into something more affordable. He plans to apply for social security disability benefits soon. He does potentially want to re-explore CADI waiver application to see if he could get [facility] osmin through the [facility] and help to pay for AL cost. He needs assistance with paperwork and that is what was difficult and ended him applying last time. Pt does not have a section 8 voucher or section 42 housing. EDDIE and patient consulted with Jeremias castellon senior housing advisor at [place] today via phone conference. He would be willing to give patient some housing resources on as needed basis.  EDDIE offered to call the [facility] to initiate mnchoices assessment today but pt declined to do this today. He wants to think about it and go over progress again within 3-4 weeks.    Christine Rutledge, MSW, [facility]  Clinic Care Coordinator  [email or web address]  [phone]

## 2021-05-28 DIAGNOSIS — I10 ESSENTIAL HYPERTENSION: ICD-10-CM

## 2021-05-28 DIAGNOSIS — E11.40 PAINFUL DIABETIC NEUROPATHY (H): ICD-10-CM

## 2021-05-28 DIAGNOSIS — I25.10 ATHEROSCLEROSIS OF CORONARY ARTERY OF NATIVE HEART WITHOUT ANGINA PECTORIS, UNSPECIFIED VESSEL OR LESION TYPE: ICD-10-CM

## 2021-05-28 DIAGNOSIS — E78.2 MIXED HYPERLIPIDEMIA: ICD-10-CM

## 2021-05-28 DIAGNOSIS — E11.21 TYPE 2 DIABETES MELLITUS WITH DIABETIC NEPHROPATHY, WITHOUT LONG-TERM CURRENT USE OF INSULIN (H): ICD-10-CM

## 2021-05-28 RX ORDER — ISOSORBIDE MONONITRATE 30 MG/1
TABLET, EXTENDED RELEASE ORAL
Qty: 30 TABLET | Refills: 2 | Status: SHIPPED | OUTPATIENT
Start: 2021-05-28 | End: 2021-08-27

## 2021-05-28 RX ORDER — METOPROLOL TARTRATE 25 MG/1
TABLET, FILM COATED ORAL
Qty: 60 TABLET | Refills: 2 | Status: SHIPPED | OUTPATIENT
Start: 2021-05-28 | End: 2021-08-26

## 2021-05-28 RX ORDER — ATORVASTATIN CALCIUM 40 MG/1
TABLET, FILM COATED ORAL
Qty: 30 TABLET | Refills: 2 | Status: SHIPPED | OUTPATIENT
Start: 2021-05-28 | End: 2021-08-26

## 2021-05-28 RX ORDER — AMLODIPINE BESYLATE 5 MG/1
5 TABLET ORAL DAILY
Qty: 90 TABLET | Refills: 0 | Status: SHIPPED | OUTPATIENT
Start: 2021-05-28 | End: 2021-06-30

## 2021-05-28 RX ORDER — LISINOPRIL 20 MG/1
TABLET ORAL
Qty: 30 TABLET | Refills: 2 | Status: SHIPPED | OUTPATIENT
Start: 2021-05-28 | End: 2021-06-30

## 2021-05-28 RX ORDER — LIRAGLUTIDE 6 MG/ML
1.2 INJECTION SUBCUTANEOUS DAILY
Qty: 3 ML | Refills: 1 | Status: SHIPPED | OUTPATIENT
Start: 2021-05-28 | End: 2021-06-01

## 2021-05-28 RX ORDER — GLIPIZIDE 5 MG/1
5 TABLET, FILM COATED, EXTENDED RELEASE ORAL DAILY
Qty: 90 TABLET | Refills: 0 | Status: SHIPPED | OUTPATIENT
Start: 2021-05-28 | End: 2021-10-28

## 2021-05-28 RX ORDER — AMITRIPTYLINE HYDROCHLORIDE 10 MG/1
TABLET ORAL
Qty: 30 TABLET | Refills: 2 | Status: SHIPPED | OUTPATIENT
Start: 2021-05-28 | End: 2021-08-26

## 2021-05-28 RX ORDER — METFORMIN HCL 500 MG
TABLET, EXTENDED RELEASE 24 HR ORAL
Qty: 120 TABLET | Refills: 2 | Status: SHIPPED | OUTPATIENT
Start: 2021-05-28 | End: 2021-08-26

## 2021-05-28 RX ORDER — HYDROCHLOROTHIAZIDE 25 MG/1
TABLET ORAL
Qty: 30 TABLET | Refills: 2 | Status: SHIPPED | OUTPATIENT
Start: 2021-05-28 | End: 2021-08-26

## 2021-05-28 RX ORDER — CLOPIDOGREL BISULFATE 75 MG/1
TABLET ORAL
Qty: 30 TABLET | Refills: 2 | Status: SHIPPED | OUTPATIENT
Start: 2021-05-28 | End: 2021-08-26

## 2021-06-01 RX ORDER — LIRAGLUTIDE 6 MG/ML
1.2 INJECTION SUBCUTANEOUS DAILY
Qty: 6 ML | Refills: 1 | COMMUNITY
Start: 2021-06-01 | End: 2021-06-07

## 2021-06-07 DIAGNOSIS — E11.21 TYPE 2 DIABETES MELLITUS WITH DIABETIC NEPHROPATHY, WITHOUT LONG-TERM CURRENT USE OF INSULIN (H): ICD-10-CM

## 2021-06-07 RX ORDER — LIRAGLUTIDE 6 MG/ML
1.2 INJECTION SUBCUTANEOUS DAILY
Qty: 6 ML | Refills: 1 | Status: SHIPPED | OUTPATIENT
Start: 2021-06-07 | End: 2021-09-14 | Stop reason: ALTCHOICE

## 2021-06-07 NOTE — TELEPHONE ENCOUNTER
Victoza  LOV 5/3/2021    Hemoglobin A1C   Date Value Ref Range Status   04/05/2021 8.3 (A) 4.0 - 6.0 % Final   12/18/2020 7.6 (H) 4.8 - 5.6 % Final     Comment:              Prediabetes: 5.7 - 6.4           Diabetes: >6.4           Glycemic control for adults with diabetes: <7.0     08/14/2020 8.3 (H) 4.8 - 5.6 % Final     Comment:              Prediabetes: 5.7 - 6.4           Diabetes: >6.4           Glycemic control for adults with diabetes: <7.0       Last Comprehensive Metabolic Panel:  Sodium   Date Value Ref Range Status   12/18/2020 134 134 - 144 mmol/L Final     Potassium   Date Value Ref Range Status   12/18/2020 5.3 (H) 3.5 - 5.2 mmol/L Final     Chloride   Date Value Ref Range Status   12/18/2020 96 96 - 106 mmol/L Final     Carbon Dioxide   Date Value Ref Range Status   01/09/2020 23 20 - 32 mmol/L Final     Anion Gap   Date Value Ref Range Status   01/09/2020 6 3 - 14 mmol/L Final     Glucose   Date Value Ref Range Status   12/18/2020 192 (H) 65 - 99 mg/dL Final     Urea Nitrogen   Date Value Ref Range Status   12/18/2020 22 8 - 27 mg/dL Final     BUN/Creatinine Ratio   Date Value Ref Range Status   12/18/2020 12 10 - 24 Final     Creatinine   Date Value Ref Range Status   12/18/2020 1.77 (H) 0.76 - 1.27 mg/dL Final     GFR Estimate   Date Value Ref Range Status   01/09/2020 46 (L) >60 mL/min/[1.73_m2] Final     Comment:     Non  GFR Calc  Starting 12/18/2018, serum creatinine based estimated GFR (eGFR) will be   calculated using the Chronic Kidney Disease Epidemiology Collaboration   (CKD-EPI) equation.       Calcium   Date Value Ref Range Status   12/18/2020 9.7 8.6 - 10.2 mg/dL Final     Bilirubin Total   Date Value Ref Range Status   08/14/2020 0.4 0.0 - 1.2 mg/dL Final     Alkaline Phosphatase   Date Value Ref Range Status   08/14/2020 49 39 - 117 IU/L Final     ALT   Date Value Ref Range Status   08/14/2020 24 0 - 44 IU/L Final     AST   Date Value Ref Range Status   08/14/2020 19  0 - 40 IU/L Final

## 2021-06-24 ENCOUNTER — TRANSFERRED RECORDS (OUTPATIENT)
Dept: FAMILY MEDICINE | Facility: CLINIC | Age: 61
End: 2021-06-24

## 2021-06-28 ENCOUNTER — PATIENT OUTREACH (OUTPATIENT)
Dept: CARE COORDINATION | Facility: CLINIC | Age: 61
End: 2021-06-28

## 2021-06-28 NOTE — PROGRESS NOTES
Clinic Care Coordination Contact    Follow Up Progress Note      Assessment: Pt called and said he was visiting family in [place] recently and on the way back he had a stroke at the airport and had to be hospitalized a few days in [place]. He has his discharge paperwork and set up an appointment with Tamica Tang for 6/30/21. His friend Lesia is helping him with IADL's when she can. Pt said he would likely benefit from PT at least since he is more weak. SW will route an update to PCP. He will use a medical ride to/from.  Pt is frustrated with waiting for PCA staffing through [facility].   Pt had his dental appointment and a few teeth were extracted. [facility] and to get dentures made and put in.     No other questions/concerns today. Pt is resting from the travel and settling in back at home.      Plan:   Attend hospital discharge appointment with Tamica Tang 6/30/21. Bring paperwork with for review.  SW will follow up after the appointment on needs/questions/concerns.

## 2021-06-28 NOTE — PROGRESS NOTES
SUBJECTIVE:                                                      Patient presents for Hospital Followup Visit:    Hospital: [facility]     Date of Admission: 6/22/21  Date of Discharge: 6/25/21  Transitional Care Management Phone Call: no   Reason(s) for Admission: stroke            Problems taking medications regularly:  Sometimes forgets       Medication changes since discharge: Yes - amitriptyline, amlodipine, lisinopril, sertraline victoza       Problems adhering to non-medication therapy:  None    Summary of hospitalization:  See outside records, reviewed and scanned  Diagnostic Tests/Treatments reviewed.  Follow up needed: physical therapy, new medications  Other Healthcare Providers Involved in Patient s Care:         Homecare, Care Coordination and Physical Therapy  Update since discharge: stable.   Patient overwhelmed and stressed with recent health issues. TIA while at airport in [place] [facility] and was taken to hospital there. No longer having numbness/tingling of right side of body. Medications were adjusted there, and patient is unsure if he is taking new doses. Has atherosclerosis of coronary artery. Denies current chest pain, pressure or palpitations.   Hypertension - controlled. Doses for amlodipine & lisinopril changed during recent hospitalization. Counseled patient on making sure his home doses correlate with our information.  Continues to smoke cigarettes. Knows he needs to quit and told him hospital told him that also. Has nicotine patches and quit plan information at home.  Feeling very weak - wants physical therapy but finds it very taxing to leave his home.  Type 2 diabetes - due for A1C check in the next couple of weeks. Unable to give me glucose readings at appointment today.    ROS:  Constitutional, neuro, ENT, endocrine, pulmonary, cardiac, gastrointestinal, genitourinary, musculoskeletal, integument and psychiatric systems are negative, except as otherwise noted.    OBJECTIVE:                                                       GENERAL APPEARANCE: Ill-appearing elderly man in no acute distress  EYES: Eyes grossly normal to inspection  RESP: decreased lung sounds in bilateral bases.  CV: regular rates and rhythm, normal S1 S2, no S3 or S4 and no murmur, click or rub  MS: generalized weakness  SKIN: no suspicious lesions or rashes  PSYCH: flat affect, depressed mood    ASSESSMENT/PLAN:                                                      Emily was seen today for hospital f/u.    Diagnoses and all orders for this visit:    TIA (transient ischemic attack)  -     aspirin (ASA) 81 MG EC tablet; Take 1 tablet (81 mg) by mouth daily  -     PHYSICAL THERAPY REFERRAL; Future  New medication from hospital discharge - baby aspirin. Reiterated need to quit smoking and good control of blood pressure and cholesterol levels.    Essential hypertension  -     [place] ART PRESS WAVEFORM ANALYS CENTRAL ART PRESSURE  -     amLODIPine (NORVASC) 10 MG tablet; Take 1 tablet (10 mg) by mouth daily  -     Discontinue: lisinopril (ZESTRIL) 10 MG tablet; Take 2 tablets (20 mg) by mouth daily  -     lisinopril (ZESTRIL) 10 MG tablet; Take 1 tablet (10 mg) by mouth daily  Medication dosage changes for both Amlodipine & Lisinopril reviewed with patient. Controlled today. Should follow-up in clinic in 2 weeks.    Type 2 diabetes mellitus with diabetic nephropathy, without long-term current use of insulin (H)  -     [place] ART PRESS WAVEFORM ANALYS CENTRAL ART PRESSURE  -     aspirin (ASA) 81 MG EC tablet; Take 1 tablet (81 mg) by mouth daily  Due for A1C when he follows up in 2 weeks    Tobacco use  Smoking cessation discussed for 5 minutes. Quit plan information card given to patient. He has nicotine patches at home    Generalized muscle weakness  -     PHYSICAL THERAPY REFERRAL; Future  Physical therapy referral placed    Atherosclerosis of coronary artery of native heart without angina pectoris, unspecified vessel or lesion  type    Advanced care planning/counseling discussion    Special screening for malignant neoplasms, colon  -     COLOGUARD(EXACT SCIENCES)            Post Discharge Medication Reconciliation: discharge medications reconciled, continue medications without change.  Issues to address: Issues identified were:   medication issues/barriers and home care follow up.  Plan of care communicated with patient      Type of Medical Decision Making Face-to-Face Visit within 7 Days Face-to-Face Visit within 14 days   Moderate Complexity [id number]   High Complexity [id number]     Patient verbalized understanding and is agreeable to the discussed plan of care.    CARROL Pretty CNP on 6/30/2021 at 12:31 PM

## 2021-06-29 DIAGNOSIS — F32.A DEPRESSION, UNSPECIFIED DEPRESSION TYPE: ICD-10-CM

## 2021-06-29 DIAGNOSIS — E11.21 TYPE 2 DIABETES MELLITUS WITH DIABETIC NEPHROPATHY, WITHOUT LONG-TERM CURRENT USE OF INSULIN (H): Primary | ICD-10-CM

## 2021-06-29 NOTE — TELEPHONE ENCOUNTER
"SERTRALINE  DM KIT, LANCETS, STRIPS    LOV: 5/3/2021    *Note from pharmacy: \"please send new rx for new kit (Accu-check Guide kit). Lancets, test strips. Pt's meter is broken and Gem Kit has been phased out.\"    PHQ-9 score:    PHQ 4/5/2021   PHQ-9 Total Score 8   Q9: Thoughts of better off dead/self-harm past 2 weeks Several days     Lab Results   Component Value Date    A1C 8.3 04/05/2021    A1C 7.6 12/18/2020    A1C 8.3 08/14/2020    A1C 7.2 11/27/2019    A1C 7.5 07/08/2019             "

## 2021-06-30 ENCOUNTER — OFFICE VISIT (OUTPATIENT)
Dept: FAMILY MEDICINE | Facility: CLINIC | Age: 61
End: 2021-06-30

## 2021-06-30 VITALS
OXYGEN SATURATION: 98 % | WEIGHT: 178.4 LBS | RESPIRATION RATE: 16 BRPM | HEART RATE: 67 BPM | BODY MASS INDEX: 29.69 KG/M2 | SYSTOLIC BLOOD PRESSURE: 111 MMHG | TEMPERATURE: 97.2 F | DIASTOLIC BLOOD PRESSURE: 73 MMHG

## 2021-06-30 DIAGNOSIS — I25.10 ATHEROSCLEROSIS OF CORONARY ARTERY OF NATIVE HEART WITHOUT ANGINA PECTORIS, UNSPECIFIED VESSEL OR LESION TYPE: ICD-10-CM

## 2021-06-30 DIAGNOSIS — Z72.0 TOBACCO USE: ICD-10-CM

## 2021-06-30 DIAGNOSIS — E11.21 TYPE 2 DIABETES MELLITUS WITH DIABETIC NEPHROPATHY, WITHOUT LONG-TERM CURRENT USE OF INSULIN (H): ICD-10-CM

## 2021-06-30 DIAGNOSIS — I10 ESSENTIAL HYPERTENSION: ICD-10-CM

## 2021-06-30 DIAGNOSIS — Z12.11 SPECIAL SCREENING FOR MALIGNANT NEOPLASMS, COLON: ICD-10-CM

## 2021-06-30 DIAGNOSIS — M62.81 GENERALIZED MUSCLE WEAKNESS: ICD-10-CM

## 2021-06-30 DIAGNOSIS — G45.9 TIA (TRANSIENT ISCHEMIC ATTACK): Primary | ICD-10-CM

## 2021-06-30 DIAGNOSIS — Z71.89 ADVANCED CARE PLANNING/COUNSELING DISCUSSION: ICD-10-CM

## 2021-06-30 PROCEDURE — 99496 TRANSJ CARE MGMT HIGH F2F 7D: CPT | Mod: 25 | Performed by: NURSE PRACTITIONER

## 2021-06-30 PROCEDURE — 93050 ART PRESSURE WAVEFORM ANALYS: CPT | Performed by: NURSE PRACTITIONER

## 2021-06-30 RX ORDER — LISINOPRIL 10 MG/1
20 TABLET ORAL DAILY
Qty: 30 TABLET | Refills: 5 | Status: SHIPPED | OUTPATIENT
Start: 2021-06-30 | End: 2021-06-30

## 2021-06-30 RX ORDER — AMLODIPINE BESYLATE 10 MG/1
10 TABLET ORAL DAILY
Qty: 30 TABLET | Refills: 5 | Status: SHIPPED | OUTPATIENT
Start: 2021-06-30 | End: 2021-08-26

## 2021-06-30 RX ORDER — LISINOPRIL 10 MG/1
10 TABLET ORAL DAILY
Qty: 30 TABLET | Refills: 5 | Status: SHIPPED | OUTPATIENT
Start: 2021-06-30 | End: 2021-12-29

## 2021-06-30 RX ORDER — AMOXICILLIN 500 MG/1
CAPSULE ORAL
COMMUNITY
Start: 2021-05-04 | End: 2023-05-09

## 2021-06-30 NOTE — PATIENT INSTRUCTIONS
Make sure that your medications match list on this after visit summary.    Lisinopril DECREASED to 10 mg daily (was 20 mg daily)    Amlodipine INCREASED to 10 mg daily (was 5 mg daily)    NEW MEDICATION: baby aspirin 81 mg daily

## 2021-07-14 ENCOUNTER — OFFICE VISIT (OUTPATIENT)
Dept: FAMILY MEDICINE | Facility: CLINIC | Age: 61
End: 2021-07-14

## 2021-07-14 VITALS
WEIGHT: 177.2 LBS | HEART RATE: 74 BPM | DIASTOLIC BLOOD PRESSURE: 69 MMHG | BODY MASS INDEX: 29.49 KG/M2 | SYSTOLIC BLOOD PRESSURE: 138 MMHG | OXYGEN SATURATION: 98 % | TEMPERATURE: 97.3 F

## 2021-07-14 DIAGNOSIS — R21 RASH: ICD-10-CM

## 2021-07-14 DIAGNOSIS — N18.32 STAGE 3B CHRONIC KIDNEY DISEASE (H): ICD-10-CM

## 2021-07-14 DIAGNOSIS — Z23 NEED FOR VACCINATION: ICD-10-CM

## 2021-07-14 DIAGNOSIS — I10 ESSENTIAL HYPERTENSION: Primary | ICD-10-CM

## 2021-07-14 DIAGNOSIS — Z72.0 TOBACCO USE: ICD-10-CM

## 2021-07-14 DIAGNOSIS — E11.21 TYPE 2 DIABETES MELLITUS WITH DIABETIC NEPHROPATHY, WITHOUT LONG-TERM CURRENT USE OF INSULIN (H): ICD-10-CM

## 2021-07-14 LAB
A/C RATIO MG/G: ABNORMAL MG/G
ALBUMIN (URINE) MG/L: 150 MG/L
HBA1C MFR BLD: 7.5 % (ref 4–6)
INTERPRETATION: ABNORMAL
URINE CREATININE MG/DL - QUEST: 200 MG/DL

## 2021-07-14 PROCEDURE — 90714 TD VACC NO PRESV 7 YRS+ IM: CPT | Performed by: NURSE PRACTITIONER

## 2021-07-14 PROCEDURE — 99214 OFFICE O/P EST MOD 30 MIN: CPT | Mod: 25 | Performed by: NURSE PRACTITIONER

## 2021-07-14 PROCEDURE — 90471 IMMUNIZATION ADMIN: CPT | Performed by: NURSE PRACTITIONER

## 2021-07-14 PROCEDURE — 82044 UR ALBUMIN SEMIQUANTITATIVE: CPT | Performed by: NURSE PRACTITIONER

## 2021-07-14 PROCEDURE — 36415 COLL VENOUS BLD VENIPUNCTURE: CPT | Performed by: NURSE PRACTITIONER

## 2021-07-14 PROCEDURE — 83036 HEMOGLOBIN GLYCOSYLATED A1C: CPT | Performed by: NURSE PRACTITIONER

## 2021-07-14 PROCEDURE — 93050 ART PRESSURE WAVEFORM ANALYS: CPT | Performed by: NURSE PRACTITIONER

## 2021-07-14 RX ORDER — CLOTRIMAZOLE AND BETAMETHASONE DIPROPIONATE 10; .64 MG/G; MG/G
CREAM TOPICAL 2 TIMES DAILY
Qty: 45 G | Refills: 1 | Status: SHIPPED | OUTPATIENT
Start: 2021-07-14 | End: 2022-11-28

## 2021-07-14 NOTE — PROGRESS NOTES
Problem(s) Oriented visit        SUBJECTIVE:                                                    Emily Zhu is a 61 year old male who presents to clinic today for the following health issues :    Feeling very weak since his stroke last month. Thought he was set up for PCA services but was told they haven't found anyone to help him yet.  Unable to do laundry, dishes, other housework.   Physical therapy set up for August.     Continues to smoke cigarettes but trying to cut down.    CKD - Known issue that I take into account for his medical decisions, no current exacerbations or new concerns.     Has itching rash behind right knee and right hip area. Has not used any OTC medications for this.    Problem list, Medication list, Allergies, and Medical/Social/Surgical histories reviewed in EPIC and updated as appropriate.   Additional history: as documented    ROS:  7 point ROS completed and negative except noted above, including Gen, CV, Resp, GI, MS, Skin, Psych    OBJECTIVE:                                                    /69   Pulse 74   Temp 97.3  F (36.3  C)   Wt 80.4 kg (177 lb 3.2 oz)   SpO2 98%   BMI 29.49 kg/m    Body mass index is 29.49 kg/m .   GENERAL: Chronically ill appearing adult male  RESP: calm regular breathing, no cough  MS: bilateral lower extremity weakness worse in right leg, uses cane for ambulation  SKIN: hyperpigmented raised rash right hip and behind right knee - no excoriation   PSYCH: flat affect, depressed mood     ASSESSMENT/PLAN:                                                      Emily was seen today for neurologic problem and other.    Diagnoses and all orders for this visit:    Essential hypertension  -     DE ART PRESS WAVEFORM ANALYS CENTRAL ART PRESSURE  -     Hemoglobin A1C ([facility])  -     VENOUS COLLECTION  Controlled with current medications. No changes in treatment today    Type 2 diabetes mellitus with diabetic nephropathy, without long-term current use of  insulin (H)  -     Hemoglobin A1C (R[facility])  -     Microalbumin ([facility])  -     Basic Metabolic Panel (8) ([facility])  -     VENOUS COLLECTION  Improved control. Continue current medications. Labs today.     Tobacco use  Stressed importance of tobacco cessation to prevent further vascular and pulmonary complications    Stage 3b chronic kidney disease  -     Basic Metabolic Panel (8) ([facility])  Checking labs    Rash  -     clotrimazole-betamethasone (LOTRISONE) 1-0.05 % external cream; Apply topically 2 times daily  Treating with antifungal/steroid cream x 2 weeks    Need for vaccination  -     TD PRSERV FREE >=7 YRS ADS IM [[id number]]  -     VACCINE ADMINISTRATION, INITIAL        See Patient Instructions  Patient Instructions   Clotrimazole-Betamethasone cream - apply small amount/thin layer to itchy/rash areas 2 times daily for up to 2 weeks    Follow-up with me and Chelo in 2 weeks      CARROL Pretty CNP  [facility]  Family Practice  [facility]  [phone]    For any issues my office # is [phone]

## 2021-07-14 NOTE — LETTER
July 15, 2021      Emily Zhu  [address]  [place] [address]        Dear ,    Kidney function worsened since last checked. Recommend rechecking kidney labs in 2 weeks. Make sure to drink plenty of water.   A1C = 7.5%, which is much better than 3 months ago.   Please call the  to schedule this lab appt.    CARROL Pretty CNP      Resulted Orders   Hemoglobin A1C (RMG)   Result Value Ref Range    Hemoglobin A1C 7.5 (A) 4.0 - 6.0 %   Microalbumin ([facility])   Result Value Ref Range    Albumin mg/L 150 mg/L    Urine Creatinine Mg/Dl 200 mg/dL    A/C Ratio mg/g  mg/g    Interpretation abnormal (A)    Basic Metabolic Panel (8) ([facility])   Result Value Ref Range    Glucose 208 (H) 65 - 99 mg/dL    Urea Nitrogen 28 (H) 8 - 27 mg/dL    Creatinine 1.99 (H) 0.76 - 1.27 mg/dL    eGFR If NonAfricn Am 35 (L) >59 mL/min/1.73    eGFR If Africn Am 40 (L) >59 mL/min/1.73      Comment:      **[facility] currently reports eGFR in compliance with the current**    recommendations of the National Kidney Foundation. [facility] will    update reporting as new guidelines are published from the NKF-ASN    Task force.      BUN/Creatinine Ratio 14 10 - 24    Sodium 137 134 - 144 mmol/L    Potassium 5.0 3.5 - 5.2 mmol/L    Chloride 100 96 - 106 mmol/L    Total CO2 18 (L) 20 - 29 mmol/L    Calcium 9.8 8.6 - 10.2 mg/dL    Narrative    Performed at:  01 - [facility]  [address], [place]  [id number]  : Arsh Caceres MD, Phone:  [phone]       If you have any questions or concerns, please call the clinic at the number listed above.       Sincerely,      CARROL Lopez CNP

## 2021-07-14 NOTE — PATIENT INSTRUCTIONS
Clotrimazole-Betamethasone cream - apply small amount/thin layer to itchy/rash areas 2 times daily for up to 2 weeks    Follow-up with me and Chelo in 2 weeks

## 2021-07-15 LAB
BUN SERPL-MCNC: 28 MG/DL (ref 8–27)
BUN/CREATININE RATIO: 14 (ref 10–24)
CALCIUM SERPL-MCNC: 9.8 MG/DL (ref 8.6–10.2)
CHLORIDE SERPLBLD-SCNC: 100 MMOL/L (ref 96–106)
CREAT SERPL-MCNC: 1.99 MG/DL (ref 0.76–1.27)
EGFR IF AFRICN AM: 40 ML/MIN/1.73
EGFR IF NONAFRICN AM: 35 ML/MIN/1.73
GLUCOSE SERPL-MCNC: 208 MG/DL (ref 65–99)
POTASSIUM SERPL-SCNC: 5 MMOL/L (ref 3.5–5.2)
SODIUM SERPL-SCNC: 137 MMOL/L (ref 134–144)
TOTAL CO2: 18 MMOL/L (ref 20–29)

## 2021-08-06 RX ORDER — AMLODIPINE BESYLATE 5 MG/1
5 TABLET ORAL DAILY
COMMUNITY
Start: 2021-07-29 | End: 2021-08-09

## 2021-08-09 ENCOUNTER — OFFICE VISIT (OUTPATIENT)
Dept: FAMILY MEDICINE | Facility: CLINIC | Age: 61
End: 2021-08-09

## 2021-08-09 ENCOUNTER — OFFICE VISIT (OUTPATIENT)
Dept: PHARMACY | Facility: PHYSICIAN GROUP | Age: 61
End: 2021-08-09
Payer: COMMERCIAL

## 2021-08-09 VITALS
DIASTOLIC BLOOD PRESSURE: 85 MMHG | OXYGEN SATURATION: 97 % | SYSTOLIC BLOOD PRESSURE: 128 MMHG | BODY MASS INDEX: 29.15 KG/M2 | TEMPERATURE: 97.3 F | WEIGHT: 175.2 LBS | HEART RATE: 92 BPM

## 2021-08-09 DIAGNOSIS — I25.10 ATHEROSCLEROSIS OF CORONARY ARTERY OF NATIVE HEART WITHOUT ANGINA PECTORIS, UNSPECIFIED VESSEL OR LESION TYPE: ICD-10-CM

## 2021-08-09 DIAGNOSIS — E11.21 TYPE 2 DIABETES MELLITUS WITH DIABETIC NEPHROPATHY, WITHOUT LONG-TERM CURRENT USE OF INSULIN (H): ICD-10-CM

## 2021-08-09 DIAGNOSIS — F32.0 MILD MAJOR DEPRESSION (H): ICD-10-CM

## 2021-08-09 DIAGNOSIS — Z86.73 HISTORY OF TIA (TRANSIENT ISCHEMIC ATTACK) AND STROKE: ICD-10-CM

## 2021-08-09 DIAGNOSIS — E11.21 TYPE 2 DIABETES MELLITUS WITH DIABETIC NEPHROPATHY, WITHOUT LONG-TERM CURRENT USE OF INSULIN (H): Primary | ICD-10-CM

## 2021-08-09 DIAGNOSIS — I10 ESSENTIAL HYPERTENSION: Primary | ICD-10-CM

## 2021-08-09 DIAGNOSIS — I10 ESSENTIAL HYPERTENSION: ICD-10-CM

## 2021-08-09 DIAGNOSIS — Z72.0 TOBACCO USE: ICD-10-CM

## 2021-08-09 DIAGNOSIS — G47.00 INSOMNIA, UNSPECIFIED TYPE: ICD-10-CM

## 2021-08-09 DIAGNOSIS — F32.A DEPRESSION, UNSPECIFIED DEPRESSION TYPE: ICD-10-CM

## 2021-08-09 DIAGNOSIS — E78.2 MIXED HYPERLIPIDEMIA: ICD-10-CM

## 2021-08-09 PROCEDURE — 99606 MTMS BY PHARM EST 15 MIN: CPT | Performed by: PHARMACIST

## 2021-08-09 PROCEDURE — 93050 ART PRESSURE WAVEFORM ANALYS: CPT | Performed by: NURSE PRACTITIONER

## 2021-08-09 PROCEDURE — 99214 OFFICE O/P EST MOD 30 MIN: CPT | Performed by: NURSE PRACTITIONER

## 2021-08-09 NOTE — PROGRESS NOTES
Medication Therapy Management (MTM) Encounter    ASSESSMENT:                            Medication Adherence/Access: unable to fully confirm the accuracy of his med bottles as he didn't have them with him today and not clear about what he is taking. Would benefit from help at home in setting up medications as concern for safety and accuracy of his regimen.     Type 2 Diabetes: Patient is not meeting A1c goal of < 7%. Patient would benefit from same doses for now, started Victoza more recently. Need to ensure he is truly taking his prescribed regimen as directed at home.    Hypertension/TIA/CAD: impacting overall ability to manage medications and care for himself. He has been in touch previously with care coordination, he reports he had been denied any help, however there do appear to be possible services he may qualify for, but he may need more help following through on paperwork and getting services in place.     Depression/Insomnia:  Need to verify med bottles at home. Uncontrolled depression likely contributing to patient's ability to navigate his recent health changes.     Hyperlipidemia: Stable.     PLAN:                            1. Need to evaluate ongoing use of aspirin plus clopidogrel post TIA for him- neurology follow up/evaluateion recommended.     2. MTM reaching out to care coordination for further assistance for patient regarding his concerns for transportation, meals and medications. PCP is putting in home care referral given decline since recent TIA.     3. Need follow up visit with patient when he has full access to his bottles of medications he is taking- will set up phone visit to start given his frustration with transportation challenges.     Follow-up: Return in 2 weeks (on 8/23/2021) for Phone call with MTM with all pill bottles.    SUBJECTIVE/OBJECTIVE:                          Emily Zhu is a 61 year old male coming in for a follow-up visit. He was referred to me from Tamica  CARROL Tang CNP.  Today's visit is a follow-up MTM visit from 5/3     Reason for visit: seeing PCP today also, recent TIA in [place], home now and struggling with food, meds and transportation.    Allergies/ADRs: Reviewed in chart  Past Medical History: Reviewed in chart  Tobacco: He reports that he has been smoking cigarettes. He has a 40.00 pack-year smoking history. He has never used smokeless tobacco.Tobacco Cessation Action Plan:   Pharmacotherapies : Nicotine patch  Alcohol: not currently using  Social: very limited interactions with others, using public transportation to get to appointment. Using Cane for balance after stroke.     Medication Adherence/Access: He is not sure what his medications are today, he did not have any bottles with him today. Tangential conversation around not having anyone helping him or no one wanting to agree that he needs help at home.     Type 2 Diabetes:  Currently taking glipizide XL 5mg daily and metformin 1000mg twice daily, Victoza 1.2mg daily - what is prescribed, but not sure if taking all these on a regular basis. Patient is not experiencing side effects.  Blood sugar monitorin time(s) daily. Ranges (patient reported): 110-180, but did not bring meter and unclear how often he is really checking.   Symptoms of low blood sugar? none  Symptoms of high blood sugar? none  Diet/Exercise: gave up  foods, eating more cereals, breads. Doesn't eat meat. Is having a hard time with COVID being stuck at home.   Aspirin: 81mg daily and taking plavix once daily.   Statin: Yes: atorvastatin   ACEi/ARB: Yes: lisinopril.   Lab Results   Component Value Date    A1C 7.5 2021    A1C 8.3 2021    A1C 7.6 2020    A1C 8.3 2020    A1C 7.2 2019       Hypertension/TIA/CAD: Current medications include amlodipine 10mg daily, hydrochlorothiazide 25mg daily, lisinopril 10mg daily, isosorbide 30mg daily, metoprolol tartrate 25mg twice daily. He is currently on aspirin  81mg daily plus clopidogrel now. Had been on clopidogrel alone prior to TIA that occurred in [place] on 6/22/2021.   Patient does not self-monitor blood pressure.  Patient reports no current medication side effects.  BP Readings from Last 3 Encounters:   08/09/21 128/85   07/14/21 138/69   06/30/21 111/73       Depression/Insomnia:  Current medications include: sertraline 50mg daily (started in April), taking amitriptyline 10mg at night for sleep. Due to other challenges of his concerns for help at home, did not give more information about his symptoms other than feeling alone and not heard. He wants help in understanding paperwork/mail. Overwhelmed by appointments and lack of transportation to get to appointments (preventing him from scheduling appointments). He has been working on trying to get a PCA, however this has not moved forward. Struggling with completing paperwork and follow through on documentation to get     PHQ-9 SCORE 7/22/2019 8/14/2020 4/5/2021   PHQ-9 Total Score 11 8 8       Hyperlipidemia: Current therapy includes atorvastatin 40mg daily.  Patient reports no significant myalgias or other side effects.    Recent Labs   Lab Test 08/14/20  1105 07/08/19  1630   CHOL 165 233*   HDL 38* 41   LDL 82 Comment   TRIG 224* 408*         Today's Vitals: There were no vitals taken for this visit.  ----------------    I spent 30 minutes with this patient today. All changes were made via collaborative practice agreement with CARROL Pretty CNP. A copy of the visit note was provided to the patient's primary care provider.    The patient declined a summary of these recommendations.     Chelo Roberts, Pharm.D, [facility]  Medication Therapy Management Pharmacist  [phone]       Medication Therapy Recommendations  No medication therapy recommendations to display

## 2021-08-09 NOTE — PROGRESS NOTES
Problem(s) Oriented visit        SUBJECTIVE:                                                    Emily Zhu is a 61 year old male who presents to clinic today for the following health issues :    Very down and depressed about his current health. Frustrated by not being able to get more help at home.    Hypertension - Controlled with current medications as far as I can tell. Patient is a poor historian and is unsure of what medications he is actually taking.  BP Readings from Last 3 Encounters:   08/09/21 128/85   07/14/21 138/69   06/30/21 111/73     Depression - not well controlled. Sertraline 50 mg daily prescribed.     History of TIA/stroke - continues to smoke cigarettes. Also has baby aspirin & Plavix on med list. Again, patient unsure if he is taking both. Has left sided deficits since stroke.    Problem list, Medication list, Allergies, and Medical/Social/Surgical histories reviewed in EPIC and updated as appropriate.   Additional history: as documented    ROS:  4 point ROS completed and negative except noted above, including Gen, CV, Resp, MS, Neuro, Psych    OBJECTIVE:                                                    /85   Pulse 92   Temp 97.3  F (36.3  C)   Wt 79.5 kg (175 lb 3.2 oz)   SpO2 97%   BMI 29.15 kg/m    Body mass index is 29.15 kg/m .   GENERAL: Chronically ill appearing adult male  RESP: calm regular breathing, no cough  MS: bilateral lower extremity weakness worse in right leg, uses cane for ambulation  NEURO: A&Ox3, decreased sensation left side, decreased lower extremity strength  PSYCH: flat affect, depressed mood     ASSESSMENT/PLAN:                                                      Emily was seen today for diabetes and hypertension.    Diagnoses and all orders for this visit:    Essential hypertension  -     [place] ART PRESS WAVEFORM ANALYS CENTRAL ART PRESSURE  -     Cancel: HOME CARE NURSING REFERRAL  -     HOME CARE NURSING REFERRAL  Controlled with current  medications    Type 2 diabetes mellitus with diabetic nephropathy, without long-term current use of insulin (H)  -     Cancel: HOME CARE NURSING REFERRAL  -     HOME CARE NURSING REFERRAL  Due for A1C in 2 months    Mild major depression (H)  -     Cancel: HOME CARE NURSING REFERRAL  -     HOME CARE NURSING REFERRAL  Uncontrolled. May consider increasing Sertraline dose in the future    History of TIA (transient ischemic attack) and stroke  -     Cancel: HOME CARE NURSING REFERRAL  -     HOME CARE NURSING REFERRAL  Residual left sided weakness    Tobacco use  Continues to smoke. Declines further help with quitting at this time    Home care referral placed due to severe patient decline and decreased mobility      See Patient Instructions  Patient Instructions   You will get a call to set up home care evaluation      CARROL Pretty CNP  [facility]  Family Practice  [facility]  [phone]    For any issues my office # is [phone]

## 2021-08-09 NOTE — PROGRESS NOTES
Called [facility] to confirm Home Care referral was received. Intake coord confirms and will start process of referral.   Yelena Montiel RN

## 2021-08-11 ENCOUNTER — PATIENT OUTREACH (OUTPATIENT)
Dept: CARE COORDINATION | Facility: CLINIC | Age: 61
End: 2021-08-11

## 2021-08-11 NOTE — PROGRESS NOTES
"Clinic Care Coordination Contact    Follow Up Progress Note      Assessment: EDDIE Care Coordinator received notice from PCP and MTM that a homecare referral was placed due to decline in patient physical and mental functioning since his stroke. He has not made it to outpatient PT.   EDDIE called [facility] today and the initial nurse evaluation will be done by \"Patrick\" tomorrow. EDDIE put in a request to speak with Patrick after the evaluation and gave input that patient would benefit from a homecare SW to be more directly involved.   EDDIE called patient. He was eating during time of call. He said he has been using medical rides for transportation through [employer]. Lesia is still helping to get him groceries at [employer] and he has groceries at home now. He is discouraged about the [facility] agency not having staff to start working with him. Discussed we can try to find a different agency that possibly has staff. EDDIE informed patient it may be wise to try the process again to get a Pending [facility] to [facility] waiver in place. He has to renew his lease next month and is unsure of what he wants to do because he was cut off from unemployment now. He cannot work. EDDIE offered to help patient complete the social security application over the phone (CC would fill it out online with him) next Tuesday. Patient has been using a 4 wheeled walker. It's difficult for him to complete household tasks right now such as laundry due to weakness. Discussed he can probably get a HHA and PT through homecare as well. Patient has no other questions today.        Goals addressed this encounter:   Goals Addressed                    This Visit's Progress       Patient Stated       Medical (pt-stated)   20%      7-Goal Statement: I will work with homecare to navigate ongoing community resources to help me within 2 months   Date Goal set: 8/11/21  Barriers: wait lists  Strengths: motivated to seek help   Date to Achieve By: 10/11/21  Patient expressed understanding " of goal: yes  Action steps to achieve this goal:  1. I will work with CC and homecare   2. I will see if there is a PCA agency that has staff to help me  3. I will try to navigate county waiver process again                Plan:   Created a goal around getting supports in place to help patient. Homecare will be doing their initial evaluation tomorrow. Put in a request to speak with the nurse that goes out to see patient afterwards. SW CC will be off Friday-Monday.     AMANDA Fuentes, [facility]  Clinic Care Coordinator  [email or web address]  [phone]

## 2021-08-12 ENCOUNTER — PATIENT OUTREACH (OUTPATIENT)
Dept: CARE COORDINATION | Facility: CLINIC | Age: 61
End: 2021-08-12

## 2021-08-12 PROCEDURE — G0180 MD CERTIFICATION HHA PATIENT: HCPCS | Performed by: NURSE PRACTITIONER

## 2021-08-12 NOTE — PROGRESS NOTES
"Clinic Care Coordination Contact    Follow Up Progress Note      Assessment: EDDIE Care Coordinator received a call from Patrick, the nurse doing pt's homecare evaluation. He will be getting PT/OT/Nursing. His insurance will not cover to get a homecare SW involved.    EDDIE decided to call a few other PCA agencies to see if they have staffing to start getting PCA involved-  [facility]- nobody available  [facility]- no staff.   [facility]- on wait list. Still looking for staff for him. [place], in charge of looking for staff.   [facility]- only accept private pay clients  [facility]-no staff  *EDDIE was told come September with changes in unemployment they may see more PCA staff return to work and may have more availability.    EDDIE consulted with EDDIE colleagues to see if there is any agencies that help clients fill out social security paperwork or [facility] paperwork- was informed about Kari Owen or \"Will\" at [facility]. EDDIE left a VM for Will at [phone] and emailed Kari (no client information was shared just put in a general inquiry about services).     Patient called care coordinator- he was pleased with how his nursing visit went today. His apartment isn't as clean as he would like it as he hasn't been able to do laundry/household tasks as much right now due to weakness. He will have his next visit on Monday and start PT/OT.   EDDIE explained to patient about the staffing shortage for PCA right now.       Care Gaps:  Care Gap Goal set: No    Goals addressed this encounter:   Goals Addressed                      This Visit's Progress       Patient Stated      Medical (pt-stated)         7-Goal Statement: I will work with homecare to navigate ongoing community resources to help me within 2 months   Date Goal set: 8/11/21  Barriers: wait lists  Strengths: motivated to seek help   Date to Achieve By: 10/11/21  Patient expressed understanding of goal: yes  Action steps to achieve this goal:  1. I " will work with CC and homecare   2. I will see if there is a PCA agency that has staff to help me  3. I will try to navigate [facility] process again    As of today's date 8/12/2021 goal is met at 26 - 50%.   Goal Status:  Showing progress. Homecare now involved for PT/OT/Nursing. Insurance doesn't cover for homecare SW to get involved. SW trying to find PCA staffing and will [facility] to [facility] stuff soon.                    Plan:   -EDDIE called 5 PCA agencies; all have no staff currently to work with patient. This may change in September in regards to unemployment benefits changing  -Pt started Homecare today and will get PT/OT/Nursing  -EDDIE is trying to see if there is an agency that can help patient fill out social security paperwork and [facility] paperwork if we start the process again-- left VM's for [facility] agency as a recommendation from coworker  -EDDIE may look at placing another referral to [facility] for [facility].  -EDDIE will reconnect with patient next week    Christine Rutledge, MSW, [facility]  Clinic Care Coordinator  [email or web address]  [phone]

## 2021-08-16 ENCOUNTER — MEDICAL CORRESPONDENCE (OUTPATIENT)
Dept: FAMILY MEDICINE | Facility: CLINIC | Age: 61
End: 2021-08-16

## 2021-08-17 ENCOUNTER — MEDICAL CORRESPONDENCE (OUTPATIENT)
Dept: FAMILY MEDICINE | Facility: CLINIC | Age: 61
End: 2021-08-17

## 2021-08-19 ENCOUNTER — PATIENT OUTREACH (OUTPATIENT)
Dept: CARE COORDINATION | Facility: CLINIC | Age: 61
End: 2021-08-19

## 2021-08-19 NOTE — PROGRESS NOTES
Clinic Care Coordination Contact  Care Team Conversations    EDDIE CC received a call from Miesha Redmond (OT) through [facility]. She is going to assist patient In getting an order for a shower chair. She is also going to see if she can help patient get a card to help with his laundry since he can't carry it. She will also see about getting a HHA ordered so patient can have help with showering.   His insurance does not cover homecare SW services.    Discussed pt is on the wait list for PCA services- no staffing is the concern. EDDIE checked with various other agencies as well and no staff currently.     EDDIE may discuss with patient referral for [employer] to help with the social security disability application. EDDIE CC not allowed to physically help fill out the application with patient.     EDDIE will continue to follow and outreach to patient tomorrow or early next week to work on these matters. May also see about making another referral to mnchoices through the [facility].    Christine Rutledge, MSW, [facility]  Clinic Care Coordinator  [email or web address]  [phone]

## 2021-08-20 ENCOUNTER — MEDICAL CORRESPONDENCE (OUTPATIENT)
Dept: FAMILY MEDICINE | Facility: CLINIC | Age: 61
End: 2021-08-20

## 2021-08-25 ENCOUNTER — MEDICAL CORRESPONDENCE (OUTPATIENT)
Dept: FAMILY MEDICINE | Facility: CLINIC | Age: 61
End: 2021-08-25

## 2021-08-26 DIAGNOSIS — I25.10 ATHEROSCLEROSIS OF CORONARY ARTERY OF NATIVE HEART WITHOUT ANGINA PECTORIS, UNSPECIFIED VESSEL OR LESION TYPE: ICD-10-CM

## 2021-08-26 DIAGNOSIS — E11.40 PAINFUL DIABETIC NEUROPATHY (H): ICD-10-CM

## 2021-08-26 DIAGNOSIS — E11.21 TYPE 2 DIABETES MELLITUS WITH DIABETIC NEPHROPATHY, WITHOUT LONG-TERM CURRENT USE OF INSULIN (H): ICD-10-CM

## 2021-08-26 DIAGNOSIS — E78.2 MIXED HYPERLIPIDEMIA: ICD-10-CM

## 2021-08-26 DIAGNOSIS — I10 ESSENTIAL HYPERTENSION: ICD-10-CM

## 2021-08-26 RX ORDER — ATORVASTATIN CALCIUM 40 MG/1
40 TABLET, FILM COATED ORAL DAILY
Qty: 30 TABLET | Refills: 0 | Status: SHIPPED | OUTPATIENT
Start: 2021-08-26 | End: 2021-09-30

## 2021-08-26 RX ORDER — METOPROLOL TARTRATE 25 MG/1
25 TABLET, FILM COATED ORAL 2 TIMES DAILY
Qty: 60 TABLET | Refills: 2 | Status: SHIPPED | OUTPATIENT
Start: 2021-08-26 | End: 2021-11-30

## 2021-08-26 RX ORDER — METFORMIN HCL 500 MG
TABLET, EXTENDED RELEASE 24 HR ORAL
Qty: 120 TABLET | Refills: 2 | Status: SHIPPED | OUTPATIENT
Start: 2021-08-26 | End: 2021-11-30

## 2021-08-26 RX ORDER — AMLODIPINE BESYLATE 10 MG/1
10 TABLET ORAL DAILY
Qty: 30 TABLET | Refills: 2 | Status: SHIPPED | OUTPATIENT
Start: 2021-08-26 | End: 2021-12-29

## 2021-08-26 RX ORDER — AMITRIPTYLINE HYDROCHLORIDE 10 MG/1
10 TABLET ORAL AT BEDTIME
Qty: 30 TABLET | Refills: 2 | Status: SHIPPED | OUTPATIENT
Start: 2021-08-26 | End: 2021-10-11

## 2021-08-26 RX ORDER — HYDROCHLOROTHIAZIDE 25 MG/1
25 TABLET ORAL DAILY
Qty: 30 TABLET | Refills: 2 | Status: SHIPPED | OUTPATIENT
Start: 2021-08-26 | End: 2021-11-30

## 2021-08-26 RX ORDER — CLOPIDOGREL BISULFATE 75 MG/1
75 TABLET ORAL DAILY
Qty: 30 TABLET | Refills: 2 | Status: SHIPPED | OUTPATIENT
Start: 2021-08-26 | End: 2021-11-30

## 2021-08-26 NOTE — TELEPHONE ENCOUNTER
atorvastatin 40 mg  amitriptyline 10 mg\  Amlodipine 5 mg  Clopidogrel 75 mg  hydrochlorothiazide 25 mg  Metoprolol tartrate 25 mg  Metformin  mg    LOV 8/9/21  Unable to ask PHQ9 questions- patient was in P T       Last lipids- 8/14/2020- due- patient informed  Recent Labs   Lab Test 08/14/20  1105 07/08/19  1630   CHOL 165 233*   HDL 38* 41   LDL 82 Comment   TRIG 224* 408*     Lab Results   Component Value Date    A1C 7.5 07/14/2021    A1C 8.3 04/05/2021    A1C 7.6 12/18/2020    A1C 8.3 08/14/2020    A1C 7.2 11/27/2019     BP Readings from Last 3 Encounters:   08/09/21 128/85   07/14/21 138/69   06/30/21 111/73     Last Comprehensive Metabolic Panel:  Sodium   Date Value Ref Range Status   07/14/2021 137 134 - 144 mmol/L Final     Potassium   Date Value Ref Range Status   07/14/2021 5.0 3.5 - 5.2 mmol/L Final     Chloride   Date Value Ref Range Status   07/14/2021 100 96 - 106 mmol/L Final     Carbon Dioxide   Date Value Ref Range Status   01/09/2020 23 20 - 32 mmol/L Final     Anion Gap   Date Value Ref Range Status   01/09/2020 6 3 - 14 mmol/L Final     Glucose   Date Value Ref Range Status   07/14/2021 208 (H) 65 - 99 mg/dL Final     Urea Nitrogen   Date Value Ref Range Status   07/14/2021 28 (H) 8 - 27 mg/dL Final     BUN/Creatinine Ratio   Date Value Ref Range Status   07/14/2021 14 10 - 24 Final     Creatinine   Date Value Ref Range Status   07/14/2021 1.99 (H) 0.76 - 1.27 mg/dL Final     GFR Estimate   Date Value Ref Range Status   01/09/2020 46 (L) >60 mL/min/[1.73_m2] Final     Comment:     Non  GFR Calc  Starting 12/18/2018, serum creatinine based estimated GFR (eGFR) will be   calculated using the Chronic Kidney Disease Epidemiology Collaboration   (CKD-EPI) equation.       Calcium   Date Value Ref Range Status   07/14/2021 9.8 8.6 - 10.2 mg/dL Final

## 2021-08-27 ENCOUNTER — PATIENT OUTREACH (OUTPATIENT)
Dept: CARE COORDINATION | Facility: CLINIC | Age: 61
End: 2021-08-27

## 2021-08-27 DIAGNOSIS — I10 ESSENTIAL HYPERTENSION: ICD-10-CM

## 2021-08-27 DIAGNOSIS — I25.10 ATHEROSCLEROSIS OF CORONARY ARTERY OF NATIVE HEART WITHOUT ANGINA PECTORIS, UNSPECIFIED VESSEL OR LESION TYPE: ICD-10-CM

## 2021-08-27 RX ORDER — ISOSORBIDE MONONITRATE 30 MG/1
30 TABLET, EXTENDED RELEASE ORAL DAILY
Qty: 30 TABLET | Refills: 2 | Status: SHIPPED | OUTPATIENT
Start: 2021-08-27 | End: 2021-11-30

## 2021-08-27 NOTE — TELEPHONE ENCOUNTER
Isosorbide  LOV 8/9/21  BP Readings from Last 3 Encounters:   08/09/21 128/85   07/14/21 138/69   06/30/21 111/73     Last Comprehensive Metabolic Panel:  Sodium   Date Value Ref Range Status   07/14/2021 137 134 - 144 mmol/L Final     Potassium   Date Value Ref Range Status   07/14/2021 5.0 3.5 - 5.2 mmol/L Final     Chloride   Date Value Ref Range Status   07/14/2021 100 96 - 106 mmol/L Final     Carbon Dioxide   Date Value Ref Range Status   01/09/2020 23 20 - 32 mmol/L Final     Anion Gap   Date Value Ref Range Status   01/09/2020 6 3 - 14 mmol/L Final     Glucose   Date Value Ref Range Status   07/14/2021 208 (H) 65 - 99 mg/dL Final     Urea Nitrogen   Date Value Ref Range Status   07/14/2021 28 (H) 8 - 27 mg/dL Final     BUN/Creatinine Ratio   Date Value Ref Range Status   07/14/2021 14 10 - 24 Final     Creatinine   Date Value Ref Range Status   07/14/2021 1.99 (H) 0.76 - 1.27 mg/dL Final     GFR Estimate   Date Value Ref Range Status   01/09/2020 46 (L) >60 mL/min/[1.73_m2] Final     Comment:     Non  GFR Calc  Starting 12/18/2018, serum creatinine based estimated GFR (eGFR) will be   calculated using the Chronic Kidney Disease Epidemiology Collaboration   (CKD-EPI) equation.       Calcium   Date Value Ref Range Status   07/14/2021 9.8 8.6 - 10.2 mg/dL Final

## 2021-08-27 NOTE — PROGRESS NOTES
Clinic Care Coordination Contact    Follow Up Progress Note      Assessment: SW received a call from Mone Thomson PT at [facility]. She is trying to get patient a 4WW. Returned call and left a [phone].     SW called patient. Made a referral online with his permission for [employer] to hopefully be able to help him fill out social security disability application with him and help him through the process.   SW also called [facility] with patient and made a referral for adult access worker- they can work with him to 3 months. If he is still in need for MH case management they will refer him on.   They are doing all appointments over the phone during covid. They can exchange documents via mail if needed.    Care Gaps: As noted below- did not discuss scheduling for that today    Health Maintenance Due   Topic Date Due     ZOSTER IMMUNIZATION (1 of 2) Never done     PREVENTIVE CARE VISIT  04/10/2014     COLORECTAL CANCER SCREENING  06/03/2021     LIPID  08/14/2021     DIABETIC FOOT EXAM  08/14/2021     INFLUENZA VACCINE (1) 09/01/2021       Goals addressed this encounter:   Goals Addressed                    This Visit's Progress       Patient Stated       Medical (pt-stated)   60%      7-Goal Statement: I will work with homecare to navigate ongoing community resources to help me within 2 months   Date Goal set: 8/11/21  Barriers: wait lists  Strengths: motivated to seek help   Date to Achieve By: 10/11/21  Patient expressed understanding of goal: yes  Action steps to achieve this goal:  1. I will work with CC and homecare   2. I will see if there is a PCA agency that has staff to help me  3. I will try to navigate [facility] process again    As of today's date 8/12/2021 goal is met at 26 - 50%.   Goal Status:  Showing progress. Homecare now involved for PT/OT/Nursing. Insurance doesn't cover for homecare SW to get involved. SW trying to find PCA staffing and will try to navigate  social security and [facility] waiver stuff soon.    As of today's date 8/27/2021 goal is met at 51 - 75%.   Goal Status:  Active. Referral made to disability specialists to help with social security application. Also, called [facility] to request adult mental health case management to help with ILS and paperwork and wanting to move etc.                 Plan:   -Referral made for an access worker through [facility]- they are screening the referral and getting him connected. Can take 1-2 weeks to get assigned.  *contact is [facility] [phone].     -Referral made for disability specialists to help with social security application. Referral made online with pt's permission    -May need to apply for covid rent help for October 1st, 2021.     -Continue working with homecare    -SW will closely follow and help. Will reach out within 1-2 weeks to continue making progress on goals.     Christine Rutledge MSW, [facility]  Clinic Care Coordinator  [email or web address]  [phone]

## 2021-08-31 ENCOUNTER — PATIENT OUTREACH (OUTPATIENT)
Dept: CARE COORDINATION | Facility: CLINIC | Age: 61
End: 2021-08-31

## 2021-08-31 ENCOUNTER — DOCUMENTATION ONLY (OUTPATIENT)
Dept: PHARMACY | Facility: PHYSICIAN GROUP | Age: 61
End: 2021-08-31

## 2021-08-31 DIAGNOSIS — I69.30 HISTORY OF STROKE WITH RESIDUAL DEFICIT: Primary | ICD-10-CM

## 2021-08-31 DIAGNOSIS — R52 PAIN: Primary | ICD-10-CM

## 2021-08-31 DIAGNOSIS — R26.81 UNSTEADY GAIT: ICD-10-CM

## 2021-08-31 DIAGNOSIS — E11.40 PAINFUL DIABETIC NEUROPATHY (H): ICD-10-CM

## 2021-08-31 RX ORDER — SENNOSIDES 8.6 MG
650 CAPSULE ORAL EVERY 8 HOURS PRN
COMMUNITY
Start: 2021-08-31 | End: 2022-11-28

## 2021-08-31 NOTE — PROGRESS NOTES
Communication with REGI Lucero ([phone]) from patient's home care team regarding home med list that is being set up for him.     Added APAP three times daily to his list, others were accurate. Patient reported only using his Victoza shot about 5 days a week instead of every day. Not checking sugars unless his nurse is present.     Considerations:   -To minimize side effects and duplication of antidepressant therapy, would consider taper of amitriptyline by taking 1/2 tab (5mg) for 2 weeks then stop while doubling his sertraline to 100mg once daily.     -Home BP is much better when RN is checking- so don't think we need to adjust this right now. Would continue BP medication as is.   124/72, 131/85, 116/64, 110/64, 128/78, 124/74     -Due to varied compliance with daily Victoza use, may be good to switch to weekly administration GLP1 agonist. Due to formulary, Bydureon Bcise would be the only option to start.  This way he would get the full dose every week when the nurse is out vs having to remember to do his shot every day.     - Since not monitoring sugars consistently and concern for higher readings lately (260 reported by home care), would like to see if able to get Reanna CGM covered for him.       Sending considerations to CARROL Pretty CNP for review and determine plan for implementation if before or waiting until next office visit for changes.     Chelo Roberts, Pharm.D, [facility]  Medication Therapy Management Pharmacist  [phone]

## 2021-08-31 NOTE — PROGRESS NOTES
Clinic Care Coordination Contact    Follow Up Progress Note      Assessment: EDDIE Care Coordinator received a call from Mone Thomson, the PT working with patient through homecare. She is working with patient and thinks it will be a few more weeks before they discharge. Patient is needing help with laundry and household tasks. She wants to try to get patient a 4WW with a seat so that he can carry his laundry on that, and also to help with pain so he can sit for a while if he has walked longer distances. 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 He is more interested in pain management. She is working on strengthening exercises with him but it seems that they increase the pain.     EDDIE put a note into Tamica Tang (PCP) to get a prescription for 4WW and fax to medical supply company.   Care Gaps:    Health Maintenance Due   Topic Date Due     ZOSTER IMMUNIZATION (1 of 2) Never done     PREVENTIVE CARE VISIT  04/10/2014     COLORECTAL CANCER SCREENING  06/03/2021     LIPID  08/14/2021     DIABETIC FOOT EXAM  08/14/2021     INFLUENZA VACCINE (1) 09/01/2021       Care Gap Goal set: No    Goals addressed this encounter:   Goals Addressed    None           Plan:   Homecare continuing to work with pt. PT requesting for pt to get a 4WW with a seat. EDDIE requested PCP put in an order. Will follow and reach out as needed within 1-2 weeks.     Chritsine Rutledge MSW, [facility]  Clinic Care Coordinator  [email or web address]  [phone]

## 2021-09-07 ENCOUNTER — PATIENT OUTREACH (OUTPATIENT)
Dept: CARE COORDINATION | Facility: CLINIC | Age: 61
End: 2021-09-07

## 2021-09-07 NOTE — PROGRESS NOTES
Clinic Care Coordination Contact    Follow Up Progress Note      Assessment: Patient called care coordinator and just wanted CC to be aware that [employer] called him and he has another call at 2pm today to do an intake and go over more. He is hoping they will help him apply for social security disability.    SW called with pt over to [facility] and they expect more staff to apply since unemployment is ending now. They still don't have a staff for him    Pt continues to work with homecare    Care Gaps: did not go over today    Health Maintenance Due   Topic Date Due     ZOSTER IMMUNIZATION (1 of 2) Never done     PREVENTIVE CARE VISIT  04/10/2014     COLORECTAL CANCER SCREENING  06/03/2021     LIPID  08/14/2021     DIABETIC FOOT EXAM  08/14/2021     INFLUENZA VACCINE (1) 09/01/2021     PHQ-9  10/05/2021       Goals addressed this encounter:   Goals Addressed                    This Visit's Progress       Patient Stated       Medical (pt-stated)   70%      7-Goal Statement: I will work with homecare to navigate ongoing community resources to help me within 2 months   Date Goal set: 8/11/21  Barriers: wait lists  Strengths: motivated to seek help   Date to Achieve By: 10/11/21  Patient expressed understanding of goal: yes  Action steps to achieve this goal:  1. I will work with CC and homecare   2. I will see if there is a PCA agency that has staff to help me  3. [facility]    As of today's date 8/12/2021 goal is met at 26 - 50%.   Goal Status:  Showing progress. Homecare now involved for PT/OT/Nursing. Insurance doesn't cover for homecare SW to get involved. SW trying to find PCA staffing and will try to navigate social security and [facility] stuff soon.    As of today's date 8/27/2021 goal is met at 51 - 75%.   Goal Status:  Active. Referral made to disability specialists to help with social security application. Also, called [facility] to request adult mental  health case management to help with ILS and paperwork and wanting to move etc.   As of today's date 9/7/2021 goal is met at 51 - 75%.   Goal Status:  Active                   Plan:   EDDIE CC will keep in close communication with patient and reach out within 1-2 weeks    Christine Rutledge, MSW, [facility]  Clinic Care Coordinator  [email or web address]  [phone]

## 2021-09-10 ENCOUNTER — PATIENT OUTREACH (OUTPATIENT)
Dept: CARE COORDINATION | Facility: CLINIC | Age: 61
End: 2021-09-10

## 2021-09-10 NOTE — PROGRESS NOTES
"Clinic Care Coordination Contact    Follow Up Progress Note      Assessment: SW received a call from patient. He said \"Tim\" from [employer] needs to reschedule an interview with patient to see if he is a good candidate to apply for social security disability. He requested CC to help call there- called with patient and he is scheduled for 9:30am on 9/23/21. If he is a candidate they will request patient to sign some paperwork and mail back and then they will help apply. It can take 4-6 months to get approval/denial.    Pt says he will not be able to pay his rent October 1st. He is no longer getting unemployment. He may need to apply for rent help or emergency assistance and his lease signing is also due then. He thinks he will stay there till he can find somewhere else to live. His rent is $845. He is still waiting to hear from [facility] about getting assigned an access worker but will give it until Monday until he calls them to ask about the status of that as it's been 2 weeks since referral.     Patient says he will get his 4WW with a seat today! His PT Mone is coming today to help him learn to use it.         Health Maintenance Due   Topic Date Due     ZOSTER IMMUNIZATION (1 of 2) Never done     PREVENTIVE CARE VISIT  04/10/2014     COLORECTAL CANCER SCREENING  06/03/2021     LIPID  08/14/2021     DIABETIC FOOT EXAM  08/14/2021     INFLUENZA VACCINE (1) 09/01/2021     PHQ-9  10/05/2021       Plan:  -SW will continue to closely follow and help over next 1-2 weeks and ongoing.     Christine Rutledge, MSW, [facility]  Clinic Care Coordinator  [email or web address]  [phone]     "

## 2021-09-14 ENCOUNTER — TELEPHONE (OUTPATIENT)
Dept: FAMILY MEDICINE | Facility: CLINIC | Age: 61
End: 2021-09-14

## 2021-09-14 ENCOUNTER — VIRTUAL VISIT (OUTPATIENT)
Dept: PHARMACY | Facility: PHYSICIAN GROUP | Age: 61
End: 2021-09-14
Payer: COMMERCIAL

## 2021-09-14 DIAGNOSIS — F32.A DEPRESSION, UNSPECIFIED DEPRESSION TYPE: ICD-10-CM

## 2021-09-14 DIAGNOSIS — Z86.73 HISTORY OF TIA (TRANSIENT ISCHEMIC ATTACK) AND STROKE: Primary | ICD-10-CM

## 2021-09-14 DIAGNOSIS — G45.9 TIA (TRANSIENT ISCHEMIC ATTACK): ICD-10-CM

## 2021-09-14 DIAGNOSIS — I69.30 HISTORY OF STROKE WITH RESIDUAL DEFICIT: ICD-10-CM

## 2021-09-14 DIAGNOSIS — I25.10 ATHEROSCLEROSIS OF CORONARY ARTERY OF NATIVE HEART WITHOUT ANGINA PECTORIS, UNSPECIFIED VESSEL OR LESION TYPE: ICD-10-CM

## 2021-09-14 DIAGNOSIS — E11.21 TYPE 2 DIABETES MELLITUS WITH DIABETIC NEPHROPATHY, WITHOUT LONG-TERM CURRENT USE OF INSULIN (H): Primary | ICD-10-CM

## 2021-09-14 DIAGNOSIS — G47.00 INSOMNIA, UNSPECIFIED TYPE: ICD-10-CM

## 2021-09-14 DIAGNOSIS — R53.1 LEFT-SIDED WEAKNESS: ICD-10-CM

## 2021-09-14 DIAGNOSIS — I10 ESSENTIAL HYPERTENSION: ICD-10-CM

## 2021-09-14 PROCEDURE — 99606 MTMS BY PHARM EST 15 MIN: CPT | Performed by: PHARMACIST

## 2021-09-14 PROCEDURE — 99607 MTMS BY PHARM ADDL 15 MIN: CPT | Performed by: PHARMACIST

## 2021-09-14 RX ORDER — EXENATIDE 2 MG/.85ML
2 INJECTION, SUSPENSION, EXTENDED RELEASE SUBCUTANEOUS
Qty: 3.4 ML | Refills: 0 | Status: SHIPPED | OUTPATIENT
Start: 2021-09-14 | End: 2021-10-07

## 2021-09-14 RX ORDER — SERTRALINE HYDROCHLORIDE 100 MG/1
100 TABLET, FILM COATED ORAL DAILY
Qty: 90 TABLET | Refills: 0 | Status: SHIPPED | OUTPATIENT
Start: 2021-09-14 | End: 2022-02-11

## 2021-09-14 NOTE — TELEPHONE ENCOUNTER
"----- Message from PATRICIA Sorensen sent at 9/13/2021  4:15 PM CDT -----  Regarding: Reacher request      Hello,    I received another request from Clint's homecare team that he get a reacher.. 26 inch reacher would be ok based on his height. \"Miesha\" who left me the  said its Page 36  26 inch reacher in the [facility] Medical catalogue if you go with [facility] [employer]. Otherwise any Medical Supply company should have one.     Can you submit this?    If questions- Miesha [phone]    Thanks so much!    -Christine ( Care Coordinator)    "

## 2021-09-14 NOTE — PROGRESS NOTES
Medication Therapy Management (MTM) Encounter    ASSESSMENT:                            Medication Adherence/Access: See below for considerations    Type 2 Diabetes: Patient is not meeting A1c goal of < 7%. Patient would benefit from consistent use with GLP1 agonist, based on insurance formulary Bydureon Bcise is the only weekly GLP1 available to him. This can be done weekly when the home care nurse visits to ensure he is able to administer.     Hypertension/TIA/CAD: Stable.     Depression/Insomnia: due to uncontrolled symptoms recommend increase in sertraline to 100mg daily and tapering off amitriptyline by cutting in half for 2 weeks then stopping.     PLAN:                            1. Stop Victoza, start weekly bydureon Bcise.     2. Decrease amitriptyline to 1/2 tab (5mg) for 2 weeks then stop and increase sertraline to 100mg daily.     3. Set up 1 month office visit with MTM and CARROL Pretty CNP    Follow-up: Return in about 4 weeks (around 10/12/2021) for MTM visit in clinic, Primary Care Provider.    SUBJECTIVE/OBJECTIVE:                          Emily Zhu is a 61 year old male called for a follow-up visit. He was referred to me from CARROL Pretty CNP.  Today's visit is a follow-up MTM visit from 8/31     Reason for visit: Med follow up.    Allergies/ADRs: Reviewed in chart  Past Medical History: Reviewed in chart  Tobacco: He reports that he has been smoking cigarettes. He has a 40.00 pack-year smoking history. He has never used smokeless tobacco.Tobacco Cessation Action Plan:   Information offered: Patient not interested at this time  Alcohol: not currently using      Medication Adherence/Access: getting help from home care now, setting up meds 1x per week. stopped his Victoza on his own as he was not feeling well, had been taking ~5 days a week prior when we discussed before.       Type 2 Diabetes:  Currently taking glipizide XL 5mg daily and metformin 1000mg twice daily, Victoza  1.2mg daily - what is prescribed, but has now stopped taking it, not feeling like taking it.   Patient is not experiencing side effects.  Blood sugar monitorin time(s) daily. Ranges (patient reported): no numbers today, but home care did report numbers have been under 200.   He is eating a lot of processed junk food in the home.    Symptoms of low blood sugar? none  Symptoms of high blood sugar? none  Aspirin: 81mg daily and taking plavix once daily.   Statin: Yes: atorvastatin   ACEi/ARB: Yes: lisinopril.   Lab Results   Component Value Date    A1C 7.5 2021    A1C 8.3 2021    A1C 7.6 2020    A1C 8.3 2020    A1C 7.2 2019       Hypertension/TIA/CAD: Current medications include amlodipine 10mg daily, hydrochlorothiazide 25mg daily, lisinopril 10mg daily, isosorbide 30mg daily, metoprolol tartrate 25mg twice daily. He is currently on aspirin 81mg daily plus clopidogrel now. Had been on clopidogrel alone prior to TIA that occurred in [place] on 2021.   Patient does not self-monitor blood pressure.  Patient reports no current medication side effects.  Home care nurse blood pressure logs- 124/72, 131/85, 116/64, 110/64, 128/78, 124/74   BP Readings from Last 3 Encounters:   21 128/85   21 138/69   21 111/73       Depression/Insomnia:  Current medications include: sertraline 50mg daily (started in April), taking amitriptyline 10mg at night for sleep. Patient reports feeling alone and not heard. Overwhelmed by appointments and lack of transportation to get to appointments (preventing him from scheduling appointments). He has been working on trying to get a PCA, however this has not moved forward. Struggling with completing paperwork and follow through on documentation to get services. Is connected to clinic SW care coordinator.     PHQ-9 SCORE 2019   PHQ-9 Total Score 11 8 8         Today's Vitals: There were no vitals taken for this  visit.  ----------------      I spent 20 minutes with this patient today. All changes were made via collaborative practice agreement with CARROL Pretty CNP. A copy of the visit note was provided to the patient's primary care provider.    The patient declined a summary of these recommendations. Discussed changes with assigned home care nurse Roma.     Chelo Roberts, Pharm.D, [facility]  Medication Therapy Management Pharmacist  [phone]      Telemedicine Visit Details  Type of service:  Telephone visit  Start Time: 10:38 AM  End Time: 10:58 AM  Originating Location (patient location): [place]  Distant Location (provider location):  [facility]     Medication Therapy Recommendations  Depression, unspecified depression type    Current Medication: sertraline (ZOLOFT) 100 MG tablet   Rationale: Dose too low - Dosage too low - Effectiveness   Recommendation: Increase Dose - increase to 100mg daily   Status: Accepted per CPA         Insomnia, unspecified type    Current Medication: amitriptyline (ELAVIL) 10 MG tablet   Rationale: No medical indication at this time - Unnecessary medication therapy - Indication   Recommendation: Discontinue Medication - take 1/2 tablet daily for 2 weeks then stop   Status: Accepted per CPA         Type 2 diabetes mellitus with diabetic nephropathy, without long-term current use of insulin (H)    Current Medication: exenatide ER (BYDUREON BCISE) 2 MG/0.85ML auto-injector   Rationale: Synergistic therapy - Needs additional medication therapy - Indication   Recommendation: Start Medication - stop victoza (already did on his own). start bydureon weekly   Status: Accepted per CPA

## 2021-09-16 ENCOUNTER — MEDICAL CORRESPONDENCE (OUTPATIENT)
Dept: FAMILY MEDICINE | Facility: CLINIC | Age: 61
End: 2021-09-16

## 2021-09-17 NOTE — PATIENT INSTRUCTIONS
Recommendations from today's MTM visit:                                                         1. Stop Victoza, start weekly bydureon Bcise.     2. Decrease amitriptyline to 1/2 tab (5mg) for 2 weeks then stop and increase sertraline to 100mg daily.     3. Set up 1 month office visit with MTM and CARROL Pretty CNP    Follow-up: Return in about 4 weeks (around 10/12/2021) for MTM visit in clinic, Primary Care Provider.    It was great to speak with you today.  I value your experience and would be very thankful for your time with providing feedback on our clinic survey. You may receive a survey via email or text message in the next few days.     To schedule another MTM appointment, please call the clinic directly or you may call the MTM scheduling line at [phone] or toll-free at [phone].     My Clinical Pharmacist's contact information:                                                      Please feel free to contact me with any questions or concerns you have.      Chelo Roberts, Pharm.D, [facility]  Medication Therapy Management Pharmacist  [phone]

## 2021-09-22 ENCOUNTER — PATIENT OUTREACH (OUTPATIENT)
Dept: CARE COORDINATION | Facility: CLINIC | Age: 61
End: 2021-09-22

## 2021-09-22 NOTE — PROGRESS NOTES
"Clinic Care Coordination Contact    Follow Up Progress Note      EDDIE Care Coordinator heard back from Zora at [facility] that pt was assigned an access worker- Lazaro Galindo ([phone]) who made a referral for ARHMS services at [facility]. Pt has an assessment set up for services with them on the 29th. EDDIE was told \"Capo CHARLES\" is the contact there to discuss the referral [phone].    EDDIE reached out to Capo, he is a supervisor of the ARHMS program. He took down some updates on patient and will pass along to the  and request that they keep CC updated. ARHMS would be able to set up goals and work on independent living skills etc.    Pt will need to apply [place] until he can navigate social security disability application with disability specialists. Capo stated that [facility] did open up their section 8 wait list recently so pt could explore that if willing.     EDDIE received a call from Roma (homecare RN- [place]) [phone]. She said homecare plans to discharge October 10th and she is worried about pt's medication management. He knows his medications and what they are for but he has a hard time with packing them for the week. She feels its related to his depression and lack of motivation right now. She is hoping to see if [facility] can do bubble packs/prepackaged and delivery to pt's home versus him having to go to [facility] to get his prescription/s.     Pt continues to be on the wait list for PCA services (no staff/staff shortage). EDDIE is hoping [facility] can help facilitate the process again to screen for a county waiver.    EDDIE will plan to connect with pt this week to see about helping apply for rent help since pt's unemployment ended and he does not have a source of income right now.      Health Maintenance Due   Topic Date Due     ZOSTER IMMUNIZATION (1 of 2) Never done     PREVENTIVE CARE VISIT  04/10/2014     COLORECTAL CANCER SCREENING  " 06/03/2021     LIPID  08/14/2021     DIABETIC FOOT EXAM  08/14/2021     INFLUENZA VACCINE (1) 09/01/2021     PHQ-9  10/05/2021       Patient accepted scheduling phone number for [facility]  to schedule independently         Plan:   -Homecare ending October 10th  -Pt is being assessed for ARHMS services through [facility] on the 29th, they will help with independent living skills/goals  -Pt on the wait list for PCA services still through [facility]  -SW will reach out this week to pt to go over progress and help with rent help if needed    Christine Rutledge, MSW, [facility]  Clinic Care Coordinator  [email or web address]  [phone]

## 2021-09-24 ENCOUNTER — MEDICAL CORRESPONDENCE (OUTPATIENT)
Dept: FAMILY MEDICINE | Facility: CLINIC | Age: 61
End: 2021-09-24

## 2021-09-29 ENCOUNTER — OFFICE VISIT (OUTPATIENT)
Dept: FAMILY MEDICINE | Facility: CLINIC | Age: 61
End: 2021-09-29

## 2021-09-29 VITALS
RESPIRATION RATE: 18 BRPM | SYSTOLIC BLOOD PRESSURE: 109 MMHG | OXYGEN SATURATION: 96 % | DIASTOLIC BLOOD PRESSURE: 59 MMHG | TEMPERATURE: 97.9 F | HEART RATE: 96 BPM | WEIGHT: 174.6 LBS | BODY MASS INDEX: 29.05 KG/M2

## 2021-09-29 DIAGNOSIS — E11.21 TYPE 2 DIABETES MELLITUS WITH DIABETIC NEPHROPATHY, WITHOUT LONG-TERM CURRENT USE OF INSULIN (H): ICD-10-CM

## 2021-09-29 DIAGNOSIS — M62.81 GENERALIZED MUSCLE WEAKNESS: Primary | ICD-10-CM

## 2021-09-29 DIAGNOSIS — R25.1 SHAKINESS: ICD-10-CM

## 2021-09-29 DIAGNOSIS — I10 ESSENTIAL HYPERTENSION: ICD-10-CM

## 2021-09-29 PROCEDURE — 99214 OFFICE O/P EST MOD 30 MIN: CPT | Performed by: NURSE PRACTITIONER

## 2021-09-29 PROCEDURE — 99207 PR FOOT EXAM NO CHARGE: CPT | Performed by: NURSE PRACTITIONER

## 2021-09-29 PROCEDURE — 93050 ART PRESSURE WAVEFORM ANALYS: CPT | Performed by: NURSE PRACTITIONER

## 2021-09-29 PROCEDURE — 36415 COLL VENOUS BLD VENIPUNCTURE: CPT | Performed by: NURSE PRACTITIONER

## 2021-09-29 ASSESSMENT — PATIENT HEALTH QUESTIONNAIRE - PHQ9: SUM OF ALL RESPONSES TO PHQ QUESTIONS 1-9: 10

## 2021-09-29 NOTE — PROGRESS NOTES
Problem(s) Oriented visit        SUBJECTIVE:                                                    Emily Zhu is a 61 year old male who presents to clinic today for the following health issues :    Symptoms started Saturday - knees and legs felt very shaky. He told OT on Monday who called clinic to schedule this appointment. Feeling a bit better yesterday and today. Glucose = 86 when symptoms occurred. Blood pressure was normal Monday when checked by OT. He is using walker for ambulation due to weakness.    Depression - taking Sertraline 100 mg daily but does not feel any better  PHQ 8/14/2020 4/5/2021 9/29/2021   PHQ-9 Total Score 8 8 10   Q9: Thoughts of better off dead/self-harm past 2 weeks Not at all Several days Not at all       Problem list, Medication list, Allergies, and Medical/Social/Surgical histories reviewed in EPIC and updated as appropriate.   Additional history: as documented    ROS:  7 point ROS completed and negative except noted above, including Gen, CV, Resp, GI, MS, Neuro, Psych    OBJECTIVE:                                                    /59   Pulse 96   Temp 97.9  F (36.6  C)   Resp 18   Wt 79.2 kg (174 lb 9.6 oz)   SpO2 96%   BMI 29.05 kg/m    Body mass index is 29.05 kg/m .   GENERAL: Chronically ill appearing adult male  RESP: calm regular breathing, no cough  MS: bilateral lower extremity weakness worse in right leg, uses cane for ambulation  NEURO: A&Ox3, decreased sensation left side, decreased lower extremity strength  PSYCH: flat affect, depressed mood     ASSESSMENT/PLAN:                                                      Emily was seen today for shaking.    Diagnoses and all orders for this visit:    Generalized muscle weakness  Shakiness  Feeling better yesterday and today. No concerns for immediate intervention today. Will check CMP. Should follow-up with MTM in 2 weeks    Essential hypertension  -     ND ART PRESS WAVEFORM ANALYS CENTRAL ART PRESSURE  -      Comp. Metabolic Panel (14) ([facility])  -     VENOUS COLLECTION  Stable, no changes made today    Type 2 diabetes mellitus with diabetic nephropathy, without long-term current use of insulin (H)  -     Lipid Panel ([facility])  -     FOOT EXAM  -     Comp. Metabolic Panel (14) ([facility])  -     VENOUS COLLECTION  Stable, no changes made today      See Patient Instructions  Patient Instructions   Checking labs today.    Schedule appointment with Chelo in 2 weeks for follow-up    Call Christine Tao to discuss other concerns you brought up with social security      CARROL Pretty [facility]  Family Practice  [facility]  [phone]    For any issues my office # is [phone]

## 2021-09-29 NOTE — PATIENT INSTRUCTIONS
Checking labs today.    Schedule appointment with Chelo in 2 weeks for follow-up    Call Christine Tao to discuss other concerns you brought up with social security

## 2021-09-30 DIAGNOSIS — I25.10 ATHEROSCLEROSIS OF CORONARY ARTERY OF NATIVE HEART WITHOUT ANGINA PECTORIS, UNSPECIFIED VESSEL OR LESION TYPE: ICD-10-CM

## 2021-09-30 DIAGNOSIS — E78.2 MIXED HYPERLIPIDEMIA: ICD-10-CM

## 2021-09-30 LAB
ALBUMIN SERPL-MCNC: 4.1 G/DL (ref 3.8–4.8)
ALBUMIN/GLOB SERPL: 1.3 {RATIO} (ref 1.2–2.2)
ALP SERPL-CCNC: 59 IU/L (ref 44–121)
ALT SERPL-CCNC: 22 IU/L (ref 0–44)
AST SERPL-CCNC: 16 IU/L (ref 0–40)
BILIRUB SERPL-MCNC: 0.3 MG/DL (ref 0–1.2)
BUN SERPL-MCNC: 43 MG/DL (ref 8–27)
BUN/CREATININE RATIO: 18 (ref 10–24)
CALCIUM SERPL-MCNC: 10.9 MG/DL (ref 8.6–10.2)
CHLORIDE SERPLBLD-SCNC: 97 MMOL/L (ref 96–106)
CHOLEST SERPL-MCNC: 131 MG/DL (ref 100–199)
CREAT SERPL-MCNC: 2.41 MG/DL (ref 0.76–1.27)
EGFR IF AFRICN AM: 32 ML/MIN/1.73
EGFR IF NONAFRICN AM: 28 ML/MIN/1.73
GLOBULIN, TOTAL: 3.1 G/DL (ref 1.5–4.5)
GLUCOSE SERPL-MCNC: 166 MG/DL (ref 65–99)
HDLC SERPL-MCNC: 32 MG/DL
LDL/HDL RATIO: 1.7 RATIO (ref 0–3.6)
LDLC SERPL CALC-MCNC: 54 MG/DL (ref 0–99)
POTASSIUM SERPL-SCNC: 4.7 MMOL/L (ref 3.5–5.2)
PROT SERPL-MCNC: 7.2 G/DL (ref 6–8.5)
SODIUM SERPL-SCNC: 136 MMOL/L (ref 134–144)
TOTAL CO2: 20 MMOL/L (ref 20–29)
TRIGL SERPL-MCNC: 289 MG/DL (ref 0–149)
VLDLC SERPL CALC-MCNC: 45 MG/DL (ref 5–40)

## 2021-09-30 RX ORDER — ATORVASTATIN CALCIUM 40 MG/1
40 TABLET, FILM COATED ORAL DAILY
Qty: 30 TABLET | Refills: 12 | Status: SHIPPED | OUTPATIENT
Start: 2021-09-30 | End: 2022-10-05

## 2021-10-01 ENCOUNTER — PATIENT OUTREACH (OUTPATIENT)
Dept: CARE COORDINATION | Facility: CLINIC | Age: 61
End: 2021-10-01

## 2021-10-01 NOTE — PROGRESS NOTES
Clinic Care Coordination Contact    Follow Up Progress Note      Assessment:   -Pt is working with disability specialists on applying for social security disability- he has no income right now as his unemployment ended and due to stroke he does not feel he can work again    -SW received a VM from Carroll BURROWS at [facility]. She touched base with [facility] medical and they were inquiring if pt has a waiver as there is no coverage for the reacher. SW left a F/up VM explaining pt does not have a waiver at this time. [phone]    -Pt had to reschedule his interview/intake for ARHMS for October 11th at 8am with Capo (via phone)    -Pt continues to have homecare OT, PT and RN- they may recertify nursing to extend that service as patient still doesn't have PCA services (wait list)    -SW left a VM for [facility] to check on the status of staffing for PCA services    -Pt plans to resign a 7 month lease for his apartment- his landlord wants to raise the rent to $905 now. He plans to see if it can be kept at $845 since he is experiencing hardship.    -EDDIE called 211 with patient to explore getting rent help to cover his rent through [place]- put on hold for quite some time. He says he cannot pay his rent this month. EDDIE helped patient complete the [place] application online on [employer]. He can call 211 to get an updated status on his application.      Health Maintenance Due   Topic Date Due     ZOSTER IMMUNIZATION (1 of 2) Never done     PREVENTIVE CARE VISIT  04/10/2014     COLORECTAL CANCER SCREENING  06/03/2021     INFLUENZA VACCINE (1) 09/01/2021       Patient accepted scheduling phone number for [facility]  to schedule independently     Goals addressed this encounter:   Goals Addressed                    This Visit's Progress       Patient Stated       Medical (pt-stated)   80%      7-Goal Statement: I will work with homecare to navigate ongoing community resources to help me within 2 months   Date Goal set:  8/11/21  Barriers: wait lists  Strengths: motivated to seek help   Date to Achieve By: 10/11/21  Patient expressed understanding of goal: yes  Action steps to achieve this goal:  1. I will work with CC and homecare   2. I will see if there is a PCA agency that has staff to help me  3. I will try to navigate [facility] process again    As of today's date 8/12/2021 goal is met at 26 - 50%.   Goal Status:  Showing progress. Homecare now involved for PT/OT/Nursing. Insurance doesn't cover for homecare SW to get involved. SW trying to find PCA staffing and will [facility] to [facility] and [facility] soon.    As of today's date 8/27/2021 goal is met at 51 - 75%.   Goal Status:  Active. Referral made to disability specialists to help with social security application. Also, called [facility] to request adult mental health case management to help with ILS and paperwork and wanting to move etc.   As of today's date 9/7/2021 goal is met at 51 - 75%.   Goal Status:  Active   As of today's date 9/22/2021 goal is met at 51 - 75%.   Goal Status:  Showing progress   As of today's date 10/1/2021 goal is met at 51 - 75%.   Goal Status:  Active. [facility]. Pt has an intake assessment October 11th at 8am via phone. Pt has homecare involved still. Waiting for PCA services through [facility] or another agency. May apply for covid rent help.                 SW will continue to follow and reach out weekly for now. Pt has an appointment 10/4/21 at [facility].     Christine Rutledge, MSW, [facility]  Clinic Care Coordinator  [email or web address]  [phone]

## 2021-10-07 ENCOUNTER — PATIENT OUTREACH (OUTPATIENT)
Dept: CARE COORDINATION | Facility: CLINIC | Age: 61
End: 2021-10-07

## 2021-10-07 DIAGNOSIS — E11.21 TYPE 2 DIABETES MELLITUS WITH DIABETIC NEPHROPATHY, WITHOUT LONG-TERM CURRENT USE OF INSULIN (H): ICD-10-CM

## 2021-10-07 RX ORDER — EXENATIDE 2 MG/.85ML
2 INJECTION, SUSPENSION, EXTENDED RELEASE SUBCUTANEOUS
Qty: 3.4 ML | Refills: 0 | Status: SHIPPED | OUTPATIENT
Start: 2021-10-07 | End: 2021-11-03

## 2021-10-07 NOTE — TELEPHONE ENCOUNTER
Bydureon   LOV 9/29/21  Hemoglobin A1C   Date Value Ref Range Status   07/14/2021 7.5 (A) 4.0 - 6.0 % Final

## 2021-10-07 NOTE — PROGRESS NOTES
"Clinic Care Coordination Contact    Follow Up Progress Note       Assessment: EDDIE LEIVA received a VM from REGI Brandon through [facility] stating she is re certifying pt for nursing for 4-5 more weeks. She said he knows his medications and what they are for but shows little motivation in follow through- such as she is worried he may not give himself his weekly injection if she isn't there.     EDDIE will plan to call around to more PCA agencies to see if there is any staff that can help him over the next week or so.    Pt went to go sign his lease renewal for the year today. His landlord agreed to only raise his rent by $40 to $895 and he plans to sign a 12 year lease. He explained to \"Ashley\" that he applied for [place] through [place] and is awaiting approval/denial.     Pt said his friend Lesia came and helped him clean his apartment which was helpful.     Pt also said that he signed the consent form for disability specialists and on the 19th he has a meeting to actually apply for social security disability.     Pt has an appt for MTM on 10/11 and [facility]      Health Maintenance Due   Topic Date Due     ZOSTER IMMUNIZATION (1 of 2) Never done     PREVENTIVE CARE VISIT  04/10/2014     COLORECTAL CANCER SCREENING  06/03/2021     INFLUENZA VACCINE (1) 09/01/2021           Plan: EDDIE LEIVA will continue to follow every 1-2 weeks.    Christine Rutledge, MSW, [facility]  Clinic Care Coordinator  [email or web address]  [phone]    "

## 2021-10-11 ENCOUNTER — OFFICE VISIT (OUTPATIENT)
Dept: PHARMACY | Facility: PHYSICIAN GROUP | Age: 61
End: 2021-10-11
Payer: COMMERCIAL

## 2021-10-11 VITALS
SYSTOLIC BLOOD PRESSURE: 98 MMHG | DIASTOLIC BLOOD PRESSURE: 64 MMHG | WEIGHT: 174 LBS | HEART RATE: 77 BPM | OXYGEN SATURATION: 99 % | BODY MASS INDEX: 28.96 KG/M2

## 2021-10-11 DIAGNOSIS — I10 ESSENTIAL HYPERTENSION: ICD-10-CM

## 2021-10-11 DIAGNOSIS — F32.A DEPRESSION, UNSPECIFIED DEPRESSION TYPE: ICD-10-CM

## 2021-10-11 DIAGNOSIS — M25.552 HIP PAIN, LEFT: ICD-10-CM

## 2021-10-11 DIAGNOSIS — G45.9 TIA (TRANSIENT ISCHEMIC ATTACK): ICD-10-CM

## 2021-10-11 DIAGNOSIS — G47.00 INSOMNIA, UNSPECIFIED TYPE: ICD-10-CM

## 2021-10-11 DIAGNOSIS — E11.21 TYPE 2 DIABETES MELLITUS WITH DIABETIC NEPHROPATHY, WITHOUT LONG-TERM CURRENT USE OF INSULIN (H): Primary | ICD-10-CM

## 2021-10-11 DIAGNOSIS — I25.10 ATHEROSCLEROSIS OF CORONARY ARTERY OF NATIVE HEART WITHOUT ANGINA PECTORIS, UNSPECIFIED VESSEL OR LESION TYPE: ICD-10-CM

## 2021-10-11 PROCEDURE — 99606 MTMS BY PHARM EST 15 MIN: CPT | Performed by: PHARMACIST

## 2021-10-11 PROCEDURE — 99607 MTMS BY PHARM ADDL 15 MIN: CPT | Performed by: PHARMACIST

## 2021-10-11 NOTE — PATIENT INSTRUCTIONS
Recommendations from today's MTM visit:                                                       1. A1c and BMP today    2. I will talk to CARROL Pretty CNP about physical therapy help again        Follow-up: Return in about 4 weeks (around 11/8/2021) for Primary Care Provider, MTM visit in clinic.    It was great to speak with you today.  I value your experience and would be very thankful for your time with providing feedback on our clinic survey. You may receive a survey via email or text message in the next few days.     To schedule another MTM appointment, please call the clinic directly or you may call the MTM scheduling line at [phone] or toll-free at [phone].     My Clinical Pharmacist's contact information:                                                      Please feel free to contact me with any questions or concerns you have.      Chelo Roberts, Pharm.D, [facility]  Medication Therapy Management Pharmacist  [phone]

## 2021-10-11 NOTE — Clinical Note
Thoughts on physical therapy order for him? I wasn't sure if you needed to see him first or if you can just put orders in- left hip pain and needing help with strengthening. Also, he was told to see us back in 1month together for recheck on meds.     Chelo Roberts, Pharm.D, [facility]  Medication Therapy Management Pharmacist  [phone]

## 2021-10-11 NOTE — PROGRESS NOTES
Medication Therapy Management (MTM) Encounter    ASSESSMENT:                            Medication Adherence/Access: No issues identified      Type 2 Diabetes: Patient is not meeting A1c goal of < 7%. Patient would benefit from updated a1c and BMP.     Hypertension/TIA/CAD:due for BMP check today.     Depression/Insomnia: continue working with support system and same doses of medication at this time.    Hip Pain: will discuss with PCP to see if able to put in referral for physical therapy or if further work up is needed first.     PLAN:                            1. A1c and BMP ordered today    2. Can he get physical therapy help again for hip or should he have another evaluation for this- sent to CARROL Pretty CNP for review.      Follow-up: Return in about 4 weeks (around 11/8/2021) for Primary Care Provider, MTM visit in clinic.    SUBJECTIVE/OBJECTIVE:                          Emily Zhu is a 61 year old male coming in for a follow-up visit. He was referred to me from CARROL Pretty CNP.  Today's visit is a follow-up MTM visit from 9/14     Reason for visit: Med follow up.    Allergies/ADRs: Reviewed in chart  Past Medical History: Reviewed in chart  Tobacco: He reports that he has been smoking cigarettes. He has a 40.00 pack-year smoking history. He has never used smokeless tobacco.Tobacco Cessation Action Plan:   Information offered: Patient not interested at this time    Medication Adherence/Access: getting help from home care now, setting up meds 1x per week.     Type 2 Diabetes:  Currently taking glipizide XL 5mg daily and metformin ER 1000mg twice daily, Bydureon weekly (help from weekly nurse visit)- working much better for him than daily Victoza.   Lowest was 86- 200. Not checking all the time.   Patient is not experiencing side effects.  He is eating a lot of processed junk food in the home.    Symptoms of low blood sugar? none  Symptoms of high blood sugar? none  Aspirin: 81mg  daily and taking plavix once daily.   Statin: Yes: atorvastatin   ACEi/ARB: Yes: lisinopril.   Lab Results   Component Value Date    A1C 7.5 07/14/2021    A1C 8.3 04/05/2021    A1C 7.6 12/18/2020    A1C 8.3 08/14/2020    A1C 7.2 11/27/2019         Hypertension/TIA/CAD: Current medications include amlodipine 10mg daily, hydrochlorothiazide 25mg daily, lisinopril 10mg daily, isosorbide 30mg daily, metoprolol tartrate 25mg twice daily. He is currently on aspirin 81mg daily plus clopidogrel 75mg now. Had been on clopidogrel alone prior to TIA that occurred in [place] on 6/22/2021.  Patient does not self-monitor blood pressure.  Patient reports no current medication side effects.  Home care nurse blood pressure logs- 110-120s/70s  BP Readings from Last 3 Encounters:   10/11/21 98/64   09/29/21 109/59   08/09/21 128/85       Depression/Insomnia:  Current medications include: sertraline 100mg daily (started in April increased in Aug2021), No longer on amitriptyline 10mg. Affect today is brighter, more hopeful in speaking about his life and the things he has gone through in his life. Continues to work with care coordination for ongoing services and resources.     [facility] intake completed today and will be getting support soon.     Hip Pain: using a walker to get around, Left side hip pain is bothering him. He would like to try to get physical therapy back to help him increase strength.    Getting help showering/shaving.       Today's Vitals: BP 98/64   Pulse 77   Wt 174 lb (78.9 kg)   SpO2 99%   BMI 28.96 kg/m    ----------------      I spent 30 minutes with this patient today. All changes were made via collaborative practice agreement with CARROL Pretty CNP. A copy of the visit note was provided to the patient's primary care provider.    The patient was given a summary of these recommendations.     Chelo Roberts, Pharm.D, [facility]  Medication Therapy Management Pharmacist  [phone]       Medication Therapy  Recommendations  Essential hypertension    Current Medication: hydrochlorothiazide (HYDRODIURIL) 25 MG tablet   Rationale: Medication requires monitoring - Needs additional monitoring - Safety   Recommendation: Order Lab - BMP due   Status: Accepted per CPA         Type 2 diabetes mellitus with diabetic nephropathy, without long-term current use of insulin (H)    Current Medication: exenatide ER (BYDUREON BCISE) 2 MG/0.85ML auto-injector   Rationale: Medication requires monitoring - Needs additional monitoring - Effectiveness   Recommendation: Order Lab - a1c   Status: Accepted per CPA

## 2021-10-12 ENCOUNTER — TELEPHONE (OUTPATIENT)
Dept: FAMILY MEDICINE | Facility: CLINIC | Age: 61
End: 2021-10-12

## 2021-10-12 ENCOUNTER — PATIENT OUTREACH (OUTPATIENT)
Dept: CARE COORDINATION | Facility: CLINIC | Age: 61
End: 2021-10-12

## 2021-10-12 DIAGNOSIS — E11.21 TYPE 2 DIABETES MELLITUS WITH DIABETIC NEPHROPATHY, WITHOUT LONG-TERM CURRENT USE OF INSULIN (H): Primary | ICD-10-CM

## 2021-10-12 LAB — HBA1C MFR BLD: 6.8 % (ref 4–6)

## 2021-10-12 PROCEDURE — 83036 HEMOGLOBIN GLYCOSYLATED A1C: CPT | Performed by: NURSE PRACTITIONER

## 2021-10-12 PROCEDURE — 36415 COLL VENOUS BLD VENIPUNCTURE: CPT | Performed by: NURSE PRACTITIONER

## 2021-10-12 NOTE — PROGRESS NOTES
Clinic Care Coordination Contact    Follow Up Progress Note      Assessment: EDDIE Care Coordinator received a VM from Capo at [employer] [facility] program (supervisor). He noted he did pt's intake and will work on getting him assigned an [facility] worker and will work with pt until he does. [phone]    EDDIE then started calling other PCA agencies to see if they have staff that can start working with patient.  -[facility]- has a male staff (doesn't drive) that is looking for work. He may not agree based on that pt is only approved for 1 hour and 45 minutes per day. Brooks is the contact there and she will follow up with CC if this is a possibility or not.     SW checked the status of pt's application for covid rent help- it's noted as still pending review but was received.    Health Maintenance Due   Topic Date Due     ZOSTER IMMUNIZATION (1 of 2) Never done     PREVENTIVE CARE VISIT  04/10/2014     COLORECTAL CANCER SCREENING  06/03/2021     INFLUENZA VACCINE (1) 09/01/2021       Patient accepted scheduling phone number for [facility]  to schedule independently     Goals addressed this encounter:   Goals Addressed                    This Visit's Progress       Patient Stated       Medical (pt-stated)   80%      7-Goal Statement: I will work with homecare to navigate ongoing community resources to help me within 2 months   Date Goal set: 8/11/21  Barriers: wait lists  Strengths: motivated to seek help   Date to Achieve By: 1/1/22  Patient expressed understanding of goal: yes  Action steps to achieve this goal:  1. I will work with CC and homecare   2. I will see if there is a PCA agency that has staff to help me  3. I will try to navigate county waiver process again    As of today's date 8/12/2021 goal is met at 26 - 50%.   Goal Status:  Showing progress. Homecare now involved for PT/OT/Nursing. Insurance doesn't cover for homecare SW to get involved. SW trying to find PCA staffing and will try to navigate  "social security and county waiver stuff soon.    As of today's date 8/27/2021 goal is met at 51 - 75%.   Goal Status:  Active. Referral made to disability specialists to help with social security application. Also, called [facility] to request adult mental health case management to help with ILS and paperwork and wanting to move etc.   As of today's date 9/7/2021 goal is met at 51 - 75%.   Goal Status:  Active   As of today's date 9/22/2021 goal is met at 51 - 75%.   Goal Status:  Showing progress   As of today's date 10/1/2021 goal is met at 51 - 75%.   Goal Status:  Active. Referral made to [facility] through [facility]. Pt has an intake assessment October 11th at 8am via phone. Pt has homecare involved still. Waiting for PCA services through [facility] or another agency. May apply for covid rent help.   As of today's date 10/12/2021 goal is met at 76 - 100%.   Goal Status:  Active. Had [facility] intake yesterday. Will be assigned a staff. Applied for covid rent help through [employer] help [place]. Has homecare RN still involved. SW helping to find pt a PCA- calling around to agencies.  Will continue to extend goal to work on these matters.             COMPLETED: Psychosocial (pt-stated)         1- Goal Statement: I will have a PCA assessment through the [facility] within three months. I understand that a PCA worker would be able to help me with bathing, dressing, grooming, eating and preparing meals, shopping, getting to medical appointments, and cleaning if eligible.  Date Goal set: 1/10/20  Date to Achieve By: 9/1/20  Patient expressed understanding of goal: yes  Action steps to achieve this goal:  1. I will schedule an assessment with a public health nurse (the [facility] will call you to schedule- [phone])  2. I will engage and participate in the assessment with any questions that need to be went over.  3. If I want to have a waiver instead of just PCA services, I will move forward with getting \"certified disabled\" by applying for " "social security and meeting with the state medical review team to get the certified disabled certification. [employer] in [place] can help me through the entire process- I can make an appointment with them by calling [phone]    1/30/20- Spoke with patient via phone. He indicated a nurse came out to do the PCA assessment and he is awaiting to hear if he qualifies and for how many hours per day/week. EDDIE LEIVA called [facility] and was told that they contract with [place] [employer], and they were the agency that completed the assessment. Called [facility] and it was confirmed that patient did have an assessment but could not share information on eligibility/qualification without a signed release. Will plan to check in with patient in 2-3 weeks on status and if there are further needs at that time.    As of today's date 3/6/2020 goal is met at 26 - 50%.   Goal Status:  Showing progress. Left voicemail to go over progress.      As of today's date 3/25/2020 goal is met at 51 - 75%.   Goal Status:  Ongoing. Pt had the assessment, but isn't sure what happened with eligibility. He has a hard time reading forms and handling paperwork. EDDIE LEIVA left a VM for the [facility] to check on the status.    3/27/20- Confirmed with the [facility] patient is eligible for 1 hour and 45 minutes per day of PCA services. He needs to find a PCA agency and PCA to work with him before the [facility] authorizes services. Called [employer] and they will mail him a list of traditional PCA agencies to call. Extended goal timeline.  [employer]: [phone] \"[employer]\" extension [id number]  As of today's date 4/13/2020 goal is met at 26 - 50%.   Goal Status:  Ongoing. EDDIE LEIVA called a PCA agency today to help patient line up PCA services- [facility]. Was told pt would be put on a wait list and  will try to find a candidate to work with pt. They will call pt to go over options with him.     As of today's date 5/21/2020 " goal is met at 26 - 50%.   Goal Status:  Showing progress. Pt wants to wait until COVID pandemic is over to start PCA services. He understands his assessment is good for a year. Shana at [facility] will reach out every few months to check in with patient.      As of today's date 7/9/2020 goal is met at 76 - 100%.   Goal Status:  Complete. PCA assessment complete. Will complete goal. Patient can start services anytime now.               Plan:    Care Coordinator will continue to follow and reach out every 1-2 weeks to go over progress.    Christine Rutledge, MSW, [facility]  Clinic Care Coordinator  [email or web address]  [phone]

## 2021-10-12 NOTE — TELEPHONE ENCOUNTER
Called patient and he has cologuard kit- will await a visiting nurse to assist him as his stroke has left him disabled to do on his own.- per patient    aMry Goldstein MA October 12, 2021 3:26 PM

## 2021-10-13 LAB
BUN SERPL-MCNC: 33 MG/DL (ref 8–27)
BUN/CREATININE RATIO: 16 (ref 10–24)
CALCIUM SERPL-MCNC: 10 MG/DL (ref 8.6–10.2)
CHLORIDE SERPLBLD-SCNC: 97 MMOL/L (ref 96–106)
CREAT SERPL-MCNC: 2.12 MG/DL (ref 0.76–1.27)
EGFR IF AFRICN AM: 37 ML/MIN/1.73
EGFR IF NONAFRICN AM: 32 ML/MIN/1.73
GLUCOSE SERPL-MCNC: 172 MG/DL (ref 65–99)
POTASSIUM SERPL-SCNC: 5.1 MMOL/L (ref 3.5–5.2)
SODIUM SERPL-SCNC: 136 MMOL/L (ref 134–144)
TOTAL CO2: 22 MMOL/L (ref 20–29)

## 2021-10-14 ENCOUNTER — MEDICAL CORRESPONDENCE (OUTPATIENT)
Dept: FAMILY MEDICINE | Facility: CLINIC | Age: 61
End: 2021-10-14

## 2021-10-19 PROBLEM — F32.9 MAJOR DEPRESSION: Status: ACTIVE | Noted: 2019-09-09

## 2021-10-21 ENCOUNTER — MEDICAL CORRESPONDENCE (OUTPATIENT)
Dept: FAMILY MEDICINE | Facility: CLINIC | Age: 61
End: 2021-10-21

## 2021-10-25 ENCOUNTER — PATIENT OUTREACH (OUTPATIENT)
Dept: CARE COORDINATION | Facility: CLINIC | Age: 61
End: 2021-10-25

## 2021-10-25 NOTE — PROGRESS NOTES
Clinic Care Coordination Contact    Follow Up Progress Note      Assessment: SW Care Coordinator checked on application status for [place] cov[place] q[place]art help- application pending. Pt needs to submit copy of rental agreement and/or past due bill statement. SW called pt and he gave the # for [employer] and gave permission to call and request this to get to the state or for them to fax it directly. SW left a VM for them and asked them if possible to email it to CC with pt's permission so SW can submit it with the application- [phone]--  Asked for follow up either way.    Pt said [employer] did their assessment to help him apply for social security disability and they are working on the application    Pt continues to work with Capo for [facility] services through [facility]    Care Gaps:    Health Maintenance Due   Topic Date Due     ZOSTER IMMUNIZATION (1 of 2) Never done     PREVENTIVE CARE VISIT  04/10/2014     COLORECTAL CANCER SCREENING  06/03/2021     INFLUENZA VACCINE (1) 09/01/2021       Patient accepted scheduling phone number for clinic  to schedule independently     Goals addressed this encounter:   Goals Addressed                    This Visit's Progress       Patient Stated       Medical (pt-stated)   80%      7-Goal Statement: I will work with homecare to navigate ongoing community resources to help me within 2 months   Date Goal set: 8/11/21  Barriers: wait lists  Strengths: motivated to seek help   Date to Achieve By: 1/1/22  Patient expressed understanding of goal: yes  Action steps to achieve this goal:  1. I will work with CC and homecare   2. I will see if there is a PCA agency that has staff to help me  3. I will try to navigate county waiver process again    As of today's date 8/12/2021 goal is met at 26 - 50%.   Goal Status:  Showing progress. Homecare now involved for PT/OT/Nursing. Insurance doesn't cover for homecare SW to get involved. SW trying to find PCA staffing and will try to  navigate social security and [facility] waiver stuff soon.    As of today's date 8/27/2021 goal is met at 51 - 75%.   Goal Status:  Active. Referral made to disability specialists to help with social security application. Also, called county to request adult mental health case management to help with ILS and paperwork and wanting to move etc.   As of today's date 9/7/2021 goal is met at 51 - 75%.   Goal Status:  Active   As of today's date 9/22/2021 goal is met at 51 - 75%.   Goal Status:  Showing progress   As of today's date 10/1/2021 goal is met at 51 - 75%.   Goal Status:  Active. Referral made to [facility] through [facility]. Pt has an intake assessment October 11th at 8am via phone. Pt has homecare involved still. Waiting for PCA services through [facility] or another agency. May apply for covid rent help.   As of today's date 10/12/2021 goal is met at 76 - 100%.   Goal Status:  Active. Had [facility] intake yesterday. Will be assigned a staff. [place] through [place]. Has homecare RN still involved. SW helping to find pt a PCA- calling around to agencies.  Will continue to extend goal to work on these matters.  As of today's date 10/25/2021 goal is met at 76 - 100%.   Goal Status:  Active                 Plan:   -pending [place] rent help [place] application; working with [employer] to get rental agreement and past due rent bill submitted  -Continue to work with disability specialists on application for social security disability benefits  -Continue homecare (not sure when they will discharge yet)    EDDIE will help with things as needed over the next 1-2 weeks    Christine Rutledge, MSW, [facility]  Clinic Care Coordinator  [email or web address]  [phone]

## 2021-10-27 ENCOUNTER — PATIENT OUTREACH (OUTPATIENT)
Dept: CARE COORDINATION | Facility: CLINIC | Age: 61
End: 2021-10-27

## 2021-10-27 NOTE — PROGRESS NOTES
Clinic Care Coordination Contact    Follow Up Progress Note      Assessment: jackson Ramirez's landlord at [employer] [facility] called care coordinator. She said that she was able to directly upload a copy of pt's lease and [place]. She is willing to help patient upload his electric bill as well and that should all be what's needed to finish the application.     EDDIE called pt to let him know and he will bring his electric bill to be uploaded. Ashley said to CC that if pt is appyling for covid rent help that he can be reassured there will be no reason for eviction or late fees. They will work with him.  Pt pleased to hear this.    Pt wanted to know when the next appt with Capo the [facility] worker would be. Left a VM for Capo to call pt back and call CC with any questions/concerns.      Health Maintenance Due   Topic Date Due     ZOSTER IMMUNIZATION (1 of 2) Never done     PREVENTIVE CARE VISIT  04/10/2014     COLORECTAL CANCER SCREENING  06/03/2021     INFLUENZA VACCINE (1) 09/01/2021       Patient accepted scheduling phone number for clinic  to schedule independently     Goals addressed this encounter:   Goals Addressed    None           Plan:   EDDIE CC will continue to reach out as needed over the next 1-2 weeks    Christine Rutledge MSW, [facility]  Clinic Care Coordinator  [email or web address]  [phone]

## 2021-10-28 DIAGNOSIS — E11.21 TYPE 2 DIABETES MELLITUS WITH DIABETIC NEPHROPATHY, WITHOUT LONG-TERM CURRENT USE OF INSULIN (H): ICD-10-CM

## 2021-10-28 RX ORDER — GLIPIZIDE 5 MG/1
5 TABLET, FILM COATED, EXTENDED RELEASE ORAL DAILY
Qty: 90 TABLET | Refills: 1 | Status: SHIPPED | OUTPATIENT
Start: 2021-10-28 | End: 2022-02-07

## 2021-10-28 NOTE — TELEPHONE ENCOUNTER
Glipizide. Last addressed 9/29/21.     Hemoglobin A1C   Date Value Ref Range Status   10/12/2021 6.8 (A) 4.0 - 6.0 % Final   07/14/2021 7.5 (A) 4.0 - 6.0 % Final   04/05/2021 8.3 (A) 4.0 - 6.0 % Final            Type 2 diabetes mellitus with diabetic nephropathy, without long-term current use of insulin (H)  -     Lipid Panel ([facility])  -     FOOT EXAM  -     Comp. Metabolic Panel (14) ([facility])  -     VENOUS COLLECTION  Stable, no changes made today

## 2021-11-03 DIAGNOSIS — E11.21 TYPE 2 DIABETES MELLITUS WITH DIABETIC NEPHROPATHY, WITHOUT LONG-TERM CURRENT USE OF INSULIN (H): ICD-10-CM

## 2021-11-03 RX ORDER — EXENATIDE 2 MG/.85ML
2 INJECTION, SUSPENSION, EXTENDED RELEASE SUBCUTANEOUS
Qty: 3.4 ML | Refills: 1 | Status: SHIPPED | OUTPATIENT
Start: 2021-11-03 | End: 2021-12-29

## 2021-11-03 NOTE — TELEPHONE ENCOUNTER
exenatide Er-Bydureon 2mg/0.85 ml auto injector    LOV - 9/29/21  Last labs 10/12/21    Lab Results   Component Value Date    A1C 6.8 10/12/2021    A1C 7.5 07/14/2021    A1C 8.3 04/05/2021    A1C 7.6 12/18/2020    A1C 8.3 08/14/2020

## 2021-11-05 ENCOUNTER — PATIENT OUTREACH (OUTPATIENT)
Dept: CARE COORDINATION | Facility: CLINIC | Age: 61
End: 2021-11-05
Payer: COMMERCIAL

## 2021-11-05 NOTE — PROGRESS NOTES
Clinic Care Coordination Contact  Care Team Conversations      EDDIE LEIVA received a VM from jackson Brandon's homecare RN stating that she was planning to discharge next week but told the homecare team she doesn't feel pt is ready for discharge yet. They want to try to get him a medication dispenser that will remind him to take his weekly insulin injection (which he knows how to do) and dispense his medications hoping that this will lead to more follow through. He knows how to do everything and what his medications are for. EDDIE will ask PCP for prescription.   EDDIE LEIVA will be off Monday but can address any needs Tuesday.    Christine Rutledge, MSW, [facility]  Clinic Care Coordinator  [email or web address]  [phone]

## 2021-11-08 ENCOUNTER — TELEPHONE (OUTPATIENT)
Dept: FAMILY MEDICINE | Facility: CLINIC | Age: 61
End: 2021-11-08

## 2021-11-16 ENCOUNTER — MEDICAL CORRESPONDENCE (OUTPATIENT)
Dept: FAMILY MEDICINE | Facility: CLINIC | Age: 61
End: 2021-11-16

## 2021-11-19 ENCOUNTER — PATIENT OUTREACH (OUTPATIENT)
Dept: CARE COORDINATION | Facility: CLINIC | Age: 61
End: 2021-11-19
Payer: COMMERCIAL

## 2021-11-19 NOTE — PROGRESS NOTES
Clinic Care Coordination Contact    Follow Up Progress Note      Assessment:     - Care Coordinator talked with Roma- homecare RN. Pt continues to get RN and HHA to help with bathing. Pt does not walk much without staff present as he has a fear of falling. He would like to get a lifeline. He has been taking his medications independently and getting better with it. He has food at home and Lesia is back to help him get groceries and his medications at the pharmacy. He does not want to get pill packs or have his medications mailed to him.     -Pt is on the wait list for an [facility] worker (according to supervisor Capo at [facility]). He is at the top of the wait list.    -EDDIE called [facility] front door and requested they do another mnchoices assessment. They did an intake and will be put on a wait list for an assessment via phone.     Care Gaps:    Health Maintenance Due   Topic Date Due     ZOSTER IMMUNIZATION (1 of 2) Never done     LUNG CANCER SCREENING  01/26/2013     PREVENTIVE CARE VISIT  04/10/2014     COLORECTAL CANCER SCREENING  06/03/2021     INFLUENZA VACCINE (1) 09/01/2021     COVID-19 Vaccine (3 - Booster for Moderna series) 10/22/2021       Patient accepted scheduling phone number for clinic  to schedule independently     Goals addressed this encounter:   Goals Addressed                    This Visit's Progress       Patient Stated       COMPLETED: Medical (pt-stated)         7-Goal Statement: I will work with homecare to navigate ongoing community resources to help me within 2 months   Date Goal set: 8/11/21  Barriers: wait lists  Strengths: motivated to seek help   Date to Achieve By: 1/1/22  Patient expressed understanding of goal: yes  Action steps to achieve this goal:  1. I will work with CC and homecare   2. I will see if there is a PCA agency that has staff to help me  3. I will try to navigate county waiver process again    As of today's date 8/12/2021 goal is met at 26 - 50%.   Goal Status:   Showing progress. Homecare now involved for PT/OT/Nursing. Insurance doesn't cover for homecare SW to get involved. SW trying to find PCA staffing and will try to navigate social security and [facility] waiver stuff soon.    As of today's date 8/27/2021 goal is met at 51 - 75%.   Goal Status:  Active. Referral made to disability specialists to help with social security application. Also, called [facility] to request adult mental health case management to help with ILS and paperwork and wanting to move etc.   As of today's date 9/7/2021 goal is met at 51 - 75%.   Goal Status:  Active   As of today's date 9/22/2021 goal is met at 51 - 75%.   Goal Status:  Showing progress   As of today's date 10/1/2021 goal is met at 51 - 75%.   Goal Status:  Active. Referral made to [facility] through [facility]. Pt has an intake assessment October 11th at 8am via phone. Pt has homecare involved still. Waiting for PCA services through [facility] or another agency. May apply for covid rent help.   As of today's date 10/12/2021 goal is met at 76 - 100%.   Goal Status:  Active. Had [facility] intake yesterday. Will be assigned a staff. Applied for [place] rent help through [employer] help [place]. Has homecare RN still involved. SW helping to find pt a PCA- calling around to agencies.  Will continue to extend goal to work on these matters.  As of today's date 10/25/2021 goal is met at 76 - 100%.   Goal Status:  Active   As of today's date 11/19/2021 goal is met at 76 - 100%.   Goal Status:  Active. Homecare still involved for HHA to bathe and RN to help with med management. EDDIE called the [facility] again to request a mnchoices assessment.  [facility] Called [facility] and they might have a PCA -- they will call within a week to follow up no matter what.                   Plan:   -EDDIE called [facility] front door and requested another mnchoices assessment be done. Cheri : [phone].  Wait list for schedulers is 4  weeks    -Waiting list for an [facility] worker  -Applied for social security disability, waiting to hear back    -Applied for covid rent help, application in final stages of approval per website  -SW called [facility] and spoke with Madalyn, they might have a PCA, will follow up with CC either way    -Continue to work with homecare RN and HHA through insurance (time limited)  -SW will continue to follow and help out as needed over next few weeks    Christine Rutledge, MSW, [facility]  Clinic Care Coordinator  [email or web address]  [phone]

## 2021-11-30 ENCOUNTER — PATIENT OUTREACH (OUTPATIENT)
Dept: CARE COORDINATION | Facility: CLINIC | Age: 61
End: 2021-11-30
Payer: COMMERCIAL

## 2021-11-30 DIAGNOSIS — I10 ESSENTIAL HYPERTENSION: ICD-10-CM

## 2021-11-30 DIAGNOSIS — E11.21 TYPE 2 DIABETES MELLITUS WITH DIABETIC NEPHROPATHY, WITHOUT LONG-TERM CURRENT USE OF INSULIN (H): ICD-10-CM

## 2021-11-30 DIAGNOSIS — I25.10 ATHEROSCLEROSIS OF CORONARY ARTERY OF NATIVE HEART WITHOUT ANGINA PECTORIS, UNSPECIFIED VESSEL OR LESION TYPE: ICD-10-CM

## 2021-11-30 RX ORDER — CLOPIDOGREL BISULFATE 75 MG/1
75 TABLET ORAL DAILY
Qty: 30 TABLET | Refills: 6 | Status: SHIPPED | OUTPATIENT
Start: 2021-11-30 | End: 2022-06-24

## 2021-11-30 RX ORDER — METFORMIN HCL 500 MG
TABLET, EXTENDED RELEASE 24 HR ORAL
Qty: 120 TABLET | Refills: 6 | Status: SHIPPED | OUTPATIENT
Start: 2021-11-30 | End: 2022-05-09 | Stop reason: SINTOL

## 2021-11-30 RX ORDER — ISOSORBIDE MONONITRATE 30 MG/1
TABLET, EXTENDED RELEASE ORAL
Qty: 30 TABLET | Refills: 6 | Status: SHIPPED | OUTPATIENT
Start: 2021-11-30 | End: 2022-08-05

## 2021-11-30 RX ORDER — METOPROLOL TARTRATE 25 MG/1
25 TABLET, FILM COATED ORAL 2 TIMES DAILY
Qty: 60 TABLET | Refills: 6 | Status: SHIPPED | OUTPATIENT
Start: 2021-11-30 | End: 2022-04-18

## 2021-11-30 RX ORDER — HYDROCHLOROTHIAZIDE 25 MG/1
25 TABLET ORAL DAILY
Qty: 30 TABLET | Refills: 6 | Status: SHIPPED | OUTPATIENT
Start: 2021-11-30 | End: 2022-05-09 | Stop reason: SINTOL

## 2021-11-30 NOTE — PROGRESS NOTES
Clinic Care Coordination Contact    Follow Up Progress Note      Assessment:     -Patient was approved for the [place] program and funds were dispersed to his landlord. Patient will follow up to see how long they will cover rent    -Patient has not heard from the county yet to schedule the mnchoices assessment.     -Pt still on wait list for [facility] worker.    -Homecare is still working with patient for RN and HHA.     -Patient received mail from disability specialists and he has some forms to fill out and send back. Lesia plans to help him with this.     Care Gaps:    Health Maintenance Due   Topic Date Due     ZOSTER IMMUNIZATION (1 of 2) Never done     LUNG CANCER SCREENING  01/26/2013     PREVENTIVE CARE VISIT  04/10/2014     HEMOGLOBIN  01/09/2021     COLORECTAL CANCER SCREENING  06/03/2021     INFLUENZA VACCINE (1) 09/01/2021     COVID-19 Vaccine (3 - Booster for Moderna series) 10/22/2021       Patient accepted scheduling phone number for clinic  to schedule independently     Goals addressed this encounter:   Goals Addressed                    This Visit's Progress       Patient Stated       Psychosocial (pt-stated)   40%      8-Goal Statement: I will get a mnchoices assessment within 1 month to screen onto county waiver   Date Goal set: 11/30/21  Barriers: waiting list   Strengths: motivated  Date to Achieve By: 12/30/21  Patient expressed understanding of goal: yes  Action steps to achieve this goal:  1. I will follow up with the county with anything they need   2. I will keep care coordinator updated   3. I will utilize [facility] worker services as needed to help with any paperwork              Plan:   EDDIE LEIVA will continue to follow and reach out as needed over next few weeks    Christine Rutledge, MSW, SW  Clinic Care Coordinator  [email or web address]  [phone]

## 2021-12-13 ENCOUNTER — MEDICAL CORRESPONDENCE (OUTPATIENT)
Dept: FAMILY MEDICINE | Facility: CLINIC | Age: 61
End: 2021-12-13

## 2021-12-17 ENCOUNTER — MEDICAL CORRESPONDENCE (OUTPATIENT)
Dept: FAMILY MEDICINE | Facility: CLINIC | Age: 61
End: 2021-12-17

## 2021-12-21 ENCOUNTER — PATIENT OUTREACH (OUTPATIENT)
Dept: CARE COORDINATION | Facility: CLINIC | Age: 61
End: 2021-12-21
Payer: COMMERCIAL

## 2021-12-21 NOTE — PROGRESS NOTES
"Clinic Care Coordination Contact  [facility]/Voicemail       Clinical Data: Care Coordinator Outreach    EDDIE CC received a voicemail from \"Vadim\" at [facility] PCA agency wanting to consult regarding patient after a convo with him [phone]  -- SW called her back and she is going to request a PCA reassessment through [facility] as patient is due by the end of the month. They still don't have PCA to start working with him.    SW also trying to reach patient to go over progress- left VM    Outreach attempted x 1.  Left message on patient's voicemail with call back information and requested return call.     Plan: Care Coordinator will try to reach out within a week or so. Encouraged to call back    Christine Rutledge, MSW, SW  Clinic Care Coordinator  [email or web address]  [phone]    "

## 2021-12-27 ENCOUNTER — PATIENT OUTREACH (OUTPATIENT)
Dept: CARE COORDINATION | Facility: CLINIC | Age: 61
End: 2021-12-27
Payer: COMMERCIAL

## 2021-12-27 NOTE — PROGRESS NOTES
"Clinic Care Coordination Contact    Follow Up Progress Note      Assessment:   A representative from [facility] named Lele called me this morning. Apparently the nurse from [facility] made a referral for patient to get a lifeline pendent called \"Go Safe 2\". This is one he can use in the home and community. [facility] needs to authorize it. Their fax # is [phone] and doctor's prescription needs to be sent there. Then [facility] will respond with approval/denial. It's $44 per month and also there is a 1 time fee of $99 for the fee of the button, usually covered by insurance.    called [facility] with patient and confirmed this process. SW asked PCP to put in a prescription and fax it to [facility]. SW can follow up.    The fax # for [place] [facility] too is [phone]    Care Gaps:    Health Maintenance Due   Topic Date Due     ZOSTER IMMUNIZATION (1 of 2) Never done     LUNG CANCER SCREENING  01/26/2013     PREVENTIVE CARE VISIT  04/10/2014     HEMOGLOBIN  01/09/2021     COLORECTAL CANCER SCREENING  06/03/2021     INFLUENZA VACCINE (1) 09/01/2021     COVID-19 Vaccine (3 - Booster for Moderna series) 10/22/2021       Patient accepted scheduling phone number for clinic  to schedule independently     Goals addressed this encounter:   Goals Addressed                    This Visit's Progress       Patient Stated       Psychosocial (pt-stated)   40%      8-Goal Statement: I will get a mnchoices assessment within 1 month to screen onto county waiver   Date Goal set: 11/30/21  Barriers: waiting list   Strengths: motivated  Date to Achieve By: 2/1/22  Patient expressed understanding of goal: yes  Action steps to achieve this goal:  1. I will follow up with the county with anything they need   2. I will keep care coordinator updated   3. I will utilize [facility] worker services as needed to help with any paperwork    As of today's date 12/21/2021 goal is met at 40%  Goal Status:  Active. Left a Vm   As of " today's date 12/27/2021 goal is met at 26 - 50%.   Goal Status:  Active. Clint says he hasn't heard form the [facility] access hospital yet? He is getting a renewal PCA assessment though . Will extend goal              Plan:   -Continue homecare  -Work on getting lifeline pendent authorized through [facility]   -Continue to await [facility] assessment and PCA renewal assessment  -Wait list for [facility] worker at [facility]  -Patient did say any updates today but says he has a hard time remembering everything  -SW will follow and reach out within 2-3 weeks    Christine Rutledge, MSW, [facility]  Clinic Care Coordinator  [email or web address]  [phone]

## 2021-12-29 ENCOUNTER — TELEPHONE (OUTPATIENT)
Dept: FAMILY MEDICINE | Facility: CLINIC | Age: 61
End: 2021-12-29

## 2021-12-29 DIAGNOSIS — G45.9 TIA (TRANSIENT ISCHEMIC ATTACK): ICD-10-CM

## 2021-12-29 DIAGNOSIS — I10 ESSENTIAL HYPERTENSION: ICD-10-CM

## 2021-12-29 DIAGNOSIS — E11.21 TYPE 2 DIABETES MELLITUS WITH DIABETIC NEPHROPATHY, WITHOUT LONG-TERM CURRENT USE OF INSULIN (H): ICD-10-CM

## 2021-12-29 RX ORDER — EXENATIDE 2 MG/.85ML
INJECTION, SUSPENSION, EXTENDED RELEASE SUBCUTANEOUS
Qty: 4 ML | Refills: 0 | Status: SHIPPED | OUTPATIENT
Start: 2021-12-29 | End: 2022-01-27

## 2021-12-29 RX ORDER — LISINOPRIL 10 MG/1
10 TABLET ORAL DAILY
Qty: 30 TABLET | Refills: 0 | Status: SHIPPED | OUTPATIENT
Start: 2021-12-29 | End: 2022-02-11

## 2021-12-29 RX ORDER — AMLODIPINE BESYLATE 10 MG/1
10 TABLET ORAL DAILY
Qty: 30 TABLET | Refills: 0 | Status: SHIPPED | OUTPATIENT
Start: 2021-12-29 | End: 2022-05-19

## 2021-12-29 NOTE — TELEPHONE ENCOUNTER
"----- Message from Christine Tao LGSW sent at 12/27/2021 11:54 AM CST -----  Regarding: [facility]      Hello!    A representative from [facility] named Lele called me this morning. Apparently the nurse from [facility] made a referral for patient to get a lifeline pendent called \"Go Safe 2\". This is one he can use in the home and community. [facility] needs to authorize it. Their fax # is [phone] and doctor's prescription needs to be sent there. Then [facility] will respond with approval/denial. It's $44 per month and also there is a 1 time fee of $99 for the fee of the button, usually covered by insurance. Tamica can you put in a prescription and get it faxed there?    The fax # for [place] [facility] [employer] too is [phone]     Let me know if/how I can help further if need be.     -Christine ()    "

## 2021-12-29 NOTE — TELEPHONE ENCOUNTER
Softclix lancets. Last addressed with Chelo 10/11/21. Was due for appt in Nov.       Follow-up: Return in about 4 weeks (around 11/8/2021) for Primary Care Provider, MTM visit in clinic.

## 2021-12-29 NOTE — TELEPHONE ENCOUNTER
"Prescription for \"Go Safe 2\"  Faxed  to [facility] for authorization and [facility] for order.   "

## 2022-01-14 ENCOUNTER — PATIENT OUTREACH (OUTPATIENT)
Dept: CARE COORDINATION | Facility: CLINIC | Age: 62
End: 2022-01-14
Payer: COMMERCIAL

## 2022-01-14 NOTE — PROGRESS NOTES
Clinic Care Coordination Contact  Care Team Conversations     received a call from mnchoices  Laurie Sarmiento at [facility]. She noted she is going to do the mnchoices assessment next Tuesday at 1pm and patient would like CC conferenced in.  agreed to being apart of the meeting. Laurie said in regards to housing, while waiting for CADI services could look into housing stabilization services and/or applying with combined application for GR to get into customized living setting. Discussed patient needs assistance with filling out paperwork and doesn't have access to the internet to do it online. He is still on the wait list for an ARHMS worker at [facility] from what CC is aware of.    SW also received a call from [place] trippiece. They are waiting for the authorization from [facility] and did not receive the order from PCP although we have the right fax #. SW asked triage at [facility] to refax the order. Typically [facility] can cover it without having a waiver in place.     will continue to help patient and will be apart of meeting next week.    Christine Rutledge, MSW, [facility]  Clinic Care Coordinator  [email or web address]  [phone]

## 2022-01-18 ENCOUNTER — PATIENT OUTREACH (OUTPATIENT)
Dept: CARE COORDINATION | Facility: CLINIC | Age: 62
End: 2022-01-18
Payer: COMMERCIAL

## 2022-01-18 NOTE — LETTER
[facility] [address] [place] [address]  January 18, 2022    Emily Audra  [address] [address]      Dear Emily,    I am a clinic care coordinator who works with CARROL Pretty CNP at [facility]. I wanted to thank you for spending the time to talk with me.  Below is a description of clinic care coordination and how I can further assist you.      The clinic care coordination team is made up of a registered nurse,  and community health worker who understand the health care system. The goal of clinic care coordination is to help you manage your health and improve access to the health care system in the most efficient manner. The team can assist you in meeting your health care goals by providing education, coordinating services, strengthening the communication among your providers and supporting you with any resource needs.    Please feel free to contact me at [phone] with any questions or concerns. We are focused on providing you with the highest-quality healthcare experience possible and that all starts with you.     Sincerely,     Christine Rutledge, MSALEX, [facility]  Clinic Care Coordinator  [email or web address]  [phone]

## 2022-01-18 NOTE — LETTER
Patient Centered Plan of Care  About Me:        Patient Name:  Emily Zhu    YOB: 1960  Age:         61 year old   Alfonso MRN:    [id number] Telephone Information:  Home Phone [phone]   Mobile None       Address:  [address]  [place] [facility] [address] Email address:  No e-mail address on record      Emergency Contact(s)    Name Relationship Lgl Grd Work Phone Home Phone Mobile Phone   EDWARD CABRERA Sister  [phone] none           Primary language:  English     needed? No   [place] [employer] [facility]:  [phone] op. 1  Other communication barriers:English as a second language; Lack of coping    Preferred Method of Communication:     Current living arrangement: I live alone    Mobility Status/ Medical Equipment: Independent w/Device        Health Maintenance  Health Maintenance Reviewed: Due/Overdue preventative care visit      My Access Plan  Medical Emergency 911   Primary Clinic Line  [phone]   24 Hour Appointment Line [phone] or  [phone]) (toll-free)   24 Hour Nurse Line [phone] (toll-free)   Preferred Urgent Care No data recorded   Preferred Hospital [facility]  [phone]     Preferred Pharmacy [facility] [address] [facility] [address] [facility]     Behavioral Health Crisis Line The National Suicide Prevention Lifeline at 1-907.718.3320 or 911             My Care Team Members  Patient Care Team       Relationship Specialty Notifications Start End    Tamica Tang APRN CNP PCP - General Family Medicine  1/4/21     Phone: [phone] Fax: [phone] [address] [facility] [address]    Chelo Roberts RPH Pharmacist Pharmacist  8/13/19     Phone: [phone] [address] [facility] [address]    Christine Tao LGSW Lead Care Coordinator   1/10/20     Tamica Tang APRN CNP Assigned PCP   8/1/21     Phone: [phone]  Fax: [phone] [address] [facility] [address]            My Care Plans  Self Management and Treatment Plan  Goals and (Comments)  Goals        General     Psychosocial (pt-stated)      Notes - Note created  1/18/2022  1:41 PM by Christine Tao LGSW     9- Goal Statement: I will work with the care coordinator to apply for housing stabilization services within 2 months  Date Goal set: 1/18/22  Barriers: may need help with paperwork (on wait list for ARHMS)  Strengths: supportive care team, [facility] involved   Date to Achieve By: 3/18/22  Patient expressed understanding of goal: yes  Action steps to achieve this goal:  1. I will have care coordinator help with application process   2. I will follow through with anything needed to get this going  3. I will continue [place] rent help program in the meantime and await application response for social security disability benefits approval             Action Plans on File:            Depression          Advance Care Plans/Directives Type:   No data recorded    My Medical and Care Information  Problem List   Patient Active Problem List   Diagnosis     Coronary atherosclerosis     Essential hypertension     Mixed hyperlipidemia     Cataract     GERD (gastroesophageal reflux disease)     Microalbuminuria due to type 2 diabetes mellitus (H)     Type 2 diabetes mellitus with diabetic nephropathy, without long-term current use of insulin (H)     Plantar fasciitis     Mild major depression (H)     Painful diabetic neuropathy (H)     W[place]in tumor     CKD (chronic kidney disease) stage 3, GFR 30-59 ml/min (H)     Tobacco use     History of TIA (transient ischemic attack) and stroke      Current Medications and Allergies:  See printed Medication Report.    Care Coordination Start Date: 1/10/2020   Frequency of Care Coordination: monthly     Form Last Updated: 01/18/2022

## 2022-01-18 NOTE — PROGRESS NOTES
Clinic Care Coordination Contact    Follow Up Progress Note      Assessment:     Care coordinator participated in the mnchoices assessment with Laurie Torsten at [facility] via phone.   Clint was ok with CC sending a meds list, diagnosis list, and surgical history to Laurie so she has it for the assessment.Sent via email securely.     Discussed it may be a good idea while awaiting waiver services to get into place to apply for housing stabilization services. CC can help with this. [place].     Lesia (friend) will help with getting pt's mail and signing CASSIE's for the [facility].       Care Gaps:    Health Maintenance Due   Topic Date Due     ZOSTER IMMUNIZATION (1 of 2) Never done     LUNG CANCER SCREENING  01/26/2013     PREVENTIVE CARE VISIT  04/10/2014     HEMOGLOBIN  01/09/2021     COLORECTAL CANCER SCREENING  06/03/2021     INFLUENZA VACCINE (1) 09/01/2021     COVID-19 Vaccine (3 - Booster for Moderna series) 09/22/2021       Patient accepted scheduling phone number for clinic  to schedule independently -- has MTM and clinic visit coming up    Goals addressed this encounter:   Goals Addressed                    This Visit's Progress       Patient Stated       COMPLETED: Psychosocial (pt-stated)         8-Goal Statement: I will get a mnchoices assessment within 1 month to screen onto county waiver   Date Goal set: 11/30/21  Barriers: waiting list   Strengths: motivated  Date to Achieve By: 2/1/22  Patient expressed understanding of goal: yes  Action steps to achieve this goal:  1. I will follow up with the [facility] with anything they need   2. I will keep care coordinator updated   3. I will utilize [facility] worker services as needed to help with any paperwork    As of today's date 12/21/2021 goal is met at 40%  Goal Status:  Active. Left a Vm   As of today's date 12/27/2021 goal is met at 26 - 50%.   Goal Status:  Active. Clint says he hasn't heard form the [facility] yet? He is getting a  renewal PCA assessment though . Will extend goal  As of today's date 1/18/2022 goal is met at 76 - 100%.   Goal Status:  Complete. Assessment completed 1/18/22          Psychosocial (pt-stated)   30%      9- Goal Statement: I will work with the care coordinator to apply for housing stabilization services within 2 months  Date Goal set: 1/18/22  Barriers: may need help with paperwork (on wait list for ARHMS)  Strengths: supportive care team, [facility] involved   Date to Achieve By: 3/18/22  Patient expressed understanding of goal: yes  Action steps to achieve this goal:  1. I will have care coordinator help with application process   2. I will follow through with anything needed to get this going  3. I will continue [place] rent help program in the meantime and await application response for social security disability benefits approval              Plan:   -Mnchoices assessment today  -Look into application for housing stabilization services  -On wait list for PCA services at [facility]  -Working with [employer] to apply for social security disability benefits  [place]  -Lesia (friend) helping informally  -Homecare involved for right now through [place] for RN and HHA services  -Informed patient about upcoming maternity leave and a coverage worker will reach out to him to help as well    Christine Rutledge, MSW, [facility]  Clinic Care Coordinator  [email or web address]  [phone]

## 2022-01-27 DIAGNOSIS — E11.21 TYPE 2 DIABETES MELLITUS WITH DIABETIC NEPHROPATHY, WITHOUT LONG-TERM CURRENT USE OF INSULIN (H): ICD-10-CM

## 2022-01-27 DIAGNOSIS — G45.9 TIA (TRANSIENT ISCHEMIC ATTACK): ICD-10-CM

## 2022-01-27 RX ORDER — EXENATIDE 2 MG/.85ML
INJECTION, SUSPENSION, EXTENDED RELEASE SUBCUTANEOUS
Qty: 4 ML | Refills: 0 | Status: SHIPPED | OUTPATIENT
Start: 2022-01-27 | End: 2022-02-18

## 2022-01-28 ENCOUNTER — PATIENT OUTREACH (OUTPATIENT)
Dept: CARE COORDINATION | Facility: CLINIC | Age: 62
End: 2022-01-28
Payer: COMMERCIAL

## 2022-01-28 NOTE — PROGRESS NOTES
Clinic Care Coordination Contact    Follow Up Progress Note      Assessment:     received an email from Laurie GREENE ([facility]) with a recap on what's been worked on. SW also spoke with patient today and called Disabil[employer] with him and assisted in re-application for cov[place] rent help [place] due today.     Summary:     The CADI assessment was completed on 01-18 and Clint qualifies for services.    We discussed the following:    Waiver Needs  RN; monitor medical conditions, provide education, medication set up.  Monitor BS and BP.   Housekeeping  Home delivered meals  Individualized Home Support (IHS) person to assist w/paperwork, assist with SSA application, budgeting, get home organized.   Adult Day program with transportation to increase socialization and address depressive symptoms.    Lifeline system: They are ordering a lifeline through [employer].  [employer] will authorize.   [facility] will pay monthly fee.   Medication dispensing machine - it talks!    Medical Assistance  Housing Stabilization Services (gets a housing worker for possible move to [place])-- working on getting in place. PCP needs to sign a form for it   [facility] - on wait list    [facility] ([facility] Homecare) got all the paperwork signed by Clint yesterday and that is safely loaded into our system and faxed to [facility].      There is one big joana to getting Clint those services.  He has to be certified disabled either by social security (SSA) or the [facility] Medical Review Team (SMRT or  smert )     Christine/CALI talked with Clint this morning and here is what she reported:    Disability  SSA:   We called [employer]ability [employer]- they confirmed that Clint s SS application is in processing since October and they have not received info about determination yet. He has all paperwork signed that they needed.     SMRT  [place] did a SMRT referral on 01-27 and I worked with CLINT on getting the paperwork completed, Gonzalez got it signed and [place] [facility]     HOUSING:   I  also resubmitted a new application for covid rent help this morning for Clint since he has to reapply and it likely will cover up to the next 3 months at least again. His landlord is aware.    I hope this info helps! He has an appointment with Tamica Tang on 2/7 so I will see if she can sign that housing form and get that [Housing Stabilization Services] going.    Care Gaps:    Health Maintenance Due   Topic Date Due     ZOSTER IMMUNIZATION (1 of 2) Never done     LUNG CANCER SCREENING  01/26/2013     PREVENTIVE CARE VISIT  04/10/2014     HEMOGLOBIN  01/09/2021     COLORECTAL CANCER SCREENING  06/03/2021     INFLUENZA VACCINE (1) 09/01/2021     COVID-19 Vaccine (3 - Booster for Moderna series) 09/22/2021       Patient accepted scheduling phone number for Clinic  to schedule independently     Goals addressed this encounter:   Goals Addressed    None           Plan:   -Reapplication for covid rent help submitted today (should for next 3 months at least)  -[facility] waiver services application/screening in place and patient waiting to hear back from social security office on approval/denial. He is working with [employer]  -Continue homecare   -Lifeline med dispenser in process of getting  -Pt has an appt with Tamica Tang 2/7. Will see if she can complete the housing stabilization form at that appointment and will send form to her via ms.  -Will follow and reach out as needed over next few weeks    AMANDA Fuentes, [facility]  Clinic Care Coordinator  [email or web address]  [phone]

## 2022-02-02 ENCOUNTER — PATIENT OUTREACH (OUTPATIENT)
Dept: CARE COORDINATION | Facility: CLINIC | Age: 62
End: 2022-02-02
Payer: COMMERCIAL

## 2022-02-02 NOTE — PROGRESS NOTES
"Clinic Care Coordination Contact  Care Team Conversations     received a call from [employer] \"[facility]\". She said they have not received an authorization from [facility] for the pendent and could not find pt's PMI # either. Gave her the PMI # [id number].   Patient cannot afford to private pay for the pendent currently. Once waiver is in place and the waiver can cover it they will set up a service agreement with the  and get the pendent to patient. [facility] about this so she is aware and can pass it along to the  once assigned.     Christine Rutledge, MSW, [facility]  Clinic Care Coordinator  [email or web address]  [phone]    "

## 2022-02-07 ENCOUNTER — OFFICE VISIT (OUTPATIENT)
Dept: FAMILY MEDICINE | Facility: CLINIC | Age: 62
End: 2022-02-07

## 2022-02-07 ENCOUNTER — OFFICE VISIT (OUTPATIENT)
Dept: PHARMACY | Facility: PHYSICIAN GROUP | Age: 62
End: 2022-02-07
Payer: COMMERCIAL

## 2022-02-07 VITALS
RESPIRATION RATE: 18 BRPM | DIASTOLIC BLOOD PRESSURE: 87 MMHG | WEIGHT: 173.4 LBS | OXYGEN SATURATION: 97 % | HEIGHT: 65 IN | SYSTOLIC BLOOD PRESSURE: 135 MMHG | BODY MASS INDEX: 28.89 KG/M2 | HEART RATE: 100 BPM | TEMPERATURE: 97.1 F

## 2022-02-07 DIAGNOSIS — N18.32 STAGE 3B CHRONIC KIDNEY DISEASE (H): ICD-10-CM

## 2022-02-07 DIAGNOSIS — G47.00 INSOMNIA, UNSPECIFIED TYPE: ICD-10-CM

## 2022-02-07 DIAGNOSIS — E11.21 TYPE 2 DIABETES MELLITUS WITH DIABETIC NEPHROPATHY, WITHOUT LONG-TERM CURRENT USE OF INSULIN (H): Primary | ICD-10-CM

## 2022-02-07 DIAGNOSIS — I10 ESSENTIAL HYPERTENSION: ICD-10-CM

## 2022-02-07 DIAGNOSIS — F33.0 MAJOR DEPRESSIVE DISORDER, RECURRENT, MILD (H): ICD-10-CM

## 2022-02-07 DIAGNOSIS — F32.A DEPRESSION, UNSPECIFIED DEPRESSION TYPE: ICD-10-CM

## 2022-02-07 DIAGNOSIS — M25.552 HIP PAIN, LEFT: ICD-10-CM

## 2022-02-07 DIAGNOSIS — G45.9 TIA (TRANSIENT ISCHEMIC ATTACK): ICD-10-CM

## 2022-02-07 DIAGNOSIS — E11.40 PAINFUL DIABETIC NEUROPATHY (H): ICD-10-CM

## 2022-02-07 DIAGNOSIS — I25.10 ATHEROSCLEROSIS OF CORONARY ARTERY OF NATIVE HEART WITHOUT ANGINA PECTORIS, UNSPECIFIED VESSEL OR LESION TYPE: ICD-10-CM

## 2022-02-07 DIAGNOSIS — E78.2 MIXED HYPERLIPIDEMIA: ICD-10-CM

## 2022-02-07 DIAGNOSIS — Z86.73 HISTORY OF TIA (TRANSIENT ISCHEMIC ATTACK) AND STROKE: ICD-10-CM

## 2022-02-07 PROBLEM — F32.9 MAJOR DEPRESSION: Status: RESOLVED | Noted: 2019-09-09 | Resolved: 2022-02-07

## 2022-02-07 LAB
% GRANULOCYTES: 71.2 % (ref 42.2–75.2)
HBA1C MFR BLD: 5.9 % (ref 4–6)
HCT VFR BLD AUTO: 50.7 % (ref 39–51)
HEMOGLOBIN: 16.7 G/DL (ref 13.4–17.5)
LYMPHOCYTES NFR BLD AUTO: 22.7 % (ref 20.5–51.1)
MCH RBC QN AUTO: 32 PG (ref 27–31)
MCHC RBC AUTO-ENTMCNC: 33 G/DL (ref 33–37)
MCV RBC AUTO: 97.1 FL (ref 80–100)
MONOCYTES NFR BLD AUTO: 6.1 % (ref 1.7–9.3)
PLATELET # BLD AUTO: 324 K/UL (ref 140–450)
RBC # BLD AUTO: 5.22 X10/CMM (ref 4.2–5.9)
WBC # BLD AUTO: 11 X10/CMM (ref 3.8–11)

## 2022-02-07 PROCEDURE — 99606 MTMS BY PHARM EST 15 MIN: CPT | Performed by: PHARMACIST

## 2022-02-07 PROCEDURE — 99214 OFFICE O/P EST MOD 30 MIN: CPT | Performed by: NURSE PRACTITIONER

## 2022-02-07 PROCEDURE — 36415 COLL VENOUS BLD VENIPUNCTURE: CPT | Performed by: NURSE PRACTITIONER

## 2022-02-07 PROCEDURE — 85025 COMPLETE CBC W/AUTO DIFF WBC: CPT | Performed by: NURSE PRACTITIONER

## 2022-02-07 PROCEDURE — 93050 ART PRESSURE WAVEFORM ANALYS: CPT | Performed by: NURSE PRACTITIONER

## 2022-02-07 PROCEDURE — 83036 HEMOGLOBIN GLYCOSYLATED A1C: CPT | Performed by: NURSE PRACTITIONER

## 2022-02-07 PROCEDURE — 99607 MTMS BY PHARM ADDL 15 MIN: CPT | Performed by: PHARMACIST

## 2022-02-07 RX ORDER — AMITRIPTYLINE HYDROCHLORIDE 10 MG/1
10 TABLET ORAL AT BEDTIME
COMMUNITY
End: 2022-02-24

## 2022-02-07 RX ORDER — LANCETS
EACH MISCELLANEOUS
COMMUNITY
Start: 2022-01-27 | End: 2022-11-28

## 2022-02-07 ASSESSMENT — MIFFLIN-ST. JEOR: SCORE: 1503.42

## 2022-02-07 NOTE — PROGRESS NOTES
"Problem(s) Oriented visit        SUBJECTIVE:                                                    Emily Zhu is a 62 year old male who presents to clinic today for the following health issues :    Seen by [facility] pharmacist, Chelo Roberts [facility] today also.  Diabetes - well controlled. Using Bydureon 2 mg weekly, Metformin 1000 mg BID. Has diabetic neuropathy and wants to restart Amitriptyline.     Hypertension - controlled with Lisinopril 10 mg daily, Metoprolol 25 mg BID, Amlodipine 10 mg daily.   BP Readings from Last 3 Encounters:   02/07/22 135/87   10/11/21 98/64   09/29/21 109/59     Stage 3b CKD - stable    Chronic left hip pain, using walker for ambulation. Left sided weakness since CVA last fall.    Depression - Sertraline 100 mg daily. Stable  PHQ 8/14/2020 4/5/2021 9/29/2021   PHQ-9 Total Score 8 8 10   Q9: Thoughts of better off dead/self-harm past 2 weeks Not at all Several days Not at all     Has multiple forms to be filled out today for housing services and other forms requested by .    Problem list, Medication list, Allergies, and Medical/Social/Surgical histories reviewed in [employer] and updated as appropriate.   Additional history: as documented    ROS:  7 point ROS completed and negative except noted above, including Gen, CV, Resp, GI, MS, Neuro, Psych    OBJECTIVE:                                                    /87   Pulse 100   Temp 97.1  F (36.2  C) (Temporal)   Resp 18   Ht 1.651 m (5' 5\")   Wt 78.7 kg (173 lb 6.4 oz)   SpO2 97%   BMI 28.86 kg/m    Body mass index is 28.86 kg/m .   GENERAL: Chronically ill adult male, no acute distress  RESP: lungs clear to auscultation - no rales, rhonchi or wheezes  CV: tachycardia, normal S1 S2, no S3 or S4, no murmur, click or rub, peripheral pulses strong and no peripheral edema  MS: left sided weakness, using walker for ambulation  SKIN: no suspicious lesions or rashes  NEURO: forgetful, oriented to person, place. Decreased " sensation of bilateral feet and hands  PSYCH: depressed mood     ASSESSMENT/PLAN:                                                      Emily was seen today for recheck medication.    Diagnoses and all orders for this visit:    Type 2 diabetes mellitus with diabetic nephropathy, without long-term current use of insulin (H)  -     CO ART PRESS WAVEFORM ANALYS CENTRAL ART PRESSURE  -     Hemoglobin A1C ([facility])  -     CBC with Diff/Plt ([facility])  -     Comp. Metabolic Panel (14) ([facility])  -     VENOUS COLLECTION    Painful diabetic neuropathy (H)    Essential hypertension    Stage 3b chronic kidney disease (H)    Hip pain, left    History of TIA (transient ischemic attack) and stroke    Major depressive disorder, recurrent, mild (H)      Forms filled out with patient present. Scanned into media section of chart per care coordinator request. Physical therapy added to home care order.     CARROL Pretty CNP  [facility]  Family Practice  [facility]  [phone]    For any issues my office # is [phone]

## 2022-02-07 NOTE — PROGRESS NOTES
Medication Therapy Management (MTM) Encounter    ASSESSMENT:                            Medication Adherence/Access: No issues identified, but need to confirm med list with home care nurse since no list brought with him today.     Type 2 Diabetes: Patient is meeting A1c goal of < 7%. Self monitoring of blood glucose is at goal of fasting  mg/dL. Patient would benefit from cutting out glipizide due to risk of falls and lows.     Hypertension/TIA/CAD: Stable.     Depression/Insomnia/neuropathy: Overall mood improved today, but having increased pain related to neuropathy without the amitriptyline. Discussed trial of gabapentin however he strongly prefers to go back to the amitriptyline he had been taking prior.     Hip Pain: physical therapy referral to be considered by PCP.     Hyperlipidemia: Stable.    PLAN:                            1. Stop glipizide- MTM will work with home care to update meds at set up    2. Restart amtriptine 1 daily at bedtime. Not willing to try gabapentin.    Follow-up: Return in about 6 weeks (around 3/21/2022) for MTM visit in clinic.     SUBJECTIVE/OBJECTIVE:                          Emily Zhu is a 62 year old male coming in for a follow-up visit. Today's visit is a co-visit with Beata Tang CNP. Today's visit is a follow-up MTM visit from 10/16     Reason for visit: medication follow up, Nurse Gonzalez [phone] sets up meds and helps weekly shot. Has social security and disability paperwork to fill out today.     Allergies/ADRs: Reviewed in chart  Past Medical History: Reviewed in chart  Tobacco: He reports that he has been smoking cigarettes. He has a 40.00 pack-year smoking history. He has never used smokeless tobacco.Tobacco Cessation Action Plan:   Information offered: Patient not interested at this time- down to 5 per day.   Alcohol: not currently using    Medication Adherence/Access: no issues reported     Type 2 Diabetes:  Currently taking glipizide XL 5mg daily and  metformin ER 1000mg twice daily, Bydureon weekly (help from weekly nurse visit)- working much better for him than daily Victoza.   152, 122, 116, 106, 91, 117, 109, 105, 95, 100, 96, 97  Patient is not experiencing side effects.  Has been eating less due to limited income and food availability. Working with care coordinator on many different resources including housing and other supports.   Symptoms of low blood sugar? none  Symptoms of high blood sugar? none  Aspirin: 81mg daily and taking plavix once daily.   Statin: Yes: atorvastatin   ACEi/ARB: Yes: lisinopril.   Lab Results   Component Value Date    A1C 5.9 02/07/2022    A1C 6.8 10/12/2021    A1C 7.5 07/14/2021    A1C 8.3 04/05/2021    A1C 7.6 12/18/2020       Hypertension/TIA/CAD: Current medications include amlodipine 10mg daily, hydrochlorothiazide 25mg daily, lisinopril 10mg daily, isosorbide 30mg daily, metoprolol tartrate 25mg twice daily. He is currently on aspirin 81mg daily plus clopidogrel 75mg now. Had been on clopidogrel alone prior to TIA that occurred in [place] on 6/22/2021.  Patient does not self-monitor blood pressure.  Patient reports no current medication side effects.  BP Readings from Last 3 Encounters:   10/11/21 98/64   09/29/21 109/59   08/09/21 128/85       Depression/Insomnia/Neuropathy:  Current medications include: sertraline 100mg daily (started in April increased in Aug 2021), No longer on amitriptyline 10mg, but has had worsening of tingling and pain in his legs now off it and wants to restart.   Affect today is brighter, more hopeful in speaking about his life and the things he has gone through in his life. Continues to work with care coordination for ongoing services and resources. Housing services paperwork being filled out today.   Home care services, but would like to get physical therapy to help with strength.   Social security and disability paperwork needs to be filled out today. He signed the social security paperwork,  "disability paperwork needs to be filled out by provider today.     Hip Pain: using a walker to get around, Left side hip pain is bothering him. He would like to try to get physical therapy back to help him increase strength.    Getting help showering/shaving.     Hyperlipidemia: Current therapy includes atorvastatin 40mg daily.  Patient reports no significant myalgias or other side effects.  Recent Labs   Lab Test 09/29/21  1430 08/14/20  1105   CHOL 131 165   HDL 32* 38*   LDL 54 82   TRIG 289* 224*     BP Readings from Last 1 Encounters:   10/11/21 98/64     Pulse Readings from Last 1 Encounters:   10/11/21 77     Wt Readings from Last 1 Encounters:   10/11/21 174 lb (78.9 kg)     Ht Readings from Last 1 Encounters:   05/03/21 5' 5\" (1.651 m)     Estimated body mass index is 28.86 kg/m  as calculated from the following:    Height as of an earlier encounter on 2/7/22: 5' 5\" (1.651 m).    Weight as of an earlier encounter on 2/7/22: 173 lb 6.4 oz (78.7 kg).    Temp Readings from Last 1 Encounters:   09/29/21 97.9  F (36.6  C)       ----------------    I spent 45 minutes with this patient today (an extra 15 minutes was spent creating the Medication Action Plan). All changes were made via collaborative practice agreement with CARROL Pretty CNP. A copy of the visit note was provided to the patient's provider(s).    The patient was given a summary of these recommendations.     Chelo Roberts, Pharm.D, [facility]  Medication Therapy Management Pharmacist  [phone]         Medication Therapy Recommendations  Painful diabetic neuropathy (H)    Current Medication: amitriptyline (ELAVIL) 10 MG tablet   Rationale: Untreated condition - Needs additional medication therapy - Indication   Recommendation: Start Medication - gabapentin 100 MG capsule - patient declined gabapentin but willing to restart amitriptyline   Status: Accepted per CPA         Type 2 diabetes mellitus with diabetic nephropathy, without long-term " current use of insulin (H)    Current Medication: glipiZIDE (GLUCOTROL XL) 5 MG 24 hr tablet   Rationale: Nonmedication therapy more appropriate - Unnecessary medication therapy - Indication   Recommendation: Discontinue Medication - stop glipizide   Status: Accepted per CPA

## 2022-02-08 PROCEDURE — G0179 MD RECERTIFICATION HHA PT: HCPCS | Performed by: NURSE PRACTITIONER

## 2022-02-09 LAB
ALBUMIN SERPL-MCNC: 4.4 G/DL (ref 3.8–4.8)
ALBUMIN/GLOB SERPL: 1.3 {RATIO} (ref 1.2–2.2)
ALP SERPL-CCNC: 55 IU/L (ref 44–121)
ALT SERPL-CCNC: 87 IU/L (ref 0–44)
AST SERPL-CCNC: 43 IU/L (ref 0–40)
BILIRUB SERPL-MCNC: <0.2 MG/DL (ref 0–1.2)
BUN SERPL-MCNC: 30 MG/DL (ref 8–27)
BUN/CREATININE RATIO: 13 (ref 10–24)
CALCIUM SERPL-MCNC: 10 MG/DL (ref 8.6–10.2)
CHLORIDE SERPLBLD-SCNC: 100 MMOL/L (ref 96–106)
CREAT SERPL-MCNC: 2.24 MG/DL (ref 0.76–1.27)
EGFR IF AFRICN AM: 35 ML/MIN/1.73
EGFR IF NONAFRICN AM: 30 ML/MIN/1.73
GLOBULIN, TOTAL: 3.4 G/DL (ref 1.5–4.5)
GLUCOSE SERPL-MCNC: 161 MG/DL (ref 65–99)
POTASSIUM SERPL-SCNC: 4.9 MMOL/L (ref 3.5–5.2)
PROT SERPL-MCNC: 7.8 G/DL (ref 6–8.5)
SODIUM SERPL-SCNC: 136 MMOL/L (ref 134–144)
TOTAL CO2: 18 MMOL/L (ref 20–29)

## 2022-02-10 ENCOUNTER — MEDICAL CORRESPONDENCE (OUTPATIENT)
Dept: FAMILY MEDICINE | Facility: CLINIC | Age: 62
End: 2022-02-10

## 2022-02-10 ENCOUNTER — TELEPHONE (OUTPATIENT)
Dept: FAMILY MEDICINE | Facility: CLINIC | Age: 62
End: 2022-02-10

## 2022-02-10 NOTE — TELEPHONE ENCOUNTER
----- Message from CARROL Lopez CNP sent at 2/9/2022  3:25 PM CST -----  Please call patient with the following results/message:    Should schedule follow-up appointment with me in next 4-6 weeks.    CARROL Pretty CNP on 2/9/2022 at 3:25 PM

## 2022-02-11 ENCOUNTER — MEDICAL CORRESPONDENCE (OUTPATIENT)
Dept: FAMILY MEDICINE | Facility: CLINIC | Age: 62
End: 2022-02-11

## 2022-02-11 DIAGNOSIS — F32.A DEPRESSION, UNSPECIFIED DEPRESSION TYPE: ICD-10-CM

## 2022-02-11 DIAGNOSIS — I10 ESSENTIAL HYPERTENSION: ICD-10-CM

## 2022-02-11 RX ORDER — SERTRALINE HYDROCHLORIDE 100 MG/1
100 TABLET, FILM COATED ORAL DAILY
Qty: 90 TABLET | Refills: 3 | Status: SHIPPED | OUTPATIENT
Start: 2022-02-11 | End: 2023-03-17

## 2022-02-11 RX ORDER — LISINOPRIL 10 MG/1
10 TABLET ORAL DAILY
Qty: 30 TABLET | Refills: 3 | Status: SHIPPED | OUTPATIENT
Start: 2022-02-11 | End: 2022-06-24

## 2022-02-11 NOTE — TELEPHONE ENCOUNTER
LISINOPRIL  SERTRALINE    LOV AND LABS: 2/7/22    BP Readings from Last 3 Encounters:   02/07/22 135/87   10/11/21 98/64   09/29/21 109/59     PHQ-9 score:    PHQ 9/29/2021   PHQ-9 Total Score 10   Q9: Thoughts of better off dead/self-harm past 2 weeks Not at all

## 2022-02-16 ENCOUNTER — PATIENT OUTREACH (OUTPATIENT)
Dept: NURSING | Facility: CLINIC | Age: 62
End: 2022-02-16
Payer: COMMERCIAL

## 2022-02-16 NOTE — PROGRESS NOTES
Clinic Care Coordination Contact    Follow Up Progress Note      Assessment: [facility] attempted to reach Public Health Nurse, Laurie, to discuss paperwork that has been requested, as well as Housing Stabilization Services. No answer. [facility] left a voicemail.    [facility] contacted patient. Introduced self and role. Explained that she contacted Laurie to work through some of the things him and SW, Christine, were working on. Patient said that everything is at a stand still right now. [facility] stated that she will continue to work towards getting things moving for him. No further questions    Care Gaps:    Health Maintenance Due   Topic Date Due     ZOSTER IMMUNIZATION (1 of 2) Never done     LUNG CANCER SCREENING  01/26/2013     PREVENTIVE CARE VISIT  04/10/2014     COLORECTAL CANCER SCREENING  06/03/2021     INFLUENZA VACCINE (1) 09/01/2021     COVID-19 Vaccine (3 - Booster for Moderna series) 09/22/2021       Care Gaps not addressed during this encounter d/t focus on psychosocial needs. Will address once psychosocial needs are met      Goals addressed this encounter:   Goals Addressed                    This Visit's Progress       Patient Stated       Psychosocial (pt-stated)   50%      9- Goal Statement: I will work with the care coordinator to apply for housing stabilization services within 2 months  Date Goal set: 1/18/22  Barriers: may need help with paperwork (on wait list for [facility])  Strengths: supportive care team, [facility] involved   Date to Achieve By: 3/18/22  Patient expressed understanding of goal: yes  Action steps to achieve this goal:  1. I will have care coordinator help with application process   2. I will follow through with anything needed to get this going  3. I will continue [place] in the meantime and await application response for social security disability benefits approval  As of today's date 1/28/2022 goal is met at 26 - 50%.   Goal Status:  Showing progress              Intervention/Education provided during outreach: Open ended questions     Outreach Frequency: monthly    Plan:   Care Coordinator will follow up in 1 month    MILTON Castro  Primary Care Clinic- Social Work Care Coordinator  [facility] [place] [place] and [facility]  Ph: [phone]  2/16/2022 3:04 PM

## 2022-02-18 ENCOUNTER — PATIENT OUTREACH (OUTPATIENT)
Dept: NURSING | Facility: CLINIC | Age: 62
End: 2022-02-18
Payer: COMMERCIAL

## 2022-02-18 DIAGNOSIS — E11.21 TYPE 2 DIABETES MELLITUS WITH DIABETIC NEPHROPATHY, WITHOUT LONG-TERM CURRENT USE OF INSULIN (H): ICD-10-CM

## 2022-02-18 RX ORDER — EXENATIDE 2 MG/.85ML
INJECTION, SUSPENSION, EXTENDED RELEASE SUBCUTANEOUS
Qty: 4 ML | Refills: 0 | Status: SHIPPED | OUTPATIENT
Start: 2022-02-18 | End: 2022-02-28

## 2022-02-18 NOTE — PROGRESS NOTES
Clinic Care Coordination Contact    Follow Up Progress Note      Assessment: [facility] received a call from patient. He said that he needs to apply for rent assistance again and asked for [facility] to call and fill out the application for him. [facility] explained that she is unable to do this, but did provide him with the phone number so [place] [employer].     Care Gaps:    Health Maintenance Due   Topic Date Due     ZOSTER IMMUNIZATION (1 of 2) Never done     LUNG CANCER SCREENING  01/26/2013     PREVENTIVE CARE VISIT  04/10/2014     COLORECTAL CANCER SCREENING  06/03/2021     INFLUENZA VACCINE (1) 09/01/2021     COVID-19 Vaccine (3 - Booster for Moderna series) 09/22/2021     EYE EXAM  03/17/2022       Care Gaps not addressed during this encounter d/t focus on psychosocial needs. Will address once psychosocial needs are met      Goals addressed this encounter:   Goals Addressed                    This Visit's Progress       Patient Stated       Psychosocial (pt-stated)   50%      9- Goal Statement: I will work with the care coordinator to apply for housing stabilization services within 2 months  Date Goal set: 1/18/22  Barriers: may need help with paperwork (on wait list for [facility])  Strengths: supportive care team, [facility] involved   Date to Achieve By: 3/18/22  Patient expressed understanding of goal: yes  Action steps to achieve this goal:  1. I will have care coordinator help with application process   2. I will follow through with anything needed to get this going  3. I will [place] in the meantime and await application response for social security disability benefits approval  As of today's date 1/28/2022 goal is met at 26 - 50%.   Goal Status:  Showing progress             Intervention/Education provided during outreach: Open ended questions     Outreach Frequency: monthly    Plan:   Care Coordinator will follow up in 1 month    MILTON Castro  Primary Care Clinic- Social Work  Care Coordinator  [facility] [place] and [facility]  Ph: [phone]  2/18/2022 1:41 PM

## 2022-02-18 NOTE — TELEPHONE ENCOUNTER
bydureon bcise injection(exenatide ER 2mg/0.85ml    LOV  And labs 2/7/22    Lab Results   Component Value Date    A1C 5.9 02/07/2022    A1C 6.8 10/12/2021    A1C 7.5 07/14/2021    A1C 8.3 04/05/2021    A1C 7.6 12/18/2020

## 2022-02-22 ENCOUNTER — PATIENT OUTREACH (OUTPATIENT)
Dept: CARE COORDINATION | Facility: CLINIC | Age: 62
End: 2022-02-22
Payer: COMMERCIAL

## 2022-02-22 NOTE — PROGRESS NOTES
Clinic Care Coordination Contact  Care Team Conversations    EDDIE LEIVA received vm from Diana with [facility] [employer] regarding referral they had been keeping open for patient. Diana had been talking with Christine LEIVA for [facility] working with this patient. She said they cannot do anything with services for patient until he is on a waiver in order to be able to set up billing. Diana is aware they were working on getting services set up. They kept it open but now it's been too long, about 3 weeks ago so they can't proceed with entering information in the system or start services.   His case is closed   If we do get waiver in place, then they'll need a new referral submitted. Can submit the referral online at [facility]/health-alert      MILTON Cardona/DONTE Care Coordination Supervisor  [facility] - Care Coordination   2/22/2022 3:17 PM  [phone]

## 2022-02-24 DIAGNOSIS — E11.40 PAINFUL DIABETIC NEUROPATHY (H): Primary | ICD-10-CM

## 2022-02-24 RX ORDER — AMITRIPTYLINE HYDROCHLORIDE 10 MG/1
TABLET ORAL
Qty: 30 TABLET | Refills: 0 | Status: SHIPPED | OUTPATIENT
Start: 2022-02-24 | End: 2022-03-08

## 2022-02-28 DIAGNOSIS — E11.21 TYPE 2 DIABETES MELLITUS WITH DIABETIC NEPHROPATHY, WITHOUT LONG-TERM CURRENT USE OF INSULIN (H): ICD-10-CM

## 2022-02-28 RX ORDER — EXENATIDE 2 MG/.85ML
INJECTION, SUSPENSION, EXTENDED RELEASE SUBCUTANEOUS
Qty: 4 ML | Refills: 1 | Status: SHIPPED | OUTPATIENT
Start: 2022-02-28 | End: 2022-05-19

## 2022-02-28 NOTE — TELEPHONE ENCOUNTER
BYDUREON BCISE 2 MG/0.85ML auto-injector    LOV and labs 2/7/22    Lab Results   Component Value Date    A1C 5.9 02/07/2022    A1C 6.8 10/12/2021    A1C 7.5 07/14/2021    A1C 8.3 04/05/2021    A1C 7.6 12/18/2020

## 2022-03-01 ENCOUNTER — MEDICAL CORRESPONDENCE (OUTPATIENT)
Dept: FAMILY MEDICINE | Facility: CLINIC | Age: 62
End: 2022-03-01

## 2022-03-08 ENCOUNTER — MEDICAL CORRESPONDENCE (OUTPATIENT)
Dept: FAMILY MEDICINE | Facility: CLINIC | Age: 62
End: 2022-03-08

## 2022-03-08 DIAGNOSIS — E11.40 PAINFUL DIABETIC NEUROPATHY (H): ICD-10-CM

## 2022-03-09 ENCOUNTER — PATIENT OUTREACH (OUTPATIENT)
Dept: CARE COORDINATION | Facility: CLINIC | Age: 62
End: 2022-03-09
Payer: COMMERCIAL

## 2022-03-09 RX ORDER — AMITRIPTYLINE HYDROCHLORIDE 10 MG/1
10 TABLET ORAL AT BEDTIME
Qty: 90 TABLET | Refills: 0 | Status: SHIPPED | OUTPATIENT
Start: 2022-03-09 | End: 2022-05-09 | Stop reason: SINTOL

## 2022-03-14 ENCOUNTER — MEDICAL CORRESPONDENCE (OUTPATIENT)
Dept: FAMILY MEDICINE | Facility: CLINIC | Age: 62
End: 2022-03-14

## 2022-03-18 ENCOUNTER — OFFICE VISIT (OUTPATIENT)
Dept: FAMILY MEDICINE | Facility: CLINIC | Age: 62
End: 2022-03-18

## 2022-03-18 VITALS
DIASTOLIC BLOOD PRESSURE: 87 MMHG | HEART RATE: 78 BPM | BODY MASS INDEX: 28.29 KG/M2 | OXYGEN SATURATION: 99 % | TEMPERATURE: 97.6 F | SYSTOLIC BLOOD PRESSURE: 143 MMHG | WEIGHT: 170 LBS

## 2022-03-18 DIAGNOSIS — E11.21 TYPE 2 DIABETES MELLITUS WITH DIABETIC NEPHROPATHY, WITHOUT LONG-TERM CURRENT USE OF INSULIN (H): Primary | ICD-10-CM

## 2022-03-18 DIAGNOSIS — K21.9 GASTROESOPHAGEAL REFLUX DISEASE, UNSPECIFIED WHETHER ESOPHAGITIS PRESENT: ICD-10-CM

## 2022-03-18 DIAGNOSIS — Z23 NEEDS FLU SHOT: ICD-10-CM

## 2022-03-18 DIAGNOSIS — I10 ESSENTIAL HYPERTENSION: ICD-10-CM

## 2022-03-18 DIAGNOSIS — Z12.11 SPECIAL SCREENING FOR MALIGNANT NEOPLASMS, COLON: ICD-10-CM

## 2022-03-18 DIAGNOSIS — R79.89 ELEVATED LFTS: ICD-10-CM

## 2022-03-18 PROCEDURE — 99214 OFFICE O/P EST MOD 30 MIN: CPT | Mod: 25 | Performed by: NURSE PRACTITIONER

## 2022-03-18 PROCEDURE — 90471 IMMUNIZATION ADMIN: CPT | Mod: 59 | Performed by: NURSE PRACTITIONER

## 2022-03-18 PROCEDURE — 90686 IIV4 VACC NO PRSV 0.5 ML IM: CPT | Performed by: NURSE PRACTITIONER

## 2022-03-18 PROCEDURE — 36415 COLL VENOUS BLD VENIPUNCTURE: CPT | Performed by: NURSE PRACTITIONER

## 2022-03-18 RX ORDER — MECOBALAMIN 5000 MCG
15 TABLET,DISINTEGRATING ORAL DAILY
Qty: 30 CAPSULE | Refills: 1 | Status: SHIPPED | OUTPATIENT
Start: 2022-03-18 | End: 2022-05-20

## 2022-03-18 NOTE — PATIENT INSTRUCTIONS
Start Lansoprazole 1 pill once daily 30-60 minutes prior to first meal to help with acid reflux    Flu shot today    Follow-up with me and Chelo 5/9

## 2022-03-18 NOTE — PROGRESS NOTES
Problem(s) Oriented visit        SUBJECTIVE:                                                    Emily Zhu is a 62 year old male who presents to clinic today for the following health issues :    Not sleeping well for past 2 weeks. No environmental changes.   Feels like acid reflux has been worse. Does not drink alcohol but continues to smoke. Taking Tums as needed, but doesn't help much  Diabetes - well controlled. A1C last month = 5.9%.   Hypertension - slightly above goal < 140/90 today.   BP Readings from Last 3 Encounters:   03/18/22 (!) 143/87   02/07/22 135/87   10/11/21 98/64       Problem list, Medication list, Allergies, and Medical/Social/Surgical histories reviewed in [facility] and updated as appropriate.   Additional history: as documented    ROS:  7 point ROS completed and negative except noted above, including Gen, CV, Resp, GI, MS, Neuro, Mood    OBJECTIVE:                                                    BP (!) 143/87   Pulse 78   Temp 97.6  F (36.4  C)   Wt 77.1 kg (170 lb)   SpO2 99%   BMI 28.29 kg/m    Body mass index is 28.29 kg/m .   GENERAL: Adult male appears older than age, chronically ill, no acute distress  RESP: lungs clear to auscultation - no rales, rhonchi or wheezes  CV: regular rates and rhythm, normal S1 S2, no S3 or S4, no murmur, click or rub  MS: extremities normal- no gross deformities noted  PSYCH: normal affect & mood     ASSESSMENT/PLAN:                                                      Emily was seen today for diabetes and hypertension.    Diagnoses and all orders for this visit:    Type 2 diabetes mellitus with diabetic nephropathy, without long-term current use of insulin (H)  -     OPTOMETRY REFERRAL; Future  -     Comp. Metabolic Panel (14) ([facility])  -     VENOUS COLLECTION  Well controlled. Due for A1C in May. No changes in medication today.     Essential hypertension  -     Comp. Metabolic Panel (14) ([facility])  -     VENOUS COLLECTION  Above goal <  140/90 today. Lifestyle modifications reviewed. No medication changes. Plan for recheck at appointment in May and adjusting medication at that time if still high    Elevated LFTs  -     Comp. Metabolic Panel (14) ([facility])  -     VENOUS COLLECTION  Liver enzymes elevated on last lab. Will recheck to make sure not rising    Gastroesophageal reflux disease, unspecified whether esophagitis present  -     LANsoprazole (PREVACID) 15 MG DR capsule; Take 1 capsule (15 mg) by mouth daily  -     VENOUS COLLECTION  Dietary changes to help with reflux discussed. Prescription for Lansoprazole once daily.    Needs flu shot  -     VACCINE ADMINISTRATION, INITIAL  -     ID FLU VAC PRESRV FREE QUAD SPLIT VIR IM  MONTHS IM    Special screening for malignant neoplasms, colon  -     Adult Gastro Ref - Procedure Only; Future  -     VENOUS COLLECTION        See Patient Instructions  Patient Instructions   Start Lansoprazole 1 pill once daily 30-60 minutes prior to first meal to help with acid reflux    Flu shot today    Follow-up with me and Chelo 5/9      CARROL Pretty CNP  [facility]  Family Practice  [facility]  [phone]    For any issues my office # is [phone]

## 2022-03-19 LAB
ALBUMIN SERPL-MCNC: 4.3 G/DL (ref 3.8–4.8)
ALBUMIN/GLOB SERPL: 1.5 {RATIO} (ref 1.2–2.2)
ALP SERPL-CCNC: 54 IU/L (ref 44–121)
ALT SERPL-CCNC: 35 IU/L (ref 0–44)
AST SERPL-CCNC: 20 IU/L (ref 0–40)
BILIRUB SERPL-MCNC: 0.4 MG/DL (ref 0–1.2)
BUN SERPL-MCNC: 38 MG/DL (ref 8–27)
BUN/CREATININE RATIO: 16 (ref 10–24)
CALCIUM SERPL-MCNC: 10.1 MG/DL (ref 8.6–10.2)
CHLORIDE SERPLBLD-SCNC: 97 MMOL/L (ref 96–106)
CREAT SERPL-MCNC: 2.42 MG/DL (ref 0.76–1.27)
EGFR: 29 ML/MIN/1.73
GLOBULIN, TOTAL: 2.8 G/DL (ref 1.5–4.5)
GLUCOSE SERPL-MCNC: 216 MG/DL (ref 65–99)
POTASSIUM SERPL-SCNC: 4.8 MMOL/L (ref 3.5–5.2)
PROT SERPL-MCNC: 7.1 G/DL (ref 6–8.5)
SODIUM SERPL-SCNC: 136 MMOL/L (ref 134–144)
TOTAL CO2: 21 MMOL/L (ref 20–29)

## 2022-03-21 ENCOUNTER — TELEPHONE (OUTPATIENT)
Dept: FAMILY MEDICINE | Facility: CLINIC | Age: 62
End: 2022-03-21

## 2022-03-21 DIAGNOSIS — N18.32 STAGE 3B CHRONIC KIDNEY DISEASE (H): Primary | ICD-10-CM

## 2022-03-21 NOTE — TELEPHONE ENCOUNTER
----- Message from CARROL Lopez CNP sent at 3/21/2022  8:04 AM CDT -----  Please call patient with the following results/message:    Kidney function worsening. Needs BMP in 2 weeks. Drink plenty of water. No Ibuprofen or other NSAID medication.    CARROL Pretty CNP on 3/21/2022 at 8:04 AM

## 2022-03-22 ENCOUNTER — PATIENT OUTREACH (OUTPATIENT)
Dept: CARE COORDINATION | Facility: CLINIC | Age: 62
End: 2022-03-22
Payer: COMMERCIAL

## 2022-03-22 NOTE — PROGRESS NOTES
Clinic Care Coordination Contact    Follow Up Progress Note      Assessment: EDDIE LEIVA spoke with patient. He states he has not gotten updates from  re: Social Security disability application. Re: rent he said he has a friend borrowing him money to pay rent. EDDIE LEIVA let pt know am working on trying to find out from [employer] if he's getting further help. He has received some money for rent help and uncertain if he'll be getting more. Also will check on [facility] worker. Pt states he needs PCA services. This is planned for him as well but [facility] needs the waiver to go through first, which needs disability before he gets waiver. EDDIE LEIVA will clarify with Laurie with [facility].     Care Gaps:    Health Maintenance Due   Topic Date Due     ZOSTER IMMUNIZATION (1 of 2) Never done     LUNG CANCER SCREENING  01/26/2013     PREVENTIVE CARE VISIT  04/10/2014     COLORECTAL CANCER SCREENING  06/03/2021     COVID-19 Vaccine (3 - Booster for Moderna series) 09/22/2021     EYE EXAM  03/17/2022     PHQ-9  03/29/2022         Goals addressed this encounter:   Goals Addressed                    This Visit's Progress       Patient Stated       Psychosocial (pt-stated)   60%      9- Goal Statement: I will work with the care coordinator to apply for housing stabilization services within 2-4 months  Date Goal set: 1/18/22  Barriers: may need help with paperwork (on wait list for [facility])  Strengths: supportive care team, [facility] involved   Date to Achieve By: 4/18/22  Patient expressed understanding of goal: yes  Action steps to achieve this goal:  1. I will have care coordinator help with application process   2. I will follow through with anything needed to get this going  3. [place] in the meantime and await application response for social security disability benefits approval              Intervention/Education provided during outreach: Pt was advised if he has money to pay his  rent he should do so to avoid eviction.     EDDIE LEIVA contacted [place] [phone] who manages the [employer]. She was unable to tell writer anything without patient on the phone.     EDDIE LEIVA did reach out to pt caterina who states patient was denied [employer] and the $800+ they had paid for one months rent they stopped that check too. Pt owes full rent by tomorrow. Let landlord know pt stated he had money and was going to pay tomorrow.     EDDIE LEIVA contacted [facility] location. Was told there is no waiting list for [facility] services so patient was supposedly not on a list. Could've been that at the time they didn't have a [facility] worker available. They're now able to schedule patient with [facility] worker, soonest appointment is May 17th. EDDIE LEIVA scheduled patient for this appt. 3pm. Pt will need to answer the phone 24-48 hours prior to that appointment the [facility] worker will call pt. If doesn't answer they cancel the appointment. Was also told patient will need diagnostic assessment this will happen then at first appointment with Sandrine Lane. EDDIE LEIVA will let pt know.      Outreach Frequency: monthly        Plan:   -EDDIE LEIVA will follow back up with pt to let him know of [facility] services appt.   -Check on PCA wait list with [facility]  -Check on disability status/update  -Follow up on finding Housing consultation services.     Care Coordinator will follow up with patient after more information is known.        MILTON Cardona for PATRICIA Sorensen   FPA/P Care Coordination Supervisor  [facility] - Care Coordination   3/22/2022 5:36 PM  [phone]

## 2022-03-22 NOTE — PROGRESS NOTES
Clinic Care Coordination Contact  Care Team Conversations    Over past couple weeks EDDIE CC has been attempting to find Housing consultation services for pt with no success thus far. Will continue to outreach in attempt to find an option for patient.     MILTON Cardona   FPLASHAUN/DONTE Care Coordination Supervisor  [facility] - Care Coordination   3/22/2022 3:45 PM  [phone]

## 2022-03-23 ENCOUNTER — MEDICAL CORRESPONDENCE (OUTPATIENT)
Dept: FAMILY MEDICINE | Facility: CLINIC | Age: 62
End: 2022-03-23

## 2022-04-06 ENCOUNTER — PATIENT OUTREACH (OUTPATIENT)
Dept: NURSING | Facility: CLINIC | Age: 62
End: 2022-04-06
Payer: MEDICAID

## 2022-04-06 NOTE — PROGRESS NOTES
Clinic Care Coordination Contact    Follow Up Progress Note      Assessment: [facility] spent a significant amount of time with patient on the phone today. Patient stated that he got his confirmation that he was approved for Social Security Disability. He updated Laurie RUELAS with [facility]. He reports that he got the confirmation about two weeks ago. [facility] then updated him that he will have an [employer] worker appointment on May 17th and that they will be calling him 24-48 hours prior to the appointment as a reminder. [facility] then asked patient if his friend ended up paying his past due rent. Patient confirmed. [facility] asked if patient was ok calling [employer] together t get an update on why his past two applications for COVID rent help were denied. Patient confirmed. Spoke with Marisol. She stated that only one of two applications were denied. She reports that it's likely beccause he did not have past due rent when the application was submitted. Rent must be 30 days past due in order to obtain this assistance. She did say that patient's November rent was paid. They sent a check to the [facility] on 2/14/2022. She reports that this month was selected to be paid because it appeared to be the only rent that was overdue at the time. No new applications are being accepted. She offered to send [facility] and patient to another representative to get other rent assistance resources. Spoke with Matilde. She stated that there are some emergency assistance programs patient can look in to. One is through [employer] for [facility] (Ph: [phone]). The other is through [employer] ([phone] ext [phone]).     BING and Clint contacted [employer] first. New policy was put in to place that over the phone applications will no longer be accepted. She stated that patient will either have to fill out information online or she can mail a paper application. Clint stated that he does not have access to internet, so he will be receiving a paper  application.     Care Gaps:    Health Maintenance Due   Topic Date Due     ZOSTER IMMUNIZATION (1 of 2) Never done     LUNG CANCER SCREENING  01/26/2013     PREVENTIVE CARE VISIT  04/10/2014     COLORECTAL CANCER SCREENING  06/03/2021     COVID-19 Vaccine (3 - Booster for Moderna series) 09/22/2021     EYE EXAM  03/17/2022     PHQ-9  03/29/2022         Goals addressed this encounter:   Goals Addressed                    This Visit's Progress       Patient Stated       Psychosocial (pt-stated)   60%      9- Goal Statement: I will work with the care coordinator to apply for housing stabilization services within 2-4 months  Date Goal set: 1/18/22  Barriers: may need help with paperwork (on wait list for [facility])  Strengths: supportive care team, county involved   Date to Achieve By: 4/18/22  Patient expressed understanding of goal: yes  Action steps to achieve this goal:  1. I will have care coordinator help with application process   2. I will follow through with anything needed to get this going  3. I will continue [place] help program in the meantime and await application response for social security disability benefits approval              Intervention/Education provided during outreach: Open ended questions     Outreach Frequency: monthly      Plan:     Care Coordinator will follow up in 1 week to see if he received Emergency Assistance application    MILTON Castro  Primary Care Clinic- Social Work Care Coordinator  [facility]  Ph: [phone]  4/6/2022 2:59 PM

## 2022-04-09 PROCEDURE — G0179 MD RECERTIFICATION HHA PT: HCPCS | Performed by: NURSE PRACTITIONER

## 2022-04-12 ENCOUNTER — MEDICAL CORRESPONDENCE (OUTPATIENT)
Dept: FAMILY MEDICINE | Facility: CLINIC | Age: 62
End: 2022-04-12

## 2022-04-12 VITALS — DIASTOLIC BLOOD PRESSURE: 58 MMHG | SYSTOLIC BLOOD PRESSURE: 112 MMHG

## 2022-04-12 DIAGNOSIS — N18.32 STAGE 3B CHRONIC KIDNEY DISEASE (H): ICD-10-CM

## 2022-04-12 PROCEDURE — 36415 COLL VENOUS BLD VENIPUNCTURE: CPT | Performed by: NURSE PRACTITIONER

## 2022-04-13 LAB
BUN SERPL-MCNC: 37 MG/DL (ref 8–27)
BUN/CREATININE RATIO: 14 (ref 10–24)
CALCIUM SERPL-MCNC: 9.5 MG/DL (ref 8.6–10.2)
CHLORIDE SERPLBLD-SCNC: 100 MMOL/L (ref 96–106)
CREAT SERPL-MCNC: 2.65 MG/DL (ref 0.76–1.27)
EGFR: 26 ML/MIN/1.73
GLUCOSE SERPL-MCNC: 140 MG/DL (ref 65–99)
POTASSIUM SERPL-SCNC: 4.7 MMOL/L (ref 3.5–5.2)
SODIUM SERPL-SCNC: 138 MMOL/L (ref 134–144)
TOTAL CO2: 22 MMOL/L (ref 20–29)

## 2022-04-14 ENCOUNTER — TELEPHONE (OUTPATIENT)
Dept: FAMILY MEDICINE | Facility: CLINIC | Age: 62
End: 2022-04-14

## 2022-04-14 DIAGNOSIS — N18.4 CHRONIC KIDNEY DISEASE, STAGE 4 (SEVERE) (H): Primary | ICD-10-CM

## 2022-04-14 NOTE — TELEPHONE ENCOUNTER
Called patient with lab results. Informed him of elevated kidney function test.  [facility]

## 2022-04-14 NOTE — TELEPHONE ENCOUNTER
----- Message from CARROL Lopez CNP sent at 4/13/2022 11:30 AM CDT -----  Please call patient with the following results/message:    Recheck BMP in 2 weeks. Needs nephrology referral for CKD stage 4.    CARROL Pretty CNP on 4/13/2022 at 11:30 AM

## 2022-04-15 ENCOUNTER — PATIENT OUTREACH (OUTPATIENT)
Dept: NURSING | Facility: CLINIC | Age: 62
End: 2022-04-15
Payer: MEDICAID

## 2022-04-15 NOTE — PROGRESS NOTES
Clinic Care Coordination Contact    Follow Up Progress Note      Assessment: [facility] contacted patient. He stated that he continues to work with Gonzalez every Thursday on paperwork. He plans to have Gonzalez help him get his bank statements sent in to the county at their next visit. He has to have bank statements dated back to April 1st. He has hip pain today, but he reports that this is not new.     Care Gaps:    Health Maintenance Due   Topic Date Due     ZOSTER IMMUNIZATION (1 of 2) Never done     LUNG CANCER SCREENING  01/26/2013     PREVENTIVE CARE VISIT  04/10/2014     COLORECTAL CANCER SCREENING  06/03/2021     COVID-19 Vaccine (3 - Booster for Moderna series) 09/22/2021     EYE EXAM  03/17/2022     PHQ-9  03/29/2022       Care Gaps not addressed during this encounter d/t focus on psychosocial needs. Will address once psychosocial needs are met      Goals addressed this encounter:    Goals Addressed                    This Visit's Progress       Patient Stated       Psychosocial (pt-stated)   60%      9- Goal Statement: I will work with the care coordinator to apply for housing stabilization services within 2-4 months  Date Goal set: 1/18/22  Barriers: may need help with paperwork (on wait list for [facility])  Strengths: supportive care team, [facility] involved   Date to Achieve By: 4/18/22  Patient expressed understanding of goal: yes  Action steps to achieve this goal:  1. I will have care coordinator help with application process   2. I will follow through with anything needed to get this going  3. [place] will [place] in the meantime and await application response for social security disability benefits approval                Intervention/Education provided during outreach: Open ended questions     Outreach Frequency: monthly      Plan:     Care Coordinator will follow up in 1 month    MILTON Castro  Primary Care Clinic- Social Work Care Coordinator  [facility]  [facility]  Ph: [phone]  4/15/2022 12:35 PM

## 2022-04-18 DIAGNOSIS — I10 ESSENTIAL HYPERTENSION: ICD-10-CM

## 2022-04-18 RX ORDER — METOPROLOL TARTRATE 25 MG/1
25 TABLET, FILM COATED ORAL 2 TIMES DAILY
Qty: 60 TABLET | Refills: 6 | Status: SHIPPED | OUTPATIENT
Start: 2022-04-18 | End: 2022-09-09

## 2022-04-18 NOTE — TELEPHONE ENCOUNTER
METOPROLOL     LOV: 3/18/22  LAST LABS: 4/12/22    BP Readings from Last 3 Encounters:   04/12/22 112/58   03/18/22 (!) 143/87   02/07/22 135/87

## 2022-05-09 ENCOUNTER — OFFICE VISIT (OUTPATIENT)
Dept: FAMILY MEDICINE | Facility: CLINIC | Age: 62
End: 2022-05-09

## 2022-05-09 ENCOUNTER — OFFICE VISIT (OUTPATIENT)
Dept: PHARMACY | Facility: PHYSICIAN GROUP | Age: 62
End: 2022-05-09
Payer: COMMERCIAL

## 2022-05-09 VITALS
HEIGHT: 65 IN | RESPIRATION RATE: 18 BRPM | OXYGEN SATURATION: 97 % | HEART RATE: 78 BPM | BODY MASS INDEX: 29.39 KG/M2 | DIASTOLIC BLOOD PRESSURE: 68 MMHG | SYSTOLIC BLOOD PRESSURE: 100 MMHG | WEIGHT: 176.4 LBS | TEMPERATURE: 97.9 F

## 2022-05-09 DIAGNOSIS — Z91.89 POOR HEALTH: ICD-10-CM

## 2022-05-09 DIAGNOSIS — E11.40 PAINFUL DIABETIC NEUROPATHY (H): ICD-10-CM

## 2022-05-09 DIAGNOSIS — N18.32 STAGE 3B CHRONIC KIDNEY DISEASE (H): ICD-10-CM

## 2022-05-09 DIAGNOSIS — E11.21 TYPE 2 DIABETES MELLITUS WITH DIABETIC NEPHROPATHY, WITHOUT LONG-TERM CURRENT USE OF INSULIN (H): Primary | ICD-10-CM

## 2022-05-09 DIAGNOSIS — I10 ESSENTIAL HYPERTENSION: ICD-10-CM

## 2022-05-09 DIAGNOSIS — G47.00 INSOMNIA, UNSPECIFIED TYPE: ICD-10-CM

## 2022-05-09 DIAGNOSIS — G45.9 TIA (TRANSIENT ISCHEMIC ATTACK): ICD-10-CM

## 2022-05-09 DIAGNOSIS — F32.A DEPRESSION, UNSPECIFIED DEPRESSION TYPE: ICD-10-CM

## 2022-05-09 DIAGNOSIS — I25.10 ATHEROSCLEROSIS OF CORONARY ARTERY OF NATIVE HEART WITHOUT ANGINA PECTORIS, UNSPECIFIED VESSEL OR LESION TYPE: ICD-10-CM

## 2022-05-09 DIAGNOSIS — G63 POLYNEUROPATHY ASSOCIATED WITH UNDERLYING DISEASE (H): ICD-10-CM

## 2022-05-09 LAB — HBA1C MFR BLD: 6.4 % (ref 4–6)

## 2022-05-09 PROCEDURE — 93050 ART PRESSURE WAVEFORM ANALYS: CPT | Performed by: NURSE PRACTITIONER

## 2022-05-09 PROCEDURE — 99214 OFFICE O/P EST MOD 30 MIN: CPT | Performed by: NURSE PRACTITIONER

## 2022-05-09 PROCEDURE — 36415 COLL VENOUS BLD VENIPUNCTURE: CPT | Performed by: NURSE PRACTITIONER

## 2022-05-09 PROCEDURE — 83036 HEMOGLOBIN GLYCOSYLATED A1C: CPT | Performed by: NURSE PRACTITIONER

## 2022-05-09 PROCEDURE — 99607 MTMS BY PHARM ADDL 15 MIN: CPT | Performed by: PHARMACIST

## 2022-05-09 PROCEDURE — 99606 MTMS BY PHARM EST 15 MIN: CPT | Performed by: PHARMACIST

## 2022-05-09 RX ORDER — LANOLIN ALCOHOL/MO/W.PET/CERES
1000 CREAM (GRAM) TOPICAL DAILY
Qty: 90 TABLET | Refills: 1 | Status: SHIPPED | OUTPATIENT
Start: 2022-05-09 | End: 2022-10-05

## 2022-05-09 NOTE — PROGRESS NOTES
"Problem(s) Oriented visit        SUBJECTIVE:                                                    Emily Zhu is a 62 year old male who presents to clinic today for the following health issues :    Seen in conjunction with [employer], Chelo Roberts [facility]. See her note for further details.    Diabetes - well controlled with current medications.   Lab Results   Component Value Date    A1C 6.4 05/09/2022    A1C 5.9 02/07/2022    A1C 6.8 10/12/2021    A1C 7.5 07/14/2021    A1C 8.3 04/05/2021     Polyneuropathy - Taking amitriptyline currently but concern that this could be causing some of his sedation and confusion.     Overall poor health, deteriorating since stroke last year. Has home health nurse coming to help him with medications. Has been discharged from therapy services. Care coordination ongoing support especially with financial services.    Problem list, Medication list, Allergies, and Medical/Social/Surgical histories reviewed in EPIC and updated as appropriate.   Additional history: as documented    ROS:  7 point ROS completed and negative except noted above, including Gen, HENT, CV, Resp, MS, Neuro, Psych    OBJECTIVE:                                                    /68   Pulse 78   Temp 97.9  F (36.6  C) (Temporal)   Resp 18   Ht 1.651 m (5' 5\")   Wt 80 kg (176 lb 6.4 oz)   SpO2 97%   BMI 29.35 kg/m    Body mass index is 29.35 kg/m .   GENERAL: Adult male appears older than age, chronically ill, no acute distress  RESP: lungs clear to auscultation - no rales, rhonchi or wheezes  CV: regular rates and rhythm, normal S1 S2, no S3 or S4, no murmur, click or rub  MS: generalized weakness, using walker and cane for ambulation  PSYCH: depressed mood     ASSESSMENT/PLAN:                                                      Emily was seen today for diabetes.    Diagnoses and all orders for this visit:    Type 2 diabetes mellitus with diabetic nephropathy, without long-term current use of insulin " (H)  -     [place] ART PRESS WAVEFORM ANALYS CENTRAL ART PRESSURE  -     Hemoglobin A1C (RMG)  -     VENOUS COLLECTION    Polyneuropathy associated with underlying disease (H)  -     cyanocobalamin (VITAMIN B-12) 1000 MCG tablet; Take 1 tablet (1,000 mcg) by mouth daily    Poor health  -     Primary Care - Care Coordination Referral; Future    Stop Amitriptyline and start vitamin B12 for neuropathy. Done in order to minimize potentially harmful side effects from amitriptyline. Metformin discontinued due to GFR - nephrology visit recently. Care coordination referral to follow-up on where patient stands with social security and [employer] financial services.    CARROL Pretty CNP  [facility]

## 2022-05-09 NOTE — PATIENT INSTRUCTIONS
"Recommendations from today's MTM visit:                                                       Stop metformin, hydrochlorothiazide and amitriptyline.    2. Christine will be reaching back out to you.    3. I will talk to Calixto about your medication list.     4. Start taking B12 1000mcg daily at night.     Follow-up: Return in 4 weeks (on 6/6/2022) for Primary Care Provider, MTM visit in clinic.    It was great speaking with you today.  I value your experience and would be very thankful for your time in providing feedback in our clinic survey. In the next few days, you may receive an email or text message from [email or web address] with a link to a survey related to your  clinical pharmacist.\"       My Clinical Pharmacist's contact information:                                                      Please feel free to contact me with any questions or concerns you have.      hCelo [facility], Pharm.D, [facility]  Medication Therapy Management Pharmacist  [phone]     "

## 2022-05-09 NOTE — PROGRESS NOTES
Medication Therapy Management (MTM) Encounter    ASSESSMENT:                            Medication Adherence/Access: Will need to touch base with home care RN.    Type 2 Diabetes: Patient is meeting A1c goal of < 7%. Patient would benefit from stopping metformin given low GFR and to simplify regimen overall.    CKD: Could consider SGLT2 inhibitor to help with renal protection, however concern for inconsistent eating and potential dehydration,lower GFR, would suggest not starting at this time.     Hypertension/TIA/CAD: Given low BP and borderline GFR will cut out hydrochlorothiazide given lower efficacy at lower GFR ranges.     Depression/Insomnia/Neuropathy:  Will have care coordination reach back out to check in on home care resources he is getting and his other  paperwork filled out last visit. For neuropathy, amitriptyline is more concerning for fatigue and memory impact so would like to try daily b12 to start and then evaluate if alternative agent makes sense. Getting back with physical therapy will also be important for strength, mood and functioning.     PLAN:                            1. Stop metformin, hydrochlorothiazide and amitriptyline.    2. Christine will be reaching back out to you.    3. I will talk to Calixto about your medication list.     4. Start taking B12 1000mcg daily at night.     Follow-up: Return in 4 weeks (on 6/6/2022) for Primary Care Provider, MTM visit in clinic.    SUBJECTIVE/OBJECTIVE:                          Emily Zhu is a 62 year old male coming in for a follow-up visit.  Today's visit is a follow-up MTM visit from 2/7     Reason for visit: Co-Visit with CARROL Pretty CNP today.    Allergies/ADRs: Reviewed in chart  Past Medical History: Reviewed in chart  Tobacco: He reports that he has been smoking cigarettes. He has a 40.00 pack-year smoking history. He has never used smokeless tobacco.Tobacco Cessation Action Plan:   Information offered: Patient  not interested at this time  Alcohol: not currently using    Nurse- Calixto comes on Thursdays weekly, finds him very pleasant and helpful at home. Patient reports he hasn't had physical therapy for over 2 weeks and the person who comes to shower him didn't show up for 2 weeks.     Medication Adherence/Access: no issues reported, does not have med list or bottles with him today. Set up by home care.     Type 2 Diabetes:  Currently taking glipizide XL 5mg daily and metformin ER 1000mg twice daily, Bydureon weekly (help from weekly nurse visit)- working much better for him than daily Victoza.   Does not have meter here, he says last check 132mg/dl.   Patient is not experiencing side effects.  Has been eating less due to limited income and food availability. Working with care coordinator on many different resources including housing and other supports.   Symptoms of low blood sugar? none  Symptoms of high blood sugar? none  Aspirin: 81mg daily and taking plavix once daily.   Statin: Yes: atorvastatin   ACEi/ARB: Yes: lisinopril.   Lab Results   Component Value Date    A1C 5.9 02/07/2022    A1C 6.8 10/12/2021    A1C 7.5 07/14/2021    A1C 8.3 04/05/2021    A1C 7.6 12/18/2020       CKD: Seeing [facility] Dr. Celis. GFR= 26ml/min/m2 in April, however improved slightly to 34ml/min/m2 5/3/22 recheck with nephrology. Patient has had widely varied renal function over the years, but has continued to decline. Has remained on lisinopril.     Hypertension/TIA/CAD: Current medications include amlodipine 10mg daily, hydrochlorothiazide 25mg daily, lisinopril 10mg daily, isosorbide 30mg daily, metoprolol tartrate 25mg twice daily. He is currently on aspirin 81mg daily plus clopidogrel 75mg now. Had been on clopidogrel alone prior to TIA that occurred in [place] on 6/22/2021.  Patient does not self-monitor blood pressure.  Patient reports fatigue, fear of falls and weakness. BP down.     Depression/Insomnia/Neuropathy:  Current medications  "include: sertraline 100mg daily (started in April increased in Aug 2021), He states he needs to take the amitriptyline 10mg, for tingling and pain in his legs. Concern for fatigue and confusion.   No recent b12 labs.   Overall very flat today, repeating himself asking how provider is doing multiple times during visit. He does recall certain details of medications and history today.   States he is sad that he has realized he is \"not going to get better.\"  Previously worked to get physical therapy in the home, but he states they have stopped coming and he doesn't know why.         BP Readings from Last 1 Encounters:   05/09/22 100/68     Pulse Readings from Last 1 Encounters:   05/09/22 78     Wt Readings from Last 1 Encounters:   05/09/22 176 lb 6.4 oz (80 kg)     Ht Readings from Last 1 Encounters:   05/09/22 5' 5\" (1.651 m)     Estimated body mass index is 29.35 kg/m  as calculated from the following:    Height as of an earlier encounter on 5/9/22: 5' 5\" (1.651 m).    Weight as of an earlier encounter on 5/9/22: 176 lb 6.4 oz (80 kg).    Temp Readings from Last 1 Encounters:   05/09/22 97.9  F (36.6  C) (Temporal)     ----------------      I spent 60 minutes with this patient today. All changes were made via collaborative practice agreement with CARROL Pretty CNP. A copy of the visit note was provided to the patient's provider(s).    The patient was given a summary of these recommendations.     Chelo Roberts, Pharm.D, [facility]  Medication Therapy Management Pharmacist  [phone]       Medication Therapy Recommendations  Essential hypertension    Current Medication: hydrochlorothiazide (HYDRODIURIL) 25 MG tablet (Discontinued)   Rationale: No medical indication at this time - Unnecessary medication therapy - Indication   Recommendation: Discontinue Medication - stop medication   Status: Accepted per CPA         Painful diabetic neuropathy (H)    Current Medication: amitriptyline (ELAVIL) 10 MG tablet " (Discontinued)   Rationale: Unsafe medication for the patient - Adverse medication event - Safety   Recommendation: Discontinue Medication - stop medication   Status: Accepted per CPA          Current Medication: cyanocobalamin (VITAMIN B-12) 1000 MCG tablet   Rationale: Untreated condition - Needs additional medication therapy - Indication   Recommendation: Start Medication - start b12 daily   Status: Accepted per CPA         Type 2 diabetes mellitus with diabetic nephropathy, without long-term current use of insulin (H)    Current Medication: metFORMIN (GLUCOPHAGE-XR) 500 MG 24 hr tablet (Discontinued)   Rationale: Unsafe medication for the patient - Adverse medication event - Safety   Recommendation: Discontinue Medication - stop metformin   Status: Accepted per CPA

## 2022-05-10 ENCOUNTER — PATIENT OUTREACH (OUTPATIENT)
Dept: CARE COORDINATION | Facility: CLINIC | Age: 62
End: 2022-05-10
Payer: MEDICAID

## 2022-05-10 NOTE — PROGRESS NOTES
"Clinic Care Coordination Contact     followed up with Clint today.     Called [employer] to go over progress with application for social security disability with him as requested. Was told it's still processing with social security office. [place] social security office told them he had a prior claim in 2013 that never was finished. His claim went to [facility] in [place] which for all cases in social security disability under [employer] age. It takes an average 6+ months to get a response from the second application he submitted with all documents in March. When his case does get updated he will get a letter in the mail and they will also call him. He could get back pay to the date that he applied.    Pt is applying for emergency assistance through the [facility] for his rent help.      emailed Gonzalez Vargas (RN, Homecare) to ask about status of PT services and HHA. He responded \"I still have HHA visits scheduled every Tuesday and have no plans to discontinue these services. The problem we're running into is staffing levels. I have asked my company to make Clint a priority but unfortunately I don't have a voice in assigning staff. I will voice my concerns again. Physical therapy discharged Clint on 3/22 with instructions to continue walking program. Occupational Therapy discharged Clint on 4/19.   I've been concentrating on Clint's depression and medications. \" SW responded acknowledging the update.    Patient is aware that the waiver was approved and he will be getting assigned a . Laurie from [facility] emailed stating she should know who the  will be on Thursday and they will take over everything then for managing his case.    Care Gaps:    Health Maintenance Due   Topic Date Due     ZOSTER IMMUNIZATION (1 of 2) Never done     LUNG CANCER SCREENING  01/26/2013     PREVENTIVE CARE VISIT  04/10/2014     Pneumococcal Vaccine: Pediatrics (0 to 5 Years) and " At-Risk Patients (6 to 64 Years) (2 - PCV) 07/24/2016     COLORECTAL CANCER SCREENING  06/03/2021     COVID-19 Vaccine (3 - Booster for Moderna series) 09/22/2021     EYE EXAM  03/17/2022     PHQ-9  03/29/2022       Goals addressed this encounter:    Goals Addressed                    This Visit's Progress       Patient Stated       Psychosocial (pt-stated)   70%      9- Goal Statement: I will work with the care coordinator to apply for housing stabilization services within 2-4 months  Date Goal set: 1/18/22  Barriers: may need help with paperwork (on wait list for [facility])  Strengths: supportive care team, county involved   Date to Achieve By: 4/18/22  Patient expressed understanding of goal: yes  Action steps to achieve this goal:  1. I will have care coordinator help with application process   2. I will follow through with anything needed to get this going  3. I will continue [place] help program in the meantime and await application response for social security disability benefits approval    As of today's date 5/10/2022 goal is met at 51 - 75%.   Goal Status:  Showing progress. Hard to find an agency that does this. Pt has an intake with [facility] for 5/17 which should help too.                   Plan:    will continue to follow and reach out as needed every 1-2 weeks.     Christine Rutledge, MSW, [facility]  Clinic Care Coordinator  [email or web address]  [phone]

## 2022-05-19 ENCOUNTER — PATIENT OUTREACH (OUTPATIENT)
Dept: CARE COORDINATION | Facility: CLINIC | Age: 62
End: 2022-05-19
Payer: MEDICAID

## 2022-05-19 DIAGNOSIS — I10 ESSENTIAL HYPERTENSION: ICD-10-CM

## 2022-05-19 DIAGNOSIS — E11.21 TYPE 2 DIABETES MELLITUS WITH DIABETIC NEPHROPATHY, WITHOUT LONG-TERM CURRENT USE OF INSULIN (H): ICD-10-CM

## 2022-05-19 RX ORDER — EXENATIDE 2 MG/.85ML
INJECTION, SUSPENSION, EXTENDED RELEASE SUBCUTANEOUS
Qty: 4 ML | Refills: 1 | Status: SHIPPED | OUTPATIENT
Start: 2022-05-19 | End: 2022-06-28

## 2022-05-19 RX ORDER — AMLODIPINE BESYLATE 10 MG/1
10 TABLET ORAL DAILY
Qty: 90 TABLET | Refills: 1 | Status: SHIPPED | OUTPATIENT
Start: 2022-05-19 | End: 2022-09-09

## 2022-05-19 NOTE — CONFIDENTIAL NOTE
AMLODIPINE & BYDUREON  LOV 5/9/22  LAST LABS 5/9/22    Lab Results   Component Value Date    A1C 6.4 05/09/2022    A1C 5.9 02/07/2022    A1C 6.8 10/12/2021    A1C 7.5 07/14/2021    A1C 8.3 04/05/2021     BP Readings from Last 3 Encounters:   05/09/22 100/68   04/12/22 112/58   03/18/22 (!) 143/87   \

## 2022-05-19 NOTE — LETTER
Patient Centered Plan of Care  About Me:        Patient Name:  Emily Zhu    YOB: 1960  Age:         62 year old   Alfonso MRN:    [id number] Telephone Information:  Home Phone [phone]   Mobile None       Address:  [address]  [facility] [address] Email address:  No e-mail address on record      Emergency Contact(s)    Name Relationship Lgl Grd Work Phone Home Phone Mobile Phone   EDWARD CABRERA Sister  [phone] none           Primary language:  English     needed? No   [facility] [employer] [facility]:  [phone] op. 1  Other communication barriers:English as a second language; Lack of coping    Preferred Method of Communication:     Current living arrangement: I live alone    Mobility Status/ Medical Equipment: Independent w/Device        Health Maintenance  Health Maintenance Reviewed: Due/Overdue preventative care visit      My Access Plan  Medical Emergency 911   Primary Clinic Line   [phone]   24 Hour Appointment Line [phone] or  [phone]) (toll-free)   24 Hour Nurse Line [phone] (toll-free)   Preferred Urgent Care No data recorded   Preferred Hospital [facility]  [phone]     Preferred Pharmacy [facility] [address] [facility] [address] [facility]     Behavioral Health Crisis Line The National Suicide Prevention Lifeline at 1-822.947.6984 or 911             My Care Team Members  Patient Care Team       Relationship Specialty Notifications Start End    Tamica Tang APRN CNP PCP - General Family Medicine  1/4/21     Phone: [phone] Fax: [phone] [address] [facility] [address]    Chelo Roberts RPH Pharmacist Pharmacist  8/13/19     Phone: [phone] [address] [facility] [address]    Christine Tao LGSW Lead Care Coordinator   1/10/20     Tmaica Tang APRN CNP Assigned PCP   8/1/21     Phone: [phone]  Fax: [phone] [address] [facility] [address]            My Care Plans  Self Management and Treatment Plan  Goals and (Comments)   Goals        General     Psychosocial (pt-stated)      Notes - Note edited  5/19/2022 11:34 AM by Christine Tao LGSW     8-Goal Statement: I will work with [facility] case management to find alternative housing option within 6 months   Date Goal set: 5/19/22  Barriers: availability of housing options-   Strengths: has [facility] waiver now, and [facility] services   Date to Achieve By: Nov 19th, 2022  Patient expressed understanding of goal: yes  Action steps to achieve this goal:  1. I will continue to meet with  to figure out options -Porsche Chacon   2. I will keep PCP and care coordinator updated   3. I will submit any paperwork needed at facilities                Action Plans on File:            Depression          Advance Care Plans/Directives Type:   Advanced Directive - On File      My Medical and Care Information  Problem List   Patient Active Problem List   Diagnosis     Coronary atherosclerosis     Essential hypertension     Mixed hyperlipidemia     Cataract     GERD (gastroesophageal reflux disease)     Microalbuminuria due to type 2 diabetes mellitus (H)     Type 2 diabetes mellitus with diabetic nephropathy, without long-term current use of insulin (H)     Painful diabetic neuropathy (H)     Warthin tumor     CKD (chronic kidney disease) stage 3, GFR 30-59 ml/min (H)     Tobacco use     History of TIA (transient ischemic attack) and stroke     Major depressive disorder, recurrent, mild (H)     Hip pain, left      Current Medications and Allergies:  See printed Medication Report.    Care Coordination Start Date: 1/10/2020   Frequency of Care Coordination: monthly     Form Last Updated: 05/19/2022

## 2022-05-19 NOTE — PROGRESS NOTES
"Clinic Care Coordination Contact    Follow Up Progress Note      Assessment:  received notice that Clint's waiver  was assigned and its [employer] [place], [phone].     called patient. He said he is in significant dental pain and is miserable with one tooth. In the past he went to [facility] clinic for dentistry. He was referred to get dentures made after a few more extractions. He hasn't heard from them since and its been about a year.  called there with patient and left a voicemail directing them to call patient back asap. Discussed with patient if he cannot get in soon may need to go to the ED for antibiotic/extraction. Patient will keep care coordinator updated.    Then,  called pt's new  with him. Porsche answered and scheduled her first assessment/meeting with patient for 5/27/22 at 10am. She says that she \"may be able to pull some strings\" to get patient a PCA. She also is going to help him figure out housing with the waiver in place. Patient is pleased to hear this. Pt still has homecare coming out on Thursdays- the nurse Gonzalez.     Care Gaps:    Health Maintenance Due   Topic Date Due     ZOSTER IMMUNIZATION (1 of 2) Never done     LUNG CANCER SCREENING  01/26/2013     PREVENTIVE CARE VISIT  04/10/2014     Pneumococcal Vaccine: Pediatrics (0 to 5 Years) and At-Risk Patients (6 to 64 Years) (2 - PCV) 07/24/2016     COLORECTAL CANCER SCREENING  06/03/2021     COVID-19 Vaccine (3 - Booster for Moderna series) 09/22/2021     EYE EXAM  03/17/2022     PHQ-9  03/29/2022       Goals addressed this encounter:    Goals Addressed                    This Visit's Progress       Patient Stated       COMPLETED: Psychosocial (pt-stated)         9- Goal Statement: I will work with the care coordinator to apply for housing stabilization services within 2-4 months  Date Goal set: 1/18/22  Barriers: may need help " with paperwork (on wait list for [facility])  Strengths: supportive care team, [facility] involved   Date to Achieve By: 4/18/22  Patient expressed understanding of goal: yes  Action steps to achieve this goal:  1. I will have care coordinator help with application process   2. I will follow through with anything needed to get this going  3. [facility] the [facility] and await application response for social security disability benefits approval    As of today's date 5/10/2022 goal is met at 51 - 75%.   Goal Status:  Showing progress. Hard to find an agency that does this. Pt has an intake with [facility] for 5/17 which should help too.   As of today's date 5/19/2022 goal is met at 76 - 100%.   Goal Status:  Active. Pt screened onto [facility] waiver now.  will help find housing.            Psychosocial (pt-stated)   30%      8-Goal Statement: I will work with [facility] case management to find alternative housing option within 6 months   Date Goal set: 5/19/22  Barriers: availability of housing options-   Strengths: has county waiver now, and [facility] services   Date to Achieve By: Nov 19th, 2022  Patient expressed understanding of goal: yes  Action steps to achieve this goal:  1. I will continue to meet with  to figure out options -Porsche Chacon   2. I will keep PCP and care coordinator updated   3. I will submit any paperwork needed at facilities                 Plan:   -Meet with  Porsche 5/27 at 10am  -Get into dental clinic asap for dental pain; [facility] to call Clint and figure out next steps. He may also consider going to the ED if he cannot get in soon  -He has not been seen before at the [facility] (confirmed today)  -Pt was assigned an [facility] worker now. Pt could not remember the name at time of call  - can be involved at the meeting on 5/27 as well    Christine Rutledge, MSW, [facility]  Clinic Care  Coordinator  [email or web address]  [phone]

## 2022-05-20 DIAGNOSIS — K21.9 GASTROESOPHAGEAL REFLUX DISEASE, UNSPECIFIED WHETHER ESOPHAGITIS PRESENT: ICD-10-CM

## 2022-05-20 RX ORDER — MECOBALAMIN 5000 MCG
15 TABLET,DISINTEGRATING ORAL DAILY
Qty: 30 CAPSULE | Refills: 1 | Status: SHIPPED | OUTPATIENT
Start: 2022-05-20 | End: 2022-07-22

## 2022-05-20 NOTE — TELEPHONE ENCOUNTER
Lansoprazole. Last addressed 3/18/22.      Gastroesophageal reflux disease, unspecified whether esophagitis present  -     LANsoprazole (PREVACID) 15 MG DR capsule; Take 1 capsule (15 mg) by mouth daily  -     VENOUS COLLECTION  Dietary changes to help with reflux discussed. Prescription for Lansoprazole once daily.

## 2022-05-25 ENCOUNTER — HOSPITAL ENCOUNTER (OUTPATIENT)
Dept: ULTRASOUND IMAGING | Facility: CLINIC | Age: 62
Discharge: HOME | End: 2022-05-25
Attending: INTERNAL MEDICINE | Admitting: INTERNAL MEDICINE
Payer: COMMERCIAL

## 2022-05-25 DIAGNOSIS — N18.4 CHRONIC KIDNEY DISEASE (CKD), STAGE IV (SEVERE) (H): ICD-10-CM

## 2022-05-25 PROCEDURE — 76770 US EXAM ABDO BACK WALL COMP: CPT

## 2022-06-02 ENCOUNTER — PATIENT OUTREACH (OUTPATIENT)
Dept: CARE COORDINATION | Facility: CLINIC | Age: 62
End: 2022-06-02
Payer: MEDICAID

## 2022-06-02 NOTE — PROGRESS NOTES
"Clinic Care Coordination Contact    Follow Up Progress Note      Assessment:    received a call from patient. He said he is overwhelmed and confused. He is tearful during the call.      called Porsche ([facility]/) with patient on the line. Patient said that he would like to move to customized living through the CADI waiver (rent and 24/7 services would be covered) and Porsche will start looking and placing referrals. In the meantime, she will also try to get PCA services out to him. However, it could take time to get a PCA out to him with the shortage. Patient is feeling defeated that everything is taking time and feels he is left to \"suffer\". Encouraged patient that hopefully a customized living can accept him asap.    Patient has no income coming in right now- his friend has been paying his rent since he no longer could get the [employer] rent help program. He did apply for emergency assistance (not sure status of that). Moving to  would help with the financial stressors right now too.    Patient did not get a call back from the dental office and he is still having significant tooth pain. Told patient to go to the ED to try to get an assessment for antibiotic in the meantime. Medical rides wont cover without 3 day advance scheduling so he will have to call 911 to take him since he doesn't have anyone else that can. He is also having significant neck pain per his report. His homecare RN Gonzalez was there during the call and was talking with patient about this.       Care Gaps:    Health Maintenance Due   Topic Date Due     ZOSTER IMMUNIZATION (1 of 2) Never done     LUNG CANCER SCREENING  01/26/2013     PREVENTIVE CARE VISIT  04/10/2014     Pneumococcal Vaccine: Pediatrics (0 to 5 Years) and At-Risk Patients (6 to 64 Years) (2 - PCV) 07/24/2016     COLORECTAL CANCER SCREENING  06/03/2021     COVID-19 Vaccine (3 - Booster for Moderna series) 09/22/2021     EYE EXAM  " 03/17/2022     PHQ-9  03/29/2022     Goals addressed this encounter:    Goals Addressed                    This Visit's Progress       Patient Stated       Psychosocial (pt-stated)   40%      8-Goal Statement: I will work with [facility] case management to find alternative housing option within 6 months   Date Goal set: 5/19/22  Barriers: availability of housing options-   Strengths: has county waiver now, and [facility] services   Date to Achieve By: Nov 19th, 2022  Patient expressed understanding of goal: yes  Action steps to achieve this goal:  1. I will continue to meet with  to figure out options -Porsche from [employer]   2. I will keep PCP and care coordinator updated   3. I will submit any paperwork needed at facilities     As of today's date 6/2/2022 goal is met at 26 - 50%.   Goal Status:  Active.                Plan:   -Patient screened onto CAD waiver and  Porsche is seeking services for patient (customized living and PCA in the meantime)  -Patient continues to have homecare RN and [facility]HA come out to help him a few times a week  -Patient having significant dental pain and [facility] dental clinic has not called him back; directed him to go to the ED to be assessed since he feels it's so bad he cannot function well with the pain.  - will follow closely     Christine Rutledge, MSW, [facility]  Clinic Care Coordinator  [email or web address]  [phone]

## 2022-06-03 ENCOUNTER — HOSPITAL ENCOUNTER (EMERGENCY)
Facility: CLINIC | Age: 62
Discharge: HOME | End: 2022-06-03
Attending: PHYSICIAN ASSISTANT | Admitting: PHYSICIAN ASSISTANT
Payer: MEDICAID

## 2022-06-03 VITALS
SYSTOLIC BLOOD PRESSURE: 93 MMHG | DIASTOLIC BLOOD PRESSURE: 60 MMHG | RESPIRATION RATE: 16 BRPM | TEMPERATURE: 97.2 F | HEART RATE: 85 BPM | OXYGEN SATURATION: 100 %

## 2022-06-03 DIAGNOSIS — R68.84 JAW PAIN: ICD-10-CM

## 2022-06-03 DIAGNOSIS — R09.81 NASAL CONGESTION: ICD-10-CM

## 2022-06-03 PROCEDURE — 99283 EMERGENCY DEPT VISIT LOW MDM: CPT | Mod: 25

## 2022-06-03 PROCEDURE — 64400 NJX AA&/STRD TRIGEMINAL NRV: CPT

## 2022-06-03 RX ORDER — BUPIVACAINE HYDROCHLORIDE AND EPINEPHRINE 5; 5 MG/ML; UG/ML
1.8 INJECTION, SOLUTION PERINEURAL ONCE
Status: DISCONTINUED | OUTPATIENT
Start: 2022-06-03 | End: 2022-06-03 | Stop reason: HOSPADM

## 2022-06-03 RX ORDER — CLINDAMYCIN HCL 300 MG
300 CAPSULE ORAL 4 TIMES DAILY
Qty: 28 CAPSULE | Refills: 0 | Status: SHIPPED | OUTPATIENT
Start: 2022-06-03 | End: 2022-06-14

## 2022-06-03 NOTE — ED TRIAGE NOTES
"Tooth pain for months. Saw a dentist but cant tell me what they recommended. \"something is growing in my nose and can't breathe through nose.\" Has been experiencing this for months. But it has gotten worse.      Triage Assessment     Row Name 06/03/22 1205       Triage Assessment (Adult)    Airway WDL WDL       Respiratory WDL    Respiratory WDL X;rhythm/pattern    Rhythm/Pattern, Respiratory dyspnea on exertion       Skin Circulation/Temperature WDL    Skin Circulation/Temperature WDL WDL       Cardiac WDL    Cardiac WDL WDL       Peripheral/Neurovascular WDL    Peripheral Neurovascular WDL WDL       Cognitive/Neuro/Behavioral WDL    Cognitive/Neuro/Behavioral WDL WDL              "

## 2022-06-03 NOTE — ED PROVIDER NOTES
History   Chief Complaint:  Dental Pain and Nasal Congestion     The history is provided by the patient and medical records. History limited by: deficits from past stroke.      Emily Zhu is a 62 year old male with history of type 2 diabetes, hypertension, hyperlipidemia, Warthin tumor s/p resection, and TIA who presents with dental pain and nasal congestion. He was advised to come into the hospital by his , Christine, due to the intensity of his dental pain. His tooth is loose and the gums are inflamed. He has not been eating due to the pain and feels weak. He has a nurse who comes to his house to help him remember to take medications and move around his home. He has been trying to get a dentist appointment, but struggles with short term memory and finding a dentist who takes [employer] has been hard.       Review of Systems   Reason unable to perform ROS: deficits from past stroke.   HENT: Positive for congestion and dental problem.      Allergies:  Crestor [Rosuvastatin]    Medications:  Norvasc  Aspirin  Lipitor  Plavix  Vitamin B-12  Bydureon Bcise  Imdur  Prevacid  Zestril  Lopressor  Zoloft  Amoxil    Past Medical History:     Antiplatelet or antithrombotic long-term use  Cataract  Coronary atherosclerosis  GERD  Hyperlipidemia  Pancreatic disease  Solitary bone cyst  Stented coronary artery  Type 2 diabetes  Hypertension   Diabetic neuropathy  Warthin tumor  CKD  Tobacco use  TIA and stroke  MDD  Left hip pain     Past Surgical History:    Angioplasty (x2)  Arthrotomy shoulder, rotator cuff repair, combined, left   Arthrotomy shoulder, subacromial decompression, combined, left  Colonoscopy  Decompression cubital tunnel  Endoscopic release carpal tunnel  ORIF hip nailing  Parotidectomy, bilateral   Phacoemulsification clear cornea with standard intraocular lens implant, bilateral   Stent      Family History:    Mother: cerebrovascular disease, hypertension  Father: cerebrovascular disease,  hypertension     Social History:  The patient presents to the ED alone.   The patient lives at home alone. He is supported by Christine, , and a nurse who comes to his home.     Physical Exam     Patient Vitals for the past 24 hrs:   BP Temp Temp src Pulse Resp SpO2   06/03/22 1209 93/60 97.2  F (36.2  C) Temporal 85 16 100 %       Physical Exam  General: Well appearing, pleasant male, resting on exam bed  HEENT: No evidence of trauma.  Conjunctive are clear. Neck range of motion intact.  Nose and throat clear. Tooth #27 is diseased and ready to fall out. No abscess. No neck tenderness. Notes neck pain around surgical site.  Nasal passages clear.  Respiratory: Good effort  Cardiovascular: Good distal perfusion  Gastrointestinal: Nondistended  Musculoskeletal: Atraumatic  Skin: Exposed skin clear.  Neurologic: Alert.  Psych:  Patient is cooperative, with normal affect.    Emergency Department Course     Procedures    Dental Block     Procedure: Dental Block  Indication: Toothache/jaw pain  Consent: Verbal  Location: #27: R lower canine   Procedure Detail: 1.8 cc of bupivacaine 0.5% with epinephrine was injected near his #27 tooth between his gingiva and buccal mucosa.  Patient Status: The patient tolerated the procedure well: Yes. There were no complications.     Emergency Department Course:         Reviewed:  I reviewed nursing notes, vitals, past medical history and Care Everywhere    Assessments:  1518 I obtained history and examined the patient as noted above.   1533 I spoke with Christine, the patient's , on the phone.     Disposition:  The patient was discharged to home.     Impression & Plan     Medical Decision Making:  Emily Zhu is a 62 year old male with a history of CVA, presents emergency room today for evaluation of jaw pain, dental pain, and nasal congestion.  See HPI.  His vitals are unremarkable.  He has dental disease throughout but a loose and decayed #27.  He was given  a digital block with some relief.  He is encouraged to see a dentist.  I do not see any airway compromise, neck tenderness, PTA or other.  Further, I do not see any nasal polyps.  He will be referred to primary care and instructed to return if worse.  See a dentist ASAP.  Covered with antibiotics for early infection.  Stable, discharge home, return if worse.    Diagnosis:    ICD-10-CM    1. Jaw pain  R68.84    2. Nasal congestion  R09.81        Discharge Medications:  Discharge Medication List as of 6/3/2022  3:49 PM      START taking these medications    Details   clindamycin (CLEOCIN) 300 MG capsule Take 1 capsule (300 mg) by mouth 4 times daily for 7 days, Disp-28 capsule, R-0, E-Prescribe             Scribe Disclosure:  I, Lacey Laughlin, am serving as a scribe at 3:13 PM on 6/3/2022 to document services personally performed by Luis Lopez PA-C based on my observations and the provider's statements to me.      Luis Lopez PA-C  06/03/22 2003

## 2022-06-08 PROCEDURE — G0179 MD RECERTIFICATION HHA PT: HCPCS | Performed by: NURSE PRACTITIONER

## 2022-06-10 ENCOUNTER — PATIENT OUTREACH (OUTPATIENT)
Dept: CARE COORDINATION | Facility: CLINIC | Age: 62
End: 2022-06-10
Payer: MEDICAID

## 2022-06-10 NOTE — PROGRESS NOTES
"Care Coordinator outreach:     received a call from Alyssiajuan Salvador at [facility] with patient on the line. She said pt's insurance switched from [facility] to [place] and he now needs to call [employer] to schedule medical rides. He was given the # .[phone]. His PMI # is [id number].  called with patient and scheduled rides for his endocrinology appt and [facility] appt next week. (certified for door to door [facility] [phone]). [place] transportation will be picking up patient and dropping him off- [phone].     Alyssia said that pt's application for emergency assistance has not been turned in yet so they can't process it. Pt is concerned about how he will pay his rent. Patient says he has it but can't do it without help. He also wants to apply for cash assistance.     For cash assistance there is a DHS form the doctor has to fill out [facility] doc D431 and to fax back to the [facility]. [facility] faxed that form to Tamica Tang to sign off on and fax back.  will give Tamica Tang a heads up on that.     emailed Porsche Herman (CADI waiver ) asking if she can go to visit patient and help fill out paperwork for cash assistance and emergency assistance as he needs help. Will await response. Will follow.    Porsche did send a recent email stating \"   I wanted to send you an email so that you also have mine handy. I appreciate you reaching out to me today - a little update since we last spoke. I was able to make a referral for services for Clint through a company called [employer]. Services include PCA, Individualized Home Supports (IHS), Homemaker Services (HM). [facility] has staff available and will contact Clint to complete an intake sooner rather than later (they still must review documents/referral info, etc. first). I did stress how important it is that Clint gets services ASAP. I asked the provider to let me know when the intake is scheduled.    " "  When I had my initial visit with Clint - I offered a Personal Emergency Response System (PERs). This referral has also been made. [employer] will be providing the PERs device. They will contact Clint re: delivery, setting up, and teaching Clint about the device. \"    AMANDA Fuentes, [facility]  Clinic Care Coordinator  [email or web address]  [phone]                    "

## 2022-06-11 ENCOUNTER — MEDICAL CORRESPONDENCE (OUTPATIENT)
Dept: FAMILY MEDICINE | Facility: CLINIC | Age: 62
End: 2022-06-11

## 2022-06-14 ENCOUNTER — OFFICE VISIT (OUTPATIENT)
Dept: PHARMACY | Facility: PHYSICIAN GROUP | Age: 62
End: 2022-06-14
Payer: MEDICAID

## 2022-06-14 VITALS
SYSTOLIC BLOOD PRESSURE: 102 MMHG | DIASTOLIC BLOOD PRESSURE: 64 MMHG | OXYGEN SATURATION: 97 % | HEART RATE: 88 BPM | WEIGHT: 179.38 LBS | RESPIRATION RATE: 16 BRPM | BODY MASS INDEX: 29.85 KG/M2

## 2022-06-14 DIAGNOSIS — I25.10 ATHEROSCLEROSIS OF CORONARY ARTERY OF NATIVE HEART WITHOUT ANGINA PECTORIS, UNSPECIFIED VESSEL OR LESION TYPE: ICD-10-CM

## 2022-06-14 DIAGNOSIS — I10 ESSENTIAL HYPERTENSION: ICD-10-CM

## 2022-06-14 DIAGNOSIS — G47.00 INSOMNIA, UNSPECIFIED TYPE: ICD-10-CM

## 2022-06-14 DIAGNOSIS — G45.9 TIA (TRANSIENT ISCHEMIC ATTACK): ICD-10-CM

## 2022-06-14 DIAGNOSIS — E11.40 PAINFUL DIABETIC NEUROPATHY (H): ICD-10-CM

## 2022-06-14 DIAGNOSIS — K08.89 PAIN, DENTAL: ICD-10-CM

## 2022-06-14 DIAGNOSIS — F32.A DEPRESSION, UNSPECIFIED DEPRESSION TYPE: ICD-10-CM

## 2022-06-14 DIAGNOSIS — E11.21 TYPE 2 DIABETES MELLITUS WITH DIABETIC NEPHROPATHY, WITHOUT LONG-TERM CURRENT USE OF INSULIN (H): Primary | ICD-10-CM

## 2022-06-14 PROCEDURE — 99606 MTMS BY PHARM EST 15 MIN: CPT | Performed by: PHARMACIST

## 2022-06-14 NOTE — PATIENT INSTRUCTIONS
"Recommendations from today's MTM visit:                                                         1. Need to find a dentist as soon as possible= try calling [facility]  [phone] in [place].       Follow-up: Return in about 2 months (around 8/14/2022) for MTM visit in clinic, Primary Care Provider, Lab Work.    It was great speaking with you today.  I value your experience and would be very thankful for your time in providing feedback in our clinic survey. In the next few days, you may receive an email or text message from [employer] with a link to a survey related to your  clinical pharmacist.\"   My Clinical Pharmacist's contact information:                                                      Please feel free to contact me with any questions or concerns you have.      Chelo Roberts, Pharm.D, [facility]  Medication Therapy Management Pharmacist  [phone]       "

## 2022-06-14 NOTE — Clinical Note
FYI- seeing you next week for paperwork. I have him planned to check back with MTM in 2 months when due for next a1c.

## 2022-06-14 NOTE — PROGRESS NOTES
Medication Therapy Management (MTM) Encounter    ASSESSMENT:                            Medication Adherence/Access: No issues identified    Type 2 Diabetes: Patient is meeting A1c goal of < 7%.     Hypertension/TIA/CAD: meeting BP goal of <140/90mm/hg.      Depression/Insomnia/Neuropathy: stable, continue to work with care coordination on housing and further supports at home.    Dental Pain: Need to find a dentist to avoid another ER visit for ongoing issues. Unable to find the list he was given in the ER of places to call. Helped look online today to find a local dentist to have him call and see if they accept medical assistance and set up a visit.     PLAN:                              1. Need to find a dentist as soon as possible= try calling [facility]  [phone] in [place].       Follow-up: Return in about 2 months (around 8/14/2022) for MTM visit in clinic, Primary Care Provider, Lab Work.    SUBJECTIVE/OBJECTIVE:                          Emily Zhu is a 62 year old male coming in for a follow-up visit.  Today's visit is a follow-up MTM visit from 5/9     Reason for visit: Recent ED visit on 6/3 for dental pain, has not been able to find a dentist yet.    Allergies/ADRs: Reviewed in chart  Past Medical History: Reviewed in chart  Tobacco: He reports that he has been smoking cigarettes. He has a 40.00 pack-year smoking history. He has never used smokeless tobacco.Tobacco Cessation Action Plan:   Information offered: Patient not interested at this time  Alcohol: not currently using    Medication Adherence/Access: [facility] helping with med set up for him.   See care coordination notes from 6/10/22 regarding social status updates.     Type 2 Diabetes:  Currently taking glipizide XL 5mg daily, Bydureon weekly (help from weekly nurse visit)- working much better for him than daily Victoza.   No longer on metformin due to renal function decline.   Patient is not experiencing side  effects.  Has been eating less due to limited income and food availability. Working with care coordinator on many different resources including housing and other supports.   Symptoms of low blood sugar? none  Symptoms of high blood sugar? none  Aspirin: 81mg daily and taking plavix once daily.   Statin: Yes: atorvastatin   ACEi/ARB: Yes: lisinopril.   Lab Results   Component Value Date    A1C 6.4 05/09/2022    A1C 5.9 02/07/2022    A1C 6.8 10/12/2021    A1C 7.5 07/14/2021    A1C 8.3 04/05/2021       Hypertension/TIA/CAD: Current medications include amlodipine 10mg daily, lisinopril 10mg daily, isosorbide 30mg daily, metoprolol tartrate 25mg twice daily. He is currently on aspirin 81mg daily plus clopidogrel 75mg now. Had been on clopidogrel alone prior to TIA that occurred in [place] on 6/22/2021.  Patient does not self-monitor blood pressure.  Patient reports fatigue, fear of falls and weakness.     Depression/Insomnia/Neuropathy:  Current medications include: sertraline 100mg daily (started in April increased in Aug 2021) and started b12 1000mcg daily for neuropathy. Last visit we stopped the amitriptyline due to cognitive impacts and risks with the medication for him. Affect today is lighter than previous visit. Today is more future focused in discussion. Does report he is frustrated with the money situation, living situation. Working with care coordination.       Dental Pain: Finished antibiotic for infection, but has not been able to find a dental office, states he got numbers from the ER but no one is answering.     Today's Vitals: /64   Pulse 88   Resp 16   Wt 179 lb 6 oz (81.4 kg)   SpO2 97%   BMI 29.85 kg/m    ----------------  Post Discharge Medication Reconciliation Status: discharge medications reconciled and changed, per note/orders.    I spent 30 minutes with this patient today. All changes were made via collaborative practice agreement with CARROL Pretty CNP. A copy of the visit note was  provided to the patient's provider(s).    The patient was given a summary of these recommendations.     Chelo Roberts, Pharm.D, [facility]  Medication Therapy Management Pharmacist  [phone]     Medication Therapy Recommendations  No medication therapy recommendations to display

## 2022-06-14 NOTE — Clinical Note
He still hasn't found a dentist. I gave him another office to try, but if you can make sure he gets connected with a place, I feel this is critical for him to avoid another ER visit given his dental issues.   Thanks,  Chelo Roberts, Pharm.D, [facility] Medication Therapy Management Pharmacist [phone]

## 2022-06-17 ENCOUNTER — PATIENT OUTREACH (OUTPATIENT)
Dept: CARE COORDINATION | Facility: CLINIC | Age: 62
End: 2022-06-17
Payer: MEDICAID

## 2022-06-17 NOTE — PROGRESS NOTES
"Clinic Care Coordination Contact    Follow Up Progress Note      Assessment:     Porsche () emailed back that she can't physically fill out forms but can help walk patient through them.      called pt to let him know. He has an appointment on Monday with Tamica Tang to fill out the form for cash assistance for the [facility]    Pt has an appointment next Thursday at 1:30pm to get his tooth extracted that's been bothering him. He has to pay $160 as they don't take MA. He doesn't want to put in effort to find a different place in network at this point since it's been difficult.    [facility] called patient- \"Vicky\" and said pt needs to get set up for an MRI of his kidney as there is a mass on it. They also want to refer him to urology at [facility]. They will let care coordinator know when his appts are so patient can confirm his medical rides.        Care Gaps:    Health Maintenance Due   Topic Date Due     ZOSTER IMMUNIZATION (1 of 2) Never done     LUNG CANCER SCREENING  01/26/2013     PREVENTIVE CARE VISIT  04/10/2014     Pneumococcal Vaccine: Pediatrics (0 to 5 Years) and At-Risk Patients (6 to 64 Years) (2 - PCV) 07/24/2016     COLORECTAL CANCER SCREENING  06/03/2021     COVID-19 Vaccine (3 - Booster for Moderna series) 09/22/2021     EYE EXAM  03/17/2022     PHQ-9  03/29/2022     MICROALBUMIN  07/14/2022           Plan:    will continue to reach out every 1-2 weeks to assist patient with needs    Christine Rutledge, MSW, [facility]  Clinic Care Coordinator  [email or web address]  [phone]  "

## 2022-06-20 ENCOUNTER — OFFICE VISIT (OUTPATIENT)
Dept: FAMILY MEDICINE | Facility: CLINIC | Age: 62
End: 2022-06-20

## 2022-06-20 VITALS
BODY MASS INDEX: 29.12 KG/M2 | WEIGHT: 174.8 LBS | SYSTOLIC BLOOD PRESSURE: 112 MMHG | HEIGHT: 65 IN | RESPIRATION RATE: 18 BRPM | TEMPERATURE: 97.8 F | DIASTOLIC BLOOD PRESSURE: 88 MMHG | OXYGEN SATURATION: 97 % | HEART RATE: 88 BPM

## 2022-06-20 DIAGNOSIS — Z86.73 HISTORY OF TIA (TRANSIENT ISCHEMIC ATTACK) AND STROKE: ICD-10-CM

## 2022-06-20 DIAGNOSIS — G89.29 CHRONIC LEFT HIP PAIN: ICD-10-CM

## 2022-06-20 DIAGNOSIS — K04.7 ABSCESSED TOOTH: ICD-10-CM

## 2022-06-20 DIAGNOSIS — M25.552 CHRONIC LEFT HIP PAIN: ICD-10-CM

## 2022-06-20 DIAGNOSIS — E11.40 PAINFUL DIABETIC NEUROPATHY (H): Primary | ICD-10-CM

## 2022-06-20 DIAGNOSIS — Z02.89 ENCOUNTER FOR COMPLETION OF FORM WITH PATIENT: ICD-10-CM

## 2022-06-20 PROCEDURE — 99213 OFFICE O/P EST LOW 20 MIN: CPT | Performed by: NURSE PRACTITIONER

## 2022-06-20 ASSESSMENT — ANXIETY QUESTIONNAIRES
3. WORRYING TOO MUCH ABOUT DIFFERENT THINGS: MORE THAN HALF THE DAYS
1. FEELING NERVOUS, ANXIOUS, OR ON EDGE: SEVERAL DAYS
IF YOU CHECKED OFF ANY PROBLEMS ON THIS QUESTIONNAIRE, HOW DIFFICULT HAVE THESE PROBLEMS MADE IT FOR YOU TO DO YOUR WORK, TAKE CARE OF THINGS AT HOME, OR GET ALONG WITH OTHER PEOPLE: SOMEWHAT DIFFICULT
GAD7 TOTAL SCORE: 10
7. FEELING AFRAID AS IF SOMETHING AWFUL MIGHT HAPPEN: NEARLY EVERY DAY
5. BEING SO RESTLESS THAT IT IS HARD TO SIT STILL: NOT AT ALL
GAD7 TOTAL SCORE: 10
2. NOT BEING ABLE TO STOP OR CONTROL WORRYING: NEARLY EVERY DAY
6. BECOMING EASILY ANNOYED OR IRRITABLE: NOT AT ALL

## 2022-06-20 ASSESSMENT — PATIENT HEALTH QUESTIONNAIRE - PHQ9
SUM OF ALL RESPONSES TO PHQ QUESTIONS 1-9: 12
5. POOR APPETITE OR OVEREATING: SEVERAL DAYS

## 2022-06-20 NOTE — PROGRESS NOTES
"Problem(s) Oriented visit        SUBJECTIVE:                                                    Emily Zhu is a 62 year old male who presents to clinic today for the following health issues :    Needs forms completed for [facility] [employer] Assistance. Has not worked since 12/2018 due to mental health issues. Worked at [employer] before that. Ongoing depression over the years. Being treated with Sertraline 100 mg daily. Has gone to therapy in the past, but currently not seeing therapist due to financial and transportation issues. Also had stroke June 2021 which has led to physical deconditioning leading to patient now using walker for ambulation. Has chronic left hip pain and painful diabetic neuropathy as well.     Needs to reschedule dental appointment at [facility] due to painful right lower tooth. They won't see him without him being cleared by oral surgeon due to medical complexity and fact that he is taking Plavix.    Problem list, Medication list, Allergies, and Medical/Social/Surgical histories reviewed in EPIC and updated as appropriate.   Additional history: as documented    ROS:  6 point ROS completed and negative except noted above, including Gen, CV, Resp, MS, Neuro, Psych    OBJECTIVE:                                                    /88   Pulse 88   Temp 97.8  F (36.6  C) (Temporal)   Resp 18   Ht 1.651 m (5' 5\")   Wt 79.3 kg (174 lb 12.8 oz)   SpO2 97%   BMI 29.09 kg/m    Body mass index is 29.09 kg/m .   GENERAL: Chronically ill appearing male, no acute distress  HENT: poor dentition with abscessed bottom tooth  RESP: lungs clear to auscultation - no rales, rhonchi or wheezes  CV: regular rates and rhythm, normal S1 S2, no S3 or S4, no murmur, click or rub, peripheral pulses strong and no peripheral edema  MS: decreased mobility and strength of bilateral lower extremities, using walker for ambulation  NEURO: sensory deficit bilateral lower extremities  PSYCH: flat " affect, depressed mood     ASSESSMENT/PLAN:                                                      Emily was seen today for forms.    Diagnoses and all orders for this visit:    Painful diabetic neuropathy (H)  Chronic left hip pain  History of TIA (transient ischemic attack) and stroke  Forms completed, faxed and scanned into chart. Message sent to care coordinator    Abscessed tooth  Spoke with [facility]. They said the doctor was in a procedure but they would fax form to me here to complete.     Encounter for completion of form with patient        CARROL Pretty CNP  [facility]

## 2022-06-21 ENCOUNTER — PATIENT OUTREACH (OUTPATIENT)
Dept: CARE COORDINATION | Facility: CLINIC | Age: 62
End: 2022-06-21
Payer: MEDICAID

## 2022-06-21 NOTE — PROGRESS NOTES
Clinic Care Coordination Contact    Follow Up Progress Note      Assessment: Isa from Dr. Seymour's office at [facility] called care coordinator and let her know pt's upcoming scheduled appointments to let Clint know and ensure he has transportation:    MRI of Kidneys Wednesday June 29th arrive at 9:45am  Exam is 10:15  [address] [facility] [address]    Urology- in the past he seen Dr. Eduardo [facility]ULLIV[facility]   July 6th at 4pm arrive at 3:40pm   [phone]      called patient to let him know     also emailed Porsche (CADI ) asking for any updates in pt's case/services.    Tamica Tang sent a msg to care coordinator noting application for financial assistance was filled out and faxed to [facility] yesterday.    Care Gaps:    Health Maintenance Due   Topic Date Due     ZOSTER IMMUNIZATION (1 of 2) Never done     LUNG CANCER SCREENING  01/26/2013     PREVENTIVE CARE VISIT  04/10/2014     Pneumococcal Vaccine: Pediatrics (0 to 5 Years) and At-Risk Patients (6 to 64 Years) (2 - PCV) 07/24/2016     COLORECTAL CANCER SCREENING  06/03/2021     COVID-19 Vaccine (3 - Booster for Moderna series) 09/22/2021     EYE EXAM  03/17/2022     MICROALBUMIN  07/14/2022         Plan:   Care Coordinator will continue to follow every 1-2 weeks    Christine Rutledge MSALEX, [facility]  Clinic Care Coordinator  [email or web address]  [phone]

## 2022-06-24 DIAGNOSIS — I10 ESSENTIAL HYPERTENSION: ICD-10-CM

## 2022-06-24 DIAGNOSIS — I25.10 ATHEROSCLEROSIS OF CORONARY ARTERY OF NATIVE HEART WITHOUT ANGINA PECTORIS, UNSPECIFIED VESSEL OR LESION TYPE: ICD-10-CM

## 2022-06-24 DIAGNOSIS — E11.21 TYPE 2 DIABETES MELLITUS WITH DIABETIC NEPHROPATHY, WITHOUT LONG-TERM CURRENT USE OF INSULIN (H): ICD-10-CM

## 2022-06-24 RX ORDER — CLOPIDOGREL BISULFATE 75 MG/1
75 TABLET ORAL DAILY
Qty: 30 TABLET | Refills: 6 | Status: SHIPPED | OUTPATIENT
Start: 2022-06-24 | End: 2023-02-09

## 2022-06-24 RX ORDER — LISINOPRIL 10 MG/1
10 TABLET ORAL DAILY
Qty: 30 TABLET | Refills: 3 | Status: SHIPPED | OUTPATIENT
Start: 2022-06-24 | End: 2022-10-05

## 2022-06-24 NOTE — TELEPHONE ENCOUNTER
Lisinopril. Last addressed 6/14/22 with Chelo.    BP Readings from Last 3 Encounters:   06/20/22 112/88   06/14/22 102/64   06/03/22 93/60           Hypertension/TIA/CAD: Current medications include amlodipine 10mg daily, lisinopril 10mg daily, isosorbide 30mg daily, metoprolol tartrate 25mg twice daily. He is currently on aspirin 81mg daily plus clopidogrel 75mg now. Had been on clopidogrel alone prior to TIA that occurred in [place] on 6/22/2021.  Patient does not self-monitor blood pressure.  Patient reports fatigue, fear of falls and weakness

## 2022-06-27 ENCOUNTER — PATIENT OUTREACH (OUTPATIENT)
Dept: CARE COORDINATION | Facility: CLINIC | Age: 62
End: 2022-06-27

## 2022-06-27 DIAGNOSIS — E11.21 TYPE 2 DIABETES MELLITUS WITH DIABETIC NEPHROPATHY, WITHOUT LONG-TERM CURRENT USE OF INSULIN (H): ICD-10-CM

## 2022-06-27 NOTE — PROGRESS NOTES
Clinic Care Coordination Contact    Follow Up Progress Note      Assessment:   Patient called care coordinator. He was tearful and feeling defeated about waiting for services to get into place. SW called Porsche (KHARI LORA) but she was out the past few days and just getting back to work today, got her VM. SW called [facility] where his referral was made for PCA, Homemaking and IHS. Patient was told that a nurse could come out in the next few days to do an intake. They are looking for a PCA worker for him but have a staff that can start right away for homemaking and IHS. Patient was pleased to hear this.    Patient has food (he gets food stamps as well), his homecare RN [facility] will come out Thursday. Pt said he is concerned about his financial situation and not able to pay rent. He hasn't heard about social security disability approval/denial yet. He did fill out cash assistance form and it was sent to the [facility]- EDDIE called the [facility] with patient but was put on a hold for a while and mutually decided to try at a different time.     Patient wanted to call [employer] to ask the status of his social security disability case- they haven't heard anything yet about approval/denial.     Patient is not sure where things stand with [facility]. SW can plan to reconnect this week to work on a few more things.    Care Gaps:    Health Maintenance Due   Topic Date Due     ZOSTER IMMUNIZATION (1 of 2) Never done     LUNG CANCER SCREENING  01/26/2013     PREVENTIVE CARE VISIT  04/10/2014     Pneumococcal Vaccine: Pediatrics (0 to 5 Years) and At-Risk Patients (6 to 64 Years) (2 - PCV) 07/24/2016     COLORECTAL CANCER SCREENING  06/03/2021     COVID-19 Vaccine (3 - Booster for Moderna series) 09/22/2021     EYE EXAM  03/17/2022     MICROALBUMIN  07/14/2022         Plan:    will continue to follow every 1-2 weeks to go over above goals.  emailed Porsche to update her on things.    Christine  AMANDA Rutledge, [facility]  Clinic Care Coordinator  [email or web address]  [phone]

## 2022-06-28 ENCOUNTER — PATIENT OUTREACH (OUTPATIENT)
Dept: CARE COORDINATION | Facility: CLINIC | Age: 62
End: 2022-06-28

## 2022-06-28 RX ORDER — BLOOD SUGAR DIAGNOSTIC
STRIP MISCELLANEOUS
Qty: 100 STRIP | Refills: 11 | Status: SHIPPED | OUTPATIENT
Start: 2022-06-28 | End: 2022-11-28

## 2022-06-28 RX ORDER — EXENATIDE 2 MG/.85ML
INJECTION, SUSPENSION, EXTENDED RELEASE SUBCUTANEOUS
Qty: 4 ML | Refills: 1 | Status: SHIPPED | OUTPATIENT
Start: 2022-06-28 | End: 2022-09-09

## 2022-06-28 NOTE — PROGRESS NOTES
Clinic Care Coordination Contact    Follow Up Progress Note      Assessment:    received a call from patient. He had the [facility] nurse Gagan at his home to go over the intake paperwork for services. Gagan said they found a PCA worker for Clint. Her name is Gillian and she will start with him this week. She will also provide homemaking and IHS services. He will complete the intake paperwork with pt. [facility] will discharge services now     also received notice from Porsche at [facility] that patient was assigned a [facility]I CM Naveen Vaughn.  exchanged an email with Naveen to introduce CC to him  [facility]   [employer] [facility] [employer]   [address] [place] [address]  Phone: [phone]  Fax: [phone]   Email: [email or web address]      Care Gaps:    Health Maintenance Due   Topic Date Due     ZOSTER IMMUNIZATION (1 of 2) Never done     LUNG CANCER SCREENING  01/26/2013     PREVENTIVE CARE VISIT  04/10/2014     Pneumococcal Vaccine: Pediatrics (0 to 5 Years) and At-Risk Patients (6 to 64 Years) (2 - PCV) 07/24/2016     COLORECTAL CANCER SCREENING  06/03/2021     COVID-19 Vaccine (3 - Booster for Moderna series) 09/22/2021     EYE EXAM  03/17/2022     MICROALBUMIN  07/14/2022       Postponed to working on other things right now     Goals addressed this encounter:    Goals Addressed                    This Visit's Progress       Patient Stated       Psychosocial (pt-stated)   70%      8-Goal Statement: I will work with [facility] case management to find alternative housing option within 6 months   Date Goal set: 5/19/22  Barriers: availability of housing options-   Strengths: has [facility] now, and [facility] services   Date to Achieve By: Nov 19th, 2022  Patient expressed understanding of goal: yes  Action steps to achieve this goal:  1. I will continue to meet with  to figure out options  "-Porsche from [facility]   2. I will keep PCP and care coordinator updated   3. I will submit any paperwork needed at facilities     As of today's date 6/2/2022 goal is met at 26 - 50%.   Goal Status:  Active.                    Plan:   -Assigned KHARI Vaughn at [facility] Services  -Pt will start PCA services this week through [facility], PCA will be \"Gillian\"  -SW will continue to follow    Christine Rutledge, MSW, [facility]  Clinic Care Coordinator  [email or web address]  [phone]  "

## 2022-06-29 ENCOUNTER — ANCILLARY PROCEDURE (OUTPATIENT)
Dept: MRI IMAGING | Facility: CLINIC | Age: 62
End: 2022-06-29
Attending: INTERNAL MEDICINE
Payer: MEDICAID

## 2022-06-29 DIAGNOSIS — N18.30 CHRONIC RENAL DISEASE, STAGE III (H): ICD-10-CM

## 2022-06-29 DIAGNOSIS — N28.89 KIDNEY MASS: ICD-10-CM

## 2022-06-29 PROCEDURE — 255N000002 HC RX 255 OP 636: Performed by: INTERNAL MEDICINE

## 2022-06-29 PROCEDURE — 74183 MRI ABD W/O CNTR FLWD CNTR: CPT

## 2022-06-29 PROCEDURE — A9585 GADOBUTROL INJECTION: HCPCS | Performed by: INTERNAL MEDICINE

## 2022-06-29 RX ORDER — GADOBUTROL 604.72 MG/ML
8 INJECTION INTRAVENOUS ONCE
Status: COMPLETED | OUTPATIENT
Start: 2022-06-29 | End: 2022-06-29

## 2022-06-29 RX ADMIN — GADOBUTROL 8 ML: 604.72 INJECTION INTRAVENOUS at 09:49

## 2022-07-01 ENCOUNTER — DOCUMENTATION ONLY (OUTPATIENT)
Dept: FAMILY MEDICINE | Facility: CLINIC | Age: 62
End: 2022-07-01

## 2022-07-06 ENCOUNTER — TRANSFERRED RECORDS (OUTPATIENT)
Dept: FAMILY MEDICINE | Facility: CLINIC | Age: 62
End: 2022-07-06

## 2022-07-11 ENCOUNTER — PATIENT OUTREACH (OUTPATIENT)
Dept: CARE COORDINATION | Facility: CLINIC | Age: 62
End: 2022-07-11

## 2022-07-14 ENCOUNTER — PATIENT OUTREACH (OUTPATIENT)
Dept: CARE COORDINATION | Facility: CLINIC | Age: 62
End: 2022-07-14

## 2022-07-14 NOTE — PROGRESS NOTES
Clinic Care Coordination Contact    Follow Up Progress Note      Assessment:  received a call from patient. It was decided with Naveen () that patient would stop trying to find PCA services not and pursue moving to customized living. [facility] was trying to find him a staff but felt patient was not a good fit for their agency and wanted to stop working with him. Reassured patient he did nothing wrong and that the agency did not have patience.    Patient said his friend paid his rent this month again but wants to know how long he will have to do this for. Patient still has not heard anything from social security office. Patient did get a letter from [employer] about his energy bill not being paid for the past 3 months. He used the last $100 he had from his friend to pay that off but is looking for energy assistance for next month.  left a msg for [employer] in [place] to see if they can assist.  Will follow.    Pt's homecare HHA and RN are still coming out through [facility]      Care Gaps:    Health Maintenance Due   Topic Date Due     ZOSTER IMMUNIZATION (1 of 2) Never done     LUNG CANCER SCREENING  01/26/2013     PREVENTIVE CARE VISIT  04/10/2014     Pneumococcal Vaccine: Pediatrics (0 to 5 Years) and At-Risk Patients (6 to 64 Years) (2 - PCV) 07/24/2016     COLORECTAL CANCER SCREENING  06/03/2021     COVID-19 Vaccine (3 - Booster for Moderna series) 09/22/2021     EYE EXAM  03/17/2022     MICROALBUMIN  07/14/2022         Plan:   Informed patient that [facility] will be off on Monday and Tuesday and to contact Naveen Vaughn (waiver ) if he needs anything while away. He is going to start to look for customized living for him. [facility] left a msg for [employer] to see if they can help with energy assistance    Christine Rutledge, MSW, [facility]  Clinic Care Coordinator  [email or web address]  [phone]

## 2022-07-22 DIAGNOSIS — K21.9 GASTROESOPHAGEAL REFLUX DISEASE, UNSPECIFIED WHETHER ESOPHAGITIS PRESENT: ICD-10-CM

## 2022-07-22 RX ORDER — MECOBALAMIN 5000 MCG
15 TABLET,DISINTEGRATING ORAL DAILY
Qty: 30 CAPSULE | Refills: 3 | Status: SHIPPED | OUTPATIENT
Start: 2022-07-22 | End: 2022-12-28

## 2022-07-22 NOTE — TELEPHONE ENCOUNTER
LANSOPRAZOLE     LOV: 6/20/22    LAST ADDRESSED: 3/18/22  Gastroesophageal reflux disease, unspecified whether esophagitis present  -     LANsoprazole (PREVACID) 15 MG DR capsule; Take 1 capsule (15 mg) by mouth daily  -     VENOUS COLLECTION  Dietary changes to help with reflux discussed. Prescription for Lansoprazole once daily.

## 2022-08-03 ENCOUNTER — PATIENT OUTREACH (OUTPATIENT)
Dept: CARE COORDINATION | Facility: CLINIC | Age: 62
End: 2022-08-03

## 2022-08-03 NOTE — PROGRESS NOTES
Clinic Care Coordination Contact    Follow Up Progress Note      Assessment: Patient called- he is wondering status of social security disability application pending. SW left a VM with patient for disability specialists to call him back and give an update.    Also, tried reaching pt's KHARI Vaughn and left a Vm. His msg says he will be out till the 5th due to illness. Asked for an update on finding customized living for patient.     Patient again frustrated by these things taking time    Care Gaps:    Health Maintenance Due   Topic Date Due     ZOSTER IMMUNIZATION (1 of 2) Never done     LUNG CANCER SCREENING  01/26/2013     PREVENTIVE CARE VISIT  04/10/2014     Pneumococcal Vaccine: Pediatrics (0 to 5 Years) and At-Risk Patients (6 to 64 Years) (2 - PCV) 07/24/2016     COLORECTAL CANCER SCREENING  06/03/2021     COVID-19 Vaccine (3 - Booster for Moderna series) 09/22/2021     EYE EXAM  03/17/2022     MICROALBUMIN  07/14/2022       Goals addressed this encounter:    Goals Addressed                    This Visit's Progress       Patient Stated       Psychosocial (pt-stated)   70%      8-Goal Statement: I will work with Count includes the [facility] case management to find alternative housing option within 6 months   Date Goal set: 5/19/22  Barriers: availability of housing options-   Strengths: has [facility] the [facility] waiver now, and [facility] services   Date to Achieve By: Nov 19th, 2022  Patient expressed understanding of goal: yes  Action steps to achieve this goal:  1. I will continue to meet with  to figure out options -Porsche from [employer]   2. I will keep PCP and care coordinator updated   3. I will submit any paperwork needed at facilities     As of today's date 6/2/2022 goal is met at 26 - 50%.   Goal Status:  Active.    As of today's date 8/3/2022 goal is met at 26 - 50%.   Goal Status:  Active                   Plan:   CC will continue to follow closely every 1-2 weeks    Christine Rutledge, MSW, LGSW  Clinic Care  Coordinator  [email or web address]  [phone]

## 2022-08-05 DIAGNOSIS — I25.10 ATHEROSCLEROSIS OF CORONARY ARTERY OF NATIVE HEART WITHOUT ANGINA PECTORIS, UNSPECIFIED VESSEL OR LESION TYPE: ICD-10-CM

## 2022-08-05 DIAGNOSIS — I10 ESSENTIAL HYPERTENSION: ICD-10-CM

## 2022-08-05 RX ORDER — ISOSORBIDE MONONITRATE 30 MG/1
30 TABLET, EXTENDED RELEASE ORAL DAILY
Qty: 90 TABLET | Refills: 2 | Status: SHIPPED | OUTPATIENT
Start: 2022-08-05 | End: 2023-06-29

## 2022-08-05 NOTE — TELEPHONE ENCOUNTER
Isosorbide  LOV 3/18/22  BP Readings from Last 3 Encounters:   06/20/22 112/88   06/14/22 102/64   06/03/22 93/60   Essential hypertension  -     Comp. Metabolic Panel (14) ([facility])  -     VENOUS COLLECTION  Above goal < 140/90 today. Lifestyle modifications reviewed. No medication changes. Plan for recheck at appointment in May and adjusting medication at that time if still high

## 2022-08-08 ENCOUNTER — PATIENT OUTREACH (OUTPATIENT)
Dept: CARE COORDINATION | Facility: CLINIC | Age: 62
End: 2022-08-08

## 2022-08-08 NOTE — PROGRESS NOTES
Clinic Care Coordination Contact    Follow Up Progress Note      Assessment:  received a call from Naveen Vaughn () and called patient to do a 3 way call:    Patient can move int [facility] temporary [facility]ome until he can move into their new customized living come October. Naveen is still working on learning some of the details and will get back to patient about his financial obligation of this. He can also figure out how to move patient's belongings through the waiver.    Patient is eager to get this going- otherwise his lease ends September 1st and he will need to resign or leave.     Patient then said he got a bill from the ED when he went in for his teeth for $600 some. He has [employer] so not sure why it wasn't covered. SW requested someone from homecare send CC a copy of the bill so CC can help him call billing to figure that out.     Care Gaps:    Health Maintenance Due   Topic Date Due     ZOSTER IMMUNIZATION (1 of 2) Never done     LUNG CANCER SCREENING  01/26/2013     PREVENTIVE CARE VISIT  04/10/2014     Pneumococcal Vaccine: Pediatrics (0 to 5 Years) and At-Risk Patients (6 to 64 Years) (2 - PCV) 07/24/2016     COLORECTAL CANCER SCREENING  06/03/2021     COVID-19 Vaccine (3 - Booster for Moderna series) 09/22/2021     EYE EXAM  03/17/2022     MICROALBUMIN  07/14/2022       Plan:   Social work care coordinator will keep in close touch with patient. The plan is for patient to move to [facility] customized living soon through the [facility]I waiver.    Christine Rutledge, MSW, [facility]  Clinic Care Coordinator  [email or web address]  [phone]

## 2022-08-10 ENCOUNTER — MEDICAL CORRESPONDENCE (OUTPATIENT)
Dept: FAMILY MEDICINE | Facility: CLINIC | Age: 62
End: 2022-08-10

## 2022-08-10 ENCOUNTER — PATIENT OUTREACH (OUTPATIENT)
Dept: CARE COORDINATION | Facility: CLINIC | Age: 62
End: 2022-08-10

## 2022-08-10 NOTE — PROGRESS NOTES
"Clinic Care Coordination Contact    Follow Up Progress Note      Assessment:    spoke with patient. He had been on the phone all morning with the county trying to figure out his insurance. He clarified he has straight MA active through the end of this month and then will switch to a medica MA plan. EDDIE called Naveen pt's CM to make sure this insurance switch will still keep his waiver open. Naveen will check into it, and if it doesn't, will work with the county to ensure he is put on a plan to keep it open.    Naveen checked into [facility] more for the financial obligation and will now need to steer in a different direction for customized living. Per his email he states \"I wasn t able to contact [facility] today, but I did speak with my supervisor about what the [employer] pena covers for ICS programs or 24 HR CL services. She mentioned that it depends on the 24 Hr CL and it s something that would have to be explored when making referral/s. ICS, rent is covered by client, and each ICS varies. An ICS program is great, but sometimes the rent is ridiculously high for the client. Even if there is room & board for CL - it usually ranges around $500 which is a lot less than what ICS usually are.     With that said, the program we talked about was an ICS program from [facility], and knowing Clint s financial situation, it would be better to go with a 24 HR CL. Now, I do have one in mind already and they are accepting referrals and clients to be assessed as soon as they receive a referral. I think it would be the better option. They are a 24 Hr Customized Living service known as [facility]. They have a few options/locations here:   - [facility] ([facility])  o a private bedroom within a 2-bedroom unit.   o shared living room  o Shared dining room and kitchen.  o Courtyard with outdoor seating  o Close to many coffee shops and eateries  o Near museums  - [facility] ([facility])  o rooming in shared apartments.   o They would match Clint with a roommate.   o This option includes communal dining + recreation spaces. A third option  o Free washers and dryers  o Courtyard with outdoor seating  o Close to many coffee shops and eateries  o Near museums  - [place] ([facility])  o 1 bedroom apartment  o Private bedrooms and bathrooms  o Cable TV in each apartment  o Free washers and dryers  o Courtyard with outdoor seating  o Close to many neighborhood shops and multi-cultural eateries  And of course, all locations with them include:  - Room and Board (includes meals)  - Housekeeping  - Medication administration  - 24-Hour staffing  - Advocacy  - Appointment management  - Home Health Aides  - RN oversight  - Daily activities    I have yet to contact them about if they have openings for sure and financial costs, but I will contact them and let you know more information. \"    Discussed this with patient.    Patient states he wants homecare to come out and shower him and give him his medication. He has not showered all week and is looking forward to a shower. He has not gotten out of bed today and notes \"I want to kill myself\". As  talked it through with patient more he is overwhelmed and having anxiety. He notes his arm is numb and his leg Is numb. He \"wants to live\" but \"wants help\".  called 911 to have paramedics go check on patient. He was concerned about financial impact of this- reassured him he was told his insurance is active for MA. They will attempt to go meet with patient.     SW spoke with [facility] RN and she will see if they can go out to [place] tomorrow. [place]. She will check with [facility] on this     will continue to follow closely.    Care Gaps    Health Maintenance Due   Topic Date Due     ZOSTER IMMUNIZATION (1 of 2) Never done     LUNG CANCER " SCREENING  01/26/2013     PREVENTIVE CARE VISIT  04/10/2014     Pneumococcal Vaccine: Pediatrics (0 to 5 Years) and At-Risk Patients (6 to 64 Years) (2 - PCV) 07/24/2016     COLORECTAL CANCER SCREENING  06/03/2021     COVID-19 Vaccine (3 - Booster for Moderna series) 09/22/2021     EYE EXAM  03/17/2022     MICROALBUMIN  07/14/2022       Plan:   -called 911 to have paramedics go meet with patient today due to medical and mental health concerns  - working on getting patient into a customized living asap  -Homecare active; SW left a message for Roma (Homecare RN through [place]) to see when they can go out this week. Insurance is active when pt called today  -Patient has food that lucille brought him at home but renewing his SNAP. He has the # for [facility] as well    Christine Rutledge, MSW, [facility]  Clinic Care Coordinator  [email or web address]  [phone]

## 2022-08-11 ENCOUNTER — PATIENT OUTREACH (OUTPATIENT)
Dept: CARE COORDINATION | Facility: CLINIC | Age: 62
End: 2022-08-11

## 2022-08-11 NOTE — PROGRESS NOTES
Clinic Care Coordination Contact    Follow Up Progress Note      Assessment: Patient called care coordinator. He said homecare called him and they will be going out to shower him this week now. When the paramedics came yesterday he did end up taking his injection for diabetes. Patient is feeling better today about things.     SW called with patient to [employer] in [place] and they are going to deliver patient food on Monday between 1-4pm. He will be getting both food and basic needs things (shower stuff, soap etc). Patient needed food in the home as he is waiting for his SNAP to renew. He was told he can get food deliveries 2x per month but he needs to call to set up an appt. He has the #    Naveen Vaughn (CADI CM) emailed stating that he made a referral for [facility] customized living and he is hoping to hear back this week from them    Care Gaps:    Health Maintenance Due   Topic Date Due     ZOSTER IMMUNIZATION (1 of 2) Never done     LUNG CANCER SCREENING  01/26/2013     PREVENTIVE CARE VISIT  04/10/2014     Pneumococcal Vaccine: Pediatrics (0 to 5 Years) and At-Risk Patients (6 to 64 Years) (2 - PCV) 07/24/2016     COLORECTAL CANCER SCREENING  06/03/2021     COVID-19 Vaccine (3 - Booster for Moderna series) 09/22/2021     EYE EXAM  03/17/2022     MICROALBUMIN  07/14/2022           Goals addressed this encounter:    Goals Addressed                    This Visit's Progress       Patient Stated       Psychosocial (pt-stated)   80%      8-Goal Statement: I will work with [facility] case management to find alternative housing option within 6 months   Date Goal set: 5/19/22  Barriers: availability of housing options-   Strengths: has [facility] waiver now, and [facility] services   Date to Achieve By: Nov 19th, 2022  Patient expressed understanding of goal: yes  Action steps to achieve this goal:  1. I will continue to meet with  to figure out options -Porsche Chacon   2. I will keep PCP and  care coordinator updated   3. I will submit any paperwork needed at facilities     As of today's date 6/2/2022 goal is met at 26 - 50%.   Goal Status:  Active.    As of today's date 8/3/2022 goal is met at 26 - 50%.   Goal Status:  Active   As of today's date 8/8/2022 goal is met at 51 - 75%.   Goal Status:  Showing progress. Patient may be moving soon to [facility] customized living                   Plan:   Will continue to follow every 1-2 weeks    Christine Rutledge, AMANDA, [facility]  Clinic Care Coordinator  [email or web address]  [phone]

## 2022-08-19 ENCOUNTER — TELEPHONE (OUTPATIENT)
Dept: FAMILY MEDICINE | Facility: CLINIC | Age: 62
End: 2022-08-19

## 2022-08-19 DIAGNOSIS — Z86.73 HISTORY OF TIA (TRANSIENT ISCHEMIC ATTACK) AND STROKE: ICD-10-CM

## 2022-08-19 DIAGNOSIS — F33.0 MAJOR DEPRESSIVE DISORDER, RECURRENT, MILD (H): ICD-10-CM

## 2022-08-19 DIAGNOSIS — I10 ESSENTIAL HYPERTENSION: ICD-10-CM

## 2022-08-19 DIAGNOSIS — E11.21 TYPE 2 DIABETES MELLITUS WITH DIABETIC NEPHROPATHY, WITHOUT LONG-TERM CURRENT USE OF INSULIN (H): Primary | ICD-10-CM

## 2022-08-19 DIAGNOSIS — E11.40 PAINFUL DIABETIC NEUROPATHY (H): ICD-10-CM

## 2022-08-19 NOTE — TELEPHONE ENCOUNTER
"Patient called clinic 8/16/22 at 11am stating he has not had a home nurse or HHA visit in over a week and asking what the issue is. [facility] nurse typically comes every week and sets up his meds and give his weekly Bydureon shot.   --8/16/22 1115am This nurse called [facility] [phone] inquire and was informed by Bernadette that patient was \"discharged from on 8/4/22 due to insurance lapse\".  I asked to have patient's nurse, Roma, paged to discuss with her as she was speaking with Christine Tao (our CCSW) last week about this and patient does have insurance and Roma was going to be working on getting patient seen last week. Bernadette sent page to Roma. See Care Coord notes below.   --8/17/22 no call from Roma yesterday. Called Bernadette at [facility] [phone] again and asked for her to page Roma again. Bernadette said will page Roma and confirmed that Roma was not off on 8/16 or 8/17.   --8/18/22 this nurse was not in office but Breanne Walton LPN sits at same desk and received no call from home care.   --8/19/22 called [facility] again and asked to speak with a nurse supervisor. Was forwarded to voicemail of Zehra Mendoza - left message with the situation and asked for call to discuss and get guidance on whether patient can be seen asa. 445pm no call back.   --8/19/22 445pm called patient. states he has not taken his meds in 3 days since they are not set up for him.  is not feeling well without his meds. States he is unable to figure out which meds he needs to take on his own. Asked patient if his sister, Lesia, could come to help him. He says she will not.  has no one to help him with meds.   450pm Nurse called [facility] again and was transferred to national intake line. They are unable to connect me to anyone locally in [place] to speak with for urgent service reinstatement over weekend.   600pm called patient again thinking he will have to go to ER but patient reports Lesia will " come today to set up his meds for the weekend but Monday needs home care again.   Encouraged patient to ask Lesia to set his meds up through Monday at least.   RN place a new urgent home care referral and will follow up with [facility] again Monday morning.     Meds include: isosorbide, clopidogrel, lisinopril, metoprolol, amlodipine, sertraline, lansoprazole, bydureon, atorvastatin.         Per patient and Christine Tao's notes, has Medicaid through August then will be changing to a Medica plan.     Per Christine Aislinn CCSW 8/11/22 4:46 note:   Clinic Care Coordination Contact   Follow Up Progress Note   Assessment: Patient called care coordinator. He said homecare called him and they will be going out to shower him this week now. When the paramedics came yesterday he did end up taking his injection for diabetes. Patient is feeling better today about things.      EDDIE called with patient to [employer] in [place] and they are going to deliver patient food on Monday between 1-4pm. He will be getting both food and basic needs things (shower stuff, soap etc). Patient needed food in the home as he is waiting for his SNAP to renew. He was told he can get food deliveries 2x per month but he needs to call to set up an appt. He has the #     Naveen Roderick (KHARI LORA) emailed stating that he made a referral for [facility] customized living and he is hoping to hear back this week from them  ______________________________________________    Per 8/10/22 Christine Tao Care Coord note:   EDDIE spoke with [facility] RN and she will see if they can go out to [place] tomorrow. [place]. She will check with romi LORA on this     .

## 2022-08-22 ENCOUNTER — PATIENT OUTREACH (OUTPATIENT)
Dept: CARE COORDINATION | Facility: CLINIC | Age: 62
End: 2022-08-22

## 2022-08-22 NOTE — LETTER
Patient Centered Plan of Care  About Me:        Patient Name:  Emily Zhu    YOB: 1960  Age:         62 year old   Alfonso MRN:    [id number] Telephone Information:  Home Phone [phone]   Mobile None       Address:  [address]  [place] [address] Email address:  No e-mail address on record      Emergency Contact(s)    Name Relationship Lgl Grd Work Phone Home Phone Mobile Phone   1. EDWARD BRAR Sister  [phone] none   2. LINUS Friend   [phone]            Primary language:  English     needed? No   [facility]:  [phone] op. 1  Other communication barriers:None    Preferred Method of Communication:     Current living arrangement: I live alone    Mobility Status/ Medical Equipment: Independent w/Device        Health Maintenance  Health Maintenance Reviewed: Due/Overdue preventative care visit      My Access Plan  Medical Emergency 911   Primary Clinic Line   - [phone]   24 Hour Appointment Line [phone] or  [phone]) (toll-free)   24 Hour Nurse Line [phone] (toll-free)   Preferred Urgent Care No data recorded   Preferred Hospital [facility]  [phone]     Preferred Pharmacy [facility]     Behavioral Health Crisis Line The National Suicide Prevention Lifeline at 1-634.189.8159 or Text/Call 518             My Care Team Members  Patient Care Team       Relationship Specialty Notifications Start End    Tamica Tang APRN CNP PCP - General Family Medicine  1/4/21     Phone: [phone] Fax: [phone] [address] [facility] [address]    Chelo Roberts RPH Pharmacist Pharmacist  8/13/19     Phone: [phone] [address] [facility] [address]    Christine Tao LGSW Lead Care Coordinator  Admissions 1/10/20     Tamica Tang APRN CNP Assigned PCP   8/1/21     Phone:  [phone] Fax: [phone]         [address] [facility] [address]    Chelo Roberts RPH Assigned MTM Pharmacist   5/28/22     Phone: [phone] [address] [facility] [address]            My Care Plans  Self Management and Treatment Plan  Care Plan  Care Plan: Housing and Support     Problem: Insufficient In-home support     Goal: Establish adequate home support     Start Date: 5/19/2022 Expected End Date: 11/21/2022    This Visit's Progress: 50%    Priority: High    Note:     8-Goal Statement: I will work with [facility] case management to find alternative housing option within 6 months   Date Goal set: 5/19/22  Barriers: availability of housing options-   Strengths: has [facility] waiver now, and [facility] services   Date to Achieve By: Nov 19th, 2022  Patient expressed understanding of goal: yes  Action steps to achieve this goal:  1. I will continue to meet with  to figure out options -Porsche Chacon   2. I will keep PCP and care coordinator updated   3. I will submit any paperwork needed at facilities     As of today's date 6/2/2022 goal is met at 26 - 50%.   Goal Status:  Active.    As of today's date 8/3/2022 goal is met at 26 - 50%.   Goal Status:  Active   As of today's date 8/8/2022 goal is met at 51 - 75%.   Goal Status:  Showing progress. Patient may be moving soon to [facility] customized living                          Action Plans on File:            Depression          Advance Care Plans/Directives Type:   Advanced Directive - On File      My Medical and Care Information  Problem List   Patient Active Problem List   Diagnosis     Coronary atherosclerosis     Essential hypertension     Mixed hyperlipidemia     Cataract     GERD (gastroesophageal reflux disease)     Microalbuminuria due to type 2 diabetes mellitus (H)     Type 2 diabetes mellitus with diabetic nephropathy, without long-term current use of insulin (H)     Painful diabetic neuropathy (H)      Warthin tumor     CKD (chronic kidney disease) stage 3, GFR 30-59 ml/min (H)     Tobacco use     History of TIA (transient ischemic attack) and stroke     Major depressive disorder, recurrent, mild (H)     Hip pain, left      Current Medications and Allergies:  See printed Medication Report.    Care Coordination Start Date: 1/10/2020   Frequency of Care Coordination: monthly     Form Last Updated: 08/23/2022

## 2022-08-23 ENCOUNTER — PATIENT OUTREACH (OUTPATIENT)
Dept: CARE COORDINATION | Facility: CLINIC | Age: 62
End: 2022-08-23

## 2022-08-23 NOTE — PROGRESS NOTES
"Clinic Care Coordination Contact    Follow Up Progress Note      Assessment:    received a call from patient that homecare has not come out and he is in need of a shower and to take his diabetic medicine shot. Lesia, his friend came late last week to set up his medications through Monday which he appreciated.     [facility] called [facility] with patient and spoke with \"Zehra\" there who confirmed that they are denying re-establishing homecare and they closed due to insurance lapse. She also said even if his insurance was active at this point they don't have capacity to keep providing care to him. They considered him in maintenance status. He was have RN help with medication set up and injection and a HHA to help shower him weekly. Patient was upset to hear this.    SW called [facility] with patient and confirmed with the financial team that he has [employer] through the end of August and then September 1st is switching to [employer].     Patient called several homecare agencies, none of which could take on the referral:  [facility]  [facility]  [facility]- only working with [facility] pt's right now due to capacity  [facility]    Will continue to work on this tomorrow. Called patient and is aware but eager to figure out a plan. He was going to see if Lesia could come out and help him do his injection/shot of medicine.     Patient's  Brandyn Vaughn emailed care coordinator late last week to say that he resigned from his position and that the customized living he referred patient to denied him due to his needs. Porsche Razo (his supervisor) is the contact for right now.  left a msg for Porsche to see if she has any ideas that the waiver can help with. Phone: [phone]     Care Gaps:    Health Maintenance Due   Topic Date Due     ZOSTER IMMUNIZATION (1 of 2) Never done     LUNG CANCER SCREENING  01/26/2013     PREVENTIVE CARE VISIT  04/10/2014     Pneumococcal " Vaccine: Pediatrics (0 to 5 Years) and At-Risk Patients (6 to 64 Years) (2 - PCV) 07/24/2016     COLORECTAL CANCER SCREENING  06/03/2021     COVID-19 Vaccine (3 - Booster for Moderna series) 09/22/2021     EYE EXAM  03/17/2022     MICROALBUMIN  07/14/2022         Care Plans  Care Plan: Housing and Support     Problem: Insufficient In-home support     Goal: Establish adequate home support     Start Date: 5/19/2022 Expected End Date: 11/21/2022    This Visit's Progress: 50%    Priority: High    Note:     8-Goal Statement: I will work with [facility] case management to find alternative housing option within 6 months   Date Goal set: 5/19/22  Barriers: availability of housing options-   Strengths: has [facility] waiver now, and [facility] services   Date to Achieve By: Nov 19th, 2022  Patient expressed understanding of goal: yes  Action steps to achieve this goal:  1. I will continue to meet with  to figure out options -Porsche Chacon   2. I will keep PCP and care coordinator updated   3. I will submit any paperwork needed at facilities     As of today's date 6/2/2022 goal is met at 26 - 50%.   Goal Status:  Active.    As of today's date 8/3/2022 goal is met at 26 - 50%.   Goal Status:  Active   As of today's date 8/8/2022 goal is met at 51 - 75%.   Goal Status:  Showing progress. Patient may be moving soon to [facility] customized living                         Plan:   Working to establish a new homecare agency that can meet pt's needs for HHA and RN services  [facility] consulting with triage at [facility] about this as well  Left a msg for Porsche Razo (supervisor, waiver services)     Christine Rutledge, MSW, [facility]  Clinic Care Coordinator  [email or web address]  [phone]
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
## 2022-08-26 ENCOUNTER — PATIENT OUTREACH (OUTPATIENT)
Dept: CARE COORDINATION | Facility: CLINIC | Age: 62
End: 2022-08-26

## 2022-08-26 NOTE — PROGRESS NOTES
"Clinic Care Coordination Contact    Follow Up Progress Note      Assessment:    received an email from Porsche Egan ([facility], CADI Supervisor) stating \"I do not have leads, I did follow up with what Naveen made referrals on and they were unable to take him. Naveen's coverage  is Nicholas Nat, I have included her in this email. She can assist with any other leads for PCA and potentially a Skilled Nurse.\"    EDDIE responded \"Right now I am very worried. He has nobody to help him shower and has not showered in a while now. He also needs help with med setup and administration of his injection for diabetes medication once a week. I have not been able to find a homecare agency that has capacity to take his referral on. He has [employer] [place] through end of August then will switch to "[employer] [place]. Are you able to go visit him and check in?\"    Porsche responded \"I understand your concern. I also acknowledge that Clint is in a need of a shower, and assistance with medications. Your experience is the same thing we are experiencing with finding providers to assist Clint - considering the staffing shortage and many places not having the capacity/staff to take Clint on as a client.     If Nicholas has any leads on places that might be taking referrals at the time, I can assure you that a referral will be made and we will continue to update you as soon as we find someone. \"    EDDIE emailed Mike to ask for updates today.    EDDIE also spoke with patient. [facility] will be off on Monday/Tuesday. Told patient if there is any crisis concerns to call 911 and/or go to the ED. He indicated undestanding.    Care Gaps:    Health Maintenance Due   Topic Date Due     ZOSTER IMMUNIZATION (1 of 2) Never done     LUNG CANCER SCREENING  01/26/2013     PREVENTIVE CARE VISIT  04/10/2014     Pneumococcal Vaccine: Pediatrics (0 to 5 Years) and At-Risk Patients (6 to 64 Years) (2 - PCV) 07/24/2016     COLORECTAL CANCER SCREENING  " 06/03/2021     COVID-19 Vaccine (3 - Booster for Moderna series) 09/22/2021     EYE EXAM  03/17/2022     MICROALBUMIN  07/14/2022     INFLUENZA VACCINE (1) 09/01/2022       Did not focus on care gaps today    Care Plans  Care Plan: Housing and Support     Problem: Insufficient In-home support     Goal: Establish adequate home support     Start Date: 5/19/2022 Expected End Date: 11/21/2022    This Visit's Progress: 50%    Priority: High    Note:     8-Goal Statement: I will work with [facility] case management to find alternative housing option within 6 months   Date Goal set: 5/19/22  Barriers: availability of housing options-   Strengths: has [facility] waiver now, and [facility] services   Date to Achieve By: Nov 19th, 2022  Patient expressed understanding of goal: yes  Action steps to achieve this goal:  1. I will continue to meet with  to figure out options -Porsche from [employer]   2. I will keep PCP and care coordinator updated   3. I will submit any paperwork needed at facilities     As of today's date 6/2/2022 goal is met at 26 - 50%.   Goal Status:  Active.    As of today's date 8/3/2022 goal is met at 26 - 50%.   Goal Status:  Active   As of today's date 8/8/2022 goal is met at 51 - 75%.   Goal Status:  Showing progress. Patient may be moving soon to comfort services customized reggie Rutledge, MSW, [facility]  Clinic Care Coordinator  [email or web address]  [phone]

## 2022-08-31 ENCOUNTER — PATIENT OUTREACH (OUTPATIENT)
Dept: CARE COORDINATION | Facility: CLINIC | Age: 62
End: 2022-08-31

## 2022-08-31 NOTE — PROGRESS NOTES
Clinic Care Coordination Contact  [facility]/Voicemail       Clinical Data: Care Coordinator Outreach--    /[facility] Mike Johns left care coordinator a msg on Monday wanting to go over progress and connect with care coordinator. Returned her call but VM box was full so emailed her. Pt also had tried to reach CC and left a msg. Returned call but it kept ringing.    Outreach attempted x 1.  Left message on care team/ and pt's  voicemail with call back information and requested return call.  Plan: Care Coordinator can try to reach again before end of the week    Christine Rutledge, MSW, [facility]  Clinic Care Coordinator  [email or web address]  [phone]

## 2022-09-01 NOTE — PROGRESS NOTES
22 Received fax from [employer] to let us know the order has .  Order closed in Epic.    Nader Hernandez,   [facility]  [phone]

## 2022-09-02 ENCOUNTER — PATIENT OUTREACH (OUTPATIENT)
Dept: CARE COORDINATION | Facility: CLINIC | Age: 62
End: 2022-09-02

## 2022-09-06 NOTE — PROGRESS NOTES
"Clinic Care Coordination Contact    Follow Up Progress Note      Assessment: patient called care coordinator. He said Lesia would be calling and he wants care coordinator to talk to her.  then received a call from her. She wanted to inform care coordinator that her mom, who is a former nurse, has been going to see patient at least once a week since he has a lapse in homecare to help with a shower and medication. She wants this to \"only be temporary\". She still declines wanting to be his PCA. She did state that pt's nephew may be coming here and have some interest in PCA and she inquired what typical pay is. Informed her. She knows pt is on a wait list at [facility] for PCA services and if nephew is interested he could apply there.     She does agree it would help for patient to move into a customized living.  Pt is thinking about making an appointment at the social security office just to see if that puts any push on his pending application.     She also said that pt got some [facility] paperwork in the mail and she was trying to figure out what it was. She will talk to the [facility] about it as [facility] couldn't figure it out based on the title on the forms.     Care Gaps:    Health Maintenance Due   Topic Date Due     ZOSTER IMMUNIZATION (1 of 2) Never done     LUNG CANCER SCREENING  01/26/2013     PREVENTIVE CARE VISIT  04/10/2014     Pneumococcal Vaccine: Pediatrics (0 to 5 Years) and At-Risk Patients (6 to 64 Years) (2 - PCV) 07/24/2016     COLORECTAL CANCER SCREENING  06/03/2021     COVID-19 Vaccine (3 - Booster for Moderna series) 09/22/2021     EYE EXAM  03/17/2022     MICROALBUMIN  07/14/2022     INFLUENZA VACCINE (1) 09/01/2022     LIPID  09/29/2022     DIABETIC FOOT EXAM  09/29/2022     Didn't focus on care gaps today    Care Plans  Care Plan: Housing and Support     Problem: Insufficient In-home support     Goal: Establish adequate home support     Start Date: 5/19/2022 Expected End Date: " 11/21/2022    This Visit's Progress: 60% Recent Progress: 50%    Priority: High    Note:     8-Goal Statement: I will work with [facility] case management to find alternative housing option within 6 months   Date Goal set: 5/19/22  Barriers: availability of housing options-   Strengths: has county waiver now, and [facility] services   Date to Achieve By: Nov 19th, 2022  Patient expressed understanding of goal: yes  Action steps to achieve this goal:  1. I will continue to meet with  to figure out options -Porsche Chacon   2. I will keep PCP and care coordinator updated   3. I will submit any paperwork needed at facilities     As of today's date 6/2/2022 goal is met at 26 - 50%.   Goal Status:  Active.    As of today's date 8/3/2022 goal is met at 26 - 50%.   Goal Status:  Active   As of today's date 8/8/2022 goal is met at 51 - 75%.   Goal Status:  Showing progress. Patient may be moving soon to comfort services customized living                           Plan:   -Has interim help until homecare or a move can be figured out  -Emailed Nicholas Jackson his [facility] worker these updates  -Will follow    Christine Rutledge, MSW, [facility]  Clinic Care Coordinator  [email or web address]  [phone]

## 2022-09-09 ENCOUNTER — PATIENT OUTREACH (OUTPATIENT)
Dept: CARE COORDINATION | Facility: CLINIC | Age: 62
End: 2022-09-09

## 2022-09-09 DIAGNOSIS — I10 ESSENTIAL HYPERTENSION: ICD-10-CM

## 2022-09-09 DIAGNOSIS — E11.21 TYPE 2 DIABETES MELLITUS WITH DIABETIC NEPHROPATHY, WITHOUT LONG-TERM CURRENT USE OF INSULIN (H): ICD-10-CM

## 2022-09-09 RX ORDER — AMLODIPINE BESYLATE 10 MG/1
10 TABLET ORAL DAILY
Qty: 90 TABLET | Refills: 1 | Status: SHIPPED | OUTPATIENT
Start: 2022-09-09 | End: 2023-03-23

## 2022-09-09 RX ORDER — METOPROLOL TARTRATE 25 MG/1
25 TABLET, FILM COATED ORAL 2 TIMES DAILY
Qty: 180 TABLET | Refills: 2 | Status: SHIPPED | OUTPATIENT
Start: 2022-09-09 | End: 2023-05-09

## 2022-09-09 RX ORDER — EXENATIDE 2 MG/.85ML
INJECTION, SUSPENSION, EXTENDED RELEASE SUBCUTANEOUS
Qty: 4 ML | Refills: 1 | Status: SHIPPED | OUTPATIENT
Start: 2022-09-09 | End: 2022-10-05

## 2022-09-09 NOTE — TELEPHONE ENCOUNTER
Amlodipine  Metoprolol   Bydureon inj   LOV 3/18/22  BP Readings from Last 3 Encounters:   06/20/22 112/88   06/14/22 102/64   06/03/22 93/60   Type 2 diabetes mellitus with diabetic nephropathy, without long-term current use of insulin (H)  -     OPTOMETRY REFERRAL; Future  -     Comp. Metabolic Panel (14) ([facility])  -     VENOUS COLLECTION  Well controlled. Due for A1C in May. No changes in medication today.      Essential hypertension  -     Comp. Metabolic Panel (14) ([facility])  -     VENOUS COLLECTION  Above goal < 140/90 today. Lifestyle modifications reviewed. No medication changes. Plan for recheck at appointment in May and adjusting medication at that time if still high    Component      Latest Ref Rng & Units 4/12/2022 5/9/2022   Glucose      65 - 99 mg/dL 140 (H)    Urea Nitrogen      8 - 27 mg/dL 37 (H)    Creatinine      0.76 - 1.27 mg/dL 2.65 (H)    eGFR       >59 mL/min/1.73 26 (L)    BUN/Creatinine Ratio      10 - 24 14    Sodium      134 - 144 mmol/L 138    Potassium      3.5 - 5.2 mmol/L 4.7    Chloride      96 - 106 mmol/L 100    Total CO2      20 - 29 mmol/L 22    Calcium      8.6 - 10.2 mg/dL 9.5    Hemoglobin A1C      4.0 - 6.0 %  6.4 (A)

## 2022-09-09 NOTE — PROGRESS NOTES
Clinic Care Coordination Contact    Follow Up Progress Note      Assessment:  spoke with patient today. He said he has [employer] and MA. Anabella, a delegate from [employer] at [employer] was added into the call. They want to do an assessment with patient and create goals and feel it would be helpful to include CC and his CADI worker. Scheduled for October 23rd at 10am. Essentially he will also have a [employer] care coordinator. They will help with resources and scheduling rides.    Called [employer] with patient and [facility] security office let them know that his case is moving onto determination processing and so it could take another 4-6 months depending on them to make a decision. His case got sent to [employer] in [facility]. He applied a year ago. Pt reassured if approved he will get back pay. He wants CC to talk to his landlord to let them know his situation. He is worried his friend may not continue to pay his rent for him.    Care Gaps:    Health Maintenance Due   Topic Date Due     ZOSTER IMMUNIZATION (1 of 2) Never done     LUNG CANCER SCREENING  01/26/2013     PREVENTIVE CARE VISIT  04/10/2014     Pneumococcal Vaccine: Pediatrics (0 to 5 Years) and At-Risk Patients (6 to 64 Years) (2 - PCV) 07/24/2016     COLORECTAL CANCER SCREENING  06/03/2021     COVID-19 Vaccine (3 - Booster for Moderna series) 09/22/2021     EYE EXAM  03/17/2022     MICROALBUMIN  07/14/2022     INFLUENZA VACCINE (1) 09/01/2022     LIPID  09/29/2022     DIABETIC FOOT EXAM  09/29/2022         Care Plans  Care Plan: Housing and Support     Problem: Insufficient In-home support     Goal: Establish adequate home support     Start Date: 5/19/2022 Expected End Date: 11/21/2022    This Visit's Progress: 60% Recent Progress: 50%    Priority: High    Note:     8-Goal Statement: I will work with [facility] case management to find alternative housing option within 6 months   Date Goal set: 5/19/22  Barriers: availability of  housing options-   Strengths: has county waiver now, and [facility] services   Date to Achieve By: Nov 19th, 2022  Patient expressed understanding of goal: yes  Action steps to achieve this goal:  1. I will continue to meet with  to figure out options -Porsche from [employer]   2. I will keep PCP and care coordinator updated   3. I will submit any paperwork needed at facilities     As of today's date 6/2/2022 goal is met at 26 - 50%.   Goal Status:  Active.    As of today's date 8/3/2022 goal is met at 26 - 50%.   Goal Status:  Active   As of today's date 8/8/2022 goal is met at 51 - 75%.   Goal Status:  Showing progress. Patient may be moving soon to comfort services customized living                           Plan:   Continue to work closely with patient at least on monthly basis.     Christine Rutledge, MSW, [facility]  Clinic Care Coordinator  [email or web address]  [phone]

## 2022-09-13 NOTE — PROGRESS NOTES
22 Received fax from [employer], order has .  Order closed in Epic.    Nader Hernandez,   [facility]  [phone]

## 2022-09-19 ENCOUNTER — PATIENT OUTREACH (OUTPATIENT)
Dept: CARE COORDINATION | Facility: CLINIC | Age: 62
End: 2022-09-19

## 2022-09-19 NOTE — PROGRESS NOTES
Clinic Care Coordination Contact    Follow Up Progress Note      Assessment:  got an email last thurdsay from Nicholas () letting [facility] know that they were able to set up [facility] for PCA services. They will also offer homemaking and ILS services. They will be coming out to establish services today. Patient also called care coordinator and let [facility] know this info. He will let CC know how it goes.      Patient also updated  that he received mail from [employer] and they want documentation of his work history for the last 13 years. There is a form he needs to fill out and send back. Lesia plans to help him with it.    Care Gaps:    Health Maintenance Due   Topic Date Due     ZOSTER IMMUNIZATION (1 of 2) Never done     LUNG CANCER SCREENING  01/26/2013     PREVENTIVE CARE VISIT  04/10/2014     Pneumococcal Vaccine: Pediatrics (0 to 5 Years) and At-Risk Patients (6 to 64 Years) (2 - PCV) 07/24/2016     COLORECTAL CANCER SCREENING  06/03/2021     COVID-19 Vaccine (3 - Booster for Moderna series) 06/17/2021     EYE EXAM  03/17/2022     MICROALBUMIN  07/14/2022     INFLUENZA VACCINE (1) 09/01/2022     LIPID  09/29/2022     DIABETIC FOOT EXAM  09/29/2022     ** reminded patient today that he is due for a physical, eye exam, and a diabetic foot exam. He will plan to get that scheduled over the next couple months    Care Plans  Care Plan: Housing and Support     Problem: Insufficient In-home support     Goal: Establish adequate home support     Start Date: 5/19/2022 Expected End Date: 11/21/2022    This Visit's Progress: 60% Recent Progress: 60%    Priority: High    Note:     8-Goal Statement: I will work with [facility] case management to find alternative housing option within 6 months   Date Goal set: 5/19/22  Barriers: availability of housing options-   Strengths: has county waiver now, and [facility] services   Date to Achieve By: Nov 19th, 2022  Patient expressed understanding of goal:  yes  Action steps to achieve this goal:  1. I will continue to meet with  to figure out options -Porsche from [employer]   2. I will keep PCP and care coordinator updated   3. I will submit any paperwork needed at facilities     As of today's date 6/2/2022 goal is met at 26 - 50%.   Goal Status:  Active.    As of today's date 8/3/2022 goal is met at 26 - 50%.   Goal Status:  Active   As of today's date 8/8/2022 goal is met at 51 - 75%.   Goal Status:  Showing progress. Patient may be moving soon to comfort services customized living                         Plan:   Cadi  set up for [facility] to establish PCA services/homemaking/ILS. They will be coming out today to see patient and set up services and have a PCA worker come out to train today to work with pt. Patient will let CC know how it goes. He says the # for the agency is [phone].         Christine Rutledge, MSW, [facility]  Clinic Care Coordinator  [email or web address]  [phone]

## 2022-09-23 ENCOUNTER — PATIENT OUTREACH (OUTPATIENT)
Dept: CARE COORDINATION | Facility: CLINIC | Age: 62
End: 2022-09-23

## 2022-09-23 NOTE — PROGRESS NOTES
Clinic Care Coordination Contact    Follow Up Progress Note      Assessment: [facility] participated in [employer]'s screening with patient today. Anabella was the person out to see him. They will work on getting pt an eye exam, into the dentist, and in for a physical and create other goals.    Pt says his new PCA Skyler is nice and he is coming out consistently. He is coming out today to help with laundry. His PCA is through [facility].    Care Gaps:    Health Maintenance Due   Topic Date Due     ZOSTER IMMUNIZATION (1 of 2) Never done     LUNG CANCER SCREENING  01/26/2013     PREVENTIVE CARE VISIT  04/10/2014     Pneumococcal Vaccine: Pediatrics (0 to 5 Years) and At-Risk Patients (6 to 64 Years) (2 - PCV) 07/24/2016     COLORECTAL CANCER SCREENING  06/03/2021     COVID-19 Vaccine (3 - Booster for Moderna series) 06/17/2021     EYE EXAM  03/17/2022     MICROALBUMIN  07/14/2022     INFLUENZA VACCINE (1) 09/01/2022     LIPID  09/29/2022     DIABETIC FOOT EXAM  09/29/2022     Working on these    Care Plans  Care Plan: Housing and Support     Problem: Insufficient In-home support     Goal: Establish adequate home support     Start Date: 5/19/2022 Expected End Date: 11/21/2022    This Visit's Progress: 60% Recent Progress: 60%    Priority: High    Note:     8-Goal Statement: I will work with [facility] case management to find alternative housing option within 6 months   Date Goal set: 5/19/22  Barriers: availability of housing options-   Strengths: has [facility] waiver now, and [facility] services   Date to Achieve By: Nov 19th, 2022  Patient expressed understanding of goal: yes  Action steps to achieve this goal:  1. I will continue to meet with  to figure out options -Porsche Chacon   2. I will keep PCP and care coordinator updated   3. I will submit any paperwork needed at facilities     As of today's date 6/2/2022 goal is met at 26 - 50%.   Goal Status:  Active.    As of today's date 8/3/2022 goal is met at 26 - 50%.    Goal Status:  Active   As of today's date 8/8/2022 goal is met at 51 - 75%.   Goal Status:  Showing progress. Patient may be moving soon to comfort services customized living                           Plan:   Will continue to follow and outreach at least monthly    AMANDA Fuentes, [facility]  Clinic Care Coordinator  [email or web address]  [phone]

## 2022-09-26 ENCOUNTER — PATIENT OUTREACH (OUTPATIENT)
Dept: CARE COORDINATION | Facility: CLINIC | Age: 62
End: 2022-09-26

## 2022-09-27 NOTE — PROGRESS NOTES
"Clinic Care Coordination Contact    Follow Up Progress Note      Assessment: Patient called care coordinator saying he has to decide if he will renew his lease or not by Friday. His new lease would start in November. They would raise rent from 895 to 955. He could do month to month lease but it would be $1,055. He still has no income and pending social security disability application. He just submitted forms the social security office requested which lucille helped him fill out. He is thinking he will not be able to afford to continue living there.     SW called Nicholas Mcgee ([place] [facility]) with patient to talk things through. Update from Nicholas afterwards via email \"I have just gotten an email form [facility] that they have an opening and may be a good fit for Clint. [facility] and [facility] are based on how much money he has and the max there that he will need to pay is $585 but they will work with him on that. I made the referral and is waiting for [phone] than I will have Clint schedule for a tour there and if he likes it there, we will work out a move date for him. I spoke with Bibiana Hunter and was told that his lease is ending at the end of November and the only option that Clint has is the month-to-month lease which Clint said he will not be able to afford and would want to move to an assisted living. Let me know if you have any questions or if Clint will call you regarding his services, please update me. But I will keep him updated on when we are able to schedule that tour. I will be out of the office this week Thursday and Friday. \"  EDDIE sent a follow up email and will continue to follow.          Care Gaps:    Health Maintenance Due   Topic Date Due     ZOSTER IMMUNIZATION (1 of 2) Never done     LUNG CANCER SCREENING  01/26/2013     PREVENTIVE CARE VISIT  04/10/2014     Pneumococcal Vaccine: Pediatrics (0 to 5 Years) and At-Risk Patients (6 to 64 Years) (2 - PCV) 07/24/2016     COLORECTAL CANCER SCREENING  06/03/2021     " COVID-19 Vaccine (3 - Booster for Moderna series) 06/17/2021     EYE EXAM  03/17/2022     MICROALBUMIN  07/14/2022     INFLUENZA VACCINE (1) 09/01/2022     LIPID  09/29/2022     DIABETIC FOOT EXAM  09/29/2022       Care Plans  Care Plan: Housing and Support     Problem: Insufficient In-home support     Goal: Establish adequate home support     Start Date: 5/19/2022 Expected End Date: 11/21/2022    This Visit's Progress: 80% Recent Progress: 60%    Priority: High    Note:     8-Goal Statement: I will work with [facility] case management to find alternative housing option within 6 months   Date Goal set: 5/19/22  Barriers: availability of housing options-   Strengths: has [facility] waiver now, and [facility] services   Date to Achieve By: Nov 19th, 2022  Patient expressed understanding of goal: yes  Action steps to achieve this goal:  1. I will continue to meet with  to figure out options -Porsche from [employer]   2. I will keep PCP and care coordinator updated   3. I will submit any paperwork needed at facilities     As of today's date 6/2/2022 goal is met at 26 - 50%.   Goal Status:  Active.    As of today's date 8/3/2022 goal is met at 26 - 50%.   Goal Status:  Active   As of today's date 8/8/2022 goal is met at 51 - 75%.   Goal Status:  Showing progress. Patient may be moving soon to comfort services customized living   As of today's date 9/27/2022 goal is met at 76 - 100%.   Goal Status:  Showing progress. Has PCA, IHS and homemaking now. CM is making referral to customized living now too                            Plan:   Continue waiver services   Continue PCA/IHS/homemaking  [employer] care coordination just starting   Possible move to customized living in exploration/referral phase  SW can continue to follow closely    Christine Rutledge, MSW, [facility]  Clinic Care Coordinator  [email or web address]  [phone]

## 2022-10-03 ENCOUNTER — PATIENT OUTREACH (OUTPATIENT)
Dept: CARE COORDINATION | Facility: CLINIC | Age: 62
End: 2022-10-03

## 2022-10-04 NOTE — PROGRESS NOTES
Clinic Care Coordination Contact    Follow Up Progress Note      Assessment: Helped patient get set up for annual physical for 10/24. He has his eye exam this week and tomorrow he will go see podiatrist for his overgrown toenails and patient thinks he has another ingrown toenail.      received notice that Andie Eduardo RN will be pt's [facility] Care Coordinator. She is going to try to find an agency that can do skilled nursing visits for medication management for patient and let [facility] know to get an order.    Patient has not heard back from [facility] the [facility] and the [facility] about customized living as of yet. She was supposed to make a referral and set up a tour at [facility]. If all went well she was going to set up a move date and get the [facility] service agreement in place.    No other questions today.      Care Gaps:    Health Maintenance Due   Topic Date Due     ZOSTER IMMUNIZATION (1 of 2) Never done     LUNG CANCER SCREENING  01/26/2013     PREVENTIVE CARE VISIT  04/10/2014     Pneumococcal Vaccine: Pediatrics (0 to 5 Years) and At-Risk Patients (6 to 64 Years) (2 - PCV) 07/24/2016     COLORECTAL CANCER SCREENING  06/03/2021     COVID-19 Vaccine (3 - Booster for Moderna series) 06/17/2021     EYE EXAM  03/17/2022     MICROALBUMIN  07/14/2022     INFLUENZA VACCINE (1) 09/01/2022     LIPID  09/29/2022     DIABETIC FOOT EXAM  09/29/2022     *set up pt's foot exam, physical and eye exam.    Care Plans  Care Plan: Housing and Support     Problem: Insufficient In-home support     Goal: Establish adequate home support     Start Date: 5/19/2022 Expected End Date: 11/21/2022    This Visit's Progress: 90% Recent Progress: 80%    Priority: High    Note:     8-Goal Statement: I will work with [facility] case management to find alternative housing option within 6 months   Date Goal set: 5/19/22  Barriers: availability of housing options-   Strengths: has [facility] waiver now, and [facility] services   Date to Achieve By: Nov 19th, 2022  Patient  expressed understanding of goal: yes  Action steps to achieve this goal:  1. I will continue to meet with  to figure out options -[employer] from [employer]   2. I will keep PCP and care coordinator updated   3. I will submit any paperwork needed at facilities     As of today's date 6/2/2022 goal is met at 26 - 50%.   Goal Status:  Active.    As of today's date 8/3/2022 goal is met at 26 - 50%.   Goal Status:  Active   As of today's date 8/8/2022 goal is met at 51 - 75%.   Goal Status:  Showing progress. Patient may be moving soon to comfort services customized living   As of today's date 9/27/2022 goal is met at 76 - 100%.   Goal Status:  Showing progress. Has PCA, IHS and homemaking now. CM is making referral to customized living now too                            Plan:   Physical set up for 10/24  Podiatry appointment 10/5/22 at [facility]  Eye exam at [facility] set up   MTM appt 10/24  [facility] Care Coordinator looking for agency to do skilled nurse visits   SW will follow    Christine Rutledge, MSW, [facility]  Clinic Care Coordinator  [email or web address]  [phone]

## 2022-10-05 DIAGNOSIS — E11.21 TYPE 2 DIABETES MELLITUS WITH DIABETIC NEPHROPATHY, WITHOUT LONG-TERM CURRENT USE OF INSULIN (H): ICD-10-CM

## 2022-10-05 DIAGNOSIS — I25.10 ATHEROSCLEROSIS OF CORONARY ARTERY OF NATIVE HEART WITHOUT ANGINA PECTORIS, UNSPECIFIED VESSEL OR LESION TYPE: ICD-10-CM

## 2022-10-05 DIAGNOSIS — G63 POLYNEUROPATHY ASSOCIATED WITH UNDERLYING DISEASE (H): ICD-10-CM

## 2022-10-05 DIAGNOSIS — E78.2 MIXED HYPERLIPIDEMIA: ICD-10-CM

## 2022-10-05 DIAGNOSIS — I10 ESSENTIAL HYPERTENSION: ICD-10-CM

## 2022-10-05 RX ORDER — ATORVASTATIN CALCIUM 40 MG/1
40 TABLET, FILM COATED ORAL DAILY
Qty: 90 TABLET | Refills: 3 | Status: SHIPPED | OUTPATIENT
Start: 2022-10-05 | End: 2023-07-28

## 2022-10-05 RX ORDER — LISINOPRIL 10 MG/1
10 TABLET ORAL DAILY
Qty: 90 TABLET | Refills: 0 | Status: SHIPPED | OUTPATIENT
Start: 2022-10-05 | End: 2022-12-28

## 2022-10-05 RX ORDER — EXENATIDE 2 MG/.85ML
INJECTION, SUSPENSION, EXTENDED RELEASE SUBCUTANEOUS
Qty: 4 ML | Refills: 1 | Status: SHIPPED | OUTPATIENT
Start: 2022-10-05 | End: 2023-01-06

## 2022-10-05 RX ORDER — LANOLIN ALCOHOL/MO/W.PET/CERES
1000 CREAM (GRAM) TOPICAL DAILY
Qty: 90 TABLET | Refills: 1 | Status: SHIPPED | OUTPATIENT
Start: 2022-10-05 | End: 2023-03-30

## 2022-10-05 NOTE — TELEPHONE ENCOUNTER
Lisinopril   Atorvastatin  Vitamin b12   Bydureon inj   LOV 6/20/22  Next appointment 10/24/22    BP Readings from Last 3 Encounters:   06/20/22 112/88   06/14/22 102/64   06/03/22 93/60     Component      Latest Ref Rng & Units 4/12/2022   Glucose      65 - 99 mg/dL 140 (H)   Urea Nitrogen      8 - 27 mg/dL 37 (H)   Creatinine      0.76 - 1.27 mg/dL 2.65 (H)   eGFR       >59 mL/min/1.73 26 (L)   BUN/Creatinine Ratio      10 - 24 14   Sodium      134 - 144 mmol/L 138   Potassium      3.5 - 5.2 mmol/L 4.7   Chloride      96 - 106 mmol/L 100   Total CO2      20 - 29 mmol/L 22   Calcium      8.6 - 10.2 mg/dL 9.5

## 2022-10-11 ENCOUNTER — TRANSFERRED RECORDS (OUTPATIENT)
Dept: FAMILY MEDICINE | Facility: CLINIC | Age: 62
End: 2022-10-11

## 2022-10-11 LAB — RETINOPATHY: NEGATIVE

## 2022-10-13 ENCOUNTER — TRANSFERRED RECORDS (OUTPATIENT)
Dept: FAMILY MEDICINE | Facility: CLINIC | Age: 62
End: 2022-10-13

## 2022-10-17 ENCOUNTER — PATIENT OUTREACH (OUTPATIENT)
Dept: CARE COORDINATION | Facility: CLINIC | Age: 62
End: 2022-10-17

## 2022-10-17 NOTE — PROGRESS NOTES
"Clinic Care Coordination Contact    Follow Up Progress Note      Assessment:   Patient called with an update that he was late to do his taxes but he had a friend help and he just got his return so he used it towards rent for this month.   He hasn't heard from [facility] Care Coordinator Andie on if she found an agency yet to do nursing. SW left her a msg asking for an update. For now, Lesia and her aunt have still been coming to help with medication.    Patient is a little frustrated with PCA \"Skyler\"- he comes for quick visits and asks patient to sign off on more hours than what he was there for. He says his PCA found another job that conflicts with the hours he is supposed to come out and see patient. SW called and left a VM for [facility] Care agency to talk this through. Explained to patient that this is fraud.     Patient then brought up that his nephrologist Dr. Celis wants him to have some imaging done of his kidney- called [facility] with patient and left a VM to clarify. It looks that patient has done some imaging in the past at [facility] but they didn't have an order- CC will follow and try and help.    Care Gaps:    Health Maintenance Due   Topic Date Due     ZOSTER IMMUNIZATION (1 of 2) Never done     LUNG CANCER SCREENING  01/26/2013     YEARLY PREVENTIVE VISIT  04/10/2014     Pneumococcal Vaccine: Pediatrics (0 to 5 Years) and At-Risk Patients (6 to 64 Years) (2 - PCV) 07/24/2016     COLORECTAL CANCER SCREENING  06/03/2021     COVID-19 Vaccine (3 - Booster for Moderna series) 06/17/2021     EYE EXAM  03/17/2022     MICROALBUMIN  07/14/2022     INFLUENZA VACCINE (1) 09/01/2022     LIPID  09/29/2022     DIABETIC FOOT EXAM  09/29/2022     Has physical scheduled 10/24  He just recently did vision appointment and podiatry visit     Care Plans  Care Plan: Housing and Support     Problem: Insufficient In-home support     Goal: Establish adequate home support     Start Date: 5/19/2022 " Expected End Date: 11/21/2022    This Visit's Progress: 80% Recent Progress: 90%    Priority: High    Note:     8-Goal Statement: I will work with [facility] case management to find alternative housing option within 6 months   Date Goal set: 5/19/22  Barriers: availability of housing options-   Strengths: has county waiver now, and [facility] services   Date to Achieve By: Nov 19th, 2022  Patient expressed understanding of goal: yes  Action steps to achieve this goal:  1. I will continue to meet with  to figure out options -[employer] from [employer]   2. I will keep PCP and care coordinator updated   3. I will submit any paperwork needed at facilities     As of today's date 6/2/2022 goal is met at 26 - 50%.   Goal Status:  Active.    As of today's date 8/3/2022 goal is met at 26 - 50%.   Goal Status:  Active   As of today's date 8/8/2022 goal is met at 51 - 75%.   Goal Status:  Showing progress. Patient may be moving soon to comfort services customized living   As of today's date 9/27/2022 goal is met at 76 - 100%.   Goal Status:  Showing progress. Has PCA, IHS and homemaking now. CM is making referral to customized living now too    As of today's date 10/17/2022 goal is met at 76 - 100%.   Goal Status:  Ongoing                           Plan:   Continue working with case management and care coordination. Plan noted above. SW will continue to follow closely.     Christine Rutledge, MSW, [facility]  Clinic Care Coordinator  [email or web address]  [phone]

## 2022-10-18 ENCOUNTER — PATIENT OUTREACH (OUTPATIENT)
Dept: CARE COORDINATION | Facility: CLINIC | Age: 62
End: 2022-10-18

## 2022-10-18 NOTE — PROGRESS NOTES
"Clinic Care Coordination Contact    Follow Up Progress Note      Assessment: Nieves (supervisor, [facility]) called [facility]. Spoke with patient on the line too. She is going to follow up with Skyler, pt's PCA on the concerns and make sure things are going well. If not, she will try to find patient a different PCA. Patient will keep [facility] updated.     Later, Nieves called [facility] and said Skyler reports that he is helping patient shower, is cooking for him, reminding him to take his medications, bringing him to/from appointments, cleaning. He goes 3 days a week, not 7, but patient was agreeable to that. Nieves plans to sit down with Skyler and patient and get on the same page and see if they can continue services. She will monitor time cards/hours.    EDDIE called [facility] with patient- left a Vm for \"Evita\" who can help him get his ultrasound scheduled that they are requesting he do for kidney lesion.    Andie ([facility] Care Coordinator) also let  know that she found an agency to take on skilled nursing visis. It's [facility] fax # [phone]. EDDIE sent an update to PCP to get order sent in after pt's physical on 10/24.        Care Gaps:    Health Maintenance Due   Topic Date Due     ZOSTER IMMUNIZATION (1 of 2) Never done     LUNG CANCER SCREENING  01/26/2013     YEARLY PREVENTIVE VISIT  04/10/2014     Pneumococcal Vaccine: Pediatrics (0 to 5 Years) and At-Risk Patients (6 to 64 Years) (2 - PCV) 07/24/2016     COLORECTAL CANCER SCREENING  06/03/2021     COVID-19 Vaccine (3 - Booster for Moderna series) 06/17/2021     EYE EXAM  03/17/2022     MICROALBUMIN  07/14/2022     INFLUENZA VACCINE (1) 09/01/2022     LIPID  09/29/2022     DIABETIC FOOT EXAM  09/29/2022         Care Plans  Care Plan: Housing and Support     Problem: Insufficient In-home support     Goal: Establish adequate home support     Start Date: 5/19/2022 Expected End Date: 11/21/2022    This Visit's Progress: 80% Recent Progress: 90%    Priority: High    Note:  "    8-Goal Statement: I will work with [facility] case management to find alternative housing option within 6 months   Date Goal set: 5/19/22  Barriers: availability of housing options-   Strengths: has county waiver now, and [facility] services   Date to Achieve By: Nov 19th, 2022  Patient expressed understanding of goal: yes  Action steps to achieve this goal:  1. I will continue to meet with  to figure out options -[employer] from [employer]   2. I will keep PCP and care coordinator updated   3. I will submit any paperwork needed at facilities     As of today's date 6/2/2022 goal is met at 26 - 50%.   Goal Status:  Active.    As of today's date 8/3/2022 goal is met at 26 - 50%.   Goal Status:  Active   As of today's date 8/8/2022 goal is met at 51 - 75%.   Goal Status:  Showing progress. Patient may be moving soon to comfort services customized living   As of today's date 9/27/2022 goal is met at 76 - 100%.   Goal Status:  Showing progress. Has PCA, IHS and homemaking now. CM is making referral to customized living now too    As of today's date 10/17/2022 goal is met at 76 - 100%.   Goal Status:  Ongoing                         Plan:   [facility] will continue to follow closely    Christine Rutledge, MSW, SW  Clinic Care Coordinator  [email or web address]  [phone]

## 2022-10-24 ENCOUNTER — OFFICE VISIT (OUTPATIENT)
Dept: PHARMACY | Facility: PHYSICIAN GROUP | Age: 62
End: 2022-10-24
Payer: COMMERCIAL

## 2022-10-24 ENCOUNTER — OFFICE VISIT (OUTPATIENT)
Dept: FAMILY MEDICINE | Facility: CLINIC | Age: 62
End: 2022-10-24

## 2022-10-24 VITALS — WEIGHT: 184.31 LBS | BODY MASS INDEX: 30.67 KG/M2

## 2022-10-24 VITALS
HEART RATE: 75 BPM | WEIGHT: 184.31 LBS | SYSTOLIC BLOOD PRESSURE: 120 MMHG | HEIGHT: 67 IN | RESPIRATION RATE: 16 BRPM | OXYGEN SATURATION: 97 % | DIASTOLIC BLOOD PRESSURE: 74 MMHG | BODY MASS INDEX: 28.93 KG/M2

## 2022-10-24 DIAGNOSIS — I10 ESSENTIAL HYPERTENSION: ICD-10-CM

## 2022-10-24 DIAGNOSIS — E11.22 TYPE 2 DIABETES MELLITUS WITH STAGE 3B CHRONIC KIDNEY DISEASE, WITHOUT LONG-TERM CURRENT USE OF INSULIN (H): ICD-10-CM

## 2022-10-24 DIAGNOSIS — Z00.00 ROUTINE GENERAL MEDICAL EXAMINATION AT A HEALTH CARE FACILITY: Primary | ICD-10-CM

## 2022-10-24 DIAGNOSIS — Z72.0 TOBACCO USE: ICD-10-CM

## 2022-10-24 DIAGNOSIS — E11.21 TYPE 2 DIABETES MELLITUS WITH DIABETIC NEPHROPATHY, WITHOUT LONG-TERM CURRENT USE OF INSULIN (H): Primary | ICD-10-CM

## 2022-10-24 DIAGNOSIS — I25.10 ATHEROSCLEROSIS OF NATIVE CORONARY ARTERY OF NATIVE HEART WITHOUT ANGINA PECTORIS: ICD-10-CM

## 2022-10-24 DIAGNOSIS — G45.9 TIA (TRANSIENT ISCHEMIC ATTACK): ICD-10-CM

## 2022-10-24 DIAGNOSIS — E11.40 PAINFUL DIABETIC NEUROPATHY (H): ICD-10-CM

## 2022-10-24 DIAGNOSIS — N18.32 TYPE 2 DIABETES MELLITUS WITH STAGE 3B CHRONIC KIDNEY DISEASE, WITHOUT LONG-TERM CURRENT USE OF INSULIN (H): ICD-10-CM

## 2022-10-24 DIAGNOSIS — F32.A DEPRESSION, UNSPECIFIED DEPRESSION TYPE: ICD-10-CM

## 2022-10-24 DIAGNOSIS — F33.0 MAJOR DEPRESSIVE DISORDER, RECURRENT, MILD (H): ICD-10-CM

## 2022-10-24 DIAGNOSIS — Z23 NEED FOR PNEUMOCOCCAL VACCINE: ICD-10-CM

## 2022-10-24 DIAGNOSIS — Z12.11 SPECIAL SCREENING FOR MALIGNANT NEOPLASMS, COLON: ICD-10-CM

## 2022-10-24 DIAGNOSIS — N18.32 STAGE 3B CHRONIC KIDNEY DISEASE (H): ICD-10-CM

## 2022-10-24 DIAGNOSIS — I25.10 ATHEROSCLEROSIS OF CORONARY ARTERY OF NATIVE HEART WITHOUT ANGINA PECTORIS, UNSPECIFIED VESSEL OR LESION TYPE: ICD-10-CM

## 2022-10-24 DIAGNOSIS — E78.2 MIXED HYPERLIPIDEMIA: ICD-10-CM

## 2022-10-24 DIAGNOSIS — Z23 NEEDS FLU SHOT: ICD-10-CM

## 2022-10-24 DIAGNOSIS — G47.00 INSOMNIA, UNSPECIFIED TYPE: ICD-10-CM

## 2022-10-24 LAB
% GRANULOCYTES: 75.3 % (ref 42.2–75.2)
HBA1C MFR BLD: 9.9 % (ref 4–6)
HCT VFR BLD AUTO: 42.9 % (ref 39–51)
HEMOGLOBIN: 14.8 G/DL (ref 13.4–17.5)
LYMPHOCYTES NFR BLD AUTO: 18.9 % (ref 20.5–51.1)
MCH RBC QN AUTO: 31.8 PG (ref 27–31)
MCHC RBC AUTO-ENTMCNC: 34.6 G/DL (ref 33–37)
MCV RBC AUTO: 91.9 FL (ref 80–100)
MONOCYTES NFR BLD AUTO: 5.8 % (ref 1.7–9.3)
PLATELET # BLD AUTO: 316 K/UL (ref 140–450)
RBC # BLD AUTO: 4.66 X10/CMM (ref 4.2–5.9)
WBC # BLD AUTO: 9.5 X10/CMM (ref 3.8–11)

## 2022-10-24 PROCEDURE — 99606 MTMS BY PHARM EST 15 MIN: CPT | Performed by: PHARMACIST

## 2022-10-24 PROCEDURE — 36415 COLL VENOUS BLD VENIPUNCTURE: CPT | Performed by: NURSE PRACTITIONER

## 2022-10-24 PROCEDURE — 99207 PR FOOT EXAM NO CHARGE: CPT | Mod: 25 | Performed by: NURSE PRACTITIONER

## 2022-10-24 PROCEDURE — 99396 PREV VISIT EST AGE 40-64: CPT | Performed by: NURSE PRACTITIONER

## 2022-10-24 PROCEDURE — 90677 PCV20 VACCINE IM: CPT | Performed by: NURSE PRACTITIONER

## 2022-10-24 PROCEDURE — 99214 OFFICE O/P EST MOD 30 MIN: CPT | Mod: 25 | Performed by: NURSE PRACTITIONER

## 2022-10-24 PROCEDURE — 83036 HEMOGLOBIN GLYCOSYLATED A1C: CPT | Performed by: NURSE PRACTITIONER

## 2022-10-24 PROCEDURE — 82044 UR ALBUMIN SEMIQUANTITATIVE: CPT | Performed by: NURSE PRACTITIONER

## 2022-10-24 PROCEDURE — 99607 MTMS BY PHARM ADDL 15 MIN: CPT | Performed by: PHARMACIST

## 2022-10-24 PROCEDURE — 90674 CCIIV4 VAC NO PRSV 0.5 ML IM: CPT | Performed by: NURSE PRACTITIONER

## 2022-10-24 PROCEDURE — 90471 IMMUNIZATION ADMIN: CPT | Mod: 59 | Performed by: NURSE PRACTITIONER

## 2022-10-24 PROCEDURE — 85025 COMPLETE CBC W/AUTO DIFF WBC: CPT | Performed by: NURSE PRACTITIONER

## 2022-10-24 PROCEDURE — 90472 IMMUNIZATION ADMIN EACH ADD: CPT | Mod: 59 | Performed by: NURSE PRACTITIONER

## 2022-10-24 RX ORDER — GLIPIZIDE 10 MG/1
10 TABLET, FILM COATED, EXTENDED RELEASE ORAL DAILY
Qty: 90 TABLET | Refills: 1 | Status: SHIPPED | OUTPATIENT
Start: 2022-10-24 | End: 2023-03-30

## 2022-10-24 ASSESSMENT — PATIENT HEALTH QUESTIONNAIRE - PHQ9: SUM OF ALL RESPONSES TO PHQ QUESTIONS 1-9: 12

## 2022-10-24 NOTE — PROGRESS NOTES
Medication Therapy Management (MTM) Encounter    ASSESSMENT:                            Medication Adherence/Access: Needs help in managing medication as he has not been able to keep on track with regimen. Unable to fully assess what he has been taking on a regular basis each day, so plan to keep most medication the same with close follow up on adjustments as they are needed.     Type 2 Diabetes: Patient is not meeting A1c goal of < 7%. Patient would benefit from Reanna monitoring to help assess what is going on with blood sugar control and lack of home monitoring currently. Also recommend restarting Glipizide and adding SGLT2 inhibitor to help in renal protection/reduce further renal decline.    Hypertension/TIA/CAD: Stable.    Depression/Insomnia/Neuropathy: Continue to monitor.     PLAN:                            1. Nurse to come out for medication set up and weekly injection to get back on track with blood sugars.     2. Take Glipizide ER 10mg daily in the AM and Jardiance 10mg daily in the morning (New) with the weekly Bydureon shot.     3.  the Reanna sensor and reader machine from the pharmacy- bring this to an appointment with Chelo to get set up.     Follow-up: Return in 2 weeks (on 11/7/2022).    SUBJECTIVE/OBJECTIVE:                          Emily Zhu is a 62 year old male coming in for a follow-up visit.  Today's visit is a follow-up MTM visit from 6/14     Reason for visit: follow up, co-visit with CARROL Pretty CNP.    Allergies/ADRs: Reviewed in chart  Past Medical History: Reviewed in chart  Tobacco: He reports that he has been smoking cigarettes. He has a 40.00 pack-year smoking history. He has never used smokeless tobacco.Nicotine/Tobacco Cessation Plan:   Information offered: Patient not interested at this time  Alcohol: not currently using    Medication Adherence/Access: 31 hours per week with PCA approved, but he does not feel he is getting the full hours he is assigned  and does not feel comfortable with this PCA.   See care coordination social status updates and requested RN home care for diabetes education and medication set up.   Does state he has not been taking his evening meds all the time due to forgetting. States he stopped glipizide. Also does not know his medication and isn't sure all what he takes. Lists below reflect the fill history data.     Type 2 Diabetes:  Currently taking Bydureon weekly.   Working much better for him than daily Victoza.   No longer on metformin due to renal function decline.   Was recommended to consider SGLT2 inhibitor by nephrology, but they never started it.   Also was on glipizide in the past, but this had not been refilled since a 90 day supply in January.   Patient is not experiencing side effects.  Has been eating less due to limited income and food availability. Working with care coordinator on many different resources including housing and other supports.   Symptoms of low blood sugar? none  Symptoms of high blood sugar? none  Aspirin: 81mg daily and taking plavix once daily.   Statin: Yes: atorvastatin   ACEi/ARB: Yes: lisinopril.   Lab Results   Component Value Date    A1C 6.4 05/09/2022    A1C 5.9 02/07/2022    A1C 6.8 10/12/2021    A1C 7.5 07/14/2021    A1C 8.3 04/05/2021       Hypertension/TIA/CAD: Current medications include amlodipine 10mg daily, lisinopril 10mg daily, isosorbide 30mg daily, metoprolol tartrate 25mg twice daily. He is currently on aspirin 81mg daily plus clopidogrel 75mg now. Had been on clopidogrel alone prior to TIA that occurred in [place] on 6/22/2021.  Patient does not self-monitor blood pressure.Patient reports fatigue, fear of falls and weakness.     Depression/Insomnia/Neuropathy:  Current medications include: sertraline 100mg daily (started in April increased in Aug 2021) and started b12 1000mcg daily for neuropathy. Last visit we stopped the amitriptyline due to cognitive impacts and risks with the medication  "for him. Affect today is lighter today and was grateful for care received by his providers. Was not verbalizing the hopelessness he has exhibited in the past visits. Is having trouble with sleep and fatigue still. Does report he is frustrated with the money situation, living situation and not having care support at home. Working with care coordination.       Today's Vitals: /74   Pulse 75   Resp 16   Ht 5' 6.5\" (1.689 m)   Wt 184 lb 5 oz (83.6 kg)   SpO2 97%   BMI 29.30 kg/m    ----------------    I spent 40 minutes with this patient today. All changes were made via collaborative practice agreement with CARROL Pretty CNP. A copy of the visit note was provided to the patient's provider(s).    The patient was given a summary of these recommendations.     Chelo Roberts, Pharm.D, [facility]  Medication Therapy Management Pharmacist  [phone]     Medication Therapy Recommendations  Type 2 diabetes mellitus with diabetic nephropathy, without long-term current use of insulin (H)    Current Medication: Continuous Blood Gluc  (FREESTYLE ALVA 2 READER) CRISSY   Rationale: Medication requires monitoring - Needs additional monitoring - Effectiveness   Recommendation: Self-Monitoring - start alva monitoring.   Status: Accepted per CPA          Current Medication: empagliflozin (JARDIANCE) 10 MG TABS tablet   Rationale: Synergistic therapy - Needs additional medication therapy - Indication   Recommendation: Start Medication - 10mg daily   Status: Accepted per CPA          Current Medication: glipiZIDE (GLUCOTROL XL) 10 MG 24 hr tablet   Rationale: Synergistic therapy - Needs additional medication therapy - Indication   Recommendation: Start Medication - restart glipizide daily   Status: Accepted per CPA              "

## 2022-10-24 NOTE — PROGRESS NOTES
i3  SUBJECTIVE:   CC: Emily Zhu is an 62 year old male who presents for preventive health visit.     Continue having difficulty finding home care for patient. Not taking medications as ordered leading to uncontrolled diabetes. Complicated by stage 3b CKD, CAD, HTN, HLD. Continues to smoke and does not want to quit. States his depression is better than it used to be.  PHQ 9/29/2021 6/20/2022 10/24/2022   PHQ-9 Total Score 10 12 12   Q9: Thoughts of better off dead/self-harm past 2 weeks Not at all Several days Not at all       Patient has been advised of split billing requirements and indicates understanding:   Healthy Habits:    Do you get at least three servings of calcium containing foods daily (dairy, green leafy vegetables, etc.)? yes    Amount of exercise or daily activities, outside of work: daily walks    Problems taking medications regularly No    Medication side effects: No    Have you had an eye exam in the past two years? yes    Do you see a dentist twice per year? no    Do you have sleep apnea, excessive snoring or daytime drowsiness?yes          Today's PHQ-2 Score:   PHQ-2 ( 1999 Pfizer) 10/24/2022   Q1: Little interest or pleasure in doing things 1   Q2: Feeling down, depressed or hopeless 1   PHQ-2 Score 2       Abuse: Current or Past(Physical, Sexual or Emotional)- No  Do you feel safe in your environment? Yes        Social History     Tobacco Use     Smoking status: Every Day     Packs/day: 1.00     Years: 40.00     Pack years: 40.00     Types: Cigarettes     Smokeless tobacco: Never     Tobacco comments:     9/29/2021 - 10 cigarettes/day   Substance Use Topics     Alcohol use: Not Currently     Alcohol/week: 0.0 standard drinks     Comment: none     If you drink alcohol do you typically have >3 drinks per day or >7 drinks per week? No                      Last PSA:   PSA   Date Value Ref Range Status   01/19/2012 4.52 (H) 0 - 4 ug/L Final     Comment:     PSA results are about 7% lower  than our prior method due to a methodology   change   on August 30, 2011.       Reviewed orders with patient. Reviewed health maintenance and updated orders accordingly - Yes  Lab work is in process    ROS:   ROS: 10 point ROS neg other than the symptoms noted above in the HPI.    OBJECTIVE:   Wt 83.6 kg (184 lb 5 oz)   BMI 30.67 kg/m    EXAM:  GENERAL: Chronically-ill appearing male appearing older than stated age, no acute distress  EYES: Eyes grossly normal to inspection  HENT: ear canals and TM's normal, nose and mouth without ulcers or lesions  NECK: no adenopathy, no asymmetry, masses, or scars and thyroid normal to palpation  RESP: decreased breath sounds in bilateral lower lobes  CV: regular rate and rhythm, normal S1 S2, no S3 or S4, no murmur, click or rub, no peripheral edema and peripheral pulses strong  ABDOMEN: soft, nontender  MS: generalized weakness uses walker for ambulation  SKIN: no suspicious lesions or rashes  NEURO: speech garbled, oriented to person, place  PSYCH: depressed mood     Diagnostic Test Results:  Labs reviewed in Epic  Results for orders placed or performed in visit on 10/24/22 (from the past 24 hour(s))   Hemoglobin A1C ([facility])   Result Value Ref Range    Hemoglobin A1C 9.9 (A) 4.0 - 6.0 %   CBC with Diff/Plt (RM)   Result Value Ref Range    WBC x10/cmm 9.5 3.8 - 11.0 x10/cmm    % Lymphocytes 18.9 (A) 20.5 - 51.1 %    % Monocytes 5.8 1.7 - 9.3 %    % Granulocytes 75.3 (A) 42.2 - 75.2 %    RBC x10/cmm 4.66 4.2 - 5.9 x10/cmm    Hemoglobin 14.8 13.4 - 17.5 g/dl    Hematocrit 42.9 39 - 51 %    MCV 91.9 80 - 100 fL    MCH 31.8 (A) 27.0 - 31.0 pg    MCHC 34.6 33.0 - 37.0 g/dL    Platelet Count 316 140 - 450 K/uL       ASSESSMENT/PLAN:   Emily was seen today for physical.    Diagnoses and all orders for this visit:    Routine general medical examination at a health care facility  Age-appropriate preventative health maintenance along with diet, exercise and healthy weight discussed.  Flu & Prevnar 20 vaccinations     Atherosclerosis of native coronary artery of native heart without angina pectoris  -     Home Care Referral  Plavix, baby aspirin. Blood pressure well controlled. History of TIA    Type 2 diabetes mellitus with stage 3b chronic kidney disease, without long-term current use of insulin (H)  Uncontrolled with A1C 9.9% today. Goal < 8%. Patient not taking medications as prescribed. Home care referral placed for skilled nursing to help with medications    Stage 3b chronic kidney disease (H)  Stable, sees nephrology. Avoid NSAID medications.     Mixed hyperlipidemia  -     Cancel: Lipid Profile ([facility])  -     VENOUS COLLECTION  -     Home Care Referral  Continue statin medications without changes    Painful diabetic neuropathy (H)  -     Home Care Referral  No change in current symptoms. Has tried various medications in the past but due to health, side effects, is not taking anything for this currently    Essential hypertension  -     Basic Metabolic Panel (8) ([facility])  -     VENOUS COLLECTION  -     Home Care Referral  Meeting goal of < 130/80 with current medications.     Tobacco use  Continue to encourage cessation but patient declines    Major depressive disorder, recurrent, mild (H)  -     Home Care Referral  Continue Sertraline without changes    Special screening for malignant neoplasms, colon  -     COLOGUARD([employer])    Needs flu shot  -     INFLUENZA,INJ,MDCK,PF,QUAD >6MO(FLUCELVAX-[facility])    Need for pneumococcal vaccine  -     PNEUMOCOCCAL 20 VALENT CONJUGATE (PREVNAR 20)  -     IMMUNIATION ADMIN EACH ADDT'    Other orders  -     Microalbumin ([facility])  -     FOOT EXAM  -     Adult Eye  Referral; Future  -     Hemoglobin A1C ([facility])  -     Lipid Panel ([facility])        Patient has been advised of split billing requirements and indicates understanding: Yes  COUNSELING:  Reviewed preventive health counseling, as reflected in patient instructions  Special attention given to:  "       Regular exercise       Healthy diet/nutrition       Immunizations    Vaccinated for: Influenza and Pneumococcal         Colorectal cancer screening       Prostate cancer screening       Consider lung cancer screening for ages 55-80 years (77 for Medicare) and 20 pack-year smoking history        One time pneumovax for smokers       Advance Care Planning    Estimated body mass index is 30.67 kg/m  as calculated from the following:    Height as of 6/20/22: 1.651 m (5' 5\").    Weight as of this encounter: 83.6 kg (184 lb 5 oz).    Weight management plan: Discussed healthy diet and exercise guidelines    He reports that he has been smoking cigarettes. He has a 40.00 pack-year smoking history. He has never used smokeless tobacco.  Nicotine/Tobacco Cessation Plan:   Information offered: Patient not interested at this time    Documentation of Face to Face and Certification for Home Health Services    I certify that patient: Emily Zhu is under my care and that I, or a nurse practitioner or physician's assistant working with me, had a face-to-face encounter that meets the physician face-to-face encounter requirements with this patient on: 10/24/2022.    This encounter with the patient was in whole, or in part, for the following medical condition, which is the primary reason for home health care: type 2 diabetes, CAD, hypertension - patient does not understand medication regimen and does not take medications as orders.    I certify that, based on my findings, the following services are medically necessary home health services: Nursing.    My clinical findings support the need for the above services because: Nurse is needed: To teach and train about the disease and treatments for diabetes, hypertension, CAD illness, because complex medication regimen. Needs help setting medications up..    Further, I certify that my clinical findings support that this patient is homebound (i.e. absences from home require " considerable and taxing effort and are for medical reasons or Congregational services or infrequently or of short duration when for other reasons) because: Requires assistance of another person or specialized equipment to access medical services because patient: Is unable to walk greater than 100 feet without rest. and Requires supervision of another for safe transfer...    Based on the above findings. I certify that this patient is confined to the home and needs intermittent skilled nursing care, physical therapy and/or speech therapy.  The patient is under my care, and I have initiated the establishment of the plan of care.  This patient will be followed by a physician who will periodically review the plan of care.  Physician/Provider to provide follow up care: Tamica Tang    Attending hospital physician (the Medicare certified PECOS provider): CARROL Pretty CNP  Physician Signature: See electronic signature associated with these discharge orders.  Date: 10/24/2022        CARROL Pretty CNP  [facility]

## 2022-10-24 NOTE — PATIENT INSTRUCTIONS
"Recommendations from today's MTM visit:                                                         1. We will work with a Nurse to come out for medication set up and weekly injection to get back on track with blood sugars.     2. Take Glipizide ER 10mg daily in the AM and Jardiance 10mg daily in the morning (New) with the weekly Bydureon shot.     3.  the Reanna sensor and reader machine from the pharmacy- bring this to an appointment with Chelo to get set up.     Follow-up: Return in 2 weeks (on 11/7/2022).    It was great speaking with you today.  I value your experience and would be very thankful for your time in providing feedback in our clinic survey. In the next few days, you may receive an email or text message from [employer] with a link to a survey related to your  clinical pharmacist.\"     My Clinical Pharmacist's contact information:                                                      Please feel free to contact me with any questions or concerns you have.      Chelo Roberts, Pharm.D, [facility]  Medication Therapy Management Pharmacist  [phone]     "

## 2022-10-25 LAB
BUN SERPL-MCNC: 25 MG/DL (ref 8–27)
BUN/CREATININE RATIO: 10 (ref 10–24)
CALCIUM SERPL-MCNC: 9.1 MG/DL (ref 8.6–10.2)
CHLORIDE SERPLBLD-SCNC: 100 MMOL/L (ref 96–106)
CHOLEST SERPL-MCNC: 259 MG/DL (ref 100–199)
CREAT SERPL-MCNC: 2.41 MG/DL (ref 0.76–1.27)
EGFR: 29 ML/MIN/1.73
GLUCOSE SERPL-MCNC: 260 MG/DL (ref 70–99)
HDLC SERPL-MCNC: 33 MG/DL
LDL/HDL RATIO: 3.9 RATIO (ref 0–3.6)
LDLC SERPL CALC-MCNC: 128 MG/DL (ref 0–99)
POTASSIUM SERPL-SCNC: 4.6 MMOL/L (ref 3.5–5.2)
SODIUM SERPL-SCNC: 134 MMOL/L (ref 134–144)
TOTAL CO2: 23 MMOL/L (ref 20–29)
TRIGL SERPL-MCNC: 538 MG/DL (ref 0–149)
VLDLC SERPL CALC-MCNC: 98 MG/DL (ref 5–40)

## 2022-10-26 LAB
A/C RATIO (RMG): ABNORMAL
ALBUMIN- RMG: 150 MG/L (ref 0–10)
INTERPRETATION: ABNORMAL
URINE CREATININE - RMG: 300 MG/DL (ref 0–300)

## 2022-10-31 ENCOUNTER — OFFICE VISIT (OUTPATIENT)
Dept: PHARMACY | Facility: PHYSICIAN GROUP | Age: 62
End: 2022-10-31
Payer: COMMERCIAL

## 2022-10-31 VITALS — OXYGEN SATURATION: 96 % | HEART RATE: 75 BPM | SYSTOLIC BLOOD PRESSURE: 93 MMHG | DIASTOLIC BLOOD PRESSURE: 62 MMHG

## 2022-10-31 DIAGNOSIS — E11.21 TYPE 2 DIABETES MELLITUS WITH DIABETIC NEPHROPATHY, WITHOUT LONG-TERM CURRENT USE OF INSULIN (H): Primary | ICD-10-CM

## 2022-10-31 PROCEDURE — 99606 MTMS BY PHARM EST 15 MIN: CPT | Performed by: PHARMACIST

## 2022-10-31 PROCEDURE — 99607 MTMS BY PHARM ADDL 15 MIN: CPT | Performed by: PHARMACIST

## 2022-10-31 NOTE — PROGRESS NOTES
Medication Therapy Management (MTM) Encounter    ASSESSMENT:                            Medication Adherence/Access: See below for considerations    Type 2 Diabetes: Patient would benefit from Reanna monitoring and continued efforts to get home support regarding medication management. High a1c likely due to poor compliance on home regimen and need to re-establish routine. Patient approved for services, but waiting on nurse assessment to be scheduled. Need to have medications to determine what can be modified for next steps.     Dental: Reviewed previous dental clinic he had seen in the past on his insurance.     PLAN:                            1. Start Reanna 2 sensors today.     Follow-up: Return in 4 weeks (on 11/28/2022) for MTM visit in clinic, Primary Care Provider.    SUBJECTIVE/OBJECTIVE:                          Emily Zuh is a 62 year old male coming in for a follow-up visit.  Today's visit is a follow-up MTM visit from 10/24     Reason for visit: Here to start Reanna 2 monitor, came with PCA today.    Allergies/ADRs: Reviewed in chart  Past Medical History: Reviewed in chart  Tobacco: He reports that he has been smoking cigarettes. He has a 40.00 pack-year smoking history. He has never used smokeless tobacco.Nicotine/Tobacco Cessation Plan:   Information offered: Patient not interested at this time  Alcohol: not currently using    Medication Adherence/Access: 31 hours per week with PCA approved. He has not gotten home care med setup yet.     See care coordination social status updates and requested RN home care for diabetes education and medication set up.   Does state he has not been taking his evening meds all the time due to forgetting. States he stopped glipizide. Also does not know his medication and isn't sure all what he takes. Lists below reflect the fill history data.     Type 2 Diabetes:  Currently taking Bydureon weekly (preferred agent on formulary). Was just recently ordered to start on  Jardiance 10mg daily.   Working much better for him than daily Victoza.   No longer on metformin due to renal function decline.   Also was on glipizide in the past, but this had not been refilled since a 90 day supply in January.   Patient is not experiencing side effects.  Has been eating less due to limited income and food availability. Working with care coordinator on many different resources including housing and other supports.   Symptoms of low blood sugar? none  Symptoms of high blood sugar? none  Aspirin: 81mg daily and taking plavix once daily.   Statin: Yes: atorvastatin   ACEi/ARB: Yes: lisinopril.   Lab Results   Component Value Date    A1C 9.9 10/24/2022    A1C 6.4 05/09/2022    A1C 5.9 02/07/2022    A1C 6.8 10/12/2021    A1C 7.5 07/14/2021       Dental work: would like contact information again for dentist as he is not sure where to go.     Today's Vitals: BP 93/62   Pulse 75   SpO2 96%   ----------------    I spent 30 minutes with this patient today. All changes were made via collaborative practice agreement with CARROL Pretty CNP. A copy of the visit note was provided to the patient's provider(s).    The patient was given a summary of these recommendations.     Chelo Roberts, Pharm.D, [facility]  Medication Therapy Management Pharmacist  [phone]       Medication Therapy Recommendations  Type 2 diabetes mellitus with diabetic nephropathy, without long-term current use of insulin (H)    Current Medication: Continuous Blood Gluc  (FREESTYLE JAQUAN 2 READER) CRISSY   Rationale: Does not understand instructions - Adherence - Adherence   Recommendation: Provide Education - help placing and reviewing use with patient.   Status: Patient Agreed - Adherence/Education

## 2022-10-31 NOTE — PATIENT INSTRUCTIONS
"Recommendations from today's MTM visit:                                                       [facility]  [phone] in [place].     Scan your sensor every morning, midday and night to get your sugar readings.       Follow-up: Return in 4 weeks (on 11/28/2022).    It was great speaking with you today.  I value your experience and would be very thankful for your time in providing feedback in our clinic survey. In the next few days, you may receive an email or text message from [employer] with a link to a survey related to your  clinical pharmacist.\"     My Clinical Pharmacist's contact information:                                                      Please feel free to contact me with any questions or concerns you have.      Chelo Roberts, Pharm.D, [facility]  Medication Therapy Management Pharmacist  [phone]     "

## 2022-11-04 ENCOUNTER — PATIENT OUTREACH (OUTPATIENT)
Dept: CARE COORDINATION | Facility: CLINIC | Age: 62
End: 2022-11-04

## 2022-11-04 NOTE — PROGRESS NOTES
Clinic Care Coordination Contact    Follow Up Progress Note      Assessment: SW called [facility] and they did not receive the referral for skilled nursing. The fax # is [phone]. [facility] will ask triage to re-fax it and they will reach out to patient early next week to schedule.    Pt said he got his renters rebate and it covers his rent for this month    His PCA is doing ok      Care Gaps:    Health Maintenance Due   Topic Date Due     PARATHYROID  Never done     PHOSPHORUS  Never done     ZOSTER IMMUNIZATION (1 of 2) Never done     LUNG CANCER SCREENING  01/26/2013     COLORECTAL CANCER SCREENING  06/03/2021     COVID-19 Vaccine (3 - Booster for Moderna series) 06/17/2021           Care Plans  Care Plan: Housing and Support     Problem: Insufficient In-home support     Goal: Establish adequate home support     Start Date: 5/19/2022 Expected End Date: 11/21/2022    This Visit's Progress: 80% Recent Progress: 80%    Priority: High    Note:     8-Goal Statement: I will work with [facility] case management to find alternative housing option within 6 months   Date Goal set: 5/19/22  Barriers: availability of housing options-   Strengths: has county waiver now, and [facility] services   Date to Achieve By: Nov 19th, 2022  Patient expressed understanding of goal: yes  Action steps to achieve this goal:  1. I will continue to meet with  to figure out options -Porsche Chacon   2. I will keep PCP and care coordinator updated   3. I will submit any paperwork needed at facilities     As of today's date 6/2/2022 goal is met at 26 - 50%.   Goal Status:  Active.    As of today's date 8/3/2022 goal is met at 26 - 50%.   Goal Status:  Active   As of today's date 8/8/2022 goal is met at 51 - 75%.   Goal Status:  Showing progress. Patient may be moving soon to comfort services customized living   As of today's date 9/27/2022 goal is met at 76 - 100%.   Goal Status:  Showing progress. Has PCA, IHS and homemaking now.   is making referral to customized living now too    As of today's date 10/17/2022 goal is met at 76 - 100%.   Goal Status:  Ongoing                             Plan:   [facility] will continue to follow closely    AMANDA Fuentes, [facility]  Clinic Care Coordinator  [email or web address]  [phone]

## 2022-11-11 ENCOUNTER — PATIENT OUTREACH (OUTPATIENT)
Dept: CARE COORDINATION | Facility: CLINIC | Age: 62
End: 2022-11-11

## 2022-11-11 NOTE — PROGRESS NOTES
Clinic Care Coordination Contact    Follow Up Progress Note      Assessment:  received a call from [facility]. They cannot take on the referral for homecare RNLeeann Kaiser is the contact there- [phone]. EDDIE emailed Andie the medica care coordinator and she responded [facility] would work as well. When I called [facility], they said they had an opening. Please keep me updated.  I know you mentioned them in an earlier email. EDDIE responded that [facility] wont take patient on as a client again.  Asked her for other ideas.    SW also received an email from Yuki ceron ([facility] hannah ) that she is working through PCA issues with Nieves at [facility] to ensure patient is having adequate PCA services. Pt continues to have complaints about his PCA about the amount of time he spends with him compared to the hours he is eligible for.       Care Gaps:    Health Maintenance Due   Topic Date Due     PARATHYROID  Never done     PHOSPHORUS  Never done     ZOSTER IMMUNIZATION (1 of 2) Never done     LUNG CANCER SCREENING  01/26/2013     COLORECTAL CANCER SCREENING  06/03/2021     COVID-19 Vaccine (3 - Booster for Moderna series) 06/17/2021         Care Plans  Care Plan: Housing and Support     Problem: Insufficient In-home support     Goal: Establish adequate home support     Start Date: 5/19/2022 Expected End Date: 11/21/2022    This Visit's Progress: 90% Recent Progress: 80%    Priority: High    Note:     8-Goal Statement: I will work with [facility] case management to find alternative housing option within 6 months   Date Goal set: 5/19/22  Barriers: availability of housing options-   Strengths: has [facility] waiver now, and [facility] services   Date to Achieve By: Nov 19th, 2022  Patient expressed understanding of goal: yes  Action steps to achieve this goal:  1. I will continue to meet with  to figure out options -Porsche Chacon   2. I will keep PCP and care coordinator  updated   3. I will submit any paperwork needed at facilities     As of today's date 6/2/2022 goal is met at 26 - 50%.   Goal Status:  Active.    As of today's date 8/3/2022 goal is met at 26 - 50%.   Goal Status:  Active   As of today's date 8/8/2022 goal is met at 51 - 75%.   Goal Status:  Showing progress. Patient may be moving soon to comfort services customized living   As of today's date 9/27/2022 goal is met at 76 - 100%.   Goal Status:  Showing progress. Has PCA, IHS and homemaking now. CM is making referral to customized living now too    As of today's date 10/17/2022 goal is met at 76 - 100%.   Goal Status:  Ongoing                             Plan:    will continue to follow  Will hopefully be able to find an agency for homecare soon for RN services.    Christine Rutledge, MSW, [facility]  Clinic Care Coordinator  [email or web address]  [phone]

## 2022-11-18 ENCOUNTER — PATIENT OUTREACH (OUTPATIENT)
Dept: CARE COORDINATION | Facility: CLINIC | Age: 62
End: 2022-11-18

## 2022-11-18 NOTE — PROGRESS NOTES
Clinic Care Coordination Contact    Follow Up Progress Note      Assessment:  worked on trying to find a homecare nursing agency to take on the referral for patient    [facility]soon[facility] could get out there week of 11/28 11:30-12pm  If we are still struggling to find someone thanksgiving week give a call back to get referral started.  [facility]- wants me to fax over face sheet to review and they are checking availability. Fax [phone]. Will let me know by Monday. Batsheva is the contact there. I will have triage fax over records   [facility]- [facility] t take on the referral, capacity     [facility]- [phone] and spoke to Jennifer. She ran member s insurance. She stated they are not in network. She stated they are not in network without Medicare and do not have openings.     [facility] - [phone]- they do not do medication set up.      [facility]- [phone] and spoke to intake. They only take patients with [facility] affiliation.    ** patient also got a bill from [facility] consultants. Called billing with Clint and he can ignore the bill he received they need to bill under his new insurance. He should not owe anything.     Care Gaps:    Health Maintenance Due   Topic Date Due     PARATHYROID  Never done     PHOSPHORUS  Never done     ZOSTER IMMUNIZATION (1 of 2) Never done     LUNG CANCER SCREENING  01/26/2013     COLORECTAL CANCER SCREENING  06/03/2021     COVID-19 Vaccine (3 - Booster for Moderna series) 06/17/2021         Care Plans  Care Plan: Housing and Support     Problem: Insufficient In-home support     Goal: Establish adequate home support     Start Date: 5/19/2022 Expected End Date: 11/21/2022    This Visit's Progress: 90% Recent Progress: 90%    Priority: High    Note:     8-Goal Statement: I will work with [facility] case management to find alternative housing option within 6 months   Date Goal set: 5/19/22  Barriers: availability of  housing options-   Strengths: has county waiver now, and [facility] services   Date to Achieve By: Nov 19th, 2022  Patient expressed understanding of goal: yes  Action steps to achieve this goal:  1. I will continue to meet with  to figure out options -Porsche from [employer]   2. I will keep PCP and care coordinator updated   3. I will submit any paperwork needed at facilities     As of today's date 6/2/2022 goal is met at 26 - 50%.   Goal Status:  Active.    As of today's date 8/3/2022 goal is met at 26 - 50%.   Goal Status:  Active   As of today's date 8/8/2022 goal is met at 51 - 75%.   Goal Status:  Showing progress. Patient may be moving soon to comfort services customized living   As of today's date 9/27/2022 goal is met at 76 - 100%.   Goal Status:  Showing progress. Has PCA, IHS and homemaking now. CM is making referral to customized living now too    As of today's date 10/17/2022 goal is met at 76 - 100%.   Goal Status:  Ongoing                           Plan:   Continue working to find homecare for nursing for patient  Luis Salvador also going to try to help find an agency with an opening  Continue PCA services  SW will continue to follow- care coordinator will be off the 22nd-26th as an dima Rutledge, MSW, [facility]  Clinic Care Coordinator  [email or web address]  [phone]

## 2022-11-21 ENCOUNTER — PATIENT OUTREACH (OUTPATIENT)
Dept: CARE COORDINATION | Facility: CLINIC | Age: 62
End: 2022-11-21

## 2022-11-21 NOTE — PROGRESS NOTES
"Clinic Care Coordination Contact    Follow Up Progress Note      Assessment: SW asked triage to fax homecare RN referral to [facility] today. On Friday they said they could start working with patient 11/28. Called patient to inform him of this.     Patient said that his left side has felt more weak and \"tingly\". He is still able to move his extremities. SW will ask triage to go over symptoms with patient and advise.    Care Gaps:    Health Maintenance Due   Topic Date Due     PARATHYROID  Never done     PHOSPHORUS  Never done     ZOSTER IMMUNIZATION (1 of 2) Never done     LUNG CANCER SCREENING  01/26/2013     COLORECTAL CANCER SCREENING  06/03/2021     COVID-19 Vaccine (3 - Booster for Moderna series) 06/17/2021         Care Plans  Care Plan: Housing and Support     Problem: Insufficient In-home support     Goal: Establish adequate home support     Start Date: 5/19/2022 Expected End Date: 11/21/2022    This Visit's Progress: 90% Recent Progress: 90%    Priority: High    Note:     8-Goal Statement: I will work with [facility] case management to find alternative housing option within 6 months   Date Goal set: 5/19/22  Barriers: availability of housing options-   Strengths: has [facility] waiver now, and [facility] services   Date to Achieve By: Nov 19th, 2022  Patient expressed understanding of goal: yes  Action steps to achieve this goal:  1. I will continue to meet with  to figure out options -Porsche Chacon   2. I will keep PCP and care coordinator updated   3. I will submit any paperwork needed at facilities     As of today's date 6/2/2022 goal is met at 26 - 50%.   Goal Status:  Active.    As of today's date 8/3/2022 goal is met at 26 - 50%.   Goal Status:  Active   As of today's date 8/8/2022 goal is met at 51 - 75%.   Goal Status:  Showing progress. Patient may be moving soon to comfort services customized living   As of today's date 9/27/2022 goal is met at 76 - 100%.   Goal Status:  Showing " progress. Has PCA, IHS and homemaking now. CM is making referral to customized living now too    As of today's date 10/17/2022 goal is met at 76 - 100%.   Goal Status:  Ongoing                         Plan:    will be off 11/22-11/26.   Updated [employer] and [employer] waiver CM on status of services as far as homecare    Will continue to follow closely    Christine Rutledge, MSW, [facility]  Clinic Care Coordinator  [email or web address]  [phone]

## 2022-11-21 NOTE — LETTER
Patient Centered Plan of Care  About Me:        Patient Name:  Emily Zhu    YOB: 1960  Age:         62 year old   Alfonso MRN:    [id number] Telephone Information:  Home Phone [phone]   Mobile None       Address:  [address]  [place] [address] Email address:  No e-mail address on record      Emergency Contact(s)    Name Relationship Lgl Grd Work Phone Home Phone Mobile Phone   1. EDWARD BRAR Sister  [phone] none   2. LINUS Friend   [phone]            Primary language:  English     needed? No   [place] [employer] [facility]:  [phone] op. 1  Other communication barriers:None    Preferred Method of Communication:     Current living arrangement: I live alone    Mobility Status/ Medical Equipment: Independent w/Device        Health Maintenance  Health Maintenance Reviewed: Due/Overdue lung cancer screening, phosphorus      My Access Plan  Medical Emergency 911   Primary Clinic Line [facility] - [phone]   24 Hour Appointment Line [phone] or  [phone]) (toll-free)   24 Hour Nurse Line [phone] (toll-free)   Preferred Urgent Care No data recorded   Preferred Hospital [facility]  [phone]     Preferred Pharmacy [facility] [address] [facility] [address] [facility]     Behavioral Health Crisis Line The National Suicide Prevention Lifeline at 1-575.650.4119 or Text/Call 988             My Care Team Members  Patient Care Team       Relationship Specialty Notifications Start End    Tamica Tang APRN CNP PCP - General Family Medicine  1/4/21     Phone: [phone] Fax: [phone] [address] [facility] [address]    Chelo Roberts RPH Pharmacist Pharmacist  8/13/19     Phone: [phone] [address] [facility] [address]    Christine Tao LGSW Lead Care Coordinator  Admissions 1/10/20     Tamica Tang APRN CNP  Assigned PCP   8/1/21     Phone: [phone] Fax: [phone] [address] [facility] [address]    Chelo Roberts RPH Assigned MTM Pharmacist   9/28/22     Phone: [phone] [address] [facility] [address]    [facility] Care Coordinator- Andie Eduardo    10/19/22     Phone: [phone]         Nicholas Mcgee    10/19/22     KHARI Salvador , [facility]    Phone: [phone] Fax: [phone]        Nicholas Johns [facility]  [facility] [employer] [address] [place] [address]            My Care Plans  Self Management and Treatment Plan  Care Plan  Care Plan: Housing and Support     Problem: Insufficient In-home support     Goal: Establish adequate home support     Start Date: 5/19/2022 Expected End Date: 11/21/2022    This Visit's Progress: 90% Recent Progress: 90%    Priority: High    Note:     8-Goal Statement: I will work with [facility] case management to find alternative housing option within 6 months   Date Goal set: 5/19/22  Barriers: availability of housing options-   Strengths: has [facility] now, and [facility] services   Date to Achieve By: Nov 19th, 2022  Patient expressed understanding of goal: yes  Action steps to achieve this goal:  1. I will continue to meet with  to figure out options -Porsche from [facility]   2. I will keep PCP and care coordinator updated   3. I will submit any paperwork needed at facilities     As of today's date 6/2/2022 goal is met at 26 - 50%.   Goal Status:  Active.    As of today's date 8/3/2022 goal is met at 26 - 50%.   Goal Status:  Active   As of today's date 8/8/2022 goal is met at 51 - 75%.   Goal Status:  Showing progress. Patient may be moving soon to comfort services customized living   As of today's date 9/27/2022 goal is met at 76 - 100%.   Goal Status:  Showing progress. Has PCA, IHS and homemaking now. CM is making referral to customized living now too    As of  today's date 10/17/2022 goal is met at 76 - 100%.   Goal Status:  Ongoing                          Action Plans on File:            Depression          Advance Care Plans/Directives Type:   Advanced Directive - On File      My Medical and Care Information  Problem List   Patient Active Problem List   Diagnosis     Coronary atherosclerosis     Essential hypertension     Mixed hyperlipidemia     Cataract     GERD (gastroesophageal reflux disease)     Microalbuminuria due to type 2 diabetes mellitus (H)     Type 2 diabetes mellitus with stage 3b chronic kidney disease, without long-term current use of insulin (H)     Painful diabetic neuropathy (H)     Warthin tumor     Stage 3b chronic kidney disease (H)     Tobacco use     History of TIA (transient ischemic attack) and stroke     Major depressive disorder, recurrent, mild (H)     Hip pain, left      Current Medications and Allergies:  See printed Medication Report.    Care Coordination Start Date: 1/10/2020   Frequency of Care Coordination: monthly     Form Last Updated: 11/21/2022

## 2022-11-28 ENCOUNTER — OFFICE VISIT (OUTPATIENT)
Dept: PHARMACY | Facility: PHYSICIAN GROUP | Age: 62
End: 2022-11-28
Payer: COMMERCIAL

## 2022-11-28 ENCOUNTER — OFFICE VISIT (OUTPATIENT)
Dept: FAMILY MEDICINE | Facility: CLINIC | Age: 62
End: 2022-11-28

## 2022-11-28 VITALS
WEIGHT: 187.4 LBS | BODY MASS INDEX: 29.79 KG/M2 | DIASTOLIC BLOOD PRESSURE: 68 MMHG | OXYGEN SATURATION: 98 % | HEART RATE: 105 BPM | SYSTOLIC BLOOD PRESSURE: 98 MMHG

## 2022-11-28 DIAGNOSIS — I25.10 ATHEROSCLEROSIS OF NATIVE CORONARY ARTERY OF NATIVE HEART WITHOUT ANGINA PECTORIS: ICD-10-CM

## 2022-11-28 DIAGNOSIS — N18.32 TYPE 2 DIABETES MELLITUS WITH STAGE 3B CHRONIC KIDNEY DISEASE, WITHOUT LONG-TERM CURRENT USE OF INSULIN (H): ICD-10-CM

## 2022-11-28 DIAGNOSIS — R52 PAIN OF LEFT SIDE OF BODY: ICD-10-CM

## 2022-11-28 DIAGNOSIS — N18.32 STAGE 3B CHRONIC KIDNEY DISEASE (H): ICD-10-CM

## 2022-11-28 DIAGNOSIS — Z79.899 POLYPHARMACY: ICD-10-CM

## 2022-11-28 DIAGNOSIS — K21.9 GASTROESOPHAGEAL REFLUX DISEASE, UNSPECIFIED WHETHER ESOPHAGITIS PRESENT: ICD-10-CM

## 2022-11-28 DIAGNOSIS — Z72.0 TOBACCO USE: ICD-10-CM

## 2022-11-28 DIAGNOSIS — E11.21 TYPE 2 DIABETES MELLITUS WITH DIABETIC NEPHROPATHY, WITHOUT LONG-TERM CURRENT USE OF INSULIN (H): Primary | ICD-10-CM

## 2022-11-28 DIAGNOSIS — Z86.73 HISTORY OF STROKE: ICD-10-CM

## 2022-11-28 DIAGNOSIS — E11.40 PAINFUL DIABETIC NEUROPATHY (H): Primary | ICD-10-CM

## 2022-11-28 DIAGNOSIS — E11.22 TYPE 2 DIABETES MELLITUS WITH STAGE 3B CHRONIC KIDNEY DISEASE, WITHOUT LONG-TERM CURRENT USE OF INSULIN (H): ICD-10-CM

## 2022-11-28 DIAGNOSIS — I10 ESSENTIAL HYPERTENSION: ICD-10-CM

## 2022-11-28 PROCEDURE — 99606 MTMS BY PHARM EST 15 MIN: CPT | Performed by: PHARMACIST

## 2022-11-28 PROCEDURE — 99214 OFFICE O/P EST MOD 30 MIN: CPT | Performed by: NURSE PRACTITIONER

## 2022-11-28 PROCEDURE — 99607 MTMS BY PHARM ADDL 15 MIN: CPT | Performed by: PHARMACIST

## 2022-11-28 RX ORDER — ACETAMINOPHEN 500 MG
1000 TABLET ORAL EVERY MORNING
COMMUNITY

## 2022-11-28 NOTE — PROGRESS NOTES
"Problem(s) Oriented visit        SUBJECTIVE:                                                    Emily Zhu is a 62 year old male who presents to clinic today for the following health issues :    Ongoing painful diabetic neuropathy primarily in feet but some in hands also. Also reports newer issue with pain on left side of his body - face, arm, leg, side. Has been \"experimenting\" cutting medications out to see if that helps with pain but it hasn't made a difference. Does have history of stroke with left side affected.   Type 2 diabetes uncontrolled. Patient not taking medications regularly. Has alva CGM.  Lab Results   Component Value Date    A1C 9.9 10/24/2022    A1C 6.4 05/09/2022    A1C 5.9 02/07/2022    A1C 6.8 10/12/2021    A1C 7.5 07/14/2021     GERD - has been worse lately. Taking Lansoprazole 15 mg daily.    Hypertension - lower BP in clinic today. Denies dizziness.  BP Readings from Last 3 Encounters:   11/28/22 98/68   10/31/22 93/62   10/24/22 120/74     Continues to smoke cigarettes. Declines education for quitting    Problem list, Medication list, Allergies, and Medical/Social/Surgical histories reviewed in EPIC and updated as appropriate.   Additional history: as documented    ROS:  10 point ROS completed and negative except noted above, including Gen, HEENT, CV, Resp, GI, , MS, Neurologic, Psych    OBJECTIVE:                                                    BP 98/68   Pulse 105   Wt 85 kg (187 lb 6.4 oz)   SpO2 98%   BMI 29.79 kg/m    Body mass index is 29.79 kg/m .   GENERAL: Chronically-ill appearing male appearing older than stated age, no acute distress  EYES: Eyes grossly normal to inspection  RESP: decreased breath sounds in bilateral lower lobes  CV: regular rate and rhythm, normal S1 S2, no S3 or S4, no murmur, click or rub, no peripheral edema and peripheral pulses strong  MS: generalized weakness uses walker for ambulation  SKIN: no suspicious lesions or rashes  NEURO: speech " prasanth, oriented to person, place  PSYCH: depressed mood      ASSESSMENT/PLAN:                                                      Emily was seen today for tingling.    Diagnoses and all orders for this visit:    Painful diabetic neuropathy (H)  Pain of left side of body  -     Adult Neurology  Referral; Future  History of stroke  -     Adult Neurology  Referral; Future  Referral to neurology for further evaluation.     Gastroesophageal reflux disease, unspecified whether esophagitis present  Diet/lifesyle modifications reviewed    Type 2 diabetes mellitus with stage 3b chronic kidney disease, without long-term current use of insulin (H)  Uncontrolled. Has appointment with pharmacist (MUSHTAQ) today as well. Increased Jardiance to 25 mg daily    Stage 3b chronic kidney disease (H)  Stable, actively managed by nephrology    Essential hypertension  No change to current medications    Tobacco use  Encourage quitting    Atherosclerosis of native coronary artery of native heart without angina pectoris  Continue statin medication, aspirin. Lifestyle reviewed      See Patient Instructions  Patient Instructions   Referral to Neurology to follow-up on stroke, left side of body pain      CARROL Pretty CNP  [facility]  Family Practice  [facility]  [phone]    For any issues my office # is [phone]

## 2022-11-28 NOTE — PATIENT INSTRUCTIONS
Medication List      Accurate as of November 28, 2022  1:22 PM. If you have any questions, ask your nurse or doctor.            Morning Afternoon Evening Bedtime    empagliflozin 25 MG Tabs tablet  Also known as: Jardiance  Take 1 tablet (25 mg) by mouth daily  What changed:   medication strength  how much to take  Changed by: Chelo Roberts [facility]  Notes to patient: Blood sugar                    Morning Afternoon Evening Bedtime    acetaminophen 500 MG tablet  Also known as: TYLENOL  Take 1,000 mg by mouth 2 times daily  Notes to patient: Pain                 amLODIPine 10 MG tablet  Also known as: NORVASC  Take 1 tablet (10 mg) by mouth daily  Notes to patient: Blood pressure               amoxicillin 500 MG capsule  Also known as: AMOXIL  TAKE 1 CAPSULE BY MOUTH THREE TIMES DAILY FOR 7 DAYS             aspirin 81 MG EC tablet  Also known as: ASA  Take 1 tablet (81 mg) by mouth daily  Notes to patient: Heart               atorvastatin 40 MG tablet  Also known as: LIPITOR  Take 1 tablet (40 mg) by mouth daily  Notes to patient: Cholesterol               Bydureon BCise 2 MG/0.85ML auto-injector  INJECT 2MG SUBCUTANEAOUS EVERY 7 DAYS  Generic drug: exenatide ER  Notes to patient: WEEKLY               clopidogrel 75 MG tablet  Also known as: PLAVIX  Take 1 tablet (75 mg) by mouth daily  Notes to patient: Heart               cyanocobalamin 1000 MCG tablet  Also known as: VITAMIN B-12  Take 1 tablet (1,000 mcg) by mouth daily  Notes to patient: Nerve Pain               FreeStyle Reanna 2 Sensor Misc  1 Device every 14 days To check blood sugars.               glipiZIDE 10 MG 24 hr tablet  Also known as: GLUCOTROL XL  Take 1 tablet (10 mg) by mouth daily  Doctor's comments: restart  Notes to patient: Blood Sugar               isosorbide mononitrate 30 MG 24 hr tablet  Also known as: IMDUR  Take 1 tablet (30 mg) by mouth daily  Notes to patient: Blood pressure               LANsoprazole 15 MG DR capsule  Also known as:  PREVACID  Take 1 capsule (15 mg) by mouth daily  Notes to patient: Heartburn               lisinopril 10 MG tablet  Also known as: ZESTRIL  Take 1 tablet (10 mg) by mouth daily  Notes to patient: Blood pressure               metoprolol tartrate 25 MG tablet  Also known as: LOPRESSOR  Take 1 tablet (25 mg) by mouth 2 times daily  Notes to patient: Blood pressure                 sertraline 100 MG tablet  Also known as: ZOLOFT  Take 1 tablet (100 mg) by mouth daily

## 2022-11-28 NOTE — PROGRESS NOTES
Medication Therapy Management (MTM) Encounter    ASSESSMENT:                            Medication Adherence/Access: Sorted out multiple confusions on medications today and worked to set up AM/PM pill boxes to be focused on mostly AM administration with limited PM medication.   Included reason for use in the medication table to help patient understand medications slightly more.   Given the inconsistency of use with his medications, will need to monitor the impact of all these medications with close follow up.     Type 2 Diabetes: Patient is not meeting A1c goal of < 7%. Patient is not meeting average glucose goal of <150mg/dL. Patient is not meeting goal of >70% time in target with continuous glucose monitoring.  Patient would benefit from increasing Jardiance to 25mg daily with recheck of renal function in 2 weeks.     PLAN:                              AM:   Tylenol 1000mg  Amlodipine 10mg daily  Aspirin 81mg daily  Atorvastatin 40mg daily  Clopidogrel 75mg daily  B12 1000mcg daily  Jardiance 25mg daily (*see below, dose increased today)  Bydureon Weekly  Glipizide ER 10mg daily   Isosorbide ER 30mg daily  Lansoprazole 15mg daily  Lisinopril 10mg daily   Metoprolol Tart 25mg twice daily (1st dose)  Sertraline 100mg daily    PM:   Tylenol 1000mg   Metoprolol tart 25mg twice daily (2nd dose)    Follow-up: Return in 2 weeks (on 12/12/2022) for Lab Work, MTM visit in clinic.    SUBJECTIVE/OBJECTIVE:                          Emily Zhu is a 62 year old male coming in for a follow-up visit. Patient saw provider prior to our visit today. Today's visit is a follow-up MTM visit from 10/31     Reason for visit: medication follow up- here with PCA today, home care has still not started. Took meds today but before today had not taken anything for a few days.     Allergies/ADRs: Reviewed in chart  Past Medical History: Reviewed in chart  Tobacco: He reports that he has been smoking cigarettes. He has a 40.00 pack-year  smoking history. He has never used smokeless tobacco.Nicotine/Tobacco Cessation Plan:   Information offered: Patient not interested at this time  Alcohol: not currently using    Medication Adherence/Access: 31 hours per week with PCA approved. He has not gotten home care med setup yet. See care coordination social status updates and requested RN home care for diabetes education and medication set up.   Does state he has not been taking his evening meds all the time due to forgetting.   Also does not know his medication and isn't sure all what he takes.   Brought in his bottles today and there were 10+duplicate bottles of medication.     Focus of today's visit was to sort out pill bottles and get medication list up to date with current regimen. Simplified to mostly once daily to limit missed medication potential.     AM:   Tylenol 1000mg  Amlodipine 10mg daily  Aspirin 81mg daily  Atorvastatin 40mg daily  Clopidogrel 75mg daily  B12 1000mcg daily  Jardiance 25mg daily (*see below, dose increased today)  Bydureon Weekly  Glipizide ER 10mg daily   Isosorbide ER 30mg daily  Lansoprazole 15mg daily  Lisinopril 10mg daily   Metoprolol Tart 25mg twice daily (1st dose)  Sertraline 100mg daily    PM:   Tylenol 1000mg   Metoprolol tart 25mg twice daily (2nd dose)    Type 2 Diabetes:  Currently taking Bydureon weekly (preferred agent on formulary) and glipizide ER 10mg daily plus Jardiance 10mg daily. Ran out and did not refill yet.     Weekly Bydureon working much better for him than daily Victoza.   No longer on metformin due to renal function decline. However did have several bottles of this in his bags with him today.   Patient is not experiencing side effects.  Symptoms of low blood sugar? none  Symptoms of high blood sugar? none  Aspirin: 81mg daily and taking plavix once daily  Statin: Yes: atorvastatin   ACEi/ARB: Yes: lisinopril.         BP Readings from Last 1 Encounters:   11/28/22 98/68     Pulse Readings from Last  "1 Encounters:   11/28/22 105     Wt Readings from Last 1 Encounters:   11/28/22 187 lb 6.4 oz (85 kg)     Ht Readings from Last 1 Encounters:   10/24/22 5' 6.5\" (1.689 m)     Estimated body mass index is 29.79 kg/m  as calculated from the following:    Height as of 10/24/22: 5' 6.5\" (1.689 m).    Weight as of an earlier encounter on 11/28/22: 187 lb 6.4 oz (85 kg).    Temp Readings from Last 1 Encounters:   06/20/22 97.8  F (36.6  C) (Temporal)     ----------------      I spent 40 minutes with this patient today. All changes were made via collaborative practice agreement with CARROL Pretty CNP. A copy of the visit note was provided to the patient's provider(s).    The patient was given a summary of these recommendations.     Chelo Roberts, Pharm.D, [facility]  Medication Therapy Management Pharmacist  [phone]       Medication Therapy Recommendations  Type 2 diabetes mellitus with stage 3b chronic kidney disease, without long-term current use of insulin (H)    Current Medication: empagliflozin (JARDIANCE) 10 MG TABS tablet (Discontinued)   Rationale: Dose too low - Dosage too low - Effectiveness   Recommendation: Increase Dose - Jardiance 25 MG Tabs - increase to 25mg daily   Status: Accepted per Provider              "

## 2022-11-29 ENCOUNTER — PATIENT OUTREACH (OUTPATIENT)
Dept: CARE COORDINATION | Facility: CLINIC | Age: 62
End: 2022-11-29

## 2022-11-29 NOTE — PROGRESS NOTES
Clinic Care Coordination Contact    Follow Up Progress Note        received notice from Andie ([facility] care coordinator) that [facility] is not able to take on referral due to staff shortage and patient is on a wait list.     [facility] is not accepting referrals until 12/5/22.    [facility]- [phone]- They may be able to work with patient and they are requesting Insurance, H and P and medication list to [phone] Attention: Jade. Jake. EDDIE requested triage fax over this info today.    Will continue to follow      Plan:    will follow this week to ensure homecare RN gets in place and starts with patient. Trying to find an agency    Christine Rutledge, MSW, LGSW  Clinic Care Coordinator  [email or web address]  [phone]

## 2022-12-02 ENCOUNTER — PATIENT OUTREACH (OUTPATIENT)
Dept: CARE COORDINATION | Facility: CLINIC | Age: 62
End: 2022-12-02

## 2022-12-02 NOTE — PROGRESS NOTES
"Clinic Care Coordination Contact    Follow Up Progress Note      Assessment:  called [facility] and they cannot take on the homecare referral due to capacity and \"focusing on a certain area\".    Patient says he got a call from \"[facility]\" and they will be \"coming out Monday\".  SW did not make a referral there and called them and spoke with \"Ed\" and they will be coming out for IHS and homemaking so his waiver CM made the referral. Their company cannot provide PCA services at this time or for people who are 65+.    [facility] also got a email from Nelda Molina at [facility] asking for dates for [facility] availability to be apart of [facility] assessment this month to renew pt's waiver services. SW gave availability.    Nelda Molina RN, PHN  [facility] Annual  [facility] PCA   [facility] - LTSS  [address] [place] [address]  Phone: (office) [phone] or (cell) [phone]     called [employer] with patient and social security has not finished reviewing his case.     Patient got mail saying that he can apply for Medicare for when he turns 63. He needs help filling out the forms. Lesia is trying to help him. He is worried he will have a premium etc. Referred him to call the senior linkage line to ask them about this and help with enrollment.    I called [facility] [phone]. I asked for weekly medication set up with injection administration. She thinks she has an opening on December 8th and they can go visit patient and get things set up. Christine Is the contact there.      Her fax number is: [phone]- EDDIE asked triage to fax over another referral.    Will follow.          Care Plans  Care Plan: Housing and Support     Problem: Insufficient In-home support     Goal: Establish adequate home support     Start Date: 5/19/2022 Expected End Date: 11/21/2022    This Visit's Progress: 90% Recent " Progress: 90%    Priority: High    Note:     8-Goal Statement: I will work with [facility] case management to find alternative housing option within 6 months   Date Goal set: 5/19/22  Barriers: availability of housing options-   Strengths: has county waiver now, and [facility] services   Date to Achieve By: Nov 19th, 2022  Patient expressed understanding of goal: yes  Action steps to achieve this goal:  1. I will continue to meet with  to figure out options -Porsche from [employer]   2. I will keep PCP and care coordinator updated   3. I will submit any paperwork needed at facilities     As of today's date 6/2/2022 goal is met at 26 - 50%.   Goal Status:  Active.    As of today's date 8/3/2022 goal is met at 26 - 50%.   Goal Status:  Active   As of today's date 8/8/2022 goal is met at 51 - 75%.   Goal Status:  Showing progress. Patient may be moving soon to comfort services customized living   As of today's date 9/27/2022 goal is met at 76 - 100%.   Goal Status:  Showing progress. Has PCA, IHS and homemaking now. CM is making referral to customized living now too    As of today's date 10/17/2022 goal is met at 76 - 100%.   Goal Status:  Ongoing                           Plan:   Pt starting with new homemaking agency called [facility] for IHS and homemaking next week  -Still trying to find agency to take homecare RN referral  -Pt is due for m[place]choices assessment and Nelda at [facility] is the . Gave dates to participate.   -Will follow    Christine Rutledge, MSW, [facility]  Clinic Care Coordinator  [email or web address]  [phone]

## 2022-12-07 ENCOUNTER — PATIENT OUTREACH (OUTPATIENT)
Dept: CARE COORDINATION | Facility: CLINIC | Age: 62
End: 2022-12-07

## 2022-12-07 NOTE — LETTER
Patient Centered Plan of Care  About Me:        Patient Name:  Emily Zhu    YOB: 1960  Age:         62 year old   [facility] MRN:    [id number] Telephone Information:  Home Phone [phone]   Mobile None       Address:  [address]  [facility] [address] Email address:  No e-mail address on record      Emergency Contact(s)    Name Relationship Lgl Grd Work Phone Home Phone Mobile Phone   1. EDWARD BRAR Sister  [phone] none   2. LINUS Friend   [phone]            Primary language:  English     needed? No   [place] [facility]:  [phone] op. 1  Other communication barriers:None    Preferred Method of Communication:     Current living arrangement: I live alone    Mobility Status/ Medical Equipment: Independent w/Device        Health Maintenance  Health Maintenance Reviewed: Due/Overdue lung cancer screening, immunizations      My Access Plan  Medical Emergency 911   Primary Clinic Line [facility] - [phone]   24 Hour Appointment Line [phone] or  [phone] ([phone]) (toll-free)   24 Hour Nurse Line [phone] (toll-free)   Preferred Urgent Care No data recorded   Preferred Hospital [facility]  [phone]     Preferred Pharmacy [facility] [address] [facility] [address] [facility]     Behavioral Health Crisis Line The National Suicide Prevention Lifeline at 1-201.318.7547 or Text/Call 218             My Care Team Members  Patient Care Team       Relationship Specialty Notifications Start End    Tamica Tang APRN CNP PCP - General Family Medicine  1/4/21     Phone: [phone] Fax: [phone] [address] [facility] [address]    Chelo Roberts RPH Pharmacist Pharmacist  8/13/19     Phone: [phone] [address] [facility] [address]    Christine Tao LGSW Lead Care Coordinator  Admissions 1/10/20     Tamica Tang APRN CNP  Assigned PCP   8/1/21     Phone: [phone] Fax: [phone] [address] [facility] [address]    Chelo Roberts RPH Assigned MTM Pharmacist   9/28/22     Phone: [phone] [address] [facility] [address]    [facility] Care Coordinator- Andie Eduardo    10/19/22     Phone: [phone]                 My Care Plans  Self Management and Treatment Plan  Care Plan  Care Plan: Housing and Support     Problem: Insufficient In-home support     Goal: Establish adequate home support     Start Date: 5/19/2022 Expected End Date: 2/1/2023    This Visit's Progress: 90% Recent Progress: 90%    Priority: High    Note:     8-Goal Statement: I will work with [facility] case management to find alternative housing option within 6 months   Date Goal set: 5/19/22  Barriers: availability of housing options-   Strengths: has county waiver now, and [facility] services   Date to Achieve By: Nov 19th, 2022  Patient expressed understanding of goal: yes  Action steps to achieve this goal:  1. I will continue to meet with  to figure out options -[employer] from [employer]   2. I will keep PCP and care coordinator updated   3. I will submit any paperwork needed at facilities     As of today's date 6/2/2022 goal is met at 26 - 50%.   Goal Status:  Active.    As of today's date 8/3/2022 goal is met at 26 - 50%.   Goal Status:  Active   As of today's date 8/8/2022 goal is met at 51 - 75%.   Goal Status:  Showing progress. Patient may be moving soon to comfort services customized living   As of today's date 9/27/2022 goal is met at 76 - 100%.   Goal Status:  Showing progress. Has PCA, IHS and homemaking now. CM is making referral to customized living now too    As of today's date 10/17/2022 goal is met at 76 - 100%.   Goal Status:  Ongoing   As of today's date 12/7/2022 goal is met at 76 - 100%.   Goal Status:  ongoing                            Action Plans on File:            Depression          Advance  Care Plans/Directives Type:   Advanced Directive - On File      My Medical and Care Information  Problem List   Patient Active Problem List   Diagnosis     Coronary atherosclerosis     Essential hypertension     Mixed hyperlipidemia     Cataract     GERD (gastroesophageal reflux disease)     Microalbuminuria due to type 2 diabetes mellitus (H)     Type 2 diabetes mellitus with stage 3b chronic kidney disease, without long-term current use of insulin (H)     Painful diabetic neuropathy (H)     Warthin tumor     Stage 3b chronic kidney disease (H)     Tobacco use     History of TIA (transient ischemic attack) and stroke     Major depressive disorder, recurrent, mild (H)     Hip pain, left      Current Medications and Allergies:  See printed Medication Report.    Care Coordination Start Date: 1/10/2020   Frequency of Care Coordination: monthly     Form Last Updated: 12/08/2022

## 2022-12-07 NOTE — PROGRESS NOTES
Clinic Care Coordination Contact    Follow Up Progress Note      Assessment: Betty Gibbons at [facility] called [facility] today- they are able to accept the referral and will go out to establish care tomorrow at 11am    SW spoke with patient who is aware. His new IHS/Homemaker staff has come out to see him and is taking him to his podiatry appointment today. They were able to help him get his insurance paperwork for medicare filled out as well and will mail it in.    Patient is hoping to be able to get into a dentist asap.  We can work on this.      Care Plans  Care Plan: Housing and Support     Problem: Insufficient In-home support     Goal: Establish adequate home support     Start Date: 5/19/2022 Expected End Date: 2/1/2023    This Visit's Progress: 90% Recent Progress: 90%    Priority: High    Note:     8-Goal Statement: I will work with [facility] case management to find alternative housing option within 6 months   Date Goal set: 5/19/22  Barriers: availability of housing options-   Strengths: has county waiver now, and [facility] services   Date to Achieve By: Nov 19th, 2022  Patient expressed understanding of goal: yes  Action steps to achieve this goal:  1. I will continue to meet with  to figure out options -[employer] from [employer]   2. I will keep PCP and care coordinator updated   3. I will submit any paperwork needed at facilities     As of today's date 6/2/2022 goal is met at 26 - 50%.   Goal Status:  Active.    As of today's date 8/3/2022 goal is met at 26 - 50%.   Goal Status:  Active   As of today's date 8/8/2022 goal is met at 51 - 75%.   Goal Status:  Showing progress. Patient may be moving soon to comfort services customized living   As of today's date 9/27/2022 goal is met at 76 - 100%.   Goal Status:  Showing progress. Has PCA, IHS and homemaking now. CM is making referral to customized living now too    As of today's date 10/17/2022 goal is met at 76 - 100%.   Goal Status:  Ongoing   As of  today's date 12/7/2022 goal is met at 76 - 100%.   Goal Status:  ongoing                             Plan:   -Has services in place now for homecare RN, PCA, IHS/Homemaking  -Pt will renew his waiver services -- assessment set up for Dec 14th  - will continue to follow.  -Patient requests help getting into an in network dentist next. SW asked the [facility]ica care coordinator ideas about this.    Christine Rutledge, MSW, [facility]  Clinic Care Coordinator  [email or web address]  [phone]

## 2022-12-12 ENCOUNTER — OFFICE VISIT (OUTPATIENT)
Dept: PHARMACY | Facility: PHYSICIAN GROUP | Age: 62
End: 2022-12-12
Payer: COMMERCIAL

## 2022-12-12 VITALS
RESPIRATION RATE: 16 BRPM | WEIGHT: 188.8 LBS | SYSTOLIC BLOOD PRESSURE: 123 MMHG | HEART RATE: 58 BPM | OXYGEN SATURATION: 97 % | DIASTOLIC BLOOD PRESSURE: 89 MMHG | BODY MASS INDEX: 30.02 KG/M2

## 2022-12-12 DIAGNOSIS — L03.113 CELLULITIS OF RIGHT UPPER EXTREMITY: ICD-10-CM

## 2022-12-12 DIAGNOSIS — E11.21 TYPE 2 DIABETES MELLITUS WITH DIABETIC NEPHROPATHY, WITHOUT LONG-TERM CURRENT USE OF INSULIN (H): Primary | ICD-10-CM

## 2022-12-12 PROCEDURE — 99607 MTMS BY PHARM ADDL 15 MIN: CPT | Performed by: PHARMACIST

## 2022-12-12 PROCEDURE — 99606 MTMS BY PHARM EST 15 MIN: CPT | Performed by: PHARMACIST

## 2022-12-12 RX ORDER — SULFAMETHOXAZOLE/TRIMETHOPRIM 800-160 MG
1 TABLET ORAL 2 TIMES DAILY
Qty: 14 TABLET | Refills: 0 | Status: SHIPPED | OUTPATIENT
Start: 2022-12-12 | End: 2023-04-21

## 2022-12-12 NOTE — PROGRESS NOTES
Medication Therapy Management (MTM) Encounter    ASSESSMENT:                            Medication Adherence/Access: now that getting help at home with set up can re-arrange the pill timing to dose some more at night. Was not able to do this before due to complexity and confusion for patient.     Cellulitis: Had provider come into visit to look at rash and arm. Per CARROL Pretty CNP will start Bactrim for 7 days and see provider on Friday for recheck.    Type 2 Diabetes: Patient is not meeting A1c goal of < 7%. Patient would benefit from no changes today but bringing in meter on Friday when coming to clinic.     PLAN:                            1. Bactrim DS twice daily for 7 days - need to see CARROL Pretty CNP on Friday for recheck on the arm.     2. Moved some medication around to split between morning and night so you won't be as drowsy in the morning.     AM:   Tylenol 1000mg  Aspirin 81mg daily  Clopidogrel 75mg daily  B12 1000mcg daily  Jardiance 25mg daily (*should be higher dose, not 10mg)  Bydureon Weekly  Glipizide ER 10mg daily   Isosorbide ER 30mg daily  Lansoprazole 15mg daily  Metoprolol Tart 25mg twice daily (1st dose)  Sertraline 100mg daily  *Sulfamethoxazole-trimethoprim (for 7 days)    PM:   Tylenol 1000mg   Metoprolol tart 25mg twice daily (2nd dose)  Amlodipine 10mg daily  Lisinopril 10mg daily   Atorvastatin 40mg daily  *Sulfamethoxazole-trimethoprim (for 7 days)    Follow-up: Return in 4 days (on 12/16/2022) for Primary Care Provider.    SUBJECTIVE/OBJECTIVE:                          Emily Zhu is a 62 year old male coming in for a follow-up visit.  Today's visit is a follow-up MTM visit from 11/28     Reason for visit: Follow up.    Allergies/ADRs: Reviewed in chart  Past Medical History: Reviewed in chart  Tobacco: He reports that he has been smoking cigarettes. He has a 40.00 pack-year smoking history. He has never used smokeless tobacco.Nicotine/Tobacco Cessation  Plan:   Information offered: Patient not interested at this time  Alcohol: not currently using    Medication Adherence/Access: 31 hours per week with PCA approved.   Home care coming tomorrow to start medication set up and this will be much easier for him to get his medications on a consistent basis as he was not able to set these up. Had set up AM medication to include all once daily medications since was missing PM does often. Now feeling better with more supports planned and he is hoping to split the medications up more balanced because he does feel very drowsy after taking his pills in the AM.    AM:   Tylenol 1000mg  Amlodipine 10mg daily  Aspirin 81mg daily  Atorvastatin 40mg daily  Clopidogrel 75mg daily  B12 1000mcg daily  Jardiance 25mg daily (*see below, dose increased today)  Bydureon Weekly  Glipizide ER 10mg daily   Isosorbide ER 30mg daily  Lansoprazole 15mg daily  Lisinopril 10mg daily   Metoprolol Tart 25mg twice daily (1st dose)  Sertraline 100mg daily    PM:   Tylenol 1000mg   Metoprolol tart 25mg twice daily (2nd dose)    Arm rash: Noticed a itchy rash and 3 in swollen spot on his upper right arm. Has not had a sensor here or injected any medication here. Has been present for awhile now. Slightly warm.     Type 2 Diabetes:  Currently taking Bydureon weekly (preferred agent on formulary) and glipizide ER 10mg daily plus Jardiance 25mg daily.    Weekly Bydureon working much better for him than daily Victoza.   No longer on metformin due to renal function decline. However did have several bottles of this in his bags with him today.   Patient is not experiencing side effects.  Symptoms of low blood sugar? none  Symptoms of high blood sugar? none  Aspirin: 81mg daily and taking plavix once daily  Statin: Yes: atorvastatin   ACEi/ARB: Yes: lisinopril.   Lab Results   Component Value Date    A1C 9.9 10/24/2022    A1C 6.4 05/09/2022    A1C 5.9 02/07/2022    A1C 6.8 10/12/2021    A1C 7.5 07/14/2021     No  Sugar report today, forgot sensor reader today.      Today's Vitals: /89   Pulse 58   Resp 16   Wt 188 lb 12.8 oz (85.6 kg)   SpO2 97%   BMI 30.02 kg/m    ----------------    I spent 30 minutes with this patient today. All changes were made via collaborative practice agreement with CARROL Pretty CNP. A copy of the visit note was provided to the patient's provider(s).    A summary of these recommendations was given to the patient.    Chelo Roberts, Pharm.D, [facility]  Medication Therapy Management Pharmacist  [phone]         Medication Therapy Recommendations  Cellulitis of right upper extremity    Current Medication: sulfamethoxazole-trimethoprim (BACTRIM DS) 800-160 MG tablet   Rationale: Untreated condition - Needs additional medication therapy - Indication   Recommendation: Start Medication - start antibiotic after consult with provider   Status: Accepted per Provider         Type 2 diabetes mellitus with diabetic nephropathy, without long-term current use of insulin (H)    Current Medication: empagliflozin (JARDIANCE) 25 MG TABS tablet   Rationale: Undesirable effect - Adverse medication event - Safety   Recommendation: Change Medication - move administrtion times of several other blood pressure medications to split AM/PM   Status: Patient Agreed - Adherence/Education

## 2022-12-12 NOTE — PATIENT INSTRUCTIONS
"Recommendations from today's MTM visit:                                                       1. Bactrim DS twice daily for 7 days - need to see CARROL Pretty CNP on Friday for recheck on the arm.     2. Moved some medication around to split between morning and night so you won't be as drowsy in the morning.     AM:   Tylenol 1000mg  Aspirin 81mg daily  Clopidogrel 75mg daily  B12 1000mcg daily  Jardiance 25mg daily (*should be higher dose, not 10mg)  Bydureon Weekly  Glipizide ER 10mg daily   Isosorbide ER 30mg daily  Lansoprazole 15mg daily  Metoprolol Tart 25mg twice daily (1st dose)  Sertraline 100mg daily  *Sulfamethoxazole-trimethoprim (for 7 days)    PM:   Tylenol 1000mg   Metoprolol tart 25mg twice daily (2nd dose)  Amlodipine 10mg daily  Lisinopril 10mg daily   Atorvastatin 40mg daily  *Sulfamethoxazole-trimethoprim (for 7 days)    Follow-up: Return in 4 days (on 12/16/2022) for Primary Care Provider.    It was great speaking with you today.  I value your experience and would be very thankful for your time in providing feedback in our clinic survey. In the next few days, you may receive an email or text message from [employer] with a link to a survey related to your  clinical pharmacist.\"     My Clinical Pharmacist's contact information:                                                      Please feel free to contact me with any questions or concerns you have.      Chelo Roberts, Pharm.D, [facility]  Medication Therapy Management Pharmacist  [phone]     "

## 2022-12-14 ENCOUNTER — PATIENT OUTREACH (OUTPATIENT)
Dept: CARE COORDINATION | Facility: CLINIC | Age: 62
End: 2022-12-14

## 2022-12-14 NOTE — PROGRESS NOTES
Clinic Care Coordination Contact    Follow Up Progress Note      Assessment:  spoke with Nelda Molina through [facility]. She met with patient and plans to keep him on the CADI waiver with supports. If he ever wants to switch to elderly waiver he can.    Discussed that in the future patient may be interested in an adult day program, home delivered meals, and possibly moving to [facility].    Below is some helpful info Nelda shared today:  He turns 63 in January and will have the option of staying on CADI or switching to the Elderly Waiver program starting on 2/1/23.   His service span will be reset to 2/1/23-11/30/23, regardless of which program he chooses.   His next annual assessment will be due in October of 2023.      If he chooses to stay on CADI:   His case management will stay the same - Yuki Hardy with [email or web address]  He can continue to have an adjustable case budget depending on his individual needs  He can continue getting his current services and use his current service providers (homemaking, IHS w/training, PCA, lifeline device)  [facility] assessors, such as myself, would complete his annual [facility] assessments  He always has the option to switch to Elderly Waiver if needed in the future  Here s the details of the program, including a list of service options: [facility] - Community Access for Disability Inclusion (CADI) Waiver [email or web address]      If he chooses to switch to Elderly Waiver:  His case management will change to a  through [facility]  His budget will be calculated based on his  case mix  - which is determined through an algorithm in the [place] tool. This provides a little less flexibility with budgeting his services, but if he has a major change in condition, he can always get an updated assessment.   Assessors through [facility], and not [facility], would complete his annual assessments moving forward  I don t believe there would be any  financial implications to him changing from CADI to EW, as he doesn t currently have a MA spenddown.   He may be restricted to the [employer] network for choosing service providers - he would need to check with his PCA agency, for example, to ensure they accept [employer] as payment.   He would not be able to use IHS with training (previously called  ILS ) services. One similar program that would be available, however is called  ICLS    Here are the details of the program, including a list of service options: [facility] - Elderly Waiver (EW) ([facility] access [facility].[place].)     I would say if he s undecided, he could always stay on CADI and switch to EW later if needed. For instance, if he is looking to move into an assisted living facility, it ll be important to ask which types of waivers they accept for service payment - if he finds one that only accepts EW, he could always switch programs. Or if he starts to get larger amounts of income and incurs a spenddown, it would be helpful to see if there would be a financial benefit to changing to Elderly Waiver.     Numbers/resources:   -Senior Linkage Line [phone]  -[facility] EZ Info Line (to speak to a financial worker) [phone]       Patient was going to a dental appointment today.   He signed all required [facility] paperwork.        Care Gaps:    Health Maintenance Due   Topic Date Due     PARATHYROID  Never done     PHOSPHORUS  Never done     ZOSTER IMMUNIZATION (1 of 2) Never done     LUNG CANCER SCREENING  01/26/2013     COLORECTAL CANCER SCREENING  06/03/2021     COVID-19 Vaccine (3 - Booster for Moderna series) 06/17/2021       Care Plans  Care Plan: Housing and Support     Problem: Insufficient In-home support     Goal: Establish adequate home support     Start Date: 5/19/2022 Expected End Date: 2/1/2023    This Visit's Progress: 90% Recent Progress: 90%    Priority: High    Note:     8-Goal Statement: I will work with [facility] case management to find alternative housing option  within 6 months   Date Goal set: 5/19/22  Barriers: availability of housing options-   Strengths: has county waiver now, and [facility] services   Date to Achieve By: Nov 19th, 2022  Patient expressed understanding of goal: yes  Action steps to achieve this goal:  1. I will continue to meet with  to figure out options -Porsche from [employer]   2. I will keep PCP and care coordinator updated   3. I will submit any paperwork needed at facilities     As of today's date 6/2/2022 goal is met at 26 - 50%.   Goal Status:  Active.    As of today's date 8/3/2022 goal is met at 26 - 50%.   Goal Status:  Active   As of today's date 8/8/2022 goal is met at 51 - 75%.   Goal Status:  Showing progress. Patient may be moving soon to comfort services customized living   As of today's date 9/27/2022 goal is met at 76 - 100%.   Goal Status:  Showing progress. Has PCA, IHS and homemaking now. CM is making referral to customized living now too    As of today's date 10/17/2022 goal is met at 76 - 100%.   Goal Status:  Ongoing   As of today's date 12/7/2022 goal is met at 76 - 100%.   Goal Status:  ongoing                             Plan:   Patient had his renewal CADI assessment today and will stay on CADI waiver   Patient was going to a dental appointment today  SW will address progress within a couple weeks  Continue PCA, IHS, Homemaking and homecare nursing support  Patient has an appointment at [facility]16    Christine Rutledge, MSW, [facility]  Clinic Care Coordinator  [email or web address]  [phone]

## 2022-12-15 ENCOUNTER — PATIENT OUTREACH (OUTPATIENT)
Dept: CARE COORDINATION | Facility: CLINIC | Age: 62
End: 2022-12-15

## 2022-12-15 NOTE — PROGRESS NOTES
Clinic Care Coordination Contact    Follow Up Progress Note      Assessment:    received a call from patient. He said he got a letter from social security stating that he meets criteria for disability as of 6/2021 and he will be getting a secondary notice as they are still trying to decide if he meets criteria from a non medical standpoint and how much he should receive in funds each month. He will keep [facility] posted on this.     called [facility] and helped patient get scheduled for neurology. They were booked out till April at all locations but were able to get him in for December 26th at 8am at the [place] location with Michael Pretty MD. Patient said he would like cognitive testing too as he is noticing the start of some confusion/memory changes at times along with his hx of stroke.    Discussed his CADI waiver will be renewed.    He is scheduled in clinic[facility] tomorrow.    Patient said he went to the dentist yesterday and the plan is to extract his 6-7 teeth and get dentures. He couldn't remember the name of the dental place but he is pleased that he will get his dental issues taken care of.    Patient says once he gets his social security situation figured out he may be interested in moving. His friend continues to pay his rent and once/if he gets back pay from social security he plans to pay [employer] back.      Care Gaps:    Health Maintenance Due   Topic Date Due     PARATHYROID  Never done     PHOSPHORUS  Never done     ZOSTER IMMUNIZATION (1 of 2) Never done     LUNG CANCER SCREENING  01/26/2013     COLORECTAL CANCER SCREENING  06/03/2021     COVID-19 Vaccine (3 - Booster for Moderna series) 06/17/2021         Care Plans  Care Plan: Housing and Support     Problem: Insufficient In-home support     Goal: Establish adequate home support     Start Date: 5/19/2022 Expected End Date: 2/1/2023    This Visit's Progress: 90% Recent Progress: 90%    Priority: High    Note:     8-Goal Statement:  I will work with [facility] case management to find alternative housing option within 6 months   Date Goal set: 5/19/22  Barriers: availability of housing options-   Strengths: has county waiver now, and [facility] services   Date to Achieve By: Nov 19th, 2022  Patient expressed understanding of goal: yes  Action steps to achieve this goal:  1. I will continue to meet with  to figure out options -[employer] from [employer]   2. I will keep PCP and care coordinator updated   3. I will submit any paperwork needed at facilities     12/15/22: Pt resides at market rate apartment still. He gets PCA, IHS, and homemaking and homecare nursing for medication management. He is still interested in moving but would like to figure out things for approval of social security funds first which he should find out about this month. He may want to move to [facility] soon.                            Plan:   [facility] appointment 12/16  Neurology appointment 12/26  Follow up with dental office to get extractions and dentures made  CADI waiver and services being renewed  Continue homecare nursing, PCA, IHS, and homemaking services   will continue to follow    Christine Rutledge, MSW, [facility]  Clinic Care Coordinator  [email or web address]  [phone]

## 2022-12-16 ENCOUNTER — OFFICE VISIT (OUTPATIENT)
Dept: FAMILY MEDICINE | Facility: CLINIC | Age: 62
End: 2022-12-16

## 2022-12-16 VITALS
DIASTOLIC BLOOD PRESSURE: 76 MMHG | SYSTOLIC BLOOD PRESSURE: 114 MMHG | BODY MASS INDEX: 29.25 KG/M2 | HEART RATE: 73 BPM | WEIGHT: 184 LBS | OXYGEN SATURATION: 96 %

## 2022-12-16 DIAGNOSIS — L03.113 CELLULITIS OF RIGHT UPPER ARM: Primary | ICD-10-CM

## 2022-12-16 PROCEDURE — 99213 OFFICE O/P EST LOW 20 MIN: CPT | Performed by: NURSE PRACTITIONER

## 2022-12-16 NOTE — PROGRESS NOTES
Problem(s) Oriented visit        SUBJECTIVE:                                                    Emily Zhu is a 62 year old male who presents to clinic today for the following health issues :    Here for follow-up appointment from his visit with MTM on Monday. Had red, warm, firm skin nodule on right upper arm. Patient had said it was itchy. No fevers. Started on Keflex. Area has resolved with antibiotics. No new symptoms.    Problem list, Medication list, Allergies, and Medical/Social/Surgical histories reviewed in EPIC and updated as appropriate.   Additional history: as documented    ROS:  5 point ROS completed and negative except noted above, including Gen, CV, Resp, GI, MS    OBJECTIVE:                                                    /76   Pulse 73   Wt 83.5 kg (184 lb)   SpO2 96%   BMI 29.25 kg/m    Body mass index is 29.25 kg/m .   General: Chronically ill adult male in no distress  Skin: Right upper arm without cellulitis identified Monday  Psych: depressed mood     ASSESSMENT/PLAN:                                                      Emily was seen today for recheck.    Diagnoses and all orders for this visit:    Cellulitis of right upper arm    Finish course of antibiotics. Follow-up with new/worsening symptoms.    CARROL Pretty [facility]  Family Practice  [facility]  [phone]    For any issues my office # is [phone]

## 2022-12-20 ENCOUNTER — MEDICAL CORRESPONDENCE (OUTPATIENT)
Dept: FAMILY MEDICINE | Facility: CLINIC | Age: 62
End: 2022-12-20

## 2022-12-21 ENCOUNTER — MEDICAL CORRESPONDENCE (OUTPATIENT)
Dept: FAMILY MEDICINE | Facility: CLINIC | Age: 62
End: 2022-12-21

## 2022-12-26 ENCOUNTER — OFFICE VISIT (OUTPATIENT)
Dept: NEUROLOGY | Facility: CLINIC | Age: 62
End: 2022-12-26
Attending: NURSE PRACTITIONER
Payer: COMMERCIAL

## 2022-12-26 VITALS
DIASTOLIC BLOOD PRESSURE: 68 MMHG | SYSTOLIC BLOOD PRESSURE: 102 MMHG | BODY MASS INDEX: 29.25 KG/M2 | HEART RATE: 93 BPM | WEIGHT: 184 LBS

## 2022-12-26 DIAGNOSIS — R52 PAIN OF LEFT SIDE OF BODY: ICD-10-CM

## 2022-12-26 DIAGNOSIS — R47.1 DYSARTHRIA: ICD-10-CM

## 2022-12-26 DIAGNOSIS — R41.89 SUBJECTIVE MEMORY COMPLAINTS: Primary | ICD-10-CM

## 2022-12-26 DIAGNOSIS — Z86.73 HISTORY OF STROKE: ICD-10-CM

## 2022-12-26 PROCEDURE — 99245 OFF/OP CONSLTJ NEW/EST HI 55: CPT | Performed by: PSYCHIATRY & NEUROLOGY

## 2022-12-26 RX ORDER — GABAPENTIN 300 MG/1
300 CAPSULE ORAL 3 TIMES DAILY
Qty: 270 CAPSULE | Refills: 3 | Status: SHIPPED | OUTPATIENT
Start: 2022-12-26 | End: 2024-01-18 | Stop reason: DRUGHIGH

## 2022-12-26 ASSESSMENT — MONTREAL COGNITIVE ASSESSMENT (MOCA)
4. NAME EACH OF THE THREE ANIMALS SHOWN: 0
4. NAME EACH OF THE THREE ANIMALS SHOWN: 0
7. [VIGILENCE] TAP WHEN HEARING DESIGNATED LETTER: 0
9. REPEAT EACH SENTENCE: 0
WHAT LEVEL OF EDUCATION WAS ATTAINED: 0
11. FOR EACH PAIR OF WORDS, WHAT CATEGORY DO THEY BELONG TO (OUT OF 2): 2
8. SERIAL SUBTRACTION OF 7S: 0
6. READ LIST OF DIGITS [FORWARD/BACKWARD]: 1
VISUOSPATIAL/EXECUTIVE SUBSCORE: 2
WHAT IS THE TOTAL SCORE (OUT OF 30): 13
13. ORIENTATION SUBSCORE: 5
WHAT LEVEL OF EDUCATION WAS ATTAINED: 0
6. READ LIST OF DIGITS [FORWARD/BACKWARD]: 1
12. MEMORY INDEX SCORE: 0
10. [FLUENCY] NAME WORDS STARTING WITH DESIGNATED LETTER: 0
10. [FLUENCY] NAME WORDS STARTING WITH DESIGNATED LETTER: 0
13. ORIENTATION SUBSCORE: 5
VISUOSPATIAL/EXECUTIVE SUBSCORE: 2
WHAT IS THE TOTAL SCORE (OUT OF 30): 10
8. SERIAL SUBTRACTION OF 7S: 3
12. MEMORY INDEX SCORE: 0
11. FOR EACH PAIR OF WORDS, WHAT CATEGORY DO THEY BELONG TO (OUT OF 2): 2
7. [VIGILENCE] TAP WHEN HEARING DESIGNATED LETTER: 0
9. REPEAT EACH SENTENCE: 0

## 2022-12-26 NOTE — PROGRESS NOTES
NEUROLOGY OUTPATIENT CONSULT NOTE   Dec 26, 2022     CHIEF COMPLAINT/REASON FOR VISIT/REASON FOR CONSULT  Patient presents with:  Stroke: No concerns    REASON FOR CONSULTATION- Left sided pain    REFERRAL SOURCE  Dr. Tamica Tang  CC Dr. Tamica Tang    HISTORY OF PRESENT ILLNESS  Emily Zhu is a 62 year old male seen today for evaluation of left-sided pain.  About a year and a half ago he was in the [place], [place] when he had a stroke.  The stroke left him with some weakness on the left side of the arm and leg and some numbness in the left arm and leg.  Does complain of some painful sensation in the arm and leg.  This is more of a pins and needle sensation.  He denies any abnormal sensation in his feet.  Symptoms have been stable or slightly improved compared to before.  He reports that he has good periods and bad periods.  During the good periods he does do better and has less stroke symptoms compared to the bad periods.  Did do physical therapy initially but then did not do it when he moved to [place].  He is scheduled for physical therapy next week.  Has not done speech therapy recently.  Is willing to do that.  Denies any symptoms on the right side or in the legs.  Does have some drooping of the left eye though not much as prominent when I look at his face.  Has seen the eye doctor without any concerns.  Also complains of poor memory.  Has been on aspirin Plavix because of 2 stents.  Does have a diagnosis of diabetes and high blood pressure which are not adequate control.  Is currently taking Tylenol for pain control.    Previous history is reviewed and this is unchanged.    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     Stented coronary artery      Type II or unspecified type diabetes mellitus without mention of complication, not stated as uncontrolled     Diabetes mellitus     Unspecified essential hypertension     Essential hypertension     Patient Active Problem List   Diagnosis     Coronary atherosclerosis     Essential hypertension     Mixed hyperlipidemia     Cataract     GERD (gastroesophageal reflux disease)     Microalbuminuria due to type 2 diabetes mellitus (H)     Type 2 diabetes mellitus with stage 3b chronic kidney disease, without long-term current use of insulin (H)     Painful diabetic neuropathy (H)     Warthin tumor     Stage 3b chronic kidney disease (H)     Tobacco use     History of TIA (transient ischemic attack) and stroke     Major depressive disorder, recurrent, mild (H)     Hip pain, left   Significant stroke, coronary artery disease, diabetes, hypertension, hyperlipidemia    FAMILY HISTORY  Family History   Problem Relation Age of Onset     Cerebrovascular Disease Mother 56     Hypertension Mother      Cerebrovascular Disease Father      Hypertension Father    Positive for stroke in both parents    SOCIAL HISTORY  Social History     Tobacco Use     Smoking status: Every Day     Packs/day: 1.00     Years: 40.00     Pack years: 40.00     Types: Cigarettes     Smokeless tobacco: Never     Tobacco comments:     9/29/2021 - 10 cigarettes/day   Substance Use Topics     Alcohol use: Not Currently     Alcohol/week: 0.0 standard drinks     Comment: none     Drug use: No   Originally from [place]    SYSTEMS REVIEW  Twelve-system ROS was done and other than the HPI this was negative.  Pertinent positives noted in the HPI.    MEDICATIONS  acetaminophen (TYLENOL) 500 MG tablet, Take 1,000 mg by mouth 2 times daily  amLODIPine (NORVASC) 10 MG tablet, Take 1 tablet (10 mg) by mouth daily  amoxicillin (AMOXIL)  500 MG capsule, TAKE 1 CAPSULE BY MOUTH THREE TIMES DAILY FOR 7 DAYS  aspirin (ASA) 81 MG EC tablet, Take 1 tablet (81 mg) by mouth daily  atorvastatin (LIPITOR) 40 MG tablet, Take 1 tablet (40 mg) by mouth daily  clopidogrel (PLAVIX) 75 MG tablet, Take 1 tablet (75 mg) by mouth daily  Continuous Blood Gluc  (FREESTYLE JAQUAN 2 READER) CRISSY, 1 Device continuous Use to test blood sugars per 's instructions  Continuous Blood Gluc Sensor (FREESTYLE JAQUAN 2 SENSOR) MISC, 1 Device every 14 days To check blood sugars.  cyanocobalamin (VITAMIN B-12) 1000 MCG tablet, Take 1 tablet (1,000 mcg) by mouth daily  empagliflozin (JARDIANCE) 25 MG TABS tablet, Take 1 tablet (25 mg) by mouth daily  exenatide ER (BYDUREON BCISE) 2 MG/0.85ML auto-injector, INJECT 2MG SUBCUTANEAOUS EVERY 7 DAYS  glipiZIDE (GLUCOTROL XL) 10 MG 24 hr tablet, Take 1 tablet (10 mg) by mouth daily  isosorbide mononitrate (IMDUR) 30 MG 24 hr tablet, Take 1 tablet (30 mg) by mouth daily  LANsoprazole (PREVACID) 15 MG DR capsule, Take 1 capsule (15 mg) by mouth daily  lisinopril (ZESTRIL) 10 MG tablet, Take 1 tablet (10 mg) by mouth daily  metoprolol tartrate (LOPRESSOR) 25 MG tablet, Take 1 tablet (25 mg) by mouth 2 times daily  sertraline (ZOLOFT) 100 MG tablet, Take 1 tablet (100 mg) by mouth daily  sulfamethoxazole-trimethoprim (BACTRIM DS) 800-160 MG tablet, Take 1 tablet by mouth 2 times daily    No current facility-administered medications on file prior to visit.       PHYSICAL EXAMINATION  VITALS: /68   Pulse 93   Wt 83.5 kg (184 lb)   BMI 29.25 kg/m    GENERAL: Healthy appearing, alert, no acute distress, normal habitus.  CARDIOVASCULAR: Extremities warm and well perfused. Pulses present.   EYES: Funduscopic exam limited.  NEUROLOGICAL:  Patient is awake and oriented to self, place and time.  Attention span is normal.  MOCA 9.  Language is fluent and follows commands appropriately.  Appropriate fund of knowledge. Cranial  nerves 2-12 are intact. There is no pronator drift.  Motor exam shows 5/5 strength in all extremities except 4-5 left upper extremity proximal distal weakness and proximal left leg weakness.  Tone is symmetric bilaterally in upper and lower extremities.  Reflexes are symmetric and absent in upper extremities and lower extremities. Sensory exam is grossly intact to light touch, pin prick and vibration except increased pinprick and vibration on the left side compared to the right side.  Based on patient responses there was no evidence of length dependent polyneuropathy.  Finger to nose and heel to shin is without dysmetria.  Romberg is unsteady.  Gait is wide-based and the patient is unable to do tandem walk and walk on toes and heels.    DIAGNOSTICS  MRI  Brain 2020  IMPRESSION:   1. Stable mixed signal intensity nodule in the superficial parotid  space on the right.  2. Diffuse cerebral volume loss and cerebral white matter changes  consistent with chronic small vessel ischemic disease. No evidence for  acute intracranial pathology.    CTA head  IMPRESSION:   1. Patent arteries in the neck without evidence of dissection. Mild  atherosclerotic calcifications at the carotid bifurcations bilaterally  without significant stenosis by NASCET criteria.  2. Patent proximal major intracranial arteries without vascular  cutoff. No significant stenosis. No aneurysm identified.  3. Bilateral hyperdense masses within the parotid glands. There are  also likely prominent periparotid lymph nodes particularly on the  left. Differential would include benign and malignant parotid lesions.  Consider further evaluation with parotid MR on a nonemergent basis.    CT head  IMPRESSION:     1. No evidence of acute intracranial hemorrhage, mass, or herniation.  2. There is generalized atrophy of the brain. White matter changes are  present in the cerebral hemispheres that are consistent with small  vessel ischemic disease in this age patient.      MRI brain 2017                                                                   IMPRESSION:  1. No evidence of acute infarct, hemorrhage or mass.  2. Brain atrophy and white matter changes consistent with sequelae of  small vessel ischemic disease, unchanged.    MRI 2016  IMPRESSION:  1. Moderately extensive white matter changes bilaterally. These are  nonspecific but could be due to gliosis, chronic ischemic change, or  prior demyelination.  2. Old lacunar infarct in both basal ganglia regions.   3. No evidence for intracranial hemorrhage, acute infarct, or any  focal mass lesions.    Stress test 2016  Tomographic Findings  Overall, the study quality is good . On the stress images, normal  myocardial perfusion was seen. On the rest images,normal myocardial  perfusion again present . Gated images demonstrated normal wall  motion. The left ventricular ejection fraction was calculated to be  86% though this is likely to be an overestimate. TID was absent.    RELEVANT LABS  Component      Latest Ref Rng & Units 10/24/2022   WBC      3.8 - 11.0 x10/cmm 9.5   % Lymphocytes      20.5 - 51.1 % 18.9 (A)   % Monocytes      1.7 - 9.3 % 5.8   % Granulocytes      42.2 - 75.2 % 75.3 (A)   RBC x10/cmm      4.2 - 5.9 x10/cmm 4.66   Hemoglobin      13.4 - 17.5 g/dl 14.8   Hematocrit      39 - 51 % 42.9   MCV      80 - 100 fL 91.9   MCH      27.0 - 31.0 pg 31.8 (A)   MCHC      33.0 - 37.0 g/dL 34.6   Platelet Count      140 - 450 K/uL 316   Glucose      70 - 99 mg/dL 260 (H)   Urea Nitrogen      8 - 27 mg/dL 25   Creatinine      0.76 - 1.27 mg/dL 2.41 (H)   eGFR       >59 mL/min/1.73 29 (L)   BUN/Creatinine Ratio      10 - 24 10   Sodium      134 - 144 mmol/L 134   Potassium      3.5 - 5.2 mmol/L 4.6   Chloride      96 - 106 mmol/L 100   Total CO2      20 - 29 mmol/L 23   Calcium      8.6 - 10.2 mg/dL 9.1   Hemoglobin A1C      4.0 - 6.0 % 9.9 (A)     OUTSIDE RECORDS  Outside referral notes and chart notes were reviewed and  pertinent information has been summarized (in addition to the HPI):-          IMPRESSION/REPORT/PLAN  Subjective memory complaints  Pain of left side of body  History of stroke  Dysarthria    This is a 62 year old male with history of stroke resulting in left arm and leg numbness and weakness.  Outside records are not available to me.  MRIs done through the [employer] system does show significant small vessel ischemic disease.  I suspect patient to have a thalamic/internal capsule stroke on the right side causing weakness and numbness on the left side.  Most likely this is related to his vascular risk factors of diabetes and hypertension.  Would recommend that he continue the aspirin Plavix and work with his primary care doctor to get his vascular risk factors under good control.  Also recommend aggressive physical therapy and I will set him up with speech therapy for his dysarthria.  Recommend exercise and healthy lifestyle.    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  I will prescribe him gabapentin to see if that helps.  Unfortunately there is no cure for this syndrome.  It might get better with time.    He further complains of memory problems.  The score low on the [place] today.  We will check blood work to look for any reversible memory problems most likely this is vascular dementia given the extensive amount of T2 hyperintensity seen on the MRI.  Speech therapy can help with cognitive rehabilitation.    I can see him back on as-needed basis.    -     Vitamin B12; Future  -     Vitamin B1 whole blood; Future  -     TSH with free T4 reflex; Future  -     gabapentin (NEURONTIN) 300 MG capsule; Take 1 capsule (300 mg) by mouth 3 times daily  -     Speech Therapy Referral; Future  -Try to get outside records    Return if symptoms worsen or fail to improve, for In-Clinic Visit (must).(Prefers to follow-up with primary care)    Over 60 minutes were spent coordinating the care for the patient  on the day of the encounter.  This includes previsit, during visit and post visit activities as documented above.  Counseling patient.  Patient is dysarthric and takes longer to understand him.  Reviewing multiple tests.  Trying to get outside records.  Testing ordered.  Multiple problems reviewed/addressed.  (Activities include but not inclusive of reviewing chart, reviewing outside records, reviewing labs and imaging study results as well as the images, patient visit time including getting history and exam,  use if applicable, review of test results with the patient and coming up with a plan in a shared model, counseling patient and family, education and answering patient questions, EMR , EMR diagnosis entry and problem list management, medication reconciliation and prescription management if applicable, paperwork if applicable, printing documents and documentation of the visit activities.)  Billing:-4 data points, 4 problem points      Michael Pretty MD  Neurologist  [facility]  Tel:- [phone]    This note was dictated using voice recognition software.  Any grammatical or context distortions are unintentional and inherent to the software.

## 2022-12-26 NOTE — NURSING NOTE
Chief Complaint   Patient presents with     Stroke     No concerns     Miesha Jacob on 12/26/2022 at 8:14 AM

## 2022-12-26 NOTE — NURSING NOTE
VARGAS COGNITIVE ASSESSMENT (MOCA)  Version 7.1 Original Version  VISUOSPATIAL/EXECUTIVE               COPY CUBE      [ 0   ]                                [  0  ] DRAW CLOCK (Ten past eleven)  (3 points)    [  1  ]                    [0    ]               [ 1   ]       Contour            Numbers     Hands POINTS           2        / 5   NAMING    [ 0 ]                                                                        [ 0   ]                                             [  0  ]  [place]                                Camel                    0 / 3   MEMORY Read list of words, subject must repeat them. Do 2 trials, even if 1st trial is successful. Do a recall after 5 minutes  FACE VELVET [employer] SHENA RED No Points    1st          2nd         ATTENTION Read list of digits (1 digit/sec) Subject has to repeat in the forward order       [  0  ]   2  1  8  5  4                                [   0 ] 7 4 2                     2     /2   Read list of letters. The subject must tap with his hand at each letter A. No points if > 2 errors.  [1    ] F B A C M N A A J K L B A F A K D E A A A J A M O F A A B          1    /1   Serial 7 subtraction starting at 100          [ 0 ] 93         [  0 ] 86          [  0  ] 79          [  0  ] 72         [0    ] 65   4 or 5 correct subtractions: 3 points,  2 or 3 correct: 2 points,  1correct: 1 point,   0 correct: 0 points      0     /3   LANGUAGE Repeat: I only know that Deejay is the one to help today. [ 0    ]                                      The cat always hid under the couch when dogs were in the room. [ 0  ]            0   /2   Fluency: Name maximum number of words in one minute that begin with the letter F                                                                                                                    [ 0   ] ___ (N > 11 words)            0   /1   ABSTRACTION Similarity between  e.g. banana-orange=fruit                                                                   [ 1   ] train-bicycle                      [ 1   ] watch-ruler         2     /2   DELAYED  RECALL Has to recall words  WITH NO CUE FACE  [    ] VELVET  [    ] [employer]  [    ]  SHENA  [    ] RED  [    ] Points for UNCUED recall only        0    /5           OPTIONAL Category cue           Multiple choice cue          ORIENTATION  [  1  ] Date     [  1  ] Month       [   0 ] Year      [ 1  ] Day      [  1  ] Place        [ 1  ] City    5     /6   TOTAL  Normal > 26/30 Add 1 point if < 12 years education    0    /30

## 2022-12-26 NOTE — LETTER
12/26/2022         RE: Emily Zhu  [address]  [facility] [address]        Dear Colleague,    Thank you for referring your patient, Emily Zhu, to the [facility] NEUROLOGY [facility] [place]. Please see a copy of my visit note below.    NEUROLOGY OUTPATIENT CONSULT NOTE   Dec 26, 2022     CHIEF COMPLAINT/REASON FOR VISIT/REASON FOR CONSULT  Patient presents with:  Stroke: No concerns    REASON FOR CONSULTATION- Left sided pain    REFERRAL SOURCE  Dr. Tamica Tang  CC Dr. Tamica Tang    HISTORY OF PRESENT ILLNESS  Emily Zhu is a 62 year old male seen today for evaluation of left-sided pain.  About a year and a half ago he was in the [place], [place] when he had a stroke.  The stroke left him with some weakness on the left side of the arm and leg and some numbness in the left arm and leg.  Does complain of some painful sensation in the arm and leg.  This is more of a pins and needle sensation.  He denies any abnormal sensation in his feet.  Symptoms have been stable or slightly improved compared to before.  He reports that he has good periods and bad periods.  During the good periods he does do better and has less stroke symptoms compared to the bad periods.  Did do physical therapy initially but then did not do it when he moved to [place].  He is scheduled for physical therapy next week.  Has not done speech therapy recently.  Is willing to do that.  Denies any symptoms on the right side or in the legs.  Does have some drooping of the left eye though not much as prominent when I look at his face.  Has seen the eye doctor without any concerns.  Also complains of poor memory.  Has been on aspirin Plavix because of 2 stents.  Does have a diagnosis of diabetes and high blood pressure which are not adequate control.  Is currently taking Tylenol for pain control.    Previous history is reviewed and this is unchanged.    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     Stented coronary artery      Type II or unspecified type diabetes mellitus without mention of complication, not stated as uncontrolled     Diabetes mellitus     Unspecified essential hypertension     Essential hypertension     Patient Active Problem List   Diagnosis     Coronary atherosclerosis     Essential hypertension     Mixed hyperlipidemia     Cataract     GERD (gastroesophageal reflux disease)     Microalbuminuria due to type 2 diabetes mellitus (H)     Type 2 diabetes mellitus with stage 3b chronic kidney disease, without long-term current use of insulin (H)     Painful diabetic neuropathy (H)     Warthin tumor     Stage 3b chronic kidney disease (H)     Tobacco use     History of TIA (transient ischemic attack) and stroke     Major depressive disorder, recurrent, mild (H)     Hip pain, left   Significant stroke, coronary artery disease, diabetes, hypertension, hyperlipidemia    FAMILY HISTORY  Family History   Problem Relation Age of Onset     Cerebrovascular Disease Mother 56     Hypertension Mother      Cerebrovascular Disease Father      Hypertension Father    Positive for stroke in both parents    SOCIAL HISTORY  Social History     Tobacco Use     Smoking status: Every Day     Packs/day: 1.00     Years: 40.00     Pack years: 40.00     Types: Cigarettes     Smokeless tobacco: Never     Tobacco comments:     9/29/2021 - 10 cigarettes/day   Substance Use Topics     Alcohol use: Not Currently     Alcohol/week: 0.0 standard drinks     Comment: none     Drug use: No   Originally from [place]    SYSTEMS REVIEW  Twelve-system ROS was done  and other than the HPI this was negative.  Pertinent positives noted in the HPI.    MEDICATIONS  acetaminophen (TYLENOL) 500 MG tablet, Take 1,000 mg by mouth 2 times daily  amLODIPine (NORVASC) 10 MG tablet, Take 1 tablet (10 mg) by mouth daily  amoxicillin (AMOXIL) 500 MG capsule, TAKE 1 CAPSULE BY MOUTH THREE TIMES DAILY FOR 7 DAYS  aspirin (ASA) 81 MG EC tablet, Take 1 tablet (81 mg) by mouth daily  atorvastatin (LIPITOR) 40 MG tablet, Take 1 tablet (40 mg) by mouth daily  clopidogrel (PLAVIX) 75 MG tablet, Take 1 tablet (75 mg) by mouth daily  Continuous Blood Gluc  (FREESTYLE JAQUAN 2 READER) CRISSY, 1 Device continuous Use to test blood sugars per 's instructions  Continuous Blood Gluc Sensor (FREESTYLE JAQUAN 2 SENSOR) MISC, 1 Device every 14 days To check blood sugars.  cyanocobalamin (VITAMIN B-12) 1000 MCG tablet, Take 1 tablet (1,000 mcg) by mouth daily  empagliflozin (JARDIANCE) 25 MG TABS tablet, Take 1 tablet (25 mg) by mouth daily  exenatide ER (BYDUREON BCISE) 2 MG/0.85ML auto-injector, INJECT 2MG SUBCUTANEAOUS EVERY 7 DAYS  glipiZIDE (GLUCOTROL XL) 10 MG 24 hr tablet, Take 1 tablet (10 mg) by mouth daily  isosorbide mononitrate (IMDUR) 30 MG 24 hr tablet, Take 1 tablet (30 mg) by mouth daily  LANsoprazole (PREVACID) 15 MG DR capsule, Take 1 capsule (15 mg) by mouth daily  lisinopril (ZESTRIL) 10 MG tablet, Take 1 tablet (10 mg) by mouth daily  metoprolol tartrate (LOPRESSOR) 25 MG tablet, Take 1 tablet (25 mg) by mouth 2 times daily  sertraline (ZOLOFT) 100 MG tablet, Take 1 tablet (100 mg) by mouth daily  sulfamethoxazole-trimethoprim (BACTRIM DS) 800-160 MG tablet, Take 1 tablet by mouth 2 times daily    No current facility-administered medications on file prior to visit.       PHYSICAL EXAMINATION  VITALS: /68   Pulse 93   Wt 83.5 kg (184 lb)   BMI 29.25 kg/m    GENERAL: Healthy appearing, alert, no acute distress, normal habitus.  CARDIOVASCULAR: Extremities warm and  well perfused. Pulses present.   EYES: Funduscopic exam limited.  NEUROLOGICAL:  Patient is awake and oriented to self, place and time.  Attention span is normal.  MOCA 9.  Language is fluent and follows commands appropriately.  Appropriate fund of knowledge. Cranial nerves 2-12 are intact. There is no pronator drift.  Motor exam shows 5/5 strength in all extremities except 4-5 left upper extremity proximal distal weakness and proximal left leg weakness.  Tone is symmetric bilaterally in upper and lower extremities.  Reflexes are symmetric and absent in upper extremities and lower extremities. Sensory exam is grossly intact to light touch, pin prick and vibration except increased pinprick and vibration on the left side compared to the right side.  Based on patient responses there was no evidence of length dependent polyneuropathy.  Finger to nose and heel to shin is without dysmetria.  Romberg is unsteady.  Gait is wide-based and the patient is unable to do tandem walk and walk on toes and heels.    DIAGNOSTICS  MRI  Brain 2020  IMPRESSION:   1. Stable mixed signal intensity nodule in the superficial parotid  space on the right.  2. Diffuse cerebral volume loss and cerebral white matter changes  consistent with chronic small vessel ischemic disease. No evidence for  acute intracranial pathology.    CTA head  IMPRESSION:   1. Patent arteries in the neck without evidence of dissection. Mild  atherosclerotic calcifications at the carotid bifurcations bilaterally  without significant stenosis by NASCET criteria.  2. Patent proximal major intracranial arteries without vascular  cutoff. No significant stenosis. No aneurysm identified.  3. Bilateral hyperdense masses within the parotid glands. There are  also likely prominent periparotid lymph nodes particularly on the  left. Differential would include benign and malignant parotid lesions.  Consider further evaluation with parotid MR on a nonemergent basis.    CT  head  IMPRESSION:     1. No evidence of acute intracranial hemorrhage, mass, or herniation.  2. There is generalized atrophy of the brain. White matter changes are  present in the cerebral hemispheres that are consistent with small  vessel ischemic disease in this age patient.     MRI brain 2017                                                                   IMPRESSION:  1. No evidence of acute infarct, hemorrhage or mass.  2. Brain atrophy and white matter changes consistent with sequelae of  small vessel ischemic disease, unchanged.    MRI 2016  IMPRESSION:  1. Moderately extensive white matter changes bilaterally. These are  nonspecific but could be due to gliosis, chronic ischemic change, or  prior demyelination.  2. Old lacunar infarct in both basal ganglia regions.   3. No evidence for intracranial hemorrhage, acute infarct, or any  focal mass lesions.    Stress test 2016  Tomographic Findings  Overall, the study quality is good . On the stress images, normal  myocardial perfusion was seen. On the rest images,normal myocardial  perfusion again present . Gated images demonstrated normal wall  motion. The left ventricular ejection fraction was calculated to be  86% though this is likely to be an overestimate. TID was absent.    RELEVANT LABS  Component      Latest Ref Rng & Units 10/24/2022   WBC      3.8 - 11.0 x10/cmm 9.5   % Lymphocytes      20.5 - 51.1 % 18.9 (A)   % Monocytes      1.7 - 9.3 % 5.8   % Granulocytes      42.2 - 75.2 % 75.3 (A)   RBC x10/cmm      4.2 - 5.9 x10/cmm 4.66   Hemoglobin      13.4 - 17.5 g/dl 14.8   Hematocrit      39 - 51 % 42.9   MCV      80 - 100 fL 91.9   MCH      27.0 - 31.0 pg 31.8 (A)   MCHC      33.0 - 37.0 g/dL 34.6   Platelet Count      140 - 450 K/uL 316   Glucose      70 - 99 mg/dL 260 (H)   Urea Nitrogen      8 - 27 mg/dL 25   Creatinine      0.76 - 1.27 mg/dL 2.41 (H)   eGFR       >59 mL/min/1.73 29 (L)   BUN/Creatinine Ratio      10 - 24 10   Sodium      134 - 144  mmol/L 134   Potassium      3.5 - 5.2 mmol/L 4.6   Chloride      96 - 106 mmol/L 100   Total CO2      20 - 29 mmol/L 23   Calcium      8.6 - 10.2 mg/dL 9.1   Hemoglobin A1C      4.0 - 6.0 % 9.9 (A)     OUTSIDE RECORDS  Outside referral notes and chart notes were reviewed and pertinent information has been summarized (in addition to the HPI):-          IMPRESSION/REPORT/PLAN  Subjective memory complaints  Pain of left side of body  History of stroke  Dysarthria    This is a 62 year old male with history of stroke resulting in left arm and leg numbness and weakness.  Outside records are not available to me.  MRIs done through the [facility] system does show significant small vessel ischemic disease.  I suspect patient to have a thalamic/internal capsule stroke on the right side causing weakness and numbness on the left side.  Most likely this is related to his vascular risk factors of diabetes and hypertension.  Would recommend that he continue the aspirin Plavix and work with his primary care doctor to get his vascular risk factors under good control.  Also recommend aggressive physical therapy and I will set him up with speech therapy for his dysarthria.  Recommend exercise and healthy lifestyle.    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  I will prescribe him gabapentin to see if that helps.  Unfortunately there is no cure for this syndrome.  It might get better with time.    He further complains of memory problems.  The score low on the [place] today.  We will check blood work to look for any reversible memory problems most likely this is vascular dementia given the extensive amount of T2 hyperintensity seen on the MRI.  Speech therapy can help with cognitive rehabilitation.    I can see him back on as-needed basis.    -     Vitamin B12; Future  -     Vitamin B1 whole blood; Future  -     TSH with free T4 reflex; Future  -     gabapentin (NEURONTIN) 300 MG capsule; Take 1 capsule (300 mg)  by mouth 3 times daily  -     Speech Therapy Referral; Future  -Try to get outside records    Return if symptoms worsen or fail to improve, for In-Clinic Visit (must).(Prefers to follow-up with primary care)    Over 60 minutes were spent coordinating the care for the patient on the day of the encounter.  This includes previsit, during visit and post visit activities as documented above.  Counseling patient.  Patient is dysarthric and takes longer to understand him.  Reviewing multiple tests.  Trying to get outside records.  Testing ordered.  Multiple problems reviewed/addressed.  (Activities include but not inclusive of reviewing chart, reviewing outside records, reviewing labs and imaging study results as well as the images, patient visit time including getting history and exam,  use if applicable, review of test results with the patient and coming up with a plan in a shared model, counseling patient and family, education and answering patient questions, EMR , EMR diagnosis entry and problem list management, medication reconciliation and prescription management if applicable, paperwork if applicable, printing documents and documentation of the visit activities.)  Billing:-4 data points, 4 problem points      Michael Pretty MD  Neurologist  [facility]  Tel:- [phone]    This note was dictated using voice recognition software.  Any grammatical or context distortions are unintentional and inherent to the software.        Again, thank you for allowing me to participate in the care of your patient.        Sincerely,        Michael Pretty MD

## 2022-12-28 DIAGNOSIS — K21.9 GASTROESOPHAGEAL REFLUX DISEASE, UNSPECIFIED WHETHER ESOPHAGITIS PRESENT: ICD-10-CM

## 2022-12-28 DIAGNOSIS — I10 ESSENTIAL HYPERTENSION: ICD-10-CM

## 2022-12-28 RX ORDER — LISINOPRIL 10 MG/1
10 TABLET ORAL DAILY
Qty: 90 TABLET | Refills: 0 | Status: SHIPPED | OUTPATIENT
Start: 2022-12-28 | End: 2023-05-09

## 2022-12-28 RX ORDER — MECOBALAMIN 5000 MCG
15 TABLET,DISINTEGRATING ORAL DAILY
Qty: 90 CAPSULE | Refills: 0 | Status: SHIPPED | OUTPATIENT
Start: 2022-12-28 | End: 2023-04-03

## 2022-12-28 NOTE — TELEPHONE ENCOUNTER
Lansoprazole  Lisinopril    LOV 11/28/22  Last labs 10/24/22  BP Readings from Last 3 Encounters:   12/26/22 102/68   12/16/22 114/76   12/12/22 123/89

## 2022-12-29 ENCOUNTER — DOCUMENTATION ONLY (OUTPATIENT)
Dept: OTHER | Facility: CLINIC | Age: 62
End: 2022-12-29

## 2022-12-29 ENCOUNTER — PATIENT OUTREACH (OUTPATIENT)
Dept: CARE COORDINATION | Facility: CLINIC | Age: 62
End: 2022-12-29

## 2022-12-29 NOTE — PROGRESS NOTES
Clinic Care Coordination Contact    Follow Up Progress Note      Assessment: Patient called and said he was approved for social security disability. He will start to get $819 per month. He will also get back pay of $8,000. His rent is $955. He will start to get the money in 1-2 months.    He gets GA and SNAP- he is wondering if he will continue to get the same amount now that he is approved. Directed him to call the [facility] and ask.    Patient plans to tell Yuki/.    SW reviewed chart and pt met with neurology on the 26th. He has lab appointment setup for 12/30.   Patient said he is directed to do speech therapy and PT. He scored a little low on the cognitive test.    Care Gaps:    Health Maintenance Due   Topic Date Due     PARATHYROID  Never done     PHOSPHORUS  Never done     ZOSTER IMMUNIZATION (1 of 2) Never done     LUNG CANCER SCREENING  01/26/2013     COLORECTAL CANCER SCREENING  06/03/2021     COVID-19 Vaccine (3 - Booster for Moderna series) 06/17/2021         Care Plans  Care Plan: Housing and Support     Problem: Insufficient In-home support     Goal: Establish adequate home support     Start Date: 5/19/2022 Expected End Date: 2/1/2023    This Visit's Progress: 90% Recent Progress: 90%    Priority: High    Note:     8-Goal Statement: I will work with [facility] case management to find alternative housing option within 6 months   Date Goal set: 5/19/22  Barriers: availability of housing options-   Strengths: has [facility] waiver now, and [facility] services   Date to Achieve By: Nov 19th, 2022  Patient expressed understanding of goal: yes  Action steps to achieve this goal:  1. I will continue to meet with  to figure out options -Porsche Chacon   2. I will keep PCP and care coordinator updated   3. I will submit any paperwork needed at facilities     12/15/22: Pt resides at market rate apartment still. He gets PCA, IHS, and homemaking and homecare nursing for medication management. He  is still interested in moving but would like to figure out things for approval of social security funds first which he should find out about this month. He may want to move to half-way/customized living soon.  12/29- patient was approved for social security disability.                           Plan:    will continue to follow closely at least once a month    AMANDA Fuentes, [facility]  Clinic Care Coordinator  [email or web address]  [phone]

## 2022-12-30 DIAGNOSIS — R41.89 SUBJECTIVE MEMORY COMPLAINTS: Primary | ICD-10-CM

## 2022-12-30 PROCEDURE — 36415 COLL VENOUS BLD VENIPUNCTURE: CPT | Performed by: NURSE PRACTITIONER

## 2022-12-31 LAB
TSH BLD-ACNC: 2.01 UIU/ML (ref 0.45–4.5)
VIT B12 SERPL-MCNC: 1859 PG/ML (ref 232–1245)

## 2023-01-02 ENCOUNTER — MEDICAL CORRESPONDENCE (OUTPATIENT)
Dept: FAMILY MEDICINE | Facility: CLINIC | Age: 63
End: 2023-01-02

## 2023-01-05 DIAGNOSIS — E11.21 TYPE 2 DIABETES MELLITUS WITH DIABETIC NEPHROPATHY, WITHOUT LONG-TERM CURRENT USE OF INSULIN (H): ICD-10-CM

## 2023-01-06 RX ORDER — EXENATIDE 2 MG/.85ML
INJECTION, SUSPENSION, EXTENDED RELEASE SUBCUTANEOUS
Qty: 4 ML | Refills: 1 | Status: SHIPPED | OUTPATIENT
Start: 2023-01-06 | End: 2023-03-08

## 2023-01-08 LAB — VITAMIN B1 WHOLE BLOOD: 101.9 NMOL/L (ref 66.5–200)

## 2023-01-10 ENCOUNTER — PATIENT OUTREACH (OUTPATIENT)
Dept: CARE COORDINATION | Facility: CLINIC | Age: 63
End: 2023-01-10

## 2023-01-10 NOTE — PROGRESS NOTES
Clinic Care Coordination Contact    Follow Up Progress Note      Assessment:  received a call from patient. He said that he should be getting his SSDI money this month and he knows disability specialists will take a percentage out for helping him.     He got his medicare card in the mail and will bring it with his MA card to all appointments. He does think he has a premium coming out of his SSDI for his medicare.    Patient knows his [employer] waiver is renewing this month. He just came to clinic for labs as well.    Validated progress. He plans to talk to Yuki (CADI waiver CM) about housing and a plan for that now        Care Gaps:    Health Maintenance Due   Topic Date Due     PARATHYROID  Never done     PHOSPHORUS  Never done     ZOSTER IMMUNIZATION (1 of 2) Never done     LUNG CANCER SCREENING  01/26/2013     COLORECTAL CANCER SCREENING  06/03/2021     COVID-19 Vaccine (3 - Booster for Moderna series) 06/17/2021       Care Plans  Care Plan: Housing and Support     Problem: Insufficient In-home support     Goal: Establish adequate home support     Start Date: 5/19/2022 Expected End Date: 2/1/2023    This Visit's Progress: 90% Recent Progress: 90%    Priority: High    Note:     8-Goal Statement: I will work with [facility] case management to find alternative housing option within 6 months   Date Goal set: 5/19/22  Barriers: availability of housing options-   Strengths: has [facility] waiver now, and [facility] services   Date to Achieve By: Nov 19th, 2022  Patient expressed understanding of goal: yes  Action steps to achieve this goal:  1. I will continue to meet with  to figure out options -Porsche Chacon   2. I will keep PCP and care coordinator updated   3. I will submit any paperwork needed at facilities     12/15/22: Pt resides at market rate apartment still. He gets PCA, IHS, and homemaking and homecare nursing for medication management. He is still interested in moving but would like to  figure out things for approval of social security funds first which he should find out about this month. He may want to move to intermediate/customized living soon.  12/29- patient was approved for social security disability.                             Plan:   will continue to follow monthly    AMANDA Fuentes, [facility]  Clinic Care Coordinator  [email or web address]  [phone]

## 2023-01-18 ENCOUNTER — MEDICAL CORRESPONDENCE (OUTPATIENT)
Dept: FAMILY MEDICINE | Facility: CLINIC | Age: 63
End: 2023-01-18

## 2023-01-24 ENCOUNTER — TELEPHONE (OUTPATIENT)
Dept: FAMILY MEDICINE | Facility: CLINIC | Age: 63
End: 2023-01-24

## 2023-01-24 DIAGNOSIS — E11.40 PAINFUL DIABETIC NEUROPATHY (H): ICD-10-CM

## 2023-01-24 DIAGNOSIS — Z86.73 HISTORY OF STROKE: ICD-10-CM

## 2023-01-24 DIAGNOSIS — Z86.73 HISTORY OF TIA (TRANSIENT ISCHEMIC ATTACK) AND STROKE: ICD-10-CM

## 2023-01-24 DIAGNOSIS — R41.89 SUBJECTIVE MEMORY COMPLAINTS: ICD-10-CM

## 2023-01-24 DIAGNOSIS — M62.81 GENERALIZED MUSCLE WEAKNESS: ICD-10-CM

## 2023-01-24 DIAGNOSIS — E11.21 TYPE 2 DIABETES MELLITUS WITH DIABETIC NEPHROPATHY, WITHOUT LONG-TERM CURRENT USE OF INSULIN (H): Primary | ICD-10-CM

## 2023-01-24 NOTE — TELEPHONE ENCOUNTER
Call received from home care nurse Jen requesting new home care orders be faxed to [facility]. Per Tamica Tang, CNP order entered and faxed with recent office visit notes. Breanne Dunlap

## 2023-01-25 PROCEDURE — G0180 MD CERTIFICATION HHA PATIENT: HCPCS | Performed by: NURSE PRACTITIONER

## 2023-01-26 ENCOUNTER — MEDICAL CORRESPONDENCE (OUTPATIENT)
Dept: FAMILY MEDICINE | Facility: CLINIC | Age: 63
End: 2023-01-26

## 2023-02-09 DIAGNOSIS — I25.10 ATHEROSCLEROSIS OF CORONARY ARTERY OF NATIVE HEART WITHOUT ANGINA PECTORIS, UNSPECIFIED VESSEL OR LESION TYPE: ICD-10-CM

## 2023-02-09 DIAGNOSIS — E11.21 TYPE 2 DIABETES MELLITUS WITH DIABETIC NEPHROPATHY, WITHOUT LONG-TERM CURRENT USE OF INSULIN (H): ICD-10-CM

## 2023-02-09 DIAGNOSIS — I10 ESSENTIAL HYPERTENSION: ICD-10-CM

## 2023-02-09 RX ORDER — CLOPIDOGREL BISULFATE 75 MG/1
75 TABLET ORAL DAILY
Qty: 30 TABLET | Refills: 1 | Status: SHIPPED | OUTPATIENT
Start: 2023-02-09 | End: 2023-04-12

## 2023-02-09 NOTE — TELEPHONE ENCOUNTER
Clopidogrel. LOV 12/16/22.  Last addressed 11/28/22.    Type 2 diabetes mellitus with stage 3b chronic kidney disease, without long-term current use of insulin (H)  Uncontrolled. Has appointment with pharmacist (MTJOEL) today as well. Increased Jardiance to 25 mg daily     Essential hypertension  No change to current medications    Atherosclerosis of native coronary artery of native heart without angina pectoris  Continue statin medication, aspirin. Lifestyle reviewed

## 2023-02-13 ENCOUNTER — PATIENT OUTREACH (OUTPATIENT)
Dept: CARE COORDINATION | Facility: CLINIC | Age: 63
End: 2023-02-13
Payer: COMMERCIAL

## 2023-02-13 DIAGNOSIS — E11.21 TYPE 2 DIABETES MELLITUS WITH DIABETIC NEPHROPATHY, WITHOUT LONG-TERM CURRENT USE OF INSULIN (H): ICD-10-CM

## 2023-02-13 NOTE — PROGRESS NOTES
Clinic Care Coordination Contact    Follow Up Progress Note      Assessment:  spoke with patient today.    He said that he has dental extraction surgery coming up on March 16th now. He said afterwards he is supposed to follow up with a dental clinic that can make dentures for him and was given [facility] or [facility] in [place]. He worked with his medica  to figure that out. He has been having a lot of dental pain lately.    Patient said that he has a new  [facility]- [facility] at [phone]. Patient wanted [facility] to connect with her on pt's plan of care. SW left her a message today. His CADI waiver was renewed. He is interested in trying to get section 8 or moving. He was approved for social security disability and has started receiving payments. He did get his certificate of rent paid and plans to get his taxes done soon.   got a follow up VM from Miesha with her email.  emailed her- [email or web address]    Patient expressed some frustrations with his PCA's and how they do things, them being on their phone a lot etc. He is in touch with the agency when he needs to or has questions about his worker's rules etc.     Patient is continuing to get homecare nurse visits and PT.    Care Gaps:    Health Maintenance Due   Topic Date Due     PARATHYROID  Never done     PHOSPHORUS  Never done     ZOSTER IMMUNIZATION (1 of 2) Never done     LUNG CANCER SCREENING  01/26/2013     COLORECTAL CANCER SCREENING  06/03/2021     COVID-19 Vaccine (3 - Booster for Moderna series) 06/17/2021     FALL RISK ASSESSMENT  01/22/2023     AORTIC ANEURYSM SCREENING (SYSTEM ASSIGNED)  01/22/2023         Plan:   -Dental surgery coming up March 16th; follow up with dental clinic to get dentures made and put in  -Follow up with [employer]  to discuss living situation  -EDDIE will follow    Christine Tao', MSW, LGSW  Clinic  Care Coordinator  [email or web address]  [phone]

## 2023-02-21 ENCOUNTER — PATIENT OUTREACH (OUTPATIENT)
Dept: CARE COORDINATION | Facility: CLINIC | Age: 63
End: 2023-02-21
Payer: COMMERCIAL

## 2023-02-21 NOTE — PROGRESS NOTES
Clinic Care Coordination Contact  [facility]/Voicemail       Clinical Data: Care Coordinator Outreach-- SW got notice that Jen wants to reach [facility] to go over insurance and alva monitor coverage.    Outreach attempted x 1.  Left message on Jen -homecare RN [facility] voicemail with call back information and requested return call.    Plan: Care Coordinator will try to reach out again this week.    Christine Rutledge, MSW, LGSW  Clinic Care Coordinator  [email or web address]  [phone]

## 2023-02-21 NOTE — PROGRESS NOTES
Clinic Care Coordination Contact    Follow Up Progress Note      Assessment:      called [employer] with patient and was told that the pharmacy needs to run the alva sensor through Medicare Part B not D. It was covered under part B last month.    SW called [facility] pharmacy and spoke with Lex -- Patient has a deductible to meet first and a month supply $156. Patient will facilitate getting this picked up. Good thing is it should go down next month as he will only have $69 next month and then he will meet the deductible and won't have to pay anything thereafter.    EDDIE updated Jen (homecare RN) via message.      Care Gaps:    Health Maintenance Due   Topic Date Due     PARATHYROID  Never done     PHOSPHORUS  Never done     ZOSTER IMMUNIZATION (1 of 2) Never done     LUNG CANCER SCREENING  01/26/2013     COLORECTAL CANCER SCREENING  06/03/2021     COVID-19 Vaccine (3 - Booster for Moderna series) 06/17/2021     FALL RISK ASSESSMENT  01/22/2023     AORTIC ANEURYSM SCREENING (SYSTEM ASSIGNED)  01/22/2023         Plan:   BING trying to help patient resolve coverage issues at pharmacy for the alva sensor. Will continue to work on this    Christine Rutledge, MSW, [facility]  Clinic Care Coordinator  [email or web address]  [phone]

## 2023-02-27 ENCOUNTER — MEDICAL CORRESPONDENCE (OUTPATIENT)
Dept: FAMILY MEDICINE | Facility: CLINIC | Age: 63
End: 2023-02-27

## 2023-03-03 ENCOUNTER — PATIENT OUTREACH (OUTPATIENT)
Dept: CARE COORDINATION | Facility: CLINIC | Age: 63
End: 2023-03-03
Payer: COMMERCIAL
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
## 2023-03-03 NOTE — LETTER
[facility] [address] [place] [address]    March 3, 2023    Emily Dylantonya  [address]  [facility] [address]      Dear Emily,        I am a clinic care coordinator who works with CARROL Pretty CNP with the [facility]. I wanted to thank you for spending the time to talk with me.  Below is a description of clinic care coordination and how I can further assist you.       The clinic care coordination team is made up of a registered nurse, , financial resource worker and community health worker who understand the health care system. The goal of clinic care coordination is to help you manage your health and improve access to the health care system. Our team works alongside your provider to assist you in determining your health and social needs. We can help you obtain health care and community resources, providing you with necessary information and education. We can work with you through any barriers and develop a care plan that helps coordinate and strengthen the communication between you and your care team.    Please feel free to contact me with any questions or concerns regarding care coordination and what we can offer.      We are focused on providing you with the highest-quality healthcare experience possible.    Sincerely,     Christine Rutledge, MSW, [facility]  Clinic Care Coordinator  [email or web address]  [phone]

## 2023-03-03 NOTE — LETTER
Patient Centered Plan of Care  About Me:        Patient Name:  Emily Zhu    YOB: 1960  Age:         63 year old   Alfonso MRN:    [id number] Telephone Information:  Home Phone [phone]   Mobile None       Address:  [address]  [place] [facility] [address] Email address:  No e-mail address on record      Emergency Contact(s)    Name Relationship Lgl Grd Work Phone Home Phone Mobile Phone   1. EDWARD BRAR Sister No [phone]    2. LINUS Friend No  [phone]            Primary language:  English     needed? No   [place] [employer] [facility]:  [phone] op. 1  Other communication barriers:None    Preferred Method of Communication:     Current living arrangement: I live alone    Mobility Status/ Medical Equipment: Independent w/Device        Health Maintenance  Health Maintenance Reviewed: Due/Overdue lung cancer screening      My Access Plan  Medical Emergency 911   Primary Clinic Line [facility] - [phone]   24 Hour Appointment Line [phone] or  [phone]) (toll-free)   24 Hour Nurse Line [phone] (toll-free)   Preferred Urgent Care No data recorded   Preferred Hospital [facility]  [phone]     Preferred Pharmacy [facility] [address] [facility] [address] [facility]     Behavioral Health Crisis Line The National Suicide Prevention Lifeline at 1-169.577.1309 or Text/Call 178             My Care Team Members  Patient Care Team       Relationship Specialty Notifications Start End    Tamica Tang APRN CNP PCP - General Family Medicine  1/4/21     Phone: [phone] Fax: [phone] [address] [facility] [address]    Chelo Roberts RPH Pharmacist Pharmacist  8/13/19     Phone: [phone] [address] [facility] [address]    Christine Tao LGSW Lead Care Coordinator  Admissions 1/10/20     Tamica Tang APRN CNP Assigned PCP    8/1/21     Phone: [phone] Fax: [phone]         [address] [facility] [address]    Chelo Roberts RPH Assigned MTM Pharmacist   9/28/22     Phone: [phone] [address] [facility] [address]    [facility] Care Coordinator- Andie Eduardo    10/19/22     Phone: [phone]         Michael Pretty MD MD Neurology  12/15/22     Phone: [phone] Fax: [phone]         [address] [facility] [address]    Michael Pretty MD Assigned Neuroscience Provider   12/31/22     Phone: [phone] Fax: [phone]         [address] [facility] [address]            My Care Plans  Self Management and Treatment Plan  Care Plan  Care Plan: Housing and Support     Problem: Insufficient In-home support     Goal: Establish adequate home support     Start Date: 5/19/2022 Expected End Date: 4/1/2023    This Visit's Progress: 90% Recent Progress: 90%    Priority: High    Note:     8-Goal Statement: I will work with [facility] case management to find alternative housing option within 6 months   Date Goal set: 5/19/22  Barriers: availability of housing options-   Strengths: has [facility] waiver now, and [facility] services   Date to Achieve By: Nov 19th, 2022  Patient expressed understanding of goal: yes  Action steps to achieve this goal:  1. I will continue to meet with  to figure out options -Porsche Chacon   2. I will keep PCP and care coordinator updated   3. I will submit any paperwork needed at facilities     12/15/22: Pt resides at market rate apartment still. He gets PCA, IHS, and homemaking and homecare nursing for medication management. He is still interested in moving but would like to figure out things for approval of social security funds first which he should find out about this month. He may want to move to [facility]/customized living soon.  12/29- patient was approved for social security disability.     2/13/23---wants to talk to  now that he  is approved for social security. Extended goal                           Action Plans on File:            Depression          Advance Care Plans/Directives Type:   Advanced Directive - On File      My Medical and Care Information  Problem List   Patient Active Problem List   Diagnosis     Coronary atherosclerosis     Essential hypertension     Mixed hyperlipidemia     Cataract     GERD (gastroesophageal reflux disease)     Microalbuminuria due to type 2 diabetes mellitus (H)     Type 2 diabetes mellitus with stage 3b chronic kidney disease, without long-term current use of insulin (H)     Painful diabetic neuropathy (H)     Warthin tumor     Stage 3b chronic kidney disease (H)     Tobacco use     History of TIA (transient ischemic attack) and stroke     Major depressive disorder, recurrent, mild (H)     Hip pain, left      Current Medications and Allergies:  See printed Medication Report.    Care Coordination Start Date: 1/10/2020   Frequency of Care Coordination: monthly     Form Last Updated: 03/03/2023

## 2023-03-07 ENCOUNTER — PATIENT OUTREACH (OUTPATIENT)
Dept: CARE COORDINATION | Facility: CLINIC | Age: 63
End: 2023-03-07
Payer: COMMERCIAL

## 2023-03-07 NOTE — PROGRESS NOTES
Clinic Care Coordination Contact    Follow Up Progress Note      Assessment: Patient has a new medica RN case manager So Calderon. She was going over an assessment with patient today and patient asked [facility] to join.  gave updates about pt's care plan and current services. She will follow with patient 1x per month to focus on health goals. SW saved contact info under snapshot.    During the call patient reported that he lost his IHS staff Ezekiel now. He is not sure why. SW called CADI worker Miesha Palmer and left a message about this. Patient is stressed because he currently does not have IHS or a PCA worker to help him.    Care Gaps:    Health Maintenance Due   Topic Date Due     PARATHYROID  Never done     PHOSPHORUS  Never done     ZOSTER IMMUNIZATION (1 of 2) Never done     LUNG CANCER SCREENING  01/26/2013     COLORECTAL CANCER SCREENING  06/03/2021     COVID-19 Vaccine (3 - Booster for Moderna series) 06/17/2021     FALL RISK ASSESSMENT  01/22/2023     AORTIC ANEURYSM SCREENING (SYSTEM ASSIGNED)  01/22/2023         Plan:    will continue to follow closely and wait to hear back from Miesha Palmer ().    Christine Rutledge, MSW, [facility]  Clinic Care Coordinator  [email or web address]  [phone]

## 2023-03-08 DIAGNOSIS — E11.21 TYPE 2 DIABETES MELLITUS WITH DIABETIC NEPHROPATHY, WITHOUT LONG-TERM CURRENT USE OF INSULIN (H): ICD-10-CM

## 2023-03-08 RX ORDER — EXENATIDE 2 MG/.85ML
INJECTION, SUSPENSION, EXTENDED RELEASE SUBCUTANEOUS
Qty: 4 ML | Refills: 3 | Status: SHIPPED | OUTPATIENT
Start: 2023-03-08 | End: 2023-05-09

## 2023-03-10 ENCOUNTER — PATIENT OUTREACH (OUTPATIENT)
Dept: CARE COORDINATION | Facility: CLINIC | Age: 63
End: 2023-03-10
Payer: COMMERCIAL

## 2023-03-10 NOTE — PROGRESS NOTES
Clinic Care Coordination Contact    Follow Up Progress Note      Assessment:     Miesha Palmer (CADI worker) resolved the service agreement issue with [facility] services so Ezekiel is going back to see patient today. Patient is pleased to hear this.    Patient thought he might have copays he couldn't afford for his medication. [facility] called the pharmacy with patient and he has medications ready including the Bydureon and he has $0 copay. Patient will have Ezekiel  his medications today.     No other questions/concerns today.    Care Gaps:    Health Maintenance Due   Topic Date Due     PARATHYROID  Never done     PHOSPHORUS  Never done     ZOSTER IMMUNIZATION (1 of 2) Never done     LUNG CANCER SCREENING  01/26/2013     COLORECTAL CANCER SCREENING  06/03/2021     COVID-19 Vaccine (3 - Booster for Moderna series) 06/17/2021     FALL RISK ASSESSMENT  01/22/2023     AORTIC ANEURYSM SCREENING (SYSTEM ASSIGNED)  01/22/2023         Plan:    will continue to follow    Christine Rutledge, AMANDA, [facility]  Clinic Care Coordinator  [email or web address]  [phone]

## 2023-03-14 ENCOUNTER — PATIENT OUTREACH (OUTPATIENT)
Dept: CARE COORDINATION | Facility: CLINIC | Age: 63
End: 2023-03-14
Payer: COMMERCIAL

## 2023-03-14 NOTE — PROGRESS NOTES
Clinic Care Coordination Contact    Follow Up Progress Note      Assessment:  got a call from patient. He said he is interested in [employer] for PCA services as his IHS worker told him about it as their cousin works there.  emailed Miesha Palmer (CADI worker) to advise/check on this as an option    Patient is getting his dental extraction surgery today.    Care Gaps:    Health Maintenance Due   Topic Date Due     PARATHYROID  Never done     PHOSPHORUS  Never done     ZOSTER IMMUNIZATION (1 of 2) Never done     LUNG CANCER SCREENING  01/26/2013     COLORECTAL CANCER SCREENING  06/03/2021     COVID-19 Vaccine (3 - Booster for Moderna series) 06/17/2021     FALL RISK ASSESSMENT  01/22/2023     AORTIC ANEURYSM SCREENING (SYSTEM ASSIGNED)  01/22/2023       Plan:    will continue to follow closely    AMANDA Fuentes, [facility]  Clinic Care Coordinator  [email or web address]  [phone]

## 2023-03-17 DIAGNOSIS — F32.A DEPRESSION, UNSPECIFIED DEPRESSION TYPE: ICD-10-CM

## 2023-03-17 RX ORDER — SERTRALINE HYDROCHLORIDE 100 MG/1
100 TABLET, FILM COATED ORAL DAILY
Qty: 90 TABLET | Refills: 3 | Status: SHIPPED | OUTPATIENT
Start: 2023-03-17 | End: 2024-01-16

## 2023-03-17 NOTE — TELEPHONE ENCOUNTER
Sertraline     LOV: 12/16/22    PHQ-9 score:    PHQ 10/24/2022   PHQ-9 Total Score 12   Q9: Thoughts of better off dead/self-harm past 2 weeks Not at all     Major depressive disorder, recurrent, mild (H)  -     Home Care Referral  Continue Sertraline without changes

## 2023-03-23 ENCOUNTER — TELEPHONE (OUTPATIENT)
Dept: FAMILY MEDICINE | Facility: CLINIC | Age: 63
End: 2023-03-23

## 2023-03-23 DIAGNOSIS — I10 ESSENTIAL HYPERTENSION: ICD-10-CM

## 2023-03-23 RX ORDER — AMLODIPINE BESYLATE 5 MG/1
5 TABLET ORAL DAILY
Qty: 90 TABLET | Refills: 0 | Status: SHIPPED | OUTPATIENT
Start: 2023-03-23 | End: 2023-05-09

## 2023-03-23 NOTE — TELEPHONE ENCOUNTER
Called nurse Jen with [facility] with recommendation to reduce amlodipine to 5mg QD. She will make this change in patient's home and continue monitoring. Will report if BP's not coming up with this change.     Jen also reports their [facility] Nakia Martinez will be reaching out to  [facility] [employer] EDDIE Tao to collaborate on getting patient more care. Currently is without a PCA. States patient is safe to be alone in home for short time but needs someone to bring him meals, help with meds and injection as well as hygiene cares. If unable to get PCA, may need to consider moving to assisted living facility to have his needs met.   Jen asks that this nurse forward this information to our [facility].   Plan: routed to Christine MORGAN  and our clinic [facility] Carmen Franklin.  Yelena Montiel RN

## 2023-03-23 NOTE — TELEPHONE ENCOUNTER
----- Message from Chelo Roberts RPH sent at 3/23/2023 12:42 PM CDT -----  Regarding: RE: low bp's  I would agree with the decrease of amlodipine to 5mg    Chelo Roberts, Pharm.D, [facility]  Medication Therapy Management Pharmacist  [phone]    ----- Message -----  From: Tamica Tang APRN CNP  Sent: 3/23/2023  12:35 PM CDT  To: Chelo Roberts RP, Yelena Montiel RN  Subject: RE: low bp's                                     I'm going to loop Chelo in on this message as well since she helps me manage his medications. I would say decrease amlodipine to 5 mg daily.    CARROL Pretty CNP    ----- Message -----  From: Yelena Montiel RN  Sent: 3/22/2023   6:43 PM CDT  To: CARROL Lopez CNP  Subject: low bp's                                         Martin Li RN CM with [facility] called today reporting patient's BP has consistently been running quite low at their visits. Today she was there before he got his morning meds and BP was 95/77 left arm and 88/68 right arm. Reports he has been compliant with meds for past month and these numbers are pretty consistent. Wonders if should decrease any of his BP affecting meds. Looks like he's on lisinopril 10 QD, amlodipine 10 QD, metoprolol 25 BID, isosorbide 30 QD.     Current Outpatient Medications:  acetaminophen (TYLENOL) 500 MG tablet  amLODIPine (NORVASC) 10 MG tablet  amoxicillin (AMOXIL) 500 MG capsule  aspirin (ASA) 81 MG EC tablet  atorvastatin (LIPITOR) 40 MG tablet  clopidogrel (PLAVIX) 75 MG tablet  Continuous Blood Gluc  (FREESTYLE JAQUAN 2 READER) CRISSY  Continuous Blood Gluc Sensor (FREESTYLE JAQUAN 2 SENSOR) MISC  cyanocobalamin (VITAMIN B-12) 1000 MCG tablet  empagliflozin (JARDIANCE) 25 MG TABS tablet  exenatide ER (BYDUREON BCISE) 2 MG/0.85ML auto-injector  gabapentin (NEURONTIN) 300 MG capsule  glipiZIDE (GLUCOTROL XL) 10 MG 24 hr tablet  isosorbide mononitrate (IMDUR) 30 MG 24 hr tablet  LANsoprazole (PREVACID)  15 MG DR capsule  lisinopril (ZESTRIL) 10 MG tablet  metoprolol tartrate (LOPRESSOR) 25 MG tablet  sertraline (ZOLOFT) 100 MG tablet  sulfamethoxazole-trimethoprim (BACTRIM DS) 800-160 MG tablet    No current facility-administered medications for this visit.

## 2023-03-24 ENCOUNTER — PATIENT OUTREACH (OUTPATIENT)
Dept: CARE COORDINATION | Facility: CLINIC | Age: 63
End: 2023-03-24
Payer: COMMERCIAL

## 2023-03-24 NOTE — PROGRESS NOTES
Clinic Care Coordination Contact    Follow Up Progress Note      Assessment: [facility] , reached out to care coordinator. She would like to have a care conference to discuss pt's housing situation. It has been presented by the RN and PT involved that they feel patient needs higher level of care like assisted living or customized living. SW is on board with holding a meeting and requested she invite Miesha Palmer (CADI worker) and Den () to the mtg too. Discussed patient with services does ok but is borderline needing more help than what he is getting. He is also in lapse of a PCA right now.      Care Gaps:    Health Maintenance Due   Topic Date Due     PARATHYROID  Never done     PHOSPHORUS  Never done     ZOSTER IMMUNIZATION (1 of 2) Never done     LUNG CANCER SCREENING  01/26/2013     COLORECTAL CANCER SCREENING  06/03/2021     COVID-19 Vaccine (3 - Booster for Moderna series) 06/17/2021     FALL RISK ASSESSMENT  01/22/2023     AORTIC ANEURYSM SCREENING (SYSTEM ASSIGNED)  01/22/2023     MICROALBUMIN  04/24/2023     PHQ-9  04/24/2023           Plan:   Team member care team conference to be held with patient; need to schedule. We will discuss scheduling via email.    Christine Rutledge, MSW, [facility]  Clinic Care Coordinator  [email or web address]  [phone]

## 2023-03-26 PROCEDURE — G0179 MD RECERTIFICATION HHA PT: HCPCS | Performed by: FAMILY MEDICINE

## 2023-03-27 ENCOUNTER — PATIENT OUTREACH (OUTPATIENT)
Dept: CARE COORDINATION | Facility: CLINIC | Age: 63
End: 2023-03-27
Payer: COMMERCIAL

## 2023-03-27 NOTE — LETTER
[facility]  Patient Centered Plan of Care  About Me:        Patient Name:  Emily Zhu    YOB: 1960  Age:         63 year old   Alfonso MRN:    [id number] Telephone Information:  Home Phone [phone]   Mobile None       Address:  [address]  [place] [address] Email address:  No e-mail address on record      Emergency Contact(s)    Name Relationship Lgl Grd Work Phone Home Phone Mobile Phone   1. EDWARD BRAR Sister No [phone]    2. LINUS Friend No  [phone]            Primary language:  English     needed? No   [place] [employer]:  [phone] op. 1  Other communication barriers:None    Preferred Method of Communication:     Current living arrangement: I live alone    Mobility Status/ Medical Equipment: Independent w/Device        Health Maintenance  Health Maintenance Reviewed: Due/Overdue ***      My Access Plan  Medical Emergency 911   Primary Clinic Line [facility] - [phone]   24 Hour Appointment Line [phone] or  [phone]) (toll-free)   24 Hour Nurse Line [phone] (toll-free)   Preferred Urgent Care No data recorded   Preferred Hospital [facility]  [phone]     Preferred Pharmacy [email or web address] [address] [place], [place] [facility] [address] [facility]     Behavioral Health Crisis Line The National Suicide Prevention Lifeline at 1-603.937.6036 or Text/Call 808             My Care Team Members  Patient Care Team       Relationship Specialty Notifications Start End    Tamica Tang APRN CNP PCP - General Family Medicine  1/4/21     Phone: [phone] Fax: [phone] [address] [place] [address]    Chelo Roberts RPH Pharmacist Pharmacist  8/13/19     Phone: [phone] [address] [facility] [address]    Christine aTo LGSW Lead Care Coordinator  Admissions 1/10/20     Tamica Tang APRN CNP Assigned PCP    8/1/21     Phone: [phone] Fax: [phone]         [address] [facility] [address]    Chelo Roberts RPH Assigned MTM Pharmacist   9/28/22     Phone: [phone] [address] [facility] [address]    [facility] Care Coordinator- Andie Eduardo    10/19/22     Phone: [phone]         Michael Pretty MD MD Neurology  12/15/22     Phone: [phone] Fax: [phone]         [address] [facility] [address]    Michael Pretty MD Assigned Neuroscience Provider   12/31/22     Phone: [phone] Fax: [phone]         [address] [facility] [address]            My Care Plans  Self Management and Treatment Plan  Care Plan  Care Plan: Housing and Support     Problem: Insufficient In-home support     Goal: Establish adequate home support     Start Date: 5/19/2022 Expected End Date: 4/1/2023    This Visit's Progress: 90% Recent Progress: 90%    Priority: High    Note:     8-Goal Statement: I will work with [facility] case management to find alternative housing option within 6 months   Date Goal set: 5/19/22  Barriers: availability of housing options-   Strengths: has [facility] waiver now, and [facility] services   Date to Achieve By: Nov 19th, 2022  Patient expressed understanding of goal: yes  Action steps to achieve this goal:  1. I will continue to meet with  to figure out options -Porsche Chacon   2. I will keep PCP and care coordinator updated   3. I will submit any paperwork needed at facilities     12/15/22: Pt resides at market rate apartment still. He gets PCA, IHS, and homemaking and homecare nursing for medication management. He is still interested in moving but would like to figure out things for approval of social security funds first which he should find out about this month. He may want to move to [facility]/customized living soon.  12/29- patient was approved for social security disability.     2/13/23---wants to talk to  now that he  is approved for social security. Extended goal                           Action Plans on File:            Depression          Advance Care Plans/Directives Type:   Advanced Directive - On File      My Medical and Care Information  Problem List   Patient Active Problem List   Diagnosis     Coronary atherosclerosis     Essential hypertension     Mixed hyperlipidemia     Cataract     GERD (gastroesophageal reflux disease)     Microalbuminuria due to type 2 diabetes mellitus (H)     Type 2 diabetes mellitus with stage 3b chronic kidney disease, without long-term current use of insulin (H)     Painful diabetic neuropathy (H)     Warthin tumor     Stage 3b chronic kidney disease (H)     Tobacco use     History of TIA (transient ischemic attack) and stroke     Major depressive disorder, recurrent, mild (H)     Hip pain, left      Current Medications and Allergies:  See printed Medication Report.    Care Coordination Start Date: 1/10/2020   Frequency of Care Coordination: 2 weeks     Form Last Updated: 03/31/2023

## 2023-03-27 NOTE — PROGRESS NOTES
Clinic Care Coordination Contact    Follow Up Progress Note      Assessment:   Patient called. He had his OT worker through [facility] there to help him with showering and to work on other skills. Patient said he got a letter in the mail regarding housing stabilization services one saying he was approved and another stating he was denied. We need to direct this question to Miesha his CADI worker. SW left a mg for Miesha. [facility] is also wanting to plan to have his interdisciplinary team meet to discuss his care plan in terms of housing and what makes the most sense to meet his needs. He currently has a lapse in PCA services.    Care Gaps:    Health Maintenance Due   Topic Date Due     PARATHYROID  Never done     PHOSPHORUS  Never done     ZOSTER IMMUNIZATION (1 of 2) Never done     LUNG CANCER SCREENING  01/26/2013     COLORECTAL CANCER SCREENING  06/03/2021     COVID-19 Vaccine (3 - Booster for Moderna series) 06/17/2021     FALL RISK ASSESSMENT  01/22/2023     AORTIC ANEURYSM SCREENING (SYSTEM ASSIGNED)  01/22/2023     MICROALBUMIN  04/24/2023     PHQ-9  04/24/2023       Plan:    will await call/email from Miesha and/or Nakia at [facility] to discuss care plan    Christine Rutledge, MSW, [facility]  Clinic Care Coordinator  [email or web address]  [phone]

## 2023-03-30 DIAGNOSIS — E11.21 TYPE 2 DIABETES MELLITUS WITH DIABETIC NEPHROPATHY, WITHOUT LONG-TERM CURRENT USE OF INSULIN (H): ICD-10-CM

## 2023-03-30 DIAGNOSIS — G63 POLYNEUROPATHY ASSOCIATED WITH UNDERLYING DISEASE (H): ICD-10-CM

## 2023-03-30 RX ORDER — LANOLIN ALCOHOL/MO/W.PET/CERES
1000 CREAM (GRAM) TOPICAL DAILY
Qty: 90 TABLET | Refills: 1 | Status: SHIPPED | OUTPATIENT
Start: 2023-03-30 | End: 2023-10-06

## 2023-03-30 RX ORDER — GLIPIZIDE 10 MG/1
10 TABLET, FILM COATED, EXTENDED RELEASE ORAL DAILY
Qty: 90 TABLET | Refills: 1 | Status: SHIPPED | OUTPATIENT
Start: 2023-03-30 | End: 2023-10-06

## 2023-03-30 NOTE — TELEPHONE ENCOUNTER
"Vitamin B12 and Glipizide. LOV 11/28/22.     Type 2 diabetes mellitus with stage 3b chronic kidney disease, without long-term current use of insulin (H)  Uncontrolled. Has appointment with pharmacist (MTM) today as well. Increased Jardiance to 25 mg daily    Ongoing painful diabetic neuropathy primarily in feet but some in hands also. Also reports newer issue with pain on left side of his body - face, arm, leg, side. Has been \"experimenting\" cutting medications out to see if that helps with pain but it hasn't made a difference. Does have history of stroke with left side affected.   "

## 2023-03-31 ENCOUNTER — PATIENT OUTREACH (OUTPATIENT)
Dept: CARE COORDINATION | Facility: CLINIC | Age: 63
End: 2023-03-31
Payer: COMMERCIAL

## 2023-03-31 NOTE — PROGRESS NOTES
Clinic Care Coordination Contact    Follow Up Progress Note      Assessment: Care Conference today was held via phone and included Nakia ([facility] SW, patient, and Miesha Palmer (CADI worker). Miesha is going to move forward in finding patient customized living/assisted living but in the meantime he is currently without a PCA. Ezekiel, his IHS worker, needs to do a take back on the service agreement so Miesha can enter a new  for PCA services. Ezekiel has not done that yet but they anticipate getting that taken care of by next Tuesday. Miesha is going to search for a new PCA for patient and get that going asap.  He continues to have PT and RN from [facility] and Ezekiel - IHS worker.     Care Gaps:    Health Maintenance Due   Topic Date Due     PARATHYROID  Never done     PHOSPHORUS  Never done     ZOSTER IMMUNIZATION (1 of 2) Never done     LUNG CANCER SCREENING  01/26/2013     COLORECTAL CANCER SCREENING  06/03/2021     COVID-19 Vaccine (3 - Booster for Moderna series) 06/17/2021     FALL RISK ASSESSMENT  01/22/2023     AORTIC ANEURYSM SCREENING (SYSTEM ASSIGNED)  01/22/2023     MICROALBUMIN  04/24/2023     PHQ-9  04/24/2023         Plan:   Team will follow. Next care conference scheduled for next Friday at 10:30am    AMANDA Fuentes, [facility]  Clinic Care Coordinator  [email or web address]  [phone]    4/3/23: rescheduling care conference for Wednesday at 2:30pm

## 2023-04-03 ENCOUNTER — TELEPHONE (OUTPATIENT)
Dept: FAMILY MEDICINE | Facility: CLINIC | Age: 63
End: 2023-04-03

## 2023-04-03 DIAGNOSIS — K21.9 GASTROESOPHAGEAL REFLUX DISEASE, UNSPECIFIED WHETHER ESOPHAGITIS PRESENT: Primary | ICD-10-CM

## 2023-04-03 RX ORDER — PANTOPRAZOLE SODIUM 40 MG/1
40 TABLET, DELAYED RELEASE ORAL DAILY
Qty: 30 TABLET | Refills: 6 | Status: SHIPPED | OUTPATIENT
Start: 2023-04-03 | End: 2023-04-21

## 2023-04-03 NOTE — TELEPHONE ENCOUNTER
Received fax from pharmacy informing that lansoprazole not covered. Omeprazole or pantoprazole would be covered.     On lansoprazole 15mg QD since 3/18/22. Was on pantoprazole 20mg QD prior to that. On omeprazole in distant past ~2013.     Plan: Chelo Roberts PharmD recommends change to pantoprazole 40mg QD.   Routed to Tamica Tang CNP for review.  Yelena Montiel RN    04/03/23 4:04 PM   Per Tamica Tang CNP, okay to send pantoprazole to replace lansoprazole. Rx sent.  Yelena Montiel RN

## 2023-04-06 ENCOUNTER — PATIENT OUTREACH (OUTPATIENT)
Dept: CARE COORDINATION | Facility: CLINIC | Age: 63
End: 2023-04-06
Payer: COMMERCIAL

## 2023-04-07 NOTE — PROGRESS NOTES
Clinic Care Coordination Contact    Follow Up Progress Note      Assessment:  spoke with Nakia ([facility] SW), patient, and Miesha Palmer (CADI worker). Miesha is doing the service agreement take back with Ezekiel (IHS worker) and this should be done by next Tuesday. She did find a new PCA company to help patient and is working on setting up that service agreement to get him started with a new PCA. The company is called [employer].  Discussion with patient about completing a healthcare directive as he is starting to have memory issues (per neurology visit in December). Patient wants to talk with Lesia, his 2 sons in [place], and his friend Shaji about a healthcare directive and figure out who he wants listed as POA. He will report back on that next week when we meet again.  Discussion around moving forward with finding patient an assisted living to move to for higher level of care. Miesha and Nakia will work on that to see where there is openings that take CADI waiver.  Sandrine, the medica  called care coordinator as well. She said she spoke with patient and his freestyle sensor is costing him $82 per month and it was previously covered. She will call the pharmacy with him to get it figured out.    Care Gaps:    Health Maintenance Due   Topic Date Due     PARATHYROID  Never done     PHOSPHORUS  Never done     ZOSTER IMMUNIZATION (1 of 2) Never done     LUNG CANCER SCREENING  01/26/2013     COLORECTAL CANCER SCREENING  06/03/2021     COVID-19 Vaccine (3 - Booster for Moderna series) 06/17/2021     FALL RISK ASSESSMENT  01/22/2023     AORTIC ANEURYSM SCREENING (SYSTEM ASSIGNED)  01/22/2023     MICROALBUMIN  04/24/2023     PHQ-9  04/24/2023       Plan:    will continue to closely follow. Team meeting to be held again next week.     Christine Rutledge, MSW, [facility]  Clinic Care Coordinator  [email or web address]  [phone]

## 2023-04-12 DIAGNOSIS — I25.10 ATHEROSCLEROSIS OF CORONARY ARTERY OF NATIVE HEART WITHOUT ANGINA PECTORIS, UNSPECIFIED VESSEL OR LESION TYPE: ICD-10-CM

## 2023-04-12 DIAGNOSIS — I10 ESSENTIAL HYPERTENSION: ICD-10-CM

## 2023-04-12 DIAGNOSIS — E11.21 TYPE 2 DIABETES MELLITUS WITH DIABETIC NEPHROPATHY, WITHOUT LONG-TERM CURRENT USE OF INSULIN (H): ICD-10-CM

## 2023-04-12 RX ORDER — CLOPIDOGREL BISULFATE 75 MG/1
75 TABLET ORAL DAILY
Qty: 30 TABLET | Refills: 1 | Status: SHIPPED | OUTPATIENT
Start: 2023-04-12 | End: 2023-07-05

## 2023-04-12 NOTE — TELEPHONE ENCOUNTER
Clopidogrel 75 mg    LOV 12/16/22  Last labs 12/30/22    BP Readings from Last 3 Encounters:   12/26/22 102/68   12/16/22 114/76   12/12/22 123/89     Lab Results   Component Value Date    A1C 9.9 10/24/2022    A1C 6.4 05/09/2022    A1C 5.9 02/07/2022    A1C 6.8 10/12/2021    A1C 7.5 07/14/2021     No future appts

## 2023-04-13 ENCOUNTER — PATIENT OUTREACH (OUTPATIENT)
Dept: CARE COORDINATION | Facility: CLINIC | Age: 63
End: 2023-04-13
Payer: COMMERCIAL

## 2023-04-13 NOTE — PROGRESS NOTES
Clinic Care Coordination Contact    Follow Up Progress Note      Assessment:  had a care conference via phone [facility] patient, Miesha Estela (CADI worker), Adele ([facility] OT) and [facility] Patient agreed to move to higher level of care and Miesha and Nakia will find an opening. Patient does get social security disability and knows he will have a room and board fee. On average, it's usually around $400.     [facility] even if he were to move.     Patient plans to complete a healthcare directive and wants to think some more this week about who he wants to add as POA on there and consult with family/friends about that. Nakia (Homecare SW) will help patient get that done.     PT/OT reported that sometimes patient forgets to take his BP medication and then if his BP is too low they cannot work with him on skills that day.     Miesha is going to work on the take back of IHS services agreement so that all his other services on the service agreement can resume. Additionally, until we can get him into an assisted living she is setting up for a new PCA to come out and has reached out to an agency [facility] that will call patient to do an intake and he would like to work with a male staff.    Care Gaps:    Health Maintenance Due   Topic Date Due     PARATHYROID  Never done     PHOSPHORUS  Never done     ZOSTER IMMUNIZATION (1 of 2) Never done     LUNG CANCER SCREENING  01/26/2013     COLORECTAL CANCER SCREENING  06/03/2021     COVID-19 Vaccine (3 - Booster for Moderna series) 06/17/2021     FALL RISK ASSESSMENT  01/22/2023     AORTIC ANEURYSM SCREENING (SYSTEM ASSIGNED)  01/22/2023     A1C  04/24/2023     MICROALBUMIN  04/24/2023     PHQ-9  04/24/2023     *we did not focus on care gaps today.     Care Plans  Care Plan: Housing and Support     Problem: Insufficient In-home support     Goal: Establish adequate home support     Start Date:  5/19/2022 Expected End Date: 6/1/2023    This Visit's Progress: 90% Recent Progress: 90%    Priority: High    Note:     8-Goal Statement: I will work with [facility] case management to find alternative housing option within 1 year  Date Goal set: 5/19/22  Barriers: availability of housing options-   Strengths: has county waiver now, and [facility] services   Date to Achieve By: Nov 19th, 2022  Patient expressed understanding of goal: yes  Action steps to achieve this goal:  1. I will continue to meet with  to figure out options -Porsche Chacon   2. I will keep PCP and care coordinator updated   3. I will submit any paperwork needed at facilities     12/15/22: Pt resides at market rate apartment still. He gets PCA, IHS, and homemaking and homecare nursing for medication management. He is still interested in moving but would like to figure out things for approval of social security funds first which he should find out about this month. He may want to move to LIDA/customized living soon.  12/29- patient was approved for social security disability.     2/13/23---wants to talk to  now that he is approved for social security. Extended goal  4/7- working on finding assisted living. Has homecare RN and PT and also has IHS and PCA services right now  4/13/23: Miesha velarde is going to find assisted living placement and will start calling around now                          Intervention/Education provided during outreach:  Talked about assisted living and finances  Current status of homecare and continuity of care    Outreach Frequency: weekly      Plan:   Care team plans to meet again next week. Patient would like to schedule an appt at [facility] so care coordinator will let scheduling know to reach out to patient    CADI worker plans to move forward to find higher level of care - assisted living    Christine Rutledge, MSW, [facility]  Clinic Care Coordinator  [email or web address]  [phone]

## 2023-04-20 ENCOUNTER — PATIENT OUTREACH (OUTPATIENT)
Dept: CARE COORDINATION | Facility: CLINIC | Age: 63
End: 2023-04-20
Payer: COMMERCIAL

## 2023-04-20 ENCOUNTER — TELEPHONE (OUTPATIENT)
Dept: FAMILY MEDICINE | Facility: CLINIC | Age: 63
End: 2023-04-20

## 2023-04-20 DIAGNOSIS — K21.9 GERD (GASTROESOPHAGEAL REFLUX DISEASE): Primary | ICD-10-CM

## 2023-04-20 NOTE — TELEPHONE ENCOUNTER
Thea from [facility] Care calls reporting patient reports to nurse that pantoprazole 40mg qd is not helping as well as lansoprazole 15mg QD did. Patient is complaining of reflux symptoms and requesting going back to lansoprazole.     Pantoprazole was ordered as replacement 4/3/23 due to formulary change and lansoprazole no longer covered.   (see 4/3/23 phone note)    Plan: will attempt prior authorization for lansoprazole 15mg and let Thea know result of this.   411pm prior authorization submitted via Cover [employer].   Yelena Montiel RN      Insurance info used on prior authorization:   [employer]  BIN [id number]  PCN MPD  ID [id number]

## 2023-04-21 RX ORDER — MECOBALAMIN 5000 MCG
15 TABLET,DISINTEGRATING ORAL DAILY
Qty: 30 CAPSULE | Refills: 4 | Status: SHIPPED | OUTPATIENT
Start: 2023-04-21 | End: 2023-09-11

## 2023-04-21 NOTE — PROGRESS NOTES
Clinic Care Coordination Contact    Follow Up Progress Note      Assessment: Care Conference was held via phone with homecare team including Nakia (). Miesha Estela (CADI worker) didn't answer the call so we called her supervisor and [employer] answered. Typical supervisor is Porsche. [employer] said Miesha is no longer employed there and supervisor/s will follow up on the service agreement. The take back was done and now they are just waiting for the [facility] to approve the new SA. They intend to get a PCA lined up asap and ultimate plan is to get him into assisted living when we can find a place.    [facility] offered to call around to some places too. [facility] a wait list reportedly.     Lesia's mom was present during time of call and was helping patient with some things.     [facility] said if we cannot get care for him they plan to file an adult protection report as patient needs consistent help with showering, making meals, etc.    Care Gaps:    Health Maintenance Due   Topic Date Due     PARATHYROID  Never done     PHOSPHORUS  Never done     ZOSTER IMMUNIZATION (1 of 2) Never done     LUNG CANCER SCREENING  01/26/2013     COLORECTAL CANCER SCREENING  06/03/2021     COVID-19 Vaccine (3 - Booster for Moderna series) 06/17/2021     FALL RISK ASSESSMENT  01/22/2023     AORTIC ANEURYSM SCREENING (SYSTEM ASSIGNED)  01/22/2023     A1C  04/24/2023     MICROALBUMIN  04/24/2023     PHQ-9  04/24/2023       Plan:   We plan to meet again next week  Get new PCA asap  Find assisted living for patient to move into through CADI hannah Rutledge, MSW, [facility]  Clinic Care Coordinator  [email or web address]  [phone]

## 2023-04-21 NOTE — TELEPHONE ENCOUNTER
04/21/23 prior authorization for lansoprazole was approved until 12/31/23. New rx sent to pharmacy with note that prior authorization was approved.   Called [facility] at [facility] and informed.   Yelena Montiel RN

## 2023-04-26 ENCOUNTER — TELEPHONE (OUTPATIENT)
Dept: FAMILY MEDICINE | Facility: CLINIC | Age: 63
End: 2023-04-26

## 2023-04-27 NOTE — TELEPHONE ENCOUNTER
4/26/23 459pm   Maeve DELUNA with [facility] left message with update:     1. Nurses set up med boxes for patient weekly. Nurse reports at visit today it was noted patient did not take any of his PM meds for past 8 days and skipped morning meds on 2 days.     2. BP today= 94/64. Asymptomatic. BP affecting meds: amlodipine 5mg QD (recently decreased); lisinopril 10mg QD; metoprolol tartrate 25mg BID; isosorbide mono 30mg QD   BP Readings from Last 6 Encounters:   12/26/22 102/68   12/16/22 114/76   12/12/22 123/89   11/28/22 98/68   10/31/22 93/62   10/24/22 120/74     3. Glucoses running 160-220 over past week. Received weekly Bydureon injection today.     Hemoglobin A1C   Date Value Ref Range Status   10/24/2022 9.9 (A) 4.0 - 6.0 % Final   05/09/2022 6.4 (A) 4.0 - 6.0 % Final   02/07/2022 5.9 4.0 - 6.0 % Final     4. Patient received lansoprazole rx. Nurse asks if this replaces pantoprazole. - advised yes this does. This was changed back to lansoprazole 4/20/23 due to patient reporting pantoprazole not as effective as lansoprazole. See note.    Pertinent visit history:   11/28/22 med check with Tamica Tang CNP  10/24/22 annual physical     Due for visit with labs and AWV.     Plan: routed to Tamica Tang CNP for review.  Yelena Montiel RN

## 2023-04-28 ENCOUNTER — PATIENT OUTREACH (OUTPATIENT)
Dept: FAMILY MEDICINE | Facility: CLINIC | Age: 63
End: 2023-04-28

## 2023-04-28 NOTE — TELEPHONE ENCOUNTER
PC from King, homecare RN from [facility]. She reported that Clint does not have transportation to his May 9th visit at [facility] where he is to establish with new PCP and MTM. Pt has an ILS worker who transports but is unable to do so . Home care rn requesting support in finding transportation to his appt.    [facility] phoned [employer] and received customer care number for booking taxi. Number is [phone].[facility] booked ride with [employer] with [employer] ( [phone]). Pt will be picked up between 2 and 2:30 and needs to be ready by 2. Taxi will call pt to confirm  so he needs to answer. When finished with appt, he is to call number listed above for return taxi.    [facility] informed King of this who will relay information to Patient. [facility] routed this message to pt [facility] , Christine Tao and Michelle Campos for follow up.    MILTON Malcolm  , Care Coordination  [facility]  Cell: [phone]  Clinic: [phone]  [email or web address]

## 2023-04-28 NOTE — TELEPHONE ENCOUNTER
Recommend joint appointment with myself & Chelo Roberts RPH.    CARROL Pretty CNP on 4/28/2023 at 7:59 AM

## 2023-05-02 ENCOUNTER — PATIENT OUTREACH (OUTPATIENT)
Dept: CARE COORDINATION | Facility: CLINIC | Age: 63
End: 2023-05-02
Payer: COMMERCIAL

## 2023-05-02 NOTE — PROGRESS NOTES
Clinic Care Coordination Contact    Follow Up Progress Note      Assessment:  called [facility] and spoke with Mary. They take the CADI waiver and do have an opening in an apartment with services. CADI waiver would pay services and then patient would be 30% of his income towards rent. SNAP doesn't count, he can continue to get that. Mary is sending the application via email and then [place] can help patient complete it. A nurse would then want to review medical records and screen with patient to ensure they can meet his needs. For his injectable diabetes medication they can fill the syringes but patient would need to insert it into himself. Mary # [phone].    Care Gaps:    Health Maintenance Due   Topic Date Due     PARATHYROID  Never done     PHOSPHORUS  Never done     ZOSTER IMMUNIZATION (1 of 2) Never done     LUNG CANCER SCREENING  01/26/2013     COLORECTAL CANCER SCREENING  06/03/2021     COVID-19 Vaccine (3 - Booster for Moderna series) 06/17/2021     FALL RISK ASSESSMENT  01/22/2023     AORTIC ANEURYSM SCREENING (SYSTEM ASSIGNED)  01/22/2023     A1C  04/24/2023     MICROALBUMIN  04/24/2023     PHQ-9  04/24/2023         Plan:    can work with patient's homecare and waiver team to get application submitted for assisted living. Will follow.     Christine Rutledge, AMANDA, [facility]  Clinic Care Coordinator  [email or web address]  [phone]

## 2023-05-05 ENCOUNTER — PATIENT OUTREACH (OUTPATIENT)
Dept: CARE COORDINATION | Facility: CLINIC | Age: 63
End: 2023-05-05
Payer: COMMERCIAL

## 2023-05-05 NOTE — PROGRESS NOTES
Clinic Care Coordination Contact    Follow Up Progress Note      Assessment:  spoke with homecare team and waiver case management  Arminda at [facility] via email. They would like for patient to be able to tour some facilities for AL and his [facility] staff Ezekiel can likely take him on tours.     [facility] called a few different AL's in the area to check on if they take CADI and have openings:    [facility]: they dont take CADI only private pay or elderly waiver  [facility]: left a VM for Chanda FABIANO  to check on availability  [facility] - Left a msg for  the  to check availability  [facility]- Left a msg for Viji Hernandez . They called and only take Elderly waiver.  [facility]; left a msg for the   [facility]; transferred to [facility], left a msg.    It appears that these facilities dont take CADI waiver. [facility] asked team for a list of places that take CADI to work off from which was provided     SW informed the team that [facility] may be able to help as well as they have home advisors for free for seniors.      Care Gaps:    Health Maintenance Due   Topic Date Due     PARATHYROID  Never done     PHOSPHORUS  Never done     ZOSTER IMMUNIZATION (1 of 2) Never done     LUNG CANCER SCREENING  01/26/2013     COLORECTAL CANCER SCREENING  06/03/2021     COVID-19 Vaccine (3 - Booster for Moderna series) 06/17/2021     FALL RISK ASSESSMENT  01/22/2023     AORTIC ANEURYSM SCREENING (SYSTEM ASSIGNED)  01/22/2023     A1C  04/24/2023     MICROALBUMIN  04/24/2023     PHQ-9  04/24/2023           Plan:   [facility] will continue to follow closely. Work on finding AL facility for patient.     Christine Rutledge, MSW, [facility]  Clinic Care Coordinator  [email or web address]  [phone]

## 2023-05-09 ENCOUNTER — OFFICE VISIT (OUTPATIENT)
Dept: FAMILY MEDICINE | Facility: CLINIC | Age: 63
End: 2023-05-09

## 2023-05-09 ENCOUNTER — OFFICE VISIT (OUTPATIENT)
Dept: PHARMACY | Facility: PHYSICIAN GROUP | Age: 63
End: 2023-05-09
Payer: COMMERCIAL

## 2023-05-09 VITALS
HEART RATE: 108 BPM | WEIGHT: 189 LBS | SYSTOLIC BLOOD PRESSURE: 108 MMHG | OXYGEN SATURATION: 98 % | BODY MASS INDEX: 30.05 KG/M2 | DIASTOLIC BLOOD PRESSURE: 72 MMHG

## 2023-05-09 DIAGNOSIS — I10 ESSENTIAL HYPERTENSION: ICD-10-CM

## 2023-05-09 DIAGNOSIS — G63 POLYNEUROPATHY ASSOCIATED WITH UNDERLYING DISEASE (H): ICD-10-CM

## 2023-05-09 DIAGNOSIS — E11.22 TYPE 2 DIABETES MELLITUS WITH STAGE 3B CHRONIC KIDNEY DISEASE, WITHOUT LONG-TERM CURRENT USE OF INSULIN (H): ICD-10-CM

## 2023-05-09 DIAGNOSIS — I25.10 ATHEROSCLEROSIS OF CORONARY ARTERY OF NATIVE HEART WITHOUT ANGINA PECTORIS, UNSPECIFIED VESSEL OR LESION TYPE: ICD-10-CM

## 2023-05-09 DIAGNOSIS — M25.511 CHRONIC RIGHT SHOULDER PAIN: ICD-10-CM

## 2023-05-09 DIAGNOSIS — G45.9 TIA (TRANSIENT ISCHEMIC ATTACK): ICD-10-CM

## 2023-05-09 DIAGNOSIS — G89.29 CHRONIC RIGHT SHOULDER PAIN: ICD-10-CM

## 2023-05-09 DIAGNOSIS — F33.0 MAJOR DEPRESSIVE DISORDER, RECURRENT, MILD (H): ICD-10-CM

## 2023-05-09 DIAGNOSIS — N18.32 STAGE 3B CHRONIC KIDNEY DISEASE (H): ICD-10-CM

## 2023-05-09 DIAGNOSIS — N18.32 TYPE 2 DIABETES MELLITUS WITH STAGE 3B CHRONIC KIDNEY DISEASE, WITHOUT LONG-TERM CURRENT USE OF INSULIN (H): ICD-10-CM

## 2023-05-09 DIAGNOSIS — I25.118 ATHEROSCLEROSIS OF NATIVE CORONARY ARTERY OF NATIVE HEART WITH STABLE ANGINA PECTORIS (H): ICD-10-CM

## 2023-05-09 DIAGNOSIS — G47.00 INSOMNIA, UNSPECIFIED TYPE: ICD-10-CM

## 2023-05-09 DIAGNOSIS — F32.A DEPRESSION, UNSPECIFIED DEPRESSION TYPE: ICD-10-CM

## 2023-05-09 DIAGNOSIS — Z72.0 TOBACCO USE: ICD-10-CM

## 2023-05-09 DIAGNOSIS — E11.21 TYPE 2 DIABETES MELLITUS WITH DIABETIC NEPHROPATHY, WITHOUT LONG-TERM CURRENT USE OF INSULIN (H): Primary | ICD-10-CM

## 2023-05-09 DIAGNOSIS — Z86.73 HISTORY OF TIA (TRANSIENT ISCHEMIC ATTACK) AND STROKE: ICD-10-CM

## 2023-05-09 DIAGNOSIS — E11.29 MICROALBUMINURIA DUE TO TYPE 2 DIABETES MELLITUS (H): ICD-10-CM

## 2023-05-09 DIAGNOSIS — E78.2 MIXED HYPERLIPIDEMIA: ICD-10-CM

## 2023-05-09 DIAGNOSIS — R80.9 MICROALBUMINURIA DUE TO TYPE 2 DIABETES MELLITUS (H): ICD-10-CM

## 2023-05-09 DIAGNOSIS — I10 ESSENTIAL HYPERTENSION: Primary | ICD-10-CM

## 2023-05-09 PROBLEM — M25.552 HIP PAIN, LEFT: Status: RESOLVED | Noted: 2022-02-07 | Resolved: 2023-05-09

## 2023-05-09 PROBLEM — E11.40 PAINFUL DIABETIC NEUROPATHY (H): Status: RESOLVED | Noted: 2019-09-29 | Resolved: 2023-05-09

## 2023-05-09 LAB — HBA1C MFR BLD: 8.4 % (ref 4–6)

## 2023-05-09 PROCEDURE — 99605 MTMS BY PHARM NP 15 MIN: CPT | Performed by: PHARMACIST

## 2023-05-09 PROCEDURE — 36415 COLL VENOUS BLD VENIPUNCTURE: CPT | Performed by: FAMILY MEDICINE

## 2023-05-09 PROCEDURE — 99607 MTMS BY PHARM ADDL 15 MIN: CPT | Performed by: PHARMACIST

## 2023-05-09 PROCEDURE — 99214 OFFICE O/P EST MOD 30 MIN: CPT | Performed by: FAMILY MEDICINE

## 2023-05-09 PROCEDURE — 83036 HEMOGLOBIN GLYCOSYLATED A1C: CPT | Performed by: FAMILY MEDICINE

## 2023-05-09 RX ORDER — HYDROCODONE BITARTRATE AND ACETAMINOPHEN 5; 325 MG/1; MG/1
1 TABLET ORAL EVERY 6 HOURS PRN
COMMUNITY
Start: 2023-03-14 | End: 2023-05-09

## 2023-05-09 RX ORDER — AMLODIPINE BESYLATE 5 MG/1
2.5 TABLET ORAL DAILY
Qty: 45 TABLET | Refills: 0
Start: 2023-05-09 | End: 2023-08-17

## 2023-05-09 RX ORDER — BLOOD SUGAR DIAGNOSTIC
STRIP MISCELLANEOUS
COMMUNITY
Start: 2022-12-28

## 2023-05-09 RX ORDER — LISINOPRIL 10 MG/1
5 TABLET ORAL DAILY
Qty: 45 TABLET | Refills: 0
Start: 2023-05-09 | End: 2023-06-13

## 2023-05-09 RX ORDER — IBUPROFEN 600 MG/1
TABLET, FILM COATED ORAL
COMMUNITY
Start: 2023-03-14

## 2023-05-09 RX ORDER — TIRZEPATIDE 5 MG/.5ML
5 INJECTION, SOLUTION SUBCUTANEOUS
Qty: 2 ML | Refills: 0 | Status: SHIPPED | OUTPATIENT
Start: 2023-05-09 | End: 2023-06-02

## 2023-05-09 RX ORDER — METOPROLOL SUCCINATE 25 MG/1
25 TABLET, EXTENDED RELEASE ORAL DAILY
Qty: 90 TABLET | Refills: 1 | Status: SHIPPED | OUTPATIENT
Start: 2023-05-09 | End: 2023-10-06

## 2023-05-09 NOTE — LETTER
May 11, 2023      Clint Zhu  [address]  [place] [address]    Dear Emily,     It was spring to see you at your recent appointment.     Here are your lab results.     Your creatinine (a waste product which is gotten rid of by the kidneys into the urine) is elevated. This typically signifies your kidneys are sluggish.     A patient is said to have chronic kidney disease (CKD) if they have abnormalities of kidney function for more than 3 months.     The most common causes of this are diabetes and high blood pressure. Both of these are hard on the kidneys over time.     The five stages of CKD refer to how well your kidneys are working.       In the early stages (Stages 1-3), your kidneys are still able to filter waste out of your blood.   In the later stages (Stages 4-5), your kidneys must work harder to filter your blood.     Your results are consistent with a diagnosis of chronic kidney disease (CKD) stage 3b. It appears you are overdue an appointment with your kidney doctor (Harjeet Celis MD  at [facility]). I will have the clinic care coordinator assist you setting up another appointment with that provider     Your A1c is above a goal of 7%. Continue to work with Chelo the pharmacist on care of diabetes and glucose control.       Please let us know if you have any questions.     Sincerely,     Dr. Gabbie Rene       Resulted Orders   Basic Metabolic Panel (8) ([facility])   Result Value Ref Range    Glucose 224 (H) 70 - 99 mg/dL    Urea Nitrogen 36 (H) 8 - 27 mg/dL    Creatinine 2.12 (H) 0.76 - 1.27 mg/dL    eGFR  34 (L) >59 mL/min/1.73    BUN/Creatinine Ratio 17 10 - 24    Sodium 134 134 - 144 mmol/L    Potassium 5.0 3.5 - 5.2 mmol/L    Chloride 100 96 - 106 mmol/L    Total CO2 20 20 - 29 mmol/L    Calcium 9.2 8.6 - 10.2 mg/dL    Narrative    Performed at:  01 - [facility] [address], [place], [place]  [id number]  : Arsh Caceres MD, Phone:  [phone]    Hemoglobin A1C ([facility])   Result Value Ref Range    Hemoglobin A1C 8.4 (A) 4.0 - 6.0 %

## 2023-05-09 NOTE — PROGRESS NOTES
SUBJECTIVE:    Emily Zhu, is a 63 year old male presenting for the below:     T2DM : Bydureon weekly - given weekly with home RN   Jardiance 25mg daily   Glipizide ER 10mg daily   Uses CGM : did not bring monitor device today.    Hypertension/TIA/CAD:   amlodipine 5mg daily  lisinopril 10mg daily  isosorbide 30mg daily  metoprolol tartrate 25mg twice daily- missing >50% of pm dose  aspirin 81mg daily   clopidogrel 75mg- Had been on clopidogrel alone prior to TIA that occurred in [place] on 6/22/2021.   Episodes of low BP (70/50 range) noted bu home healthcare. Booker not stay well hydrated.     SW involved and attempting assistance with placement in care facility.  Currently receiving Home care, PT, OT.      2. Right sided shoulder rotator cuff injury : s/p left sided rotator cuff repair. Rotator cuff was done in 2017 by Dr. Deejay Conrad. Would like referral back to same provider in [facility] for management of right shoulder.       OBJECTIVE:  Vitals:    05/09/23 1434   BP: 108/72   Pulse: 108   SpO2: 98%   Weight: 85.7 kg (189 lb)    Body mass index is 30.05 kg/m .  General: no acute distress, cooperative with exam.  Psych: mental status normal, mood and affect appropriate.    Hemoglobin A1C   Date Value Ref Range Status   05/09/2023 8.4 (A) 4.0 - 6.0 % Final   10/24/2022 9.9 (A) 4.0 - 6.0 % Final   05/09/2022 6.4 (A) 4.0 - 6.0 % Final       ASSESSMENT / PLAN:      Essential hypertension  Stage 3b chronic kidney disease (H)  Blood pressure soft. Partly may be related to poor hydration --> dehydration (note slight tachycardia today).   Blood pressure medications down titrated : lisinopril 10 --> 5 mg, norvasc 5--> 2.5 mg.   -     Basic Metabolic Panel (8) ([facility])  -     VENOUS COLLECTION    Type 2 diabetes mellitus with stage 3b chronic kidney disease, without long-term current use of insulin (H)  Microalbuminuria due to type 2 diabetes mellitus (H)  Not meeting goal of <7% A1c  Jardiance 25mg daily   Glipizide ER 10mg  daily   Uses CGM : did not bring monitor device today.  Bydureon --> Mounjaro 5mg weekly as per [facility] pharmacy note  .-     Basic Metabolic Panel (8) ([facility])  -     VENOUS COLLECTION  -     Hemoglobin A1C ([facility])    Polyneuropathy associated with underlying disease (H)  Gabapentin 300 mg TID.    Major depressive disorder, recurrent, mild (H)  Zoloft 100 mg daily.     Chronic right shoulder pain  -     Orthopedic  Referral; Future    Mixed hyperlipidemia  Tobacco use  History of TIA (transient ischemic attack) and stroke  Atherosclerosis of native coronary artery of native heart with stable angina pectoris (H)  Statin, Plavix, ASA, Imdur, BB (metoprolol tartrate --> succinate at per [facility] pharmacy).    See [facility] note on details referencing home care set up and possible transition to assisted living.     Follow-up in 4 weeks.
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
## 2023-05-09 NOTE — PATIENT INSTRUCTIONS
"Recommendations from today's MTM visit:                                                       1. Would like to stop bydureon and start Mounjaro 5mg weekly     2. Rotator cuff was done in 2017 by Dr. Deejay Conrad- referral placed    3. amlodipine 5mg daily--- down to 2.5mg  lisinopril 10mg daily---down 5mg    4. Metoprolol succinate 25mg - once daily (replace metoprolol tartrate)    5. Move atorvastatin to Morning.     6. MTM to check on Pharmacy coverage for continuous glucose monitor    Follow-up: Return in about 4 weeks (around 6/6/2023) for Lab Work, Primary Care Provider, MTM visit in clinic.    It was great speaking with you today.  I value your experience and would be very thankful for your time in providing feedback in our clinic survey. In the next few days, you may receive an email or text message from [employer] with a link to a survey related to your  clinical pharmacist.\"     My Clinical Pharmacist's contact information:                                                      Please feel free to contact me with any questions or concerns you have.      Chelo Roberts, Pharm.D, [facility]  Medication Therapy Management Pharmacist  [phone]      "

## 2023-05-10 LAB
BUN SERPL-MCNC: 36 MG/DL (ref 8–27)
BUN/CREATININE RATIO: 17 (ref 10–24)
CALCIUM SERPL-MCNC: 9.2 MG/DL (ref 8.6–10.2)
CHLORIDE SERPLBLD-SCNC: 100 MMOL/L (ref 96–106)
CREAT SERPL-MCNC: 2.12 MG/DL (ref 0.76–1.27)
EGFR: 34 ML/MIN/1.73
GLUCOSE SERPL-MCNC: 224 MG/DL (ref 70–99)
POTASSIUM SERPL-SCNC: 5 MMOL/L (ref 3.5–5.2)
SODIUM SERPL-SCNC: 134 MMOL/L (ref 134–144)
TOTAL CO2: 20 MMOL/L (ref 20–29)

## 2023-05-12 ENCOUNTER — PATIENT OUTREACH (OUTPATIENT)
Dept: CARE COORDINATION | Facility: CLINIC | Age: 63
End: 2023-05-12
Payer: COMMERCIAL

## 2023-05-12 NOTE — PROGRESS NOTES
Clinic Care Coordination Contact    Follow Up Progress Note      Assessment:      helped patient get scheduled with Dr. Celis (nephrologist) for May 16th at 2pm. Patient confirmed his S worker Ezekiel can take him to that appointment    Patient did schedule his follow up dentistry appointment at [facility] for June 30th. He is trying to get in sooner.    [facility] called [facility] to get patient in for an orthopedic consult for his shoulder. He says the other shoulder is bothering him now (not the same shoulder he had surgery on). Referral was placed by provider for [place] but pt refers to go back to [facility]. Dr. Deejay Conrad retired. He is scheduled at 1pm on the 18th with Dr. Recio. Address is [address] [place] [address].     also helped patient call [facility] as he got a bill from them for $331. Spoke with billing and made sure they had up to date insurance on file. They had [employer] so updated on medicare and [employer]. They are re-billing and expect everything to be covered.        informed patient that we are working on finding AL for him.     Care Gaps:    Health Maintenance Due   Topic Date Due     PARATHYROID  Never done     PHOSPHORUS  Never done     ZOSTER IMMUNIZATION (1 of 2) Never done     LUNG CANCER SCREENING  01/26/2013     COLORECTAL CANCER SCREENING  06/03/2021     COVID-19 Vaccine (3 - Moderna series) 06/17/2021     FALL RISK ASSESSMENT  01/22/2023     AORTIC ANEURYSM SCREENING (SYSTEM ASSIGNED)  01/22/2023     MICROALBUMIN  04/24/2023     PHQ-9  04/24/2023         Plan:   [facility] will continue to follow.    Christine Rutledge, MSW, [facility]  Clinic Care Coordinator  [email or web address]  [phone]

## 2023-05-18 ENCOUNTER — TRANSFERRED RECORDS (OUTPATIENT)
Dept: FAMILY MEDICINE | Facility: CLINIC | Age: 63
End: 2023-05-18

## 2023-05-19 DIAGNOSIS — E11.21 TYPE 2 DIABETES MELLITUS WITH DIABETIC NEPHROPATHY, WITHOUT LONG-TERM CURRENT USE OF INSULIN (H): ICD-10-CM

## 2023-05-19 NOTE — TELEPHONE ENCOUNTER
Jardiance 25 mg     LOV: 5/9/23    Type 2 diabetes mellitus with stage 3b chronic kidney disease, without long-term current use of insulin (H)  Not meeting goal of <7% A1c  Jardiance 25mg daily   Follow-up in 4 weeks.     Hemoglobin A1C   Date Value Ref Range Status   05/09/2023 8.4 (A) 4.0 - 6.0 % Final   10/24/2022 9.9 (A) 4.0 - 6.0 % Final   05/09/2022 6.4 (A) 4.0 - 6.0 % Final

## 2023-05-22 DIAGNOSIS — I10 ESSENTIAL HYPERTENSION: Primary | ICD-10-CM

## 2023-05-23 RX ORDER — LISINOPRIL 5 MG/1
5 TABLET ORAL DAILY
Qty: 90 TABLET | Refills: 3 | Status: SHIPPED | OUTPATIENT
Start: 2023-05-23 | End: 2024-04-22

## 2023-05-25 PROCEDURE — G0179 MD RECERTIFICATION HHA PT: HCPCS | Performed by: FAMILY MEDICINE

## 2023-05-31 ENCOUNTER — PATIENT OUTREACH (OUTPATIENT)
Dept: CARE COORDINATION | Facility: CLINIC | Age: 63
End: 2023-05-31
Payer: COMMERCIAL

## 2023-05-31 NOTE — PROGRESS NOTES
Clinic Care Coordination Contact    Follow Up Progress Note      Assessment: [facility] called some assisted living facilities and found out the following and reported progress via email to [facility] homecare team and hannah CM:  I did some calls this afternoon as well:    [facility]- don t take CADI waiver  [facility]- only do elderly waiver after 3 years of private pay  [facility], left a voicemail  [facility], left a message  [facility]- doesn t take CADI waiver    It appears the main lead we had was [facility]--I think we should still set up a tour there. It s really difficult to find places that take CADI vs Elderly waiver it seems    Nakia ([facility]) reported also calling:  [facility] (Left a voicemail), [facility], [facility] (Left a voicemail), [facility] and [facility] (Left a voicemail).     [facility] will be off next week; called to inform patient and check in on any needs before then. Patient says no urgent needs at the moment.     Care Gaps:    Health Maintenance Due   Topic Date Due     PARATHYROID  Never done     PHOSPHORUS  Never done     ZOSTER IMMUNIZATION (1 of 2) Never done     LUNG CANCER SCREENING  01/26/2013     COLORECTAL CANCER SCREENING  06/03/2021     COVID-19 Vaccine (3 - Moderna series) 06/17/2021     FALL RISK ASSESSMENT  01/22/2023     AORTIC ANEURYSM SCREENING (SYSTEM ASSIGNED)  01/22/2023     MICROALBUMIN  04/24/2023     PHQ-9  04/24/2023       Plan: [facility] will continue to follow closely    Christine Rutledge, MSW, [facility]  Clinic Care Coordinator  [email or web address]      6/1/23: [facility] called Mary at [facility] and patient has a tour there set up for next Thursday at 1pm.  SW called patient to let him know and he will have Ezekiel take him

## 2023-06-01 ENCOUNTER — TRANSFERRED RECORDS (OUTPATIENT)
Dept: FAMILY MEDICINE | Facility: CLINIC | Age: 63
End: 2023-06-01

## 2023-06-01 NOTE — PROGRESS NOTES
Clinic Care Coordination Contact  Care Team Conversations    Patient called to ask if he can come to the clinic tomorrow for assistance with his glucometer, and for help choosing new health care coverage. He received letter in the mail from [employer] stating he needs to change his insurance to [facility], [facility], or [facility] insurance.     Plan: Patient will come to the clinic tomorrow at 12:00 to meet with ERGI Conrad RN  Clinic Care Coordination  [facility]  [phone]   Medical Necessity Clause: This procedure was medically necessary because the lesion that was treated was: Was A Bandage Applied: Yes Billing Type: Third-Party Bill Render Path Notes In Note?: No Detail Level: Detailed X Size Of Lesion In Cm (Optional): 0 Consent was obtained from the patient. The risks and benefits to therapy were discussed in detail. Specifically, the risks of infection, scarring, bleeding, prolonged wound healing, incomplete removal, allergy to anesthesia, nerve injury and recurrence were addressed. Prior to the procedure, the treatment site was clearly identified and confirmed by the patient. All components of Universal Protocol/PAUSE Rule completed. Wound Care: Petrolatum Notification Instructions: Patient will be notified of pathology results. However, patient instructed to call the office if not contacted within 2 weeks. Biopsy Method: Personna blade Depth Of Shave: dermis Anesthesia Type: 1% lidocaine with epinephrine Anesthesia Volume In Cc: 2 Post-Care Instructions: I reviewed with the patient in detail post-care instructions. Patient is to keep the biopsy site dry overnight, and then apply vaseline twice daily until healed. Hemostasis: Electrocautery Path Notes (To The Dermatopathologist): Check margins Size Of Lesion In Cm (Required): 0.5 Size Of Lesion In Cm (Required): 0.7 Medical Necessity Information: It is in your best interest to select a reason for this procedure from the list below. All of these items fulfill various CMS LCD requirements except the new and changing color options. Size Of Lesion In Cm (Required): 0.3

## 2023-06-02 ENCOUNTER — TELEPHONE (OUTPATIENT)
Dept: FAMILY MEDICINE | Facility: CLINIC | Age: 63
End: 2023-06-02

## 2023-06-02 DIAGNOSIS — E11.21 TYPE 2 DIABETES MELLITUS WITH DIABETIC NEPHROPATHY, WITHOUT LONG-TERM CURRENT USE OF INSULIN (H): ICD-10-CM

## 2023-06-02 NOTE — CONFIDENTIAL NOTE
Maeve - nurse with [facility] reporting patient did not get Mounjaro injection today as pen malfunctioned and discharged the med before it was injected in skin. This was last pen he had in the box of 4 pens filled 5/10/23. Reports same thing happened with injection 2 weeks ago.     Nurse does report that with receiving only 2 of the 4 injections over the past month, patient's glucose numbers have been better with a past 7 day average of 180.    Nurse also reports Mounjaro will not be covered.   We have received a letter from patient's insurance ([employer]) stating they allow a 30 day temporary supply of the Mounjaro but it is not on formulary so will not cover going forward.   Their formulary alternatives are: Trulicity and Bydureon.     Patient has been on Bydureon and was changing to Mounjaro to get better control of diabetes.     Plan: per Chelo Roberts PharmD and Dr. Rene, patient should change to Trulicity 1.5mg weekly and schedule follow up with MTM and PCP in next week or two. Trulicity rx sent to pharmacy. Nurse Maeve informed.  Yelena Montiel RN

## 2023-06-07 DIAGNOSIS — E11.21 TYPE 2 DIABETES MELLITUS WITH DIABETIC NEPHROPATHY, WITHOUT LONG-TERM CURRENT USE OF INSULIN (H): ICD-10-CM

## 2023-06-07 NOTE — TELEPHONE ENCOUNTER
Fareed. LOV 5/09/23.    T2DM : Bydureon weekly - given weekly with home RN   Sae 25mg daily   Glipizide ER 10mg daily   Uses CGM : did not bring monitor device today.    Hemoglobin A1C   Date Value Ref Range Status   05/09/2023 8.4 (A) 4.0 - 6.0 % Final

## 2023-06-13 ENCOUNTER — OFFICE VISIT (OUTPATIENT)
Dept: PHARMACY | Facility: PHYSICIAN GROUP | Age: 63
End: 2023-06-13
Payer: COMMERCIAL

## 2023-06-13 VITALS
RESPIRATION RATE: 20 BRPM | HEART RATE: 91 BPM | SYSTOLIC BLOOD PRESSURE: 116 MMHG | OXYGEN SATURATION: 93 % | DIASTOLIC BLOOD PRESSURE: 80 MMHG

## 2023-06-13 DIAGNOSIS — E11.21 TYPE 2 DIABETES MELLITUS WITH DIABETIC NEPHROPATHY, WITHOUT LONG-TERM CURRENT USE OF INSULIN (H): Primary | ICD-10-CM

## 2023-06-13 DIAGNOSIS — K21.9 GASTROESOPHAGEAL REFLUX DISEASE, UNSPECIFIED WHETHER ESOPHAGITIS PRESENT: ICD-10-CM

## 2023-06-13 DIAGNOSIS — I10 ESSENTIAL HYPERTENSION: ICD-10-CM

## 2023-06-13 DIAGNOSIS — I25.10 ATHEROSCLEROSIS OF CORONARY ARTERY OF NATIVE HEART WITHOUT ANGINA PECTORIS, UNSPECIFIED VESSEL OR LESION TYPE: ICD-10-CM

## 2023-06-13 DIAGNOSIS — G45.9 TIA (TRANSIENT ISCHEMIC ATTACK): ICD-10-CM

## 2023-06-13 PROCEDURE — 99207 PR NO CHARGE LOS: CPT | Performed by: PHARMACIST

## 2023-06-13 RX ORDER — DIAZEPAM 5 MG
TABLET ORAL
COMMUNITY
Start: 2023-05-18 | End: 2023-11-02

## 2023-06-13 RX ORDER — FAMOTIDINE 20 MG/1
20 TABLET, FILM COATED ORAL DAILY
Qty: 90 TABLET | Refills: 1 | Status: SHIPPED | OUTPATIENT
Start: 2023-06-13 | End: 2023-12-01

## 2023-06-13 RX ORDER — TIRZEPATIDE 5 MG/.5ML
5 INJECTION, SOLUTION SUBCUTANEOUS
Qty: 2 ML | Refills: 1 | Status: SHIPPED | OUTPATIENT
Start: 2023-06-13 | End: 2023-07-28

## 2023-06-13 NOTE — PROGRESS NOTES
Medication Therapy Management (MTM) Encounter    ASSESSMENT:                            Medication Adherence/Access: ongoing supports needed to help him to remember to take meds daily in addition to help with set up as he is unable to manage this on his own.     Type 2 Diabetes: Patient is not meeting A1c goal of < 8%. Patient is not meeting average glucose goal of <150mg/dL. Patient is not meeting goal of > 70% time in target with continuous glucose monitoring.  Patient would benefit from stopping Trulicity since side effects drastically worsened with the start of this shot and sugars have been worse the last 2 weeks, feels that the sugars were better on Mounjaro, so will attempt to get this covered now that has not had Bydureon (ineffective) and Trulicity (did not tolerate) to work for him. Reviewed concern for these types of medications to worsen his GI symtpoms and how to help reduce gas/bloating. Did not have this previously with Mounjaro.     Hypertension/TIA/CAD:  Patient is meeting blood pressure goal of < 140/90mmHg.    GERD: Recommend addition of H2 blocker given inadequate control of symptoms currently. Will need close monitoring and if this is not improving or worsens at all, needs to be seen by the provider.     PLAN:                            1. Add in famotidine with breakfast.    2. Stop Trulicity and go back to Mounjaro    3. Have nurse call PharmD (Chelo) [phone] with any medication questions - should have all pills to be set up once per day in the AM for easier administration. (excluding gabapentin)      Follow-up: Return in about 4 weeks (around 7/11/2023).    SUBJECTIVE/OBJECTIVE:                          Clint Zhu is a 63 year old male coming in for a follow-up visit.  Today's visit is a follow-up MTM visit from 5/9     Reason for visit: Medication follow up. Here with Ezkeiel who helps 4 days per week.     Allergies/ADRs: Reviewed in chart  Past Medical History: Reviewed in  chart  Tobacco: He reports that he has been smoking cigarettes. He has a 40.00 pack-year smoking history. He has never used smokeless tobacco.Nicotine/Tobacco Cessation Plan:   Information offered: Patient not interested at this time  Alcohol: none     Medication Adherence/Access:    Nurse comes on Wednesdays who sets up medications- switched to once daily AM administration to try to improve compliance.   Services with  Ezekiel- help with rides to get prescriptions/food- 4 days per week.   Showering by Nathaly- 1 x per week aid  physical therapy/occupation therapy     Type 2 Diabetes:    Trulicity 1.5mg weekly- Wednesdays (when nurse comes)- is not tolerating this as well as the Mounjaro 5mg he was recently on. Notices sugars are worse too. States since last week not eating much with the gas/indigestion. Dental extraction not helping. Going to be eating soup/milk for 2 days to see if this helps.   Jardiance 25mg daily   Glipizide ER 10mg daily     Medication Hx:  Victoza- couldn't do daily administration  metformin - stopped due to renal function decline.   Bydureon weekly - stopped when trying to get Mounjaro for better sugar control  Mounjaro- temporary supply filled but then notice not covered, so switched to Trulicity on 6/2    Patient is not experiencing side effects.  Has been eating less due to limited income and food availability. Working with care coordinator on many different resources including housing and other supports.   Symptoms of low blood sugar? none  Symptoms of high blood sugar? none  Aspirin: 81mg daily and taking plavix once daily.   Statin: Yes: atorvastatin   ACEi/ARB: Yes: lisinopril.   Lab Results   Component Value Date    A1C 8.4 05/09/2023    A1C 9.9 10/24/2022    A1C 6.4 05/09/2022    A1C 5.9 02/07/2022    A1C 6.8 10/12/2021               Hypertension/TIA/CAD:   amlodipine 2.5mg daily - decreased from 5mg daily due to hypotension  lisinopril 5mg - decreased from 10mg daily due  to hypotension  isosorbide 30mg daily  metoprolol succinate 25mg daily - replaced tartrate as missing 2nd   aspirin 81mg daily   clopidogrel 75mg- Had been on clopidogrel alone prior to TIA that occurred in [place] on 6/22/2021.   BP Readings from Last 3 Encounters:   06/13/23 116/80   05/09/23 108/72   12/26/22 102/68     GERD:   Prevacid (lansoprazole) 15 once daily  Patient feels that current regimen is not effective since last week with the Trulicity change. Having a lot of gas and indigestion.  Has tried omeprazole and pantoprazole with lower efficacy so was put back on lansoprazole    Today's Vitals: /80   Pulse 91   Resp 20   SpO2 93%   ----------------    I spent 50 minutes with this patient today. All changes were made via collaborative practice agreement with Gabibe Rene MD. A copy of the visit note was provided to the patient's provider(s).    A summary of these recommendations was given to the patient.    Chelo Roberts, Pharm.D, [facility]  Medication Therapy Management Pharmacist  [phone]         Medication Therapy Recommendations  GERD (gastroesophageal reflux disease)    Current Medication: LANsoprazole (PREVACID) 15 MG DR capsule   Rationale: Condition refractory to medication - Ineffective medication - Effectiveness   Recommendation: Start Medication - famotidine 20 MG tablet - 1 daily   Status: Accepted per CPA         Type 2 diabetes mellitus with diabetic nephropathy, without long-term current use of insulin (H)    Current Medication: dulaglutide (TRULICITY) 1.5 MG/0.5ML pen (Discontinued)   Rationale: Undesirable effect - Adverse medication event - Safety   Recommendation: Change Medication - Mounjaro 5 MG/0.5ML Sopn - switch back to mounjaro   Status: Accepted per CPA

## 2023-06-13 NOTE — PATIENT INSTRUCTIONS
"Recommendations from today's MTM visit:                                                         1. Add in famotidine with breakfast.    2. Stop Trulicity and go back to Mounjaro    3. Have nurse call PharmD (hCelo) [phone] with any medication questions - should have all pills to be set up once per day in the AM for easier administration.    Follow-up: Return in about 4 weeks (around 7/11/2023).    It was great speaking with you today.  I value your experience and would be very thankful for your time in providing feedback in our clinic survey. In the next few days, you may receive an email or text message from [employer] with a link to a survey related to your  clinical pharmacist.\"     My Clinical Pharmacist's contact information:                                                      Please feel free to contact me with any questions or concerns you have.      Chelo Roberts, Pharm.D, [facility]  Medication Therapy Management Pharmacist  [phone]      "

## 2023-06-23 ENCOUNTER — PATIENT OUTREACH (OUTPATIENT)
Dept: CARE COORDINATION | Facility: CLINIC | Age: 63
End: 2023-06-23
Payer: COMMERCIAL

## 2023-06-23 NOTE — PROGRESS NOTES
Clinic Care Coordination Contact    Follow Up Progress Note      Assessment: Michael Mark emailed care coordinator stating 'Quick update: Mary from [facility] called me back, and I was able to schedule a tour for Ailyn on July 5th at 11am for their assisted living. I spoke to Elizabeth at [facility] in [place], and I scheduled Ailyn a tour for July 3rd at 10:30am.  I am making sure that his staff has July 3rd and 5th on the calendar to tour the two facilities. I am out of the office from July 3rd -7th and will be returning July 10th and hoping to come back with good news. I hope Ailyn is able to make these, as he agrees that he will be able to tour the first week of July. If you have any questions or concerns, please let me know.      Care Gaps:    Health Maintenance Due   Topic Date Due     PARATHYROID  Never done     PHOSPHORUS  Never done     ZOSTER IMMUNIZATION (1 of 2) Never done     LUNG CANCER SCREENING  01/26/2013     COLORECTAL CANCER SCREENING  06/03/2021     COVID-19 Vaccine (3 - Moderna series) 06/17/2021     FALL RISK ASSESSMENT  01/22/2023     AORTIC ANEURYSM SCREENING (SYSTEM ASSIGNED)  01/22/2023     MICROALBUMIN  04/24/2023     PHQ-9  04/24/2023         Plan:   Patient will be touring 2 assisted living facilities as noted above. His waiver  is involved in case he decided to move to one of them to set up a service agreement.  EDDIE will follow and also help out if/as needed    AMANDA Fuentes, [facility]  Clinic Care Coordinator  [email or web address]  [phone]

## 2023-06-28 DIAGNOSIS — I25.10 ATHEROSCLEROSIS OF CORONARY ARTERY OF NATIVE HEART WITHOUT ANGINA PECTORIS, UNSPECIFIED VESSEL OR LESION TYPE: ICD-10-CM

## 2023-06-28 DIAGNOSIS — I10 ESSENTIAL HYPERTENSION: ICD-10-CM

## 2023-06-29 RX ORDER — ISOSORBIDE MONONITRATE 30 MG/1
30 TABLET, EXTENDED RELEASE ORAL DAILY
Qty: 90 TABLET | Refills: 2 | Status: SHIPPED | OUTPATIENT
Start: 2023-06-29 | End: 2024-03-18

## 2023-06-29 NOTE — TELEPHONE ENCOUNTER
MTM got a call from home care nurse that patient needs order for isosorbide sent to pharmacy as he is now out of this medication.     Updated prescription per CPA with Gabbie Rene MD.    Chelo Roberts, Pharm.D, [facility]  Medication Therapy Management Pharmacist  [phone]

## 2023-07-03 DIAGNOSIS — R21 RASH: Primary | ICD-10-CM

## 2023-07-03 DIAGNOSIS — E11.21 TYPE 2 DIABETES MELLITUS WITH DIABETIC NEPHROPATHY, WITHOUT LONG-TERM CURRENT USE OF INSULIN (H): ICD-10-CM

## 2023-07-03 NOTE — TELEPHONE ENCOUNTER
MED: FREESTYLE READER & CREAM  LOV: (RELATED) 6/13/23    LAST LAB: 5/9/23    UPCOMING APPT: NONE

## 2023-07-05 DIAGNOSIS — I10 ESSENTIAL HYPERTENSION: ICD-10-CM

## 2023-07-05 DIAGNOSIS — I25.10 ATHEROSCLEROSIS OF CORONARY ARTERY OF NATIVE HEART WITHOUT ANGINA PECTORIS, UNSPECIFIED VESSEL OR LESION TYPE: ICD-10-CM

## 2023-07-05 DIAGNOSIS — E11.21 TYPE 2 DIABETES MELLITUS WITH DIABETIC NEPHROPATHY, WITHOUT LONG-TERM CURRENT USE OF INSULIN (H): ICD-10-CM

## 2023-07-05 NOTE — TELEPHONE ENCOUNTER
Clopidogrel Plavix 75 mg    LOV  & labs 6/13/23 with [facility]    No appt scheduled    Last notes  Atherosclerosis of native coronary artery of native heart with stable angina pectoris (H)  Statin, Plavix, ASA, Imdur, BB (metoprolol tartrate --> succinate at per [facility] pharmacy).     2. Stop Trulicity and go back to [facility]     3. Have nurse call PharmD Migdalia) [phone] with any medication questions - should have all pills to be set up once per day in the AM for easier administration. (excluding gabapentin)      Follow-up: Return in about 4 weeks (around 7/11/2023).    Spoke with patient to write note to have his homecare nurse set up appt with DAVID LANDIN in July 2023

## 2023-07-06 ENCOUNTER — TELEPHONE (OUTPATIENT)
Dept: PHARMACY | Facility: PHYSICIAN GROUP | Age: 63
End: 2023-07-06
Payer: COMMERCIAL

## 2023-07-06 PROBLEM — Z86.73 HISTORY OF TIA (TRANSIENT ISCHEMIC ATTACK) AND STROKE: Status: ACTIVE | Noted: 2021-08-09

## 2023-07-06 RX ORDER — CLOPIDOGREL BISULFATE 75 MG/1
75 TABLET ORAL DAILY
Qty: 90 TABLET | Refills: 1 | Status: SHIPPED | OUTPATIENT
Start: 2023-07-06 | End: 2024-01-05

## 2023-07-06 RX ORDER — CLOTRIMAZOLE AND BETAMETHASONE DIPROPIONATE 10; .5 MG/ML; MG/ML
LOTION TOPICAL 2 TIMES DAILY
Qty: 45 ML | Refills: 0 | Status: SHIPPED | OUTPATIENT
Start: 2023-07-06

## 2023-07-06 NOTE — TELEPHONE ENCOUNTER
Call from home care that patient's copay went up to $80 for Mounjaro and this is going to be hard for him to afford. There are not any patient assistance options for this medication right now.     He is working closely with care coordination, would like to see if he could apply for Extra Help with medicare to get more support on his copays, if this is not an option then we tre have to look at switching to alternative therapy that we could try to get patient assistance on.     Sending to care coordination to see if able to help facilitate applying for extra help.     Chelo Roberts, Pharm.D, BCACP  Medication Therapy Management Pharmacist  [phone]

## 2023-07-11 ENCOUNTER — PATIENT OUTREACH (OUTPATIENT)
Dept: CARE COORDINATION | Facility: CLINIC | Age: 63
End: 2023-07-11
Payer: COMMERCIAL

## 2023-07-11 NOTE — PROGRESS NOTES
Clinic Care Coordination Contact    Follow Up Progress Note      Assessment:    spoke with patient. He has a dental appointment at [facility] for the 28th at 2pm. They will look at the images from previous dental oral sugery center and work towards getting dentures done through insurance for patient.    Patient said in regards to his Manjouro medication his nurse Maeve knows more about that and the copay ($80). He will have her call [facility] tomorrow. SW told patient we can call the senior linkage line to see if he can qualify for extra help for it.     Patient did not make his tour at [facility] that was set up a bit ago because he was sick and would like to get it rescheduled. His homecare SW is helping to get another tour set up. He will let [facility] know if he needs more help in that regard.    Care Gaps:    Health Maintenance Due   Topic Date Due     PARATHYROID  Never done     PHOSPHORUS  Never done     ZOSTER IMMUNIZATION (1 of 2) Never done     LUNG CANCER SCREENING  01/26/2013     COLORECTAL CANCER SCREENING  06/03/2021     COVID-19 Vaccine (3 - Moderna series) 06/17/2021     FALL RISK ASSESSMENT  01/22/2023     AORTIC ANEURYSM SCREENING (SYSTEM ASSIGNED)  01/22/2023     MICROALBUMIN  04/24/2023     PHQ-9  04/24/2023         Plan:   [facility] will continue to follow closely and will reach out later this week if patient doesn't call tomorrow.     AMANDA Fuentes, [facility]  Clinic Care Coordinator  [email or web address]  [phone]

## 2023-07-12 ENCOUNTER — PATIENT OUTREACH (OUTPATIENT)
Dept: CARE COORDINATION | Facility: CLINIC | Age: 63
End: 2023-07-12
Payer: COMMERCIAL

## 2023-07-12 NOTE — PROGRESS NOTES
Clinic Care Coordination Contact    Follow Up Progress Note      Assessment:  spoke with Maeve (homecare nurse, [place]) [phone]. She said that she called the pharmacy and the manjouro was covered it was the alva sensor that wasn't at $81. They gave pt a [employer] discount and his new copay was $64 which patient paid even though it's tough financially for him and now he has a month worth. He would like to figure out a plan to afford it better for next month.   [facility] left a msg for So at [facility] the RN CC there who helped pt with that in April to see what she says.    [facility] wants [facility] to set up another tour of [facility] and they are willing to meet him there and be on the tour but can't provide transportation. Pt's IHS worker Ezekiel more who usually took him places. Michael is trying to find him a new worker. Insurance cannot provide rides to/from if it's not a medical appt.      left patient a message and requested a call back; plan to see if Lesia can bring him on a tour.      Care Gaps:    Health Maintenance Due   Topic Date Due     PARATHYROID  Never done     PHOSPHORUS  Never done     ZOSTER IMMUNIZATION (1 of 2) Never done     LUNG CANCER SCREENING  01/26/2013     COLORECTAL CANCER SCREENING  06/03/2021     COVID-19 Vaccine (3 - Moderna series) 06/17/2021     FALL RISK ASSESSMENT  01/22/2023     AORTIC ANEURYSM SCREENING (SYSTEM ASSIGNED)  01/22/2023     MICROALBUMIN  04/24/2023     PHQ-9  04/24/2023         Plan:   Set up a tour of [facility] and figure out transportation to go  Figure out medication affordability for Alva Sensor   [facility] will follow up    Christine Rutledge, MSALEX, [facility]  Clinic Care Coordinator  [email or web address]  [phone]

## 2023-07-19 ENCOUNTER — PATIENT OUTREACH (OUTPATIENT)
Dept: CARE COORDINATION | Facility: CLINIC | Age: 63
End: 2023-07-19
Payer: COMMERCIAL

## 2023-07-19 NOTE — PROGRESS NOTES
"Clinic Care Coordination Contact    Follow Up Progress Note      Assessment: [facility] spoke with patient. He wanted help to get into the podiatrist. SW called [facility] in [place] and got him an appointment for the 25th at 1:30. He said his nails are overgrown and hurting so need maintenance.    EDDIE then discussed setting up a tour at [facility]. He doesn't have anyone that can help bring him so he knows he would have to pay for a cab. He wants to see how much it would cost and if it's affordable for him. He \"wants to just get it over with\". He doesn't have an IHS staff anymore and his  is trying to find a new staff. His homecare staff cannot transport. He said his PT might know someone that could do it. He will let CC know what he wants to do by Friday.  His homecare staff can meet him at the tour but cant transport.    ** upon helping to schedule insurance rides for podiatry and dental, it was noted that if the waiver  authorizes a ride through the waiver they can get a covered ride for him for the tour. EDDIE messaged Isela ([facility] about this and gave her the # to call to authorize and asked for an update    Patient said [facility] visit for his shoulder went well. They did a cortisone shot and it's feeling much better    Care Gaps:    Health Maintenance Due   Topic Date Due     PARATHYROID  Never done     PHOSPHORUS  Never done     ZOSTER IMMUNIZATION (1 of 2) Never done     LUNG CANCER SCREENING  01/26/2013     COLORECTAL CANCER SCREENING  06/03/2021     COVID-19 Vaccine (3 - Moderna series) 06/17/2021     FALL RISK ASSESSMENT  01/22/2023     AORTIC ANEURYSM SCREENING (SYSTEM ASSIGNED)  01/22/2023     MICROALBUMIN  04/24/2023     PHQ-9  04/24/2023         Plan:   [facility] will touch base before the end of the week with patient about AL tour    Set up podiatry appt for 25th     AMANDA Fuentes, [facility]  Clinic Care Coordinator  [email or web address]  [phone]  "

## 2023-07-28 DIAGNOSIS — E78.2 MIXED HYPERLIPIDEMIA: ICD-10-CM

## 2023-07-28 DIAGNOSIS — I25.10 ATHEROSCLEROSIS OF CORONARY ARTERY OF NATIVE HEART WITHOUT ANGINA PECTORIS, UNSPECIFIED VESSEL OR LESION TYPE: ICD-10-CM

## 2023-07-28 DIAGNOSIS — E11.21 TYPE 2 DIABETES MELLITUS WITH DIABETIC NEPHROPATHY, WITHOUT LONG-TERM CURRENT USE OF INSULIN (H): ICD-10-CM

## 2023-07-29 RX ORDER — TIRZEPATIDE 5 MG/.5ML
5 INJECTION, SOLUTION SUBCUTANEOUS
Qty: 2 ML | Refills: 1 | Status: SHIPPED | OUTPATIENT
Start: 2023-07-29 | End: 2023-08-10

## 2023-07-29 RX ORDER — ATORVASTATIN CALCIUM 40 MG/1
40 TABLET, FILM COATED ORAL DAILY
Qty: 90 TABLET | Refills: 3 | Status: SHIPPED | OUTPATIENT
Start: 2023-07-29 | End: 2024-07-31

## 2023-07-29 NOTE — CONFIDENTIAL NOTE
MED: ATORVASTATIN & MOUNJARO  LOV: (RELATED) 5/9/23    UPCOMING APPT: NONE    PER LAST OV NOTE: FOLLOW UP IN 4 WEEKS

## 2023-08-02 ENCOUNTER — PATIENT OUTREACH (OUTPATIENT)
Dept: CARE COORDINATION | Facility: CLINIC | Age: 63
End: 2023-08-02
Payer: COMMERCIAL

## 2023-08-02 NOTE — PROGRESS NOTES
Clinic Care Coordination Contact  Follow Up Progress Note      Assessment:    heard from patient's homecare team that patient has a new S worker who can transport.   called patient and he is interested in touring [facility] still.  called Mary at [facility] and a tour is scheduled for August 10th at 1pm.    called and spoke with Clint and Avtar Plummer (IHS Worker) and she agreed to take him. Her direct # is [phone].     SW let pt's homecare team know about the tour being set up and transportation being confirmed.      Plan:   Tour of [facility] set up for August 10th at 1pm.     AMANDA Fuentes, LICSW  Clinic Care Coordinator  [email or web address]  [phone]     8/3/23: Addendum  Avtar called and said she has a conflict for the 10th and will call [facility] and reschedule the tour. She will let care coordinator know when it's scheduled for. SACHA Kohler

## 2023-08-10 DIAGNOSIS — E11.21 TYPE 2 DIABETES MELLITUS WITH DIABETIC NEPHROPATHY, WITHOUT LONG-TERM CURRENT USE OF INSULIN (H): ICD-10-CM

## 2023-08-10 RX ORDER — TIRZEPATIDE 5 MG/.5ML
5 INJECTION, SOLUTION SUBCUTANEOUS
Qty: 6 ML | Refills: 3 | Status: SHIPPED | OUTPATIENT
Start: 2023-08-10 | End: 2024-06-20

## 2023-08-10 NOTE — TELEPHONE ENCOUNTER
Mounjaro- pharmacy states pt would benefit from a 90 day supply    Monjaro  LOV 5/9/23  Type 2 diabetes mellitus with stage 3b chronic kidney disease, without long-term current use of insulin (H)  Microalbuminuria due to type 2 diabetes mellitus (H)  Not meeting goal of <7% A1c  Jardiance 25mg daily   Glipizide ER 10mg daily   Uses CGM : did not bring monitor device today.  Bydureon --> Mounjaro 5mg weekly as per [facility] pharmacy note  .-     Basic Metabolic Panel (8) ([facility])  -     VENOUS COLLECTION  -     Hemoglobin A1C ([facility])  BP Readings from Last 3 Encounters:   06/13/23 116/80   05/09/23 108/72   12/26/22 102/68     Hemoglobin A1C   Date Value Ref Range Status   05/09/2023 8.4 (A) 4.0 - 6.0 % Final     Next appointment 9/1/23

## 2023-08-11 ENCOUNTER — PATIENT OUTREACH (OUTPATIENT)
Dept: CARE COORDINATION | Facility: CLINIC | Age: 63
End: 2023-08-11
Payer: COMMERCIAL

## 2023-08-11 NOTE — PROGRESS NOTES
"Clinic Care Coordination Contact  Follow Up Progress Note      Assessment:    got a call from \"Tyler\" the RN Care Coordinator at [facility]. Patient is there for a tour today and will be filling out the housing application with Avtar (IHS Worker) and submitting it. As of right now they are thinking he will be a good fit for there in their independent housing with services. He would get weekly housekeeping, laundry help, medication management, and help showering. If he started to need help transferring from bed to chair and chair to bed then he would have to go to their AL or nursing home depending on the need. They use [facility] homecare so records transfer will be easier to get the info they need to review.    Patient will let care coordinator know what he thinks afterwards. They are full now with a wait list but that can change quickly.     EDDIE gave her Isela ([employer] waiver worker) contact info too.    [facility] will follow.    Christine Rutledge, MSW, [facility]  Clinic Care Coordinator  [email or web address]  [phone]   "

## 2023-08-17 ENCOUNTER — PATIENT OUTREACH (OUTPATIENT)
Dept: CARE COORDINATION | Facility: CLINIC | Age: 63
End: 2023-08-17
Payer: COMMERCIAL

## 2023-08-17 DIAGNOSIS — I10 ESSENTIAL HYPERTENSION: ICD-10-CM

## 2023-08-17 RX ORDER — AMLODIPINE BESYLATE 2.5 MG/1
2.5 TABLET ORAL DAILY
Qty: 90 TABLET | Refills: 3 | Status: SHIPPED | OUTPATIENT
Start: 2023-08-17

## 2023-08-17 NOTE — LETTER
[facility]    August 17, 2023    Emily Dylantonya  [address]  [place] [address]      Dear Emily,        I am a  clinic care coordinator who works with Gabbie Rene MD with the [facility]. I wanted to thank you for spending the time to talk with me.  Below is a description of clinic care coordination and how I can further assist you.       The clinic care coordination team is made up of a registered nurse, , financial resource worker and community health worker who understand the health care system. The goal of clinic care coordination is to help you manage your health and improve access to the health care system. Our team works alongside your provider to assist you in determining your health and social needs. We can help you obtain health care and community resources, providing you with necessary information and education. We can work with you through any barriers and develop a care plan that helps coordinate and strengthen the communication between you and your care team.  Our services are voluntary and are offered without charge to you personally.    Please feel free to contact me with any questions or concerns regarding care coordination and what we can offer.      We are focused on providing you with the highest-quality healthcare experience possible.    Sincerely,     Christine Rutledge, AMANDA, [facility]  Clinic Care Coordinator  [email or web address]  [phone]

## 2023-08-17 NOTE — LETTER
Patient Centered Plan of Care  About Me:        Patient Name:  Emily Zhu    YOB: 1960  Age:         63 year old   Alfonso MRN:    [id number] Telephone Information:  Home Phone [phone]   Mobile None       Address:  [address]  [place] [address] Email address:  No e-mail address on record      Emergency Contact(s)    Name Relationship Lgl Grd Work Phone Home Phone Mobile Phone   1. EDWARD BRAR Sister No [phone]    2. LINUS Friend No  [phone]            Primary language:  English     needed? No   [facility]:  [phone] op. 1  Other communication barriers:None; Other (hernando is Shinto and had a thick accent but does not need to use )    Preferred Method of Communication:     Current living arrangement: No data recorded  Mobility Status/ Medical Equipment: No data recorded      Health Maintenance  Health Maintenance Reviewed: Due/Overdue       My Access Plan  Medical Emergency 911   Primary Clinic Line [facility] - [phone]   24 Hour Appointment Line [phone] or  [phone]) (toll-free)   24 Hour Nurse Line [phone] (toll-free)   Preferred Urgent Care No data recorded   Preferred Hospital No data recorded   Preferred Pharmacy [id number] - [place], [place] - [address] [place] [address]     Behavioral Health Crisis Line The National Suicide Prevention Lifeline at 1-526.995.3167 or Text/Call 958             My Care Team Members  Patient Care Team         Relationship Specialty Notifications Start End    Gabbie Rene MD PCP - General Family Medicine  4/28/23     Phone: [phone] Fax: [phone] [address] [facility] [address]    Christine Tao LICSW Lead Care Coordinator  Admissions 1/10/20     Tamica Tang APRN CNP Assigned PCP   8/1/21     Phone: [phone] Fax: [phone] [address] [facility] [address]     Chelo Roberts, [facility] Assigned MTM Pharmacist   9/28/22     Phone: [phone] [address] [facility] [address]    [facility] Care Coordinator- Andie Eduardo    10/19/22     Phone: [phone]         Michael Pretty MD MD Neurology  12/15/22     Phone: [phone] Fax: [phone]         [address] [facility] [address]    Michael Pretty MD Assigned Neuroscience Provider   12/31/22     Phone: [phone] Fax: [phone]         [address] [facility] [address]              My Care Plans  Self Management and Treatment Plan  Care Plan  Care Plan: Housing and Support       Problem: Insufficient In-home support       Goal: I will work with [facility] case management to find alternative housing option within 1 year       Start Date: 5/19/2022 Expected End Date: 10/11/2023    This Visit's Progress: 90% Recent Progress: 90%    Priority: High    Note:     8-Goal Statement: I will work with [facility] case [facility] to find alternative housing option within 1 year  Date Goal set: 5/19/22  Barriers: availability of housing options-   Strengths: has [facility] waiver now, and [facility] services   Date to Achieve By: Nov 19th, 2022  Patient expressed understanding of goal: yes  Action steps to achieve this goal:  1. I will continue to meet with  to figure out options -Porsche from [employer]   2. I will keep PCP and care coordinator updated   3. I will submit any paperwork needed at facilities     12/15/22: Pt resides at market rate apartment still. He gets PCA, IHS, and homemaking and homecare nursing for medication management. He is still interested in moving but would like to figure out things for approval of social security funds first which he should find out about this month. He may want to move to [facility].  12/29- patient was approved for social security disability.     2/13/23---wants to talk to  now that he is approved for social security.  Extended goal  4/7- working on finding assisted living. Has homecare RN and PT and also has IHS and PCA services right now  4/13/23: Miesha KHARI worker is going to find assisted living placement and will start calling around now  7/11/23- working on setting up a tour at [facility] as discussed by homecare team. Extended goal  8/2/23: tour set up for August 10th at 1pm at [facility] and IHS worker will bring him                                 Action Plans on File:            Depression          Advance Care Plans/Directives Type:   No data recorded    My Medical and Care Information  Problem List   Patient Active Problem List   Diagnosis    Atherosclerosis of native coronary artery of native heart with stable angina pectoris (H)    Essential hypertension    Mixed hyperlipidemia    Cataract    GERD (gastroesophageal reflux disease)    Microalbuminuria due to type 2 diabetes mellitus (H)    Type 2 diabetes mellitus with stage 3b chronic kidney disease, without long-term current use of insulin (H)    Warthin tumor    Stage 3b chronic kidney disease (H)    Tobacco use    History of TIA (transient ischemic attack) and stroke    Major depressive disorder, recurrent, mild (H)    Polyneuropathy associated with underlying disease (H)      Current Medications and Allergies:  See printed Medication Report.    Care Coordination Start Date: 1/10/2020   Frequency of Care Coordination: monthly     Form Last Updated: 08/17/2023

## 2023-08-17 NOTE — TELEPHONE ENCOUNTER
Denisse DELUNA with [facility] calls requesting refill on patient's amlodipine. His current dose is 2.5mg every day (takes 1/2 of 5mg tab). Dose was decreased 2.5mg QD at 5/9/23 with Dr. Rene and Chelo Roberts PharmD due to low BP's.   Had follow up visit with Chelo Roberts PharmD 6/13 and BP was okay. See below.    BP Readings from Last 6 Encounters:   06/13/23 116/80   05/09/23 108/72   12/26/22 102/68   12/16/22 114/76   12/12/22 123/89   11/28/22 98/68      Future appt: 9/1/23 with Dr. Rene   Plan: routed request to Dr. Rene for review.  Yelena Montiel RN

## 2023-08-17 NOTE — PROGRESS NOTES
Clinic Care Coordination Contact  Follow Up Progress Note      Assessment:    got a call from patient. He liked touring [facility]. He likes it but he was put on a wait list and submitted an application. He wants Isela ([facility] ) to set up a tour of a couple other places she had in mind  Patient has dentures placement September 11th scheduled and a ride set up  Patient did go to the podiatrist to get his nails trimmed     Patient also spoke with the [facility]. His SNAP was renewed.   He was told that he will get SLMB and QMB to cover his insurance premium instead of it getting taken out of his social security money each month. He will be getting back pay from the [facility] for that.   Patient is pleased to hear this    Patient says a nurse from [facility] came to visit him and do an assessment-   She gave him a paper that when he goes to [facility] he must show the PCP that. He has an appt at [facility] September 1st.      Plan:    will continue to follow.    Christine Rutledge, AMANDA, [facility]  Clinic Care Coordinator  [email or web address]  [phone]

## 2023-08-25 ENCOUNTER — TELEPHONE (OUTPATIENT)
Dept: FAMILY MEDICINE | Facility: CLINIC | Age: 63
End: 2023-08-25

## 2023-08-25 NOTE — TELEPHONE ENCOUNTER
Prior authorization for Freestyle Reanna Kit 2 Sensor done via CoverMyMeds. Approved through 12/31/23.   Eliz Rios MA

## 2023-09-01 ENCOUNTER — OFFICE VISIT (OUTPATIENT)
Dept: FAMILY MEDICINE | Facility: CLINIC | Age: 63
End: 2023-09-01

## 2023-09-01 VITALS
HEART RATE: 99 BPM | HEIGHT: 65 IN | DIASTOLIC BLOOD PRESSURE: 75 MMHG | WEIGHT: 177.4 LBS | SYSTOLIC BLOOD PRESSURE: 138 MMHG | BODY MASS INDEX: 29.56 KG/M2 | OXYGEN SATURATION: 97 %

## 2023-09-01 DIAGNOSIS — Z86.73 HISTORY OF TIA (TRANSIENT ISCHEMIC ATTACK) AND STROKE: ICD-10-CM

## 2023-09-01 DIAGNOSIS — F33.1 MODERATE EPISODE OF RECURRENT MAJOR DEPRESSIVE DISORDER (H): ICD-10-CM

## 2023-09-01 DIAGNOSIS — Z23 NEED FOR INFLUENZA VACCINATION: ICD-10-CM

## 2023-09-01 DIAGNOSIS — E11.29 MICROALBUMINURIA DUE TO TYPE 2 DIABETES MELLITUS (H): ICD-10-CM

## 2023-09-01 DIAGNOSIS — N18.32 TYPE 2 DIABETES MELLITUS WITH STAGE 3B CHRONIC KIDNEY DISEASE, WITHOUT LONG-TERM CURRENT USE OF INSULIN (H): Primary | ICD-10-CM

## 2023-09-01 DIAGNOSIS — G63 POLYNEUROPATHY ASSOCIATED WITH UNDERLYING DISEASE (H): ICD-10-CM

## 2023-09-01 DIAGNOSIS — N18.32 STAGE 3B CHRONIC KIDNEY DISEASE (H): ICD-10-CM

## 2023-09-01 DIAGNOSIS — E11.22 TYPE 2 DIABETES MELLITUS WITH STAGE 3B CHRONIC KIDNEY DISEASE, WITHOUT LONG-TERM CURRENT USE OF INSULIN (H): Primary | ICD-10-CM

## 2023-09-01 DIAGNOSIS — I10 ESSENTIAL HYPERTENSION: ICD-10-CM

## 2023-09-01 DIAGNOSIS — Z72.0 TOBACCO USE: ICD-10-CM

## 2023-09-01 DIAGNOSIS — E78.2 MIXED HYPERLIPIDEMIA: ICD-10-CM

## 2023-09-01 DIAGNOSIS — I25.118 ATHEROSCLEROSIS OF NATIVE CORONARY ARTERY OF NATIVE HEART WITH STABLE ANGINA PECTORIS (H): ICD-10-CM

## 2023-09-01 DIAGNOSIS — R80.9 MICROALBUMINURIA DUE TO TYPE 2 DIABETES MELLITUS (H): ICD-10-CM

## 2023-09-01 PROBLEM — N28.89 RENAL MASS: Status: ACTIVE | Noted: 2023-05-16

## 2023-09-01 LAB
A/C RATIO (RMG): >300
ALBUMIN- RMG: 150 (ref 10–30)
HBA1C MFR BLD: 7.3 % (ref 4–6)
INTERPRETATION: ABNORMAL
URINE CREATININE - RMG: 200 (ref 0–300)

## 2023-09-01 PROCEDURE — 83036 HEMOGLOBIN GLYCOSYLATED A1C: CPT | Performed by: FAMILY MEDICINE

## 2023-09-01 PROCEDURE — 82044 UR ALBUMIN SEMIQUANTITATIVE: CPT | Performed by: FAMILY MEDICINE

## 2023-09-01 PROCEDURE — G0008 ADMIN INFLUENZA VIRUS VAC: HCPCS | Mod: 59 | Performed by: FAMILY MEDICINE

## 2023-09-01 PROCEDURE — 99214 OFFICE O/P EST MOD 30 MIN: CPT | Mod: 25 | Performed by: FAMILY MEDICINE

## 2023-09-01 PROCEDURE — 90694 VACC AIIV4 NO PRSRV 0.5ML IM: CPT | Performed by: FAMILY MEDICINE

## 2023-09-01 ASSESSMENT — ANXIETY QUESTIONNAIRES
3. WORRYING TOO MUCH ABOUT DIFFERENT THINGS: NEARLY EVERY DAY
2. NOT BEING ABLE TO STOP OR CONTROL WORRYING: NEARLY EVERY DAY
6. BECOMING EASILY ANNOYED OR IRRITABLE: NOT AT ALL
GAD7 TOTAL SCORE: 17
5. BEING SO RESTLESS THAT IT IS HARD TO SIT STILL: NEARLY EVERY DAY
1. FEELING NERVOUS, ANXIOUS, OR ON EDGE: NEARLY EVERY DAY
GAD7 TOTAL SCORE: 17
7. FEELING AFRAID AS IF SOMETHING AWFUL MIGHT HAPPEN: NEARLY EVERY DAY

## 2023-09-01 ASSESSMENT — PATIENT HEALTH QUESTIONNAIRE - PHQ9
5. POOR APPETITE OR OVEREATING: MORE THAN HALF THE DAYS
SUM OF ALL RESPONSES TO PHQ QUESTIONS 1-9: 17

## 2023-09-01 NOTE — PATIENT INSTRUCTIONS
Patient Education   Personalized Prevention Plan  You are due for the preventive services outlined below.  Your care team is available to assist you in scheduling these services.  If you have already completed any of these items, please share that information with your care team to update in your medical record.  Health Maintenance Due   Topic Date Due    Parathyroid Lab  Never done    Phosphorous Lab  Never done    LUNG CANCER SCREENING  01/26/2013    Colorectal Cancer Screening  06/03/2021    COVID-19 Vaccine (3 - Moderna series) 06/17/2021    FALL RISK ASSESSMENT  01/22/2023    AORTIC ANEURYSM SCREENING (SYSTEM ASSIGNED)  01/22/2023    Kidney Microalbumin Urine Test  04/24/2023    Depression Assessment  04/24/2023    Flu Vaccine (1) 09/01/2023    Hemoglobin  10/24/2023

## 2023-09-01 NOTE — LETTER
September 5, 2023      Clint Zhu  [address]  [place] [address]              Dear Emily,     It was spring to see you at your recent appointment.     Here are your lab results.     Your A1c (or the measure of your glucose control over the last 3 months) is better since the last check. This indicates improved glucose control.     This microalbuminuria test screens for protein (albumin) in the urine. Increased excretion of urinary albumin is a marker of kidney damage which is most commonly caused by diabetes and / or high blood pressure.     You have severely increased albumin in the urine, (also known as macro albuminuria ), with an albumin to creatine ratio of more than 300 mg/g.     This is managed by good hypertension and diabetes control.     Resulted Orders   Hemoglobin A1C (RMG)   Result Value Ref Range    Hemoglobin A1C 7.3 (A) 4.0 - 6.0 %   Microalbumin ([facility])   Result Value Ref Range    Albumin mg/L 150 (A) 10 - 30    Urine Creatinine Mg/Dl 200 0 - 300    A/C Ratio mg/g >300 (A)     Interpretation high abnormal (A)        If you have any questions or concerns, please call the clinic at the number listed above.       Sincerely,  aGbbie Rene MD

## 2023-09-01 NOTE — PROGRESS NOTES
"SUBJECTIVE:    Emily Zhu, is a 63 year old male presenting with staff member for the below:     T2DM :   Mounjaro 5mg weekly  - given by home REGI Quevedo 25mg daily   Glipizide ER 10mg daily     Uses CGM : brings in monitor today:  average glucose 7 days : 142  Time in range 91%    Hypertension/TIA/CAD:   amlodipine 2.5 mg daily  lisinopril 5mg daily  isosorbide 30mg daily  metoprolol succinate 25 mg daily  aspirin 81mg daily   clopidogrel 75mg- Had been on clopidogrel alone prior to TIA that occurred in [place] on 6/22/2021.   Lipitor 40 mg daily    Peripheral neuropathy:  Gabapentin 300 mg TID    MDD : Zoloft 100 mg daily.     OBJECTIVE:  Vitals:    09/01/23 1440   BP: 138/75   Pulse: 99   SpO2: 97%   Weight: 80.5 kg (177 lb 6.4 oz)   Height: 1.657 m (5' 5.25\")    Body mass index is 29.3 kg/m .  General: no acute distress, cooperative with exam.  Lungs: clear to auscultation bilaterally, normal respiratory effort.  Heart:  RRR without murmurs, rubs or gallops.   Psych: mental status normal, mood and affect appropriate.    Hemoglobin A1C   Date Value Ref Range Status   09/01/2023 7.3 (A) 4.0 - 6.0 % Final   05/09/2023 8.4 (A) 4.0 - 6.0 % Final   10/24/2022 9.9 (A) 4.0 - 6.0 % Final     Wt Readings from Last 3 Encounters:   09/01/23 80.5 kg (177 lb 6.4 oz)   05/09/23 85.7 kg (189 lb)   12/26/22 83.5 kg (184 lb)         6/20/2022     2:05 PM 10/24/2022    11:00 AM 9/1/2023     2:43 PM   PHQ   PHQ-9 Total Score 12 12 17   Q9: Thoughts of better off dead/self-harm past 2 weeks Several days Not at all Several days         8/14/2020    11:00 AM 6/20/2022     2:05 PM 9/1/2023     2:43 PM   KELLIE-7 SCORE   Total Score 7 10 17       ASSESSMENT / PLAN:        Essential hypertension  Stage 3b chronic kidney disease (H)  Adequate blood pressure control since antihypertensive medications down titrated : lisinopril 10 --> 5 mg, norvasc 5--> 2.5 mg.     Type 2 diabetes mellitus with stage 3b chronic kidney disease, without " long-term current use of insulin (H)  Microalbuminuria due to type 2 diabetes mellitus (H)  Not meeting goal of <7% A1c, but A1c improved 8.4%-->7.3% with 7 day time in range 91% since switching to Mounjaro. Adequate control in this patient >65 with multiple co morbidities.  Jardiance 25mg daily   Glipizide ER 10mg daily   Mounjaro 5mg weekly.  No changes made today.   -     Hemoglobin A1C ([facility])  -     Microalbumin ([facility])    Polyneuropathy associated with underlying disease (H)  Gabapentin 300 mg TID.    Moderate episode of recurrent major depressive disorder (H)  Zoloft 100 mg daily.       Mixed hyperlipidemia  Tobacco use  History of TIA (transient ischemic attack) and stroke  Atherosclerosis of native coronary artery of native heart with stable angina pectoris (H)  Statin, Plavix, ASA, Imdur, BB   Improved medication adherence since metoprolol tartrate --> succinate at per [facility] pharmacy.      Need for influenza vaccination  -     INFLUENZA VACCINE 65+ (FLUAD)    Follow-up in 4 weeks for  -MAW with clinician and [facility] pharmacist joint appointment.     Appointments in Next Year      Oct 17, 2023  2:00 PM  Pharmacist Visit with Chelo Roberts RPH  [facility] ([facility] ) [phone]     Oct 17, 2023  2:30 PM  PHYSICAL with Gabbie Rene MD  [facility] ([facility]) [phone]

## 2023-09-11 DIAGNOSIS — K21.9 GERD (GASTROESOPHAGEAL REFLUX DISEASE): ICD-10-CM

## 2023-09-11 NOTE — CONFIDENTIAL NOTE
Med: LANSOPRAZOLE    LOV (related): 9/1/23 - MEDS      Due for F/U around: 9/2024 FOR AWV. 12/2024 FOR DM CHECK    Next Appt: 10/17/23 WITH AM & MTM

## 2023-09-12 RX ORDER — MECOBALAMIN 5000 MCG
15 TABLET,DISINTEGRATING ORAL DAILY
Qty: 30 CAPSULE | Refills: 4 | Status: SHIPPED | OUTPATIENT
Start: 2023-09-12 | End: 2024-01-26

## 2023-09-13 ENCOUNTER — PATIENT OUTREACH (OUTPATIENT)
Dept: CARE COORDINATION | Facility: CLINIC | Age: 63
End: 2023-09-13
Payer: COMMERCIAL

## 2023-09-19 ENCOUNTER — PATIENT OUTREACH (OUTPATIENT)
Dept: CARE COORDINATION | Facility: CLINIC | Age: 63
End: 2023-09-19
Payer: COMMERCIAL

## 2023-09-19 NOTE — PROGRESS NOTES
"Clinic Care Coordination Contact  Follow Up Progress Note      Assessment:    received emails from Michael Mark (Waiver ) and Nakia Martinez (Homecare ) to set up care conference to go over progress on navigating services and finding assisted living for patient.     Michael's update:    I wanted to give you two an update on Clint. Clint has a S worker that has been assisting him with cares and taking him to his appointments. The staff was supposed to assist Clint with going to a tour last Friday at [facility]. Last Thursday the provider contacted me and said that the female staff felt uncomfortable with Clint due him crossing boundaries. I rescheduled the tour for this Wednesday as I was told that Clint would have a permanent new staff starting today. Clint and I spoke this morning and he said the new staff did come. Clint said he doesn't want services from a man, because he doesn't trust them. I asked if the new staff did or said anything for him not to trust him. Clint said the staff is new and he thinks he would take advantage of the fact his memory is bad. Clint has never discussed having issues with having a male staff before. He has requested me to find a new company, which I'm fine with. I did explain to Clint that this may take more time. I know that Clint speaks very highly of faye King, and he may listen to your suggestions. I'm hoping that we can figure out a way for Clint to be consistent with his staff. I noticed that there are staffing issues when I have a tour scheduled, but it could just be coincident. I will be looking for a new provider and will be talking to Clint throughout the week. Let me know if you have any questions.\"    [facility] responded and we can talk more when we meet.    Christine Tao', MSW, [facility]  Clinic Care Coordinator  [email or web address]  [phone]     Addendum 9/19:  Michael emailed another update:    I spoke to Clint earlier today and he said that he would give " the male staff a chance. Which is great news since we can keep to the schedule for the tour tomorrow. I will give an update on how the tour goes. I also spoke to Denisse a few weeks ago, and she said that she knows someone at [facility], and she will see if there is anything she can do. Hopefully we can start getting some better results before winter starts.     Christine Rutledge, AMANDA, [facility]  Clinic Care Coordinator  [email or web address]  [phone]

## 2023-09-20 ENCOUNTER — PATIENT OUTREACH (OUTPATIENT)
Dept: CARE COORDINATION | Facility: CLINIC | Age: 63
End: 2023-09-20
Payer: COMMERCIAL

## 2023-09-20 NOTE — PROGRESS NOTES
Clinic Care Coordination Contact  Follow Up Progress Note      Assessment:     Care conference via phone was held between writer, Michael Mark (Waiver ) and Nakia Martinez (homecare ). Discussed potential need for neuropsychological assessment to further get insight into mental health and memory changes and get input on level of care for services. We have been trying to make movement on getting patient to agree to move to assisted living. He has done some tours and is on a wait list at [facility]. Michael has tried to set up a tour at a place called [facility] ([facility] but pt keeps making up excuses about why he can't go on the tour (has an appt already, no staff or anyone to take him). Michael may see if she can take him herself if approved by supervisor. Discussed patient has had a lot of staff changes mostly due to staff not wanting to work with patient due to boundary issues of making his staff feel uncomfortable. Patient has also not wanted to work with anyone who is of  descent or [place].     Homecare continues to be involved via [facility]. He has RN and PT and SW. They all feel patient needs AL level of care.    [facility] messaged Dr. Rene about order for neuropsychological testing and will talk with patient about it and if agreeable will help to get that scheduled.     Christine Rutledge, MSW, [facility]  Clinic Care Coordinator  [email or web address]  [phone]

## 2023-09-21 DIAGNOSIS — R41.89 COGNITIVE CHANGES: ICD-10-CM

## 2023-09-21 DIAGNOSIS — Z86.73 HISTORY OF TIA (TRANSIENT ISCHEMIC ATTACK) AND STROKE: Primary | ICD-10-CM

## 2023-09-22 PROCEDURE — G0179 MD RECERTIFICATION HHA PT: HCPCS | Performed by: FAMILY MEDICINE

## 2023-09-27 ENCOUNTER — PATIENT OUTREACH (OUTPATIENT)
Dept: CARE COORDINATION | Facility: CLINIC | Age: 63
End: 2023-09-27
Payer: COMMERCIAL

## 2023-09-27 NOTE — PROGRESS NOTES
Clinic Care Coordination Contact  Follow Up Progress Note      Assessment:  called patient to help set up the neuropsychological assessment. The referral was placed.  Soonest he could get in at the [place] location is March 7th it will be an 8am appt with Dr. Siddiqui. He will also be put on a cancellation list.  [address] 3rd floor for the appt.      Plan:   [facility] may try to see if patient can get in somewhere else sooner but will keep this appt for now.  notified the homecare team and [facility] .    -- Alfreda Marinelli Fax # is [phone]. They are booking out till January/February.  -[facility]: 2-3 months out to get in. Fax [phone]  [facility]- Don't have any providers that do testing over age 60  [facility]: as of right now not accepting outside referrals for pt's over age of 65      EDDIE asked triage to fax referrals to [facility]a Marinelli and [facility] and we can see where he comes up the soonest.        Christine Rutledge, MSW, [facility]  Clinic Care Coordinator  [email or web address]  [phone]

## 2023-10-06 DIAGNOSIS — G63 POLYNEUROPATHY ASSOCIATED WITH UNDERLYING DISEASE (H): ICD-10-CM

## 2023-10-06 DIAGNOSIS — E11.21 TYPE 2 DIABETES MELLITUS WITH DIABETIC NEPHROPATHY, WITHOUT LONG-TERM CURRENT USE OF INSULIN (H): ICD-10-CM

## 2023-10-06 RX ORDER — GLIPIZIDE 10 MG/1
10 TABLET, FILM COATED, EXTENDED RELEASE ORAL DAILY
Qty: 90 TABLET | Refills: 1 | Status: SHIPPED | OUTPATIENT
Start: 2023-10-06 | End: 2024-01-18

## 2023-10-06 RX ORDER — LANOLIN ALCOHOL/MO/W.PET/CERES
1000 CREAM (GRAM) TOPICAL DAILY
Qty: 90 TABLET | Refills: 1 | Status: SHIPPED | OUTPATIENT
Start: 2023-10-06 | End: 2024-04-25

## 2023-10-06 RX ORDER — METOPROLOL SUCCINATE 25 MG/1
25 TABLET, EXTENDED RELEASE ORAL DAILY
Qty: 90 TABLET | Refills: 1 | Status: SHIPPED | OUTPATIENT
Start: 2023-10-06 | End: 2024-03-28

## 2023-10-06 NOTE — CONFIDENTIAL NOTE
Med: GLIPIZIDE, B12, METOPROLOL, JARDIANCE    LOV (related): 9/1/23 - CPX    Last Lab: 9/1/23      Due for F/U around: 9/2024 FOR CPX - 12/2023 FOR DM CHECK    Next Appt: 10/17/23 MTM AND OV        Lab Results   Component Value Date    A1C 7.3 09/01/2023    A1C 8.4 05/09/2023    A1C 9.9 10/24/2022    A1C 6.4 05/09/2022    A1C 5.9 02/07/2022

## 2023-10-13 ENCOUNTER — PATIENT OUTREACH (OUTPATIENT)
Dept: CARE COORDINATION | Facility: CLINIC | Age: 63
End: 2023-10-13
Payer: COMMERCIAL

## 2023-10-13 NOTE — PROGRESS NOTES
Clinic Care Coordination Contact  Follow Up Progress Note      Assessment: Patient called today to express frustration with his new [facility] worker who calls in a lot. He will call his [facility]  Isela to work through that as she finds his staffing and sets up service agreements.    Patient said he went to the podiatrist last week and they fixed his ingrown toenail and then he also went to the dentist this week as he is doing the process to get dentures in.    SW looked into the neuropsych testing resources and Alfreda Marinelli and [facility] did not get referrals yet. EDDIE asked triage to fax. As of right now he has an appt in March through [facility] [employer] but going to see if he can get in any sooner elsewhere.     Christine Rutledge, AMANDA, [facility]  Clinic Care Coordinator  [email or web address]  [phone]

## 2023-10-18 ENCOUNTER — PATIENT OUTREACH (OUTPATIENT)
Dept: CARE COORDINATION | Facility: CLINIC | Age: 63
End: 2023-10-18
Payer: COMMERCIAL

## 2023-10-18 NOTE — PROGRESS NOTES
Clinic Care Coordination Contact  Follow Up Progress Note      Assessment:     Patient called care coordinator. He said a place called him and scheduled neuropsychological testing for January 24th. He couldn't remember the provider name but said he will call and let care coordinator know once he gets the appt confirmation details in the mail. Once he has a confirmed appt SW can always cancel the other appt for March through [place].    Patient said he had to cancel his app[facility] at [facility] for yesterday as he didn't have a ride. He rescheduled for 11/2/23.     Patient's next nephrology appt is 11/14/23    Patient has his 3rd dental appt in process to get dentures on Monday.     No other updates today    SW emailed Michael Mark (waiver ) and Nakia Martinez (homecare SW) to let them know these updates.    Christine Rutledge, MSW, [facility]  Clinic Care Coordinator  [email or web address]  [phone]

## 2023-11-01 NOTE — PROGRESS NOTES
Medication Therapy Management (MTM) Encounter    ASSESSMENT:                            Medication Adherence/Access: No issues identified    Diabetes:   Patient is meeting A1c goal of < 7%. Patient is meeting goal of > 70% time in target with continuous glucose monitoring.  Patient would benefit from scanning sensor more often to make sure not getting missing data.     Hypertension/CAD/TIA:   Patient is meeting blood pressure goal of < 140/90mmHg.    Hyperlipidemia:   Stable.    PLAN:                            1. Scan sensor morning and night.     Follow-up: Return in about 3 months (around 2/2/2024) for Lab Work, Primary Care Provider, MTM visit in clinic.    SUBJECTIVE/OBJECTIVE:                          Clint Zhu is a 63 year old male coming in for a follow-up visit from 6/13.       Reason for visit: Seeing Lakesha after MTM.    Allergies/ADRs: Reviewed in chart  Past Medical History: Reviewed in chart  Tobacco: He reports that he has been smoking cigarettes. He has a 40.00 pack-year smoking history. He has never used smokeless tobacco.Nicotine/Tobacco Cessation Plan:   Information offered: Patient not interested at this time    Alcohol: not currently using    Medication Adherence/Access: Nurse comes on Wednesdays who sets up medications- switched to once daily AM administration to try to improve compliance.   Services with case manage  Showering  1 x per week aid  occupation therapy    Diabetes    Mounjaro 5mg weekly - replaced Trulicity 1.5mg weekly- Wednesdays (when nurse comes).   Jardiance 25mg daily   Glipizide ER 10mg daily     Medication Hx:  Victoza- couldn't do daily administration  metformin - stopped due to renal function decline.   Bydureon weekly - stopped when trying to get Mounjaro for better sugar control    Patient is not experiencing side effects.  Blood sugar monitoring: Continuous Glucose Monitor Average glucose Last 14 Days - 132mg/dl  12a-6a, 6a-12p, 12p-6p, 6p-12a; 111, 153, 138,  "129  Time in Range Last 14 Days - 13%, Above (>180mg/dl) 86%, Below (<70mg/dl) 1%  Low sensor usage in last 30 days- 33%  Current diabetes symptoms: none       Lab Results   Component Value Date    A1C 6.7 (A) 11/02/2023     Hypertension /TIA/CAD:   amlodipine 2.5mg daily - decreased from 5mg daily due to hypotension  lisinopril 5mg - decreased from 10mg daily due to hypotension  isosorbide 30mg daily  metoprolol succinate 25mg daily - replaced tartrate as missing 2nd   aspirin 81mg daily   clopidogrel 75mg- Had been on clopidogrel alone prior to TIA that occurred in [place] on 6[place]22/[place]21.   Patient reports no current medication side effects  Patient does not self-monitor blood pressure.       BP Readings from Last 3 Encounters:   11/02/23 125/88   09/01/23 138/75   06/13/23 116/80     Pulse Readings from Last 3 Encounters:   11/02/23 91   09/01/23 99   06/13/23 91     Hyperlipidemia    atorvastatin 40mg daily  Patient reports no significant myalgias or other side effects.       Recent Labs   Lab Test 10/24/22  1035 09/29/21  1430   CHOL 259* 131   HDL 33* 32*   * 54   TRIG 538* 289*       BP Readings from Last 1 Encounters:   11/02/23 125/88     Pulse Readings from Last 1 Encounters:   11/02/23 91     Wt Readings from Last 1 Encounters:   11/02/23 180 lb (81.6 kg)     Ht Readings from Last 1 Encounters:   09/01/23 5' 5.25\" (1.657 m)     Estimated body mass index is 29.72 kg/m  as calculated from the following:    Height as of 9/1/23: 5' 5.25\" (1.657 m).    Weight as of an earlier encounter on 11/2/23: 180 lb (81.6 kg).    Temp Readings from Last 1 Encounters:   06/20/22 97.8  F (36.6  C) (Temporal)     ----------------    I spent 30 minutes with this patient today. All changes were made via collaborative practice agreement with Gabbie Rene MD. A copy of the visit note was provided to the patient's provider(s).    A summary of these recommendations was given to the patient.    /Chelo Roberts, Pharm.D, " [facility]  Medication Therapy Management Pharmacist  [phone]         Medication Therapy Recommendations  No medication therapy recommendations to display

## 2023-11-01 NOTE — PATIENT INSTRUCTIONS
"Recommendations from today's MTM visit:                                                       1. Scan sensor morning and night.     Follow-up: Return in about 3 months (around 2/2/2024) for Lab Work, Primary Care Provider, MTM visit in clinic.    It was great speaking with you today.  I value your experience and would be very thankful for your time in providing feedback in our clinic survey. In the next few days, you may receive an email or text message from [employer] with a link to a survey related to your  clinical pharmacist.\"     My Clinical Pharmacist's contact information:                                                      Please feel free to contact me with any questions or concerns you have.      Chelo Roberts, Pharm.D, [facility]  Medication Therapy Management Pharmacist  [phone]      "

## 2023-11-02 ENCOUNTER — OFFICE VISIT (OUTPATIENT)
Dept: PHARMACY | Facility: PHYSICIAN GROUP | Age: 63
End: 2023-11-02
Payer: COMMERCIAL

## 2023-11-02 DIAGNOSIS — I25.10 ATHEROSCLEROSIS OF CORONARY ARTERY OF NATIVE HEART WITHOUT ANGINA PECTORIS, UNSPECIFIED VESSEL OR LESION TYPE: ICD-10-CM

## 2023-11-02 DIAGNOSIS — I10 ESSENTIAL HYPERTENSION: ICD-10-CM

## 2023-11-02 DIAGNOSIS — G45.9 TIA (TRANSIENT ISCHEMIC ATTACK): ICD-10-CM

## 2023-11-02 DIAGNOSIS — E11.21 TYPE 2 DIABETES MELLITUS WITH DIABETIC NEPHROPATHY, WITHOUT LONG-TERM CURRENT USE OF INSULIN (H): Primary | ICD-10-CM

## 2023-11-02 DIAGNOSIS — E78.2 MIXED HYPERLIPIDEMIA: ICD-10-CM

## 2023-11-02 PROCEDURE — 99207 PR NO CHARGE LOS: CPT | Performed by: PHARMACIST

## 2023-11-09 ENCOUNTER — PATIENT OUTREACH (OUTPATIENT)
Dept: CARE COORDINATION | Facility: CLINIC | Age: 63
End: 2023-11-09
Payer: COMMERCIAL

## 2023-11-09 NOTE — PROGRESS NOTES
Clinic Care Coordination Contact  Follow Up Progress Note      Patient called care coordinator asking when his next nephrology appt is. EDDIE informed him it's 11/14/23. His [facility] staff were with him and plan to take him to that appointment.    Patient said Michael (CADI CM) visited him recently as well. He is still wanting to look into assisted living. He said they raised his rent at his current place by $60. He is still getting homecare services for IHS and homemaking. Michael is actively looking for PCA.    He has 1 more dental appointment in process to get dentures at [facility].    His neuropsych assessment is scheduled for February 13th at [facility]    AMANDA Fuentes, [facility]  Clinic Care Coordinator  [email or web address]  [phone]

## 2023-11-22 ENCOUNTER — LAB REQUISITION (OUTPATIENT)
Dept: LAB | Facility: CLINIC | Age: 63
End: 2023-11-22
Payer: COMMERCIAL

## 2023-11-22 LAB
ANION GAP SERPL CALCULATED.3IONS-SCNC: 11 MMOL/L (ref 7–15)
BUN SERPL-MCNC: 26.8 MG/DL (ref 8–23)
CALCIUM SERPL-MCNC: 8.7 MG/DL (ref 8.8–10.2)
CHLORIDE SERPL-SCNC: 100 MMOL/L (ref 98–107)
CREAT SERPL-MCNC: 1.87 MG/DL (ref 0.67–1.17)
DEPRECATED HCO3 PLAS-SCNC: 24 MMOL/L (ref 22–29)
EGFRCR SERPLBLD CKD-EPI 2021: 39 ML/MIN/1.73M2
GLUCOSE SERPL-MCNC: 189 MG/DL (ref 70–99)
POTASSIUM SERPL-SCNC: 4.5 MMOL/L (ref 3.4–5.3)
SODIUM SERPL-SCNC: 135 MMOL/L (ref 135–145)

## 2023-11-22 PROCEDURE — 80048 BASIC METABOLIC PNL TOTAL CA: CPT | Mod: ORL | Performed by: INTERNAL MEDICINE

## 2023-11-22 PROCEDURE — 80048 BASIC METABOLIC PNL TOTAL CA: CPT | Performed by: INTERNAL MEDICINE

## 2023-11-27 ENCOUNTER — PATIENT OUTREACH (OUTPATIENT)
Dept: CARE COORDINATION | Facility: CLINIC | Age: 63
End: 2023-11-27
Payer: COMMERCIAL

## 2023-11-27 NOTE — Clinical Note
Just giving you the update that Clint is likely going to be moving soon to [facility] with services on site managed through the CADI waiver. Exciting news. He wants to keep [facility] as primary care.   -Christine

## 2023-11-27 NOTE — PROGRESS NOTES
Care Coordination Note     got a call from patient.  SW called Michael Mark (Waiver CM) and there is an opening currently at [facility]. Patient wants to move forward.    Pt will have to get out of his lease to move there but Michael is going to try to waive the 1 month break the lease fee of one month worth of rent. She is sending a notice to move out to [facility] the  where he is living now.    Likely patient will move in January. He will live on the main level and not have to use the elevator.  Patient gets $820 a month in social security disability and 30% of his rent would be $246 for [facility]. He will need to do an assessment there. He would still get homecare services on site. He will also have insurance rides for appts or S staff to help him.    Pt got a medicare renewal form in the mail that Michael is going to help him fill out.     He will have transitional services to help him move his belongings through the waiver.      Plan:   Break lease to move to [facility] which CADI hannah will help with. Michael is going to help him with this process. He will still have neuropsychological testing In February    AMANDA Fuentes, [facility]  Clinic Care Coordinator  [email or web address]  [phone]

## 2023-12-01 DIAGNOSIS — K21.9 GASTROESOPHAGEAL REFLUX DISEASE, UNSPECIFIED WHETHER ESOPHAGITIS PRESENT: ICD-10-CM

## 2023-12-01 DIAGNOSIS — E11.21 TYPE 2 DIABETES MELLITUS WITH DIABETIC NEPHROPATHY, WITHOUT LONG-TERM CURRENT USE OF INSULIN (H): ICD-10-CM

## 2023-12-01 RX ORDER — FAMOTIDINE 20 MG/1
20 TABLET, FILM COATED ORAL DAILY
Qty: 90 TABLET | Refills: 1 | Status: SHIPPED | OUTPATIENT
Start: 2023-12-01 | End: 2024-05-28

## 2023-12-01 NOTE — TELEPHONE ENCOUNTER
Med: freestyle  alva sensor  Famotidine      LOV (related): 9/1/23    Last Lab: 11/2/23      Due for F/U around:Follow-up in 4 weeks for  -MAW with clinician and MTM pharmacist joint appointment.     Next Appt: None           Lab Results   Component Value Date    A1C 6.7 11/02/2023    A1C 7.3 09/01/2023    A1C 8.4 05/09/2023    A1C 9.9 10/24/2022    A1C 6.4 05/09/2022

## 2023-12-15 ENCOUNTER — PATIENT OUTREACH (OUTPATIENT)
Dept: CARE COORDINATION | Facility: CLINIC | Age: 63
End: 2023-12-15
Payer: COMMERCIAL

## 2023-12-15 NOTE — PROGRESS NOTES
Clinic Care Coordination Contact  Follow Up Progress Note      Assessment:     Patient called today and updated that he will be moving to [facility] living at the end of this month. He was able to get out of his lease without any penalties. He will have moving help. He will have services on site. His rent will be 30% of his income. He went to the social security office and he will be getting $840 per month.    [facility] will update Tarah Downey about this change as well.    [facility] will do 1 more supportive outreach once patient moves to ensure he has all his needs met.     He told lucille about what's going on. She has some storage stuff in the laundry room storage so she is aware that he is moving and will have services.     Christine Rutledge, AMANDA, [facility]  Clinic Care Coordinator  [email or web address]  [phone]

## 2024-01-02 ENCOUNTER — PATIENT OUTREACH (OUTPATIENT)
Dept: CARE COORDINATION | Facility: CLINIC | Age: 64
End: 2024-01-02
Payer: COMMERCIAL

## 2024-01-02 NOTE — PROGRESS NOTES
Clinic Care Coordination Contact  Follow Up Progress Note      Assessment: Patient called. He moved into [facility] over the weekend (Assisted living). [facility] and HHA services. He will also get S staff services weekly. Patient is settling in and no needs at ths time that he can think of.      Plan:   [facility] can do maintenance outreach in 1 month after pt settles in to double check to ensure no care coordination needs and close care coordination if all is going well at that time    AMANDA Fuentes, [facility]  Clinic Care Coordinator  [email or web address]  [phone]

## 2024-01-05 DIAGNOSIS — I25.10 ATHEROSCLEROSIS OF CORONARY ARTERY OF NATIVE HEART WITHOUT ANGINA PECTORIS, UNSPECIFIED VESSEL OR LESION TYPE: ICD-10-CM

## 2024-01-05 DIAGNOSIS — E11.21 TYPE 2 DIABETES MELLITUS WITH DIABETIC NEPHROPATHY, WITHOUT LONG-TERM CURRENT USE OF INSULIN (H): ICD-10-CM

## 2024-01-05 DIAGNOSIS — I10 ESSENTIAL HYPERTENSION: ICD-10-CM

## 2024-01-07 RX ORDER — CLOPIDOGREL BISULFATE 75 MG/1
75 TABLET ORAL DAILY
Qty: 90 TABLET | Refills: 1 | Status: SHIPPED | OUTPATIENT
Start: 2024-01-07 | End: 2024-07-31

## 2024-01-13 ENCOUNTER — MEDICAL CORRESPONDENCE (OUTPATIENT)
Dept: FAMILY MEDICINE | Facility: CLINIC | Age: 64
End: 2024-01-13

## 2024-01-16 DIAGNOSIS — F32.A DEPRESSION, UNSPECIFIED DEPRESSION TYPE: ICD-10-CM

## 2024-01-16 RX ORDER — SERTRALINE HYDROCHLORIDE 100 MG/1
100 TABLET, FILM COATED ORAL DAILY
Qty: 90 TABLET | Refills: 3 | Status: SHIPPED | OUTPATIENT
Start: 2024-01-16

## 2024-01-16 NOTE — TELEPHONE ENCOUNTER
Med: Sertraline      LOV (related): 9/1/23      Due for F/U around: 4 weeks    Next Appt: 1/18/24 with Lety Roberts, [facility]              6/20/2022     2:05 PM 10/24/2022    11:00 AM 9/1/2023     2:43 PM   PHQ   PHQ-9 Total Score 12 12 17   Q9: Thoughts of better off dead/self-harm past 2 weeks Several days Not at all Several days           8/14/2020    11:00 AM 6/20/2022     2:05 PM 9/1/2023     2:43 PM   KELLIE-7 SCORE   Total Score 7 10 17

## 2024-01-17 NOTE — PATIENT INSTRUCTIONS
Patient Education   Personalized Prevention Plan  You are due for the preventive services outlined below.  Your care team is available to assist you in scheduling these services.  If you have already completed any of these items, please share that information with your care team to update in your medical record.  Health Maintenance Due   Topic Date Due     Parathyroid Lab  Never done     Phosphorous Lab  Never done     LUNG CANCER SCREENING  01/26/2013     RSV VACCINE (Pregnancy & 60+) (1 - 1-dose 60+ series) Never done     Colorectal Cancer Screening  06/03/2021     AORTIC ANEURYSM SCREENING (SYSTEM ASSIGNED)  01/22/2023     COVID-19 Vaccine (3 - 2023-24 season) 09/01/2023     Cholesterol Lab  10/24/2023     Diabetic Foot Exam  10/24/2023     Hemoglobin  10/24/2023

## 2024-01-17 NOTE — PROGRESS NOTES
Medication Therapy Management (MTM) Encounter    ASSESSMENT:                            Medication Adherence/Access: No issues identified    Diabetes:   Patient is meeting A1c goal of < 7%.  Patient is meeting goal of > 70% time in target with continuous glucose monitoring, however does not have a lot of data.   Patient would benefit from stopping glipizide due to risk of lows.     Hypertension/TIA/CAD:   Patient is meeting blood pressure goal of < 140/90mmHg.    Hyperlipidemia:   Stable.    Depression:  Continue to follow up, improved socialization in new    Neuropathy:  Concern for over treatment and side effects given renal function and improved medication administration in new facility. Dose reduction to bedtime is suggested.     GERD:   Stable.    PLAN:                            1. Stop glipizide    2. Decrease gabapentin to 300mg ONCE daily at bedtime.     Follow-up: Return in about 4 weeks (around 2/15/2024) for MTM visit in clinic.    SUBJECTIVE/OBJECTIVE:                          Clint Zhu is a 63 year old male coming in for a follow-up visit from 11/2/23.       Reason for visit: medication review, first visit    Allergies/ADRs: Reviewed in chart  Past Medical History: Reviewed in chart  Tobacco: He reports that he has never smoked. He has never used smokeless tobacco.  Alcohol: not currently using    Medication Adherence/Access: no issues reported, now getting help from facility for medication management.     Diabetes   -Mounjaro 5mg weekly - replaced Trulicity 1.5mg weekly- Wednesdays (when nurse comes).   -Jardiance 25mg daily   -Glipizide ER 10mg daily     Medication Hx:  Victoza- couldn't do daily administration  metformin - stopped due to renal function decline.   Bydureon weekly - stopped when trying to get Mounjaro for better sugar control    Patient is not experiencing side effects.  Blood sugar monitoring: Continuous Glucose Monitor   Current diabetes symptoms: none       Eye exam is up to  date  Foot exam: up to date  Lab Results   Component Value Date    A1C 6.7 (A) 11/02/2023       Hypertension /TIA/CAD:   -amlodipine 2.5mg daily - decreased from 5mg daily due to hypotension  -lisinopril 5mg - decreased from 10mg daily due to hypotension  -isosorbide 30mg daily  -metoprolol succinate 25mg daily - replaced tartrate as missing 2nd   -aspirin 81mg daily   -clopidogrel 75mg- Had been on clopidogrel alone prior to TIA that occurred in [place] on 6/22/2021.   Patient reports no current medication side effects  Patient does not self-monitor blood pressure.       BP Readings from Last 3 Encounters:   01/18/24 124/69   11/02/23 125/88   09/01/23 138/75     Pulse Readings from Last 3 Encounters:   01/18/24 80   11/02/23 91   09/01/23 99     Hyperlipidemia   -atorvastatin 40mg daily  Patient reports no significant myalgias or other side effects.     Recent Labs   Lab Test 10/24/22  1035 09/29/21  1430   CHOL 259* 131   HDL 33* 32*   * 54   TRIG 538* 289*     Depression:   Sertraline 100mg once daily.   Patient reports no current medication side effects.        10/24/2022    11:00 AM 9/1/2023     2:43 PM 1/18/2024     1:53 PM   PHQ-9 SCORE   PHQ-9 Total Score 12 17 17         6/20/2022     2:05 PM 9/1/2023     2:43 PM 1/18/2024     1:53 PM   KELLIE-7 SCORE   Total Score 10 17 11     Neuropathy:  Gabapentin 300mg three times daily.   Hip and should pain.   More dizzy and sleepy overall that has not correlated to blood sugars or blood pressure.   Getting more regular medication now that he is in a facility.   Trying to get orders for a lift chair in his home.     GERD:   Lansoprazole 15 mg once daily  Famotidine 20 mg once daily   Patient reports no current symptoms.     Skin creams and supplements not discussed       BP Readings from Last 1 Encounters:   01/18/24 124/69     Pulse Readings from Last 1 Encounters:   01/18/24 80     Wt Readings from Last 1 Encounters:   01/18/24 182 lb (82.6 kg)     Ht Readings from  "Last 1 Encounters:   01/18/24 5' 5\" (1.651 m)     Estimated body mass index is 30.29 kg/m  as calculated from the following:    Height as of an earlier encounter on 1/18/24: 5' 5\" (1.651 m).    Weight as of an earlier encounter on 1/18/24: 182 lb (82.6 kg).    Temp Readings from Last 1 Encounters:   06/20/22 97.8  F (36.6  C) (Temporal)     ----------------    I spent 35 minutes with this patient today. All changes were made via collaborative practice agreement with Gabbie Rene MD. A copy of the visit note was provided to the patient's provider(s).    A summary of these recommendations was given to the patient.    Chelo Roberts, Pharm.D, [facility]  Medication Therapy Management Pharmacist  [phone]       Medication Therapy Recommendations  Painful diabetic neuropathy (H)    Current Medication: gabapentin (NEURONTIN) 300 MG capsule   Rationale: Undesirable effect - Adverse medication event - Safety   Recommendation: Decrease Dose - 300mg once daily   Status: Accepted per CPA         Type 2 diabetes mellitus with diabetic nephropathy, without long-term current use of insulin (H)    Current Medication: glipiZIDE (GLUCOTROL XL) 10 MG 24 hr tablet (Discontinued)   Rationale: Undesirable effect - Adverse medication event - Safety   Recommendation: Discontinue Medication   Status: Accepted per CPA            "

## 2024-01-17 NOTE — PATIENT INSTRUCTIONS
"Recommendations from today's MTM visit:                                                       1. Stop glipizide    2. Decrease gabapentin to 300mg ONCE daily at bedtime.       Follow-up: Return in about 4 weeks (around 2/15/2024) for MTM visit in clinic.    It was great speaking with you today.  I value your experience and would be very thankful for your time in providing feedback in our clinic survey. In the next few days, you may receive an email or text message from [employer] with a link to a survey related to your  clinical pharmacist.\"     My Clinical Pharmacist's contact information:                                                      Please feel free to contact me with any questions or concerns you have.      Chelo Roberts, Pharm.D, [facility]  Medication Therapy Management Pharmacist  [phone]      "

## 2024-01-17 NOTE — PROGRESS NOTES
"SUBJECTIVE:   Emily Zhu is a 63 year old male who presents for Preventive Visit.     Are you in the first 12 months of your Medicare Part B coverage?  Had eye exam two weeks ago per pt     Physical Health:  In general, how would you rate your overall physical health? poor  Outside of work, how many days during the week do you exercise? 1 day/week  Outside of work, approximately how many minutes a day do you exercise?less than 15 minutes  If you drink alcohol do you typically have >3 drinks per day or >7 drinks per week? No  Do you usually eat at least 4 servings of fruit and vegetables a day, include whole grains & fiber and avoid regularly eating high fat or \"junk\" foods? Yes  Do you have any problems taking medications regularly?  YES  Do you have any side effects from medications?  YES per pt  Needs assistance for the following daily activities:  YES per pt   Which of the following safety concerns are present in your home?  lack of grab bars in the bathroom and poor lighting   Hearing impairment: No per pt  In the past 6 months, have you been bothered by leaking of urine? yes  Healthy Habits:  Have you had an eye exam in the past two years? Yes   Do you see a dentist twice per year? Yes   What is your current smoking status? Current  Do you use smokeless tobacco? No  Abuse: Current or Past(Physical, Sexual or Emotional)- No     HEARING FREQUENCY:     Right Ear:     500 Hz : Fail   1000 Hz: Fail   2000 Hz: Fail   4000 Hz: Fail  Left Ear:     500 Hz : Fail   1000 Hz: Fail   2000 Hz: Fail   4000 Hz: Fail    Mental Health:  In general, how would you rate your overall mental or emotional health? poor  PHQ-2 Score:  4    Do you feel safe in your environment? Yes    Have you ever done Advance Care Planning? (For example, a Health Directive, POLST, or a discussion with a medical provider or your loved ones about your wishes): Yes, advance care planning is on file.    Additional concerns to address?  YES    Fall " "risk:  Fallen 2 or more times in the past year?: Yes  Any fall with injury in the past year?: No    Cognitive Screenin) Repeat 3 items (Leader, Season, Table)    2) Clock draw: NORMAL  3) 3 item recall: Recalls 1 object   Results: NORMAL clock, 1-2 items recalled: COGNITIVE IMPAIRMENT LESS LIKELY    Mini-CogTM Copyright HECT[employer]. Licensed by the author for use in [facility]; reprinted with permission (jenae@[facility]). All rights reserved.      Do you have sleep apnea, excessive snoring or daytime drowsiness? : yes    OBJECTIVE:   There were no vitals taken for this visit. Estimated body mass index is 29.72 kg/m  as calculated from the following:    Height as of 23: 1.657 m (5' 5.25\").    Weight as of 23: 81.6 kg (180 lb).    ASSESSMENT / PLAN:       Encounter for Medicare annual wellness exam    Failed hearing screening  Declined formal audiology testing.     COUNSELING:  Reviewed preventive health counseling, as reflected in patient instructions       Vision screening       Hearing screening      "

## 2024-01-18 ENCOUNTER — ORDERS ONLY (AUTO-RELEASED) (OUTPATIENT)
Dept: FAMILY MEDICINE | Facility: CLINIC | Age: 64
End: 2024-01-18

## 2024-01-18 ENCOUNTER — OFFICE VISIT (OUTPATIENT)
Dept: PHARMACY | Facility: PHYSICIAN GROUP | Age: 64
End: 2024-01-18
Payer: COMMERCIAL

## 2024-01-18 ENCOUNTER — OFFICE VISIT (OUTPATIENT)
Dept: FAMILY MEDICINE | Facility: CLINIC | Age: 64
End: 2024-01-18

## 2024-01-18 VITALS
BODY MASS INDEX: 30.32 KG/M2 | OXYGEN SATURATION: 98 % | WEIGHT: 182 LBS | HEART RATE: 80 BPM | SYSTOLIC BLOOD PRESSURE: 124 MMHG | DIASTOLIC BLOOD PRESSURE: 69 MMHG | HEIGHT: 65 IN

## 2024-01-18 DIAGNOSIS — Z12.12 SCREENING FOR COLORECTAL CANCER: ICD-10-CM

## 2024-01-18 DIAGNOSIS — Z12.11 SCREENING FOR COLORECTAL CANCER: ICD-10-CM

## 2024-01-18 DIAGNOSIS — I10 ESSENTIAL HYPERTENSION: ICD-10-CM

## 2024-01-18 DIAGNOSIS — I25.118 ATHEROSCLEROSIS OF NATIVE CORONARY ARTERY OF NATIVE HEART WITH STABLE ANGINA PECTORIS (H): ICD-10-CM

## 2024-01-18 DIAGNOSIS — G45.9 TIA (TRANSIENT ISCHEMIC ATTACK): ICD-10-CM

## 2024-01-18 DIAGNOSIS — E11.40 PAINFUL DIABETIC NEUROPATHY (H): ICD-10-CM

## 2024-01-18 DIAGNOSIS — K21.9 GASTROESOPHAGEAL REFLUX DISEASE, UNSPECIFIED WHETHER ESOPHAGITIS PRESENT: ICD-10-CM

## 2024-01-18 DIAGNOSIS — G63 POLYNEUROPATHY ASSOCIATED WITH UNDERLYING DISEASE (H): ICD-10-CM

## 2024-01-18 DIAGNOSIS — E11.21 TYPE 2 DIABETES MELLITUS WITH DIABETIC NEPHROPATHY, WITHOUT LONG-TERM CURRENT USE OF INSULIN (H): Primary | ICD-10-CM

## 2024-01-18 DIAGNOSIS — F32.A DEPRESSION, UNSPECIFIED DEPRESSION TYPE: ICD-10-CM

## 2024-01-18 DIAGNOSIS — E53.8 VITAMIN B12 DEFICIENCY (NON ANEMIC): ICD-10-CM

## 2024-01-18 DIAGNOSIS — N18.32 STAGE 3B CHRONIC KIDNEY DISEASE (H): ICD-10-CM

## 2024-01-18 DIAGNOSIS — E78.2 MIXED HYPERLIPIDEMIA: ICD-10-CM

## 2024-01-18 DIAGNOSIS — R80.9 MICROALBUMINURIA DUE TO TYPE 2 DIABETES MELLITUS (H): ICD-10-CM

## 2024-01-18 DIAGNOSIS — Z86.73 HISTORY OF TIA (TRANSIENT ISCHEMIC ATTACK) AND STROKE: ICD-10-CM

## 2024-01-18 DIAGNOSIS — E11.22 TYPE 2 DIABETES MELLITUS WITH STAGE 3B CHRONIC KIDNEY DISEASE, WITHOUT LONG-TERM CURRENT USE OF INSULIN (H): ICD-10-CM

## 2024-01-18 DIAGNOSIS — F33.1 MODERATE EPISODE OF RECURRENT MAJOR DEPRESSIVE DISORDER (H): ICD-10-CM

## 2024-01-18 DIAGNOSIS — I25.10 ATHEROSCLEROSIS OF CORONARY ARTERY OF NATIVE HEART WITHOUT ANGINA PECTORIS, UNSPECIFIED VESSEL OR LESION TYPE: ICD-10-CM

## 2024-01-18 DIAGNOSIS — N18.32 TYPE 2 DIABETES MELLITUS WITH STAGE 3B CHRONIC KIDNEY DISEASE, WITHOUT LONG-TERM CURRENT USE OF INSULIN (H): ICD-10-CM

## 2024-01-18 DIAGNOSIS — Z13.6 SCREENING FOR AAA (ABDOMINAL AORTIC ANEURYSM): ICD-10-CM

## 2024-01-18 DIAGNOSIS — Z72.0 TOBACCO USE: ICD-10-CM

## 2024-01-18 DIAGNOSIS — Z00.00 ENCOUNTER FOR MEDICARE ANNUAL WELLNESS EXAM: Primary | ICD-10-CM

## 2024-01-18 DIAGNOSIS — R94.120 FAILED HEARING SCREENING: ICD-10-CM

## 2024-01-18 DIAGNOSIS — E11.29 MICROALBUMINURIA DUE TO TYPE 2 DIABETES MELLITUS (H): ICD-10-CM

## 2024-01-18 LAB
ANION GAP SERPL CALCULATED.3IONS-SCNC: 14 MMOL/L (ref 7–15)
BUN SERPL-MCNC: 29.2 MG/DL (ref 8–23)
CALCIUM SERPL-MCNC: 9.7 MG/DL (ref 8.8–10.2)
CHLORIDE SERPL-SCNC: 102 MMOL/L (ref 98–107)
CHOLEST SERPL-MCNC: 178 MG/DL
CREAT SERPL-MCNC: 2.01 MG/DL (ref 0.67–1.17)
DEPRECATED HCO3 PLAS-SCNC: 22 MMOL/L (ref 22–29)
EGFRCR SERPLBLD CKD-EPI 2021: 36 ML/MIN/1.73M2
FASTING STATUS PATIENT QL REPORTED: NO
GLUCOSE SERPL-MCNC: 159 MG/DL (ref 70–99)
HDLC SERPL-MCNC: 42 MG/DL
LDLC SERPL CALC-MCNC: 83 MG/DL
NONHDLC SERPL-MCNC: 136 MG/DL
POTASSIUM SERPL-SCNC: 5 MMOL/L (ref 3.4–5.3)
SODIUM SERPL-SCNC: 138 MMOL/L (ref 135–145)
TRIGL SERPL-MCNC: 265 MG/DL
VIT B12 SERPL-MCNC: 977 PG/ML (ref 232–1245)

## 2024-01-18 PROCEDURE — 82607 VITAMIN B-12: CPT | Mod: ORL | Performed by: FAMILY MEDICINE

## 2024-01-18 PROCEDURE — 99215 OFFICE O/P EST HI 40 MIN: CPT | Mod: 25 | Performed by: FAMILY MEDICINE

## 2024-01-18 PROCEDURE — 99207 PR NO CHARGE LOS: CPT | Performed by: PHARMACIST

## 2024-01-18 PROCEDURE — G0438 PPPS, INITIAL VISIT: HCPCS | Performed by: FAMILY MEDICINE

## 2024-01-18 PROCEDURE — 80048 BASIC METABOLIC PNL TOTAL CA: CPT | Mod: ORL | Performed by: FAMILY MEDICINE

## 2024-01-18 PROCEDURE — 36415 COLL VENOUS BLD VENIPUNCTURE: CPT | Performed by: FAMILY MEDICINE

## 2024-01-18 PROCEDURE — 99207 PR FOOT EXAM NO CHARGE: CPT | Performed by: FAMILY MEDICINE

## 2024-01-18 PROCEDURE — 80061 LIPID PANEL: CPT | Mod: ORL | Performed by: FAMILY MEDICINE

## 2024-01-18 RX ORDER — GABAPENTIN 300 MG/1
300 CAPSULE ORAL AT BEDTIME
Qty: 90 CAPSULE | Refills: 1 | Status: SHIPPED | OUTPATIENT
Start: 2024-01-18 | End: 2024-01-22

## 2024-01-18 ASSESSMENT — ANXIETY QUESTIONNAIRES
1. FEELING NERVOUS, ANXIOUS, OR ON EDGE: NEARLY EVERY DAY
3. WORRYING TOO MUCH ABOUT DIFFERENT THINGS: MORE THAN HALF THE DAYS
2. NOT BEING ABLE TO STOP OR CONTROL WORRYING: SEVERAL DAYS
GAD7 TOTAL SCORE: 11
GAD7 TOTAL SCORE: 11
7. FEELING AFRAID AS IF SOMETHING AWFUL MIGHT HAPPEN: SEVERAL DAYS
5. BEING SO RESTLESS THAT IT IS HARD TO SIT STILL: NOT AT ALL
6. BECOMING EASILY ANNOYED OR IRRITABLE: SEVERAL DAYS
IF YOU CHECKED OFF ANY PROBLEMS ON THIS QUESTIONNAIRE, HOW DIFFICULT HAVE THESE PROBLEMS MADE IT FOR YOU TO DO YOUR WORK, TAKE CARE OF THINGS AT HOME, OR GET ALONG WITH OTHER PEOPLE: VERY DIFFICULT

## 2024-01-18 ASSESSMENT — PATIENT HEALTH QUESTIONNAIRE - PHQ9
5. POOR APPETITE OR OVEREATING: NEARLY EVERY DAY
SUM OF ALL RESPONSES TO PHQ QUESTIONS 1-9: 17

## 2024-01-18 NOTE — LETTER
January 22, 2024        Clint Dylantonya  [address]   [place] [address]        Dear Emily,     It was spring to see you at your recent appointment.     Here are your lab results.     Your total cholesterol is at target of less than 200   You HDL ('healthy cholesterol') is at target of more than 40   Your LDL ('unhealthy cholesterol') is at target of less than 130.   Your triglycerides are elevated, but less than last year, which is great.     Your kidney function is reduced, but stable in line with previous readings.     Your vitamin B12 is within normal limits.     Please let us know if you have any questions.     Sincerely,     Dr. Gabbie Rene     Resulted Orders   Basic metabolic panel   Result Value Ref Range    Sodium 138 135 - 145 mmol/L      Comment:      Reference intervals for this test were updated on 09/26/2023 to more accurately reflect our healthy population. There may be differences in the flagging of prior results with similar values performed with this method. Interpretation of those prior results can be made in the context of the updated reference intervals.     Potassium 5.0 3.4 - 5.3 mmol/L    Chloride 102 98 - 107 mmol/L    Carbon Dioxide (CO2) 22 22 - 29 mmol/L    Anion Gap 14 7 - 15 mmol/L    Urea Nitrogen 29.2 (H) 8.0 - 23.0 mg/dL    Creatinine 2.01 (H) 0.67 - 1.17 mg/dL    GFR Estimate 36 (L) >60 mL/min/1.73m2    Calcium 9.7 8.8 - 10.2 mg/dL    Glucose 159 (H) 70 - 99 mg/dL   Vitamin B12   Result Value Ref Range    Vitamin B12 977 232 - 1,245 pg/mL   Lipid Profile   Result Value Ref Range    Cholesterol 178 <200 mg/dL    Triglycerides 265 (H) <150 mg/dL    Direct Measure HDL 42 >=40 mg/dL    LDL Cholesterol Calculated 83 <=100 mg/dL    Non HDL Cholesterol 136 (H) <130 mg/dL    Patient Fasting > 8hrs? No     Narrative    Cholesterol  Desirable:  <200 mg/dL    Triglycerides  Normal:  Less than 150 mg/dL  Borderline High:  150-199 mg/dL  High:  200-499 mg/dL  Very High:   Greater than or equal to 500 mg/dL    Direct Measure HDL  Female:  Greater than or equal to 50 mg/dL   Male:  Greater than or equal to 40 mg/dL    LDL Cholesterol  Desirable:  <100mg/dL  Above Desirable:  100-129 mg/dL   Borderline High:  130-159 mg/dL   High:  160-189 mg/dL   Very High:  >= 190 mg/dL    Non HDL Cholesterol  Desirable:  130 mg/dL  Above Desirable:  130-159 mg/dL  Borderline High:  160-189 mg/dL  High:  190-219 mg/dL  Very High:  Greater than or equal to 220 mg/dL       If you have any questions or concerns, please call the clinic at the number listed above.       Sincerely,      Gabbie Rene MD

## 2024-01-18 NOTE — PROGRESS NOTES
"SUBJECTIVE:    This 63 year old male presents with the following concerns:     Has moved to assisted living. Has nurse to set up medications.     T2DM :   Mounjaro 5mg weekly  - given by home REGI Quevedo 25mg daily   Glipizide XL 10mg daily     Uses CGM : brings in monitor today:  average glucose 7 days : 113  Time in range : 98% over last 7 days.   Taking statin.     Hemoglobin A1C   Date Value Ref Range Status   11/02/2023 6.7 (A) 4.0 - 6.0 % Final   09/01/2023 7.3 (A) 4.0 - 6.0 % Final   05/09/2023 8.4 (A) 4.0 - 6.0 % Final     Hypertension/TIA/CAD:   amlodipine 2.5 mg daily  lisinopril 5mg daily  isosorbide mononitrate 30 mg daily  metoprolol succinate 25 mg daily  aspirin 81mg daily   clopidogrel 75mg- Had been on clopidogrel alone prior to TIA that occurred in [place] on 6/22/2021.   Lipitor 40 mg daily    Peripheral neuropathy:  Gabapentin 300 mg TID    MDD : Zoloft 100 mg daily.     Health Maintenance  Health maintenance alerts were reviewed and updated as appropriate.  Lung cancer screening: Declines. Tobacco abuse : \"I've smoked all my life\". 40 + pack year history. Has cut back to 5 cigarettes per day. Is not allowed to smoke inside new building.  AAA screening: due. Voices will schedule.   Colorectal cancer screening: due      OBJECTIVE:    Vitals:    01/18/24 1334   BP: 124/69   Pulse: 80   SpO2: 98%   Weight: 82.6 kg (182 lb)   Height: 1.651 m (5' 5\")    Body mass index is 30.29 kg/m .  General: no acute distress, cooperative with exam. 4 wheeled walker in the exam room.   Lungs: clear to auscultation bilaterally, normal respiratory effort.  Heart:  RRR without murmurs, rubs or gallops.  Normal S1 and S2.  Diabetic foot check: skin and nails within normal limits, palpable pedal pulses, intact sensation to monofilament.   Abdomen: normal bowel sounds, nontender, no palpable organomegaly.  MS: decreased mobility and strength of bilateral lower extremities, using walker for ambulation            10/24/2022 "    11:00 AM 9/1/2023     2:43 PM 1/18/2024     1:53 PM   PHQ   PHQ-9 Total Score 12 17 17   Q9: Thoughts of better off dead/self-harm past 2 weeks Not at all Several days Not at all         6/20/2022     2:05 PM 9/1/2023     2:43 PM 1/18/2024     1:53 PM   KELLIE-7 SCORE   Total Score 10 17 11       ASSESSMENT / PLAN:     Moderate episode of recurrent major depressive disorder (H)  Zoloft 100 mg daily. Ongoing symptoms. Stable.     Type 2 diabetes mellitus with stage 3b chronic kidney disease, without long-term current use of insulin (H)  Patient is meeting A1c goal of < 7%.     Mounjaro 5mg weekly  - given by home REGI Quevedo 25mg daily   Taking statin.     Uses CGM : brings in monitor today:  average glucose 7 days : 113  Time in range : 98% over last 7 days.     Stop Glipizide XL 10mg daily : at risk of hypoglycemia     -     FOOT EXAM  -     Basic metabolic panel; Future    Atherosclerosis of native coronary artery of native heart with stable angina pectoris (H24)  Moderate atherosclerotic changes of abdominal aorta on MR renal 6/29/2022. Taking statin medication. Blood pressure at target.   -     Cancel: Lipid Profile ([facility])  -     VENOUS COLLECTION    Stage 3b chronic kidney disease (H)  Microalbuminuria due to type 2 diabetes mellitus (H)  Monitoring. Taking ACEi. Follows with nephrology. Avoid NSAID medications.   -     Basic metabolic panel; Future    Screening for colorectal cancer  -     COLOGUAKATIA(EXACT SCIENCES); Future    Screening for AAA (abdominal aortic aneurysm)  -     US Abdominal Aorta Imaging; Future    Tobacco use   40 + pack year history. Has cut back to 5 cigarettes per day. Is not allowed to smoke inside new building. Declines low dose CT lung cancer screening.     Mixed hyperlipidemia  -     Lipid Profile; Future    History of TIA (transient ischemic attack) and stroke  aspirin 81mg daily   clopidogrel 75mg- Had been on clopidogrel alone prior to TIA that occurred in [place] on 6/22/2021.    Lipitor 40 mg daily    Gastroesophageal reflux disease, unspecified whether esophagitis present  PPI with good effect.    Essential hypertension  At goal of blood pressure < 140/90mmHg.   amlodipine 2.5 mg daily  lisinopril 5mg daily  isosorbide mononitrate 30 mg daily  metoprolol succinate 25 mg daily  No medication changes.     Vitamin B12 deficiency (non anemic)  -     Vitamin B12; Future    Polyneuropathy associated with underlying disease (H24)  Gabapentin 300 mg TID. Now has pill set up with improved adherence to medications. Seems slightly somnolent today. MTM suggest   -Decrease gabapentin to 300mg ONCE daily at bedtime.      Would like to apply for a seat lift mechanism to assist with getting out of a seat and for improved mobility safety related to above  All appropriate therapeutic motilities (Home care, PT, OT, medication) have been tried and failed to enable patient to transfer from a chair to a standing position.   He has an inability to stand from a regular armchair or any chair in their current accommodation.   Once standing, can ambulate with a 4 wheeled walker.   Expect improved ADLs and independence with use of this device    -     Lift Chair W Seat Lift Mechanism Order for DME - ONLY FOR DME    In addition to MAW additional office time spent reviewing lab results and /or imaging studies, performing exam and preparing plan of care for above issues : 42 minutes

## 2024-01-20 ENCOUNTER — HOSPITAL ENCOUNTER (EMERGENCY)
Facility: CLINIC | Age: 64
Discharge: HOME | End: 2024-01-21
Attending: EMERGENCY MEDICINE | Admitting: EMERGENCY MEDICINE
Payer: COMMERCIAL

## 2024-01-20 ENCOUNTER — APPOINTMENT (OUTPATIENT)
Dept: CT IMAGING | Facility: CLINIC | Age: 64
End: 2024-01-20
Attending: EMERGENCY MEDICINE
Payer: COMMERCIAL

## 2024-01-20 DIAGNOSIS — E11.40 PAINFUL DIABETIC NEUROPATHY (H): ICD-10-CM

## 2024-01-20 DIAGNOSIS — R47.89 EPISODE OF CHANGE IN SPEECH: ICD-10-CM

## 2024-01-20 DIAGNOSIS — R41.0 EPISODE OF CONFUSION: ICD-10-CM

## 2024-01-20 LAB
ANION GAP SERPL CALCULATED.3IONS-SCNC: 10 MMOL/L (ref 7–15)
APTT PPP: 27 SECONDS (ref 22–38)
BASOPHILS # BLD AUTO: 0.1 10E3/UL (ref 0–0.2)
BASOPHILS NFR BLD AUTO: 1 %
BUN SERPL-MCNC: 28 MG/DL (ref 8–23)
CALCIUM SERPL-MCNC: 8.5 MG/DL (ref 8.8–10.2)
CHLORIDE SERPL-SCNC: 100 MMOL/L (ref 98–107)
CREAT SERPL-MCNC: 1.81 MG/DL (ref 0.67–1.17)
DEPRECATED HCO3 PLAS-SCNC: 25 MMOL/L (ref 22–29)
EGFRCR SERPLBLD CKD-EPI 2021: 41 ML/MIN/1.73M2
EOSINOPHIL # BLD AUTO: 0.1 10E3/UL (ref 0–0.7)
EOSINOPHIL NFR BLD AUTO: 1 %
ERYTHROCYTE [DISTWIDTH] IN BLOOD BY AUTOMATED COUNT: 13.3 % (ref 10–15)
ETHANOL SERPL-MCNC: <0.01 G/DL
GLUCOSE SERPL-MCNC: 110 MG/DL (ref 70–99)
HCT VFR BLD AUTO: 41.5 % (ref 40–53)
HGB BLD-MCNC: 13.5 G/DL (ref 13.3–17.7)
IMM GRANULOCYTES # BLD: 0 10E3/UL
IMM GRANULOCYTES NFR BLD: 0 %
INR PPP: 1 (ref 0.85–1.15)
LYMPHOCYTES # BLD AUTO: 2.3 10E3/UL (ref 0.8–5.3)
LYMPHOCYTES NFR BLD AUTO: 29 %
MCH RBC QN AUTO: 31.3 PG (ref 26.5–33)
MCHC RBC AUTO-ENTMCNC: 32.5 G/DL (ref 31.5–36.5)
MCV RBC AUTO: 96 FL (ref 78–100)
MONOCYTES # BLD AUTO: 0.6 10E3/UL (ref 0–1.3)
MONOCYTES NFR BLD AUTO: 8 %
NEUTROPHILS # BLD AUTO: 4.8 10E3/UL (ref 1.6–8.3)
NEUTROPHILS NFR BLD AUTO: 61 %
NRBC # BLD AUTO: 0 10E3/UL
NRBC BLD AUTO-RTO: 0 /100
PLATELET # BLD AUTO: 164 10E3/UL (ref 150–450)
POTASSIUM SERPL-SCNC: 4.1 MMOL/L (ref 3.4–5.3)
RBC # BLD AUTO: 4.31 10E6/UL (ref 4.4–5.9)
SODIUM SERPL-SCNC: 135 MMOL/L (ref 135–145)
TROPONIN T SERPL HS-MCNC: 19 NG/L
WBC # BLD AUTO: 7.8 10E3/UL (ref 4–11)

## 2024-01-20 PROCEDURE — 82962 GLUCOSE BLOOD TEST: CPT

## 2024-01-20 PROCEDURE — 36415 COLL VENOUS BLD VENIPUNCTURE: CPT | Performed by: EMERGENCY MEDICINE

## 2024-01-20 PROCEDURE — G0179 MD RECERTIFICATION HHA PT: HCPCS | Performed by: FAMILY MEDICINE

## 2024-01-20 PROCEDURE — 84484 ASSAY OF TROPONIN QUANT: CPT | Performed by: EMERGENCY MEDICINE

## 2024-01-20 PROCEDURE — 93005 ELECTROCARDIOGRAM TRACING: CPT

## 2024-01-20 PROCEDURE — 99291 CRITICAL CARE FIRST HOUR: CPT | Mod: 25

## 2024-01-20 PROCEDURE — 82077 ASSAY SPEC XCP UR&BREATH IA: CPT | Performed by: EMERGENCY MEDICINE

## 2024-01-20 PROCEDURE — 85025 COMPLETE CBC W/AUTO DIFF WBC: CPT | Performed by: EMERGENCY MEDICINE

## 2024-01-20 PROCEDURE — 70496 CT ANGIOGRAPHY HEAD: CPT

## 2024-01-20 PROCEDURE — 85610 PROTHROMBIN TIME: CPT | Performed by: EMERGENCY MEDICINE

## 2024-01-20 PROCEDURE — 85730 THROMBOPLASTIN TIME PARTIAL: CPT | Performed by: EMERGENCY MEDICINE

## 2024-01-20 PROCEDURE — 70450 CT HEAD/BRAIN W/O DYE: CPT | Mod: XU

## 2024-01-20 PROCEDURE — 250N000011 HC RX IP 250 OP 636: Performed by: EMERGENCY MEDICINE

## 2024-01-20 PROCEDURE — 80048 BASIC METABOLIC PNL TOTAL CA: CPT | Performed by: EMERGENCY MEDICINE

## 2024-01-20 PROCEDURE — 99291 CRITICAL CARE FIRST HOUR: CPT | Mod: G0,GC | Performed by: STUDENT IN AN ORGANIZED HEALTH CARE EDUCATION/TRAINING PROGRAM

## 2024-01-20 PROCEDURE — 250N000009 HC RX 250: Performed by: EMERGENCY MEDICINE

## 2024-01-20 PROCEDURE — 96374 THER/PROPH/DIAG INJ IV PUSH: CPT | Mod: 59

## 2024-01-20 RX ORDER — IOPAMIDOL 755 MG/ML
67 INJECTION, SOLUTION INTRAVASCULAR ONCE
Status: COMPLETED | OUTPATIENT
Start: 2024-01-20 | End: 2024-01-20

## 2024-01-20 RX ADMIN — SODIUM CHLORIDE 100 ML: 9 INJECTION, SOLUTION INTRAVENOUS at 23:06

## 2024-01-20 RX ADMIN — IOPAMIDOL 67 ML: 755 INJECTION, SOLUTION INTRAVENOUS at 23:07

## 2024-01-20 ASSESSMENT — ACTIVITIES OF DAILY LIVING (ADL): ADLS_ACUITY_SCORE: 35

## 2024-01-21 ENCOUNTER — APPOINTMENT (OUTPATIENT)
Dept: MRI IMAGING | Facility: CLINIC | Age: 64
End: 2024-01-21
Attending: EMERGENCY MEDICINE
Payer: COMMERCIAL

## 2024-01-21 VITALS
HEART RATE: 71 BPM | SYSTOLIC BLOOD PRESSURE: 167 MMHG | OXYGEN SATURATION: 94 % | DIASTOLIC BLOOD PRESSURE: 90 MMHG | TEMPERATURE: 97 F | RESPIRATION RATE: 17 BRPM

## 2024-01-21 LAB
ALBUMIN UR-MCNC: 30 MG/DL
APPEARANCE UR: CLEAR
ATRIAL RATE - MUSE: 65 BPM
BILIRUB UR QL STRIP: NEGATIVE
COLOR UR AUTO: ABNORMAL
DIASTOLIC BLOOD PRESSURE - MUSE: NORMAL MMHG
GLUCOSE BLDC GLUCOMTR-MCNC: 104 MG/DL (ref 70–99)
GLUCOSE UR STRIP-MCNC: 1000 MG/DL
HGB UR QL STRIP: NEGATIVE
INTERPRETATION ECG - MUSE: NORMAL
KETONES UR STRIP-MCNC: NEGATIVE MG/DL
LEUKOCYTE ESTERASE UR QL STRIP: NEGATIVE
NITRATE UR QL: NEGATIVE
P AXIS - MUSE: 54 DEGREES
PH UR STRIP: 5.5 [PH] (ref 5–7)
PR INTERVAL - MUSE: 174 MS
QRS DURATION - MUSE: 74 MS
QT - MUSE: 418 MS
QTC - MUSE: 434 MS
R AXIS - MUSE: -63 DEGREES
RBC URINE: <1 /HPF
SP GR UR STRIP: 1.02 (ref 1–1.03)
SYSTOLIC BLOOD PRESSURE - MUSE: NORMAL MMHG
T AXIS - MUSE: 47 DEGREES
UROBILINOGEN UR STRIP-MCNC: NORMAL MG/DL
VENTRICULAR RATE- MUSE: 65 BPM
WBC URINE: <1 /HPF

## 2024-01-21 PROCEDURE — 250N000011 HC RX IP 250 OP 636: Performed by: EMERGENCY MEDICINE

## 2024-01-21 PROCEDURE — A9585 GADOBUTROL INJECTION: HCPCS | Performed by: EMERGENCY MEDICINE

## 2024-01-21 PROCEDURE — 70553 MRI BRAIN STEM W/O & W/DYE: CPT

## 2024-01-21 PROCEDURE — 255N000002 HC RX 255 OP 636: Performed by: EMERGENCY MEDICINE

## 2024-01-21 PROCEDURE — 81001 URINALYSIS AUTO W/SCOPE: CPT | Performed by: EMERGENCY MEDICINE

## 2024-01-21 RX ORDER — LORAZEPAM 2 MG/ML
1 INJECTION INTRAMUSCULAR
Status: COMPLETED | OUTPATIENT
Start: 2024-01-21 | End: 2024-01-21

## 2024-01-21 RX ORDER — GADOBUTROL 604.72 MG/ML
9 INJECTION INTRAVENOUS ONCE
Status: COMPLETED | OUTPATIENT
Start: 2024-01-21 | End: 2024-01-21

## 2024-01-21 RX ADMIN — GADOBUTROL 9 ML: 604.72 INJECTION INTRAVENOUS at 02:43

## 2024-01-21 RX ADMIN — LORAZEPAM 1 MG: 2 INJECTION INTRAMUSCULAR; INTRAVENOUS at 00:48

## 2024-01-21 ASSESSMENT — ACTIVITIES OF DAILY LIVING (ADL)
ADLS_ACUITY_SCORE: 35

## 2024-01-21 NOTE — ED NOTES
[facility]  ED Nurse Handoff Report    ED Chief complaint: Stroke Symptoms      ED Diagnosis:   Final diagnoses:   None       Code Status: Full Code    Allergies:   Allergies   Allergen Reactions    Crestor [Rosuvastatin] Muscle Pain (Myalgia)       Patient Story:   Patient arrives via ambulance from nursing home where he seemed confused to staff and had slurred/garbled speech. Has baseline left sided weakness from a right thalamic stroke in 2021. Currently still has slurred speech. Is oriented to place, situation and name but not time.     Focused Assessment:    Neuro: Alert, oriented x 3  Respiratory:Clear lung sounds, on room air   Cardiology:  Sinus rhythm    Gastrointestinal: soft, non tender, non distended   Genitourinary/Renal:    Musculoskeletal: moves all extremities   Skin: Intact skin   Lines: 18g RAC    Labs Ordered and Resulted from Time of ED Arrival to Time of ED Departure   BASIC METABOLIC PANEL - Abnormal       Result Value    Sodium 135      Potassium 4.1      Chloride 100      Carbon Dioxide (CO2) 25      Anion Gap 10      Urea Nitrogen 28.0 (*)     Creatinine 1.81 (*)     GFR Estimate 41 (*)     Calcium 8.5 (*)     Glucose 110 (*)    CBC WITH PLATELETS AND DIFFERENTIAL - Abnormal    WBC Count 7.8      RBC Count 4.31 (*)     Hemoglobin 13.5      Hematocrit 41.5      MCV 96      MCH 31.3      MCHC 32.5      RDW 13.3      Platelet Count 164      % Neutrophils 61      % Lymphocytes 29      % Monocytes 8      % Eosinophils 1      % Basophils 1      % Immature Granulocytes 0      NRBCs per 100 WBC 0      Absolute Neutrophils 4.8      Absolute Lymphocytes 2.3      Absolute Monocytes 0.6      Absolute Eosinophils 0.1      Absolute Basophils 0.1      Absolute Immature Granulocytes 0.0      Absolute NRBCs 0.0     ROUTINE UA WITH MICROSCOPIC REFLEX TO CULTURE - Abnormal    Color Urine Straw      Appearance Urine Clear      Glucose Urine 1000 (*)     Bilirubin Urine Negative      Ketones  Urine Negative      Specific Gravity Urine 1.020      Blood Urine Negative      pH Urine 5.5      Protein Albumin Urine 30 (*)     Urobilinogen Urine Normal      Nitrite Urine Negative      Leukocyte Esterase Urine Negative      RBC Urine <1      WBC Urine <1     GLUCOSE BY METER - Abnormal    GLUCOSE BY METER POCT 104 (*)    INR - Normal    INR 1.00     PARTIAL THROMBOPLASTIN TIME - Normal    aPTT 27     TROPONIN T, HIGH SENSITIVITY - Normal    Troponin T, High Sensitivity 19     ETHYL ALCOHOL LEVEL - Normal    Alcohol ethyl <0.01     GLUCOSE MONITOR NURSING POCT        CTA Head Neck with Contrast   Final Result   IMPRESSION:    HEAD CT:   1.  No CT evidence of acute intracranial hemorrhage, mass or recent transcortical infarct.   2.  Moderate presumed chronic small vessel ischemic changes.   3.  There are chronic lacunar infarcts in the bilateral putamina.      HEAD CTA:    1.  No large vessel occlusion or flow-limiting stenosis.      NECK CTA:   1.  No hemodynamically significant stenosis or dissection.   2.  Redemonstration of bilateral indeterminate parotid gland mass lesions. These have demonstrated slow growth dating back to 01/13/2018. The relative slow enlargement of the masses would favor a nonaggressive etiology although both benign and primary    salivary gland malignancies are included in the differential diagnosis. If not previously characterized, recommend ultrasound and possible ultrasound-guided FNA.      Results were called to Dr. Boudreaux at 11:26 PM on 01/20/2024.      CT Head w/o Contrast   Final Result   IMPRESSION:    HEAD CT:   1.  No CT evidence of acute intracranial hemorrhage, mass or recent transcortical infarct.   2.  Moderate presumed chronic small vessel ischemic changes.   3.  There are chronic lacunar infarcts in the bilateral putamina.      HEAD CTA:    1.  No large vessel occlusion or flow-limiting stenosis.      NECK CTA:   1.  No hemodynamically significant stenosis or dissection.    2.  Redemonstration of bilateral indeterminate parotid gland mass lesions. These have demonstrated slow growth dating back to 01/13/2018. The relative slow enlargement of the masses would favor a nonaggressive etiology although both benign and primary    salivary gland malignancies are included in the differential diagnosis. If not previously characterized, recommend ultrasound and possible ultrasound-guided FNA.      Results were called to Dr. Boudreaux at 11:26 PM on 01/20/2024.      MR Brain w/o & w Contrast    (Results Pending)         Treatments and/or interventions provided:      Medications   iopamidol (ISOVUE-370) solution 67 mL (67 mLs Intravenous $Given 1/20/24 2307)   sodium chloride 0.9 % CT scan flush use (100 mLs Intravenous $Given 1/20/24 2306)   LORazepam (ATIVAN) injection 1 mg (1 mg Intravenous $Given 1/21/24 0048)   gadobutrol (GADAVIST) injection 9 mL (9 mLs Intravenous $Given 1/21/24 0243)   sodium chloride (PF) 0.9% PF flush 10 mL (10 mLs Intravenous $Given 1/21/24 0243)        Patient's response to treatments and/or interventions:   Resting comfortably    To be done/followed up on inpatient unit:    See any in-patient orders    Does this patient have any cognitive concerns?: Forgetful and Disoriented to time    Activity level - Baseline/Home:    Cane    Activity Level - Current:     Stand with Assist and Cane    Patient's Preferred language: English     Needed?: No    Isolation: None  Infection: Not Applicable  Patient tested for COVID 19 prior to admission: NO    Bariatric?: No    Vital Signs:   Vitals:    01/21/24 0243 01/21/24 0244 01/21/24 0245 01/21/24 0247   BP: 117/81  135/81    Pulse: 71  68 67   Resp:   10 17   SpO2:  100%  99%       Cardiac Rhythm:Cardiac Rhythm: Normal sinus rhythm    Was the PSS-3 completed:   Yes  What interventions are required if any?               Family Comments: None  OBS brochure/video discussed/provided to patient/family: No              Name of  person given brochure if not patient:              Relationship to patient:    For the majority of the shift this patient's behavior was Green.   Behavioral interventions performed were     ED NURSE PHONE NUMBER: *[id number]

## 2024-01-21 NOTE — ED PROVIDER NOTES
History     Chief Complaint:  Stroke Symptoms     The history is provided by the EMS personnel.      Emily Zhu is a 63 year old male with a stented coronary artery with a history of CAD, hyperlipidemia, hypertension, pancreatic disease, CVA, type 2 diabetes, and CKD stage 3b  who presents to the emergency department for stroke symptoms. EMS states that one hour prior to emergency department arrival, the patient's assisted living staff noticed that the patient was experiencing increased confusion as well as slurred speech as confirmed by the patient's sister over the phone. They report that the patient had a CVA in 2021 and has since had left-sided deficits. They add that the patient's blood pressure was 190 systolic and BG was 104.    Independent Historian:   EMS - They report above    Review of External Notes:   Reviewed previous neurology note from Dr. Hoff in 12/26/2022.  Patient noted to have had a stroke about a year and a half prior in [place] with weakness of the left side.  MRI did not show definite findings of stroke but significant small vessel ischemic disease.  Neurology suspected a thalamic/internal capsule stroke on the right side causing weakness and numbness.    Medications:    Amlodipine  Aspirin 81 mg  Atorvastatin  Clopidogrel  Empagliflozin  Famotidine  Gabapentin  Isosorbide mononitrate  Lansoprazole  Lisinopril  Metoprolol  Sertraline  Tirzepatide    Past Medical History:    Cataracts  CAD  GERD  Hyperlipidemia  Hypertension   Pancreatic disease  Solitary bone cyst  Stented coronary artery  Type 2 diabetes  Stage 3b CKD  CVA  Renal mass  Tobacco use    Past Surgical History:    Angioplasty x 2  Arthrotomy x 2  Decompression cubital tunnel  Carpal tunnel release  ORIF hip nailing  Parotidectomy x 2  Cornea surgery with IOL x 2  Stent     Physical Exam   Patient Vitals for the past 24 hrs:   BP Temp Temp src Pulse Resp SpO2   01/21/24 0921 -- -- -- 71 17 --   01/21/24 0854 (!)  167/90 -- -- 73 26 94 %   01/21/24 0851 (!) 157/89 -- -- 73 (!) 8 95 %   01/21/24 0824 136/70 -- -- 69 (!) 8 94 %   01/21/24 0821 (!) 140/90 -- -- 68 15 94 %   01/21/24 0754 (!) 148/95 -- -- 68 17 95 %   01/21/24 0751 (!) 152/98 -- -- 65 22 97 %   01/21/24 0721 (!) 148/78 -- -- 67 21 --   01/21/24 0651 (!) 155/98 -- -- 73 (!) 32 --   01/21/24 0431 -- 97  F (36.1  C) Oral -- -- --   01/21/24 0428 -- -- -- 72 23 99 %   01/21/24 0413 (!) 140/78 -- -- 71 18 95 %   01/21/24 0358 134/75 -- -- 70 18 95 %   01/21/24 0343 133/86 -- -- 72 19 97 %   01/21/24 0328 130/89 -- -- 71 16 95 %   01/21/24 0313 132/82 -- -- 71 16 96 %   01/21/24 0258 (!) 138/90 -- -- 70 17 99 %   01/21/24 0247 -- -- -- 67 17 99 %   01/21/24 0245 135/81 -- -- 68 10 --   01/21/24 0244 -- -- -- -- -- 100 %   01/21/24 0243 117/81 -- -- 71 -- --   01/21/24 0200 (!) 167/94 -- -- 76 -- --   01/21/24 0159 (!) 167/94 -- -- 76 -- --   01/21/24 0158 -- -- -- -- -- 94 %   01/21/24 0154 -- -- -- -- -- 95 %   01/21/24 0149 -- -- -- -- -- 97 %   01/21/24 0144 (!) 172/107 -- -- 74 -- 95 %   01/21/24 0140 -- -- -- -- -- 96 %   01/21/24 0139 (!) 159/96 -- -- 72 -- 96 %   01/21/24 0133 -- -- -- -- -- 97 %   01/21/24 0132 -- -- -- -- -- 97 %   01/21/24 0131 -- -- -- -- -- 98 %   01/21/24 0130 (!) 177/131 -- -- 72 -- 98 %   01/21/24 0116 -- -- -- -- -- 95 %   01/21/24 0115 (!) 155/85 -- -- 73 -- 98 %      Physical Exam  General: Well-nourished,  appears to be resting comfortably when I enter the room  Eyes: PERRL, conjunctivae pink no scleral icterus or conjunctival injection  ENT:  Moist mucus membranes, posterior oropharynx clear without erythema or exudates  Respiratory:  Lungs clear to auscultation bilaterally, no crackles/rubs/wheezes.  Good air movement  CV: Normal rate and rhythm, no murmurs/rubs/gallops  GI:  Abdomen soft and non-distended.  Normoactive BS.  No tenderness, guarding or rebound  Skin: Warm, dry.  No rashes or petechiae  Musculoskeletal: No peripheral  edema or calf tenderness  Neuro: Alert and oriented to person/place/time. + Mildly slurred speech. PERRL, EOMI no nystagmus, no aphasia/facial droop/dysarthria, tongue midline, symmetric palatal elevation, diminished strength in left upper and left lower extremity compared to right.  Able to hold left leg and arm up against gravity.  sensation intact to LT over face/BUE/BLE  Psychiatric: Normal affect      Emergency Department Course   ECG  ECG taken at 2348, ECG read at 2355  Normal sinus rhythm  Left axis deviation  Inferior infarct, age undetermined  Anterior infarct, age undetermined   No change as compared to prior, dated 01/09/20.  Rate 65 bpm. WI interval 174 ms. QRS duration 74 ms. QT/QTc 418/434 ms. P-R-T axes 54 -63 47.     Imaging:  MR Brain w/o & w Contrast   Final Result   IMPRESSION:   1.  No recent infarct, intracranial mass or evidence of recent intracranial hemorrhage.   2.  Moderate presumed chronic small vessel ischemic changes.   3.  Right parotid gland mass lesion is not significantly changed in size dating back to 01/09/2020. This would favor benign etiology, however, both benign and malignant primary salivary gland neoplasms remain in the differential diagnosis. If not    previously characterized, recommend ENT consultation with ultrasound and possible ultrasound-guided FNA.      CTA Head Neck with Contrast   Final Result   IMPRESSION:    HEAD CT:   1.  No CT evidence of acute intracranial hemorrhage, mass or recent transcortical infarct.   2.  Moderate presumed chronic small vessel ischemic changes.   3.  There are chronic lacunar infarcts in the bilateral putamina.      HEAD CTA:    1.  No large vessel occlusion or flow-limiting stenosis.      NECK CTA:   1.  No hemodynamically significant stenosis or dissection.   2.  Redemonstration of bilateral indeterminate parotid gland mass lesions. These have demonstrated slow growth dating back to 01/13/2018. The relative slow enlargement of the  masses would favor a nonaggressive etiology although both benign and primary    salivary gland malignancies are included in the differential diagnosis. If not previously characterized, recommend ultrasound and possible ultrasound-guided FNA.      Results were called to Dr. Boudreaux at 11:26 PM on 01/20/2024.      CT Head w/o Contrast   Final Result   IMPRESSION:    HEAD CT:   1.  No CT evidence of acute intracranial hemorrhage, mass or recent transcortical infarct.   2.  Moderate presumed chronic small vessel ischemic changes.   3.  There are chronic lacunar infarcts in the bilateral putamina.      HEAD CTA:    1.  No large vessel occlusion or flow-limiting stenosis.      NECK CTA:   1.  No hemodynamically significant stenosis or dissection.   2.  Redemonstration of bilateral indeterminate parotid gland mass lesions. These have demonstrated slow growth dating back to 01/13/2018. The relative slow enlargement of the masses would favor a nonaggressive etiology although both benign and primary    salivary gland malignancies are included in the differential diagnosis. If not previously characterized, recommend ultrasound and possible ultrasound-guided FNA.      Results were called to Dr. Boudreaux at 11:26 PM on 01/20/2024.         Read by radiologist.     Laboratory:  Labs Ordered and Resulted from Time of ED Arrival to Time of ED Departure   BASIC METABOLIC PANEL - Abnormal       Result Value    Sodium 135      Potassium 4.1      Chloride 100      Carbon Dioxide (CO2) 25      Anion Gap 10      Urea Nitrogen 28.0 (*)     Creatinine 1.81 (*)     GFR Estimate 41 (*)     Calcium 8.5 (*)     Glucose 110 (*)    CBC WITH PLATELETS AND DIFFERENTIAL - Abnormal    WBC Count 7.8      RBC Count 4.31 (*)     Hemoglobin 13.5      Hematocrit 41.5      MCV 96      MCH 31.3      MCHC 32.5      RDW 13.3      Platelet Count 164      % Neutrophils 61      % Lymphocytes 29      % Monocytes 8      % Eosinophils 1      % Basophils 1      % Immature  Granulocytes 0      NRBCs per 100 WBC 0      Absolute Neutrophils 4.8      Absolute Lymphocytes 2.3      Absolute Monocytes 0.6      Absolute Eosinophils 0.1      Absolute Basophils 0.1      Absolute Immature Granulocytes 0.0      Absolute NRBCs 0.0     ROUTINE UA WITH MICROSCOPIC REFLEX TO CULTURE - Abnormal    Color Urine Straw      Appearance Urine Clear      Glucose Urine 1000 (*)     Bilirubin Urine Negative      Ketones Urine Negative      Specific Gravity Urine 1.020      Blood Urine Negative      pH Urine 5.5      Protein Albumin Urine 30 (*)     Urobilinogen Urine Normal      Nitrite Urine Negative      Leukocyte Esterase Urine Negative      RBC Urine <1      WBC Urine <1     GLUCOSE BY METER - Abnormal    GLUCOSE BY METER POCT 104 (*)    INR - Normal    INR 1.00     PARTIAL THROMBOPLASTIN TIME - Normal    aPTT 27     TROPONIN T, HIGH SENSITIVITY - Normal    Troponin T, High Sensitivity 19     ETHYL ALCOHOL LEVEL - Normal    Alcohol ethyl <0.01     GLUCOSE MONITOR NURSING POCT     Emergency Department Course & Assessments:  ED Course as of 01/22/24 0114   Sun Jan 21, 2024   0650 I checked on patient.  He was sleeping.  He awoke and spoke.  Somewhat slurred speech but patient thinks he is at his baseline.  He is denying any complaints at this time.  Discussed disposition and he was comfortable with discharge.   0702 I consulted with stroke neurology. No need for admission given MRI report. No changes in medications.     Interventions:  Medications   iopamidol (ISOVUE-370) solution 67 mL (67 mLs Intravenous $Given 1/20/24 2307)   sodium chloride 0.9 % CT scan flush use (100 mLs Intravenous $Given 1/20/24 2306)   LORazepam (ATIVAN) injection 1 mg (1 mg Intravenous $Given 1/21/24 0048)   gadobutrol (GADAVIST) injection 9 mL (9 mLs Intravenous $Given 1/21/24 0243)   sodium chloride (PF) 0.9% PF flush 10 mL (10 mLs Intravenous $Given 1/21/24 0243)      Assessments:  2300 I obtained history and examined the  patient as noted above.   2302 I declared a tier 1 code stroke.  2336 I deescalated the code stroke.     Independent Interpretation (X-rays, CTs, rhythm strip):  I independently interpreted the patient's head CT and see no evidence of intracranial hemorrhage.    Consultations/Discussion of Management or Tests:  2305 I spoke with stroke neurologist, regarding the patient.  2326 I spoke with Dr. Jim, radiologist, regarding the patient. He reports there is no large vessel occlusion on the patient's head imaging.   2336 I spoke with stroke neurology, they advise deescalating the code stroke.     Social Determinants of Health affecting care:   None    Disposition:  The patient was discharged.    Impression & Plan      Medical Decision Making:    The patient has stroke symptoms:         ED Stroke specific documentation           NIHSS PDF       Thrombolytics:   Not given due to:   - minor/isolated/quickly resolving symptoms    If treating with thrombolytics: Ensure SBP<180 and DBP<105 prior to treatment with thrombolytics.  Administering thrombolytics after treatment with IV labetalol, hydralazine, or nicardipine is reasonable once BP control is established.    Endovascular Retrieval:  Not initiated due to absence of proximal vessel occlusion    National Institutes of [employer] Stroke Scale (Baseline)  Time Performed: on arrival    Stroke Mimics were considered (including migraine headache, seizure disorder, hypoglycemia (or hyperglycemia), head or spinal trauma, CNS infection, Toxin ingestion and shock state (e.g. sepsis) .      [employer] of [employer] Stroke Scale  Exam Interval: Baseline   Score    Level of consciousness: (0)   Alert, keenly responsive    LOC questions: (0)   Answers both questions correctly    LOC commands: (0)   Performs both tasks correctly    Best gaze: (0)   Normal    Visual: (0)   No visual loss    Facial palsy: (0)   Normal symmetrical movements    Motor arm (left): (1)   Drift    Motor  arm (right): (0)   No drift    Motor leg (left): (1)   Drift    Motor leg (right): (0)   No drift    Limb ataxia: (1)   Present in one limb    Sensory: (0)   Normal- no sensory loss    Best language: (0)   Normal- no aphasia    Dysarthria: (1)   Mild to moderate dysarthria    Extinction and inattention: (0)   No abnormality        Total Score:  3     Emily Zhu is a 64 year old male brought for concern of slurred speech and confusion.  His neurologic examination shows left-sided weakness but this is well-documented and not new for him.  Speech is mildly slurred.  No aphasia.  No apparent new deficits otherwise.  A code stroke was called given the patient was in the timeframe.  No bleed on CT scan.  No large vessel occlusion seen on CTA.  Code stroke was de-escalated after consultation with stroke neurology.  No other medical etiology for his symptoms is identified.  MRI was obtained and shows no new stroke or other concerning findings.  Stroke neurology did not feel the patient needs to be admitted from a stroke standpoint.  No change in medical management if this was a TIA as he is already on maximal medical therapy.  He has no other acute medical findings on exam, history or his workup.  He is thus discharged with precautions to return if any new or worsening.    Total Critical Care time: was 35 minutes for this patient excluding procedures.     Diagnosis:    ICD-10-CM    1. Episode of change in speech  R47.89       2. Episode of confusion  R41.0            Scribe Disclosure:  DANO, Mayo Rosales, am serving as a scribe at 11:13 PM on 1/20/2024 to document services personally performed by Viji Boudreaux MD based on my observations and the provider's statements to me.     1/20/2024   Viji Boudreaux MD Cho, Amy C, MD  01/22/24 0118

## 2024-01-21 NOTE — ED TRIAGE NOTES
Pt BIBA from assisted living facility. Pt staff noticed acute confusion and slurred speech. Pt has Hx of stroke in 2020 with L side deficits. LKW 2130  \

## 2024-01-21 NOTE — DISCHARGE INSTRUCTIONS
*You may resume diet and activities as tolerated.  *No new medications.  *Return if you become worse in any way.

## 2024-01-21 NOTE — CONSULTS
"[facility]    Stroke Consult Note    Reason for Consult: Stroke Code     Chief Complaint: No chief complaint on file.      VINNY Zhu is a 63 year old male with previous right thalamic stroke in 2021, DM2, HTN had slurred speech and seemed confused in nursing home Has baseline left sided weakness.His LKW- 9.30 pm, 1/21/24.    Imaging Findings  CT head- No hemorrhage  CTA head and neck: No LVO    Intravenous Thrombolysis  Not given due to:   - Minor symptoms    Endovascular Treatment  Not initiated due to absence of proximal vessel occlusion    Impression     #Possible recrudescence of prior Right thalamic stroke    Recommendations  - MRI Brain w/w/o contrast and if it shows new infarct then please page on call stroke neurology    Updated recommendations:  - No further stroke work up as negative MRI for stroke.     The Stroke Staff is Dr. Prieto.    Aaron Couch MD  Vascular Neurology Fellow    To page me or covering stroke neurology team member, click here: AMCOM  Choose \"On Call\" tab at top, then select \"NEUROLOGY/ALL SITES\" from middle drop-down box, press Enter, then look for \"stroke\" or \"telestroke\" for your site.  ______________________________________________________    Clinically Significant Risk Factors Present on Admission                # Drug Induced Platelet Defect: home medication list includes an antiplatelet medication   # Hypertension: Noted on problem list     # DMII: A1C = 6.7 % (Ref range: 4.0 - 6.0 %) within past 6 months    # Obesity: Estimated body mass index is 30.29 kg/m  as calculated from the following:    Height as of 1/18/24: 1.651 m (5' 5\").    Weight as of 1/18/24: 82.6 kg (182 lb).                 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    Stented coronary artery     Type II or unspecified type diabetes mellitus without mention of complication, not stated as uncontrolled     Diabetes mellitus    Unspecified essential hypertension     Essential hypertension     Past Surgical History   Past Surgical History:   Procedure Laterality Date    ANGIOPLASTY  2007    rca    ANGIOPLASTY  2010    om    ARTHROTOMY SHOULDER, ROTATOR CUFF REPAIR, COMBINED Left 4/12/2017    Procedure: COMBINED ARTHROTOMY SHOULDER, ROTATOR CUFF REPAIR;  Surgeon: Deejay Conrad MD;  Location: [facility]    ARTHROTOMY SHOULDER, SUBACROMIAL DECOMPRESSION, COMBINED Left 4/12/2017    Procedure: COMBINED ARTHROTOMY SHOULDER, SUBACROMIAL DECOMPRESSION;  Surgeon: Deejay Conrad MD;  Location: [facility]    COLONOSCOPY      DECOMPRESSION CUBITAL TUNNEL  11/29/2013    Procedure: DECOMPRESSION CUBITAL TUNNEL;;  Surgeon: Radha Cain MD;  Location: [facility]    ENDOSCOPIC RELEASE CARPAL TUNNEL  11/29/2013    Procedure: ENDOSCOPIC RELEASE CARPAL TUNNEL;  LEFT ENDOSCOPIC CARPAL TUNNEL RELEASE AND CUBITAL TUNNEL RELEASE ;  Surgeon: Radha Cain MD;  Location: [facility]    OPEN REDUCTION INTERNAL FIXATION HIP NAILING  3/30/2012    Procedure:OPEN REDUCTION INTERNAL FIXATION HIP NAILING; Biopsy, Curettage and Bone Grafting Right Hip Lesion, Placement of Hip Screw; Surgeon:MARILIN GAY; Location:[facility]    PAR[place]ECTOMY Right ~1990    [place]    PAROTIDECTOMY Left 11/26/2019    Procedure: Left parotidectomy with facial nerve monitoring and 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.;  Surgeon: Rafat Carlin MD;  Location: [facility] OR    PHACOEMULSIFICATION CLEAR CORNEA WITH STANDARD INTRAOCULAR LENS IMPLANT  5/8/2013    Procedure: PHACOEMULSIFICATION CLEAR CORNEA WITH STANDARD INTRAOCULAR LENS IMPLANT;  RIGHT  EYE PHACOEMULSIFICATION CLEAR CORNEA WITH STANDARD INTRAOCULAR LENS IMPLANT ;  Surgeon: Jovani Pretty MD;  Location: [facility]    PHACOEMULSIFICATION CLEAR CORNEA WITH STANDARD INTRAOCULAR LENS IMPLANT  5/20/2013    Procedure: PHACOEMULSIFICATION CLEAR CORNEA WITH STANDARD INTRAOCULAR LENS IMPLANT;  LEFT  EYE PHACOEMULSIFICATION CLEAR CORNEA WITH STANDARD INTRAOCULAR LENS IMPLANT ;  Surgeon: Jovani Pretty MD;  Location: [facility]    STENT       Medications   Home Meds  Prior to Admission medications    Medication Sig Start Date End Date Taking? Authorizing Provider   ACCU-CHEK SALLY PLUS test strip USE 1 STRIP TO CHECK GLUCOSE TWICE DAILY OR AS DIRECTED  Patient not taking: Reported on 9/1/2023 12/28/22   Reported, Patient   acetaminophen (TYLENOL) 500 MG tablet Take 1,000 mg by mouth 2 times daily    Reported, Patient   amLODIPine (NORVASC) 2.5 MG tablet Take 1 tablet (2.5 mg) by mouth daily 8/17/23   Gabbie Rene MD   aspirin (ASA) 81 MG EC tablet Take 1 tablet (81 mg) by mouth daily 1/27/22   Tamica Tang APRN CNP   atorvastatin (LIPITOR) 40 MG tablet Take 1 tablet (40 mg) by mouth daily 7/29/23   Gabbie Rene MD   clopidogrel (PLAVIX) 75 MG tablet Take 1 tablet (75 mg) by mouth daily 1/7/24   Gabbie Rene MD   clotrimazole-betamethasone (LOTRISONE) 1-0.05 % external lotion Apply topically 2 times daily 7/6/23   Gabbie Rene MD   Continuous Blood Gluc Sensor (FREESTYLE JAQUAN 2 SENSOR) MISC 1 Device every 14 days To check blood sugars. 12/1/23   Gabbie Rene MD   cyanocobalamin (VITAMIN B-12) 1000 MCG tablet Take 1 tablet (1,000 mcg) by mouth daily 10/6/23   Gabbie Rene MD   empagliflozin (JARDIANCE) 25 MG TABS tablet Take 1 tablet (25 mg) by mouth daily 10/6/23   Gabbie Rene MD   famotidine (PEPCID) 20 MG tablet Take 1 tablet (20 mg) by mouth daily 12/1/23   Gabbie Rene MD   gabapentin (NEURONTIN) 300 MG capsule Take 1 capsule (300 mg) by mouth at bedtime  1/18/24   Gabbie Rene MD   ibuprofen (ADVIL/MOTRIN) 600 MG tablet TAKE 1 TABLET BY MOUTH EVERY 6 TO 8 HOURS 3/14/23   Reported, Patient   isosorbide mononitrate (IMDUR) 30 MG 24 hr tablet Take 1 tablet (30 mg) by mouth daily 6/29/23   Gabbie Rene MD   LANsoprazole (PREVACID) 15 MG DR capsule Take 1 capsule (15 mg) by mouth daily 9/12/23   Gabbie Rene MD   lisinopril (ZESTRIL) 5 MG tablet Take 1 tablet (5 mg) by mouth daily 5/23/23   Gabbie Rene MD   metoprolol succinate ER (TOPROL XL) 25 MG 24 hr tablet Take 1 tablet (25 mg) by mouth daily 10/6/23   Gabbie Rene MD   sertraline (ZOLOFT) 100 MG tablet Take 1 tablet (100 mg) by mouth daily 1/16/24   Gabbie Rene MD   tirzepatide (MOUNJARO) 5 MG/0.5ML pen Inject 5 mg Subcutaneous every 7 days 8/10/23   Gabbie Rene MD       Scheduled Meds   iopamidol (ISOVUE-370)  67 mL Intravenous Once    sodium chloride 0.9 %  100 mL Intravenous Once       Infusion Meds      PRN Meds      Allergies   Allergies   Allergen Reactions    Crestor [Rosuvastatin] Muscle Pain (Myalgia)     Family History   Family History   Problem Relation Age of Onset    Cerebrovascular Disease Mother 56    Hypertension Mother     Cerebrovascular Disease Father     Hypertension Father      Social History   Social History     Tobacco Use    Smoking status: Every Day     Packs/day: 1.00     Years: 40.00     Additional pack years: 0.00     Total pack years: 40.00     Types: Cigarettes    Smokeless tobacco: Never    Tobacco comments:     9/29/2021 - 10 cigarettes/day   Substance Use Topics    Alcohol use: Not Currently     Alcohol/week: 0.0 standard drinks of alcohol     Comment: none    Drug use: No       Review of Systems                Stroke Scales    NIHSS  1a. Level of Consciousness 0-->Alert, keenly responsive   1b. LOC Questions 0-->Answers both questions correctly   1c. LOC Commands 0-->Performs both tasks correctly   2.   Best Gaze 0-->Normal   3.   Visual 0-->No  "visual loss   4.   Facial Palsy 0-->Normal symmetrical movements   5a. Motor Arm, Left 0-->No drift, limb holds 90 (or 45) degrees for full 10 secs   5b. Motor Arm, Right 0-->No drift, limb holds 90 (or 45) degrees for full 10 secs   6a. Motor Leg, Left 1-->Drift, leg falls by the end of the 5-sec period but does not hit bed   6b. Motor Leg, right 0-->No drift, leg holds 30 degree position for full 5 secs   7.   Limb Ataxia 0-->Absent   8.   Sensory 1-->Mild-to-moderate sensory loss, patient feels pinprick is less sharp or is dull on the affected side, or there is a loss of superficial pain with pinprick, but patient is aware of being touched   9.   Best Language 0-->No aphasia, normal   10. Dysarthria 1-->Mild-to-moderate dysarthria, patient slurs at least some words and, at worst, can be understood with some difficulty   11. Extinction and Inattention  0-->No abnormality   Total 3 (01/20/24 5808)           Imaging  I personally reviewed all imaging; relevant findings per HPI.     Lab Results Data   CBC  No results for input(s): \"WBC\", \"RBC\", \"HGB\", \"HCT\", \"PLT\" in the last 168 hours.  Basic Metabolic Panel    Recent Labs   Lab 01/18/24  1413      POTASSIUM 5.0   CHLORIDE 102   CO2 22   BUN 29.2*   CR 2.01*   *   ONEL 9.7     Liver Panel  No results for input(s): \"PROTTOTAL\", \"ALBUMIN\", \"BILITOTAL\", \"ALKPHOS\", \"AST\", \"ALT\", \"BILIDIRECT\" in the last 168 hours.  INR    Recent Labs   Lab Test 01/09/20  1117 01/13/18  0840 04/12/17  0943   INR 0.99 0.87 0.88      Lipid Profile    Recent Labs   Lab Test 01/18/24  1425 10/24/22  1035 09/29/21  1430   CHOL 178 259* 131   HDL 42 33* 32*   LDL 83 128* 54   TRIG 265* 538* 289*     A1C    Recent Labs   Lab Test 11/02/23  1333 09/01/23  1455 05/09/23  0000   A1C 6.7* 7.3* 8.4*     Troponin  No results for input(s): \"CTROPT\", \"TROPONINIS\", \"TROPONINI\", \"GHTROP\" in the last 168 hours.       Stroke Code Data Data   Stroke Code Data  (for stroke code with tele)  Stroke " code activated 01/20/24  2301   First stroke provider response 01/20/24  2302   Video start time 01/20/24 2316   Video end time    2335   Last known normal 01/20/24 2130   Time of discovery (or onset of symptoms)  01/20/24 2130   Head CT read by Stroke Neuro Provider 01/20/24 2316   Was stroke code de-escalated? Yes  01/20/24 2336     Telestroke Service Details  Type of service telemedicine diagnostic assessment of acute neurological changes   Reason telemedicine is appropriate patient requires assessment with a specialist for diagnosis and treatment of neurological symptoms   Mode of transmission secure interactive audio and video communication per Gustavo   Originating site (patient location) [facility]    Distant site (provider location) Provider remote site

## 2024-01-21 NOTE — ED NOTES
Bed: ST03  Expected date:   Expected time:   Means of arrival:   Comments:  429 63m STROKE ALERT new onset slurred speech, confusion, LWK 1945.

## 2024-01-22 ENCOUNTER — ANCILLARY PROCEDURE (OUTPATIENT)
Dept: ULTRASOUND IMAGING | Facility: CLINIC | Age: 64
End: 2024-01-22
Attending: FAMILY MEDICINE
Payer: COMMERCIAL

## 2024-01-22 DIAGNOSIS — Z13.6 SCREENING FOR AAA (ABDOMINAL AORTIC ANEURYSM): ICD-10-CM

## 2024-01-22 PROCEDURE — 76775 US EXAM ABDO BACK WALL LIM: CPT

## 2024-01-22 RX ORDER — GABAPENTIN 300 MG/1
300 CAPSULE ORAL 3 TIMES DAILY
Qty: 90 CAPSULE | Refills: 0 | Status: SHIPPED | OUTPATIENT
Start: 2024-01-22 | End: 2024-02-20

## 2024-01-22 NOTE — TELEPHONE ENCOUNTER
Refill request for: Gabapentin    Directions: Take 1 capsule TID     LOV: 12/26/22   NOV: None: PRN     30 day supply with 0 refills Medication T'd for review and signature

## 2024-01-25 ENCOUNTER — PATIENT OUTREACH (OUTPATIENT)
Dept: CARE COORDINATION | Facility: CLINIC | Age: 64
End: 2024-01-25
Payer: COMMERCIAL

## 2024-01-25 NOTE — PROGRESS NOTES
"Clinic Care Coordination Contact  Follow Up Progress Note      Assessment:    Patient had an ED visit at [facility] a week ago for slurred speech and confusion. His BP was high at 190. Stroke was ruled out by neurology and MRI. He was discharged back to assisted living.    [facility] called patient to check in. He is doing well. He celebrated his birthday at the assisted living and had a good time. He says that staff comes in to help him as needed. He does wish staff could be there more.  left a message with Isela ([facility] worker) to see what his services look like right now for homecare.     Plan:   [facility] will do 1 more maintenance outreach before closing care coordination due to pt's needs being met  [place] [facility] [place] that [facility] will review      AMANDA Fuentes, [facility]  Clinic Care Coordinator  [email or web address]  [phone]     Isela called today 1/26/24- she said she plans to connect with [facility] to see what days/times they are helping patient. Patient does have continued [facility] staff. She will send an update to [facility].    AMANDA Fuentes, [facility]  Clinic Care Coordinator  [email or web address]  [phone]     2/7- Isela emailed \"My apologies for not getting back to you right away after our phone conversation a few weeks back. I finally was able to speak to Daly at [facility]. She will be making sure that Clint has the help that he needs moving forward. As they will be assisting with his ADL's and other delegated task that they feel he may need. Clint is having issues with his other service provider for homemaking and [employer] [facility] ([facility]). I will be looking for a different agency that is more reliable, because Clint has had 5 different staffs, and the same issues keep on occurring. Clint would like to continue working with the current provider until I find a replacement. Please let me know if you have any questions or concerns.\"    Christine Rutledge, " AMANDA, [facility]  Clinic Care Coordinator  [email or web address]  [phone]

## 2024-01-26 DIAGNOSIS — K21.9 GERD (GASTROESOPHAGEAL REFLUX DISEASE): ICD-10-CM

## 2024-01-26 RX ORDER — MECOBALAMIN 5000 MCG
15 TABLET,DISINTEGRATING ORAL DAILY
Qty: 30 CAPSULE | Refills: 4 | Status: SHIPPED | OUTPATIENT
Start: 2024-01-26 | End: 2024-07-17

## 2024-01-26 NOTE — TELEPHONE ENCOUNTER
Med: lansoprazole    LOV (related): 1/18/24      Due for F/U around: 6 months    Next Appt: 2/20/24 With MUSHTAQ Mares       oral

## 2024-02-04 NOTE — ED PROVIDER NOTES
"  History     Chief Complaint:  Left-Sided Numbness and Weakness    HPI   Emily Zhu is a 57 year old male with a history of diabetes and hypertension who presents with left-sided numbness and weakness. The patient is a poor historian, so history is somewhat limited. He reports that \"over one week ago\" he began noticing numbness and tingling in his left face, head, arm, and leg. The patient describes this as \"pins and needles\". He also notes some weakness on the left side as well, but is uncertain as to how long this has been present, maybe a year. Mr. Zhu continues that three days ago he developed a generalized headache, and this has been relatively constant since initial onset. His numbness and tingling increased yesterday morning, which concerned him and prompted him to come to the ED for evaluation. Here, the patient notes that despite the somewhat long-standing duration of his symptoms, he has not discussed them with his primary care provider as he has been scared to discuss them. He notes that he has had some difficulty \"getting words out\" although he is clear on what he wants to say. However, the patient reports that this symptom has also been ongoing. The patient denies chest pain, SOB, vomiting, or diarrhea. He denies any vision changes or facial asymmetry.       Allergies:  Crestor     Medications:    Lyrica  Glipizide  Metformin  Aspirin  Metoprolol  Plavix  Protonix     Past Medical History:    Cataract  Coronary atherosclerosis   GERD  Hyperlipidemia   Solitary bone cyst  Stented coronary artery  Diabetes, type 2  Hypertension      Past Surgical History:    Angioplasty  Arthrotomy shoulder  Colonoscopy   Decompression cubital tunnel   Carpal tunnel release  ORIF, hip  Phacoemulsification clear cornea with standard iol    Family History:    The patient reports a maternal and fraternal history of CVA and hypertension. The patient denies any other relevant family history.     Social " Pt to CT with CT staff.   "History:  The patient is single. The patient reports current, every day tobacco use and occasional alcohol use.     Review of Systems   Eyes: Negative for visual disturbance.   Respiratory: Negative for shortness of breath.    Cardiovascular: Negative for chest pain.   Gastrointestinal: Negative for nausea and vomiting.   Neurological: Positive for speech difficulty (per patient), weakness (left-sided), numbness (left-sided) and headaches. Negative for facial asymmetry.   All other systems reviewed and are negative.    Physical Exam   First Vitals:  Patient Vitals for the past 24 hrs:   BP Temp Temp src Pulse Heart Rate Resp SpO2 Height   01/13/18 1240 (!) 153/96 - - - 69 28 99 % -   01/13/18 1230 138/80 - - - 73 26 98 % -   01/13/18 1200 142/82 - - - 78 14 99 % -   01/13/18 1130 (!) 178/107 - - - 75 10 99 % -   01/13/18 1102 (!) 161/96 - - - - - - -   01/13/18 1100 (!) 161/96 - - - 70 17 99 % -   01/13/18 1040 (!) 162/105 - - - 70 16 100 % -   01/13/18 1030 173/85 - - - 65 17 100 % -   01/13/18 1020 (!) 169/100 - - - 68 12 99 % -   01/13/18 1010 (!) 163/103 - - - 66 20 99 % -   01/13/18 1000 (!) 172/103 - - - 73 17 99 % -   01/13/18 0951 (!) 180/110 - - - - - - -   01/13/18 0950 (!) 180/110 - - - 63 22 99 % -   01/13/18 0937 - - - - - - 99 % -   01/13/18 0936 (!) 165/118 - - - - - - -   01/13/18 0835 (!) 167/103 98.4  F (36.9  C) Oral 72 - 22 98 % 1.626 m (5' 4\")       Physical Exam   Physical Exam   Constitutional:  Patient is oriented to person, place, and time. They appear well-developed and well-nourished. HENT:   Mouth/Throat:   Oropharynx is clear and moist.   Eyes:    Conjunctivae normal and EOM are normal. Pupils are equal, round, and reactive to light.   Neck:    Normal range of motion.   Cardiovascular: Normal rate, regular rhythm and normal heart sounds.  Exam reveals no gallop and no friction rub.  No murmur heard.  Pulmonary/Chest:  Effort normal and breath sounds normal. Patient has no wheezes. " Patient has no rales.   Abdominal:   Soft. Bowel sounds are normal. Patient exhibits no mass. There is no tenderness. There is no rebound and no guarding.   Musculoskeletal:  Normal range of motion. Patient exhibits no edema.   Neurological:   Patient is alert and oriented to person, place, and time. Patient has normal strength. No cranial nerve deficit. GCS 15. 4/5  strength on the left, 5/5 on the right. Slightly altered sensation in the left hand, not present in the left leg. No facial droop or dysarthria.   Skin:   Skin is warm and dry. No rash noted. No erythema.   Psychiatric:   Patient has depression. Verbalizes that he is very lonely    Emergency Department Course   ECG:  Indication: Numbness  Findings: Normal sinus rhythm. Left anterior fascicular block. Possible anterolateral infarct, age undetermined. ECG read at 08:46 by Dr. Harrison.  Ventricular rate: 70 bpm  CO Interval: 160 ms  QRS duration: 74 ms  QT/QTc: 402/434 ms  R-wave axis: -62    Imaging:  Radiographic findings were communicated with the patient who voiced understanding of the findings.    Head CT w/o contrast:  IMPRESSION:     1. No evidence of acute intracranial hemorrhage, mass, or herniation. 2. There is generalized atrophy of the brain. White matter changes are present in the cerebral hemispheres that are consistent with small vessel ischemic disease in this age patient. Report per radiology.     Head/Neck CTA:  IMPRESSION:   1. Patent arteries in the neck without evidence of dissection. Mild atherosclerotic calcifications at the carotid bifurcations bilaterally without significant stenosis by NASCET criteria. 2. Patent proximal major intracranial arteries without vascular cutoff. No significant stenosis. No aneurysm identified. 3. Bilateral hyperdense masses within the parotid glands. There are also likely prominent periparotid lymph nodes particularly on the left. Differential would include benign and malignant parotid lesions.  Consider further evaluation with parotid MR on a nonemergent basis. Report per radiology.     Laboratory:  CBC: WNL (WBC 76, HGB 15.1, )  CMP: Glucose 187 (high), Albumin 3.2 (low), o/w WNL (Creatinine 1.13)  Troponin I (08:40): <0.015  INR: 0.87  PTT: 28    Interventions:  Hydralazine 10 mg IV injection x2  Normal Saline 1L IV injection   Tylenol 1000 mg tablet PO     Emergency Department Course:  Nursing notes and vitals reviewed. I performed an exam of the patient as documented above. Patient declined MRI imaging during initial interview due to anxiety.   IV inserted and blood drawn. The patient was placed on continuous cardiac monitoring and pulse oximetry.  09:35 The patient was given NLS IV, dosage as noted above.    09:36 Head CT scan obtained.  Results as above.  Findings discussed with the patient.   09:50 CTA scan obtained.  Results as above.  Findings discussed with the patient.   09:51 The patient was given Hydralazine IV, dosage as noted above.    10:45 Recheck. Patient now reports that his symptoms have been ongoing for over a year.   11:01 The patient was given Tylenol PO, dosage as noted above.    11:02 The patient was given additional Hydralazine IV, dosage as noted above.    Findings and plan explained to the Patient. Patient discharged home with instructions regarding supportive care, medications, and reasons to return. The importance of close follow-up was reviewed.      Impression & Plan      Medical Decision Making:  Emily Zhu is a 57 year old male coming in with left-sided tingling with associated weakness. This has been ongoing intermittently for the past year. He stated that the whole side of his body except for his torso is like this. It sounds like he has been diagnosed with carpal tunnel in the past. He was told to come here by Dr. Apodaca, his primary care doctor, for evaluation of possible stroke. His ECG shows no evidence of ischemia or arrhythmia. He has no history of  "atrial fibrillation. I did CT/CTA him. We talked about an MRI due to the duration of his symptoms, but he is unable to do this as it will give him \"a heart attack\". In the past when he has had MRIs, he has required open-sided imaging. Because of this, CT/CTA was performed, which showed no acute findings. His metabolic panel is unremarkable. He is not acutely anemic. Cardiac enzyme is within normal limits. It is not clear what is causing his left-sided numbness, although certainly with the diagnosis of possible carpal tunnel, that could account for some of his tingling and decreased  strength on the left. I am not sure what could make his leg feel so numb as I see no evidence of stroke or mass on CT. It is not clear what provokes his symptoms to be worse. He did have high blood pressure on arrival but this has also improved. Again, these symptoms have been problematic for over a year. It is not clear whether he has discussed this with Dr. Apodaca. He is a very pleasant but emotional man, and his answers vary from time to time. I discussed disposition with him. At this time, based on the duration of his symptoms, I feel that he is appropriate for outpatient follow up although I did offer observation. I will refer him to neurology for close follow up on Monday or Tuesday for continued evaluation of his paresthesias and weakness. The patient is comfortable with this plan.       Diagnosis:    ICD-10-CM   1. Hypertension, unspecified type I10   2. Paresthesia R20.2   3. Generalized muscle weakness M62.81       Disposition:  Discharged to home      Elisha MATSON, am serving as a scribe at 8:45 AM on 1/13/2018 to document services personally performed by Alyssia Harrison MD, based on my observations and the provider's statements to me.        Alyssia Harrison MD  01/14/18 5240    "

## 2024-02-20 ENCOUNTER — OFFICE VISIT (OUTPATIENT)
Dept: PHARMACY | Facility: PHYSICIAN GROUP | Age: 64
End: 2024-02-20
Payer: COMMERCIAL

## 2024-02-20 VITALS
DIASTOLIC BLOOD PRESSURE: 67 MMHG | OXYGEN SATURATION: 96 % | SYSTOLIC BLOOD PRESSURE: 111 MMHG | HEART RATE: 93 BPM | WEIGHT: 178.8 LBS | BODY MASS INDEX: 29.75 KG/M2

## 2024-02-20 DIAGNOSIS — E11.21 TYPE 2 DIABETES MELLITUS WITH DIABETIC NEPHROPATHY, WITHOUT LONG-TERM CURRENT USE OF INSULIN (H): Primary | ICD-10-CM

## 2024-02-20 DIAGNOSIS — E11.40 PAINFUL DIABETIC NEUROPATHY (H): ICD-10-CM

## 2024-02-20 DIAGNOSIS — I10 ESSENTIAL HYPERTENSION: ICD-10-CM

## 2024-02-20 DIAGNOSIS — E78.2 MIXED HYPERLIPIDEMIA: ICD-10-CM

## 2024-02-20 DIAGNOSIS — G45.9 TIA (TRANSIENT ISCHEMIC ATTACK): ICD-10-CM

## 2024-02-20 DIAGNOSIS — I25.10 ATHEROSCLEROSIS OF CORONARY ARTERY OF NATIVE HEART WITHOUT ANGINA PECTORIS, UNSPECIFIED VESSEL OR LESION TYPE: ICD-10-CM

## 2024-02-20 PROCEDURE — 99207 PR NO CHARGE LOS: CPT | Performed by: PHARMACIST

## 2024-02-20 RX ORDER — GABAPENTIN 300 MG/1
300 CAPSULE ORAL 2 TIMES DAILY
Qty: 180 CAPSULE | Refills: 1 | Status: SHIPPED | OUTPATIENT
Start: 2024-02-20 | End: 2024-07-17

## 2024-02-20 NOTE — PATIENT INSTRUCTIONS
"Recommendations from today's MTM visit:                                                       1. Set up your [employer] application on your phone- You can have the nurse call me if having issues. [phone]    2. I changed your gabapentin down to 300mg twice daily - AM and PM - new order sent to [facility].     Follow-up: Return in 3 months (on 5/20/2024) for MTM visit in clinic, Primary Care Provider.    It was great speaking with you today.  I value your experience and would be very thankful for your time in providing feedback in our clinic survey. In the next few days, you may receive an email or text message from [employer] with a link to a survey related to your  clinical pharmacist.\"     My Clinical Pharmacist's contact information:                                                      Please feel free to contact me with any questions or concerns you have.      Chelo Roberts, Pharm.D, [facility]  Medication Therapy Management Pharmacist  [phone]      "

## 2024-02-20 NOTE — PROGRESS NOTES
Medication Therapy Management (MTM) Encounter    ASSESSMENT:                            Medication Adherence/Access: No issues identified    Diabetes:   Patient is meeting A1c goal of < 7%.  Patient would benefit from setting up eshtery application on phone.     Hypertension/TIA/CAD:   Stable. Patient is meeting blood pressure goal of < 140/90mmHg.    Hyperlipidemia:   Stable.    Neuropathy:  Due to impaired renal function and concern for side effects from medication, recommend lowering gabapentin frequency.     PLAN:                            1. Set up your [employer] application on your phone- You can have the nurse call me if having issues. [phone]    2. I changed your gabapentin down to 300mg twice daily - AM and PM - new order sent to [facility].     Follow-up: Return in 3 months (on 5/20/2024) for MTM visit in clinic, Primary Care Provider.    SUBJECTIVE/OBJECTIVE:                          Clint Zhu is a 64 year old male coming in for a follow-up visit from 1/18.       Reason for visit: medication review.    Allergies/ADRs: Reviewed in chart  Past Medical History: Reviewed in chart  Tobacco: He reports that he has never smoked. He has never used smokeless tobacco.  Alcohol: not currently using  Dental- got his new dentures and very happy about- got these about 3 weeks ago, occasional speech difficultly with these now.     Medication Adherence/Access: facilities managing medications for him.     Diabetes   -Mounjaro 5mg weekly - replaced Trulicity 1.5mg weekly- Wednesdays (when nurse comes).   -Jardiance 25mg daily     Medication Hx:  Glipizide ER 10mg daily - stopped last visit 1/18, but unable to see blood sugars today.   Victoza- couldn't do daily administration  metformin - stopped due to renal function decline.   Bydureon weekly - stopped when trying to get Mounjaro for better sugar control    Patient is not experiencing side effects.  Blood sugar monitoring: Continuous Glucose Monitor - did NOT have his  reader with him today- got a new phone and needs to get application set up on new phone.   Current diabetes symptoms: none       Eye exam is up to date  Foot exam is up to date    Lab Results   Component Value Date    A1C 6.7 (A) 11/02/2023     Hypertension /TIA/CAD:   -amlodipine 2.5mg daily - decreased from 5mg daily due to hypotension  -lisinopril 5mg - decreased from 10mg daily due to hypotension  -isosorbide 30mg daily  -metoprolol succinate 25mg daily - replaced tartrate as missing 2nd   -aspirin 81mg daily   -clopidogrel 75mg- Had been on clopidogrel alone prior to TIA that occurred in [place] on 6/22/2021.   Patient reports no current medication side effects  Patient does not self-monitor blood pressure.      ED visit on 1/20/24 for slurred speech and confusion, was worked up for stroke, however MRI did not warrant admission. Has scheduled neuropsych on 3/7.      BP Readings from Last 3 Encounters:   02/20/24 111/67   01/21/24 (!) 167/90   01/18/24 124/69     Pulse Readings from Last 3 Encounters:   02/20/24 93   01/21/24 71   01/18/24 80     Hyperlipidemia   -atorvastatin 40mg daily  Patient reports no significant myalgias or other side effects.       Recent Labs   Lab Test 01/18/24  1425 10/24/22  1035   CHOL 178 259*   HDL 42 33*   LDL 83 128*   TRIG 265* 538*     Neuropathy:  Gabapentin 300mg three times daily, tried to decrease to once daily at bedtime due to renal function and concern for confusion and balance, but the reorder was changed back to three times daily.   Hip and should pain.   More dizzy and sleepy overall that has not correlated to blood sugars or blood pressure.   Getting more regular medication now that he is in a facility.   Trying to get orders for a lift chair in his home.   Estimated Creatinine Clearance: 39.4 mL/min (A) (based on SCr of 1.81 mg/dL (H)).      Today's Vitals: /67   Pulse 93   Wt 178 lb 12.8 oz (81.1 kg)   SpO2 96%   BMI 29.75 kg/m    ----------------  Post  Discharge Medication Reconciliation Status: discharge medications reconciled and changed, per note/orders.    I spent 35 minutes with this patient today. All changes were made via collaborative practice agreement with Gabbie Rene MD. A copy of the visit note was provided to the patient's provider(s).    A summary of these recommendations was given to the patient.    Chelo Roberts, Pharm.D, [facility]  Medication Therapy Management Pharmacist  [phone]       Medication Therapy Recommendations  Painful diabetic neuropathy (H)    Current Medication: gabapentin (NEURONTIN) 300 MG capsule   Rationale: Dose too high - Dosage too high - Safety   Recommendation: Decrease Dose - twice daily   Status: Accepted per CPA

## 2024-03-05 ENCOUNTER — PATIENT OUTREACH (OUTPATIENT)
Dept: CARE COORDINATION | Facility: CLINIC | Age: 64
End: 2024-03-05
Payer: COMMERCIAL

## 2024-03-05 NOTE — PROGRESS NOTES
"Clinic Care Coordination Contact  Follow Up Progress Note      Assessment:  spoke with patient today.    He was stressed because he received a bill from [facility] for $106 from his ER visit in January. [facility] called the billing/business office with him and it was for an ultrasound and they only ran it through Medicare and not the secondary [employer] [place] plan. They updated his file and will run the secondary insurance. He can disregard the bill he got in the mail.    He wants more \"hours for people to be there\" with him. He said he is forgetting to take his medications. [facility] will touch base with Isela (KHARI young CM) about this. He was recently diagnosed with Major Neurocognitive Disorder. He lives at [facility] with [facility] services onsite. He also has an [facility] staff but it seems like he has a hard time with consistent staffing. He says he was assigned a new worker recently that helped him to go his recent appts and go to the bank etc.    $41.72 for [facility] he got a bill as well..  billing office [phone]. [id number] is the account #. [facility] left a msg regarding that. He needed help to call both billing offices.     He likes living there; he has made some friends in the building and is participating in the activities    He lost his glasses on an outing. He went and got a new vision exam at [facility] in [place] and picked out glasses but hasn't received them yet. He thinks he has to go pick them up; we called there and they think they should be ready sometime this week.    He has cut down on smoking he is now only smoking 4 cigarettes a day instead of 1 pack. He said living in the apartment that is smoke free helped him cut down because he has to go outside to smoke. He also has been walking more.    [facility] encouraged patient to call as needs arise.     [facility] left a message for Isela (KHARI young worker) today to collaborate and go over plan of care.    Christine Tao', MSW, " [facility]  Clinic Care Coordinator  [email or web address]  [phone]

## 2024-03-06 ENCOUNTER — PATIENT OUTREACH (OUTPATIENT)
Dept: CARE COORDINATION | Facility: CLINIC | Age: 64
End: 2024-03-06
Payer: COMMERCIAL

## 2024-03-06 DIAGNOSIS — L85.3 DRY SKIN DERMATITIS: Primary | ICD-10-CM

## 2024-03-06 NOTE — PROGRESS NOTES
Clinic Care Coordination Contact  Follow Up Progress Note      Assessment:  spoke with patient. His [facility] homecare nurse was with him. Patient declined getting a medication dispenser and said he wants to try to set up phone alarms as a reminder to take his medication and see how he does. He already has alarm set to test his blood sugar.    Patient needs to get a dermatology appt set up- he has rough patches behind his ears. EDDIE huerta'd Dr. Rene to place a referral.    Patient expressed frustration with inconsistency with his new S staff. EDDIE called Michael (Cassi young CM to let her know) and she will work on navigating that with patient and reach out to the service provider.    Plan:   [facility] will follow up again within a few weeks time    AMANDA Fuentes, [facility]  Clinic Care Coordinator  [email or web address]  [phone]

## 2024-03-07 ENCOUNTER — TELEPHONE (OUTPATIENT)
Dept: NEUROPSYCHOLOGY | Facility: CLINIC | Age: 64
End: 2024-03-07
Payer: COMMERCIAL

## 2024-03-07 NOTE — TELEPHONE ENCOUNTER
Message from Lissette Lomax on 3/7/24 stated the appt for the pt with Dr. Siddiqui needed to be canceled due to the pt having been evaluated a month ago.     Caller contacted the pt, pt was already aware, caller canceled appt.    3/7/24 BD

## 2024-03-18 DIAGNOSIS — I25.10 ATHEROSCLEROSIS OF CORONARY ARTERY OF NATIVE HEART WITHOUT ANGINA PECTORIS, UNSPECIFIED VESSEL OR LESION TYPE: ICD-10-CM

## 2024-03-18 DIAGNOSIS — I10 ESSENTIAL HYPERTENSION: ICD-10-CM

## 2024-03-18 RX ORDER — ISOSORBIDE MONONITRATE 30 MG/1
30 TABLET, EXTENDED RELEASE ORAL DAILY
Qty: 90 TABLET | Refills: 2 | Status: SHIPPED | OUTPATIENT
Start: 2024-03-18

## 2024-03-18 NOTE — TELEPHONE ENCOUNTER
Med: isosorbide      LOV (related): 1/18/24    Last Lab: 1/20/24      Due for F/U around: 6 months    Next Appt: None           BP Readings from Last 3 Encounters:   02/20/24 111/67   01/21/24 (!) 167/90   01/18/24 124/69       Last Comprehensive Metabolic Panel:  Lab Results   Component Value Date     01/20/2024    POTASSIUM 4.1 01/20/2024    CHLORIDE 100 01/20/2024    CO2 25 01/20/2024    ANIONGAP 10 01/20/2024     (H) 01/20/2024    BUN 28.0 (H) 01/20/2024    CR 1.81 (H) 01/20/2024    GFRESTIMATED 41 (L) 01/20/2024    ONEL 8.5 (L) 01/20/2024

## 2024-03-28 DIAGNOSIS — E11.21 TYPE 2 DIABETES MELLITUS WITH DIABETIC NEPHROPATHY, WITHOUT LONG-TERM CURRENT USE OF INSULIN (H): ICD-10-CM

## 2024-03-28 RX ORDER — METOPROLOL SUCCINATE 25 MG/1
25 TABLET, EXTENDED RELEASE ORAL DAILY
Qty: 90 TABLET | Refills: 1 | Status: SHIPPED | OUTPATIENT
Start: 2024-03-28 | End: 2024-09-25

## 2024-03-28 NOTE — TELEPHONE ENCOUNTER
Med: Metoprolol       LOV (related): 1/18/24    Last Lab: 1/20/24      Due for F/U around 6 months for bp check     Next Appt: 4/4/24 with Edgard           BP Readings from Last 3 Encounters:   02/20/24 111/67   01/21/24 (!) 167/90   01/18/24 124/69       Last Comprehensive Metabolic Panel:  Lab Results   Component Value Date     01/20/2024    POTASSIUM 4.1 01/20/2024    CHLORIDE 100 01/20/2024    CO2 25 01/20/2024    ANIONGAP 10 01/20/2024     (H) 01/20/2024    BUN 28.0 (H) 01/20/2024    CR 1.81 (H) 01/20/2024    GFRESTIMATED 41 (L) 01/20/2024    ONEL 8.5 (L) 01/20/2024

## 2024-04-03 ENCOUNTER — PATIENT OUTREACH (OUTPATIENT)
Dept: CARE COORDINATION | Facility: CLINIC | Age: 64
End: 2024-04-03
Payer: COMMERCIAL

## 2024-04-03 NOTE — PROGRESS NOTES
Clinic Care Coordination Contact  [facility]    Clinical Data: Care Coordinator Outreach    Patient has an appt this week with Dr. Rene and she can look at the rough patches behind his ears then and on his face; otherwise he has a derm appt but its booked out all the way till September.  He says he also has been having blurry vision for about a week and feels it onsets after taking his medication and wants input on that from PCP.    Patient says he has been feeling weaker lately but [facility]. He also has nursing. An [facility] staff comes to help him too and he is getting help with cleaning, showers, meals etc.    Patient does wish that he could have staffing at his place more often- Isela his waiver  is in charge of that and his services and he will call her.     SW will do chart review within a few weeks and address any needs then. Patient is on maintenance status for care coordination.    Christine Rutledge, MSW, [facility]  Clinic Care Coordinator  [email or web address]  [phone]

## 2024-04-05 ENCOUNTER — OFFICE VISIT (OUTPATIENT)
Dept: FAMILY MEDICINE | Facility: CLINIC | Age: 64
End: 2024-04-05

## 2024-04-05 VITALS
RESPIRATION RATE: 99 BRPM | SYSTOLIC BLOOD PRESSURE: 109 MMHG | WEIGHT: 179 LBS | HEART RATE: 77 BPM | DIASTOLIC BLOOD PRESSURE: 69 MMHG | BODY MASS INDEX: 29.79 KG/M2

## 2024-04-05 DIAGNOSIS — L40.9 PSORIASIS: ICD-10-CM

## 2024-04-05 DIAGNOSIS — N18.32 TYPE 2 DIABETES MELLITUS WITH STAGE 3B CHRONIC KIDNEY DISEASE, WITHOUT LONG-TERM CURRENT USE OF INSULIN (H): ICD-10-CM

## 2024-04-05 DIAGNOSIS — F01.B0 MAJOR NEUROCOGNITIVE DISORDER DUE TO VASCULAR DISEASE, WITHOUT BEHAVIORAL DISTURBANCE, MODERATE (H): Primary | ICD-10-CM

## 2024-04-05 DIAGNOSIS — R80.9 MICROALBUMINURIA DUE TO TYPE 2 DIABETES MELLITUS (H): ICD-10-CM

## 2024-04-05 DIAGNOSIS — E11.22 TYPE 2 DIABETES MELLITUS WITH STAGE 3B CHRONIC KIDNEY DISEASE, WITHOUT LONG-TERM CURRENT USE OF INSULIN (H): ICD-10-CM

## 2024-04-05 DIAGNOSIS — E11.29 MICROALBUMINURIA DUE TO TYPE 2 DIABETES MELLITUS (H): ICD-10-CM

## 2024-04-05 PROCEDURE — 99214 OFFICE O/P EST MOD 30 MIN: CPT | Performed by: FAMILY MEDICINE

## 2024-04-05 PROCEDURE — G2211 COMPLEX E/M VISIT ADD ON: HCPCS | Performed by: FAMILY MEDICINE

## 2024-04-05 RX ORDER — PIMECROLIMUS 10 MG/G
CREAM TOPICAL 2 TIMES DAILY
Qty: 60 G | Refills: 1 | Status: SHIPPED | OUTPATIENT
Start: 2024-04-05 | End: 2024-04-19

## 2024-04-05 RX ORDER — TRIAMCINOLONE ACETONIDE 1 MG/G
CREAM TOPICAL 2 TIMES DAILY
Qty: 45 G | Refills: 0 | Status: SHIPPED | OUTPATIENT
Start: 2024-04-05 | End: 2024-04-19

## 2024-04-05 RX ORDER — TIRZEPATIDE 7.5 MG/.5ML
7.5 INJECTION, SOLUTION SUBCUTANEOUS
Qty: 6 ML | Refills: 3 | Status: SHIPPED | OUTPATIENT
Start: 2024-04-05 | End: 2024-08-20

## 2024-04-05 NOTE — Clinical Note
Also needs to schedule a next available with [facility] pharmacy and ideally be seen in clinic with me every 3 months for proactive care. Thank you.

## 2024-04-05 NOTE — PATIENT INSTRUCTIONS
- you have been diagnosed with major neurocognitive changes   - we will need to get you more help to be taking your medications ; I will have the  connect with your CADI case work to see what we can do.     The skin lesions on the head are in keeping with psoriasis.   For behind the ears, nap of the neck and scalp use kenalog cream  For the eyebrows and around the eyes use the Elidel cream    Lets increase the Moujaro from 5 mg to 7.5 mg daily.

## 2024-04-05 NOTE — PROGRESS NOTES
SUBJECTIVE:    Emily Zhu, is a 64 year old male with complex medical history, presenting for the below:     1. T2DM : recent higher readings in last 2-3 days (reports >200).    Brings in CMG today: average glucose last 14 days : 181, but is not checking with any frequency.     Patient reports taking none of his medications this morning. Presents on his own. Denies fevers, chills, urinary symptoms.     Med regimen for T2DM  Mounjaro 5mg weekly  - given by home [facility] 25mg daily        2. 6 month h/o patchy lesions to scalp, behind ears bilat, nape of neck., brows, eye lids. Itchy on occasion. Denies h/o psoriasis.        OBJECTIVE:  Vitals:    04/05/24 1056   BP: 109/69   Pulse: 77   Resp: (!) 99   Weight: 81.2 kg (179 lb)    Body mass index is 29.79 kg/m .    General: no acute distress, cooperative with exam.  Lungs: clear to auscultation bilaterally, normal respiratory effort.  Heart: regular rate, normal S1 and S2, no murmurs.   Skin: Scattered plaque like patches overlying scale affecting scalp, behind ears bilat, nape of neck, brows, eye lids.          Hemoglobin A1C   Date Value Ref Range Status   11/02/2023 6.7 (A) 4.0 - 6.0 % Final   09/01/2023 7.3 (A) 4.0 - 6.0 % Final   05/09/2023 8.4 (A) 4.0 - 6.0 % Final       ASSESSMENT / PLAN:      Major neurocognitive disorder due to vascular disease, without behavioral disturbance, moderate (H)  Recent neuropsych assessment at [facility] : diagnosed with major neurocognitive changes due to vascular disease.   Patient suspects he has not been taking all medications as directed : likely cause of elevated BG.  -needs help with finances, ADL's, med set up  -resides in assisted living with [facility] services at [facility].   -has skilled nursing for med set up : : spoke with CADI worker during appointment on phone : due to staffing issues, does not receive skilled nurse med set up every day   -clinic  to liaise with CADI worker for further  level of support in med set up and witnessed administration daily      Type 2 diabetes mellitus with stage 3b chronic kidney disease, without long-term current use of insulin (H)  Microalbuminuria due to type 2 diabetes mellitus (H)  Mounjaro 5mg weekly : tolerating well - given by home RN : increased to 7.5 mg daily   Jardiance 25mg daily   -     tirzepatide (MOUNJARO) 7.5 MG/0.5ML pen; Inject 7.5 mg Subcutaneous every 7 days    Psoriasis  Lesions appearance and location in keeping with psoriasis.   -kenalog to lesions with elidel to eye and brow area.   -     triamcinolone (KENALOG) 0.1 % external cream; Apply topically 2 times daily for 14 days  -     pimecrolimus (ELIDEL) 1 % external cream; Apply topically 2 times daily for 14 days    Follow up:  -next available with [facility] pharmacy.   -should be seen q3 monthly in clinic

## 2024-04-05 NOTE — Clinical Note
Bgs running high due to poor adherence to medications. Sounds like some staffing issues have meant he has not been getting med set up every day. Could you work with CADI  to see if he can get help with meds every day please?

## 2024-04-12 ENCOUNTER — PATIENT OUTREACH (OUTPATIENT)
Dept: CARE COORDINATION | Facility: CLINIC | Age: 64
End: 2024-04-12
Payer: COMMERCIAL

## 2024-04-12 NOTE — PROGRESS NOTES
Clinic Care Coordination Contact  Follow Up Progress Note      Assessment: KHARI Rodriguez worker communicated an update with care coordinator via email:    I have been working with Clint on getting a new service provider. His current staff is unreliable again and does not always come when they are supposed too. I have also been getting a lot of complaints that Clint has become very difficult to work with and has been using vulgar language with staff. I spoke with Dr. Rene last Friday, and she explained the same thing to me. [facility]'s [facility] staff and homemaking staff are not trained on medication. [facility] is supposed to be handling his medication set up, so I will have to speak with Adele or Maeve regarding these issues. He did decline the medication reminder dispenser when I spoke to him this Monday, so I am trying to figure out an alternative solution, as the dispenser will be very beneficial for him.     I was thinking to get him set up with 24-hour emergency assistance as he has been needing more assistance lately.      will send this update to Dr. Rene.     [facility] responded to Michael to see if there is anything more the AL can be doing as well directly through the facility.    Plan:   [facility] will continue to monitor and help set up follow up appts.     Christine Rutledge, MSW, [facility]  Clinic Care Coordinator  [email or web address]  [phone]

## 2024-04-17 ENCOUNTER — PATIENT OUTREACH (OUTPATIENT)
Dept: CARE COORDINATION | Facility: CLINIC | Age: 64
End: 2024-04-17
Payer: COMMERCIAL

## 2024-04-17 NOTE — PROGRESS NOTES
"Clinic Care Coordination Contact  Follow Up Progress Note      Assessment:    got an email from Michael (CADI worker) stating \"I received a call from Clint, and he expressed that he has been having blurry vision lately and would like assistance calling his eye specialist to make an appointment. I don't have that information and was wondering if you can assist with scheduling this appointment.  [facility] and requested more assistance with medication management and ADL's. I have not gotten a response from anyone yet and will give you an update once I get in touch with someone. \"    [facility] figured out that Clint went to [facility] in [place] in 2018 and he wants to go back there to see Dr. Stauffer who did his cataract surgery in the past. He prefers to see Dr. Stauffer but he is booked out. His soonest available is May 22nd at 1:45.  He says he can see but his vision is blurry. He wears glasses but feels he may need an updated prescription. He chose to take that appt vs seeing a different provider sooner. He will have a full comprehensive eye exam then with dilation. I gave them insurance info.       Plan:   [facility] will continue to monitor for any needs and schedule outreach monthly     Christine Rutledge, AMANDA, [facility]  Clinic Care Coordinator  [email or web address]  [phone]   "

## 2024-04-22 DIAGNOSIS — I10 ESSENTIAL HYPERTENSION: ICD-10-CM

## 2024-04-22 NOTE — TELEPHONE ENCOUNTER
Med: Lisinopril      LOV (related): 1/18/24    Last Lab: 1/20/24      Due for F/U around: 6 months for bp check     Next Appt: No current future appointments scheduled          BP Readings from Last 3 Encounters:   04/05/24 109/69   02/20/24 111/67   01/21/24 (!) 167/90       Last Comprehensive Metabolic Panel:  Lab Results   Component Value Date     01/20/2024    POTASSIUM 4.1 01/20/2024    CHLORIDE 100 01/20/2024    CO2 25 01/20/2024    ANIONGAP 10 01/20/2024     (H) 01/20/2024    BUN 28.0 (H) 01/20/2024    CR 1.81 (H) 01/20/2024    GFRESTIMATED 41 (L) 01/20/2024    ONEL 8.5 (L) 01/20/2024

## 2024-04-23 RX ORDER — LISINOPRIL 5 MG/1
5 TABLET ORAL DAILY
Qty: 90 TABLET | Refills: 3 | Status: SHIPPED | OUTPATIENT
Start: 2024-04-23

## 2024-04-24 ENCOUNTER — TELEPHONE (OUTPATIENT)
Dept: FAMILY MEDICINE | Facility: CLINIC | Age: 64
End: 2024-04-24

## 2024-04-25 DIAGNOSIS — G63 POLYNEUROPATHY ASSOCIATED WITH UNDERLYING DISEASE (H): ICD-10-CM

## 2024-04-25 DIAGNOSIS — E11.21 TYPE 2 DIABETES MELLITUS WITH DIABETIC NEPHROPATHY, WITHOUT LONG-TERM CURRENT USE OF INSULIN (H): ICD-10-CM

## 2024-04-25 NOTE — TELEPHONE ENCOUNTER
4/24/24   Maeve (nurse with [facility]) called with 2 week up date on his scalp rash since visit with Dr. Rene on 4/5/24. He has been using the TMC 0.1% cream BID as ordered and areas are much improved and lighter, less angry looking but not totally gone. Still minor scaling. Should they continue treating or change regimen? He has another full tube on hand but checking in first as Dr. Rene had advised.   Plan: Dr. Rene recommends continue treatment for another 14 days then stop. Call/RTC if symptoms persist or worsen. Maeve DELUNA informed.  Yelena Montiel RN

## 2024-04-25 NOTE — TELEPHONE ENCOUNTER
Med: vitamin b-12 & JARDIANCE    LOV (related): 01/18/2024 (CPX)      Due for F/U around: due to see MTM! Then 07/2024 (q3 months per last visit note)       Next Appt: Not scheduled

## 2024-05-03 ENCOUNTER — TELEPHONE (OUTPATIENT)
Dept: FAMILY MEDICINE | Facility: CLINIC | Age: 64
End: 2024-05-03

## 2024-05-03 DIAGNOSIS — N18.32 TYPE 2 DIABETES MELLITUS WITH STAGE 3B CHRONIC KIDNEY DISEASE, WITHOUT LONG-TERM CURRENT USE OF INSULIN (H): Primary | ICD-10-CM

## 2024-05-03 DIAGNOSIS — E11.22 TYPE 2 DIABETES MELLITUS WITH STAGE 3B CHRONIC KIDNEY DISEASE, WITHOUT LONG-TERM CURRENT USE OF INSULIN (H): Primary | ICD-10-CM

## 2024-05-03 NOTE — TELEPHONE ENCOUNTER
Per home care nurse Maeve patient is unable to find Mounjaro and is wondering about an alternative. Patient last dose 5mg and will be due next week. Per Chelo Salazar and Dr Rene prescription sent to pharmacy for Ozempic 2mg. Advised to stay at this dose for 1 month and will re-assess availability at that time. Breanne Dunlap

## 2024-05-07 RX ORDER — LANOLIN ALCOHOL/MO/W.PET/CERES
1000 CREAM (GRAM) TOPICAL DAILY
Qty: 90 TABLET | Refills: 1 | Status: SHIPPED | OUTPATIENT
Start: 2024-05-07

## 2024-05-17 ENCOUNTER — PATIENT OUTREACH (OUTPATIENT)
Dept: CARE COORDINATION | Facility: CLINIC | Age: 64
End: 2024-05-17
Payer: COMMERCIAL

## 2024-05-28 ENCOUNTER — OFFICE VISIT (OUTPATIENT)
Dept: FAMILY MEDICINE | Facility: CLINIC | Age: 64
End: 2024-05-28

## 2024-05-28 VITALS
BODY MASS INDEX: 28.79 KG/M2 | DIASTOLIC BLOOD PRESSURE: 65 MMHG | WEIGHT: 173 LBS | SYSTOLIC BLOOD PRESSURE: 99 MMHG | HEART RATE: 77 BPM | OXYGEN SATURATION: 97 %

## 2024-05-28 DIAGNOSIS — E11.29 MICROALBUMINURIA DUE TO TYPE 2 DIABETES MELLITUS (H): ICD-10-CM

## 2024-05-28 DIAGNOSIS — L40.9 PSORIASIS: Primary | ICD-10-CM

## 2024-05-28 DIAGNOSIS — R80.9 MICROALBUMINURIA DUE TO TYPE 2 DIABETES MELLITUS (H): ICD-10-CM

## 2024-05-28 DIAGNOSIS — N18.32 TYPE 2 DIABETES MELLITUS WITH STAGE 3B CHRONIC KIDNEY DISEASE, WITHOUT LONG-TERM CURRENT USE OF INSULIN (H): ICD-10-CM

## 2024-05-28 DIAGNOSIS — K63.5 POLYP OF COLON, UNSPECIFIED PART OF COLON, UNSPECIFIED TYPE: ICD-10-CM

## 2024-05-28 DIAGNOSIS — K21.9 GASTROESOPHAGEAL REFLUX DISEASE, UNSPECIFIED WHETHER ESOPHAGITIS PRESENT: ICD-10-CM

## 2024-05-28 DIAGNOSIS — E11.22 TYPE 2 DIABETES MELLITUS WITH STAGE 3B CHRONIC KIDNEY DISEASE, WITHOUT LONG-TERM CURRENT USE OF INSULIN (H): ICD-10-CM

## 2024-05-28 LAB
ANION GAP SERPL CALCULATED.3IONS-SCNC: 14 MMOL/L (ref 7–15)
BUN SERPL-MCNC: 32.3 MG/DL (ref 8–23)
CALCIUM SERPL-MCNC: 9.2 MG/DL (ref 8.8–10.2)
CHLORIDE SERPL-SCNC: 101 MMOL/L (ref 98–107)
CREAT SERPL-MCNC: 2.33 MG/DL (ref 0.67–1.17)
DEPRECATED HCO3 PLAS-SCNC: 22 MMOL/L (ref 22–29)
EGFRCR SERPLBLD CKD-EPI 2021: 30 ML/MIN/1.73M2
FASTING STATUS PATIENT QL REPORTED: ABNORMAL
GLUCOSE SERPL-MCNC: 140 MG/DL (ref 70–99)
HBA1C MFR BLD: 7.6 % (ref 4–6)
POTASSIUM SERPL-SCNC: 5.2 MMOL/L (ref 3.4–5.3)
SODIUM SERPL-SCNC: 137 MMOL/L (ref 135–145)

## 2024-05-28 PROCEDURE — 99214 OFFICE O/P EST MOD 30 MIN: CPT | Performed by: FAMILY MEDICINE

## 2024-05-28 PROCEDURE — 36415 COLL VENOUS BLD VENIPUNCTURE: CPT | Performed by: FAMILY MEDICINE

## 2024-05-28 PROCEDURE — 82044 UR ALBUMIN SEMIQUANTITATIVE: CPT | Performed by: FAMILY MEDICINE

## 2024-05-28 PROCEDURE — 80048 BASIC METABOLIC PNL TOTAL CA: CPT | Mod: ORL | Performed by: FAMILY MEDICINE

## 2024-05-28 PROCEDURE — 83036 HEMOGLOBIN GLYCOSYLATED A1C: CPT | Performed by: FAMILY MEDICINE

## 2024-05-28 PROCEDURE — G2211 COMPLEX E/M VISIT ADD ON: HCPCS | Performed by: FAMILY MEDICINE

## 2024-05-28 RX ORDER — CALCIPOTRIENE 0.05 MG/ML
SOLUTION TOPICAL
Qty: 60 ML | Refills: 1 | Status: SHIPPED | OUTPATIENT
Start: 2024-05-28

## 2024-05-28 RX ORDER — FAMOTIDINE 20 MG/1
20 TABLET, FILM COATED ORAL DAILY
Qty: 90 TABLET | Refills: 1 | Status: SHIPPED | OUTPATIENT
Start: 2024-05-28

## 2024-05-28 NOTE — PROGRESS NOTES
SUBJECTIVE:    Emily Zhu, is a 64 year old male presenting for the below:     1. More than 6 months h/o patchy lesions to scalp, behind ears bilat, nape of neck, brows, eye lids. Initially managed as psoriasis and responsive to medium potency steroids to lesions to scalp and elidel to eye and brow area. Now worsening again. Has establish care appointment with derm September upcoming.     OBJECTIVE:  Vitals:    05/28/24 1338   BP: 99/65   Pulse: 77   SpO2: 97%   Weight: 78.5 kg (173 lb)    Body mass index is 28.79 kg/m .  General: no acute distress, cooperative with exam.  Skin: Scattered plaque like patches with overlying scale affecting scalp, behind ears bilat, nape of neck, brows, eye lids.     Lab Results   Component Value Date    A1C 7.6 05/28/2024    A1C 6.7 11/02/2023    A1C 7.3 09/01/2023    A1C 8.4 05/09/2023    A1C 9.9 10/24/2022       ASSESSMENT / PLAN:        Psoriasis  Add in use of Dovonox to medium potency steroid  Has establish care appointment with derm September upcoming.   -     calcipotriene (DOVONOX) 0.005 % external solution; Apply to the affected scalp twice daily.    Gastroesophageal reflux disease, unspecified whether esophagitis present  -     famotidine (PEPCID) 20 MG tablet; Take 1 tablet (20 mg) by mouth daily    Microalbuminuria due to type 2 diabetes mellitus (H)  -     Microalbumin ([facility])    Type 2 diabetes mellitus with stage 3b chronic kidney disease, without long-term current use of insulin (H)  Not at target of <7%.   Mounjaro 7.5 mg weekly : tolerating well - given by home RN. Increase to 10 mg weekly.   Jardiance 25mg daily.   Recheck A1c in 3 months.   -     Hemoglobin A1C ([facility])  -     VENOUS COLLECTION  -     Basic metabolic panel; Future  -     tirzepatide (MOUNJARO) 10 MG/0.5ML pen; Inject 10 mg Subcutaneous every 7 days    Polyp of colon, unspecified part of colon, unspecified type  Very overdue 3 year colonoscopy: 2011:  5 adenomas (1 sessile serrated).   -      Colonoscopy Screening  Referral; Future      Follow up:  3 months T2DM check.

## 2024-05-28 NOTE — LETTER
May 29, 2024      Clint Zhu  [address] [facility]   [place] [facility] [place] [place] [address]                Dear Emily,     It was spring to see you at your recent appointment.     Here are your lab results.     Your A1c (or the measure of your glucose control over the last 3 months) is greater than 7%. We discussed increasing the mounjaro dose to assist in improved glucose control.     Please schedule an appointment in 3 months with both me and Chelo the pharmacist on the same day for a diabetes med check.     Your creatinine (a waste product which is gotten rid of by the kidneys into the urine) is elevated but continues to be in the range of chronic kidney disease (CKD) stage 3b.     The most common causes of this are diabetes and high blood pressure. Both of these are hard on the kidneys over time.     The five stages of CKD refer to how well your kidneys are working.      In the early stages (Stages 1-3), your kidneys are still able to filter waste out of your blood.   In the later stages (Stages 4-5), your kidneys must work harder to filter your blood.     Your results are consistent with a diagnosis of chronic kidney disease (CKD) stage 3b.     The microalbuminuria test screens for protein (albumin) in the urine. Increased excretion of urinary albumin is a marker of kidney damage which is most commonly caused by diabetes and / or high blood pressure.     You have moderately increased albumin in the urine (also known as micro albuminuria) with an albumin to creatine ratio of  mg/g.     Resulted Orders   Hemoglobin A1C (RMG)   Result Value Ref Range    Hemoglobin A1C 7.6 (A) 4.0 - 6.0 %   Microalbumin ([facility])   Result Value Ref Range    Albumin mg/L 150 (A) 0 - 10    Urine Creatinine Mg/Dl 200 0 - 300    A/C Ratio mg/g  (A)     Interpretation abnormal (A)    Basic metabolic panel   Result Value Ref Range    Sodium 137 135 - 145 mmol/L      Comment:      Reference intervals for this test were updated on  09/26/2023 to more accurately reflect our healthy population. There may be differences in the flagging of prior results with similar values performed with this method. Interpretation of those prior results can be made in the context of the updated reference intervals.     Potassium 5.2 3.4 - 5.3 mmol/L    Chloride 101 98 - 107 mmol/L    Carbon Dioxide (CO2) 22 22 - 29 mmol/L    Anion Gap 14 7 - 15 mmol/L    Urea Nitrogen 32.3 (H) 8.0 - 23.0 mg/dL    Creatinine 2.33 (H) 0.67 - 1.17 mg/dL    GFR Estimate 30 (L) >60 mL/min/1.73m2    Calcium 9.2 8.8 - 10.2 mg/dL    Glucose 140 (H) 70 - 99 mg/dL    Patient Fasting > 8hrs? Unknown        If you have any questions or concerns, please call the clinic at the number listed above.       Sincerely,      Gabbie Rene MD

## 2024-05-29 LAB
A/C RATIO (RMG): ABNORMAL
ALBUMIN- RMG: 150 (ref 0–10)
INTERPRETATION: ABNORMAL
URINE CREATININE - RMG: 200 (ref 0–300)

## 2024-06-17 ENCOUNTER — PATIENT OUTREACH (OUTPATIENT)
Dept: CARE COORDINATION | Facility: CLINIC | Age: 64
End: 2024-06-17
Payer: COMMERCIAL

## 2024-06-17 NOTE — PROGRESS NOTES
Clinic Care Coordination Contact  [facility]/Voicemail    Clinical Data: Care Coordinator Outreach    Plan from last outreach and chart review:    See vision doctor in May for a vision exam-- he was reporting blurry vision at times  Patient is overdue for a colonoscopy- CC will touch base about this when able to reach  Continue CADI waiver and services through [facility]  assisted living and [facility] checking in on any other needs/questions/concerns      Outreach Documentation Number of Outreach Attempt   6/17/2024   4:02 PM 1         Plan: Care Coordinator  can try to reach out again within 1 month    Christine Rutledge, MSW, [facility]  Clinic Care Coordinator  [email or web address]  [phone]

## 2024-06-19 ENCOUNTER — TELEPHONE (OUTPATIENT)
Dept: FAMILY MEDICINE | Facility: CLINIC | Age: 64
End: 2024-06-19

## 2024-06-19 NOTE — TELEPHONE ENCOUNTER
Alison, nurse with [facility] calls to clarify Mounjaro dosing. Reports has 90 day supply of Mounjaro 7.5mg. Also received 1 month supply of Mounjaro 10mg after last clinic visit 5/28/24.   Asks which patient should be taking.     Per Dr. Rene's 5/28 visit note, dose was increased to 10mg because hgba1c was 7.6, not at goal of <7.0%.     Nurse Alison reports due to Mounjaro supply shortage, patient only recently received the 7.5mg supply and started this a few weeks ago.     Plan: per Dr. Rene patient should stay on Mounjaro 7.5mg weekly and recheck hgba1c in 3 months.   Alison informed and agrees.   Yelena Montiel RN

## 2024-06-21 ENCOUNTER — TRANSFERRED RECORDS (OUTPATIENT)
Dept: FAMILY MEDICINE | Facility: CLINIC | Age: 64
End: 2024-06-21

## 2024-06-21 LAB — RETINOPATHY: POSITIVE

## 2024-07-09 ENCOUNTER — OFFICE VISIT (OUTPATIENT)
Dept: FAMILY MEDICINE | Facility: CLINIC | Age: 64
End: 2024-07-09

## 2024-07-09 ENCOUNTER — OFFICE VISIT (OUTPATIENT)
Dept: PHARMACY | Facility: PHYSICIAN GROUP | Age: 64
End: 2024-07-09
Payer: COMMERCIAL

## 2024-07-09 VITALS
RESPIRATION RATE: 16 BRPM | BODY MASS INDEX: 28.49 KG/M2 | HEART RATE: 89 BPM | DIASTOLIC BLOOD PRESSURE: 82 MMHG | SYSTOLIC BLOOD PRESSURE: 120 MMHG | OXYGEN SATURATION: 98 % | WEIGHT: 171.2 LBS

## 2024-07-09 DIAGNOSIS — K63.5 POLYP OF COLON, UNSPECIFIED PART OF COLON, UNSPECIFIED TYPE: ICD-10-CM

## 2024-07-09 DIAGNOSIS — F01.B0 MAJOR NEUROCOGNITIVE DISORDER DUE TO VASCULAR DISEASE, WITHOUT BEHAVIORAL DISTURBANCE, MODERATE (H): ICD-10-CM

## 2024-07-09 DIAGNOSIS — I10 ESSENTIAL HYPERTENSION: ICD-10-CM

## 2024-07-09 DIAGNOSIS — E11.21 TYPE 2 DIABETES MELLITUS WITH DIABETIC NEPHROPATHY, WITHOUT LONG-TERM CURRENT USE OF INSULIN (H): Primary | ICD-10-CM

## 2024-07-09 DIAGNOSIS — Z01.818 PREOP GENERAL PHYSICAL EXAM: Primary | ICD-10-CM

## 2024-07-09 DIAGNOSIS — G45.9 TIA (TRANSIENT ISCHEMIC ATTACK): ICD-10-CM

## 2024-07-09 DIAGNOSIS — F33.1 MODERATE EPISODE OF RECURRENT MAJOR DEPRESSIVE DISORDER (H): ICD-10-CM

## 2024-07-09 DIAGNOSIS — G63 POLYNEUROPATHY ASSOCIATED WITH UNDERLYING DISEASE (H): ICD-10-CM

## 2024-07-09 DIAGNOSIS — K21.9 GASTROESOPHAGEAL REFLUX DISEASE, UNSPECIFIED WHETHER ESOPHAGITIS PRESENT: ICD-10-CM

## 2024-07-09 DIAGNOSIS — I25.10 ATHEROSCLEROSIS OF CORONARY ARTERY OF NATIVE HEART WITHOUT ANGINA PECTORIS, UNSPECIFIED VESSEL OR LESION TYPE: ICD-10-CM

## 2024-07-09 DIAGNOSIS — Z86.73 HISTORY OF TIA (TRANSIENT ISCHEMIC ATTACK) AND STROKE: ICD-10-CM

## 2024-07-09 DIAGNOSIS — N18.32 TYPE 2 DIABETES MELLITUS WITH STAGE 3B CHRONIC KIDNEY DISEASE, WITHOUT LONG-TERM CURRENT USE OF INSULIN (H): ICD-10-CM

## 2024-07-09 DIAGNOSIS — E78.2 MIXED HYPERLIPIDEMIA: ICD-10-CM

## 2024-07-09 DIAGNOSIS — H43.821 VITREOMACULAR ADHESION OF RIGHT EYE: ICD-10-CM

## 2024-07-09 DIAGNOSIS — I25.118 ATHEROSCLEROSIS OF NATIVE CORONARY ARTERY OF NATIVE HEART WITH STABLE ANGINA PECTORIS (H): ICD-10-CM

## 2024-07-09 DIAGNOSIS — E11.22 TYPE 2 DIABETES MELLITUS WITH STAGE 3B CHRONIC KIDNEY DISEASE, WITHOUT LONG-TERM CURRENT USE OF INSULIN (H): ICD-10-CM

## 2024-07-09 LAB
% GRANULOCYTES: 70.6 % (ref 42.2–75.2)
ANION GAP SERPL CALCULATED.3IONS-SCNC: 6 MMOL/L (ref 7–15)
BUN SERPL-MCNC: 25.5 MG/DL (ref 8–23)
CALCIUM SERPL-MCNC: 9.2 MG/DL (ref 8.8–10.2)
CHLORIDE SERPL-SCNC: 103 MMOL/L (ref 98–107)
CREAT SERPL-MCNC: 1.93 MG/DL (ref 0.67–1.17)
DEPRECATED HCO3 PLAS-SCNC: 27 MMOL/L (ref 22–29)
EGFRCR SERPLBLD CKD-EPI 2021: 38 ML/MIN/1.73M2
FASTING STATUS PATIENT QL REPORTED: NO
GLUCOSE SERPL-MCNC: 126 MG/DL (ref 70–99)
HCT VFR BLD AUTO: 50.6 % (ref 39–51)
HEMOGLOBIN: 17 G/DL (ref 13.4–17.5)
LYMPHOCYTES NFR BLD AUTO: 23.1 % (ref 20.5–51.1)
MCH RBC QN AUTO: 31.5 PG (ref 27–31)
MCHC RBC AUTO-ENTMCNC: 33.6 G/DL (ref 33–37)
MCV RBC AUTO: 93.7 FL (ref 80–100)
MONOCYTES NFR BLD AUTO: 6.3 % (ref 1.7–9.3)
PLATELET # BLD AUTO: 204 K/UL (ref 140–450)
POTASSIUM SERPL-SCNC: 4.5 MMOL/L (ref 3.4–5.3)
RBC # BLD AUTO: 5.4 X10/CMM (ref 4.2–5.9)
SODIUM SERPL-SCNC: 136 MMOL/L (ref 135–145)
WBC # BLD AUTO: 9.8 X10/CMM (ref 3.8–11)

## 2024-07-09 PROCEDURE — 85025 COMPLETE CBC W/AUTO DIFF WBC: CPT

## 2024-07-09 PROCEDURE — 99214 OFFICE O/P EST MOD 30 MIN: CPT

## 2024-07-09 PROCEDURE — 80048 BASIC METABOLIC PNL TOTAL CA: CPT | Mod: ORL

## 2024-07-09 PROCEDURE — 36415 COLL VENOUS BLD VENIPUNCTURE: CPT

## 2024-07-09 PROCEDURE — G2211 COMPLEX E/M VISIT ADD ON: HCPCS

## 2024-07-09 PROCEDURE — 99207 PR NO CHARGE LOS: CPT | Performed by: PHARMACIST

## 2024-07-09 NOTE — PATIENT INSTRUCTIONS
How to Take Your Medication Before Surgery  Preoperative Medication Instructions      Antiplatelet or Anticoagulation Medication Instructions   - aspirin: Patient is at increased risk of thrombosis (e.g. stenting within the last year), continue aspirin without modification. Consider consultation with cardiology.      - clopidrogel (Plavix), prasugrel (Effient), ticagrelor (Brilinta): No contraindication to stopping Plavix, DO NOT TAKE 5-7 days before surgery.     Additional Medication Instructions  Take all scheduled medications on the day of surgery EXCEPT for modifications listed below:   - Lisinopril: DO NOT TAKE on day of surgery (minimum 11 hours for general anesthesia).   - Metoprolol: Continue taking on the day of surgery.   - Amlodipine: May be continued on the day of surgery.   - Atorvastatin: Continue taking on the day of surgery.    - Jardiance: DO NOT TAKE 3 days before surgery.    - Mounjaro: DO NOT TAKE 7 days before surgery    - Gabapentin: Continue without modification.   - ibuprofen (Advil, Motrin): DO NOT TAKE 1 day before surgery.    - SSRIs, SNRIs, TCAs, Antipsychotics: Continue without modification.     Per [facility] needs hold on Plavix and glp1 for 7 days and SGLT2 for 4 days     See MTM pharmacist note for further instruction regarding medication holds between procedures.     Patient Education   Preparing for Your Surgery  Getting started  A nurse will call you to review your health history and instructions. They will give you an arrival time based on your scheduled surgery time. Please be ready to share:  Your doctor's clinic name and phone number  Your medical, surgical, and anesthesia history  A list of allergies and sensitivities  A list of medicines, including herbal treatments and over-the-counter drugs  Whether the patient has a legal guardian (ask how to send us the papers in advance)  Please tell us if you're pregnant--or if there's any chance you might be pregnant. Some surgeries may  injure a fetus (unborn baby), so they require a pregnancy test. Surgeries that are safe for a fetus don't always need a test, and you can choose whether to have one.   If you have a child who's having surgery, please ask for a copy of Preparing for Your Child's Surgery.    Preparing for surgery  Within 10 to 30 days of surgery: Have a pre-op exam (sometimes called an H&P, or History and Physical). This can be done at a clinic or pre-operative center.  If you're having a , you may not need this exam. Talk to your care team.  At your pre-op exam, talk to your care team about all medicines you take. If you need to stop any medicines before surgery, ask when to start taking them again.  We do this for your safety. Many medicines can make you bleed too much during surgery. Some change how well surgery (anesthesia) drugs work.  Call your insurance company to let them know you're having surgery. (If you don't have insurance, call [phone].)  Call your clinic if there's any change in your health. This includes signs of a cold or flu (sore throat, runny nose, cough, rash, fever). It also includes a scrape or scratch near the surgery site.  If you have questions on the day of surgery, call your hospital or surgery center.  Eating and drinking guidelines  For your safety: Unless your surgeon tells you otherwise, follow the guidelines below.  Eat and drink as usual until 8 hours before you arrive for surgery. After that, no food or milk.  Drink clear liquids until 2 hours before you arrive. These are liquids you can see through, like water, Gatorade, and Propel Water. They also include plain black coffee and tea (no cream or milk), candy, and breath mints. You can spit out gum when you arrive.  If you drink alcohol: Stop drinking it the night before surgery.  If your care team tells you to take medicine on the morning of surgery, it's okay to take it with a sip of water.  Preventing infection  Shower or bathe the  night before and morning of your surgery. Follow the instructions your clinic gave you. (If no instructions, use regular soap.)  Don't shave or clip hair near your surgery site. We'll remove the hair if needed.  Don't smoke or vape the morning of surgery. You may chew nicotine gum up to 2 hours before surgery. A nicotine patch is okay.  Note: Some surgeries require you to completely quit smoking and nicotine. Check with your surgeon.  Your care team will make every effort to keep you safe from infection. We will:  Clean our hands often with soap and water (or an alcohol-based hand rub).  Clean the skin at your surgery site with a special soap that kills germs.  Give you a special gown to keep you warm. (Cold raises the risk of infection.)  Wear special hair covers, masks, gowns and gloves during surgery.  Give antibiotic medicine, if prescribed. Not all surgeries need antibiotics.  What to bring on the day of surgery  Photo ID and insurance card  Copy of your health care directive, if you have one  Glasses and hearing aids (bring cases)  You can't wear contacts during surgery  Inhaler and eye drops, if you use them (tell us about these when you arrive)  CPAP machine or breathing device, if you use them  A few personal items, if spending the night  If you have . . .  A pacemaker, ICD (cardiac defibrillator) or other implant: Bring the ID card.  An implanted stimulator: Bring the remote control.  A legal guardian: Bring a copy of the certified (court-stamped) guardianship papers.  Please remove any jewelry, including body piercings. Leave jewelry and other valuables at home.  If you're going home the day of surgery  You must have a responsible adult drive you home. They should stay with you overnight as well.  If you don't have someone to stay with you, and you aren't safe to go home alone, we may keep you overnight. Insurance often won't pay for this.  After surgery  If it's hard to control your pain or you need more  pain medicine, please call your surgeon's office.  Questions?   If you have any questions for your care team, list them here: _________________________________________________________________________________________________________________________________________________________________________ ____________________________________ ____________________________________ ____________________________________  For informational purposes only. Not to replace the advice of your health care provider. Copyright   2003, 2019 [facility]. All rights reserved. Clinically reviewed by Reyna Dias MD. [employer]ART[employer] [id number] - REV 12/22.

## 2024-07-09 NOTE — PROGRESS NOTES
Medication Therapy Management (MTM) Encounter    ASSESSMENT:                            Medication Adherence/Access: No issues identified    Diabetes:   Patient is not meeting A1c goal of < 7% but meeting less strict goal of <8%. .  Patient would benefit from getting reader to bring with him to evaluate control and titrate Mounjaro as able. Due to the back to back surgeries a week apart and needing to hold a week for the GLP1 agonist, recommend moving up the injection a day to limit the time off medication for surgeries.      Hypertension/TIA/CAD:   Stable, will only hold the clopidogrel for procedures given lower bleeding risk and high CV risks.     PLAN:                            Hold Mounjaro shot the week before your colonoscopy (move up Tuesday shot 7/16 last shot until after colonoscopy and eye surgery, restart on 7/31)    Hold the Jardiance 4 days before the colonoscopy (last pill on 7/18 restart 7/24 stop 7/26 and resume 7/31 for eye surgery)    Hold the clopidogrel for 1 week (last pill on 7/16 then resume on 7/31 after eye surgery unless directed to hold longer by eye surgeon)    Follow-up: Return in about 4 weeks (around 8/6/2024) for MTM visit in clinic.    SUBJECTIVE/OBJECTIVE:                          Clint Zhu is a 64 year old male seen for a follow-up visit.       Reason for visit: medication review, seeing provider today as well    Allergies/ADRs: Reviewed in chart  Past Medical History: Reviewed in chart  Tobacco: He reports that he has been smoking cigarettes. He has a 40 pack-year smoking history. He has never used smokeless tobacco.Nicotine/Tobacco Cessation Plan  Information offered: Patient not interested at this time  Alcohol: not currently using    Medication Adherence/Access: no issues reported-Getting more regular medication now that he is in a facility. (Maeve herman [facility] sets up pills and sees him on Wednesdays- [phone], fax [phone])    Diabetes   -Mounjaro 7.5mg weekly -  "replaced Trulicity 1.5mg weekly- Wednesdays (when nurse comes).   -Jardiance 25mg daily     Medication Hx:  Glipizide ER 10mg daily - stopped last visit 1/18, but unable to see blood sugars today.   Victoza- couldn't do daily administration  metformin - stopped due to renal function decline.   Bydureon weekly - stopped when trying to get Mounjaro for better sugar control    Patient is not experiencing side effects.  Blood sugar monitoring: Continuous Glucose Monitor - did NOT have his reader with him today- got a new phone and needs to get application set up on new phone.   Current diabetes symptoms: none  Has continuous glucose monitor but did not bring the reader with him. Per staff sugars have been doing really well on current regimen.      Hypertension /TIA/CAD:   -amlodipine 2.5mg daily - decreased from 5mg daily due to hypotension  -lisinopril 5mg  -isosorbide 30mg daily  -metoprolol succinate 25mg daily   -aspirin 81mg daily   -clopidogrel 75mg- Had been on clopidogrel alone prior to TIA that occurred in [place] on 6/22/2021.  Patient reports no current medication side effects  Patient does not self-monitor blood pressure.        Colonoscopy on 7/23 and eye surgery on 7/30 needing help with hold plan.   Per [facility] needs hold on Plavix and glp1 for 7 days and SGLT2 for 4 days.       BP Readings from Last 1 Encounters:   07/09/24 (!) 131/91     Pulse Readings from Last 1 Encounters:   07/09/24 89     Wt Readings from Last 1 Encounters:   07/09/24 171 lb 3.2 oz (77.7 kg)     Ht Readings from Last 1 Encounters:   01/18/24 5' 5\" (1.651 m)     Estimated body mass index is 28.49 kg/m  as calculated from the following:    Height as of 1/18/24: 5' 5\" (1.651 m).    Weight as of an earlier encounter on 7/9/24: 171 lb 3.2 oz (77.7 kg).    Temp Readings from Last 1 Encounters:   01/21/24 97  F (36.1  C) (Oral)       ----------------    I spent 30 minutes with this patient today. All changes were made via collaborative practice " agreement with Gabbie Rene MD. A copy of the visit note was provided to the patient's provider(s).    A summary of these recommendations was given to the patient.    Chelo Roberts, Pharm.D, [facility]  Medication Therapy Management Pharmacist  [phone]       Medication Therapy Recommendations  Type 2 diabetes mellitus with diabetic nephropathy, without long-term current use of insulin (H)    Current Medication: tirzepatide (MOUNJARO) 7.5 MG/0.5ML pen   Rationale: Does not understand instructions - Adherence - Adherence   Recommendation: hold orders reviewed and discussed for upcoming procedure.   Status: Accepted per CPA

## 2024-07-09 NOTE — LETTER
July 11, 2024          [facility]  [address]   [place] [address]        Dear Clint,     It was nice to meet you at your recent appointment. I have summarized your lab results below.     Your CBC results are as expected.  There are some labs that contain results that are outside of the normal range, I have reviewed each of these results and they require no changes at this time.     Your Basic Metabolic panel (BMP), the testing of your blood sugar, kidney function, and electrolytes shows stable and slightly improved kidney function.     Please let me know if you have questions or concerns. Thank you for trusting me with your care!     CARROL Rojas CNP     Resulted Orders   CBC with Diff/Plt ([facility])   Result Value Ref Range    WBC x10/cmm 9.8 3.8 - 11.0 x10/cmm    % Lymphocytes 23.1 20.5 - 51.1 %    % Monocytes 6.3 1.7 - 9.3 %    % Granulocytes 70.6 42.2 - 75.2 %    RBC x10/cmm 5.40 4.2 - 5.9 x10/cmm    Hemoglobin 17.0 13.4 - 17.5 g/dl    Hematocrit 50.6 39 - 51 %    MCV 93.7 80 - 100 fL    MCH 31.5 (A) 27.0 - 31.0 pg    MCHC 33.6 33.0 - 37.0 g/dL    Platelet Count 204 140 - 450 K/uL   Basic metabolic panel   Result Value Ref Range    Sodium 136 135 - 145 mmol/L    Potassium 4.5 3.4 - 5.3 mmol/L    Chloride 103 98 - 107 mmol/L    Carbon Dioxide (CO2) 27 22 - 29 mmol/L    Anion Gap 6 (L) 7 - 15 mmol/L    Urea Nitrogen 25.5 (H) 8.0 - 23.0 mg/dL    Creatinine 1.93 (H) 0.67 - 1.17 mg/dL    GFR Estimate 38 (L) >60 mL/min/1.73m2      Comment:      eGFR calculated using 2021 CKD-EPI equation.    Calcium 9.2 8.8 - 10.2 mg/dL    Glucose 126 (H) 70 - 99 mg/dL    Patient Fasting > 8hrs? No        If you have any questions or concerns, please call the clinic at the number listed above.       Sincerely,      CARROL Salas CNP

## 2024-07-09 NOTE — PATIENT INSTRUCTIONS
"Recommendations from today's MTM visit:                                                       Hold Mounjaro shot the week before your colonoscopy (move up Tuesday shot 7/16 last shot until after colonoscopy and eye surgery, restart on 7/31)    Hold the Jardiance 4 days before the colonoscopy (last pill on 7/18 restart 7/24 stop 7/26 and resume 7/31 for eye surgery)    Hold the clopidogrel for 1 week (last pill on 7/16 then resume on 7/31 after eye surgery unless directed to hold longer by eye surgeon)    Follow-up: Return in about 4 weeks (around 8/6/2024) for MTM visit in clinic.- bring in your [employer] device with you    It was great speaking with you today.  I value your experience and would be very thankful for your time in providing feedback in our clinic survey. In the next few days, you may receive an email or text message from [employer] with a link to a survey related to your  clinical pharmacist.\"     My Clinical Pharmacist's contact information:                                                      Please feel free to contact me with any questions or concerns you have.      Chelo Roberts, Pharm.D, BCACP  Medication Therapy Management Pharmacist  [phone]      "

## 2024-07-09 NOTE — PROGRESS NOTES
Preoperative Evaluation  [facility]  [address] [place] [address]  Phone: [phone]  Fax: [phone]  Primary Provider: Gabbie Rene MD  Pre-op Performing Provider: CARROL Brownlee CNP  Jul 9, 2024 7/9/2024   Surgical Information   What procedure is being done? Pars Plana Vitrectomy in Right eye - with gas bubble    Facility or Hospital where procedure/surgery will be performed: [facility]   Who is doing the procedure / surgery? Dr. Ricardo Limon M.D.    Date of surgery / procedure: 7/30/24   Time of surgery / procedure: 0830   Where do you plan to recover after surgery? at home with Home Care      Fax number for surgical facility: [phone]        7/9/2024   Surgical Information   What procedure is being done? Colonoscopy   Facility or Hospital where procedure/surgery will be performed: [facility]   Who is doing the procedure / surgery?    Date of surgery / procedure: 7/23/24   Time of surgery / procedure: 12:15   Where do you plan to recover after surgery? at home with Home Care      Fax number for surgical facility: [phone]  Phone: [phone]    Assessment & Plan     The proposed surgical procedure is considered LOW risk.    Preop general physical exam  No apparent contraindications noted for purposed procedures. Reviewed medication holds with [facility] pharmacist.  - CBC with Diff/Plt ([facility])  - Basic metabolic panel  - VENOUS COLLECTION  - Basic metabolic panel    Polyp of colon, unspecified part of colon, unspecified type  Indication for surgery. Very overdue for Colonoscopy. Last colonoscopy: 2011: 5 adenomas (1 sessile serrated) with recommendation for 3 year follow up for polyp surveillance.     Vitreomacular adhesion of right eye  Indication for surgery     Type 2 diabetes mellitus with stage 3b chronic kidney disease, without long-term current use of insulin (H)  Taking Mounjaro weekly and Jardiance 25mg daily. Last A1c 7.6. Has CGM.  Following with [facility] pharmacy. CKD: Taking ACEi, Follows with nephrology. Continue current medication regimen and treatment plans.  - CBC with Diff/Plt ([facility])  - Basic metabolic panel  - VENOUS COLLECTION  - Basic metabolic panel    Atherosclerosis of native coronary artery of native heart with stable angina pectoris (H24)  Mixed hyperlipidemia  Taking statin, ASA and clopidogrel daily. Stable/controlled. Continue current medication regimen.    Essential hypertension  Taking Amlodipine 2.5 mg daily, Lisinopril 5 mg daily, isosorbide mononitrate 30  mg daily, and metoprolol succinate 25 mg daily. Stable/controlled. Continue current medication regimen.    History of TIA (transient ischemic attack) and stroke  Taking ASA 81 mg daily, Clopidogrel 75 mg and Lipitor 40 mg daily. Continue current medication regimen.    Polyneuropathy associated with underlying disease (H24)  Taking Gabapentin 300 mg and Vit B12 daily. Stable/controlled. Continue current medication regimen.    Gastroesophageal reflux disease, unspecified whether esophagitis present  Taking PPI. Stable/controlled. Continue current medication regimen.    Moderate episode of recurrent major depressive disorder (H)  Taking Sertraline 100 mg daily. Stable/controlled. Continue current medication regimen.    Major neurocognitive disorder due to vascular disease, without behavioral disturbance, moderate (H)  Recent neuropsych assessment at [facility] : diagnosed with major neurocognitive changes due to vascular disease. Resides in assisted living with [facility] services at [facility]. Receives assistance with ADL's.             - No identified additional risk factors other than previously addressed    Antiplatelet or Anticoagulation Medication Instructions   - aspirin: Patient is at increased risk of thrombosis (e.g. stenting within the last year), continue aspirin without modification. Consider consultation with cardiology.    - clopidrogel (Plavix), prasugrel  (Effient), ticagrelor (Brilinta): No contraindication to stopping Plavix, DO NOT TAKE 5-7 days before surgery. See MTM note    Additional Medication Instructions  Take all scheduled medications on the day of surgery EXCEPT for modifications listed below:   - ACE/ARB: DO NOT TAKE on day of surgery (minimum 11 hours for general anesthesia).   - Beta Blockers: Continue taking on the day of surgery.   - Calcium Channel Blockers: May be continued on the day of surgery.   - Statins: Continue taking on the day of surgery.    - SGLT2 Inhibitor (canagliflozin, dapagliflozin, or empagliflozin): DO NOT TAKE 3 days before surgery.    - GLP-1 Injectable (exenitide, liraglutide, semaglutide, dulaglutide, etc.): DO NOT TAKE 7 days before surgery    - Herbal medications and vitamins: DO NOT TAKE 14 days prior to surgery.   - pregabalin, gabapentin: Continue without modification.   - ibuprofen (Advil, Motrin): DO NOT TAKE 1 day before surgery.    - SSRIs, SNRIs, TCAs, Antipsychotics: Continue without modification.    - GLP-1 Injectable (exenitide, liraglutide, semaglutide, dulaglutide, etc.): DO NOT TAKE 7 days before surgery     [facility] surgery center instructions: Mounjaro: Hold one week prior to surgery  Per [facility] needs hold on Plavix and glp1 for 7 days and SGLT2 for 4 days     See MTM note for further instruction regarding medication holds between procedures.       Recommendation  No contraindications noted to proceed with proposed procedure, without further diagnostic evaluation.    Subjective   Clint is a 64 year old, presenting for the following:  Pre-Op Exam (No concerns )      HPI related to upcoming procedure: Colonoscopy and Pars Plana Vitrectomy in Right eye    Hx of TIA: Taking statin, ASA and Plavix daily    T2DM: Continuous glucose monitor, Mounjaro and Jardiance. Follows with MTM    CKD: on ACEi, Following with Nephrology     HTN: Amlodipine 2.5 mg, Lisinopril 5 mg daily, Metoprolol 25 mg and Imdur 30 mg daily.      HLD/Arthrosclerosis: Taking statin, ASA and Plavix. No chest pain or shortness of breath.     Polyneuropathy: Vit B 12 and Gabapentin     GERD: Pepcid and Prevacid. No chest pain.     MDD: Sertraline 100 mg daily. Going okay        7/9/2024   Pre-Op Questionnaire   Have you ever had a heart attack or stroke? (!) YES - stroke 2019   Have you ever had surgery on your heart or blood vessels, such as a stent placement, a coronary artery bypass, or surgery on an artery in your head, neck, heart, or legs? No   Do you have chest pain with activity? No   Do you have a history of heart failure? No   Do you currently have a cold, bronchitis or symptoms of other infection? No   Do you have a cough, shortness of breath, or wheezing? No   Do you or anyone in your family have previous history of blood clots? No   Do you or does anyone in your family have a serious bleeding problem such as prolonged bleeding following surgeries or cuts? No   Have you ever had problems with anemia or been told to take iron pills? No   Have you had any abnormal blood loss such as black, tarry or bloody stools? No   Have you ever had a blood transfusion? No   Are you willing to have a blood transfusion if it is medically needed before, during, or after your surgery? Yes   Have you or any of your relatives ever had problems with anesthesia? No   Do you have sleep apnea, excessive snoring or daytime drowsiness? (!) YES   Daytime drowsiness due to medications    Do you have a CPAP machine? (!) NO    Do you have any artifical heart valves or other implanted medical devices like a pacemaker, defibrillator, or continuous glucose monitor? (!) YES   What type of device do you have? continuous glucose monitor   Name of the clinic that manages your device N/A   Do you have artificial joints? No   Are you allergic to latex? No      Health Care Directive  Patient has a Health Care Directive on file      Preoperative Review of    reviewed - controlled  substances reflected in medication list.      Status of Chronic Conditions:  See problem list for active medical problems.  Problems all longstanding and stable, except as noted/documented.  See ROS for pertinent symptoms related to these conditions.    Patient Active Problem List    Diagnosis Date Noted    Major neurocognitive disorder due to vascular disease, without behavioral disturbance, moderate (H) 04/05/2024     Priority: Medium    Renal mass 05/16/2023     Priority: Medium    Polyneuropathy associated with underlying disease (H24) 05/09/2023     Priority: Medium    Moderate episode of recurrent major depressive disorder (H) 02/07/2022     Priority: Medium    History of TIA (transient ischemic attack) and stroke 08/09/2021     Priority: Medium     Dual antiplatelet : aspirin 81mg daily plus clopidogrel 75mg now. Previously clopidogrel alone prior to TIA that occurred in [place] on 6/22/2021.         Tobacco use 08/14/2020     Priority: Medium    Stage 3b chronic kidney disease (H) 01/13/2020     Priority: Medium    W[place]in tumor 11/26/2019     Priority: Medium    Type 2 diabetes mellitus with stage 3b chronic kidney disease, without long-term current use of insulin (H) 11/03/2017     Priority: Medium    Microalbuminuria due to type 2 diabetes mellitus (H) 11/23/2015     Priority: Medium    GERD (gastroesophageal reflux disease) 05/03/2013     Priority: Medium    Cataract 04/10/2013     Priority: Medium     Utility update for deleted IMO code  [employer] Update utility      Atherosclerosis of native coronary artery of native heart with stable angina pectoris (H24)      Priority: Medium     Coronary artery disease. 2 stents in [place]. Normal nuclear stress in 2011.  Problem list name updated by automated process. Provider to review      Essential hypertension      Priority: Medium     Essential hypertension  Problem list name updated by automated process. 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    Stented coronary artery     Type II or unspecified type diabetes mellitus without mention of complication, not stated as uncontrolled     Diabetes mellitus    Unspecified essential hypertension     Essential hypertension     Past Surgical History:   Procedure Laterality Date    ANGIOPLASTY  2007    rca    ANGIOPLASTY  2010    om    ARTHROTOMY SHOULDER, ROTATOR CUFF REPAIR, COMBINED Left 4/12/2017    Procedure: COMBINED ARTHROTOMY SHOULDER, ROTATOR CUFF REPAIR;  Surgeon: Deejay Conrad MD;  Location: [facility]    ARTHROTOMY SHOULDER, SUBACROMIAL DECOMPRESSION, COMBINED Left 4/12/2017    Procedure: COMBINED ARTHROTOMY SHOULDER, SUBACROMIAL DECOMPRESSION;  Surgeon: Deejay Conrad MD;  Location: [facility]    COLONOSCOPY      DECOMPRESSION CUBITAL TUNNEL  11/29/2013    Procedure: DECOMPRESSION CUBITAL TUNNEL;;  Surgeon: Radha Cain MD;  Location: [facility]    ENDOSCOPIC RELEASE CARPAL TUNNEL  11/29/2013    Procedure: ENDOSCOPIC RELEASE CARPAL TUNNEL;  LEFT ENDOSCOPIC CARPAL TUNNEL RELEASE AND CUBITAL TUNNEL RELEASE ;  Surgeon: Radha Cain MD;  Location: [facility]    OPEN REDUCTION INTERNAL FIXATION HIP NAILING  3/30/2012    Procedure:OPEN REDUCTION INTERNAL FIXATION HIP NAILING; Biopsy, Curettage and Bone Grafting Right Hip Lesion, Placement of Hip Screw; Surgeon:MARILIN GAY; Location:[facility]    PAROTIDECTOMY Right ~1990    [place]    PAROTIDECTOMY Left 11/26/2019    Procedure: Left parotidectomy with facial nerve monitoring and 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.;  Surgeon: Tesfaye  Rafat Plascencia MD;  Location: [facility]    PHACOEMULSIFICATION CLEAR CORNEA WITH STANDARD INTRAOCULAR LENS IMPLANT  5/8/2013    Procedure: PHACOEMULSIFICATION CLEAR CORNEA WITH STANDARD INTRAOCULAR LENS IMPLANT;  RIGHT EYE PHACOEMULSIFICATION CLEAR CORNEA WITH STANDARD INTRAOCULAR LENS IMPLANT ;  Surgeon: Jovani Pretty MD;  Location:  [facility]    PHACOEMULSIFICATION CLEAR CORNEA WITH STANDARD INTRAOCULAR LENS IMPLANT  5/20/2013    Procedure: PHACOEMULSIFICATION CLEAR CORNEA WITH STANDARD INTRAOCULAR LENS IMPLANT;  LEFT  EYE PHACOEMULSIFICATION CLEAR CORNEA WITH STANDARD INTRAOCULAR LENS IMPLANT ;  Surgeon: Jovani Pretty MD;  Location:  [facility]    STENT       Current Outpatient Medications   Medication Sig Dispense Refill    ACCU-CHEK SALLY PLUS test strip  (Patient not taking: Reported on 7/9/2024)      acetaminophen (TYLENOL) 500 MG tablet Take 1,000 mg by mouth 2 times daily      amLODIPine (NORVASC) 2.5 MG tablet Take 1 tablet (2.5 mg) by mouth daily 90 tablet 3    aspirin (ASA) 81 MG EC tablet Take 1 tablet (81 mg) by mouth daily 30 tablet 11    atorvastatin (LIPITOR) 40 MG tablet Take 1 tablet (40 mg) by mouth daily 90 tablet 3    calcipotriene (DOVONOX) 0.005 % external solution Apply to the affected scalp twice daily. 60 mL 1    clopidogrel (PLAVIX) 75 MG tablet Take 1 tablet (75 mg) by mouth daily 90 tablet 1    clotrimazole-betamethasone (LOTRISONE) 1-0.05 % external lotion Apply topically 2 times daily 45 mL 0    Continuous Blood Gluc Sensor (FREESTYLE JAQUAN 2 SENSOR) MISC 1 Device every 14 days To check blood sugars. 2 each 11    cyanocobalamin (VITAMIN B-12) 1000 MCG tablet Take 1 tablet (1,000 mcg) by mouth daily 90 tablet 1    empagliflozin (JARDIANCE) 25 MG TABS tablet Take 1 tablet (25 mg) by mouth daily 90 tablet 1    famotidine (PEPCID) 20 MG tablet Take 1 tablet (20 mg) by mouth daily 90 tablet 1    gabapentin (NEURONTIN) 300 MG capsule Take 1 capsule (300 mg) by mouth 2 times daily 180 capsule 1     "ibuprofen (ADVIL/MOTRIN) 600 MG tablet TAKE 1 TABLET BY MOUTH EVERY 6 TO 8 HOURS      isosorbide mononitrate (IMDUR) 30 MG 24 hr tablet Take 1 tablet (30 mg) by mouth daily 90 tablet 2    LANsoprazole (PREVACID) 15 MG DR capsule Take 1 capsule (15 mg) by mouth daily 30 capsule 4    lisinopril (ZESTRIL) 5 MG tablet Take 1 tablet (5 mg) by mouth daily 90 tablet 3    metoprolol succinate ER (TOPROL XL) 25 MG 24 hr tablet Take 1 tablet (25 mg) by mouth daily 90 tablet 1    sertraline (ZOLOFT) 100 MG tablet Take 1 tablet (100 mg) by mouth daily 90 tablet 3    tirzepatide (MOUNJARO) 7.5 MG/0.5ML pen Inject 7.5 mg Subcutaneous every 7 days 6 mL 3       Allergies   Allergen Reactions    Crestor [Rosuvastatin] Muscle Pain (Myalgia)        Social History     Tobacco Use    Smoking status: Every Day     Current packs/day: 1.00     Average packs/day: 1 pack/day for 40.0 years (40.0 ttl pk-yrs)     Types: Cigarettes    Smokeless tobacco: Never    Tobacco comments:     9/29/2021 - 10 cigarettes/day   Substance Use Topics    Alcohol use: Not Currently     Alcohol/week: 0.0 standard drinks of alcohol     Comment: none     Family History   Problem Relation Age of Onset    Cerebrovascular Disease Mother 56    Hypertension Mother     Cerebrovascular Disease Father     Hypertension Father      History   Drug Use No             Review of Systems  Constitutional, HEENT, cardiovascular, pulmonary, GI, , musculoskeletal, neuro, skin, endocrine and psych systems are negative, except as otherwise noted.    Objective    /82   Pulse 89   Resp 16   Wt 77.7 kg (171 lb 3.2 oz)   SpO2 98%   BMI 28.49 kg/m     Estimated body mass index is 28.49 kg/m  as calculated from the following:    Height as of 1/18/24: 1.651 m (5' 5\").    Weight as of this encounter: 77.7 kg (171 lb 3.2 oz).  Physical Exam  GENERAL: alert and no distress  EYES: Eyes grossly normal to inspection, PERRL and conjunctivae and sclerae normal  HENT: ear canals and TM's " normal, nose and mouth without ulcers or lesions  NECK: no adenopathy, no asymmetry, masses, or scars  RESP: lungs clear to auscultation - no rales, rhonchi or wheezes  CV: regular rate and rhythm, normal S1 S2, no S3 or S4, no murmur, click or rub, no peripheral edema  ABDOMEN: soft, nontender, no hepatosplenomegaly, no masses and bowel sounds normal  MS: Slight diminished strength on left side  SKIN: no suspicious lesions or rashes  NEURO: Normal strength and tone, mentation intact and speech normal  PSYCH: mentation appears normal, affect normal/bright      Recent Labs   Lab Test 05/28/24  1419 05/28/24  1411 01/20/24  2319 11/22/23  1800 11/02/23  1333   HGB  --   --  13.5  --   --    PLT  --   --  164  --   --    INR  --   --  1.00  --   --    NA  --  137 135   < >  --    POTASSIUM  --  5.2 4.1   < >  --    CR  --  2.33* 1.81*   < >  --    A1C 7.6*  --   --   --  6.7*    < > = values in this interval not displayed.        Diagnostics  Labs pending at this time.  Results will be reviewed when available.  No results found for this or any previous visit (from the past 24 hour(s)).     No EKG required for low risk surgery (cataract, skin procedure, breast biopsy, etc).    Revised Cardiac Risk Index (RCRI)  The patient has the following serious cardiovascular risks for perioperative complications:   - Serum Creatinine >2.0 mg/dl = 1 point   - History of cerebrovascular (TIA) = 1 point     RCRI Interpretation: 2 point: Risk 6.6% (3.9% to 10.3%)         Signed Electronically by: CARROL Brownlee CNP  Copy of this evaluation report is provided to requesting physician.

## 2024-07-11 NOTE — PROGRESS NOTES
7/10/24 faxed preop and labs to [facility] surgery center @ [phone]    Nader Hernandez,   [facility]  [phone]

## 2024-07-17 ENCOUNTER — TELEPHONE (OUTPATIENT)
Dept: FAMILY MEDICINE | Facility: CLINIC | Age: 64
End: 2024-07-17

## 2024-07-17 DIAGNOSIS — E11.40 PAINFUL DIABETIC NEUROPATHY (H): ICD-10-CM

## 2024-07-17 DIAGNOSIS — K21.9 GERD (GASTROESOPHAGEAL REFLUX DISEASE): ICD-10-CM

## 2024-07-17 RX ORDER — GABAPENTIN 300 MG/1
300 CAPSULE ORAL EVERY MORNING
Qty: 90 CAPSULE | Refills: 1 | Status: SHIPPED | OUTPATIENT
Start: 2024-07-17

## 2024-07-17 NOTE — TELEPHONE ENCOUNTER
Maeve RN with [facility] [phone] calls reporting she did recertification eval yesterday and would like approval for skilled nurse visits 1x/week for 9 weeks plus 3 prn visits.     Also reports they set up patient's meds and note that he consistently misses his pm meds which consist of Tylenol 1000mg and gabapentin 300mg.   Last week he didn't take these meds 4 of 7 days. Most weeks is skipping them every night.     Does take these meds with his morning meds.     Last related visit: 2/20/24 with Chelo Roberts PharmD - mohinder dose was decreased from 300mg TID to 300mg BID due to impaired renal function    Nurse Mcfarlane asks if would make sense to just discontinue these PM meds?     Plan: Chelo VargasD reviewed message and approves of discontinuation of PM Tylenol and gabapentin.   Called nurse Mcfarlane with verbal orders for this and the skilled nursing visits. Updated med list.   Yelena Montiel RN

## 2024-07-18 RX ORDER — MECOBALAMIN 5000 MCG
15 TABLET,DISINTEGRATING ORAL DAILY
Qty: 30 CAPSULE | Refills: 4 | Status: SHIPPED | OUTPATIENT
Start: 2024-07-18

## 2024-07-29 ENCOUNTER — PATIENT OUTREACH (OUTPATIENT)
Dept: CARE COORDINATION | Facility: CLINIC | Age: 64
End: 2024-07-29
Payer: COMMERCIAL

## 2024-07-29 NOTE — Clinical Note
Dr. Rene and scheduling-  Clint went to get his colonoscopy done after doing the prep but when he got there he was told he needs to do it in a hospital setting. He is upset that its pushed out so far. First available at [facility] is October 25th at 7:15am. Go was booking into November. This doesn't sound urgent just would be good for him to get it done. He will need a pre-op. Please assist with scheduling that.  I am also wondering if at his pre-op appt if a healthcare directive or POLST can be completed. I think that would be helpful to have on file with his noted neurocognitive changes.  Thanks!  -Christine (care coordinator)

## 2024-07-29 NOTE — PROGRESS NOTES
Clinic Care Coordination Contact  Follow Up Progress Note      Assessment:     -He is getting a new CADI waiver . He is not sure who it will be yet. [facility] emailed Michael (his current worker) to see who the new worker will be to update his chart.  She emailed the contact info:  Leyda Trinidad (She/Her)  Phone Number: [phone] Fax: [phone]  Email: [email or web address]      -He had a colonoscopy planned. When he got there they said he couldn't do it.  They want him to do it in the hospital setting.   We called [facility] today- he needs to schedule it in a hospital setting because endoscopy center not equipped if emergency would happen. He can reschedule-- he will need a pre-op.   He wants it done at [facility]. First available is Friday October 25th. He would need to arrive at 7:15am. They don't keep a cancellation list. He can call to try to get in sooner. He is upset about having to wait. He is scheduled with Dr. Tobar. He will get a call from a nurse when he's doing the prep.  He will have a prescription prep.     -He has eye surgery tomorrow. Its a same day surgery. His S worker will transport.     -He says he has a problem with moving around. He is trying to get on [employer] for transportation. He has the paperwork. He will get $500 per month for uber/lyft from his waiver also for transportation.    -He is following with [facility].    -Everything is good at [facility] with his apartment. His AC is working. He got a tv. He got a new chair. He got some plants. Overall he likes his place. Things feel more affordable since its subsidized    -He does have some complaints about staff on site. He does have [facility] for nursing which he likes.      Plan:   -Needs to get a pre-op scheduled for colonoscopy at [facility]- soonest available is October 25th.   -Continue CADI waiver services  -EDDIE left a message for PIERRE Malik at [facility] so she is aware  and can help assist in the process. He will have special instructions for his blood thinner.  -Patient should consider completing a healthcare directive; he does have a POLST on file from 2022.  -[facility] can be available as needed and can do a maintenance outreach in 2 months    AMANDA Fuentes, [facility]  Clinic Care Coordinator  [email or web address]  [phone]

## 2024-07-29 NOTE — LETTER
[facility]  August 21, 2024    Emily Zhu  [address]   [place] [address]      Dear Clint,    I am a clinic care coordinator  who works with Gabbie Rene MD with the [facility]. I wanted to thank you for spending the time to talk with me.  Below is a description of clinic care coordination and how I can further assist you.       The clinic care coordination team is made up of a registered nurse, , financial resource worker and community health worker who understand the health care system. The goal of clinic care coordination is to help you manage your health and improve access to the health care system. Our team works alongside your provider to assist you in determining your health and social needs. We can help you obtain health care and community resources, providing you with necessary information and education. We can work with you through any barriers and develop a care plan that helps coordinate and strengthen the communication between you and your care team.  Our services are voluntary and are offered without charge to you personally.    Please feel free to contact me with any questions or concerns regarding care coordination and what we can offer.      We are focused on providing you with the highest-quality healthcare experience possible.    Sincerely,     Christine Rutledge, MSW, [facility]  Clinic Care Coordinator  [email or web address]  [phone]

## 2024-07-29 NOTE — LETTER
Patient Centered Plan of Care  About Me:        Patient Name:  Emily Zuh    YOB: 1960  Age:         64 year old   Alfonso MRN:    [id number] Telephone Information:  Home Phone [phone]   Mobile [phone]       Address:  [address]  [place] [address] Email address:  No e-mail address on record      Emergency Contact(s)    Name Relationship Lgl Grd Work Phone Home Phone Mobile Phone   1. EDWARD BRAR Sister No [phone]    2. LINUS Friend No  [phone]            Primary language:  English     needed? No   [employer]:  [phone] op. 1  Other communication barriers:None; Other (hernando is Druze and had a thick accent but does not need to use )    Preferred Method of Communication:     Current living arrangement: I live alone    Mobility Status/ Medical Equipment: Independent w/Device        Health Maintenance  Health Maintenance Reviewed: Not assessed      My Access Plan  Medical Emergency 911   Primary Clinic Line [facility] - [phone]   24 Hour Appointment Line [phone] or  [phone]) (toll-free)   24 Hour Nurse Line [phone] (toll-free)   Preferred Urgent Care No data recorded   Preferred Hospital [facility]  [phone]     Preferred Pharmacy [facility] [address] [facility] [address] [facility]     Behavioral Health Crisis Line The National Suicide Prevention Lifeline at 1-786.868.4909 or Text/Call 128           My Care Team Members  Patient Care Team         Relationship Specialty Notifications Start End    Gabbie Rene MD PCP - General Family Medicine  4/28/23     Referring to DERM    Phone: [phone] Fax: [phone] [address] [facility] [address]    Christine Tao LICSW Lead Care Coordinator  Admissions 1/10/20     Chelo Roberts RPH Assigned MTM Pharmacist   9/28/22     Phone:  [phone]          [address] [place] [address]    [facility] Care Coordinator- Andiealondra Eduardo    10/19/22     Phone: [phone]         Michael Pretty MD MD Neurology  12/15/22     Phone: [phone] Fax: [phone] [address] [place] [address] [facility] [address]    Gabbie Rene MD Assigned PCP   8/26/23     Phone: [phone] Fax: [phone] [address] [place] [address]    Víctor Siddiqui, PhD Psychologist Neuropsychology  9/27/23     Phone: [phone] Fax: [phone]         [address] [place] [address]    Moira Zapata, PACammyC Physician Assistant Dermatology  3/7/24     Phone: [phone] Fax: [phone]         [address] [facility] [address]    Miesha Rodriguez, APRN CNP Nurse Practitioner Family Medicine  7/9/24     Phone: [phone] Fax: [phone] [address] [place] [address]                My Care Plans  Self Management and Treatment Plan    Care Plan  Care Plan: Housing and Support       Problem: Insufficient In-home support                     Action Plans on File:            Depression          Advance Care Plans/Directives:   Advanced Care Plan/Directives on file:   Yes    Status of Document(s): No data recorded  Advanced Care Plan/Directives Type:   Advanced Directive - On File           My Medical and Care Information  Problem List   Patient Active Problem List   Diagnosis    Atherosclerosis of native coronary artery of native heart with stable angina pectoris (H24)    Essential hypertension    Mixed hyperlipidemia    Cataract    GERD (gastroesophageal reflux disease)    Microalbuminuria due to type 2 diabetes mellitus (H)    Type 2 diabetes mellitus with stage 3b chronic kidney disease, without long-term current use of insulin (H)    Warthin tumor    Stage 3b chronic kidney disease (H)    Tobacco use    History of TIA (transient ischemic attack) and stroke    Moderate episode of recurrent major  depressive disorder (H)    Polyneuropathy associated with underlying disease (H24)    Renal mass    Major neurocognitive disorder due to vascular disease, without behavioral disturbance, moderate (H)      Current Medications and Allergies:  See printed Medication Report.    Care Coordination Start Date: 1/10/2020   Frequency of Care Coordination: 6 weeks, more frequently as needed     Form Last Updated: 08/21/2024

## 2024-07-31 DIAGNOSIS — I25.10 ATHEROSCLEROSIS OF CORONARY ARTERY OF NATIVE HEART WITHOUT ANGINA PECTORIS, UNSPECIFIED VESSEL OR LESION TYPE: ICD-10-CM

## 2024-07-31 DIAGNOSIS — E11.21 TYPE 2 DIABETES MELLITUS WITH DIABETIC NEPHROPATHY, WITHOUT LONG-TERM CURRENT USE OF INSULIN (H): ICD-10-CM

## 2024-07-31 DIAGNOSIS — I10 ESSENTIAL HYPERTENSION: ICD-10-CM

## 2024-07-31 DIAGNOSIS — E78.2 MIXED HYPERLIPIDEMIA: ICD-10-CM

## 2024-07-31 RX ORDER — ATORVASTATIN CALCIUM 40 MG/1
40 TABLET, FILM COATED ORAL DAILY
Qty: 90 TABLET | Refills: 3 | Status: SHIPPED | OUTPATIENT
Start: 2024-07-31

## 2024-07-31 RX ORDER — CLOPIDOGREL BISULFATE 75 MG/1
75 TABLET ORAL DAILY
Qty: 90 TABLET | Refills: 1 | Status: SHIPPED | OUTPATIENT
Start: 2024-07-31

## 2024-07-31 NOTE — TELEPHONE ENCOUNTER
"Med: atorvastatin    LOV (related): 01/18/2024    Last Lab: 01/18/2024      Due for F/U around: 08/28/2024 (DM check)    Next Appt: 08/16/2024        Cholesterol   Date Value Ref Range Status   01/18/2024 178 <200 mg/dL Final   10/24/2022 259 (H) 100 - 199 mg/dL Final   09/29/2021 131 100 - 199 mg/dL Final     HDL Cholesterol   Date Value Ref Range Status   10/24/2022 33 (L) >39 mg/dL Final   09/29/2021 32 (L) >39 mg/dL Final     Direct Measure HDL   Date Value Ref Range Status   01/18/2024 42 >=40 mg/dL Final     LDL Cholesterol Calculated   Date Value Ref Range Status   01/18/2024 83 <=100 mg/dL Final   10/24/2022 128 (H) 0 - 99 mg/dL Final   09/29/2021 54 0 - 99 mg/dL Final     Triglycerides   Date Value Ref Range Status   01/18/2024 265 (H) <150 mg/dL Final   10/24/2022 538 (H) 0 - 149 mg/dL Final   09/29/2021 289 (H) 0 - 149 mg/dL Final     No results found for: \"CHOLHDLRATIO\"     Med: Clopidogrel    LOV (related): 01/18/2024      Due for F/U around: 08/28/2024 (DM check)    Next Appt: 08/16/2024      "

## 2024-08-16 ENCOUNTER — OFFICE VISIT (OUTPATIENT)
Dept: FAMILY MEDICINE | Facility: CLINIC | Age: 64
End: 2024-08-16

## 2024-08-16 VITALS
WEIGHT: 170 LBS | HEART RATE: 78 BPM | DIASTOLIC BLOOD PRESSURE: 82 MMHG | SYSTOLIC BLOOD PRESSURE: 136 MMHG | OXYGEN SATURATION: 99 % | BODY MASS INDEX: 28.29 KG/M2

## 2024-08-16 DIAGNOSIS — G63 POLYNEUROPATHY ASSOCIATED WITH UNDERLYING DISEASE (H): Primary | ICD-10-CM

## 2024-08-16 DIAGNOSIS — F01.B0 MAJOR NEUROCOGNITIVE DISORDER DUE TO VASCULAR DISEASE, WITHOUT BEHAVIORAL DISTURBANCE, MODERATE (H): ICD-10-CM

## 2024-08-16 PROCEDURE — G2211 COMPLEX E/M VISIT ADD ON: HCPCS | Performed by: FAMILY MEDICINE

## 2024-08-16 PROCEDURE — 99213 OFFICE O/P EST LOW 20 MIN: CPT | Performed by: FAMILY MEDICINE

## 2024-08-16 NOTE — PROGRESS NOTES
SUBJECTIVE:    Emily Zhu, is a 64 year old male presenting for the below:     Here to complete paperwork for metro mobility and disability parking permit.   Currently using taxis. Mobilizes with wheeled walker follows CVA. Known cognitive      OBJECTIVE:  Vitals:    08/16/24 1054   BP: 136/82   Pulse: 78   SpO2: 99%   Weight: 77.1 kg (170 lb)    Body mass index is 28.29 kg/m .  General: no acute distress, cooperative with exam.  Psych: mental status normal, mood and affect appropriate.      ASSESSMENT / PLAN:        Polyneuropathy associated with underlying disease (H24)  Major neurocognitive disorder due to vascular disease, without behavioral disturbance, moderate (H)  [employer] mobility paperwork completed.   Disability parking permit completed   Both issued back to patient for onward submission  Also discussed advanced care direction : patient would like to look over this prior to competing.     The longitudinal plan of care for the diagnosis(es)/condition(s) as documented were addressed during this visit. Due to the added complexity in care, I will continue to support Clint in the subsequent management and with ongoing continuity of care.

## 2024-08-20 DIAGNOSIS — E11.22 TYPE 2 DIABETES MELLITUS WITH STAGE 3B CHRONIC KIDNEY DISEASE, WITHOUT LONG-TERM CURRENT USE OF INSULIN (H): ICD-10-CM

## 2024-08-20 DIAGNOSIS — N18.32 TYPE 2 DIABETES MELLITUS WITH STAGE 3B CHRONIC KIDNEY DISEASE, WITHOUT LONG-TERM CURRENT USE OF INSULIN (H): ICD-10-CM

## 2024-08-20 RX ORDER — TIRZEPATIDE 7.5 MG/.5ML
7.5 INJECTION, SOLUTION SUBCUTANEOUS
Qty: 6 ML | Refills: 3 | Status: SHIPPED | OUTPATIENT
Start: 2024-08-20

## 2024-08-20 NOTE — TELEPHONE ENCOUNTER
Med:  Josué   ** CHANGING PHARMACY**    LOV (related): 7/9/24    Last Lab: 5/28/24      Due for F/U around: 3 months for DM check  8/28/24    Next Appt: No current future appointments scheduled         Lab Results   Component Value Date    A1C 7.6 05/28/2024    A1C 6.7 11/02/2023    A1C 7.3 09/01/2023    A1C 8.4 05/09/2023    A1C 9.9 10/24/2022

## 2024-08-22 ENCOUNTER — TELEPHONE (OUTPATIENT)
Dept: FAMILY MEDICINE | Facility: CLINIC | Age: 64
End: 2024-08-22

## 2024-08-22 NOTE — TELEPHONE ENCOUNTER
Maeve RN with [facility] calls today requesting verbal order/approval for home PT eval and treat to assess his right arm/shoulder pain. Nurse reports has hx of right rotator cuff tear with repair. Pain is inhibiting the use of his arm.   Plan: okay per Dr. Rene for home PT eval and treat. Left VO on Maeve's voicemail and to call our office if anything more is needed to proceed with this.   Yelena Montiel RN

## 2024-09-18 ENCOUNTER — TELEPHONE (OUTPATIENT)
Dept: FAMILY MEDICINE | Facility: CLINIC | Age: 64
End: 2024-09-18

## 2024-09-18 DIAGNOSIS — M25.511 RIGHT SHOULDER PAIN: Primary | ICD-10-CM

## 2024-09-18 NOTE — TELEPHONE ENCOUNTER
Maeve, nurse with [facility] [phone], calls reporting patient struggling with right shoulder pain that is not improving with PT he has been having for past month.   Asking how to proceed?   Patient does have hx left rotator repair in 2017 with Dr. Deejay Conrad (retired) at [facility].  Plan: per Dr. Rene, recommend ortho eval. Called Maeve with recommendation and contact info for [facility].  Referral placed.   Yelena Montiel RN

## 2024-09-24 ENCOUNTER — PATIENT OUTREACH (OUTPATIENT)
Dept: CARE COORDINATION | Facility: CLINIC | Age: 64
End: 2024-09-24
Payer: COMMERCIAL

## 2024-09-24 NOTE — PROGRESS NOTES
Clinic Care Coordination Contact  Follow Up Progress Note      Assessment: Overall things are in a good place right now. Patient lives at [facility]. He has [facility] helping him. His room and board is subsidized. He has a CADI waiver  handling his PCA and IHS services. Maeve his nurse at [facility] sets up his medications and sees him every Wednesday.  He has his colonoscopy set up   He will be getting his shoulder checked out soon through [facility]. He said Maeve helped him set up the appt.  Overall his needs are being met right now.  [facility] informed patient to call care coordinator if he needs anything and otherwise PCP can place a referral if any new needs arise    He says he has a healthcare directive filled out and just needs to get it notarized- Maeve helped him.    Patient prefers to work with this care coordinator if possible if a new referral gets placed. He knows that may not be possible but just wanted to put in that request.    Plan:   CC will close care coordination at this time but can be available again as needed and/or if a new referral is placed  Continue CADI waiver services    Christine Rutledge, MSW, [facility]  Clinic Care Coordinator  [email or web address]  [phone]

## 2024-09-24 NOTE — LETTER
[facility]  September 24, 2024    Emily Permaul  [address]   [place] [address]    Dear Emily,  Your Care Team congratulates you on your journey to maintain wellness. This document will help guide you on your journey to maintain a healthy lifestyle.  You can use this to help you overcome any barriers you may encounter.  If you should have any questions or concerns, you can contact the members of your Care Team or contact your Primary Care Clinic for assistance.     Health Maintenance  Health Maintenance Reviewed:      My Access Plan  Medical Emergency 911   Primary Clinic Line [facility] - [phone]   24 Hour Appointment Line [phone] or  [phone]) (toll-free)   24 Hour Nurse Line [phone] (toll-free)   Preferred Urgent Care     Preferred Hospital     Preferred Pharmacy [facility] [address]     Behavioral Health Crisis Line The National Suicide Prevention Lifeline at 1-477.588.3258 or 911     My Care Team Members  Patient Care Team         Relationship Specialty Notifications Start End    Gabbie Rene MD PCP - General Family Medicine  4/28/23     Referring to DERM    Phone: [phone] Fax: [phone] [address] [facility] [address]    Christine Tao LICSW Lead Care Coordinator  Admissions 1/10/20     Chelo Roberts [facility] Assigned MTM Pharmacist   9/28/22     Phone: [phone] [address] [facility] [address]    [facility] Care Coordinator- Andie Eduardo    10/19/22     Phone: [phone]         Michael Pretty MD MD Neurology  12/15/22     Phone: [phone] Fax: [phone] [address] [address]    Gabbie Rene MD Assigned PCP   8/26/23     Phone: [phone] Fax: [phone] [address] [facility] [address]    Víctor Siddiqui, PhD Psychologist Neuropsychology  9/27/23     Phone: [phone] Fax:  [phone] [address] [facility] [address]    Moira Zapata PA-C Physician Assistant Dermatology  3/7/24     Phone: [phone] Fax: [phone] [address] [facility] [address]    Miesha Rodriguez APRN CNP Nurse Practitioner Family Medicine  7/9/24     Phone: [phone] Fax: [phone] [address] [facility] [address]                     It has been your Clinic Care Team's pleasure to work with you on accomplishing your goals.    Regards,  Your Clinic Care Team

## 2024-09-25 DIAGNOSIS — E11.21 TYPE 2 DIABETES MELLITUS WITH DIABETIC NEPHROPATHY, WITHOUT LONG-TERM CURRENT USE OF INSULIN (H): ICD-10-CM

## 2024-09-25 RX ORDER — METOPROLOL SUCCINATE 25 MG/1
25 TABLET, EXTENDED RELEASE ORAL DAILY
Qty: 90 TABLET | Refills: 1 | Status: SHIPPED | OUTPATIENT
Start: 2024-09-25

## 2024-09-25 NOTE — TELEPHONE ENCOUNTER
Med: Metoprolol       LOV (related): 7/9/24    Last Lab: 5/28/24      Due for F/U around: 3 months for DM check      Next Appt: No current future [place] [facility]         BP Readings from Last 3 Encounters:   08/16/24 136/82   07/09/24 120/82   05/28/24 99/65       Last Comprehensive Metabolic Panel:  Lab Results   Component Value Date     07/09/2024    POTASSIUM 4.5 07/09/2024    CHLORIDE 103 07/09/2024    CO2 27 07/09/2024    ANIONGAP 6 (L) 07/09/2024     (H) 07/09/2024    BUN 25.5 (H) 07/09/2024    CR 1.93 (H) 07/09/2024    GFRESTIMATED 38 (L) 07/09/2024    ONEL 9.2 07/09/2024       Hemoglobin A1C   Date Value Ref Range Status   05/28/2024 7.6 (A) 4.0 - 6.0 % Final   11/02/2023 6.7 (A) 4.0 - 6.0 % Final   09/01/2023 7.3 (A) 4.0 - 6.0 % Final

## 2024-10-17 ENCOUNTER — PATIENT OUTREACH (OUTPATIENT)
Dept: CARE COORDINATION | Facility: CLINIC | Age: 64
End: 2024-10-17
Payer: COMMERCIAL

## 2024-10-17 NOTE — PROGRESS NOTES
Clinic Care Coordination Contact  Follow Up Progress Note      Assessment: [facility] got a message from Chan, the homecare PT stating that patient needs help setting up an appt at [facility] for an injection in his shoulder.     SW called patient- he said he did get an appt set up for the 30th of this month. He will call for a ride through insurance.     Patient is thankful for care coordinator calling back but he doesn't have any other needs right now to help with.     His colonoscopy is set up for 10/25.    He has an appt at [facility] tomorrow for his pre-op.    He no showed his derm intake appt on 9/9 and needs to reschedule -- unfortunately derm is booking out until May. He has an appt now Tuesday May 27th at 1pm. It will be with Nano Billingsley. He can talk with his Pcp tomorrow to see if there is anything that can be done to help until then.       Plan:   - Pre op tomorrow  -Colonoscopy 10/25 in the hospital  -Derm appt rescheduled for May (hoping to get in sooner if possible- they keep a cancellation list but he would have to have mychart which he doesn't have a computer and wouldn't know how to access)  -[facility] appt for his shoulder at the end of this month for an injection  -Continue CADI and homecare services with case management  [facility] can be available if any needs arise    Christine Rutledge, MSALEX, [facility]  Clinic Care Coordinator  [email or web address]  [phone]

## 2024-10-17 NOTE — PATIENT INSTRUCTIONS
How to Take Your Medication Before Surgery  Preoperative Medication Instructions   Antiplatelet or Anticoagulation Medication Instructions   - aspirin: Bleeding risk is low for this procedure and daily aspirin may be continued without modification.    - clopidrogel (Plavix), prasugrel (Effient), ticagrelor (Brilinta): Continue without changes    Additional Medication Instructions  Take all scheduled medications on the day of surgery EXCEPT for modifications listed below:   - Lisinopril: DO NOT TAKE on day of surgery (minimum 11 hours for general anesthesia).   - Metoprolol: Continue taking on the day of surgery.   - Amlodipine: May be continued on the day of surgery.   - Statins: Continue taking on the day of surgery.    - SGLT2 Inhibitor (canagliflozin, dapagliflozin, or empagliflozin): DO NOT TAKE 3 days before surgery.    - GLP-1 Injectable (exenitide, liraglutide, semaglutide, dulaglutide, etc.): DO NOT TAKE 7 days before surgery    - Herbal medications and vitamins: DO NOT TAKE 14 days prior to surgery.   - pregabalin, gabapentin: Continue without modification.   - ibuprofen (Advil, Motrin): DO NOT TAKE 1 day before surgery.    - SSRIs, SNRIs, TCAs, Antipsychotics: Continue without modification.    - GLP-1 Injectable (exenitide, liraglutide, semaglutide, dulaglutide, tirzepatide etc.): DO NOT TAKE 7 days before surgery      Per [facility] notes needs hold on glp1 for 7 days and SGLT2 for 4 days       Patient Education   Preparing for Your Surgery  For Adults  Getting started  In most cases, a nurse will call to review your health history and instructions. They will give you an arrival time based on your scheduled surgery time. Please be ready to share:  Your doctor's clinic name and phone number  Your medical, surgical, and anesthesia history  A list of allergies and sensitivities  A list of medicines, including herbal treatments and over-the-counter drugs  Whether the patient has a legal guardian (ask how to send us  the papers in advance)  Note: You may not receive a call if you were seen at our PAC (Preoperative Assessment Center).  Please tell us if you're pregnant--or if there's any chance you might be pregnant. Some surgeries may injure a fetus (unborn baby), so they require a pregnancy test. Surgeries that are safe for a fetus don't always need a test, and you can choose whether to have one.   Preparing for surgery  Within 10 to 30 days of surgery: Have a pre-op exam (sometimes called an H&P, or History and Physical). This can be done at a clinic or pre-operative center.  If you're having a , you may not need this exam. Talk to your care team.  At your pre-op exam, talk to your care team about all medicines you take. (This includes CBD oil and any drugs, such as THC, marijuana, and other forms of cannabis.) If you need to stop any medicine before surgery, ask when to start taking it again.  This is for your safety. Many medicines and drugs can make you bleed too much during surgery. Some change how well surgery (anesthesia) drugs work.  Call your insurance company to let them know you're having surgery. (If you don't have insurance, call [phone].)  Call your clinic if there's any change in your health. This includes a scrape or scratch near the surgery site, or any signs of a cold (sore throat, runny nose, cough, rash, fever).  Eating and drinking guidelines  For your safety: Unless your surgeon tells you otherwise, follow the guidelines below.  Eat and drink as normal until 8 hours before you arrive for surgery. After that, no food or milk. You can spit out gum when you arrive.  Drink clear liquids until 2 hours before you arrive. These are liquids you can see through, like water, Gatorade, and Propel Water. They also include plain black coffee and tea (no cream or milk).  No alcohol for 24 hours before you arrive. The night before surgery, stop any drinks that contain THC.  If your care team tells you to  take medicine on the morning of surgery, it's okay to take it with a sip of water. No other medicines or drugs are allowed (including CBD oil)--follow your care team's instructions.  If you have questions the day of surgery, call your hospital or surgery center.   Preventing infection  Shower or bathe the night before and the morning of surgery. Follow the instructions your clinic gave you. (If no instructions, use regular soap.)  Don't shave or clip hair near your surgery site. We'll remove the hair if needed.  Don't smoke or vape the morning of surgery. No chewing tobacco for 6 hours before you arrive. A nicotine patch is okay. You may spit out nicotine gum when you arrive.  For some surgeries, the surgeon will tell you to fully quit smoking and nicotine.  We will make every effort to keep you safe from infection. We will:  Clean our hands often with soap and water (or an alcohol-based hand rub).  Clean the skin at your surgery site with a special soap that kills germs.  Give you a special gown to keep you warm. (Cold raises the risk of infection.)  Wear hair covers, masks, gowns, and gloves during surgery.  Give antibiotic medicine, if prescribed. Not all surgeries need this medicine.  What to bring on the day of surgery  Photo ID and insurance card  Copy of your health care directive, if you have one  Glasses and hearing aids (bring cases)  You can't wear contacts during surgery  Inhaler and eye drops, if you use them (tell us about these when you arrive)  CPAP machine or breathing device, if you use them  A few personal items, if spending the night  If you have . . .  A pacemaker, ICD (cardiac defibrillator), or other implant: Bring the ID card.  An implanted stimulator: Bring the remote control.  A legal guardian: Bring a copy of the certified (court-stamped) guardianship papers.  Please remove any jewelry, including body piercings. Leave jewelry and other valuables at home.  If you're going home the day of  surgery  You must have a responsible adult drive you home. They should stay with you overnight as well.  If you don't have someone to stay with you, and you aren't safe to go home alone, we may keep you overnight. Insurance often won't pay for this.  After surgery  If it's hard to control your pain or you need more pain medicine, please call your surgeon's office.  Questions?   If you have any questions for your care team, list them here:   ____________________________________________________________________________________________________________________________________________________________________________________________________________________________________________________________  For informational purposes only. Not to replace the advice of your health care provider. Copyright   2003, 2019 [employer]. All rights reserved. Clinically reviewed by Ilia Munson MD. [employer] 756777 - REV 08/24.

## 2024-10-18 ENCOUNTER — OFFICE VISIT (OUTPATIENT)
Dept: FAMILY MEDICINE | Facility: CLINIC | Age: 64
End: 2024-10-18

## 2024-10-18 VITALS
SYSTOLIC BLOOD PRESSURE: 117 MMHG | BODY MASS INDEX: 27.92 KG/M2 | DIASTOLIC BLOOD PRESSURE: 86 MMHG | HEART RATE: 88 BPM | WEIGHT: 167.8 LBS | OXYGEN SATURATION: 98 %

## 2024-10-18 DIAGNOSIS — Z86.73 HISTORY OF TIA (TRANSIENT ISCHEMIC ATTACK) AND STROKE: ICD-10-CM

## 2024-10-18 DIAGNOSIS — Z01.818 PREOP GENERAL PHYSICAL EXAM: Primary | ICD-10-CM

## 2024-10-18 DIAGNOSIS — Z12.11 SCREENING FOR MALIGNANT NEOPLASM OF COLON: ICD-10-CM

## 2024-10-18 DIAGNOSIS — N18.32 TYPE 2 DIABETES MELLITUS WITH STAGE 3B CHRONIC KIDNEY DISEASE, WITHOUT LONG-TERM CURRENT USE OF INSULIN (H): ICD-10-CM

## 2024-10-18 DIAGNOSIS — I10 ESSENTIAL HYPERTENSION: ICD-10-CM

## 2024-10-18 DIAGNOSIS — I25.10 ATHEROSCLEROSIS OF CORONARY ARTERY OF NATIVE HEART WITHOUT ANGINA PECTORIS, UNSPECIFIED VESSEL OR LESION TYPE: ICD-10-CM

## 2024-10-18 DIAGNOSIS — K63.5 POLYP OF COLON, UNSPECIFIED PART OF COLON, UNSPECIFIED TYPE: ICD-10-CM

## 2024-10-18 DIAGNOSIS — F01.B0 MAJOR NEUROCOGNITIVE DISORDER DUE TO VASCULAR DISEASE, WITHOUT BEHAVIORAL DISTURBANCE, MODERATE (H): ICD-10-CM

## 2024-10-18 DIAGNOSIS — E11.22 TYPE 2 DIABETES MELLITUS WITH STAGE 3B CHRONIC KIDNEY DISEASE, WITHOUT LONG-TERM CURRENT USE OF INSULIN (H): ICD-10-CM

## 2024-10-18 DIAGNOSIS — G63 POLYNEUROPATHY ASSOCIATED WITH UNDERLYING DISEASE (H): ICD-10-CM

## 2024-10-18 DIAGNOSIS — F33.1 MODERATE EPISODE OF RECURRENT MAJOR DEPRESSIVE DISORDER (H): ICD-10-CM

## 2024-10-18 DIAGNOSIS — E78.2 MIXED HYPERLIPIDEMIA: ICD-10-CM

## 2024-10-18 DIAGNOSIS — K21.9 GASTROESOPHAGEAL REFLUX DISEASE, UNSPECIFIED WHETHER ESOPHAGITIS PRESENT: ICD-10-CM

## 2024-10-18 LAB
% GRANULOCYTES: 67.2 % (ref 42.2–75.2)
ANION GAP SERPL CALCULATED.3IONS-SCNC: 11 MMOL/L (ref 7–15)
BUN SERPL-MCNC: 20.4 MG/DL (ref 8–23)
CALCIUM SERPL-MCNC: 9.3 MG/DL (ref 8.8–10.4)
CHLORIDE SERPL-SCNC: 102 MMOL/L (ref 98–107)
CREAT SERPL-MCNC: 1.91 MG/DL (ref 0.67–1.17)
EGFRCR SERPLBLD CKD-EPI 2021: 38 ML/MIN/1.73M2
FASTING STATUS PATIENT QL REPORTED: NO
GLUCOSE SERPL-MCNC: 153 MG/DL (ref 70–99)
HBA1C MFR BLD: 6.4 % (ref 4–6)
HCO3 SERPL-SCNC: 25 MMOL/L (ref 22–29)
HCT VFR BLD AUTO: 49.8 % (ref 39–51)
HEMOGLOBIN: 16.6 G/DL (ref 13.4–17.5)
LYMPHOCYTES NFR BLD AUTO: 25.9 % (ref 20.5–51.1)
MCH RBC QN AUTO: 31.6 PG (ref 27–31)
MCHC RBC AUTO-ENTMCNC: 33.4 G/DL (ref 33–37)
MCV RBC AUTO: 94.5 FL (ref 80–100)
MONOCYTES NFR BLD AUTO: 6.9 % (ref 1.7–9.3)
PLATELET # BLD AUTO: 211 K/UL (ref 140–450)
POTASSIUM SERPL-SCNC: 5 MMOL/L (ref 3.4–5.3)
RBC # BLD AUTO: 5.27 X10/CMM (ref 4.2–5.9)
SODIUM SERPL-SCNC: 138 MMOL/L (ref 135–145)
WBC # BLD AUTO: 8.5 X10/CMM (ref 3.8–11)

## 2024-10-18 PROCEDURE — 85025 COMPLETE CBC W/AUTO DIFF WBC: CPT

## 2024-10-18 PROCEDURE — 99214 OFFICE O/P EST MOD 30 MIN: CPT

## 2024-10-18 PROCEDURE — 36415 COLL VENOUS BLD VENIPUNCTURE: CPT

## 2024-10-18 PROCEDURE — 83036 HEMOGLOBIN GLYCOSYLATED A1C: CPT

## 2024-10-18 PROCEDURE — G2211 COMPLEX E/M VISIT ADD ON: HCPCS

## 2024-10-18 PROCEDURE — 80048 BASIC METABOLIC PNL TOTAL CA: CPT | Mod: ORL

## 2024-10-18 NOTE — LETTER
October 21, 2024          Clint Permaul  [address]   [place] [address]        Hi Clint,     Your Basic Metabolic panel (BMP), the testing of your blood sugar, kidney function, and electrolytes shows stable kidney function.     A1c was 6.4%.     Your CBC results are as expected.  There is one lab result that is outside of the normal range, I have reviewed it and it requires no changes at this time.     Thank you for trusting me with your care!     CARROL Rojas CNP     Resulted Orders   CBC with Diff/Plt ([facility])   Result Value Ref Range    WBC x10/cmm 8.5 3.8 - 11.0 x10/cmm    % Lymphocytes 25.9 20.5 - 51.1 %    % Monocytes 6.9 1.7 - 9.3 %    % Granulocytes 67.2 42.2 - 75.2 %    RBC x10/cmm 5.27 4.2 - 5.9 x10/cmm    Hemoglobin 16.6 13.4 - 17.5 g/dl    Hematocrit 49.8 39 - 51 %    MCV 94.5 80 - 100 fL    MCH 31.6 (A) 27.0 - 31.0 pg    MCHC 33.4 33.0 - 37.0 g/dL    Platelet Count 211 140 - 450 K/uL   Basic metabolic panel   Result Value Ref Range    Sodium 138 135 - 145 mmol/L    Potassium 5.0 3.4 - 5.3 mmol/L    Chloride 102 98 - 107 mmol/L    Carbon Dioxide (CO2) 25 22 - 29 mmol/L    Anion Gap 11 7 - 15 mmol/L    Urea Nitrogen 20.4 8.0 - 23.0 mg/dL    Creatinine 1.91 (H) 0.67 - 1.17 mg/dL    GFR Estimate 38 (L) >60 mL/min/1.73m2      Comment:      eGFR calculated using 2021 CKD-EPI equation.    Calcium 9.3 8.8 - 10.4 mg/dL      Comment:      Reference intervals for this test were updated on 7/16/2024 to reflect our healthy population more accurately. There may be differences in the flagging of prior results with similar values performed with this method. Those prior results can be interpreted in the context of the updated reference intervals.    Glucose 153 (H) 70 - 99 mg/dL    Patient Fasting > 8hrs? No    Hemoglobin A1C ([facility])   Result Value Ref Range    Hemoglobin A1C 6.4 (A) 4.0 - 6.0 %       If you have any questions or concerns, please call the clinic at the number listed above.        Sincerely,      CARROL Salas CNP

## 2024-10-18 NOTE — PROGRESS NOTES
Preoperative Evaluation  [facility]  [address] [place] [address]  Phone: [phone]  Fax: [phone]  Primary Provider: Gabbie Rene MD  Pre-op Performing Provider: CARROL Brownlee CNP  Oct 18, 2024             10/18/2024   Surgical Information   What procedure is being done? Colonoscopy   Facility or Hospital where procedure/surgery will be performed: [facility]   Who is doing the procedure / surgery? n/a   Date of surgery / procedure: 10/25/2024   Time of surgery / procedure: 0815am   Where do you plan to recover after surgery? at home with Home Care        Fax number for surgical facility: Note does not need to be faxed, will be available electronically in Epic.    Assessment & Plan     The proposed surgical procedure is considered LOW risk.    Preop general physical exam  No apparent contraindications noted for purposed procedures. Reviewed medication holds.   - CBC with Diff/Plt ([facility])  - Basic metabolic panel  - VENOUS COLLECTION  - Basic metabolic panel    Screening for malignant neoplasm of colon  Polyp of colon, unspecified part of colon, unspecified type  Indication for surgery. Very overdue for Colonoscopy. Last colonoscopy: 2011: 5 adenomas (1 sessile serrated) with recommendation for 3 year follow up for polyp surveillance.     Type 2 diabetes mellitus with stage 3b chronic kidney disease, without long-term current use of insulin (H)  Taking Mounjaro 7.5 mg weekly and Jardiance 25mg daily. Has CGM. Following with [facility] pharmacy. CKD: Taking ACEi, Follows with nephrology. Will recheck A1c. Continue current medication regimen and treatment plans.  - Basic metabolic panel  - VENOUS COLLECTION  - Basic metabolic panel  - Hemoglobin A1C ([facility])    Atherosclerosis of coronary artery of native heart without angina pectoris, unspecified vessel or lesion type  Mixed hyperlipidemia  Asymptomatic. Taking Atorvastatin 40 mg, ASA 81 mg and clopidogrel 75 daily.  Stable/controlled. Continue current medication regimen.    Essential hypertension  Taking Amlodipine 2.5 mg daily, Lisinopril 5 mg daily, isosorbide mononitrate 30 mg daily, and metoprolol succinate 25 mg daily. Stable/controlled. Continue current medication regimen.     History of TIA (transient ischemic attack) and stroke  Taking ASA 81 mg daily, Clopidogrel 75 mg and Atorvastatin 40 mg daily. Continue current medication regimen.     Polyneuropathy associated with underlying disease (H)  Taking Gabapentin 300 mg and Vit B12 daily. Stable/controlled. Continue current medication regimen.     Gastroesophageal reflux disease, unspecified whether esophagitis present  Taking Lansoprazole 15 mg daily. Stable/controlled. Continue current medication regimen.     Moderate episode of recurrent major depressive disorder (H)  Taking Sertraline 100 mg daily. Stable/controlled. Continue current medication regimen.     Major neurocognitive disorder due to vascular disease, without behavioral disturbance, moderate (H)  Recent neuropsych evaluation at [facility]. Resides in assisted living with [facility] services at [facility]. Receives assistance with ADL's including medication management.              - No identified additional risk factors other than previously addressed    Antiplatelet or Anticoagulation Medication Instructions   - aspirin: Bleeding risk is low for this procedure and daily aspirin may be continued without modification.    - clopidrogel (Plavix), prasugrel (Effient), ticagrelor (Brilinta): No contraindication to stopping Plavix, DO NOT TAKE 5-7 days before surgery.     Per [facility] needs hold on glp1 for 7 days and SGLT2 for 4 days. Medications discussed with PIERRE Mcfarlane from [facility] who was informed by [facility] that patient does not need to hold Plavix.      Additional Medication Instructions  Take all scheduled medications on the day of surgery EXCEPT for modifications listed below:   - ACE/ARB: DO NOT TAKE  on day of surgery (minimum 11 hours for general anesthesia).   - Beta Blockers: Continue taking on the day of surgery.   - Calcium Channel Blockers: May be continued on the day of surgery.   - Statins: Continue taking on the day of surgery.    - SGLT2 Inhibitor (canagliflozin, dapagliflozin, or empagliflozin): DO NOT TAKE 3 days before surgery.    - GLP-1 Injectable (exenitide, liraglutide, semaglutide, dulaglutide, etc.): DO NOT TAKE 7 days before surgery    - Herbal medications and vitamins: DO NOT TAKE 14 days prior to surgery.   - pregabalin, gabapentin: Continue without modification.   - ibuprofen (Advil, Motrin): DO NOT TAKE 1 day before surgery.    - SSRIs, SNRIs, TCAs, Antipsychotics: Continue without modification.    - GLP-1 Injectable (exenitide, liraglutide, semaglutide, dulaglutide, etc.): DO NOT TAKE 7 days before surgery         Recommendation  No apparent contraindications noted to proceed with proposed procedure, without further diagnostic evaluation.    Subjective   Clint is a 64 year old, presenting for the following:  Pre-Op Exam (No concerns )        HPI related to upcoming procedure: Colonoscopy    No recent changes to medications. Gets assistance with his medications by [facility]. Resides in assisted living with [facility] services at [facility].     Hx of TIA/stroke: Taking Atorvastatin, ASA and Plavix daily     T2DM: Last A1c was 7.6 on 5/28/24. Continuous glucose monitor  on left upper arm, Mounjaro and Jardiance. Follows with [facility]-due for visit.      CKD3b: Taking Lisinopril 5 mg, Following with Nephrology      HTN: Taking Amlodipine 2.5 mg, Lisinopril 5 mg daily, Metoprolol 25 mg and Imdur 30 mg daily.      HLD/Arthrosclerosis: Taking Atorvastatin, ASA and Plavix. No chest pain or shortness of breath.      Polyneuropathy: Vit B 12 and Gabapentin      GERD: Pepcid and Prevacid. No chest pain.      MDD: Sertraline 100 mg daily. Going okay, happy to be in clinic today          10/18/2024    Pre-Op Questionnaire   Have you ever had a heart attack or stroke? (!) YES - 2021   Have you ever had surgery on your heart or blood vessels, such as a stent placement, a coronary artery bypass, or surgery on an artery in your head, neck, heart, or legs? No   Do you have chest pain with activity? No   Do you have a history of heart failure? No   Do you currently have a cold, bronchitis or symptoms of other infection? No   Do you have a cough, shortness of breath, or wheezing? No   Do you or anyone in your family have previous history of blood clots? No   Do you or does anyone in your family have a serious bleeding problem such as prolonged bleeding following surgeries or cuts? No   Have you ever had problems with anemia or been told to take iron pills? No   Have you had any abnormal blood loss such as black, tarry or bloody stools? No   Have you ever had a blood transfusion? No   Are you willing to have a blood transfusion if it is medically needed before, during, or after your surgery? Yes   Have you or any of your relatives ever had problems with anesthesia? No   Do you have sleep apnea, excessive snoring or daytime drowsiness? (!) YES - daytime drowsiness   Do you have a CPAP machine? (!) NO    Do you have any artifical heart valves or other implanted medical devices like a pacemaker, defibrillator, or continuous glucose monitor? No   Do you have artificial joints? No   Are you allergic to latex? No        Health Care Directive  Patient has a Health Care Directive on file      Preoperative Review of    reviewed - controlled substances reflected in medication list.      Status of Chronic Conditions:  See problem list for active medical problems.  Problems all longstanding and stable, except as noted/documented.  See ROS for pertinent symptoms related to these conditions.    Patient Active Problem List    Diagnosis Date Noted    Major neurocognitive disorder due to vascular disease, without behavioral  disturbance, moderate (H) 04/05/2024     Priority: Medium    Renal mass 05/16/2023     Priority: Medium    Polyneuropathy associated with underlying disease (H) 05/09/2023     Priority: Medium    Moderate episode of recurrent major depressive disorder (H) 02/07/2022     Priority: Medium    History of TIA (transient ischemic attack) and stroke 08/09/2021     Priority: Medium     Dual antiplatelet : aspirin 81mg daily plus clopidogrel 75mg now. Previously clopidogrel alone prior to TIA that occurred in [place] on 6/22/2021.         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 2 stents in [place]. Normal nuclear stress in 2011.  Problem list name updated by automated process. Provider to review      Essential hypertension      Priority: Medium     Essential hypertension  Problem list name updated by automated process. 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    Stented coronary artery     Type II or unspecified type diabetes mellitus without mention of complication, not stated as uncontrolled     Diabetes mellitus    Unspecified essential hypertension     Essential hypertension     Past Surgical History:   Procedure Laterality Date    ANGIOPLASTY  2007    rca    ANGIOPLASTY  2010    om    ARTHROTOMY SHOULDER, ROTATOR CUFF REPAIR, COMBINED Left 4/12/2017    Procedure: COMBINED ARTHROTOMY SHOULDER, ROTATOR CUFF REPAIR;  Surgeon: Deejay Conrad MD;  Location: [facility]    ARTHROTOMY SHOULDER, SUBACROMIAL DECOMPRESSION, COMBINED Left 4/12/2017    Procedure: COMBINED ARTHROTOMY SHOULDER, SUBACROMIAL DECOMPRESSION;  Surgeon: Deejay Conrad MD;  Location: [facility]    COLONOSCOPY      DECOMPRESSION CUBITAL TUNNEL  11/29/2013    Procedure: DECOMPRESSION CUBITAL TUNNEL;;  Surgeon: Radha Cain MD;  Location: [facility]    ENDOSCOPIC RELEASE CARPAL TUNNEL  11/29/2013    Procedure: ENDOSCOPIC RELEASE CARPAL TUNNEL;  LEFT ENDOSCOPIC CARPAL TUNNEL RELEASE AND CUBITAL TUNNEL RELEASE ;  Surgeon: Radha Cain MD;  Location: [facility]    OPEN REDUCTION INTERNAL FIXATION HIP NAILING  3/30/2012    Procedure:OPEN REDUCTION INTERNAL FIXATION HIP NAILING; Biopsy, Curettage and Bone Grafting Right Hip Lesion, Placement of Hip Screw; Surgeon:MARILIN GAY; Location:[facility]    PAROTIDECTOMY Right ~1990    [place]    PAROTIDECTOMY Left 11/26/2019    Procedure: Left parotidectomy with facial nerve monitoring and 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.;  Surgeon: Rafat Carlin MD;  Location:  OR    PHACOEMULSIFICATION CLEAR CORNEA WITH STANDARD INTRAOCULAR LENS IMPLANT  5/8/2013    Procedure: PHACOEMULSIFICATION CLEAR CORNEA WITH STANDARD INTRAOCULAR LENS IMPLANT;  RIGHT EYE PHACOEMULSIFICATION CLEAR CORNEA WITH STANDARD INTRAOCULAR LENS IMPLANT ;  Surgeon: Jovani Pretty MD;  Location: [facility]    PHACOEMULSIFICATION CLEAR CORNEA WITH STANDARD  INTRAOCULAR LENS IMPLANT  5/20/2013    Procedure: PHACOEMULSIFICATION CLEAR CORNEA WITH STANDARD INTRAOCULAR LENS IMPLANT;  LEFT  EYE PHACOEMULSIFICATION CLEAR CORNEA WITH STANDARD INTRAOCULAR LENS IMPLANT ;  Surgeon: Jovani Pretty MD;  Location:  [facility]    STENT       Current Outpatient Medications   Medication Sig Dispense Refill    acetaminophen (TYLENOL) 500 MG tablet Take 1,000 mg by mouth every morning      amLODIPine (NORVASC) 2.5 MG tablet Take 1 tablet (2.5 mg) by mouth daily 90 tablet 3    aspirin (ASA) 81 MG EC tablet Take 1 tablet (81 mg) by mouth daily 30 tablet 11    atorvastatin (LIPITOR) 40 MG tablet Take 1 tablet (40 mg) by mouth daily 90 tablet 3    calcipotriene (DOVONOX) 0.005 % external solution Apply to the affected scalp twice daily. 60 mL 1    clopidogrel (PLAVIX) 75 MG tablet Take 1 tablet (75 mg) by mouth daily 90 tablet 1    clotrimazole-betamethasone (LOTRISONE) 1-0.05 % external lotion Apply topically 2 times daily 45 mL 0    Continuous Blood Gluc Sensor (FREESTYLE JAQUAN 2 SENSOR) MISC 1 Device every 14 days To check blood sugars. 2 each 11    cyanocobalamin (VITAMIN B-12) 1000 MCG tablet Take 1 tablet (1,000 mcg) by mouth daily 90 tablet 1    empagliflozin (JARDIANCE) 25 MG TABS tablet Take 1 tablet (25 mg) by mouth daily 90 tablet 1    famotidine (PEPCID) 20 MG tablet Take 1 tablet (20 mg) by mouth daily 90 tablet 1    gabapentin (NEURONTIN) 300 MG capsule Take 1 capsule (300 mg) by mouth every morning 90 capsule 1    ibuprofen (ADVIL/MOTRIN) 600 MG tablet TAKE 1 TABLET BY MOUTH EVERY 6 TO 8 HOURS      isosorbide mononitrate (IMDUR) 30 MG 24 hr tablet Take 1 tablet (30 mg) by mouth daily 90 tablet 2    LANsoprazole (PREVACID) 15 MG DR capsule Take 1 capsule (15 mg) by mouth daily 30 capsule 4    lisinopril (ZESTRIL) 5 MG tablet Take 1 tablet (5 mg) by mouth daily 90 tablet 3    metoprolol succinate ER (TOPROL XL) 25 MG 24 hr tablet Take 1 tablet (25 mg) by mouth daily. 90 tablet 1  "   sertraline (ZOLOFT) 100 MG tablet Take 1 tablet (100 mg) by mouth daily 90 tablet 3    tirzepatide (MOUNJARO) 7.5 MG/0.5ML pen Inject 7.5 mg subcutaneously every 7 days 6 mL 3    ACCU-CHEK SALLY PLUS test strip  (Patient not taking: Reported on 10/18/2024)         Allergies   Allergen Reactions    Crestor [Rosuvastatin] Muscle Pain (Myalgia)        Social History     Tobacco Use    Smoking status: Every Day     Current packs/day: 1.00     Average packs/day: 1 pack/day for 40.0 years (40.0 ttl pk-yrs)     Types: Cigarettes    Smokeless tobacco: Never    Tobacco comments:     9/29/2021 - 10 cigarettes/day   Substance Use Topics    Alcohol use: Not Currently     Alcohol/week: 0.0 standard drinks of alcohol     Comment: none     Family History   Problem Relation Age of Onset    Cerebrovascular Disease Mother 56    Hypertension Mother     Cerebrovascular Disease Father     Hypertension Father      History   Drug Use No             Review of Systems  Constitutional, HEENT, cardiovascular, pulmonary, GI, , musculoskeletal, neuro, skin, endocrine and psych systems are negative, except as otherwise noted.    Objective    /86   Pulse 88   Wt 76.1 kg (167 lb 12.8 oz)   SpO2 98%   BMI 27.92 kg/m     Estimated body mass index is 27.92 kg/m  as calculated from the following:    Height as of 1/18/24: 1.651 m (5' 5\").    Weight as of this encounter: 76.1 kg (167 lb 12.8 oz).  Physical Exam  GENERAL: alert and no distress  EYES: Eyes grossly normal to inspection, PERRL and conjunctivae and sclerae normal  HENT: ear canals and TM's normal, nose and mouth without ulcers or lesions  NECK: no adenopathy, no asymmetry, masses, or scars  RESP: lungs clear to auscultation - no rales, rhonchi or wheezes  CV: regular rate and rhythm, normal S1 S2, no S3 or S4, no murmur, click or rub, no peripheral edema  ABDOMEN: soft, nontender, no hepatosplenomegaly, no masses and bowel sounds normal  MS: no gross musculoskeletal defects " noted, no edema  SKIN: no suspicious lesions or rashes  NEURO: Normal strength and tone, mentation intact and speech normal  PSYCH: mentation appears normal, affect normal/bright    Recent Labs   Lab Test 07/09/24  1501 07/09/24  1446 05/28/24  1419 05/28/24  1411 01/20/24  2319 11/22/23  1800 11/02/23  1333   HGB 17.0  --   --   --  13.5  --   --      --   --   --  164  --   --    INR  --   --   --   --  1.00  --   --    NA  --  136  --  137 135   < >  --    POTASSIUM  --  4.5  --  5.2 4.1   < >  --    CR  --  1.93*  --  2.33* 1.81*   < >  --    A1C  --   --  7.6*  --   --   --  6.7*    < > = values in this interval not displayed.        Diagnostics  Labs pending at this time.  Results will be reviewed when available.  Recent Results (from the past 24 hour(s))   CBC with Diff/Plt ([facility])    Collection Time: 10/18/24 12:00 AM   Result Value Ref Range    WBC x10/cmm 8.5 3.8 - 11.0 x10/cmm    % Lymphocytes 25.9 20.5 - 51.1 %    % Monocytes 6.9 1.7 - 9.3 %    % Granulocytes 67.2 42.2 - 75.2 %    RBC x10/cmm 5.27 4.2 - 5.9 x10/cmm    Hemoglobin 16.6 13.4 - 17.5 g/dl    Hematocrit 49.8 39 - 51 %    MCV 94.5 80 - 100 fL    MCH 31.6 (A) 27.0 - 31.0 pg    MCHC 33.4 33.0 - 37.0 g/dL    Platelet Count 211 140 - 450 K/uL   Hemoglobin A1C ([facility])    Collection Time: 10/18/24 12:00 AM   Result Value Ref Range    Hemoglobin A1C 6.4 (A) 4.0 - 6.0 %      No EKG required for low risk surgery (cataract, skin procedure, breast biopsy, etc).    Revised Cardiac Risk Index (RCRI)  The patient has the following serious cardiovascular risks for perioperative complications:   - Cerebrovascular Disease (TIA or CVA) = 1 point     RCRI Interpretation: 1 point: Class II (low risk - 0.9% complication rate)         Signed Electronically by: CARROL Brownlee CNP  A copy of this evaluation report is provided to the requesting physician.

## 2024-10-25 ENCOUNTER — HOSPITAL ENCOUNTER (OUTPATIENT)
Facility: CLINIC | Age: 64
Discharge: HOME | End: 2024-10-25
Attending: INTERNAL MEDICINE | Admitting: INTERNAL MEDICINE
Payer: COMMERCIAL

## 2024-10-25 ENCOUNTER — ANESTHESIA EVENT (OUTPATIENT)
Dept: GASTROENTEROLOGY | Facility: CLINIC | Age: 64
End: 2024-10-25
Payer: COMMERCIAL

## 2024-10-25 ENCOUNTER — PATIENT OUTREACH (OUTPATIENT)
Dept: CARE COORDINATION | Facility: CLINIC | Age: 64
End: 2024-10-25

## 2024-10-25 ENCOUNTER — ANESTHESIA (OUTPATIENT)
Dept: GASTROENTEROLOGY | Facility: CLINIC | Age: 64
End: 2024-10-25
Payer: COMMERCIAL

## 2024-10-25 VITALS
WEIGHT: 167 LBS | SYSTOLIC BLOOD PRESSURE: 94 MMHG | OXYGEN SATURATION: 100 % | HEART RATE: 65 BPM | DIASTOLIC BLOOD PRESSURE: 76 MMHG | HEIGHT: 65 IN | RESPIRATION RATE: 10 BRPM | BODY MASS INDEX: 27.82 KG/M2

## 2024-10-25 LAB — COLONOSCOPY: NORMAL

## 2024-10-25 PROCEDURE — 45385 COLONOSCOPY W/LESION REMOVAL: CPT | Performed by: INTERNAL MEDICINE

## 2024-10-25 PROCEDURE — 999N000010 HC STATISTIC ANES STAT CODE-CRNA PER MINUTE: Performed by: INTERNAL MEDICINE

## 2024-10-25 PROCEDURE — 45385 COLONOSCOPY W/LESION REMOVAL: CPT | Performed by: NURSE ANESTHETIST, CERTIFIED REGISTERED

## 2024-10-25 PROCEDURE — 250N000009 HC RX 250: Performed by: NURSE ANESTHETIST, CERTIFIED REGISTERED

## 2024-10-25 PROCEDURE — 250N000011 HC RX IP 250 OP 636: Performed by: NURSE ANESTHETIST, CERTIFIED REGISTERED

## 2024-10-25 PROCEDURE — 258N000003 HC RX IP 258 OP 636: Performed by: NURSE ANESTHETIST, CERTIFIED REGISTERED

## 2024-10-25 PROCEDURE — 88305 TISSUE EXAM BY PATHOLOGIST: CPT | Mod: TC | Performed by: INTERNAL MEDICINE

## 2024-10-25 PROCEDURE — 370N000017 HC ANESTHESIA TECHNICAL FEE, PER MIN: Performed by: INTERNAL MEDICINE

## 2024-10-25 PROCEDURE — 45385 COLONOSCOPY W/LESION REMOVAL: CPT | Performed by: ANESTHESIOLOGY

## 2024-10-25 RX ORDER — DEXMEDETOMIDINE HYDROCHLORIDE 4 UG/ML
INJECTION, SOLUTION INTRAVENOUS PRN
Status: DISCONTINUED | OUTPATIENT
Start: 2024-10-25 | End: 2024-10-25

## 2024-10-25 RX ORDER — NALOXONE HYDROCHLORIDE 0.4 MG/ML
0.2 INJECTION, SOLUTION INTRAMUSCULAR; INTRAVENOUS; SUBCUTANEOUS
Status: CANCELLED | OUTPATIENT
Start: 2024-10-25

## 2024-10-25 RX ORDER — ONDANSETRON 2 MG/ML
4 INJECTION INTRAMUSCULAR; INTRAVENOUS
Status: CANCELLED | OUTPATIENT
Start: 2024-10-25

## 2024-10-25 RX ORDER — NALOXONE HYDROCHLORIDE 0.4 MG/ML
0.4 INJECTION, SOLUTION INTRAMUSCULAR; INTRAVENOUS; SUBCUTANEOUS
Status: CANCELLED | OUTPATIENT
Start: 2024-10-25

## 2024-10-25 RX ORDER — PROCHLORPERAZINE MALEATE 5 MG/1
5 TABLET ORAL EVERY 6 HOURS PRN
Status: CANCELLED | OUTPATIENT
Start: 2024-10-25

## 2024-10-25 RX ORDER — LIDOCAINE 40 MG/G
CREAM TOPICAL
Status: CANCELLED | OUTPATIENT
Start: 2024-10-25

## 2024-10-25 RX ORDER — PROPOFOL 10 MG/ML
INJECTION, EMULSION INTRAVENOUS PRN
Status: DISCONTINUED | OUTPATIENT
Start: 2024-10-25 | End: 2024-10-25

## 2024-10-25 RX ORDER — ONDANSETRON 4 MG/1
4 TABLET, ORALLY DISINTEGRATING ORAL EVERY 6 HOURS PRN
Status: CANCELLED | OUTPATIENT
Start: 2024-10-25

## 2024-10-25 RX ORDER — ONDANSETRON 2 MG/ML
4 INJECTION INTRAMUSCULAR; INTRAVENOUS EVERY 6 HOURS PRN
Status: CANCELLED | OUTPATIENT
Start: 2024-10-25

## 2024-10-25 RX ORDER — LIDOCAINE HYDROCHLORIDE 20 MG/ML
INJECTION, SOLUTION INFILTRATION; PERINEURAL PRN
Status: DISCONTINUED | OUTPATIENT
Start: 2024-10-25 | End: 2024-10-25

## 2024-10-25 RX ORDER — PROPOFOL 10 MG/ML
INJECTION, EMULSION INTRAVENOUS CONTINUOUS PRN
Status: DISCONTINUED | OUTPATIENT
Start: 2024-10-25 | End: 2024-10-25

## 2024-10-25 RX ORDER — FLUMAZENIL 0.1 MG/ML
0.2 INJECTION, SOLUTION INTRAVENOUS
Status: CANCELLED | OUTPATIENT
Start: 2024-10-25 | End: 2024-10-25

## 2024-10-25 RX ORDER — SODIUM CHLORIDE, SODIUM LACTATE, POTASSIUM CHLORIDE, CALCIUM CHLORIDE 600; 310; 30; 20 MG/100ML; MG/100ML; MG/100ML; MG/100ML
INJECTION, SOLUTION INTRAVENOUS CONTINUOUS PRN
Status: DISCONTINUED | OUTPATIENT
Start: 2024-10-25 | End: 2024-10-25

## 2024-10-25 RX ADMIN — SODIUM CHLORIDE, POTASSIUM CHLORIDE, SODIUM LACTATE AND CALCIUM CHLORIDE: 600; 310; 30; 20 INJECTION, SOLUTION INTRAVENOUS at 08:22

## 2024-10-25 RX ADMIN — PROPOFOL 100 MCG/KG/MIN: 10 INJECTION, EMULSION INTRAVENOUS at 08:22

## 2024-10-25 RX ADMIN — PROPOFOL 30 MG: 10 INJECTION, EMULSION INTRAVENOUS at 08:26

## 2024-10-25 RX ADMIN — LIDOCAINE HYDROCHLORIDE 40 MG: 20 INJECTION, SOLUTION INFILTRATION; PERINEURAL at 08:22

## 2024-10-25 RX ADMIN — DEXMEDETOMIDINE HYDROCHLORIDE 4 MCG: 200 INJECTION INTRAVENOUS at 08:29

## 2024-10-25 RX ADMIN — PROPOFOL 10 MG: 10 INJECTION, EMULSION INTRAVENOUS at 08:36

## 2024-10-25 RX ADMIN — DEXMEDETOMIDINE HYDROCHLORIDE 12 MCG: 200 INJECTION INTRAVENOUS at 08:24

## 2024-10-25 ASSESSMENT — LIFESTYLE VARIABLES: TOBACCO_USE: 1

## 2024-10-25 ASSESSMENT — ACTIVITIES OF DAILY LIVING (ADL)
ADLS_ACUITY_SCORE: 0
ADLS_ACUITY_SCORE: 0

## 2024-10-25 NOTE — ANESTHESIA CARE TRANSFER NOTE
Patient: Emily Zhu    Procedure: Procedure(s):  Colonoscopy       Diagnosis: Screen for colon cancer [Z12.11]  Diagnosis Additional Information: No value filed.    Anesthesia Type:   MAC     Note:    Oropharynx: oropharynx clear of all foreign objects and spontaneously breathing  Level of Consciousness: drowsy  Oxygen Supplementation: room air    Independent Airway: airway patency satisfactory and stable  Dentition: dentition unchanged  Vital Signs Stable: post-procedure vital signs reviewed and stable  Report to RN Given: handoff report given  Patient transferred to: PACU  Comments: At end of procedure, spontaneous respirations, patient alert to voice. Patient breathing room air at room air to PACU. SpO2, NiBP, and EKG monitors and alarms on and functioning, report on patient's clinical status given to PACU RN, RN questions answered.      Handoff Report: Identifed the Patient, Identified the Reponsible Provider, Reviewed the pertinent medical history, Discussed the surgical course, Reviewed Intra-OP anesthesia mangement and issues during anesthesia, Set expectations for post-procedure period and Allowed opportunity for questions and acknowledgement of understanding      Vitals:  Vitals Value Taken Time   BP     Temp     Pulse     Resp     SpO2         Electronically Signed By: CARROL Muñoz CRNA  October 25, 2024  8:59 AM

## 2024-10-25 NOTE — ANESTHESIA PREPROCEDURE EVALUATION
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    Stented coronary artery     Type II or unspecified type diabetes mellitus without mention of complication, not stated as uncontrolled     Diabetes mellitus    Unspecified essential hypertension     Essential hypertension      Past Surgical History:   Procedure Laterality Date    ANGIOPLASTY      rca    ANGIOPLASTY      om    ARTHROTOMY SHOULDER, ROTATOR CUFF REPAIR, COMBINED Left 2017    Procedure: COMBINED ARTHROTOMY SHOULDER, ROTATOR CUFF REPAIR;  Surgeon: Deejay Conrad MD;  Location: [facility]    ARTHROTOMY SHOULDER, SUBACROMIAL DECOMPRESSION, COMBINED Left 2017    Procedure: COMBINED ARTHROTOMY SHOULDER, SUBACROMIAL DECOMPRESSION;  Surgeon: Deejay Conrad MD;  Location: [facility]    COLONOSCOPY      DECOMPRESSION CUBITAL TUNNEL  2013    Procedure: DECOMPRESSION CUBITAL TUNNEL;;  Surgeon: Radha Cain MD;  Location: [facility]    ENDOSCOPIC RELEASE CARPAL TUNNEL  2013    Procedure: ENDOSCOPIC RELEASE CARPAL TUNNEL;  LEFT ENDOSCOPIC CARPAL TUNNEL RELEASE AND CUBITAL TUNNEL RELEASE ;  Surgeon: Radha Cain MD;  Location: [facility]    OPEN REDUCTION INTERNAL FIXATION HIP NAILING  3/30/2012    Procedure:OPEN REDUCTION INTERNAL FIXATION HIP NAILING; Biopsy, Curettage and Bone Grafting Right Hip Lesion, Placement of Hip Screw; Surgeon:MARILIN GAY; Location:[facility]    PAROTIDECTOMY Right     [place]    PAROTIDECTOMY Left  11/26/2019    Procedure: Left parotidectomy with facial nerve monitoring and 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.;  Surgeon: Rafat Carlin MD;  Location: [facility]    PHACOEMULSIFICATION CLEAR CORNEA WITH STANDARD INTRAOCULAR LENS IMPLANT  5/8/2013    Procedure: PHACOEMULSIFICATION CLEAR CORNEA WITH STANDARD INTRAOCULAR LENS IMPLANT;  RIGHT EYE PHACOEMULSIFICATION CLEAR CORNEA WITH STANDARD INTRAOCULAR LENS IMPLANT ;  Surgeon: Jovani Pretty MD;  Location:  [facility]    PHACOEMULSIFICATION CLEAR CORNEA WITH STANDARD INTRAOCULAR LENS IMPLANT  5/20/2013    Procedure: PHACOEMULSIFICATION CLEAR CORNEA WITH STANDARD INTRAOCULAR LENS IMPLANT;  LEFT  EYE PHACOEMULSIFICATION CLEAR CORNEA WITH STANDARD INTRAOCULAR LENS IMPLANT ;  Surgeon: Jovani Pretty MD;  Location:  [facility]    STENT        Allergies   Allergen Reactions    Crestor [Rosuvastatin] Muscle Pain (Myalgia)      Social History     Tobacco Use    Smoking status: Every Day     Current packs/day: 1.00     Average packs/day: 1 pack/day for 40.0 years (40.0 ttl pk-yrs)     Types: Cigarettes    Smokeless tobacco: Never    Tobacco comments:     9/29/2021 - 10 cigarettes/day   Substance Use Topics    Alcohol use: Not Currently     Alcohol/week: 0.0 standard drinks of alcohol     Comment: none      Wt Readings from Last 1 Encounters:   10/18/24 76.1 kg (167 lb 12.8 oz)        Anesthesia Evaluation   Pt has had prior anesthetic.     No history of anesthetic complications       ROS/MED HX  ENT/Pulmonary:     (+)                tobacco use, Current use,                    (-) sleep apnea   Neurologic:     (+)    peripheral neuropathy,          TIA,                  Cardiovascular:     (+) Dyslipidemia hypertension- -  CAD angina-  - stent-   Taking blood thinners                                   METS/Exercise Tolerance:     Hematologic:       Musculoskeletal:       GI/Hepatic:     (+) GERD, Asymptomatic on medication,                  Renal/Genitourinary:     (+)  renal disease, type: CRI,            Endo:     (+)  type II DM,                    Psychiatric/Substance Use:     (+) psychiatric history depression       Infectious Disease:       Malignancy:       Other:            Physical Exam    Airway        Mallampati: II   TM distance: > 3 FB   Neck ROM: full   Mouth opening: > 3 cm    Respiratory Devices and Support         Dental       (+) Edentulous      Cardiovascular   cardiovascular exam normal          Pulmonary   pulmonary exam normal                OUTSIDE LABS:  CBC:   Lab Results   Component Value Date    WBC 8.5 10/18/2024    WBC 9.8 07/09/2024    HGB 16.6 10/18/2024    HGB 17.0 07/09/2024    HCT 49.8 10/18/2024    HCT 50.6 07/09/2024     10/18/2024     07/09/2024     BMP:   Lab Results   Component Value Date     10/18/2024     07/09/2024    POTASSIUM 5.0 10/18/2024    POTASSIUM 4.5 07/09/2024    CHLORIDE 102 10/18/2024    CHLORIDE 103 07/09/2024    CO2 25 10/18/2024    CO2 27 07/09/2024    BUN 20.4 10/18/2024    BUN 25.5 (H) 07/09/2024    CR 1.91 (H) 10/18/2024    CR 1.93 (H) 07/09/2024     (H) 10/18/2024     (H) 07/09/2024     COAGS:   Lab Results   Component Value Date    PTT 27 01/20/2024    INR 1.00 01/20/2024     POC:   Lab Results   Component Value Date     (H) 11/27/2019     HEPATIC:   Lab Results   Component Value Date    ALBUMIN 4.3 03/18/2022    PROTTOTAL 7.1 03/18/2022    ALT 35 03/18/2022    AST 20 03/18/2022    ALKPHOS 54 03/18/2022    BILITOTAL 0.4 03/18/2022    BILIDIRECT 0.11 12/12/2018     OTHER:   Lab Results   Component Value Date    LACT 1.2 04/20/2015    A1C 6.4 (A) 10/18/2024    ONEL 9.3 10/18/2024    MAG 2.0 04/01/2012    LIPASE 1,017 (H) 01/09/2020    T4 1.27 12/12/2018    SED 12 06/28/2016       Anesthesia Plan    ASA Status:  3    NPO Status:  NPO Appropriate    Anesthesia Type: MAC.     - Reason for MAC: immobility needed              Consents    Anesthesia Plan(s) and associated risks,  benefits, and realistic alternatives discussed. Questions answered and patient/representative(s) expressed understanding.     - Discussed:     - Discussed with:  Patient            Postoperative Care            Comments:               Tamar iNño MD    I have reviewed the pertinent notes and labs in the chart from the past 30 days and (re)examined the patient.  Any updates or changes from those notes are reflected in this note.               # Hypertension: Noted on problem list   # Dementia: noted on problem list

## 2024-10-25 NOTE — PROGRESS NOTES
Clinic Care Coordination Contact  Follow Up Progress Note      Assessment: Patient called; he had his colonoscopy today and said it went well. He will get the results soon.  He did express a concern that his new walker is not working properly. He said his [employer] worker knows the company where he got it through. Discussed the company will need to come assess/fix or get him a new one.  left a msg for Leyda MARIO ([employer] worker) today to facilitate. No ongoing needs.    --[facility] got a follow up msg from Leyda -- she is working with [employer] to get Clint's walker fixed. She got a quote and submitted for approval for the funding to get it fixed. She anticipates this getting done soon.    Health Maintenance Due   Topic Date Due    LUNG CANCER SCREENING  01/26/2013    RSV VACCINE (1 - Risk 60-74 years 1-dose series) Never done    PHQ-9  07/18/2024    INFLUENZA VACCINE (1) 09/01/2024    COVID-19 Vaccine (3 - 2024-25 season) 09/01/2024              Plan:   [facility] will do no further outreach at this time  Left a msg for [employer] worker to help with walker issue and she is working on it    Christine Rutledge, MSALEX, [facility]  Clinic Care Coordinator  [email or web address]  [phone]

## 2024-10-25 NOTE — ANESTHESIA POSTPROCEDURE EVALUATION
Patient: Emily Zhu    Procedure: Procedure(s):  Colonoscopy       Anesthesia Type:  MAC    Note:     Postop Pain Control: Uneventful            Sign Out: Well controlled pain   PONV: No   Neuro/Psych: Uneventful            Sign Out: Acceptable/Baseline neuro status   Airway/Respiratory: Uneventful            Sign Out: Acceptable/Baseline resp. status   CV/Hemodynamics: Uneventful            Sign Out: Acceptable CV status; No obvious hypovolemia; No obvious fluid overload   Other NRE: NONE   DID A NON-ROUTINE EVENT OCCUR? No           Last vitals:  Vitals Value Taken Time   /67 10/25/24 0920   Temp     Pulse 64 10/25/24 0921   Resp 15 10/25/24 0921   SpO2 95 % 10/25/24 0921   Vitals shown include unfiled device data.    Electronically Signed By: Tamar Niño MD  October 25, 2024  9:22 AM

## 2024-10-28 LAB
PATH REPORT.COMMENTS IMP SPEC: NORMAL
PATH REPORT.FINAL DX SPEC: NORMAL
PATH REPORT.GROSS SPEC: NORMAL
PATH REPORT.MICROSCOPIC SPEC OTHER STN: NORMAL
PATH REPORT.RELEVANT HX SPEC: NORMAL
PHOTO IMAGE: NORMAL

## 2024-10-28 PROCEDURE — 88305 TISSUE EXAM BY PATHOLOGIST: CPT | Mod: 26 | Performed by: PATHOLOGY

## 2024-10-31 ENCOUNTER — TRANSFERRED RECORDS (OUTPATIENT)
Dept: FAMILY MEDICINE | Facility: CLINIC | Age: 64
End: 2024-10-31

## 2024-11-08 ENCOUNTER — PATIENT OUTREACH (OUTPATIENT)
Dept: GASTROENTEROLOGY | Facility: CLINIC | Age: 64
End: 2024-11-08
Payer: COMMERCIAL

## 2024-11-11 DIAGNOSIS — G63 POLYNEUROPATHY ASSOCIATED WITH UNDERLYING DISEASE (H): ICD-10-CM

## 2024-11-11 RX ORDER — LANOLIN ALCOHOL/MO/W.PET/CERES
1000 CREAM (GRAM) TOPICAL DAILY
Qty: 90 TABLET | Refills: 1 | Status: SHIPPED | OUTPATIENT
Start: 2024-11-11

## 2024-11-11 NOTE — TELEPHONE ENCOUNTER
Med: B12     LOV (related): 8/16/24      Due for F/U around: Not specified    Next Appt: Not Scheduled

## 2024-11-14 DIAGNOSIS — E11.21 TYPE 2 DIABETES MELLITUS WITH DIABETIC NEPHROPATHY, WITHOUT LONG-TERM CURRENT USE OF INSULIN (H): ICD-10-CM

## 2024-11-14 NOTE — TELEPHONE ENCOUNTER
Med: Freestyle Reanna 2 Sensors    LOV (related): 10/18/24 Miesha    Last Lab: 10/18/24      Due for F/U around: 4/18/25    Next Appt: Not Scheduled        Lab Results   Component Value Date    A1C 6.4 10/18/2024    A1C 7.6 05/28/2024    A1C 6.7 11/02/2023    A1C 7.3 09/01/2023    A1C 8.4 05/09/2023

## 2024-11-19 ENCOUNTER — OFFICE VISIT (OUTPATIENT)
Dept: PHARMACY | Facility: PHYSICIAN GROUP | Age: 64
End: 2024-11-19
Payer: COMMERCIAL

## 2024-11-19 VITALS
WEIGHT: 166.8 LBS | SYSTOLIC BLOOD PRESSURE: 121 MMHG | DIASTOLIC BLOOD PRESSURE: 73 MMHG | OXYGEN SATURATION: 98 % | HEART RATE: 82 BPM | BODY MASS INDEX: 27.76 KG/M2

## 2024-11-19 DIAGNOSIS — I25.10 ATHEROSCLEROSIS OF CORONARY ARTERY OF NATIVE HEART WITHOUT ANGINA PECTORIS, UNSPECIFIED VESSEL OR LESION TYPE: ICD-10-CM

## 2024-11-19 DIAGNOSIS — I10 ESSENTIAL HYPERTENSION: ICD-10-CM

## 2024-11-19 DIAGNOSIS — G63 POLYNEUROPATHY ASSOCIATED WITH UNDERLYING DISEASE (H): Primary | ICD-10-CM

## 2024-11-19 DIAGNOSIS — E11.21 TYPE 2 DIABETES MELLITUS WITH DIABETIC NEPHROPATHY, WITHOUT LONG-TERM CURRENT USE OF INSULIN (H): Primary | ICD-10-CM

## 2024-11-19 DIAGNOSIS — E11.22 TYPE 2 DIABETES MELLITUS WITH STAGE 3B CHRONIC KIDNEY DISEASE, WITHOUT LONG-TERM CURRENT USE OF INSULIN (H): ICD-10-CM

## 2024-11-19 DIAGNOSIS — G45.9 TIA (TRANSIENT ISCHEMIC ATTACK): ICD-10-CM

## 2024-11-19 DIAGNOSIS — N18.32 TYPE 2 DIABETES MELLITUS WITH STAGE 3B CHRONIC KIDNEY DISEASE, WITHOUT LONG-TERM CURRENT USE OF INSULIN (H): ICD-10-CM

## 2024-11-19 DIAGNOSIS — K21.9 GASTROESOPHAGEAL REFLUX DISEASE, UNSPECIFIED WHETHER ESOPHAGITIS PRESENT: ICD-10-CM

## 2024-11-19 PROCEDURE — 99207 PR NO CHARGE LOS: CPT | Performed by: PHARMACIST

## 2024-11-19 RX ORDER — CALCIPOTRIENE 0.05 MG/ML
SOLUTION TOPICAL
Qty: 60 ML | Refills: 1 | Status: SHIPPED | OUTPATIENT
Start: 2024-11-19

## 2024-11-19 NOTE — PROGRESS NOTES
Medication Therapy Management (MTM) Encounter    ASSESSMENT:                            Medication Adherence/Access: No issues identified.    Diabetes   Patient is meeting A1c goal of < 7%.  Patient is meeting goal of > 70% time in target with continuous glucose monitoring.   Patient would benefit from no changes at this time.     Hypertension/TIA/CAD    Patient is meeting blood pressure goal of < 140/90mmHg.    GERD  In effort to simplify regimen will stop famotidine and continue on lansoprazole.     PLAN:                            1. Stop famotidine. Updated nurse Maeve with this change who will be seeing him tomorrow.     2. Refill of scalp shampoo processed today.     Follow-up: Return in about 6 months (around 5/19/2025) for MTM visit in clinic.    SUBJECTIVE/OBJECTIVE:                          Clint Zhu is a 64 year old male seen for a follow-up visit.       Reason for visit: medication review.    Allergies/ADRs: Reviewed in chart  Past Medical History: Reviewed in chart  Tobacco: He reports that he has been smoking cigarettes. He has a 40 pack-year smoking history. He has never used smokeless tobacco.Nicotine/Tobacco Cessation Plan  Information offered: Patient not interested at this time  Alcohol: not currently using    Medication Adherence/Access: once per day administration, pill box set up for him once per week.   3 hours 7 days a week for PCA.  Maeve nurse is who he wants me to contact for the updated medication list NOT Shalo directly.     Diabetes   -Mounjaro 7.5mg weekly - replaced Trulicity 1.5mg weekly- Wednesdays (when nurse comes).   -Jardiance 25mg daily     Medication Hx:  Glipizide ER 10mg daily - stopped last visit 1/18, but unable to see blood sugars today.   Victoza- couldn't do daily administration  metformin - stopped due to renal function decline.   Bydureon weekly - stopped when trying to get Mounjaro for better sugar control    Patient is not experiencing side effects.  Blood sugar  monitoring: Continuous Glucose Monitor - did NOT have his reader with him today- got a new phone and needs to get application set up on new phone.   Current diabetes symptoms: none     Blood sugar monitoring: Continuous Glucose Monitor     Eye exam is up to date  Foot exam is up to date    Hypertension /TIA/CAD   -lisinopril 5mg  -isosorbide 30mg daily  -metoprolol succinate 25mg daily   -aspirin 81mg daily   -clopidogrel 75mg- Had been on clopidogrel alone prior to TIA that occurred in [place] on 6/22/2021.  Patient reports no current medication side effects  Patient does not self-monitor blood pressure.   Stopped amlodipine 2.5mg daily - decreased from 5mg daily due to hypotension  Can get up to 140/80s with nurse check before medications.      GERD:   Lansoprazole 15 mg once daily  Famotidine 20 mg once daily   Patient reports no current symptoms.     Today's Vitals: /73   Pulse 82   Wt 166 lb 12.8 oz (75.7 kg)   SpO2 98%   BMI 27.76 kg/m    ----------------  Post Discharge Medication Reconciliation Status: discharge medications reconciled and changed, per note/orders.    I spent 25 minutes with this patient today. All changes were made via collaborative practice agreement with Gabbie Rene MD. A copy of the visit note was provided to the patient's provider(s).    A summary of these recommendations was given to the patient.    Chelo Roberts, Pharm.D, Psychiatric  Medication Therapy Management Pharmacist  [phone]     Medication Therapy Recommendations  GERD (gastroesophageal reflux disease)   1 Current Medication: famotidine (PEPCID) 20 MG tablet (Discontinued)   Current Medication Sig: Take 1 tablet (20 mg) by mouth daily   Rationale: Duplicate Therapy - Unnecessary medication therapy - Indication   Recommendation: Discontinue Medication   Status: Accepted per CPA   Identified Date: 11/19/2024 Completed Date: 11/19/2024

## 2024-11-19 NOTE — PATIENT INSTRUCTIONS
"Recommendations from today's MTM visit:                                                       1. Stop famotidine. Updated nurse Maeve with this change who will be seeing him tomorrow.     Follow-up: Return in about 6 months (around 5/19/2025) for MTM visit in clinic.    It was great speaking with you today.  I value your experience and would be very thankful for your time in providing feedback in our clinic survey. In the next few days, you may receive an email or text message from [employer] with a link to a survey related to your  clinical pharmacist.\"     My Clinical Pharmacist's contact information:                                                      Please feel free to contact me with any questions or concerns you have.      Chelo Roberts, Pharm.D, [facility]  Medication Therapy Management Pharmacist  [phone]      "

## 2024-12-11 ENCOUNTER — PATIENT OUTREACH (OUTPATIENT)
Dept: GASTROENTEROLOGY | Facility: CLINIC | Age: 64
End: 2024-12-11
Payer: COMMERCIAL

## 2025-02-27 ENCOUNTER — LAB REQUISITION (OUTPATIENT)
Dept: LAB | Facility: CLINIC | Age: 65
End: 2025-02-27
Payer: COMMERCIAL

## 2025-02-27 DIAGNOSIS — I12.9 HYPERTENSIVE CHRONIC KIDNEY DISEASE WITH STAGE 1 THROUGH STAGE 4 CHRONIC KIDNEY DISEASE, OR UNSPECIFIED CHRONIC KIDNEY DISEASE: ICD-10-CM

## 2025-02-27 LAB
ANION GAP SERPL CALCULATED.3IONS-SCNC: 10 MMOL/L (ref 7–15)
BUN SERPL-MCNC: 25 MG/DL (ref 8–23)
CALCIUM SERPL-MCNC: 8.9 MG/DL (ref 8.8–10.4)
CHLORIDE SERPL-SCNC: 105 MMOL/L (ref 98–107)
CREAT SERPL-MCNC: 2.01 MG/DL (ref 0.67–1.17)
EGFRCR SERPLBLD CKD-EPI 2021: 36 ML/MIN/1.73M2
GLUCOSE SERPL-MCNC: 102 MG/DL (ref 70–99)
HCO3 SERPL-SCNC: 23 MMOL/L (ref 22–29)
POTASSIUM SERPL-SCNC: 4.2 MMOL/L (ref 3.4–5.3)
SODIUM SERPL-SCNC: 138 MMOL/L (ref 135–145)

## 2025-02-27 PROCEDURE — 80048 BASIC METABOLIC PNL TOTAL CA: CPT | Mod: ORL | Performed by: NURSE PRACTITIONER

## 2025-02-27 PROCEDURE — 36415 COLL VENOUS BLD VENIPUNCTURE: CPT | Mod: ORL | Performed by: NURSE PRACTITIONER

## (undated) DEVICE — SLING ARM LG 79-99157

## (undated) DEVICE — WRAP SHOULDER THERAPUTIC B-COOL 0814-3321

## (undated) DEVICE — MAGSTIM ELECTRODE NEUROSIGN 100 FACIAL NERVE

## (undated) DEVICE — BAG CLEAR TRASH 1.3M 39X33" P4040C

## (undated) DEVICE — SU PLAIN FAST ABSORB 5-0 PC-1 18" 1915G

## (undated) DEVICE — ESU GROUND PAD ADULT W/CORD E7507

## (undated) DEVICE — ESU HOLSTER PLASTIC DISP E2400

## (undated) DEVICE — SU ETHIBOND 1 OS-4 30" X518H

## (undated) DEVICE — SU ETHIBOND 1 CT-1 30" X425H

## (undated) DEVICE — RETR ELASTIC STAYS LONE STAR SHARP 5MM 8/PACK 3311-8G

## (undated) DEVICE — CATH TRAY FOLEY SURESTEP 16FR DRAIN BAG STATOCK A899916

## (undated) DEVICE — SYR 01ML 25GA 5/8" TBC

## (undated) DEVICE — PACK OPEN SHOULDER SOP15OCFSC

## (undated) DEVICE — SOL NACL 0.9% 20ML VIAL

## (undated) DEVICE — NDL 25GA 1.5" 305127

## (undated) DEVICE — SUCTION CANISTER MEDIVAC LINER 3000ML W/LID 65651-530

## (undated) DEVICE — DRSG TELFA 2X3"

## (undated) DEVICE — GLOVE PROTEXIS POWDER FREE 8.0 ORTHOPEDIC 2D73ET80

## (undated) DEVICE — PREP SKIN SCRUB TRAY 4461A

## (undated) DEVICE — LINEN TOWEL PACK X5 5464

## (undated) DEVICE — PEN MARKING SKIN W/LABELS 31145884

## (undated) DEVICE — DRAPE STERI U 1015

## (undated) DEVICE — BONE WAX 2.5GM W31G

## (undated) DEVICE — PACK MAJOR HEAD AND NECK RIDGES

## (undated) DEVICE — LINEN FULL SHEET 5511

## (undated) DEVICE — Device

## (undated) DEVICE — SOL NACL 0.9% IRRIG 1000ML BOTTLE 07138-09

## (undated) DEVICE — DRSG STERI STRIP 1/2X4" R1547

## (undated) DEVICE — LINEN TOWEL PACK X10 5473

## (undated) DEVICE — ESU HARMONIC FOCUS SHEARS CVD 9CM FSC9

## (undated) DEVICE — SU VICRYL 3-0 SH 27" UND J416H

## (undated) DEVICE — SOL NACL 0.9% IRRIG 1000ML BOTTLE 2F7124

## (undated) DEVICE — DRAIN JACKSON PRATT RESERVOIR 100ML SU130-1305

## (undated) DEVICE — LINEN HALF SHEET 5512

## (undated) DEVICE — NDL BLUNT 18GA 1.5" FILTER 305211

## (undated) DEVICE — SYR 10ML FINGER CONTROL W/O NDL 309695

## (undated) DEVICE — DRSG GAUZE 4X4" 3033

## (undated) DEVICE — SU SILK 2-0 TIE 24" SA75H

## (undated) DEVICE — GLOVE PROTEXIS W/NEU-THERA 8.5  2D73TE85

## (undated) DEVICE — ESU CORD BIPOLAR GREEN 10-4000

## (undated) DEVICE — DRAIN JACKSON PRATT 10FR ROUND SU130-1321

## (undated) DEVICE — PREP CHLORAPREP 26ML TINTED ORANGE  260815

## (undated) DEVICE — SU SILK 3-0 TIE 24" SA74H

## (undated) DEVICE — BLADE CLIPPER SGL USE 9680

## (undated) DEVICE — GLOVE PROTEXIS MICRO 8.0  2D73PM80

## (undated) DEVICE — PROBE NEUROSIGN MAGSTIM BIPOLAR 3601-00-TE

## (undated) DEVICE — DRAPE CONVERTORS U-DRAPE 60X72" 8476

## (undated) DEVICE — PREP DURAPREP 26ML APL 8630

## (undated) DEVICE — SU ETHILON 2-0 PS 18" 585H

## (undated) DEVICE — DRSG ADAPTIC 3X8" 6113

## (undated) DEVICE — STPL SKIN 35W APPOSE 8886803712

## (undated) DEVICE — DRAPE STERI TOWEL LG 1010

## (undated) DEVICE — ESU ELEC NDL 1" E1552

## (undated) DEVICE — DRSG ABDOMINAL 07 1/2X8" 7197D

## (undated) DEVICE — SU VICRYL 3-0 PS-1 18" UND J683

## (undated) RX ORDER — PROPOFOL 10 MG/ML
INJECTION, EMULSION INTRAVENOUS
Status: DISPENSED
Start: 2019-11-26

## (undated) RX ORDER — DEXAMETHASONE SODIUM PHOSPHATE 4 MG/ML
INJECTION, SOLUTION INTRA-ARTICULAR; INTRALESIONAL; INTRAMUSCULAR; INTRAVENOUS; SOFT TISSUE
Status: DISPENSED
Start: 2019-11-12

## (undated) RX ORDER — FENTANYL CITRATE 50 UG/ML
INJECTION, SOLUTION INTRAMUSCULAR; INTRAVENOUS
Status: DISPENSED
Start: 2019-11-26

## (undated) RX ORDER — GINSENG 100 MG
CAPSULE ORAL
Status: DISPENSED
Start: 2019-11-26

## (undated) RX ORDER — PROPOFOL 10 MG/ML
INJECTION, EMULSION INTRAVENOUS
Status: DISPENSED
Start: 2017-04-05

## (undated) RX ORDER — FENTANYL CITRATE 50 UG/ML
INJECTION, SOLUTION INTRAMUSCULAR; INTRAVENOUS
Status: DISPENSED
Start: 2017-04-12

## (undated) RX ORDER — CEFAZOLIN SODIUM 1 G/3ML
INJECTION, POWDER, FOR SOLUTION INTRAMUSCULAR; INTRAVENOUS
Status: DISPENSED
Start: 2019-11-26

## (undated) RX ORDER — FENTANYL CITRATE 50 UG/ML
INJECTION, SOLUTION INTRAMUSCULAR; INTRAVENOUS
Status: DISPENSED
Start: 2019-11-12

## (undated) RX ORDER — ACETAMINOPHEN 325 MG/1
TABLET ORAL
Status: DISPENSED
Start: 2019-11-26

## (undated) RX ORDER — GINSENG 100 MG
CAPSULE ORAL
Status: DISPENSED
Start: 2019-11-12

## (undated) RX ORDER — EPHEDRINE SULFATE 50 MG/ML
INJECTION, SOLUTION INTRAMUSCULAR; INTRAVENOUS; SUBCUTANEOUS
Status: DISPENSED
Start: 2019-11-26

## (undated) RX ORDER — NEOSTIGMINE METHYLSULFATE 1 MG/ML
VIAL (ML) INJECTION
Status: DISPENSED
Start: 2019-11-26

## (undated) RX ORDER — LIDOCAINE HYDROCHLORIDE 20 MG/ML
INJECTION, SOLUTION EPIDURAL; INFILTRATION; INTRACAUDAL; PERINEURAL
Status: DISPENSED
Start: 2017-04-05

## (undated) RX ORDER — CEFAZOLIN SODIUM 2 G/100ML
INJECTION, SOLUTION INTRAVENOUS
Status: DISPENSED
Start: 2019-11-12

## (undated) RX ORDER — GLYCOPYRROLATE 0.2 MG/ML
INJECTION INTRAMUSCULAR; INTRAVENOUS
Status: DISPENSED
Start: 2019-11-26

## (undated) RX ORDER — PHENYLEPHRINE HCL IN 0.9% NACL 1 MG/10 ML
SYRINGE (ML) INTRAVENOUS
Status: DISPENSED
Start: 2019-11-26

## (undated) RX ORDER — PROPOFOL 10 MG/ML
INJECTION, EMULSION INTRAVENOUS
Status: DISPENSED
Start: 2019-11-12

## (undated) RX ORDER — LIDOCAINE HYDROCHLORIDE 10 MG/ML
INJECTION, SOLUTION EPIDURAL; INFILTRATION; INTRACAUDAL; PERINEURAL
Status: DISPENSED
Start: 2019-11-12

## (undated) RX ORDER — ONDANSETRON 2 MG/ML
INJECTION INTRAMUSCULAR; INTRAVENOUS
Status: DISPENSED
Start: 2017-04-12

## (undated) RX ORDER — EPINEPHRINE 1 MG/ML(1)
AMPUL (ML) INJECTION
Status: DISPENSED
Start: 2019-11-26

## (undated) RX ORDER — DEXAMETHASONE SODIUM PHOSPHATE 4 MG/ML
INJECTION, SOLUTION INTRA-ARTICULAR; INTRALESIONAL; INTRAMUSCULAR; INTRAVENOUS; SOFT TISSUE
Status: DISPENSED
Start: 2019-11-26

## (undated) RX ORDER — ONDANSETRON 2 MG/ML
INJECTION INTRAMUSCULAR; INTRAVENOUS
Status: DISPENSED
Start: 2019-11-26

## (undated) RX ORDER — DEXAMETHASONE SODIUM PHOSPHATE 4 MG/ML
INJECTION, SOLUTION INTRA-ARTICULAR; INTRALESIONAL; INTRAMUSCULAR; INTRAVENOUS; SOFT TISSUE
Status: DISPENSED
Start: 2017-04-05

## (undated) RX ORDER — ONDANSETRON 2 MG/ML
INJECTION INTRAMUSCULAR; INTRAVENOUS
Status: DISPENSED
Start: 2017-04-05

## (undated) RX ORDER — FENTANYL CITRATE 50 UG/ML
INJECTION, SOLUTION INTRAMUSCULAR; INTRAVENOUS
Status: DISPENSED
Start: 2017-04-05

## (undated) RX ORDER — EPINEPHRINE 1 MG/ML(1)
AMPUL (ML) INJECTION
Status: DISPENSED
Start: 2019-11-12

## (undated) RX ORDER — LIDOCAINE HYDROCHLORIDE 10 MG/ML
INJECTION, SOLUTION EPIDURAL; INFILTRATION; INTRACAUDAL; PERINEURAL
Status: DISPENSED
Start: 2019-11-26

## (undated) RX ORDER — GLYCOPYRROLATE 0.2 MG/ML
INJECTION INTRAMUSCULAR; INTRAVENOUS
Status: DISPENSED
Start: 2019-11-12

## (undated) RX ORDER — CEFAZOLIN SODIUM 2 G/100ML
INJECTION, SOLUTION INTRAVENOUS
Status: DISPENSED
Start: 2017-04-12

## (undated) RX ORDER — ACETAMINOPHEN 325 MG/1
TABLET ORAL
Status: DISPENSED
Start: 2019-11-12

## (undated) RX ORDER — CEFAZOLIN SODIUM 2 G/100ML
INJECTION, SOLUTION INTRAVENOUS
Status: DISPENSED
Start: 2019-11-26